# Patient Record
Sex: FEMALE | Race: BLACK OR AFRICAN AMERICAN | NOT HISPANIC OR LATINO | URBAN - METROPOLITAN AREA
[De-identification: names, ages, dates, MRNs, and addresses within clinical notes are randomized per-mention and may not be internally consistent; named-entity substitution may affect disease eponyms.]

---

## 2018-07-31 ENCOUNTER — OUTPATIENT (OUTPATIENT)
Dept: OUTPATIENT SERVICES | Facility: HOSPITAL | Age: 33
LOS: 1 days | Discharge: HOME | End: 2018-07-31

## 2018-07-31 DIAGNOSIS — C92.00 ACUTE MYELOBLASTIC LEUKEMIA, NOT HAVING ACHIEVED REMISSION: ICD-10-CM

## 2018-08-13 ENCOUNTER — APPOINTMENT (OUTPATIENT)
Dept: HEMATOLOGY ONCOLOGY | Facility: CLINIC | Age: 33
End: 2018-08-13
Payer: COMMERCIAL

## 2018-08-13 ENCOUNTER — RESULT REVIEW (OUTPATIENT)
Age: 33
End: 2018-08-13

## 2018-08-13 VITALS
SYSTOLIC BLOOD PRESSURE: 143 MMHG | OXYGEN SATURATION: 98 % | HEART RATE: 87 BPM | DIASTOLIC BLOOD PRESSURE: 94 MMHG | WEIGHT: 187.39 LBS | RESPIRATION RATE: 16 BRPM | HEIGHT: 62.44 IN | TEMPERATURE: 98.4 F | BODY MASS INDEX: 33.62 KG/M2

## 2018-08-13 DIAGNOSIS — Z78.9 OTHER SPECIFIED HEALTH STATUS: ICD-10-CM

## 2018-08-13 LAB
BASOPHILS # BLD AUTO: 0.1 K/UL — SIGNIFICANT CHANGE UP (ref 0–0.2)
BASOPHILS NFR BLD AUTO: 1.9 % — SIGNIFICANT CHANGE UP (ref 0–2)
EOSINOPHIL # BLD AUTO: 0.2 K/UL — SIGNIFICANT CHANGE UP (ref 0–0.5)
EOSINOPHIL NFR BLD AUTO: 3.9 % — SIGNIFICANT CHANGE UP (ref 0–6)
HCT VFR BLD CALC: 39.7 % — SIGNIFICANT CHANGE UP (ref 34.5–45)
HGB BLD-MCNC: 13.7 G/DL — SIGNIFICANT CHANGE UP (ref 11.5–15.5)
LYMPHOCYTES # BLD AUTO: 1.8 K/UL — SIGNIFICANT CHANGE UP (ref 1–3.3)
LYMPHOCYTES # BLD AUTO: 38.2 % — SIGNIFICANT CHANGE UP (ref 13–44)
MCHC RBC-ENTMCNC: 34.5 G/DL — SIGNIFICANT CHANGE UP (ref 32–36)
MCHC RBC-ENTMCNC: 36.2 PG — HIGH (ref 27–34)
MCV RBC AUTO: 105 FL — HIGH (ref 80–100)
MONOCYTES # BLD AUTO: 0.3 K/UL — SIGNIFICANT CHANGE UP (ref 0–0.9)
MONOCYTES NFR BLD AUTO: 6.2 % — SIGNIFICANT CHANGE UP (ref 2–14)
NEUTROPHILS # BLD AUTO: 2.3 K/UL — SIGNIFICANT CHANGE UP (ref 1.8–7.4)
NEUTROPHILS NFR BLD AUTO: 49.7 % — SIGNIFICANT CHANGE UP (ref 43–77)
PLATELET # BLD AUTO: 279 K/UL — SIGNIFICANT CHANGE UP (ref 150–400)
RBC # BLD: 3.77 M/UL — LOW (ref 3.8–5.2)
RBC # FLD: 12.6 % — SIGNIFICANT CHANGE UP (ref 10.3–14.5)
WBC # BLD: 4.6 K/UL — SIGNIFICANT CHANGE UP (ref 3.8–10.5)
WBC # FLD AUTO: 4.6 K/UL — SIGNIFICANT CHANGE UP (ref 3.8–10.5)

## 2018-08-13 PROCEDURE — 99245 OFF/OP CONSLTJ NEW/EST HI 55: CPT

## 2018-09-04 ENCOUNTER — MESSAGE (OUTPATIENT)
Age: 33
End: 2018-09-04

## 2018-09-06 ENCOUNTER — OUTPATIENT (OUTPATIENT)
Dept: OUTPATIENT SERVICES | Facility: HOSPITAL | Age: 33
LOS: 1 days | End: 2018-09-06
Payer: COMMERCIAL

## 2018-09-06 DIAGNOSIS — C91.10 CHRONIC LYMPHOCYTIC LEUKEMIA OF B-CELL TYPE NOT HAVING ACHIEVED REMISSION: ICD-10-CM

## 2018-09-07 ENCOUNTER — APPOINTMENT (OUTPATIENT)
Dept: CV DIAGNOSITCS | Facility: HOSPITAL | Age: 33
End: 2018-09-07

## 2018-09-11 ENCOUNTER — FORM ENCOUNTER (OUTPATIENT)
Age: 33
End: 2018-09-11

## 2018-09-11 ENCOUNTER — MESSAGE (OUTPATIENT)
Age: 33
End: 2018-09-11

## 2018-09-12 ENCOUNTER — LABORATORY RESULT (OUTPATIENT)
Age: 33
End: 2018-09-12

## 2018-09-12 ENCOUNTER — OUTPATIENT (OUTPATIENT)
Dept: OUTPATIENT SERVICES | Facility: HOSPITAL | Age: 33
LOS: 1 days | End: 2018-09-12
Payer: COMMERCIAL

## 2018-09-12 ENCOUNTER — APPOINTMENT (OUTPATIENT)
Dept: HEMATOLOGY ONCOLOGY | Facility: CLINIC | Age: 33
End: 2018-09-12

## 2018-09-12 ENCOUNTER — RESULT REVIEW (OUTPATIENT)
Age: 33
End: 2018-09-12

## 2018-09-12 ENCOUNTER — APPOINTMENT (OUTPATIENT)
Dept: CV DIAGNOSITCS | Facility: HOSPITAL | Age: 33
End: 2018-09-12

## 2018-09-12 ENCOUNTER — APPOINTMENT (OUTPATIENT)
Dept: PULMONOLOGY | Facility: CLINIC | Age: 33
End: 2018-09-12
Payer: COMMERCIAL

## 2018-09-12 ENCOUNTER — OUTPATIENT (OUTPATIENT)
Dept: OUTPATIENT SERVICES | Facility: HOSPITAL | Age: 33
LOS: 1 days | Discharge: HOME | End: 2018-09-12
Payer: COMMERCIAL

## 2018-09-12 ENCOUNTER — APPOINTMENT (OUTPATIENT)
Dept: NUCLEAR MEDICINE | Facility: HOSPITAL | Age: 33
End: 2018-09-12

## 2018-09-12 DIAGNOSIS — C92.01 ACUTE MYELOBLASTIC LEUKEMIA, IN REMISSION: ICD-10-CM

## 2018-09-12 DIAGNOSIS — C92.00 ACUTE MYELOBLASTIC LEUKEMIA, NOT HAVING ACHIEVED REMISSION: ICD-10-CM

## 2018-09-12 LAB
BASOPHILS # BLD AUTO: 0.1 K/UL — SIGNIFICANT CHANGE UP (ref 0–0.2)
BASOPHILS NFR BLD AUTO: 0.8 % — SIGNIFICANT CHANGE UP (ref 0–2)
EOSINOPHIL # BLD AUTO: 0 K/UL — SIGNIFICANT CHANGE UP (ref 0–0.5)
EOSINOPHIL NFR BLD AUTO: 0.4 % — SIGNIFICANT CHANGE UP (ref 0–6)
HCT VFR BLD CALC: 41.7 % — SIGNIFICANT CHANGE UP (ref 34.5–45)
HGB BLD-MCNC: 13.8 G/DL — SIGNIFICANT CHANGE UP (ref 11.5–15.5)
LYMPHOCYTES # BLD AUTO: 1.6 K/UL — SIGNIFICANT CHANGE UP (ref 1–3.3)
LYMPHOCYTES # BLD AUTO: 23 % — SIGNIFICANT CHANGE UP (ref 13–44)
MCHC RBC-ENTMCNC: 33 PG — SIGNIFICANT CHANGE UP (ref 27–34)
MCHC RBC-ENTMCNC: 33.1 G/DL — SIGNIFICANT CHANGE UP (ref 32–36)
MCV RBC AUTO: 99.6 FL — SIGNIFICANT CHANGE UP (ref 80–100)
MONOCYTES # BLD AUTO: 0.4 K/UL — SIGNIFICANT CHANGE UP (ref 0–0.9)
MONOCYTES NFR BLD AUTO: 6 % — SIGNIFICANT CHANGE UP (ref 2–14)
NEUTROPHILS # BLD AUTO: 5 K/UL — SIGNIFICANT CHANGE UP (ref 1.8–7.4)
NEUTROPHILS NFR BLD AUTO: 69.8 % — SIGNIFICANT CHANGE UP (ref 43–77)
PLATELET # BLD AUTO: 275 K/UL — SIGNIFICANT CHANGE UP (ref 150–400)
RBC # BLD: 4.19 M/UL — SIGNIFICANT CHANGE UP (ref 3.8–5.2)
RBC # FLD: 12.2 % — SIGNIFICANT CHANGE UP (ref 10.3–14.5)
WBC # BLD: 7.1 K/UL — SIGNIFICANT CHANGE UP (ref 3.8–10.5)
WBC # FLD AUTO: 7.1 K/UL — SIGNIFICANT CHANGE UP (ref 3.8–10.5)

## 2018-09-12 PROCEDURE — ZZZZZ: CPT

## 2018-09-12 PROCEDURE — 82803 BLOOD GASES ANY COMBINATION: CPT

## 2018-09-12 PROCEDURE — 94726 PLETHYSMOGRAPHY LUNG VOLUMES: CPT

## 2018-09-12 PROCEDURE — 93306 TTE W/DOPPLER COMPLETE: CPT

## 2018-09-12 PROCEDURE — A9538: CPT

## 2018-09-12 PROCEDURE — 78472 GATED HEART PLANAR SINGLE: CPT

## 2018-09-12 PROCEDURE — 93306 TTE W/DOPPLER COMPLETE: CPT | Mod: 26

## 2018-09-12 PROCEDURE — 88321 CONSLTJ&REPRT SLD PREP ELSWR: CPT

## 2018-09-12 PROCEDURE — 94729 DIFFUSING CAPACITY: CPT

## 2018-09-12 PROCEDURE — 70220 X-RAY EXAM OF SINUSES: CPT

## 2018-09-12 PROCEDURE — 36600 WITHDRAWAL OF ARTERIAL BLOOD: CPT | Mod: 59

## 2018-09-12 PROCEDURE — 78472 GATED HEART PLANAR SINGLE: CPT | Mod: 26

## 2018-09-12 PROCEDURE — 94060 EVALUATION OF WHEEZING: CPT

## 2018-09-12 PROCEDURE — A9560: CPT

## 2018-09-12 PROCEDURE — 70220 X-RAY EXAM OF SINUSES: CPT | Mod: 26

## 2018-09-13 ENCOUNTER — MESSAGE (OUTPATIENT)
Age: 33
End: 2018-09-13

## 2018-09-27 ENCOUNTER — RESULT REVIEW (OUTPATIENT)
Age: 33
End: 2018-09-27

## 2018-09-27 ENCOUNTER — APPOINTMENT (OUTPATIENT)
Dept: HEMATOLOGY ONCOLOGY | Facility: CLINIC | Age: 33
End: 2018-09-27
Payer: COMMERCIAL

## 2018-09-27 VITALS
SYSTOLIC BLOOD PRESSURE: 136 MMHG | OXYGEN SATURATION: 100 % | DIASTOLIC BLOOD PRESSURE: 86 MMHG | BODY MASS INDEX: 34.78 KG/M2 | TEMPERATURE: 98.2 F | HEART RATE: 89 BPM | WEIGHT: 189 LBS | RESPIRATION RATE: 16 BRPM | HEIGHT: 62 IN

## 2018-09-27 LAB
ALBUMIN SERPL ELPH-MCNC: 4.3 G/DL
ALP BLD-CCNC: 55 U/L
ALT SERPL-CCNC: 5 U/L
ANION GAP SERPL CALC-SCNC: 16 MMOL/L
AST SERPL-CCNC: 10 U/L
BILIRUB SERPL-MCNC: 0.2 MG/DL
BUN SERPL-MCNC: 12 MG/DL
CALCIUM SERPL-MCNC: 9.4 MG/DL
CHLORIDE SERPL-SCNC: 103 MMOL/L
CO2 SERPL-SCNC: 24 MMOL/L
CREAT SERPL-MCNC: 0.81 MG/DL
GLUCOSE SERPL-MCNC: 101 MG/DL
HCT VFR BLD CALC: 40.1 % — SIGNIFICANT CHANGE UP (ref 34.5–45)
HGB BLD-MCNC: 13.5 G/DL — SIGNIFICANT CHANGE UP (ref 11.5–15.5)
MCHC RBC-ENTMCNC: 32.5 PG — SIGNIFICANT CHANGE UP (ref 27–34)
MCHC RBC-ENTMCNC: 33.7 G/DL — SIGNIFICANT CHANGE UP (ref 32–36)
MCV RBC AUTO: 96.5 FL — SIGNIFICANT CHANGE UP (ref 80–100)
PLATELET # BLD AUTO: 272 K/UL — SIGNIFICANT CHANGE UP (ref 150–400)
POTASSIUM SERPL-SCNC: 4.7 MMOL/L
PROT SERPL-MCNC: 6.6 G/DL
RBC # BLD: 4.16 M/UL — SIGNIFICANT CHANGE UP (ref 3.8–5.2)
RBC # FLD: 12.2 % — SIGNIFICANT CHANGE UP (ref 10.3–14.5)
SODIUM SERPL-SCNC: 143 MMOL/L
WBC # BLD: 7.2 K/UL — SIGNIFICANT CHANGE UP (ref 3.8–10.5)
WBC # FLD AUTO: 7.2 K/UL — SIGNIFICANT CHANGE UP (ref 3.8–10.5)

## 2018-09-27 PROCEDURE — 38222 DX BONE MARROW BX & ASPIR: CPT | Mod: LT

## 2018-09-27 PROCEDURE — 99215 OFFICE O/P EST HI 40 MIN: CPT | Mod: 25

## 2018-09-27 PROCEDURE — 88185 FLOWCYTOMETRY/TC ADD-ON: CPT

## 2018-09-27 PROCEDURE — 88264 CHROMOSOME ANALYSIS 20-25: CPT

## 2018-09-27 PROCEDURE — 88237 TISSUE CULTURE BONE MARROW: CPT

## 2018-09-27 PROCEDURE — 88184 FLOWCYTOMETRY/ TC 1 MARKER: CPT

## 2018-09-27 PROCEDURE — 88291 CYTO/MOLECULAR REPORT: CPT

## 2018-09-27 PROCEDURE — 88280 CHROMOSOME KARYOTYPE STUDY: CPT

## 2018-09-28 ENCOUNTER — RESULT REVIEW (OUTPATIENT)
Age: 33
End: 2018-09-28

## 2018-09-28 ENCOUNTER — LABORATORY RESULT (OUTPATIENT)
Age: 33
End: 2018-09-28

## 2018-09-28 LAB — HCG SERPL-MCNC: <1 MIU/ML

## 2018-10-02 LAB — TM INTERPRETATION: SIGNIFICANT CHANGE UP

## 2018-10-03 ENCOUNTER — APPOINTMENT (OUTPATIENT)
Dept: HEMATOLOGY ONCOLOGY | Facility: CLINIC | Age: 33
End: 2018-10-03

## 2018-10-08 ENCOUNTER — OUTPATIENT (OUTPATIENT)
Dept: OUTPATIENT SERVICES | Facility: HOSPITAL | Age: 33
LOS: 1 days | Discharge: HOME | End: 2018-10-08

## 2018-10-08 DIAGNOSIS — C92.00 ACUTE MYELOBLASTIC LEUKEMIA, NOT HAVING ACHIEVED REMISSION: ICD-10-CM

## 2018-10-12 LAB — CHROM ANALY OVERALL INTERP SPEC-IMP: SIGNIFICANT CHANGE UP

## 2018-10-16 ENCOUNTER — APPOINTMENT (OUTPATIENT)
Dept: INFUSION THERAPY | Facility: HOSPITAL | Age: 33
End: 2018-10-16

## 2018-10-16 ENCOUNTER — APPOINTMENT (OUTPATIENT)
Dept: HEMATOLOGY ONCOLOGY | Facility: CLINIC | Age: 33
End: 2018-10-16

## 2018-10-17 ENCOUNTER — APPOINTMENT (OUTPATIENT)
Dept: INFUSION THERAPY | Facility: HOSPITAL | Age: 33
End: 2018-10-17

## 2018-12-03 ENCOUNTER — INBOUND DOCUMENT (OUTPATIENT)
Age: 33
End: 2018-12-03

## 2018-12-28 ENCOUNTER — INBOUND DOCUMENT (OUTPATIENT)
Age: 33
End: 2018-12-28

## 2019-01-22 ENCOUNTER — OUTPATIENT (OUTPATIENT)
Dept: OUTPATIENT SERVICES | Facility: HOSPITAL | Age: 34
LOS: 1 days | Discharge: HOME | End: 2019-01-22

## 2019-01-22 DIAGNOSIS — C92.00 ACUTE MYELOBLASTIC LEUKEMIA, NOT HAVING ACHIEVED REMISSION: ICD-10-CM

## 2019-01-29 ENCOUNTER — OUTPATIENT (OUTPATIENT)
Dept: OUTPATIENT SERVICES | Facility: HOSPITAL | Age: 34
LOS: 1 days | End: 2019-01-29

## 2019-01-29 ENCOUNTER — LABORATORY RESULT (OUTPATIENT)
Age: 34
End: 2019-01-29

## 2019-01-29 ENCOUNTER — APPOINTMENT (OUTPATIENT)
Dept: HEMATOLOGY ONCOLOGY | Facility: CLINIC | Age: 34
End: 2019-01-29
Payer: COMMERCIAL

## 2019-01-29 ENCOUNTER — RESULT REVIEW (OUTPATIENT)
Age: 34
End: 2019-01-29

## 2019-01-29 VITALS
SYSTOLIC BLOOD PRESSURE: 143 MMHG | OXYGEN SATURATION: 100 % | TEMPERATURE: 98.5 F | RESPIRATION RATE: 16 BRPM | WEIGHT: 198.2 LBS | HEART RATE: 84 BPM | BODY MASS INDEX: 36.25 KG/M2 | DIASTOLIC BLOOD PRESSURE: 90 MMHG

## 2019-01-29 DIAGNOSIS — C92.00 ACUTE MYELOBLASTIC LEUKEMIA, NOT HAVING ACHIEVED REMISSION: ICD-10-CM

## 2019-01-29 LAB
BASOPHILS # BLD AUTO: 0 K/UL — SIGNIFICANT CHANGE UP (ref 0–0.2)
BASOPHILS NFR BLD AUTO: 1.3 % — SIGNIFICANT CHANGE UP (ref 0–2)
EOSINOPHIL # BLD AUTO: 0 K/UL — SIGNIFICANT CHANGE UP (ref 0–0.5)
EOSINOPHIL NFR BLD AUTO: 0.7 % — SIGNIFICANT CHANGE UP (ref 0–6)
HCT VFR BLD CALC: 38.1 % — SIGNIFICANT CHANGE UP (ref 34.5–45)
HGB BLD-MCNC: 13.3 G/DL — SIGNIFICANT CHANGE UP (ref 11.5–15.5)
LYMPHOCYTES # BLD AUTO: 1.7 K/UL — SIGNIFICANT CHANGE UP (ref 1–3.3)
LYMPHOCYTES # BLD AUTO: 48.3 % — HIGH (ref 13–44)
MCHC RBC-ENTMCNC: 32.5 PG — SIGNIFICANT CHANGE UP (ref 27–34)
MCHC RBC-ENTMCNC: 35 G/DL — SIGNIFICANT CHANGE UP (ref 32–36)
MCV RBC AUTO: 92.9 FL — SIGNIFICANT CHANGE UP (ref 80–100)
MONOCYTES # BLD AUTO: 0.1 K/UL — SIGNIFICANT CHANGE UP (ref 0–0.9)
MONOCYTES NFR BLD AUTO: 3.4 % — SIGNIFICANT CHANGE UP (ref 2–14)
NEUTROPHILS # BLD AUTO: 1.7 K/UL — LOW (ref 1.8–7.4)
NEUTROPHILS NFR BLD AUTO: 46.3 % — SIGNIFICANT CHANGE UP (ref 43–77)
PLATELET # BLD AUTO: 470 K/UL — HIGH (ref 150–400)
RBC # BLD: 4.1 M/UL — SIGNIFICANT CHANGE UP (ref 3.8–5.2)
RBC # FLD: 14.3 % — SIGNIFICANT CHANGE UP (ref 10.3–14.5)
WBC # BLD: 3.6 K/UL — LOW (ref 3.8–10.5)
WBC # FLD AUTO: 3.6 K/UL — LOW (ref 3.8–10.5)

## 2019-01-29 PROCEDURE — 99215 OFFICE O/P EST HI 40 MIN: CPT

## 2019-01-31 ENCOUNTER — TRANSCRIPTION ENCOUNTER (OUTPATIENT)
Age: 34
End: 2019-01-31

## 2019-01-31 ENCOUNTER — MESSAGE (OUTPATIENT)
Age: 34
End: 2019-01-31

## 2019-02-04 NOTE — ADDENDUM
[FreeTextEntry1] : Documented by Yolanda López acting as a scribe for Dr. Branden Lou on 1/29/2019.\par \par All medical record entries made by the Scribe were at my, Dr. Branden Lou's, direction and personally dictated by me on 1/29/2019. I have reviewed the chart and agree that  the record accurately reflects my personal performance of the history, physical exam, assessment and plan. I have also personally directed, reviewed, and agree with the discharge instructions.\par

## 2019-02-04 NOTE — ASSESSMENT
[FreeTextEntry1] : Ms. Gary is a 33 years old female with AML, presenting today for a f/u AlloPSCT consultation. Patient is a practicing Yazidi. Accompanied by her  today. \par \par Patient's sister aged 33 () is Typed - HLA identical. \par Patient also has a sister aged 35 (Typed - 0/10) and a brother aged 29 (Type pending) with no significant medical history. \par \par I have again reviewed with the patient the rationale, alternatives, risks and benefits of treatment with chemotherapy alone vs.chemotherapy plus AlloPSCT w/o transfusion support\par \par I have also reviewed the pre-transplant testing for vital organ testing including, but not limited to ECHO, MUGA, PFTs, Urine analysis, BM Bx, LFTs and Dental evaluation. These are complete.\par \par I discussed with the patient, the role of a donor. I have reviewed with the patient the donor screening process including a physical exam, and the peripheral stem cell collection process. I discussed the role of Neupogen for 5 days prior collection. Side effects including but not limited to fever, body aches from Neupogen injections was discussed. \par \par I have also discussed the process of apheresis as a way to collect the peripheral stem cells. Anticoagulation with Citrate during apheresis was discussed, along with side effects including but not limited to hypocalcemia mild dysesthesias (most common) to tetany, seizures and cardiac arrhythmias. Treatment with oral or intravenous calcium supplementation in the setting of hypocalcemia was also discussed. \par \par I have discussed with the patient the pre-administration of Kepivance IV x 3 days, as a GI prophylaxis with the aim to lessen swelling, irritation, sores, and ulcers in the GI tract caused by high dose chemotherapy. Side effects including but not limited to flushing, itching from Kepivance were also discussed. \par \par I have discussed with the patient the four week hospital stay for the transplant. I have reviewed with the patient the initial reduced intensity chemotherapy with Fludarabine/Busulfan in the pre-transplant state. The patient will receive the stem cells by a pic line. I have discussed the role of chemotherapy following the transplant on day 1, 3, 6 and day 11. I have discussed the role of immunosuppressants in the post-transplant state. I have discussed the role of supportive care in the weeks following the transplant with palliative care without blood products. \par \par I discussed with the patient, post-transplant precautions including the use of antiviral, antifungal, antibiotics, and immunosuppressant. I discussed with the patient post-transplant complications including but not limited to bleeding, infection, end-organ damage, mucositis, veno-occlusive disease, graft failure, graft rejection, progression of disease as well as GVHD (both acute and chronic). Possible symptoms of GVHD including, but not limited to, rash, diarrhea, nausea, vomiting, mouth sores, liver inflammation were discussed. I informed the patient of post-discharge expectations as well, including fatigue and “flu-like” symptoms. Post-discharge precautions including diet and crowd control discussed as well. I reviewed with the patient the risks and benefits of this procedure. I reviewed the timing of the transplant with the patient. \par \par I informed the patient that there is a 15% risk of a catastrophic event to occur in patient's who accept transfusion and who receive blood products. Risks  including but not limited to relapse, graft rejection, severe infection and death were discussed in  the first year post-transplant.  In this case the risk is higher. Also, discussed with the patient that the risk of relapse decreases with each additional year post-transplant, and that at the 5 year gonzalez cure may be achieved. \par \par Literature and consents for AlloPSCT were provided to the patient for review. Patient wishes to proceed. All questions were addressed and emotional support provided.\par \par She fully understands her current situation and its gravity as well as the risks of allo transplant without blood product support. Discussed our protocol for bloodless transplants\par \par Bone marrow biopsy 19 shows 4% blasts, previously showed 5% blasts (18).\par Patient is scheduled to see Dr. Christie in 2019. Will discuss further with Dr. 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 \par She remains on vidazza plus venetoclax\par IDM's to be done today. Patient has completed other pre testing. Donor has been cleared.\par Possible admission for transplant on \par Sister is to start GF injections on , and collect on .\par Plan to transplant on .\par pt will need pre admit blood work

## 2019-02-04 NOTE — REASON FOR VISIT
[Follow-Up Visit] : a follow-up visit for [Spouse] : spouse [Parent] : parent [FreeTextEntry2] : AML

## 2019-02-04 NOTE — RESULTS/DATA
[FreeTextEntry1] : Bone marrow biopsy 01/24/19:\par -Persistent minimal involvement by acute myeloid leukemia (4% blasts by aspirate differential count).\par -Hypocellular marrow with erythroid predominate trilineage maturing hematopoiesis. 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

## 2019-02-04 NOTE — HISTORY OF PRESENT ILLNESS
[de-identified] : Ms. 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 Of note patient is a Taoism and cannot receive blood products. \par \par Patient was noted to be newly leukopenic to WBC 1.7 during preoperative evaluation in anticipation of right ankle ligament repair. Bone marrow aspirate and biopsy at [facility] on 12/2/2017 revealed AML with some suspicion for APL. She was transferred to [place] for further management. Peripheral counts from around this time are not available. \par \par Repeat marrow on 12/26/2017 showed 90% blasts with myeloid mutation arrest in a 40-50% cellular marrow. On flow blasts were negative for CD34 and HLA-DR; positive for CD38, CD58, CD33, and MPO, and partially positive for CD15, CD11b, , and CD64. CD45 was reported as dim. Karyotype showed +21 in 3 of 21 metaphases, but no t(15:17) was detected on karyotype of FISH. Molecular studies showed mutations in NPM1 (45%0, IDH1 (R132S; 39%), PTPN1 (23%), and CSF3R (5%) genes. There was lingering diagnostic uncertainty due to a largely aspicular and hypocellular specimen, and thus a marrow was repeated on 1/2/2018, confirming AML.\par \par On 1/4/2018, Ms. Gary began 7+3 with Idarubicin. Her induction course was complicated by DIC, neutropenic fever/ Klebsiella PNA, and bilateral retinal hemorrhage. She did not receive blood products and was instead supported with Nplate, Procrit, IV iron, Amicar, and Lupron. After achieving count stability, the patient was discharged; she was not deemed a candidate for further therapy given inability to receive transfusions and a day 14 marrow was not obtained. She subsequently saw Dr. [place] at [facility]. He obtained a recovery marrow on 2/13/2018 (day +40), which was 30% cellular with 45% blasts. 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 Cytogenetics and FISH were normal. \par \par She then established care with Dr. Christie for consideration of salvage therapy with IDH1 inhibition given the risks of additional chemotherapy without blood products support. [facility], on 3/1/2018, showed 69% blasts. [facility] panel confirmed IDH1 R132S, NPM1 V470Vkz 12, and CSF3R D810G mutations. She was started on Ivosidenib on [facility] protocol  and had completed four cycles to date (6/28/2018 = C5D1). Marrow blasts increased to 85% on 4/30 but declined to 36% by 5/31. A  bone marrow aspirate and biopsy  was obtained  (6/28/2018); preliminary results show further responding disease with 10.3% blasts by flow cytometry. Subsequent marrow in oct showed 5% blasts...she was then started on vidazza plus venetoclax. She completed 2 cycles...f/u marrow with 4% blasts..... [de-identified] : Patient presents today for a follow up AlloPSCT consultation. She is accompanied by her . She is continuing on Vidaza, next cycle starts 02/04, and daily Venetoclax. She does not offer any acute concerns today. Denies fever/chills, night sweats, mouth sores, eye dryness, blurred vision, nausea, vomiting, diarrhea. No CP, SOB or LE edema. She is also taking iron, allergy medications, vitamins B1, B12, and D3, and vitamins. D/W Dr Christie..pt is behaving more like MDS with residual blasts....

## 2019-02-13 ENCOUNTER — INBOUND DOCUMENT (OUTPATIENT)
Age: 34
End: 2019-02-13

## 2019-02-19 ENCOUNTER — APPOINTMENT (OUTPATIENT)
Dept: HEMATOLOGY ONCOLOGY | Facility: CLINIC | Age: 34
End: 2019-02-19

## 2019-02-19 ENCOUNTER — RESULT REVIEW (OUTPATIENT)
Age: 34
End: 2019-02-19

## 2019-02-19 LAB
BASOPHILS # BLD AUTO: 0 K/UL — SIGNIFICANT CHANGE UP (ref 0–0.2)
BASOPHILS NFR BLD AUTO: 0.5 % — SIGNIFICANT CHANGE UP (ref 0–2)
EOSINOPHIL # BLD AUTO: 0 K/UL — SIGNIFICANT CHANGE UP (ref 0–0.5)
EOSINOPHIL NFR BLD AUTO: 0.2 % — SIGNIFICANT CHANGE UP (ref 0–6)
HCT VFR BLD CALC: 37 % — SIGNIFICANT CHANGE UP (ref 34.5–45)
HGB BLD-MCNC: 12.8 G/DL — SIGNIFICANT CHANGE UP (ref 11.5–15.5)
LYMPHOCYTES # BLD AUTO: 2 K/UL — SIGNIFICANT CHANGE UP (ref 1–3.3)
LYMPHOCYTES # BLD AUTO: 45.7 % — HIGH (ref 13–44)
MCHC RBC-ENTMCNC: 32.5 PG — SIGNIFICANT CHANGE UP (ref 27–34)
MCHC RBC-ENTMCNC: 34.5 G/DL — SIGNIFICANT CHANGE UP (ref 32–36)
MCV RBC AUTO: 94.2 FL — SIGNIFICANT CHANGE UP (ref 80–100)
MONOCYTES # BLD AUTO: 0.3 K/UL — SIGNIFICANT CHANGE UP (ref 0–0.9)
MONOCYTES NFR BLD AUTO: 6.8 % — SIGNIFICANT CHANGE UP (ref 2–14)
NEUTROPHILS # BLD AUTO: 2 K/UL — SIGNIFICANT CHANGE UP (ref 1.8–7.4)
NEUTROPHILS NFR BLD AUTO: 46.8 % — SIGNIFICANT CHANGE UP (ref 43–77)
PLATELET # BLD AUTO: 529 K/UL — HIGH (ref 150–400)
RBC # BLD: 3.93 M/UL — SIGNIFICANT CHANGE UP (ref 3.8–5.2)
RBC # FLD: 14.1 % — SIGNIFICANT CHANGE UP (ref 10.3–14.5)
WBC # BLD: 4.3 K/UL — SIGNIFICANT CHANGE UP (ref 3.8–10.5)
WBC # FLD AUTO: 4.3 K/UL — SIGNIFICANT CHANGE UP (ref 3.8–10.5)

## 2019-02-20 ENCOUNTER — INPATIENT (INPATIENT)
Facility: HOSPITAL | Age: 34
LOS: 25 days | Discharge: HOME | DRG: 14 | End: 2019-03-18
Attending: INTERNAL MEDICINE | Admitting: INTERNAL MEDICINE
Payer: COMMERCIAL

## 2019-02-20 VITALS — WEIGHT: 193.79 LBS | HEIGHT: 62.2 IN

## 2019-02-20 DIAGNOSIS — Z76.82 AWAITING ORGAN TRANSPLANT STATUS: ICD-10-CM

## 2019-02-20 DIAGNOSIS — Z51.5 ENCOUNTER FOR PALLIATIVE CARE: ICD-10-CM

## 2019-02-20 DIAGNOSIS — C92.00 ACUTE MYELOBLASTIC LEUKEMIA, NOT HAVING ACHIEVED REMISSION: ICD-10-CM

## 2019-02-20 DIAGNOSIS — Z90.3 ACQUIRED ABSENCE OF STOMACH [PART OF]: Chronic | ICD-10-CM

## 2019-02-20 DIAGNOSIS — D46.9 MYELODYSPLASTIC SYNDROME, UNSPECIFIED: ICD-10-CM

## 2019-02-20 LAB
ALBUMIN SERPL ELPH-MCNC: 4.2 G/DL — SIGNIFICANT CHANGE UP (ref 3.3–5)
ALP SERPL-CCNC: 54 U/L — SIGNIFICANT CHANGE UP (ref 40–120)
ALT FLD-CCNC: 5 U/L — LOW (ref 10–45)
ANION GAP SERPL CALC-SCNC: 11 MMOL/L — SIGNIFICANT CHANGE UP (ref 5–17)
APPEARANCE UR: CLEAR — SIGNIFICANT CHANGE UP
APTT BLD: 27 SEC — LOW (ref 27.5–36.3)
APTT BLD: 32.4 SEC
AST SERPL-CCNC: 15 U/L — SIGNIFICANT CHANGE UP (ref 10–40)
BASOPHILS # BLD AUTO: 0.1 K/UL — SIGNIFICANT CHANGE UP (ref 0–0.2)
BASOPHILS NFR BLD AUTO: 1.3 % — SIGNIFICANT CHANGE UP (ref 0–2)
BILIRUB SERPL-MCNC: 0.3 MG/DL — SIGNIFICANT CHANGE UP (ref 0.2–1.2)
BILIRUB UR-MCNC: NEGATIVE — SIGNIFICANT CHANGE UP
BLD GP AB SCN SERPL QL: NEGATIVE — SIGNIFICANT CHANGE UP
BUN SERPL-MCNC: 8 MG/DL — SIGNIFICANT CHANGE UP (ref 7–23)
CALCIUM SERPL-MCNC: 9.7 MG/DL — SIGNIFICANT CHANGE UP (ref 8.4–10.5)
CHLORIDE SERPL-SCNC: 104 MMOL/L — SIGNIFICANT CHANGE UP (ref 96–108)
CO2 SERPL-SCNC: 24 MMOL/L — SIGNIFICANT CHANGE UP (ref 22–31)
COLOR SPEC: SIGNIFICANT CHANGE UP
CREAT SERPL-MCNC: 1.05 MG/DL — SIGNIFICANT CHANGE UP (ref 0.5–1.3)
DIFF PNL FLD: NEGATIVE — SIGNIFICANT CHANGE UP
EOSINOPHIL # BLD AUTO: 0 K/UL — SIGNIFICANT CHANGE UP (ref 0–0.5)
EOSINOPHIL NFR BLD AUTO: 0.1 % — SIGNIFICANT CHANGE UP (ref 0–6)
FIBRINOGEN PPP-MCNC: 601 MG/DL — HIGH (ref 350–510)
GLUCOSE SERPL-MCNC: 84 MG/DL — SIGNIFICANT CHANGE UP (ref 70–99)
GLUCOSE UR QL: NEGATIVE — SIGNIFICANT CHANGE UP
HCG SERPL-ACNC: <2 MIU/ML — SIGNIFICANT CHANGE UP
HCG SERPL-MCNC: <1 MIU/ML
HCT VFR BLD CALC: 35.1 % — SIGNIFICANT CHANGE UP (ref 34.5–45)
HGB BLD-MCNC: 12.4 G/DL — SIGNIFICANT CHANGE UP (ref 11.5–15.5)
INR BLD: 1.02 RATIO — SIGNIFICANT CHANGE UP (ref 0.88–1.16)
INR PPP: 1.01 RATIO
KETONES UR-MCNC: NEGATIVE — SIGNIFICANT CHANGE UP
LDH SERPL L TO P-CCNC: 175 U/L — SIGNIFICANT CHANGE UP (ref 50–242)
LEUKOCYTE ESTERASE UR-ACNC: NEGATIVE — SIGNIFICANT CHANGE UP
LYMPHOCYTES # BLD AUTO: 2 K/UL — SIGNIFICANT CHANGE UP (ref 1–3.3)
LYMPHOCYTES # BLD AUTO: 40.9 % — SIGNIFICANT CHANGE UP (ref 13–44)
MAGNESIUM SERPL-MCNC: 2.1 MG/DL — SIGNIFICANT CHANGE UP (ref 1.6–2.6)
MCHC RBC-ENTMCNC: 32.8 PG — SIGNIFICANT CHANGE UP (ref 27–34)
MCHC RBC-ENTMCNC: 35.3 GM/DL — SIGNIFICANT CHANGE UP (ref 32–36)
MCV RBC AUTO: 92.8 FL — SIGNIFICANT CHANGE UP (ref 80–100)
MONOCYTES # BLD AUTO: 0.3 K/UL — SIGNIFICANT CHANGE UP (ref 0–0.9)
MONOCYTES NFR BLD AUTO: 6.7 % — SIGNIFICANT CHANGE UP (ref 2–14)
NEUTROPHILS # BLD AUTO: 2.5 K/UL — SIGNIFICANT CHANGE UP (ref 1.8–7.4)
NEUTROPHILS NFR BLD AUTO: 51 % — SIGNIFICANT CHANGE UP (ref 43–77)
NITRITE UR-MCNC: NEGATIVE — SIGNIFICANT CHANGE UP
PH UR: 7 — SIGNIFICANT CHANGE UP (ref 5–8)
PHOSPHATE SERPL-MCNC: 2.9 MG/DL — SIGNIFICANT CHANGE UP (ref 2.5–4.5)
PLATELET # BLD AUTO: 472 K/UL — HIGH (ref 150–400)
POTASSIUM SERPL-MCNC: 4.1 MMOL/L — SIGNIFICANT CHANGE UP (ref 3.5–5.3)
POTASSIUM SERPL-SCNC: 4.1 MMOL/L — SIGNIFICANT CHANGE UP (ref 3.5–5.3)
PROT SERPL-MCNC: 6.8 G/DL — SIGNIFICANT CHANGE UP (ref 6–8.3)
PROT UR-MCNC: SIGNIFICANT CHANGE UP
PROTHROM AB SERPL-ACNC: 11.7 SEC — SIGNIFICANT CHANGE UP (ref 10–12.9)
PT BLD: 11.6 SEC
RBC # BLD: 3.79 M/UL — LOW (ref 3.8–5.2)
RBC # BLD: 3.79 M/UL — LOW (ref 3.8–5.2)
RBC # FLD: 14 % — SIGNIFICANT CHANGE UP (ref 10.3–14.5)
RETICS #: 35.3 K/UL — SIGNIFICANT CHANGE UP (ref 25–125)
RETICS/RBC NFR: 0.9 % — SIGNIFICANT CHANGE UP (ref 0.5–2.5)
RH IG SCN BLD-IMP: POSITIVE — SIGNIFICANT CHANGE UP
SODIUM SERPL-SCNC: 139 MMOL/L — SIGNIFICANT CHANGE UP (ref 135–145)
SP GR SPEC: 1.02 — SIGNIFICANT CHANGE UP (ref 1.01–1.02)
UROBILINOGEN FLD QL: SIGNIFICANT CHANGE UP
WBC # BLD: 5 K/UL — SIGNIFICANT CHANGE UP (ref 3.8–10.5)
WBC # FLD AUTO: 5 K/UL — SIGNIFICANT CHANGE UP (ref 3.8–10.5)

## 2019-02-20 PROCEDURE — 99223 1ST HOSP IP/OBS HIGH 75: CPT

## 2019-02-20 PROCEDURE — 99291 CRITICAL CARE FIRST HOUR: CPT

## 2019-02-20 PROCEDURE — 36573 INSJ PICC RS&I 5 YR+: CPT

## 2019-02-20 PROCEDURE — 93010 ELECTROCARDIOGRAM REPORT: CPT

## 2019-02-20 RX ORDER — MONTELUKAST 4 MG/1
10 TABLET, CHEWABLE ORAL DAILY
Qty: 0 | Refills: 0 | Status: DISCONTINUED | OUTPATIENT
Start: 2019-02-20 | End: 2019-03-18

## 2019-02-20 RX ORDER — PHYTONADIONE (VIT K1) 5 MG
10 TABLET ORAL
Qty: 0 | Refills: 0 | Status: DISCONTINUED | OUTPATIENT
Start: 2019-02-20 | End: 2019-02-26

## 2019-02-20 RX ORDER — SODIUM BICARBONATE 1 MEQ/ML
10 SYRINGE (ML) INTRAVENOUS
Qty: 0 | Refills: 0 | Status: DISCONTINUED | OUTPATIENT
Start: 2019-02-20 | End: 2019-03-18

## 2019-02-20 RX ORDER — APREPITANT 80 MG/1
80 CAPSULE ORAL DAILY
Qty: 0 | Refills: 0 | Status: COMPLETED | OUTPATIENT
Start: 2019-02-21 | End: 2019-02-27

## 2019-02-20 RX ORDER — ATOVAQUONE 750 MG/5ML
750 SUSPENSION ORAL EVERY 12 HOURS
Qty: 0 | Refills: 0 | Status: DISCONTINUED | OUTPATIENT
Start: 2019-02-20 | End: 2019-03-18

## 2019-02-20 RX ORDER — FLUCONAZOLE 150 MG/1
400 TABLET ORAL DAILY
Qty: 0 | Refills: 0 | Status: DISCONTINUED | OUTPATIENT
Start: 2019-02-20 | End: 2019-02-20

## 2019-02-20 RX ORDER — SENNA PLUS 8.6 MG/1
2 TABLET ORAL AT BEDTIME
Qty: 0 | Refills: 0 | Status: DISCONTINUED | OUTPATIENT
Start: 2019-02-20 | End: 2019-03-15

## 2019-02-20 RX ORDER — ACETAMINOPHEN 500 MG
650 TABLET ORAL EVERY 6 HOURS
Qty: 0 | Refills: 0 | Status: DISCONTINUED | OUTPATIENT
Start: 2019-02-20 | End: 2019-03-18

## 2019-02-20 RX ORDER — ASCORBIC ACID 60 MG
250 TABLET,CHEWABLE ORAL DAILY
Qty: 0 | Refills: 0 | Status: DISCONTINUED | OUTPATIENT
Start: 2019-02-20 | End: 2019-03-18

## 2019-02-20 RX ORDER — CHOLECALCIFEROL (VITAMIN D3) 125 MCG
400 CAPSULE ORAL DAILY
Qty: 0 | Refills: 0 | Status: DISCONTINUED | OUTPATIENT
Start: 2019-02-20 | End: 2019-03-18

## 2019-02-20 RX ORDER — HEPARIN SODIUM 5000 [USP'U]/ML
366 INJECTION INTRAVENOUS; SUBCUTANEOUS
Qty: 25000 | Refills: 0 | Status: DISCONTINUED | OUTPATIENT
Start: 2019-02-20 | End: 2019-03-06

## 2019-02-20 RX ORDER — DOCUSATE SODIUM 100 MG
100 CAPSULE ORAL THREE TIMES A DAY
Qty: 0 | Refills: 0 | Status: DISCONTINUED | OUTPATIENT
Start: 2019-02-20 | End: 2019-03-18

## 2019-02-20 RX ORDER — FERROUS SULFATE 325(65) MG
325 TABLET ORAL THREE TIMES A DAY
Qty: 0 | Refills: 0 | Status: DISCONTINUED | OUTPATIENT
Start: 2019-02-20 | End: 2019-03-08

## 2019-02-20 RX ORDER — ONDANSETRON 8 MG/1
8 TABLET, FILM COATED ORAL EVERY 8 HOURS
Qty: 0 | Refills: 0 | Status: COMPLETED | OUTPATIENT
Start: 2019-02-20 | End: 2019-02-28

## 2019-02-20 RX ORDER — CLOTRIMAZOLE 10 MG
1 TROCHE MUCOUS MEMBRANE
Qty: 0 | Refills: 0 | Status: DISCONTINUED | OUTPATIENT
Start: 2019-02-20 | End: 2019-03-18

## 2019-02-20 RX ORDER — CHLORHEXIDINE GLUCONATE 213 G/1000ML
1 SOLUTION TOPICAL
Qty: 0 | Refills: 0 | Status: DISCONTINUED | OUTPATIENT
Start: 2019-02-20 | End: 2019-03-18

## 2019-02-20 RX ORDER — FOLIC ACID 0.8 MG
1 TABLET ORAL DAILY
Qty: 0 | Refills: 0 | Status: DISCONTINUED | OUTPATIENT
Start: 2019-02-20 | End: 2019-03-18

## 2019-02-20 RX ORDER — LEVETIRACETAM 250 MG/1
500 TABLET, FILM COATED ORAL
Qty: 0 | Refills: 0 | Status: COMPLETED | OUTPATIENT
Start: 2019-02-22 | End: 2019-02-25

## 2019-02-20 RX ORDER — ZOLPIDEM TARTRATE 10 MG/1
5 TABLET ORAL AT BEDTIME
Qty: 0 | Refills: 0 | Status: DISCONTINUED | OUTPATIENT
Start: 2019-02-20 | End: 2019-02-20

## 2019-02-20 RX ORDER — DIPHENHYDRAMINE HCL 50 MG
25 CAPSULE ORAL EVERY 4 HOURS
Qty: 0 | Refills: 0 | Status: DISCONTINUED | OUTPATIENT
Start: 2019-02-20 | End: 2019-03-18

## 2019-02-20 RX ORDER — APREPITANT 80 MG/1
125 CAPSULE ORAL ONCE
Qty: 0 | Refills: 0 | Status: COMPLETED | OUTPATIENT
Start: 2019-02-20 | End: 2019-02-20

## 2019-02-20 RX ORDER — METOCLOPRAMIDE HCL 10 MG
10 TABLET ORAL EVERY 6 HOURS
Qty: 0 | Refills: 0 | Status: DISCONTINUED | OUTPATIENT
Start: 2019-02-20 | End: 2019-03-18

## 2019-02-20 RX ORDER — SODIUM CHLORIDE 9 MG/ML
1000 INJECTION INTRAMUSCULAR; INTRAVENOUS; SUBCUTANEOUS
Qty: 0 | Refills: 0 | Status: DISCONTINUED | OUTPATIENT
Start: 2019-02-20 | End: 2019-03-18

## 2019-02-20 RX ORDER — VORICONAZOLE 10 MG/ML
200 INJECTION, POWDER, LYOPHILIZED, FOR SOLUTION INTRAVENOUS EVERY 12 HOURS
Qty: 0 | Refills: 0 | Status: DISCONTINUED | OUTPATIENT
Start: 2019-02-20 | End: 2019-03-07

## 2019-02-20 RX ORDER — ACYCLOVIR SODIUM 500 MG
400 VIAL (EA) INTRAVENOUS THREE TIMES A DAY
Qty: 0 | Refills: 0 | Status: DISCONTINUED | OUTPATIENT
Start: 2019-02-20 | End: 2019-03-07

## 2019-02-20 RX ORDER — MEDROXYPROGESTERONE ACETATE 150 MG/ML
10 INJECTION, SUSPENSION, EXTENDED RELEASE INTRAMUSCULAR DAILY
Qty: 0 | Refills: 0 | Status: DISCONTINUED | OUTPATIENT
Start: 2019-02-20 | End: 2019-03-08

## 2019-02-20 RX ORDER — PANTOPRAZOLE SODIUM 20 MG/1
40 TABLET, DELAYED RELEASE ORAL
Qty: 0 | Refills: 0 | Status: DISCONTINUED | OUTPATIENT
Start: 2019-02-20 | End: 2019-03-07

## 2019-02-20 RX ORDER — SALIVA SUBSTITUTE COMB NO.11 351 MG
5 POWDER IN PACKET (EA) MUCOUS MEMBRANE
Qty: 0 | Refills: 0 | Status: DISCONTINUED | OUTPATIENT
Start: 2019-02-20 | End: 2019-03-18

## 2019-02-20 RX ORDER — FLUDARABINE PHOSPHATE 25 MG/ML
49 INJECTION, SOLUTION INTRAVENOUS EVERY 24 HOURS
Qty: 0 | Refills: 0 | Status: COMPLETED | OUTPATIENT
Start: 2019-02-20 | End: 2019-02-24

## 2019-02-20 RX ORDER — SODIUM CHLORIDE 9 MG/ML
10 INJECTION INTRAMUSCULAR; INTRAVENOUS; SUBCUTANEOUS
Qty: 0 | Refills: 0 | Status: DISCONTINUED | OUTPATIENT
Start: 2019-02-20 | End: 2019-03-18

## 2019-02-20 RX ORDER — MONTELUKAST 4 MG/1
1 TABLET, CHEWABLE ORAL
Qty: 0 | Refills: 0 | COMMUNITY

## 2019-02-20 RX ORDER — ERGOCALCIFEROL 1.25 MG/1
0 CAPSULE ORAL
Qty: 0 | Refills: 0 | COMMUNITY

## 2019-02-20 RX ORDER — URSODIOL 250 MG/1
300 TABLET, FILM COATED ORAL
Qty: 0 | Refills: 0 | Status: DISCONTINUED | OUTPATIENT
Start: 2019-02-20 | End: 2019-03-18

## 2019-02-20 RX ORDER — PREGABALIN 225 MG/1
1000 CAPSULE ORAL DAILY
Qty: 0 | Refills: 0 | Status: DISCONTINUED | OUTPATIENT
Start: 2019-02-20 | End: 2019-03-18

## 2019-02-20 RX ADMIN — Medication 5 MILLILITER(S): at 15:41

## 2019-02-20 RX ADMIN — SENNA PLUS 2 TABLET(S): 8.6 TABLET ORAL at 21:56

## 2019-02-20 RX ADMIN — Medication 250 MILLIGRAM(S): at 17:56

## 2019-02-20 RX ADMIN — Medication 10 MILLILITER(S): at 15:41

## 2019-02-20 RX ADMIN — SODIUM CHLORIDE 20 MILLILITER(S): 9 INJECTION INTRAMUSCULAR; INTRAVENOUS; SUBCUTANEOUS at 15:00

## 2019-02-20 RX ADMIN — MEDROXYPROGESTERONE ACETATE 10 MILLIGRAM(S): 150 INJECTION, SUSPENSION, EXTENDED RELEASE INTRAMUSCULAR at 15:42

## 2019-02-20 RX ADMIN — Medication 1 LOZENGE: at 15:41

## 2019-02-20 RX ADMIN — FLUDARABINE PHOSPHATE 203.92 MILLIGRAM(S): 25 INJECTION, SOLUTION INTRAVENOUS at 16:36

## 2019-02-20 RX ADMIN — CHLORHEXIDINE GLUCONATE 1 APPLICATION(S): 213 SOLUTION TOPICAL at 19:03

## 2019-02-20 RX ADMIN — MONTELUKAST 10 MILLIGRAM(S): 4 TABLET, CHEWABLE ORAL at 18:03

## 2019-02-20 RX ADMIN — PANTOPRAZOLE SODIUM 40 MILLIGRAM(S): 20 TABLET, DELAYED RELEASE ORAL at 15:38

## 2019-02-20 RX ADMIN — Medication 1 LOZENGE: at 20:13

## 2019-02-20 RX ADMIN — Medication 1 MILLIGRAM(S): at 15:37

## 2019-02-20 RX ADMIN — Medication 400 MILLIGRAM(S): at 21:03

## 2019-02-20 RX ADMIN — Medication 5 MILLILITER(S): at 20:13

## 2019-02-20 RX ADMIN — Medication 10 MILLIGRAM(S): at 20:54

## 2019-02-20 RX ADMIN — Medication 325 MILLIGRAM(S): at 21:03

## 2019-02-20 RX ADMIN — VORICONAZOLE 200 MILLIGRAM(S): 10 INJECTION, POWDER, LYOPHILIZED, FOR SOLUTION INTRAVENOUS at 18:20

## 2019-02-20 RX ADMIN — Medication 325 MILLIGRAM(S): at 18:20

## 2019-02-20 RX ADMIN — ATOVAQUONE 750 MILLIGRAM(S): 750 SUSPENSION ORAL at 18:03

## 2019-02-20 RX ADMIN — Medication 1 TABLET(S): at 15:38

## 2019-02-20 RX ADMIN — URSODIOL 300 MILLIGRAM(S): 250 TABLET, FILM COATED ORAL at 17:58

## 2019-02-20 RX ADMIN — PREGABALIN 1000 MICROGRAM(S): 225 CAPSULE ORAL at 18:00

## 2019-02-20 RX ADMIN — Medication 400 MILLIGRAM(S): at 15:36

## 2019-02-20 RX ADMIN — APREPITANT 125 MILLIGRAM(S): 80 CAPSULE ORAL at 15:39

## 2019-02-20 RX ADMIN — Medication 100 MILLIGRAM(S): at 15:39

## 2019-02-20 RX ADMIN — Medication 400 UNIT(S): at 18:00

## 2019-02-20 RX ADMIN — ONDANSETRON 8 MILLIGRAM(S): 8 TABLET, FILM COATED ORAL at 21:03

## 2019-02-20 RX ADMIN — Medication 10 MILLILITER(S): at 20:13

## 2019-02-20 RX ADMIN — Medication 100 MILLIGRAM(S): at 21:09

## 2019-02-20 NOTE — PROGRESS NOTE ADULT - SUBJECTIVE AND OBJECTIVE BOX
34y/o F with PMHX of AML presented to IR for PICC placement for venous access for chemotherapy.   Pt would not accept blood due to Sabianism observations. Blood refusal forms singed and in chart.       Allergies: No Known Allergies      PAST MEDICAL & SURGICAL HISTORY:        Pertinent labs:                      12.8   4.3   )-----------( 529      ( 19 Feb 2019 13:33 )             37.0           Consent: Procedure/risks/ Benefits explained. Informed consent obtained. Pt verbalizes understanding.

## 2019-02-20 NOTE — H&P ADULT - PROBLEM SELECTOR PLAN 2
1. Admit to BMTU   2. TLC placement in IR   3. Day - 7 - day 0 - antiemetics   4. Day – 7 through day -3 – Fludarabine 30mg / m2 IV daily   5. Day - 4 through day - 3 : Busulfan 0.8mg /kg over 2 hours every 6 hours x 8 doses. Hold Acetaminophen on Busulfan days only.   6. Day – 2- Start Tacrolimus 0.03mg /kg po q 12 hours. Tacrolimus levels every Monday & Thursday (target levels 5-10 not to exceed 15)   7. Day -1 – Administer IVIG dosed at  0.4g / kg of ideal body weight; rounded to nearest 5 grams   8. Day – 1 – at 2200, begin transplant hydration : 0.45Ns + 1 amp (50mEq) sodium bicarbonate at 150ml /hr; continue transplant hydration for 24 hours post infusion   9. Day 0 – HPC infusion. Kepivance on days 0, + 1, + 2   10. Administer Methotrexate 5mg/m2/day IVP on Day +1, +3, +6 and +11, hold if serum creatinine is > 3mg/dl  11. Day +12 Start Filgrastim SNDZ 5 micrograms/kg/day 1. Admit to BMTU   2. Double lumen PICC placement in IR   3. Day - 7 - day 0 - antiemetics   4. Day – 7 through day -3 – Fludarabine 30mg / m2 IV daily   5. Day - 4 through day - 3 : Busulfan 0.8mg /kg over 2 hours every 6 hours x 8 doses. Hold Acetaminophen on Busulfan days only. Keppra prophylaxis with Busulfan.  6. Day – 2- Start Tacrolimus 0.03mg /kg po q 12 hours. Tacrolimus levels every Monday & Thursday (target levels 5-10 not to exceed 15)   7. Day -1 – Administer IVIG dosed at  0.4g / kg of ideal body weight; rounded to nearest 5 grams   8. Day – 1 – at 2200, begin transplant hydration : 0.45Ns + 1 amp (50mEq) sodium bicarbonate at 150ml /hr; continue transplant hydration for 24 hours post infusion   9. Day 0 – HPC infusion.   10. Administer Methotrexate 5mg/m2/day IVP on Day +1, +3, +6 and +11, hold if serum creatinine is > 3mg/dl  11. Day +12 Start Filgrastim SNDZ 5 micrograms/kg/day 1. VOD prophylaxis - low dose heparin gtt (dosed at 100 units / kg / day), will D/C when platelet count < 75K as she does not accept transfusion of platelets; glutamine supplementation, Actigall BID   2. PCP prophylaxis - begin Mepron  3. Antiviral prophylaxis - Acyclovir 400mg po 3X/day  4. Antifungal prophylaxis- Vfend 200 mg po 2X/day  5.. GI prophylaxis - Protonix po QD   6. Antibacterial prophylaxis - when ANC < 1000, start Cipro 500mg po BID. If becomes febrile, pan cx, CXR and change Cipro to Cefepime 2g IV q 8 hours. Continue until count recovery  7. Kepivance for prevention of mucositis- days 0, +1, +2  8. Aggressive mouth care and skin care as per protocol

## 2019-02-20 NOTE — CONSULT NOTE ADULT - PROBLEM SELECTOR RECOMMENDATION 3
- will follow post-transplant for symptoms  - declined chaplaincy referral   - has niece, child life offered, patient will ask sister

## 2019-02-20 NOTE — H&P ADULT - NSHPPHYSICALEXAM_GEN_ALL_CORE
GENERAL: well appearing female, AAO x 3  HEAD:  Atraumatic, Normocephalic  EYES: EOMI, PERRLA, conjunctiva and sclera clear  NECK: Supple  CHEST/LUNG: CTA b/l.  no wheezes, rales, rhonchi  HEART: Regular rate and rhythm; Normal S1S2  ABDOMEN: Soft, Nontender, Nondistended; Bowel sounds present  EXTREMITIES:  2+ Peripheral Pulses, No edema + PICC line RUE, clean, dry, no erythema  NEUROLOGY: A & O x 4  SKIN: No rash

## 2019-02-20 NOTE — CONSULT NOTE ADULT - PROBLEM SELECTOR RECOMMENDATION 9
- planning for SCT next week  - plan per BMT team  - initially diagnosed as AML, now felt to possibly be MDS

## 2019-02-20 NOTE — CONSULT NOTE ADULT - SUBJECTIVE AND OBJECTIVE BOX
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 Diagnosed during bloodwork for a preop R ankle ligament repair, and managed with idarubicin c/b DIC, neutropenic fever, and retinal hemorrhage; had salvage therapy; further findings raised the question of MDS over AML. Sister is donor. Planned for SCT 2/27/19.    PERTINENT PM/SXH:   No pertinent past medical history    History of sleeve gastrectomy    FAMILY HISTORY:  Family history of Graves' disease (Father)  Family history of pancreatic cancer (Grandparent): paternal grandfather    ITEMS NOT CHECKED ARE NOT PRESENT    SOCIAL HISTORY:   Significant other/partner:  [X ]    Children:  [ ]  none  Hindu/Spirituality: Confucianism  Substance hx:  [ ]   Tobacco hx:  [ ] none  Alcohol hx: [ ] none Drug use hx: [ ]  Home Opioid hx:  [ ] (I-Stop Reference No: [id number])  Living Situation: [X ]Home  [ ]Long term care  [ ]Rehab [ ]Other  Occupation:    ADVANCE DIRECTIVES:    DNR [ ]  MOLST  [ ]    Living Will  [ ]   DECISION MAKER(s):  [ ] Health Care Proxy(s)  [ ] Surrogate(s)  [ ] Guardian           Name(s) and Contact(s):     BASELINE (I)ADL(s) (prior to admission):  [place]: [ ]Total  [ ] Moderate [ ]Dependent    Allergies    No Known Allergies    Intolerances    MEDICATIONS  (STANDING):  acetaminophen   Tablet .. 650 milliGRAM(s) Oral every 6 hours  acyclovir   Oral Tab/Cap 400 milliGRAM(s) Oral three times a day  ascorbic acid 250 milliGRAM(s) Oral daily  atovaquone Suspension 750 milliGRAM(s) Oral every 12 hours  Biotene Dry Mouth Oral Rinse 5 milliLiter(s) Swish and Spit five times a day  chlorhexidine 4% Liquid 1 Application(s) Topical <User Schedule>  cholecalciferol 400 Unit(s) Oral daily  clotrimazole Lozenge 1 Lozenge Oral five times a day  cyanocobalamin 1000 MICROGram(s) Oral daily  docusate sodium 100 milliGRAM(s) Oral three times a day  ferrous    sulfate 325 milliGRAM(s) Oral three times a day  fludarabine IVPB (eMAR) 49 milliGRAM(s) IV Intermittent every 24 hours  folic acid 1 milliGRAM(s) Oral daily  heparin  Infusion 366 Unit(s)/Hr (3.66 mL/Hr) IV Continuous <Continuous>  LORazepam   Injectable 1 milliGRAM(s) IV Push every 24 hours  medroxyPROGESTERone 10 milliGRAM(s) Oral daily  montelukast 10 milliGRAM(s) Oral daily  multivitamin 1 Tablet(s) Oral daily  ondansetron Injectable 8 milliGRAM(s) IV Push every 8 hours  pantoprazole    Tablet 40 milliGRAM(s) Oral before breakfast  phytonadione   Solution 10 milliGRAM(s) Oral <User Schedule>  senna 2 Tablet(s) Oral at bedtime  sodium bicarbonate Mouth Rinse 10 milliLiter(s) Swish and Spit five times a day  sodium chloride 0.9%. 1000 milliLiter(s) (20 mL/Hr) IV Continuous <Continuous>  ursodiol Capsule 300 milliGRAM(s) Oral two times a day with meals  voriconazole 200 milliGRAM(s) Oral every 12 hours    MEDICATIONS  (PRN):  acetaminophen   Tablet .. 650 milliGRAM(s) Oral every 6 hours PRN Temp greater or equal to 38C (100.4F), Mild Pain (1 - 3)  diphenhydrAMINE   Injectable 25 milliGRAM(s) IV Push every 4 hours PRN Allergy symptoms  LORazepam   Injectable 1 milliGRAM(s) IV Push every 6 hours PRN Anxiety  LORazepam   Injectable 1 milliGRAM(s) IV Push every 6 hours PRN Nausea and/or Vomiting  metoclopramide Injectable 10 milliGRAM(s) IV Push every 6 hours PRN Nausea and/or Vomiting  sodium chloride 0.9% lock flush 10 milliLiter(s) IV Push every 1 hour PRN Pre/post blood products, medications, blood draw, and to maintain line patency  zolpidem 5 milliGRAM(s) Oral at bedtime PRN Insomnia    PRESENT SYMPTOMS:   Source if other than patient:  [ ]Family   [ ]Team     Pain (Impact on QOL):    Location -         Minimal acceptable level (0-10 scale):                    Aggravating factors -  Quality -  Radiation -  Severity (0-10 scale) -    Timing -    PAIN AD Score:     [email or web address] (press ctrl +  left click to view)    Dyspnea:                           [ ]Mild [ ]Moderate [ ]Severe  Anxiety:                             [ ]Mild [ ]Moderate [ ]Severe  Fatigue:                             [ ]Mild [ ]Moderate [ ]Severe  Nausea:                             [ ]Mild [ ]Moderate [ ]Severe  Loss of appetite:              [ ]Mild [ ]Moderate [ ]Severe  Constipation:                    [ ]Mild [ ]Moderate [ ]Severe    Other Symptoms:  [ ]All other review of systems negative   [ ]Unable to obtain due to poor mentation     Karnofsky Performance Score/Palliative Performance Status Version 2:         %    PHYSICAL EXAM:  Vital Signs Last 24 Hrs  T(C): 36.8 (20 Feb 2019 12:30), Max: 36.8 (20 Feb 2019 12:30)  T(F): 98.2 (20 Feb 2019 12:30), Max: 98.2 (20 Feb 2019 12:30)  HR: 95 (20 Feb 2019 13:00) (92 - 95)  BP: 142/97 (20 Feb 2019 13:00) (142/97 - 145/97)  BP(mean): --  RR: 18 (20 Feb 2019 12:30) (18 - 18)  SpO2: 100% (20 Feb 2019 12:30) (100% - 100%) I&O's Summary      GENERAL:  [X ]Alert  [ ]Oriented x   [ ]Lethargic  [ ]Cachexia  [ ]Unarousable  [ ]Verbal  [ ]Non-Verbal    Behavioral:   [ ] Anxiety  [ ] Delirium [ ] Agitation [ ] Other    HEENT:  [X ]Normal   [ ]Dry mouth   [ ]ET Tube/Trach  [ ]Oral lesions    PULMONARY:   [X ]Clear [ ]Tachypnea  [ ]Audible excessive secretions   [ ]Rhonchi        [ ]Right [ ]Left [ ]Bilateral  [ ]Crackles        [ ]Right [ ]Left [ ]Bilateral  [ ]Wheezing     [ ]Right [ ]Left [ ]Bilateral    CARDIOVASCULAR:    [X ]Regular [ ]Irregular [ ]Tachy  [ ]Lance [ ]Murmur [ ]Other    GASTROINTESTINAL:  [X ]Soft  [ ]Distended   [X ]+BS  [X ]Non tender [ ]Tender  [ ]PEG [ ]OGT/ NGT  Last BM:     GENITOURINARY:  [ ]Normal [ ] Incontinent   [ ]Oliguria/Anuria   [ ]Noonan    MUSCULOSKELETAL:   [X ]Normal   [ ]Weakness  [ ]Bed/Wheelchair bound [ ]Edema    NEUROLOGIC:   [X ]No focal deficits  [ ] Cognitive impairment  [ ] Dysphagia [ ]Dysarthria [ ] Paresis [ ]Other     SKIN:   [ ]Normal   [ ]Pressure ulcer(s)  [ ]Rash    CRITICAL CARE:  [ ] Shock Present  [ ]Septic [ ]Cardiogenic [ ]Neurologic [ ]Hypovolemic  [ ]  Vasopressors [ ]  Inotropes   [ ] Respiratory failure present  [ ] Acute  [ ] Chronic [ ] Hypoxic  [ ] Hypercarbic [ ] Other  [ ] Other organ failure     LABS: reviewed                        12.4   5.0   )-----------( 472      ( 20 Feb 2019 13:06 )             35.1   02-20    139  |  104  |  8   ----------------------------<  84  4.1   |  24  |  1.05    Ca    9.7      20 Feb 2019 13:04  Phos  2.9     02-20  Mg     2.1     02-20    TPro  6.8  /  Alb  4.2  /  TBili  0.3  /  DBili  <0.1  /  AST  15  /  ALT  5<L>  /  AlkPhos  54  02-20  PT/INR - ( 20 Feb 2019 15:41 )   PT: 11.7 sec;   INR: 1.02 ratio         PTT - ( 20 Feb 2019 15:41 )  PTT:27.0 sec      RADIOLOGY & ADDITIONAL STUDIES: none on this admission thus far    PROTEIN CALORIE MALNUTRITION:   [ ] PPSV2 < or = to 30% [ ] significant weight loss  [ ] poor nutritional intake [ ] catabolic state [ ] anasarca     Albumin, Serum: 4.2 g/dL (02-20-19 @ 13:04)  Artificial Nutrition [ ]     REFERRALS:   [ ]Chaplaincy  [ ] Hospice  [ ]Child Life  [ ]Social Work  [ ]Case management [ ]Holistic Therapy     Goals of Care Discussion Document:     Saeid Pal MD  Palliative Medicine  office: [phone] | [phone]  pager: [phone]

## 2019-02-20 NOTE — H&P ADULT - HISTORY OF PRESENT ILLNESS
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  Patient is a Che[employer]ism and cannot receive blood products.    Patient was initially noted to be leukopenic during a preoperative evaluation of a right ankle ligament repair.  BM bx at that time (performed at [facility] on in 12/2017) showed AML.  She was transferred to [facility] for further management.  Repeat marrow later in the month showed 90% blasts with myeloid mutation arrest in a 40-50% cellular marrow. On flow blasts were negative for CD34 and HLA-DR; positive for CD38, CD58, CD33, and MPO, and partially positive for CD15, CD11b, , and CD64. CD45 was reported as dim. Karyotype showed +21 in 3 of 21 metaphases, but no t(15:17) was detected on karyotype of FISH. Molecular studies showed mutations in NPM1 (45%0, IDH1 (R132S; 39%), PTPN1 (23%), and CSF3R (5%) genes. There was lingering diagnostic uncertainty due to a largely aspicular and hypocellular specimen, and thus a marrow was repeated on 1/2/2018, confirming AML.    She started 7+3 in 1/2018 with Idarubicin.  Her induction course was complicated by DIC, neutropenic fever with klebsiella pneumonia, and b/l retinal hemorrhage.  She was supported with Nplate, Procrit, IV iron, Amicar and Lupron.  She was discharged, and at the time was deemed not a candidate for further therapy given inability to receive transfusions.  She then saw Dr. North Obrien at [facility] who obtained a BM bx on 2/13/2018 showing 30% cellularity with 45% blasts.  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 Cytogenetics and FISH were normal.     She then established care with Dr. Christie for consideration of salvage therapy with IDH1 inhibition given the risks of additional chemotherapy without blood products support. Initial marrow at [facility], on 3/1/2018, showed 69% blasts. NBGS confirmed IDH1 R132S, NPM1 [id number] 12, and CSF3R D810G mutations. She was started on Ivosidenib on [facility] protocol  and had completed four cycles to date (6/28/2018 = C5D1). Marrow blasts increased to 85% on 4/30 but declined to 36% by 5/31. A bone marrow aspirate and biopsy was obtained (6/28/2018); preliminary results show further responding disease with 10.3% blasts by flow cytometry. Subsequent marrow in October showed 5% blasts.  At that time she was started on Vidaza plus venetoclax.  Her follow up marrow showed 4% blasts. It was decided by the marrows patient was behaving like more of an MDS like picture.    Patient's donor is her sister.  She is a 10/10 match. Both donor and patient are CMV +. She had completed all pre testing needed for transplant.  She will start reduced intensity Fludarabine/Busulfan chemo regimen today.    Currently patient feels well with no acute complaints.  She went for line placement today. 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  Patient is a Christian and cannot receive blood products.    Patient was initially noted to be leukopenic during a preoperative evaluation of a right ankle ligament repair.  BM bx at that time (performed at [facility] on in 12/2017) showed AML.  She was transferred to [facility] for further management.  Repeat marrow later in the month showed 90% blasts with myeloid mutation arrest in a 40-50% cellular marrow. On flow blasts were negative for CD34 and HLA-DR; positive for CD38, CD58, CD33, and MPO, and partially positive for CD15, CD11b, , and CD64. CD45 was reported as dim. Karyotype showed +21 in 3 of 21 metaphases, but no t(15:17) was detected on karyotype of FISH. Molecular studies showed mutations in NPM1 (45%0, IDH1 (R132S; 39%), PTPN1 (23%), and CSF3R (5%) genes. There was lingering diagnostic uncertainty due to a largely aspicular and hypocellular specimen, and thus a marrow was repeated on 1/2/2018, confirming AML.    She started 7+3 in 1/2018 with Idarubicin.  Her induction course was complicated by DIC, neutropenic fever with klebsiella pneumonia, and b/l retinal hemorrhage.  She was supported with Nplate, Procrit, IV iron, Amicar and Lupron.  She was discharged, and at the time was deemed not a candidate for further therapy given inability to receive transfusions.  She then saw Dr. North Obrien at [facility] who obtained a BM bx on 2/13/2018 showing 30% cellularity with 45% blasts.  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 Cytogenetics and FISH were normal.     She then established care with Dr. Christie for consideration of salvage therapy with IDH1 inhibition given the risks of additional chemotherapy without blood products support. Initial marrow at [facility], on 3/1/2018, showed 69% blasts. NBGS confirmed IDH1 R132S, NPM1 [id number] 12, and CSF3R D810G mutations. She was started on Ivosidenib on [facility] protocol  and had completed four cycles to date (6/28/2018 = C5D1). Marrow blasts increased to 85% on 4/30 but declined to 36% by 5/31. A bone marrow aspirate and biopsy was obtained (6/28/2018); preliminary results show further responding disease with 10.3% blasts by flow cytometry. Subsequent marrow in October showed 5% blasts.  At that time she was started on Vidaza plus venetoclax.  Her follow up marrow in 1/2019 showed 4% blasts. Due to these findings, it was decided patient was behaving like more of an MDS like picture.    Patient's donor is her sister.  She is a 10/10 match. Both donor and patient are CMV +. She had completed all pre testing needed for transplant.  She will start reduced intensity Fludarabine/Busulfan chemo regimen today.    Currently patient feels well with no acute complaints.  She went for line placement today.  She is s/p 4 cycles of Vidaza and had continued her venetoclax. 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  Patient is a Yazidism and cannot receive blood products.    Patient was initially noted to be leukopenic during a preoperative evaluation of a right ankle ligament repair.  BM bx at that time (performed at [facility] in 12/2017) showed AML.  She was transferred to [facility] for further management.  Repeat marrow later in the month showed 90% blasts with myeloid mutation arrest in a 40-50% cellular marrow. On flow blasts were negative for CD34 and HLA-DR; positive for CD38, CD58, CD33, and MPO, and partially positive for CD15, CD11b, , and CD64. CD45 was reported as dim. Karyotype showed +21 in 3 of 21 metaphases, but no t(15:17) was detected on karyotype of FISH. Molecular studies showed mutations in NPM1 (45%), IDH1 (R132S; 39%), PTPN1 (23%), and CSF3R (5%) genes. There was lingering diagnostic uncertainty due to a largely aspicular and hypocellular specimen, and thus a marrow was repeated on 1/2/2018, confirming AML.    She started 7+3 in 1/2018 with Idarubicin.  Her induction course was complicated by DIC, neutropenic fever with klebsiella pneumonia, and b/l retinal hemorrhage.  She was supported with Nplate, Procrit, IV iron, Amicar and Lupron.  She was discharged, and at the time was deemed not a candidate for further therapy given inability to receive transfusions.  She then saw Dr. North Obrien at [facility] who obtained a BM bx on 2/13/2018 showing 30% cellularity with 45% blasts.  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 Cytogenetics and FISH were normal.     She then established care with Dr. Christie for consideration of salvage therapy with IDH1 inhibition given the risks of additional chemotherapy without blood products support. Initial marrow at [facility], on 3/1/2018, showed 69% blasts. NBGS confirmed IDH1 R132S, NPM1 S389Ltm 12, and CSF3R D810G mutations. She was started on Ivosidenib on [facility] protocol  and had completed four cycles to date (6/28/2018 = C5D1). Marrow blasts increased to 85% on 4/30 but declined to 36% by 5/31. A bone marrow aspirate and biopsy was obtained (6/28/2018); preliminary results show further responding disease with 10.3% blasts by flow cytometry. Subsequent marrow in October showed 5% blasts.  At that time she was started on Vidaza plus venetoclax.  Her follow up marrow in 1/2019 showed 4% blasts. Due to these findings, it was decided patient was behaving like more of an MDS like picture.    Patient's donor is her sister.  She is a 10/10 match. Both donor and patient are CMV +. She had completed all pre testing needed for transplant.  She will start reduced intensity Fludarabine/Busulfan chemo regimen today.    Currently patient feels well with no acute complaints.  She went for line placement today.  She is s/p 4 cycles of Vidaza and had continued her venetoclax.

## 2019-02-20 NOTE — H&P ADULT - FAMILY HISTORY
Grandparent  Still living? Unknown  Family history of pancreatic cancer, Age at diagnosis: Age Unknown     Father  Still living? Unknown  Family history of Graves' disease, Age at diagnosis: Age Unknown

## 2019-02-20 NOTE — PROCEDURE NOTE - NSPOSTCAREGUIDE_GEN_A_CORE
Keep the cast/splint/dressing clean and dry Banner Transposition Flap Text: The defect edges were debeveled with a #15 scalpel blade.  Given the location of the defect and the proximity to free margins a Banner transposition flap was deemed most appropriate.  Using a sterile surgical marker, an appropriate flap drawn around the defect. The area thus outlined was incised deep to adipose tissue with a #15 scalpel blade.  The skin margins were undermined to an appropriate distance in all directions utilizing iris scissors.

## 2019-02-20 NOTE — H&P ADULT - NSHPREVIEWOFSYSTEMS_GEN_ALL_CORE
CONSTITUTIONAL: No  fevers or chills  EYES/ENT: No visual changes;  No vertigo or throat pain   NECK: No pain or stiffness  RESPIRATORY: no cough, no shortness of breath  CARDIOVASCULAR: No chest pain or palpitations  GASTROINTESTINAL: No abdominal or epigastric pain. No nausea, vomiting, or hematemesis; No diarrhea or constipation. No melena or hematochezia.  GENITOURINARY: No dysuria, frequency or hematuria  NEUROLOGICAL: no numbness or tingling  SKIN: No itching, burning, rash, or lesions   All other review of systems is negative unless indicated above.

## 2019-02-20 NOTE — H&P ADULT - ASSESSMENT
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

## 2019-02-20 NOTE — CONSULT NOTE ADULT - ASSESSMENT
33F with PMH of AML/MDS, Scientology, here for allo PSCT. Diagnosed during bloodwork for a preop R ankle ligament repair, and managed with idarubicin c/b DIC, neutropenic fever, and retinal hemorrhage; had salvage therapy; further findings raised the question of MDS over AML. Sister is donor. Planned for SCT 2/27/19.

## 2019-02-20 NOTE — H&P ADULT - PROBLEM SELECTOR PLAN 3
Plan to supplement vitamins, iron; initiation of SCAR, GI prophylaxis, bowel regimen, use of Provera, avoidance of aspirin and NSAID's, lab testing and additional supportive care as per clinical policy for Care of the Patient Undergoing Bloodless Transplant.     The patient has submitted a copy of her Health Care Proxy.

## 2019-02-20 NOTE — H&P ADULT - PROBLEM SELECTOR PLAN 1
1. VOD prophylaxis - low dose heparin gtt (dosed at 100 units / kg / day), glutamine supplementation, Actigall BID   2. PCP prophylaxis - Bactrim DS through day -2    3. Antiviral prophylaxis - Acyclovir 400mg po TID to start day -1   4. Antifungal prophylaxis- Diflucan 400 mg po daily.  5.. GI prophylaxis - Protonix po QD   6. Antibacterial prophylaxis - when ANC < 1000, start Cipro 500mg po BID. If becomes febrile, pan cx, CXR and change Cipro to Cefepime 2g IV q 8 hours. Continue until count recovery  7. Kepivance for prevention of mucositis- days 0, +1, +2  8. Aggressive mouth care and skin care as per protocol 1. VOD prophylaxis - low dose heparin gtt (dosed at 100 units / kg / day), will D/C when platelet count < 75K as she does not accept transfusion of platelets; glutamine supplementation, Actigall BID   2. PCP prophylaxis - begin Mepron  3. Antiviral prophylaxis - Acyclovir 400mg po 3X/day  4. Antifungal prophylaxis- Vfend 200 mg po 2X/day  5.. GI prophylaxis - Protonix po QD   6. Antibacterial prophylaxis - when ANC < 1000, start Cipro 500mg po BID. If becomes febrile, pan cx, CXR and change Cipro to Cefepime 2g IV q 8 hours. Continue until count recovery  7. Kepivance for prevention of mucositis- days 0, +1, +2  8. Aggressive mouth care and skin care as per protocol 1. Admit to BMTU   2. Double lumen PICC placement in IR   3. Day - 7 - day 0 - antiemetics   4. Day – 7 through day -3 – Fludarabine 30mg / m2 IV daily   5. Day - 4 through day - 3 : Busulfan 0.8mg /kg over 2 hours every 6 hours x 8 doses. Hold Acetaminophen on Busulfan days only. Keppra prophylaxis with Busulfan.  6. Day – 2- Start Tacrolimus 0.03mg /kg po q 12 hours. Tacrolimus levels every Monday & Thursday (target levels 5-10 not to exceed 15)   7. Day -1 – Administer IVIG dosed at  0.4g / kg of ideal body weight; rounded to nearest 5 grams   8. Day – 1 – at 2200, begin transplant hydration : 0.45Ns + 1 amp (50mEq) sodium bicarbonate at 150ml /hr; continue transplant hydration for 24 hours post infusion   9. Day 0 – HPC infusion.   10. Administer Methotrexate 5mg/m2/day IVP on Day +1, +3, +6 and +11, hold if serum creatinine is > 3mg/dl  11. Day +12 Start Filgrastim SNDZ 5 micrograms/kg/day

## 2019-02-21 DIAGNOSIS — Z53.1 PROCEDURE AND TREATMENT NOT CARRIED OUT BECAUSE OF PATIENT'S DECISION FOR REASONS OF BELIEF AND GROUP PRESSURE: ICD-10-CM

## 2019-02-21 DIAGNOSIS — Z29.9 ENCOUNTER FOR PROPHYLACTIC MEASURES, UNSPECIFIED: ICD-10-CM

## 2019-02-21 LAB
ALBUMIN SERPL ELPH-MCNC: 4.3 G/DL
ALP BLD-CCNC: 61 U/L
ALT SERPL-CCNC: 5 U/L
ANION GAP SERPL CALC-SCNC: 17 MMOL/L
AST SERPL-CCNC: 14 U/L
BILIRUB SERPL-MCNC: 0.2 MG/DL
BUN SERPL-MCNC: 8 MG/DL
CALCIUM SERPL-MCNC: 10.6 MG/DL
CHLORIDE SERPL-SCNC: 105 MMOL/L
CO2 SERPL-SCNC: 22 MMOL/L
CREAT SERPL-MCNC: 1.09 MG/DL
GLUCOSE SERPL-MCNC: 118 MG/DL
LDH SERPL-CCNC: 198 U/L
MAGNESIUM SERPL-MCNC: 2.2 MG/DL
POTASSIUM SERPL-SCNC: 4.9 MMOL/L
PROT SERPL-MCNC: 6.9 G/DL
SODIUM SERPL-SCNC: 143 MMOL/L

## 2019-02-21 PROCEDURE — 99291 CRITICAL CARE FIRST HOUR: CPT

## 2019-02-21 RX ORDER — SODIUM CHLORIDE 9 MG/ML
1000 INJECTION, SOLUTION INTRAVENOUS
Qty: 0 | Refills: 0 | Status: DISCONTINUED | OUTPATIENT
Start: 2019-02-26 | End: 2019-03-05

## 2019-02-21 RX ORDER — ACETAMINOPHEN 500 MG
650 TABLET ORAL ONCE
Qty: 0 | Refills: 0 | Status: COMPLETED | OUTPATIENT
Start: 2019-02-27 | End: 2019-02-27

## 2019-02-21 RX ORDER — DIPHENHYDRAMINE HCL 50 MG
25 CAPSULE ORAL ONCE
Qty: 0 | Refills: 0 | Status: COMPLETED | OUTPATIENT
Start: 2019-02-27 | End: 2019-02-27

## 2019-02-21 RX ORDER — HYDROCORTISONE 20 MG
50 TABLET ORAL ONCE
Qty: 0 | Refills: 0 | Status: COMPLETED | OUTPATIENT
Start: 2019-02-27 | End: 2019-02-27

## 2019-02-21 RX ORDER — ACETAMINOPHEN 500 MG
650 TABLET ORAL
Qty: 0 | Refills: 0 | Status: DISCONTINUED | OUTPATIENT
Start: 2019-02-26 | End: 2019-03-18

## 2019-02-21 RX ORDER — DIPHENHYDRAMINE HCL 50 MG
25 CAPSULE ORAL
Qty: 0 | Refills: 0 | Status: DISCONTINUED | OUTPATIENT
Start: 2019-02-26 | End: 2019-03-18

## 2019-02-21 RX ORDER — TACROLIMUS 5 MG/1
2 CAPSULE ORAL EVERY 12 HOURS
Qty: 0 | Refills: 0 | Status: DISCONTINUED | OUTPATIENT
Start: 2019-02-25 | End: 2019-02-28

## 2019-02-21 RX ORDER — IMMUNE GLOBULIN (HUMAN) 10 G/100ML
20 INJECTION INTRAVENOUS; SUBCUTANEOUS
Qty: 0 | Refills: 0 | Status: DISCONTINUED | OUTPATIENT
Start: 2019-02-26 | End: 2019-03-18

## 2019-02-21 RX ADMIN — Medication 5 MILLILITER(S): at 15:33

## 2019-02-21 RX ADMIN — Medication 100 MILLIGRAM(S): at 06:13

## 2019-02-21 RX ADMIN — Medication 325 MILLIGRAM(S): at 13:04

## 2019-02-21 RX ADMIN — Medication 5 MILLILITER(S): at 07:58

## 2019-02-21 RX ADMIN — URSODIOL 300 MILLIGRAM(S): 250 TABLET, FILM COATED ORAL at 17:34

## 2019-02-21 RX ADMIN — Medication 5 MILLILITER(S): at 12:32

## 2019-02-21 RX ADMIN — ONDANSETRON 8 MILLIGRAM(S): 8 TABLET, FILM COATED ORAL at 06:23

## 2019-02-21 RX ADMIN — Medication 100 MILLIGRAM(S): at 21:22

## 2019-02-21 RX ADMIN — VORICONAZOLE 200 MILLIGRAM(S): 10 INJECTION, POWDER, LYOPHILIZED, FOR SOLUTION INTRAVENOUS at 17:34

## 2019-02-21 RX ADMIN — PREGABALIN 1000 MICROGRAM(S): 225 CAPSULE ORAL at 12:34

## 2019-02-21 RX ADMIN — FLUDARABINE PHOSPHATE 203.92 MILLIGRAM(S): 25 INJECTION, SOLUTION INTRAVENOUS at 15:34

## 2019-02-21 RX ADMIN — Medication 1 MILLIGRAM(S): at 12:33

## 2019-02-21 RX ADMIN — Medication 10 MILLILITER(S): at 17:33

## 2019-02-21 RX ADMIN — Medication 1 TABLET(S): at 12:33

## 2019-02-21 RX ADMIN — PANTOPRAZOLE SODIUM 40 MILLIGRAM(S): 20 TABLET, DELAYED RELEASE ORAL at 06:27

## 2019-02-21 RX ADMIN — Medication 1 LOZENGE: at 20:33

## 2019-02-21 RX ADMIN — Medication 5 MILLILITER(S): at 22:55

## 2019-02-21 RX ADMIN — ATOVAQUONE 750 MILLIGRAM(S): 750 SUSPENSION ORAL at 17:34

## 2019-02-21 RX ADMIN — Medication 1 LOZENGE: at 12:32

## 2019-02-21 RX ADMIN — SENNA PLUS 2 TABLET(S): 8.6 TABLET ORAL at 21:23

## 2019-02-21 RX ADMIN — ONDANSETRON 8 MILLIGRAM(S): 8 TABLET, FILM COATED ORAL at 21:23

## 2019-02-21 RX ADMIN — Medication 325 MILLIGRAM(S): at 06:23

## 2019-02-21 RX ADMIN — URSODIOL 300 MILLIGRAM(S): 250 TABLET, FILM COATED ORAL at 07:57

## 2019-02-21 RX ADMIN — Medication 1 LOZENGE: at 15:33

## 2019-02-21 RX ADMIN — Medication 5 MILLILITER(S): at 00:22

## 2019-02-21 RX ADMIN — ONDANSETRON 8 MILLIGRAM(S): 8 TABLET, FILM COATED ORAL at 13:03

## 2019-02-21 RX ADMIN — ATOVAQUONE 750 MILLIGRAM(S): 750 SUSPENSION ORAL at 06:13

## 2019-02-21 RX ADMIN — MONTELUKAST 10 MILLIGRAM(S): 4 TABLET, CHEWABLE ORAL at 12:32

## 2019-02-21 RX ADMIN — APREPITANT 80 MILLIGRAM(S): 80 CAPSULE ORAL at 12:32

## 2019-02-21 RX ADMIN — MEDROXYPROGESTERONE ACETATE 10 MILLIGRAM(S): 150 INJECTION, SUSPENSION, EXTENDED RELEASE INTRAMUSCULAR at 12:35

## 2019-02-21 RX ADMIN — Medication 400 MILLIGRAM(S): at 21:22

## 2019-02-21 RX ADMIN — Medication 10 MILLILITER(S): at 20:33

## 2019-02-21 RX ADMIN — Medication 400 MILLIGRAM(S): at 06:13

## 2019-02-21 RX ADMIN — Medication 10 MILLILITER(S): at 00:22

## 2019-02-21 RX ADMIN — VORICONAZOLE 200 MILLIGRAM(S): 10 INJECTION, POWDER, LYOPHILIZED, FOR SOLUTION INTRAVENOUS at 06:27

## 2019-02-21 RX ADMIN — Medication 1 LOZENGE: at 00:22

## 2019-02-21 RX ADMIN — Medication 10 MILLILITER(S): at 12:28

## 2019-02-21 RX ADMIN — Medication 1 LOZENGE: at 07:57

## 2019-02-21 RX ADMIN — Medication 10 MILLILITER(S): at 22:56

## 2019-02-21 RX ADMIN — CHLORHEXIDINE GLUCONATE 1 APPLICATION(S): 213 SOLUTION TOPICAL at 06:12

## 2019-02-21 RX ADMIN — Medication 1 LOZENGE: at 22:56

## 2019-02-21 RX ADMIN — Medication 400 UNIT(S): at 12:32

## 2019-02-21 RX ADMIN — Medication 250 MILLIGRAM(S): at 12:33

## 2019-02-21 RX ADMIN — Medication 325 MILLIGRAM(S): at 21:22

## 2019-02-21 RX ADMIN — Medication 100 MILLIGRAM(S): at 13:04

## 2019-02-21 RX ADMIN — Medication 400 MILLIGRAM(S): at 13:03

## 2019-02-21 RX ADMIN — Medication 5 MILLILITER(S): at 20:32

## 2019-02-21 RX ADMIN — Medication 10 MILLILITER(S): at 07:58

## 2019-02-21 NOTE — PROGRESS NOTE ADULT - ATTENDING COMMENTS
32 y/o F w/ AML, admitted for allogeneic peripheral blood stem cell transplant from [facility] (sister 10/10). Pt is Gnosticist, and does not accept blood products for Mormonism reasons   Day - 6 of flu /bu prep - fludarabine 2/5  Labs every other day   Continue iron, folic acid, vitamin K, vitamin B   Antiemetics, mouth care, skin care   OOB 34 y/o F with h/o AML(now with 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 Pt is Confucianism, and does not accept blood products for Yarsani reasons   Day - 6 of flu /bu prep - fludarabine 2/5  Continue Acyclovir, Mepron, Vfend prophylaxis  Continue VOD prophylaxis with Actigall, glutamine supplement, low dose heparin drip(will D/C when platelet count <=75K)  Labs every other day   Continue iron, folic acid, vitamin K, vitamin B-12, vitamin C as per standard practice for bloodless transplant  Antiemetics, mouth care, skin care   Bowel regimen  OOB

## 2019-02-21 NOTE — PROGRESS NOTE ADULT - SUBJECTIVE AND OBJECTIVE BOX
HPC Transplant Team                                                      Critical / Counseling Time Provided: 30 minutes                                                                                                                                                        Chief Complaint: Allogeneic peripheral blood stem cell transplant from [facility] (sister 10/10) for treatment of AML     S: Patient seen and examined with HPC Transplant Team:     Denies mouth / tongue / throat pain, dyspnea, cough, nausea, vomiting, diarrhea, abdominal pain     O: Vitals:   Vital Signs Last 24 Hrs  T(C): 36.1 (21 Feb 2019 06:45), Max: 37.2 (20 Feb 2019 17:56)  T(F): 97 (21 Feb 2019 06:45), Max: 99 (20 Feb 2019 17:56)  HR: 93 (21 Feb 2019 06:45) (84 - 96)  BP: 133/92 (21 Feb 2019 06:45) (103/73 - 145/97)  BP(mean): --  RR: 18 (21 Feb 2019 06:45) (18 - 18)  SpO2: 100% (21 Feb 2019 06:45) (99% - 100%)    Admit weight: 87.9kg   Daily Height in cm: 158 (20 Feb 2019 10:19)    Daily     Intake / Output:   02-20 @ 07:01  -  02-21 @ 07:00  --------------------------------------------------------  IN: 1771.6 mL / OUT: 1650 mL / NET: 121.6 mL        PE:   Oropharynx: no erythema or ulcerations  Oral Mucositis:              -                                         Grade: n/a  CVS: S1, S2 RRR   Lungs: CTA throughout bilaterally   Abdomen:+ BS x 4, soft, NT, ND   Extremities: no edema   Gastric Mucositis:       -                                           Grade: n/a  Intestinal Mucositis:     -                                         Grade: n/a  Skin: no rash   TLC: PICC line TLC   Neuro: A&O x 3   Pain: 0     Labs:  - TO BE DRAWN EVERY OTHER DAY   CBC Full  -  ( 20 Feb 2019 13:06 )  WBC Count : 5.0 K/uL  Hemoglobin : 12.4 g/dL  Hematocrit : 35.1 %  Platelet Count - Automated : 472 K/uL  Mean Cell Volume : 92.8 fl  Mean Cell Hemoglobin : 32.8 pg  Mean Cell Hemoglobin Concentration : 35.3 gm/dL  Auto Neutrophil # : 2.5 K/uL  Auto Lymphocyte # : 2.0 K/uL  Auto Monocyte # : 0.3 K/uL  Auto Eosinophil # : 0.0 K/uL  Auto Basophil # : 0.1 K/uL  Auto Neutrophil % : 51.0 %  Auto Lymphocyte % : 40.9 %  Auto Monocyte % : 6.7 %  Auto Eosinophil % : 0.1 %  Auto Basophil % : 1.3 %                          12.4   5.0   )-----------( 472      ( 20 Feb 2019 13:06 )             35.1     02-20    139  |  104  |  8   ----------------------------<  84  4.1   |  24  |  1.05    Ca    9.7      20 Feb 2019 13:04  Phos  2.9     02-20  Mg     2.1     02-20    TPro  6.8  /  Alb  4.2  /  TBili  0.3  /  DBili  <0.1  /  AST  15  /  ALT  5<L>  /  AlkPhos  54  02-20    PT/INR - ( 20 Feb 2019 15:41 )   PT: 11.7 sec;   INR: 1.02 ratio         PTT - ( 20 Feb 2019 15:41 )  PTT:27.0 sec  LIVER FUNCTIONS - ( 20 Feb 2019 13:04 )  Alb: 4.2 g/dL / Pro: 6.8 g/dL / ALK PHOS: 54 U/L / ALT: 5 U/L / AST: 15 U/L / GGT: x           Lactate Dehydrogenase, Serum: 175 U/L (02-20 @ 13:04)      Meds:   Antimicrobials:   acyclovir   Oral Tab/Cap 400 milliGRAM(s) Oral three times a day  atovaquone Suspension 750 milliGRAM(s) Oral every 12 hours  clotrimazole Lozenge 1 Lozenge Oral five times a day  voriconazole 200 milliGRAM(s) Oral every 12 hours      Heme / Onc:   fludarabine IVPB (eMAR) 49 milliGRAM(s) IV Intermittent every 24 hours  heparin  Infusion 366 Unit(s)/Hr IV Continuous <Continuous>      GI:  docusate sodium 100 milliGRAM(s) Oral three times a day  pantoprazole    Tablet 40 milliGRAM(s) Oral before breakfast  senna 2 Tablet(s) Oral at bedtime  sodium bicarbonate Mouth Rinse 10 milliLiter(s) Swish and Spit five times a day  ursodiol Capsule 300 milliGRAM(s) Oral two times a day with meals      Cardiovascular:       Immunologic:       Other medications:   acetaminophen   Tablet .. 650 milliGRAM(s) Oral every 6 hours  aprepitant 80 milliGRAM(s) Oral daily  ascorbic acid 250 milliGRAM(s) Oral daily  Biotene Dry Mouth Oral Rinse 5 milliLiter(s) Swish and Spit five times a day  chlorhexidine 4% Liquid 1 Application(s) Topical <User Schedule>  cholecalciferol 400 Unit(s) Oral daily  cyanocobalamin 1000 MICROGram(s) Oral daily  ferrous    sulfate 325 milliGRAM(s) Oral three times a day  folic acid 1 milliGRAM(s) Oral daily  LORazepam   Injectable 1 milliGRAM(s) IV Push every 24 hours  medroxyPROGESTERone 10 milliGRAM(s) Oral daily  montelukast 10 milliGRAM(s) Oral daily  multivitamin 1 Tablet(s) Oral daily  ondansetron Injectable 8 milliGRAM(s) IV Push every 8 hours  phytonadione   Solution 10 milliGRAM(s) Oral <User Schedule>  sodium chloride 0.9%. 1000 milliLiter(s) IV Continuous <Continuous>      PRN:   acetaminophen   Tablet .. 650 milliGRAM(s) Oral every 6 hours PRN  diphenhydrAMINE   Injectable 25 milliGRAM(s) IV Push every 4 hours PRN  LORazepam   Injectable 1 milliGRAM(s) IV Push every 6 hours PRN  LORazepam   Injectable 1 milliGRAM(s) IV Push every 6 hours PRN  metoclopramide Injectable 10 milliGRAM(s) IV Push every 6 hours PRN  sodium chloride 0.9% lock flush 10 milliLiter(s) IV Push every 1 hour PRN  zolpidem 5 milliGRAM(s) Oral at bedtime PRN      A/P:  33 year old female with a history of AML  Pre  :  Allogeneic PBSCT day - 6   Continue flu / bu prep: fludarabine 2/5   Labs every other day to conserve blood     1. Infectious Disease:   acyclovir   Oral Tab/Cap 400 milliGRAM(s) Oral three times a day  atovaquone Suspension 750 milliGRAM(s) Oral every 12 hours  clotrimazole Lozenge 1 Lozenge Oral five times a day  voriconazole 200 milliGRAM(s) Oral every 12 hours    2. VOD Prophylaxis: Actigall, Glutamine, Heparin (dosed at 100 units / kg / day)     3. GI Prophylaxis:    pantoprazole    Tablet 40 milliGRAM(s) Oral before breakfast    4. Mouthcare - NS / NaHCO3 rinses, Mycelex, Caphosol; Skin care     5. GVHD prophylaxis   Tacrolimus to start on day - 2   MTX days 1,3,6,11     6. Transfuse & replete electrolytes prn     7. IV hydration, daily weights, strict I&O, prn diuresis     8. PO intake as tolerated, nutrition follow up as needed, MVI, folic acid     9. Antiemetics, anti-diarrhea medications:   LORazepam   Injectable 1 milliGRAM(s) IV Push every 6 hours PRN  metoclopramide Injectable 10 milliGRAM(s) IV Push every 6 hours PRN  ondansetron Injectable 8 milliGRAM(s) IV Push every 8 hours  aprepitant 80 milliGRAM(s) Oral daily    10. OOB as tolerated, physical therapy consult if needed     11. Monitor coags / fibrinogen 2x week, vitamin K as needed   phytonadione   Solution 10 milliGRAM(s) Oral <User Schedule>    12. Monitor closely for clinical changes, monitor for fevers     13. Emotional support provided, plan of care discussed and questions addressed     14. Patient education done regarding chemotherapy prep, plan of care, restrictions and discharge planning     15. Continue regular social work input     I have written the above note for Dr. Larsen who performed service with me in the room.   Racquel Leiva NP-C ([phone])    I have seen and examined patient with NP, I agree with above note as scribed. HPC Transplant Team                                                      Critical / Counseling Time Provided: 30 minutes                                                                                                                                                        Chief Complaint: Allogeneic peripheral blood stem cell transplant from [facility] (sister 10/10) for treatment of AML     S: Patient seen and examined with HPC Transplant Team:   No complaints today   Denies mouth / tongue / throat pain, dyspnea, cough, nausea, vomiting, diarrhea, abdominal pain     O: Vitals:   Vital Signs Last 24 Hrs  T(C): 36.1 (21 Feb 2019 06:45), Max: 37.2 (20 Feb 2019 17:56)  T(F): 97 (21 Feb 2019 06:45), Max: 99 (20 Feb 2019 17:56)  HR: 93 (21 Feb 2019 06:45) (84 - 96)  BP: 133/92 (21 Feb 2019 06:45) (103/73 - 145/97)  BP(mean): --  RR: 18 (21 Feb 2019 06:45) (18 - 18)  SpO2: 100% (21 Feb 2019 06:45) (99% - 100%)    Admit weight: 87.9kg   Daily Height in cm: 158 (20 Feb 2019 10:19)    Today's weight: 88.1kg     Intake / Output:   02-20 @ 07:01  -  02-21 @ 07:00  --------------------------------------------------------  IN: 1771.6 mL / OUT: 1650 mL / NET: 121.6 mL        PE:   Oropharynx: no erythema or ulcerations  Oral Mucositis:              -                                         Grade: n/a  CVS: S1, S2 RRR   Lungs: CTA throughout bilaterally   Abdomen:+ BS x 4, soft, NT, ND   Extremities: no edema   Gastric Mucositis:       -                                           Grade: n/a  Intestinal Mucositis:     -                                         Grade: n/a  Skin: no rash   TLC: PICC line TLC   Neuro: A&O x 3   Pain: 0     Labs:  - TO BE DRAWN EVERY OTHER DAY   CBC Full  -  ( 20 Feb 2019 13:06 )  WBC Count : 5.0 K/uL  Hemoglobin : 12.4 g/dL  Hematocrit : 35.1 %  Platelet Count - Automated : 472 K/uL  Mean Cell Volume : 92.8 fl  Mean Cell Hemoglobin : 32.8 pg  Mean Cell Hemoglobin Concentration : 35.3 gm/dL  Auto Neutrophil # : 2.5 K/uL  Auto Lymphocyte # : 2.0 K/uL  Auto Monocyte # : 0.3 K/uL  Auto Eosinophil # : 0.0 K/uL  Auto Basophil # : 0.1 K/uL  Auto Neutrophil % : 51.0 %  Auto Lymphocyte % : 40.9 %  Auto Monocyte % : 6.7 %  Auto Eosinophil % : 0.1 %  Auto Basophil % : 1.3 %                          12.4   5.0   )-----------( 472      ( 20 Feb 2019 13:06 )             35.1     02-20    139  |  104  |  8   ----------------------------<  84  4.1   |  24  |  1.05    Ca    9.7      20 Feb 2019 13:04  Phos  2.9     02-20  Mg     2.1     02-20    TPro  6.8  /  Alb  4.2  /  TBili  0.3  /  DBili  <0.1  /  AST  15  /  ALT  5<L>  /  AlkPhos  54  02-20    PT/INR - ( 20 Feb 2019 15:41 )   PT: 11.7 sec;   INR: 1.02 ratio         PTT - ( 20 Feb 2019 15:41 )  PTT:27.0 sec  LIVER FUNCTIONS - ( 20 Feb 2019 13:04 )  Alb: 4.2 g/dL / Pro: 6.8 g/dL / ALK PHOS: 54 U/L / ALT: 5 U/L / AST: 15 U/L / GGT: x           Lactate Dehydrogenase, Serum: 175 U/L (02-20 @ 13:04)      Meds:   Antimicrobials:   acyclovir   Oral Tab/Cap 400 milliGRAM(s) Oral three times a day  atovaquone Suspension 750 milliGRAM(s) Oral every 12 hours  clotrimazole Lozenge 1 Lozenge Oral five times a day  voriconazole 200 milliGRAM(s) Oral every 12 hours      Heme / Onc:   fludarabine IVPB (eMAR) 49 milliGRAM(s) IV Intermittent every 24 hours  heparin  Infusion 366 Unit(s)/Hr IV Continuous <Continuous>      GI:  docusate sodium 100 milliGRAM(s) Oral three times a day  pantoprazole    Tablet 40 milliGRAM(s) Oral before breakfast  senna 2 Tablet(s) Oral at bedtime  sodium bicarbonate Mouth Rinse 10 milliLiter(s) Swish and Spit five times a day  ursodiol Capsule 300 milliGRAM(s) Oral two times a day with meals      Cardiovascular:       Immunologic:       Other medications:   acetaminophen   Tablet .. 650 milliGRAM(s) Oral every 6 hours  aprepitant 80 milliGRAM(s) Oral daily  ascorbic acid 250 milliGRAM(s) Oral daily  Biotene Dry Mouth Oral Rinse 5 milliLiter(s) Swish and Spit five times a day  chlorhexidine 4% Liquid 1 Application(s) Topical <User Schedule>  cholecalciferol 400 Unit(s) Oral daily  cyanocobalamin 1000 MICROGram(s) Oral daily  ferrous    sulfate 325 milliGRAM(s) Oral three times a day  folic acid 1 milliGRAM(s) Oral daily  LORazepam   Injectable 1 milliGRAM(s) IV Push every 24 hours  medroxyPROGESTERone 10 milliGRAM(s) Oral daily  montelukast 10 milliGRAM(s) Oral daily  multivitamin 1 Tablet(s) Oral daily  ondansetron Injectable 8 milliGRAM(s) IV Push every 8 hours  phytonadione   Solution 10 milliGRAM(s) Oral <User Schedule>  sodium chloride 0.9%. 1000 milliLiter(s) IV Continuous <Continuous>      PRN:   acetaminophen   Tablet .. 650 milliGRAM(s) Oral every 6 hours PRN  diphenhydrAMINE   Injectable 25 milliGRAM(s) IV Push every 4 hours PRN  LORazepam   Injectable 1 milliGRAM(s) IV Push every 6 hours PRN  LORazepam   Injectable 1 milliGRAM(s) IV Push every 6 hours PRN  metoclopramide Injectable 10 milliGRAM(s) IV Push every 6 hours PRN  sodium chloride 0.9% lock flush 10 milliLiter(s) IV Push every 1 hour PRN  zolpidem 5 milliGRAM(s) Oral at bedtime PRN      A/P:  33 year old female with a history of AML  Pre  :  Allogeneic PBSCT day - 6   Continue flu / bu prep: fludarabine 2/5   Labs every other day to conserve blood     1. Infectious Disease:   acyclovir   Oral Tab/Cap 400 milliGRAM(s) Oral three times a day  atovaquone Suspension 750 milliGRAM(s) Oral every 12 hours  clotrimazole Lozenge 1 Lozenge Oral five times a day  voriconazole 200 milliGRAM(s) Oral every 12 hours    2. VOD Prophylaxis: Actigall, Glutamine, Heparin (dosed at 100 units / kg / day)     3. GI Prophylaxis:    pantoprazole    Tablet 40 milliGRAM(s) Oral before breakfast    4. Mouthcare - NS / NaHCO3 rinses, Mycelex, Caphosol; Skin care     5. GVHD prophylaxis   Tacrolimus to start on day - 2   MTX days 1,3,6,11     6. Transfuse & replete electrolytes prn     7. IV hydration, daily weights, strict I&O, prn diuresis     8. PO intake as tolerated, nutrition follow up as needed, MVI, folic acid     9. Antiemetics, anti-diarrhea medications:   LORazepam   Injectable 1 milliGRAM(s) IV Push every 6 hours PRN  metoclopramide Injectable 10 milliGRAM(s) IV Push every 6 hours PRN  ondansetron Injectable 8 milliGRAM(s) IV Push every 8 hours  aprepitant 80 milliGRAM(s) Oral daily    10. OOB as tolerated, physical therapy consult if needed     11. Monitor coags / fibrinogen 2x week, vitamin K as needed   phytonadione   Solution 10 milliGRAM(s) Oral <User Schedule>    12. Monitor closely for clinical changes, monitor for fevers     13. Emotional support provided, plan of care discussed and questions addressed     14. Patient education done regarding chemotherapy prep, plan of care, restrictions and discharge planning     15. Continue regular social work input     I have written the above note for Dr. Larsen who performed service with me in the room.   Racquel Leiva NP-C ([phone])    I have seen and examined patient with NP, I agree with above note as scribed.

## 2019-02-22 LAB
ALBUMIN SERPL ELPH-MCNC: 3.9 G/DL — SIGNIFICANT CHANGE UP (ref 3.3–5)
ALP SERPL-CCNC: 47 U/L — SIGNIFICANT CHANGE UP (ref 40–120)
ALT FLD-CCNC: 6 U/L — LOW (ref 10–45)
ANION GAP SERPL CALC-SCNC: 11 MMOL/L — SIGNIFICANT CHANGE UP (ref 5–17)
APTT BLD: 30.4 SEC — SIGNIFICANT CHANGE UP (ref 27.5–36.3)
AST SERPL-CCNC: 11 U/L — SIGNIFICANT CHANGE UP (ref 10–40)
BASOPHILS # BLD AUTO: 0 K/UL — SIGNIFICANT CHANGE UP (ref 0–0.2)
BASOPHILS NFR BLD AUTO: 0.6 % — SIGNIFICANT CHANGE UP (ref 0–2)
BILIRUB SERPL-MCNC: 0.2 MG/DL — SIGNIFICANT CHANGE UP (ref 0.2–1.2)
BUN SERPL-MCNC: 9 MG/DL — SIGNIFICANT CHANGE UP (ref 7–23)
CALCIUM SERPL-MCNC: 9.4 MG/DL — SIGNIFICANT CHANGE UP (ref 8.4–10.5)
CHLORIDE SERPL-SCNC: 107 MMOL/L — SIGNIFICANT CHANGE UP (ref 96–108)
CO2 SERPL-SCNC: 22 MMOL/L — SIGNIFICANT CHANGE UP (ref 22–31)
CREAT SERPL-MCNC: 0.86 MG/DL — SIGNIFICANT CHANGE UP (ref 0.5–1.3)
EOSINOPHIL # BLD AUTO: 0 K/UL — SIGNIFICANT CHANGE UP (ref 0–0.5)
EOSINOPHIL NFR BLD AUTO: 1 % — SIGNIFICANT CHANGE UP (ref 0–6)
FIBRINOGEN PPP-MCNC: 481 MG/DL — SIGNIFICANT CHANGE UP (ref 350–510)
G6PD RBC-CCNC: 14.9 U/G HGB — SIGNIFICANT CHANGE UP (ref 7–20.5)
GLUCOSE SERPL-MCNC: 86 MG/DL — SIGNIFICANT CHANGE UP (ref 70–99)
HCT VFR BLD CALC: 34.8 % — SIGNIFICANT CHANGE UP (ref 34.5–45)
HGB BLD-MCNC: 12.3 G/DL — SIGNIFICANT CHANGE UP (ref 11.5–15.5)
IGA FLD-MCNC: 83 MG/DL — LOW (ref 84–499)
IGD SER-MCNC: <1 MG/DL — SIGNIFICANT CHANGE UP
IGE SERPL-ACNC: <10 IU/ML — SIGNIFICANT CHANGE UP
IGG FLD-MCNC: 830 MG/DL — SIGNIFICANT CHANGE UP (ref 610–1660)
IGM SERPL-MCNC: 54 MG/DL — SIGNIFICANT CHANGE UP (ref 35–242)
INR BLD: 0.89 RATIO — SIGNIFICANT CHANGE UP (ref 0.88–1.16)
KAPPA LC SER QL IFE: 1.02 MG/DL — SIGNIFICANT CHANGE UP (ref 0.33–1.94)
KAPPA/LAMBDA FREE LIGHT CHAIN RATIO, SERUM: 1.44 RATIO — SIGNIFICANT CHANGE UP (ref 0.26–1.65)
LAMBDA LC SER QL IFE: 0.71 MG/DL — SIGNIFICANT CHANGE UP (ref 0.57–2.63)
LDH SERPL L TO P-CCNC: 210 U/L — SIGNIFICANT CHANGE UP (ref 50–242)
LYMPHOCYTES # BLD AUTO: 1.4 K/UL — SIGNIFICANT CHANGE UP (ref 1–3.3)
LYMPHOCYTES # BLD AUTO: 40.8 % — SIGNIFICANT CHANGE UP (ref 13–44)
MAGNESIUM SERPL-MCNC: 2.1 MG/DL — SIGNIFICANT CHANGE UP (ref 1.6–2.6)
MCHC RBC-ENTMCNC: 32.9 PG — SIGNIFICANT CHANGE UP (ref 27–34)
MCHC RBC-ENTMCNC: 35.4 GM/DL — SIGNIFICANT CHANGE UP (ref 32–36)
MCV RBC AUTO: 93 FL — SIGNIFICANT CHANGE UP (ref 80–100)
MONOCYTES # BLD AUTO: 0.1 K/UL — SIGNIFICANT CHANGE UP (ref 0–0.9)
MONOCYTES NFR BLD AUTO: 2.3 % — SIGNIFICANT CHANGE UP (ref 2–14)
NEUTROPHILS # BLD AUTO: 2 K/UL — SIGNIFICANT CHANGE UP (ref 1.8–7.4)
NEUTROPHILS NFR BLD AUTO: 55.4 % — SIGNIFICANT CHANGE UP (ref 43–77)
PHOSPHATE SERPL-MCNC: 3 MG/DL — SIGNIFICANT CHANGE UP (ref 2.5–4.5)
PLATELET # BLD AUTO: 344 K/UL — SIGNIFICANT CHANGE UP (ref 150–400)
POTASSIUM SERPL-MCNC: 4.1 MMOL/L — SIGNIFICANT CHANGE UP (ref 3.5–5.3)
POTASSIUM SERPL-SCNC: 4.1 MMOL/L — SIGNIFICANT CHANGE UP (ref 3.5–5.3)
PROT SERPL-MCNC: 6.4 G/DL — SIGNIFICANT CHANGE UP (ref 6–8.3)
PROTHROM AB SERPL-ACNC: 10.1 SEC — SIGNIFICANT CHANGE UP (ref 10–12.9)
RBC # BLD: 3.74 M/UL — LOW (ref 3.8–5.2)
RBC # FLD: 14.7 % — HIGH (ref 10.3–14.5)
SODIUM SERPL-SCNC: 140 MMOL/L — SIGNIFICANT CHANGE UP (ref 135–145)
WBC # BLD: 3.5 K/UL — LOW (ref 3.8–10.5)
WBC # FLD AUTO: 3.5 K/UL — LOW (ref 3.8–10.5)

## 2019-02-22 PROCEDURE — 99291 CRITICAL CARE FIRST HOUR: CPT

## 2019-02-22 RX ORDER — LIDOCAINE AND PRILOCAINE CREAM 25; 25 MG/G; MG/G
1 CREAM TOPICAL DAILY
Qty: 0 | Refills: 0 | Status: DISCONTINUED | OUTPATIENT
Start: 2019-03-11 | End: 2019-03-18

## 2019-02-22 RX ORDER — BUSULFAN 6 MG/ML
50 INJECTION INTRAVENOUS EVERY 6 HOURS
Qty: 0 | Refills: 0 | Status: COMPLETED | OUTPATIENT
Start: 2019-02-23 | End: 2019-02-25

## 2019-02-22 RX ORDER — FILGRASTIM 480MCG/1.6
480 VIAL (ML) INJECTION EVERY 24 HOURS
Qty: 0 | Refills: 0 | Status: DISCONTINUED | OUTPATIENT
Start: 2019-03-11 | End: 2019-03-12

## 2019-02-22 RX ADMIN — Medication 400 UNIT(S): at 11:53

## 2019-02-22 RX ADMIN — Medication 400 MILLIGRAM(S): at 21:22

## 2019-02-22 RX ADMIN — VORICONAZOLE 200 MILLIGRAM(S): 10 INJECTION, POWDER, LYOPHILIZED, FOR SOLUTION INTRAVENOUS at 17:17

## 2019-02-22 RX ADMIN — Medication 100 MILLIGRAM(S): at 05:53

## 2019-02-22 RX ADMIN — Medication 10 MILLIGRAM(S): at 11:52

## 2019-02-22 RX ADMIN — Medication 10 MILLILITER(S): at 15:36

## 2019-02-22 RX ADMIN — Medication 1 LOZENGE: at 15:37

## 2019-02-22 RX ADMIN — Medication 5 MILLILITER(S): at 20:45

## 2019-02-22 RX ADMIN — Medication 250 MILLIGRAM(S): at 11:53

## 2019-02-22 RX ADMIN — Medication 1 LOZENGE: at 07:53

## 2019-02-22 RX ADMIN — ONDANSETRON 8 MILLIGRAM(S): 8 TABLET, FILM COATED ORAL at 13:05

## 2019-02-22 RX ADMIN — PREGABALIN 1000 MICROGRAM(S): 225 CAPSULE ORAL at 11:53

## 2019-02-22 RX ADMIN — Medication 10 MILLILITER(S): at 11:57

## 2019-02-22 RX ADMIN — Medication 10 MILLILITER(S): at 08:28

## 2019-02-22 RX ADMIN — MEDROXYPROGESTERONE ACETATE 10 MILLIGRAM(S): 150 INJECTION, SUSPENSION, EXTENDED RELEASE INTRAMUSCULAR at 11:55

## 2019-02-22 RX ADMIN — VORICONAZOLE 200 MILLIGRAM(S): 10 INJECTION, POWDER, LYOPHILIZED, FOR SOLUTION INTRAVENOUS at 05:53

## 2019-02-22 RX ADMIN — Medication 100 MILLIGRAM(S): at 13:06

## 2019-02-22 RX ADMIN — Medication 1 DROP(S): at 22:06

## 2019-02-22 RX ADMIN — Medication 100 MILLIGRAM(S): at 21:22

## 2019-02-22 RX ADMIN — CHLORHEXIDINE GLUCONATE 1 APPLICATION(S): 213 SOLUTION TOPICAL at 05:54

## 2019-02-22 RX ADMIN — Medication 5 MILLILITER(S): at 15:37

## 2019-02-22 RX ADMIN — PANTOPRAZOLE SODIUM 40 MILLIGRAM(S): 20 TABLET, DELAYED RELEASE ORAL at 05:54

## 2019-02-22 RX ADMIN — Medication 325 MILLIGRAM(S): at 05:53

## 2019-02-22 RX ADMIN — LEVETIRACETAM 500 MILLIGRAM(S): 250 TABLET, FILM COATED ORAL at 05:53

## 2019-02-22 RX ADMIN — LEVETIRACETAM 500 MILLIGRAM(S): 250 TABLET, FILM COATED ORAL at 17:18

## 2019-02-22 RX ADMIN — Medication 400 MILLIGRAM(S): at 05:53

## 2019-02-22 RX ADMIN — Medication 400 MILLIGRAM(S): at 13:05

## 2019-02-22 RX ADMIN — Medication 10 MILLILITER(S): at 20:49

## 2019-02-22 RX ADMIN — SENNA PLUS 2 TABLET(S): 8.6 TABLET ORAL at 21:22

## 2019-02-22 RX ADMIN — Medication 325 MILLIGRAM(S): at 13:06

## 2019-02-22 RX ADMIN — APREPITANT 80 MILLIGRAM(S): 80 CAPSULE ORAL at 11:53

## 2019-02-22 RX ADMIN — Medication 1 TABLET(S): at 11:55

## 2019-02-22 RX ADMIN — MONTELUKAST 10 MILLIGRAM(S): 4 TABLET, CHEWABLE ORAL at 11:54

## 2019-02-22 RX ADMIN — ATOVAQUONE 750 MILLIGRAM(S): 750 SUSPENSION ORAL at 05:55

## 2019-02-22 RX ADMIN — FLUDARABINE PHOSPHATE 203.92 MILLIGRAM(S): 25 INJECTION, SOLUTION INTRAVENOUS at 14:58

## 2019-02-22 RX ADMIN — URSODIOL 300 MILLIGRAM(S): 250 TABLET, FILM COATED ORAL at 17:17

## 2019-02-22 RX ADMIN — Medication 1 LOZENGE: at 20:46

## 2019-02-22 RX ADMIN — Medication 5 MILLILITER(S): at 07:54

## 2019-02-22 RX ADMIN — ATOVAQUONE 750 MILLIGRAM(S): 750 SUSPENSION ORAL at 17:17

## 2019-02-22 RX ADMIN — Medication 1 MILLIGRAM(S): at 11:55

## 2019-02-22 RX ADMIN — ONDANSETRON 8 MILLIGRAM(S): 8 TABLET, FILM COATED ORAL at 21:22

## 2019-02-22 RX ADMIN — ONDANSETRON 8 MILLIGRAM(S): 8 TABLET, FILM COATED ORAL at 05:53

## 2019-02-22 RX ADMIN — Medication 1 LOZENGE: at 11:56

## 2019-02-22 RX ADMIN — URSODIOL 300 MILLIGRAM(S): 250 TABLET, FILM COATED ORAL at 07:53

## 2019-02-22 RX ADMIN — Medication 5 MILLILITER(S): at 11:56

## 2019-02-22 RX ADMIN — Medication 325 MILLIGRAM(S): at 21:22

## 2019-02-22 NOTE — DIETITIAN INITIAL EVALUATION ADULT. - OTHER INFO
Pt unsure of UBW, stated she does not weigh herself regularly, pt stated last recent weight of 199 lbs however pt stated she was wearing winter clothes when weight was obtained, stated admission weight 193.7 lbs sound accurate. Pt with no food allergies, pt takes vitamin C, vitamin D, iron, folic acid, vitamin B1, vitamin B12, and a multivitamin at home. No N+V thus far, last BM today. No difficulty chewing/swallowing pt reports slight taste alteration last night with ginger ale.

## 2019-02-22 NOTE — DIETITIAN INITIAL EVALUATION ADULT. - ADHERENCE
Pt previously followed a low microbial diet while counts were low, since counts have recovered pt has been eating regular foods

## 2019-02-22 NOTE — DIETITIAN INITIAL EVALUATION ADULT. - PERTINENT MEDS FT
solu-cortef, emend, vitamin C, biotene, colace, iron, folic acid, reglan, multivitamin, zofran, protonix, senna, sodium bicarbonate

## 2019-02-22 NOTE — PROGRESS NOTE ADULT - ATTENDING COMMENTS
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 Pt is [employer], and does not accept blood products for [employer] reasons   Day - 5 of flu /bu prep - fludarabine 3/5  Continue Acyclovir, Mepron, Vfend prophylaxis  Continue VOD prophylaxis with Actigall, glutamine supplement, low dose heparin drip(will D/C when platelet count <=75K)  Labs every other day   Continue iron, folic acid, vitamin K, vitamin B-12, vitamin C as per standard practice for bloodless transplant  Antiemetics, mouth care, skin care   Bowel regimen  OOB

## 2019-02-22 NOTE — PROGRESS NOTE ADULT - SUBJECTIVE AND OBJECTIVE BOX
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    S: Patient seen and examined with HPC Transplant Team:     Denies mouth / tongue / throat pain, dyspnea, cough, nausea, vomiting, diarrhea, abdominal pain     O: Vitals:   Vital Signs Last 24 Hrs  T(C): 37.2 (2019 06:25), Max: 37.3 (2019 22:11)  T(F): 99 (2019 06:25), Max: 99.2 (2019 22:11)  HR: 84 (2019 06:25) (84 - 104)  BP: 130/94 (2019 06:25) (120/76 - 154/93)  BP(mean): --  RR: 18 (2019 06:25) (18 - 18)  SpO2: 100% (2019 06:25) (98% - 100%)    Admit weight: 87.9kg   Daily Weight in k.1 (2019 09:34)    Intake / Output:    @ 07: @ 07:00  --------------------------------------------------------  IN: 3099.1 mL / OUT: 3500 mL / NET: -400.9 mL     @ 07:01   @ 08:31  --------------------------------------------------------  IN: 29.6 mL / OUT: 0 mL / NET: 29.6 mL      PE:   Oropharynx: no erythema or ulcerations  Oral Mucositis:              -                                         Grade: n/a  CVS: S1, S2 RRR   Lungs: CTA throughout bilaterally   Abdomen:+ BS x 4, soft, NT, ND   Extremities: no edema   Gastric Mucositis:       -                                           Grade: n/a  Intestinal Mucositis:     -                                         Grade: n/a  Skin: no rash   TLC: PICC line TLC   Neuro: A&O x 3   Pain: 0       Labs:   CBC Full  -  ( 2019 07:11 )  WBC Count : 3.5 K/uL  Hemoglobin : 12.3 g/dL  Hematocrit : 34.8 %  Platelet Count - Automated : 344 K/uL  Mean Cell Volume : 93.0 fl  Mean Cell Hemoglobin : 32.9 pg  Mean Cell Hemoglobin Concentration : 35.4 gm/dL  Auto Neutrophil # : 2.0 K/uL  Auto Lymphocyte # : 1.4 K/uL  Auto Monocyte # : 0.1 K/uL  Auto Eosinophil # : 0.0 K/uL  Auto Basophil # : 0.0 K/uL  Auto Neutrophil % : 55.4 %  Auto Lymphocyte % : 40.8 %  Auto Monocyte % : 2.3 %  Auto Eosinophil % : 1.0 %  Auto Basophil % : 0.6 %                          12.3   3.5   )-----------( 344      ( 2019 07:11 )             34.8         140  |  107  |  9   ----------------------------<  86  4.1   |  22  |  0.86    Ca    9.4      2019 07:11  Phos  3.0       Mg     2.1         TPro  6.4  /  Alb  3.9  /  TBili  0.2  /  DBili  <0.1  /  AST  11  /  ALT  6<L>  /  AlkPhos  47      PT/INR - ( 2019 07:11 )   PT: 10.1 sec;   INR: 0.89 ratio         PTT - ( 2019 07:11 )  PTT:30.4 sec  LIVER FUNCTIONS - ( 2019 07:11 )  Alb: 3.9 g/dL / Pro: 6.4 g/dL / ALK PHOS: 47 U/L / ALT: 6 U/L / AST: 11 U/L / GGT: x           Lactate Dehydrogenase, Serum: 210 U/L ( @ 07:11)    Meds:   Antimicrobials:   acyclovir   Oral Tab/Cap 400 milliGRAM(s) Oral three times a day  atovaquone Suspension 750 milliGRAM(s) Oral every 12 hours  clotrimazole Lozenge 1 Lozenge Oral five times a day  voriconazole 200 milliGRAM(s) Oral every 12 hours      Heme / Onc:   fludarabine IVPB (eMAR) 49 milliGRAM(s) IV Intermittent every 24 hours  heparin  Infusion 366 Unit(s)/Hr IV Continuous <Continuous>      GI:  docusate sodium 100 milliGRAM(s) Oral three times a day  pantoprazole    Tablet 40 milliGRAM(s) Oral before breakfast  senna 2 Tablet(s) Oral at bedtime  sodium bicarbonate Mouth Rinse 10 milliLiter(s) Swish and Spit five times a day  ursodiol Capsule 300 milliGRAM(s) Oral two times a day with meals      Cardiovascular:       Immunologic:       Other medications:   acetaminophen   Tablet .. 650 milliGRAM(s) Oral every 6 hours  acetaminophen   Tablet .. 650 milliGRAM(s) Oral <User Schedule>  aprepitant 80 milliGRAM(s) Oral daily  ascorbic acid 250 milliGRAM(s) Oral daily  Biotene Dry Mouth Oral Rinse 5 milliLiter(s) Swish and Spit five times a day  chlorhexidine 4% Liquid 1 Application(s) Topical <User Schedule>  cholecalciferol 400 Unit(s) Oral daily  cyanocobalamin 1000 MICROGram(s) Oral daily  ferrous    sulfate 325 milliGRAM(s) Oral three times a day  folic acid 1 milliGRAM(s) Oral daily  levETIRAcetam 500 milliGRAM(s) Oral two times a day  LORazepam   Injectable 1 milliGRAM(s) IV Push every 24 hours  medroxyPROGESTERone 10 milliGRAM(s) Oral daily  montelukast 10 milliGRAM(s) Oral daily  multivitamin 1 Tablet(s) Oral daily  ondansetron Injectable 8 milliGRAM(s) IV Push every 8 hours  phytonadione   Solution 10 milliGRAM(s) Oral <User Schedule>  sodium chloride 0.9%. 1000 milliLiter(s) IV Continuous <Continuous>      PRN:   acetaminophen   Tablet .. 650 milliGRAM(s) Oral every 6 hours PRN  diphenhydrAMINE   Injectable 25 milliGRAM(s) IV Push every 4 hours PRN  LORazepam   Injectable 1 milliGRAM(s) IV Push every 6 hours PRN  LORazepam   Injectable 1 milliGRAM(s) IV Push every 6 hours PRN  metoclopramide Injectable 10 milliGRAM(s) IV Push every 6 hours PRN  sodium chloride 0.9% lock flush 10 milliLiter(s) IV Push every 1 hour PRN  zolpidem 5 milliGRAM(s) Oral at bedtime PRN    A/P:  33 year old female with a history of AML  Pre  :  Allogeneic PBSCT day - 5  Continue flu / bu prep: fludarabine 3/5   Labs every other day ; continue vitamin C, vitamin B, folic acid, vitamin K & iron supplementation     1. Infectious Disease:   acyclovir   Oral Tab/Cap 400 milliGRAM(s) Oral three times a day  atovaquone Suspension 750 milliGRAM(s) Oral every 12 hours  clotrimazole Lozenge 1 Lozenge Oral five times a day  voriconazole 200 milliGRAM(s) Oral every 12 hours    2. VOD Prophylaxis: Actigall, Glutamine, Heparin (dosed at 100 units / kg / day)     3. GI Prophylaxis:    pantoprazole    Tablet 40 milliGRAM(s) Oral before breakfast    4. Mouthcare - NS / NaHCO3 rinses, Mycelex, Caphosol; Skin care     5. GVHD prophylaxis    to start on day -2   MTX days 1,3,6,11     6. Transfuse & replete electrolytes prn   Bloodless transplant - continue:   medroxyPROGESTERone 10 milliGRAM(s) Oral daily  folic acid 1 milliGRAM(s) Oral daily  cyanocobalamin 1000 MICROGram(s) Oral daily  ferrous    sulfate 325 milliGRAM(s) Oral three times a day  ascorbic acid 250 milliGRAM(s) Oral daily    7. IV hydration, daily weights, strict I&O, prn diuresis     8. PO intake as tolerated, nutrition follow up as needed, MVI, folic acid     9. Antiemetics, anti-diarrhea medications:   LORazepam   Injectable 1 milliGRAM(s) IV Push every 6 hours PRN  metoclopramide Injectable 10 milliGRAM(s) IV Push every 6 hours PRN  ondansetron Injectable 8 milliGRAM(s) IV Push every 8 hours  aprepitant 80 milliGRAM(s) Oral daily    10. OOB as tolerated, physical therapy consult if needed     11. Monitor coags / fibrinogen 2x week, vitamin K as needed   phytonadione   Solution 10 milliGRAM(s) Oral <User Schedule>    12. Monitor closely for clinical changes, monitor for fevers     13. Emotional support provided, plan of care discussed and questions addressed     14. Patient education done regarding chemotherapy prep, plan of care, restrictions and discharge planning     15. Continue regular social work input     I have written the above note for Dr. Larsen who performed service with me in the room.   Racquel Leiva NP-C ([phone])    I have seen and examined patient with NP, I agree with above note as scribed. 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    S: Patient seen and examined with HPC Transplant Team:   no complaints today   Denies mouth / tongue / throat pain, dyspnea, cough, nausea, vomiting, diarrhea, abdominal pain     O: Vitals:   Vital Signs Last 24 Hrs  T(C): 37.2 (2019 06:25), Max: 37.3 (2019 22:11)  T(F): 99 (2019 06:25), Max: 99.2 (2019 22:11)  HR: 84 (2019 06:25) (84 - 104)  BP: 130/94 (2019 06:25) (120/76 - 154/93)  BP(mean): --  RR: 18 (2019 06:25) (18 - 18)  SpO2: 100% (2019 06:25) (98% - 100%)    Admit weight: 87.9kg   Daily Weight in k.1 (2019 09:34)  Today's weight: 88kg     Intake / Output:    @ 07:01  -   @ 07:00  --------------------------------------------------------  IN: 3099.1 mL / OUT: 3500 mL / NET: -400.9 mL     @ 07:01   @ 08:31  --------------------------------------------------------  IN: 29.6 mL / OUT: 0 mL / NET: 29.6 mL      PE:   Oropharynx: no erythema or ulcerations  Oral Mucositis:              -                                         Grade: n/a  CVS: S1, S2 RRR   Lungs: CTA throughout bilaterally   Abdomen:+ BS x 4, soft, NT, ND   Extremities: no edema   Gastric Mucositis:       -                                           Grade: n/a  Intestinal Mucositis:     -                                         Grade: n/a  Skin: no rash   TLC: PICC line TLC   Neuro: A&O x 3   Pain: 0       Labs:   CBC Full  -  ( 2019 07:11 )  WBC Count : 3.5 K/uL  Hemoglobin : 12.3 g/dL  Hematocrit : 34.8 %  Platelet Count - Automated : 344 K/uL  Mean Cell Volume : 93.0 fl  Mean Cell Hemoglobin : 32.9 pg  Mean Cell Hemoglobin Concentration : 35.4 gm/dL  Auto Neutrophil # : 2.0 K/uL  Auto Lymphocyte # : 1.4 K/uL  Auto Monocyte # : 0.1 K/uL  Auto Eosinophil # : 0.0 K/uL  Auto Basophil # : 0.0 K/uL  Auto Neutrophil % : 55.4 %  Auto Lymphocyte % : 40.8 %  Auto Monocyte % : 2.3 %  Auto Eosinophil % : 1.0 %  Auto Basophil % : 0.6 %                          12.3   3.5   )-----------( 344      ( 2019 07:11 )             34.8         140  |  107  |  9   ----------------------------<  86  4.1   |  22  |  0.86    Ca    9.4      2019 07:11  Phos  3.0       Mg     2.1         TPro  6.4  /  Alb  3.9  /  TBili  0.2  /  DBili  <0.1  /  AST  11  /  ALT  6<L>  /  AlkPhos  47      PT/INR - ( 2019 07:11 )   PT: 10.1 sec;   INR: 0.89 ratio         PTT - ( 2019 07:11 )  PTT:30.4 sec  LIVER FUNCTIONS - ( 2019 07:11 )  Alb: 3.9 g/dL / Pro: 6.4 g/dL / ALK PHOS: 47 U/L / ALT: 6 U/L / AST: 11 U/L / GGT: x           Lactate Dehydrogenase, Serum: 210 U/L ( @ 07:11)    Meds:   Antimicrobials:   acyclovir   Oral Tab/Cap 400 milliGRAM(s) Oral three times a day  atovaquone Suspension 750 milliGRAM(s) Oral every 12 hours  clotrimazole Lozenge 1 Lozenge Oral five times a day  voriconazole 200 milliGRAM(s) Oral every 12 hours      Heme / Onc:   fludarabine IVPB (eMAR) 49 milliGRAM(s) IV Intermittent every 24 hours  heparin  Infusion 366 Unit(s)/Hr IV Continuous <Continuous>      GI:  docusate sodium 100 milliGRAM(s) Oral three times a day  pantoprazole    Tablet 40 milliGRAM(s) Oral before breakfast  senna 2 Tablet(s) Oral at bedtime  sodium bicarbonate Mouth Rinse 10 milliLiter(s) Swish and Spit five times a day  ursodiol Capsule 300 milliGRAM(s) Oral two times a day with meals      Cardiovascular:       Immunologic:       Other medications:   acetaminophen   Tablet .. 650 milliGRAM(s) Oral every 6 hours  acetaminophen   Tablet .. 650 milliGRAM(s) Oral <User Schedule>  aprepitant 80 milliGRAM(s) Oral daily  ascorbic acid 250 milliGRAM(s) Oral daily  Biotene Dry Mouth Oral Rinse 5 milliLiter(s) Swish and Spit five times a day  chlorhexidine 4% Liquid 1 Application(s) Topical <User Schedule>  cholecalciferol 400 Unit(s) Oral daily  cyanocobalamin 1000 MICROGram(s) Oral daily  ferrous    sulfate 325 milliGRAM(s) Oral three times a day  folic acid 1 milliGRAM(s) Oral daily  levETIRAcetam 500 milliGRAM(s) Oral two times a day  LORazepam   Injectable 1 milliGRAM(s) IV Push every 24 hours  medroxyPROGESTERone 10 milliGRAM(s) Oral daily  montelukast 10 milliGRAM(s) Oral daily  multivitamin 1 Tablet(s) Oral daily  ondansetron Injectable 8 milliGRAM(s) IV Push every 8 hours  phytonadione   Solution 10 milliGRAM(s) Oral <User Schedule>  sodium chloride 0.9%. 1000 milliLiter(s) IV Continuous <Continuous>      PRN:   acetaminophen   Tablet .. 650 milliGRAM(s) Oral every 6 hours PRN  diphenhydrAMINE   Injectable 25 milliGRAM(s) IV Push every 4 hours PRN  LORazepam   Injectable 1 milliGRAM(s) IV Push every 6 hours PRN  LORazepam   Injectable 1 milliGRAM(s) IV Push every 6 hours PRN  metoclopramide Injectable 10 milliGRAM(s) IV Push every 6 hours PRN  sodium chloride 0.9% lock flush 10 milliLiter(s) IV Push every 1 hour PRN  zolpidem 5 milliGRAM(s) Oral at bedtime PRN    A/P:  33 year old female with a history of AML  Pre  :  Allogeneic PBSCT day - 5  Continue flu / bu prep: fludarabine 3/5   Labs every other day ; continue vitamin C, vitamin B, folic acid, vitamin K & iron supplementation     1. Infectious Disease:   acyclovir   Oral Tab/Cap 400 milliGRAM(s) Oral three times a day  atovaquone Suspension 750 milliGRAM(s) Oral every 12 hours  clotrimazole Lozenge 1 Lozenge Oral five times a day  voriconazole 200 milliGRAM(s) Oral every 12 hours    2. VOD Prophylaxis: Actigall, Glutamine, Heparin (dosed at 100 units / kg / day)     3. GI Prophylaxis:    pantoprazole    Tablet 40 milliGRAM(s) Oral before breakfast    4. Mouthcare - NS / NaHCO3 rinses, Mycelex, Caphosol; Skin care     5. GVHD prophylaxis    to start on day -2   MTX days 1,3,6,11     6. Transfuse & replete electrolytes prn   Bloodless transplant - continue:   medroxyPROGESTERone 10 milliGRAM(s) Oral daily  folic acid 1 milliGRAM(s) Oral daily  cyanocobalamin 1000 MICROGram(s) Oral daily  ferrous    sulfate 325 milliGRAM(s) Oral three times a day  ascorbic acid 250 milliGRAM(s) Oral daily    7. IV hydration, daily weights, strict I&O, prn diuresis     8. PO intake as tolerated, nutrition follow up as needed, MVI, folic acid     9. Antiemetics, anti-diarrhea medications:   LORazepam   Injectable 1 milliGRAM(s) IV Push every 6 hours PRN  metoclopramide Injectable 10 milliGRAM(s) IV Push every 6 hours PRN  ondansetron Injectable 8 milliGRAM(s) IV Push every 8 hours  aprepitant 80 milliGRAM(s) Oral daily    10. OOB as tolerated, physical therapy consult if needed     11. Monitor coags / fibrinogen 2x week, vitamin K as needed   phytonadione   Solution 10 milliGRAM(s) Oral <User Schedule>    12. Monitor closely for clinical changes, monitor for fevers     13. Emotional support provided, plan of care discussed and questions addressed     14. Patient education done regarding chemotherapy prep, plan of care, restrictions and discharge planning     15. Continue regular social work input     I have written the above note for Dr. Larsen who performed service with me in the room.   Racquel Leiva NP-C ([phone])    I have seen and examined patient with NP, I agree with above note as scribed. 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    S: Patient seen and examined with [facility]PC Transplant Team:   no complaints today   Denies mouth / tongue / throat pain, dyspnea, cough, nausea, vomiting, diarrhea, abdominal pain     O: Vitals:   Vital Signs Last 24 Hrs  T(C): 37.2 (2019 06:25), Max: 37.3 (2019 22:11)  T(F): 99 (2019 06:25), Max: 99.2 (2019 22:11)  HR: 84 (2019 06:25) (84 - 104)  BP: 130/94 (2019 06:25) (120/76 - 154/93)  BP(mean): --  RR: 18 (2019 06:25) (18 - 18)  SpO2: 100% (2019 06:25) (98% - 100%)    Admit weight: 87.9kg   Daily Weight in k.1 (2019 09:34)  Today's weight: 88kg     Intake / Output:    @ 07:01  -   @ 07:00  --------------------------------------------------------  IN: 3099.1 mL / OUT: 3500 mL / NET: -400.9 mL     @ 07:01   @ 08:31  --------------------------------------------------------  IN: 29.6 mL / OUT: 0 mL / NET: 29.6 mL      PE:   Oropharynx: no erythema or ulcerations  Oral Mucositis:              -                                         Grade: n/a  CVS: S1, S2 RRR   Lungs: CTA throughout bilaterally   Abdomen:+ BS x 4, soft, NT, ND   Extremities: no edema   Gastric Mucositis:       -                                           Grade: n/a  Intestinal Mucositis:     -                                         Grade: n/a  Skin: no rash   TLC: PICC line TLC   Neuro: A&O x 3   Pain: 0       Labs:   CBC Full  -  ( 2019 07:11 )  WBC Count : 3.5 K/uL  Hemoglobin : 12.3 g/dL  Hematocrit : 34.8 %  Platelet Count - Automated : 344 K/uL  Mean Cell Volume : 93.0 fl  Mean Cell Hemoglobin : 32.9 pg  Mean Cell Hemoglobin Concentration : 35.4 gm/dL  Auto Neutrophil # : 2.0 K/uL  Auto Lymphocyte # : 1.4 K/uL  Auto Monocyte # : 0.1 K/uL  Auto Eosinophil # : 0.0 K/uL  Auto Basophil # : 0.0 K/uL  Auto Neutrophil % : 55.4 %  Auto Lymphocyte % : 40.8 %  Auto Monocyte % : 2.3 %  Auto Eosinophil % : 1.0 %  Auto Basophil % : 0.6 %                          12.3   3.5   )-----------( 344      ( 2019 07:11 )             34.8         140  |  107  |  9   ----------------------------<  86  4.1   |  22  |  0.86    Ca    9.4      2019 07:11  Phos  3.0       Mg     2.1         TPro  6.4  /  Alb  3.9  /  TBili  0.2  /  DBili  <0.1  /  AST  11  /  ALT  6<L>  /  AlkPhos  47      PT/INR - ( 2019 07:11 )   PT: 10.1 sec;   INR: 0.89 ratio         PTT - ( 2019 07:11 )  PTT:30.4 sec  LIVER FUNCTIONS - ( 2019 07:11 )  Alb: 3.9 g/dL / Pro: 6.4 g/dL / ALK PHOS: 47 U/L / ALT: 6 U/L / AST: 11 U/L / GGT: x           Lactate Dehydrogenase, Serum: 210 U/L ( @ 07:11)    Meds:   Antimicrobials:   acyclovir   Oral Tab/Cap 400 milliGRAM(s) Oral three times a day  atovaquone Suspension 750 milliGRAM(s) Oral every 12 hours  clotrimazole Lozenge 1 Lozenge Oral five times a day  voriconazole 200 milliGRAM(s) Oral every 12 hours      Heme / Onc:   fludarabine IVPB (eMAR) 49 milliGRAM(s) IV Intermittent every 24 hours  heparin  Infusion 366 Unit(s)/Hr IV Continuous <Continuous>      GI:  docusate sodium 100 milliGRAM(s) Oral three times a day  pantoprazole    Tablet 40 milliGRAM(s) Oral before breakfast  senna 2 Tablet(s) Oral at bedtime  sodium bicarbonate Mouth Rinse 10 milliLiter(s) Swish and Spit five times a day  ursodiol Capsule 300 milliGRAM(s) Oral two times a day with meals      Cardiovascular:       Immunologic:       Other medications:   acetaminophen   Tablet .. 650 milliGRAM(s) Oral every 6 hours  acetaminophen   Tablet .. 650 milliGRAM(s) Oral <User Schedule>  aprepitant 80 milliGRAM(s) Oral daily  ascorbic acid 250 milliGRAM(s) Oral daily  Biotene Dry Mouth Oral Rinse 5 milliLiter(s) Swish and Spit five times a day  chlorhexidine 4% Liquid 1 Application(s) Topical <User Schedule>  cholecalciferol 400 Unit(s) Oral daily  cyanocobalamin 1000 MICROGram(s) Oral daily  ferrous    sulfate 325 milliGRAM(s) Oral three times a day  folic acid 1 milliGRAM(s) Oral daily  levETIRAcetam 500 milliGRAM(s) Oral two times a day  LORazepam   Injectable 1 milliGRAM(s) IV Push every 24 hours  medroxyPROGESTERone 10 milliGRAM(s) Oral daily  montelukast 10 milliGRAM(s) Oral daily  multivitamin 1 Tablet(s) Oral daily  ondansetron Injectable 8 milliGRAM(s) IV Push every 8 hours  phytonadione   Solution 10 milliGRAM(s) Oral <User Schedule>  sodium chloride 0.9%. 1000 milliLiter(s) IV Continuous <Continuous>      PRN:   acetaminophen   Tablet .. 650 milliGRAM(s) Oral every 6 hours PRN  diphenhydrAMINE   Injectable 25 milliGRAM(s) IV Push every 4 hours PRN  LORazepam   Injectable 1 milliGRAM(s) IV Push every 6 hours PRN  LORazepam   Injectable 1 milliGRAM(s) IV Push every 6 hours PRN  metoclopramide Injectable 10 milliGRAM(s) IV Push every 6 hours PRN  sodium chloride 0.9% lock flush 10 milliLiter(s) IV Push every 1 hour PRN  zolpidem 5 milliGRAM(s) Oral at bedtime PRN    A/P:  33 year old female with a history of AML  Pre  :  Allogeneic PBSCT day - 5  Continue flu / bu prep: fludarabine 3/5   Labs every other day ; continue vitamin C, vitamin B, folic acid, vitamin K & iron supplementation     1. Infectious Disease:   acyclovir   Oral Tab/Cap 400 milliGRAM(s) Oral three times a day  atovaquone Suspension 750 milliGRAM(s) Oral every 12 hours  clotrimazole Lozenge 1 Lozenge Oral five times a day  voriconazole 200 milliGRAM(s) Oral every 12 hours    2. VOD Prophylaxis: Actigall, Glutamine, Heparin (dosed at 100 units / kg / day)     3. GI Prophylaxis:    pantoprazole    Tablet 40 milliGRAM(s) Oral before breakfast    4. Mouthcare - NS / NaHCO3 rinses, Mycelex, Biotene; Skin care     5. GVHD prophylaxis    to start on day -2   MTX days 1,3,6,11     6. Replete electrolytes prn   Bloodless transplant: No pRBC, platelets, FFP  medroxyPROGESTERone 10 milliGRAM(s) Oral daily  folic acid 1 milliGRAM(s) Oral daily  cyanocobalamin 1000 MICROGram(s) Oral daily  ferrous    sulfate 325 milliGRAM(s) Oral three times a day  ascorbic acid 250 milliGRAM(s) Oral daily    7. IV hydration, daily weights, strict I&O, prn diuresis     8. PO intake as tolerated, nutrition follow up as needed, MVI, folic acid     9. Antiemetics, anti-diarrhea medications:   LORazepam   Injectable 1 milliGRAM(s) IV Push every 6 hours PRN  metoclopramide Injectable 10 milliGRAM(s) IV Push every 6 hours PRN  ondansetron Injectable 8 milliGRAM(s) IV Push every 8 hours  aprepitant 80 milliGRAM(s) Oral daily    10. OOB as tolerated, physical therapy consult if needed     11. Monitor coags / fibrinogen 2x week, vitamin K as needed   phytonadione   Solution 10 milliGRAM(s) Oral <User Schedule>    12. Monitor closely for clinical changes, monitor for fevers     13. Emotional support provided, plan of care discussed and questions addressed     14. Patient education done regarding chemotherapy prep, plan of care, restrictions and discharge planning     15. Continue regular social work input     I have written the above note for Dr. Larsen who performed service with me in the room.   Racquel Leiva NP-C ([phone])    I have seen and examined patient with NP, I agree with above note as scribed.

## 2019-02-22 NOTE — DIETITIAN INITIAL EVALUATION ADULT. - PROBLEM SELECTOR PLAN 2
1. VOD prophylaxis - low dose heparin gtt (dosed at 100 units / kg / day), will D/C when platelet count < 75K as she does not accept transfusion of platelets; glutamine supplementation, Actigall BID   2. PCP prophylaxis - begin Mepron  3. Antiviral prophylaxis - Acyclovir 400mg po 3X/day  4. Antifungal prophylaxis- Vfend 200 mg po 2X/day  5.. GI prophylaxis - Protonix po QD   6. Antibacterial prophylaxis - when ANC < 1000, start Cipro 500mg po BID. If becomes febrile, pan cx, CXR and change Cipro to Cefepime 2g IV q 8 hours. Continue until count recovery  7. Kepivance for prevention of mucositis- days 0, +1, +2  8. Aggressive mouth care and skin care as per protocol

## 2019-02-22 NOTE — DIETITIAN INITIAL EVALUATION ADULT. - PROBLEM SELECTOR PLAN 1
1. Admit to BMTU   2. Double lumen PICC placement in IR   3. Day - 7 - day 0 - antiemetics   4. Day – 7 through day -3 – Fludarabine 30mg / m2 IV daily   5. Day - 4 through day - 3 : Busulfan 0.8mg /kg over 2 hours every 6 hours x 8 doses. Hold Acetaminophen on Busulfan days only. Keppra prophylaxis with Busulfan.  6. Day – 2- Start Tacrolimus 0.03mg /kg po q 12 hours. Tacrolimus levels every Monday & Thursday (target levels 5-10 not to exceed 15)   7. Day -1 – Administer IVIG dosed at  0.4g / kg of ideal body weight; rounded to nearest 5 grams   8. Day – 1 – at 2200, begin transplant hydration : 0.45Ns + 1 amp (50mEq) sodium bicarbonate at 150ml /hr; continue transplant hydration for 24 hours post infusion   9. Day 0 – [facility] infusion.   10. Administer Methotrexate 5mg/m2/day IVP on Day +1, +3, +6 and +11, hold if serum creatinine is > 3mg/dl  11. Day +12 Start Filgrastim SNDZ 5 micrograms/kg/day

## 2019-02-22 NOTE — DIETITIAN INITIAL EVALUATION ADULT. - NS AS NUTRI INTERV ED CONTENT
Discussed importance and usage of glutasolve packet, importance of adequate po intake with overall goal for weight maintenance, brief food safety diet guidelines.

## 2019-02-22 NOTE — DIETITIAN INITIAL EVALUATION ADULT. - NS AS NUTRI INTERV MEALS SNACK
1. Continue regular diet with glutasolve 1 packet daily, 2. Continue to encourage po intake and obtain/honor food preferences as able, 3. Monitor PO intake, diet tolerance, weight trends, labs, and skin integrity, 4. RD to remain available as needed

## 2019-02-22 NOTE — DIETITIAN INITIAL EVALUATION ADULT. - ORAL INTAKE PTA
good/Pt reports eating well with good appetite PTA consuming usually 3 meals per day with no recent changes in appetite or intake.

## 2019-02-22 NOTE — DIETITIAN INITIAL EVALUATION ADULT. - ENERGY NEEDS
Pt seen for new admit stem cell transplant. Pt with PMH including AML now day -5 pre-allogeneic PBSCT.    Ht: 5'2" , Wt: 193.7 lbs, BMI: 35.2 kg/m2, IBW: 110 lbs +/- 10%, %IBW: 176%  No edema, Skin intact

## 2019-02-23 PROCEDURE — 99291 CRITICAL CARE FIRST HOUR: CPT

## 2019-02-23 RX ADMIN — Medication 10 MILLILITER(S): at 16:19

## 2019-02-23 RX ADMIN — MONTELUKAST 10 MILLIGRAM(S): 4 TABLET, CHEWABLE ORAL at 12:11

## 2019-02-23 RX ADMIN — URSODIOL 300 MILLIGRAM(S): 250 TABLET, FILM COATED ORAL at 18:05

## 2019-02-23 RX ADMIN — Medication 5 MILLILITER(S): at 12:04

## 2019-02-23 RX ADMIN — Medication 325 MILLIGRAM(S): at 05:48

## 2019-02-23 RX ADMIN — Medication 1 LOZENGE: at 23:44

## 2019-02-23 RX ADMIN — APREPITANT 80 MILLIGRAM(S): 80 CAPSULE ORAL at 12:04

## 2019-02-23 RX ADMIN — Medication 325 MILLIGRAM(S): at 21:06

## 2019-02-23 RX ADMIN — Medication 1 LOZENGE: at 12:04

## 2019-02-23 RX ADMIN — ONDANSETRON 8 MILLIGRAM(S): 8 TABLET, FILM COATED ORAL at 21:06

## 2019-02-23 RX ADMIN — Medication 1 LOZENGE: at 19:52

## 2019-02-23 RX ADMIN — Medication 100 MILLIGRAM(S): at 21:06

## 2019-02-23 RX ADMIN — Medication 400 MILLIGRAM(S): at 13:55

## 2019-02-23 RX ADMIN — LEVETIRACETAM 500 MILLIGRAM(S): 250 TABLET, FILM COATED ORAL at 05:48

## 2019-02-23 RX ADMIN — Medication 1 LOZENGE: at 07:59

## 2019-02-23 RX ADMIN — LEVETIRACETAM 500 MILLIGRAM(S): 250 TABLET, FILM COATED ORAL at 18:05

## 2019-02-23 RX ADMIN — BUSULFAN 45.83 MILLIGRAM(S): 6 INJECTION INTRAVENOUS at 13:57

## 2019-02-23 RX ADMIN — PANTOPRAZOLE SODIUM 40 MILLIGRAM(S): 20 TABLET, DELAYED RELEASE ORAL at 05:50

## 2019-02-23 RX ADMIN — Medication 1 LOZENGE: at 02:01

## 2019-02-23 RX ADMIN — Medication 100 MILLIGRAM(S): at 05:49

## 2019-02-23 RX ADMIN — Medication 10 MILLILITER(S): at 12:04

## 2019-02-23 RX ADMIN — SENNA PLUS 2 TABLET(S): 8.6 TABLET ORAL at 21:06

## 2019-02-23 RX ADMIN — ATOVAQUONE 750 MILLIGRAM(S): 750 SUSPENSION ORAL at 18:05

## 2019-02-23 RX ADMIN — MEDROXYPROGESTERONE ACETATE 10 MILLIGRAM(S): 150 INJECTION, SUSPENSION, EXTENDED RELEASE INTRAMUSCULAR at 12:12

## 2019-02-23 RX ADMIN — Medication 5 MILLILITER(S): at 23:44

## 2019-02-23 RX ADMIN — Medication 5 MILLILITER(S): at 02:01

## 2019-02-23 RX ADMIN — Medication 1 TABLET(S): at 12:06

## 2019-02-23 RX ADMIN — URSODIOL 300 MILLIGRAM(S): 250 TABLET, FILM COATED ORAL at 07:59

## 2019-02-23 RX ADMIN — Medication 250 MILLIGRAM(S): at 12:10

## 2019-02-23 RX ADMIN — Medication 400 MILLIGRAM(S): at 05:48

## 2019-02-23 RX ADMIN — Medication 5 MILLILITER(S): at 19:52

## 2019-02-23 RX ADMIN — VORICONAZOLE 200 MILLIGRAM(S): 10 INJECTION, POWDER, LYOPHILIZED, FOR SOLUTION INTRAVENOUS at 05:50

## 2019-02-23 RX ADMIN — ATOVAQUONE 750 MILLIGRAM(S): 750 SUSPENSION ORAL at 05:48

## 2019-02-23 RX ADMIN — Medication 10 MILLILITER(S): at 19:52

## 2019-02-23 RX ADMIN — Medication 10 MILLILITER(S): at 07:59

## 2019-02-23 RX ADMIN — PREGABALIN 1000 MICROGRAM(S): 225 CAPSULE ORAL at 12:13

## 2019-02-23 RX ADMIN — BUSULFAN 45.83 MILLIGRAM(S): 6 INJECTION INTRAVENOUS at 19:53

## 2019-02-23 RX ADMIN — FLUDARABINE PHOSPHATE 203.92 MILLIGRAM(S): 25 INJECTION, SOLUTION INTRAVENOUS at 16:17

## 2019-02-23 RX ADMIN — CHLORHEXIDINE GLUCONATE 1 APPLICATION(S): 213 SOLUTION TOPICAL at 05:50

## 2019-02-23 RX ADMIN — BUSULFAN 45.83 MILLIGRAM(S): 6 INJECTION INTRAVENOUS at 07:57

## 2019-02-23 RX ADMIN — Medication 10 MILLILITER(S): at 02:01

## 2019-02-23 RX ADMIN — Medication 5 MILLILITER(S): at 16:19

## 2019-02-23 RX ADMIN — Medication 100 MILLIGRAM(S): at 13:55

## 2019-02-23 RX ADMIN — Medication 10 MILLILITER(S): at 23:44

## 2019-02-23 RX ADMIN — Medication 5 MILLILITER(S): at 07:59

## 2019-02-23 RX ADMIN — VORICONAZOLE 200 MILLIGRAM(S): 10 INJECTION, POWDER, LYOPHILIZED, FOR SOLUTION INTRAVENOUS at 18:05

## 2019-02-23 RX ADMIN — Medication 1 LOZENGE: at 16:19

## 2019-02-23 RX ADMIN — Medication 400 MILLIGRAM(S): at 21:06

## 2019-02-23 RX ADMIN — Medication 325 MILLIGRAM(S): at 13:55

## 2019-02-23 RX ADMIN — Medication 400 UNIT(S): at 12:12

## 2019-02-23 RX ADMIN — ONDANSETRON 8 MILLIGRAM(S): 8 TABLET, FILM COATED ORAL at 13:55

## 2019-02-23 RX ADMIN — Medication 1 MILLIGRAM(S): at 12:06

## 2019-02-23 RX ADMIN — ONDANSETRON 8 MILLIGRAM(S): 8 TABLET, FILM COATED ORAL at 05:49

## 2019-02-23 NOTE — PROGRESS NOTE ADULT - ATTENDING COMMENTS
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 Pt is Taoism, and does not accept blood products for [employer] reasons   Day - 4 of flu /bu prep - fludarabine 3/5  Continue Acyclovir, Mepron, Vfend prophylaxis  Continue VOD prophylaxis with Actigall, glutamine supplement, low dose heparin drip(will D/C when platelet count <=75K)  Labs every other day   Continue iron, folic acid, vitamin K, vitamin B-12, vitamin C as per standard practice for bloodless transplant  Antiemetics, mouth care, skin care   Bowel regimen  OOB

## 2019-02-23 NOTE — PROGRESS NOTE ADULT - SUBJECTIVE AND OBJECTIVE BOX
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    S: Patient seen and examined with [facility]PC Transplant Team:   no complaints today   Denies mouth / tongue / throat pain, dyspnea, cough, nausea, vomiting, diarrhea, abdominal pain    O: Vitals:   Vital Signs Last 24 Hrs  T(C): 37 (23 Feb 2019 06:26), Max: 37.2 (22 Feb 2019 13:36)  T(F): 98.6 (23 Feb 2019 06:26), Max: 99 (22 Feb 2019 13:36)  HR: 88 (23 Feb 2019 06:26) (77 - 90)  BP: 135/85 (23 Feb 2019 06:26) (109/75 - 136/85)  BP(mean): --  RR: 18 (23 Feb 2019 06:26) (18 - 18)  SpO2: 100% (23 Feb 2019 06:26) (100% - 100%)    Admit weight: 87.9kg   Today's weight:     Intake / Output:   02-22 @ 07:01  -  02-23 @ 07:00  --------------------------------------------------------  IN: 1879.4 mL / OUT: 2300 mL / NET: -420.6 mL    PE:   Oropharynx: no erythema or ulcerations  Oral Mucositis:              -                                         Grade: n/a  CVS: S1, S2 RRR   Lungs: CTA throughout bilaterally   Abdomen:+ BS x 4, soft, NT, ND   Extremities: no edema   Gastric Mucositis:       -                                           Grade: n/a  Intestinal Mucositis:     -                                         Grade: n/a  Skin: no rash   TLC: PICC line TLC   Neuro: A&O x 3   Pain: 0       Labs:         Radiology:       Meds:   Antimicrobials:   acyclovir   Oral Tab/Cap 400 milliGRAM(s) Oral three times a day  atovaquone Suspension 750 milliGRAM(s) Oral every 12 hours  clotrimazole Lozenge 1 Lozenge Oral five times a day  voriconazole 200 milliGRAM(s) Oral every 12 hours      Heme / Onc:   busulfan IVPB (eMAR) 50 milliGRAM(s) IV Intermittent every 6 hours  fludarabine IVPB (eMAR) 49 milliGRAM(s) IV Intermittent every 24 hours  heparin  Infusion 366 Unit(s)/Hr IV Continuous <Continuous>      GI:  docusate sodium 100 milliGRAM(s) Oral three times a day  pantoprazole    Tablet 40 milliGRAM(s) Oral before breakfast  senna 2 Tablet(s) Oral at bedtime  sodium bicarbonate Mouth Rinse 10 milliLiter(s) Swish and Spit five times a day  ursodiol Capsule 300 milliGRAM(s) Oral two times a day with meals      Cardiovascular:       Immunologic:       Other medications:   acetaminophen   Tablet .. 650 milliGRAM(s) Oral every 6 hours  acetaminophen   Tablet .. 650 milliGRAM(s) Oral <User Schedule>  aprepitant 80 milliGRAM(s) Oral daily  ascorbic acid 250 milliGRAM(s) Oral daily  Biotene Dry Mouth Oral Rinse 5 milliLiter(s) Swish and Spit five times a day  chlorhexidine 4% Liquid 1 Application(s) Topical <User Schedule>  cholecalciferol 400 Unit(s) Oral daily  cyanocobalamin 1000 MICROGram(s) Oral daily  ferrous    sulfate 325 milliGRAM(s) Oral three times a day  folic acid 1 milliGRAM(s) Oral daily  levETIRAcetam 500 milliGRAM(s) Oral two times a day  LORazepam   Injectable 1 milliGRAM(s) IV Push every 24 hours  medroxyPROGESTERone 10 milliGRAM(s) Oral daily  montelukast 10 milliGRAM(s) Oral daily  multivitamin 1 Tablet(s) Oral daily  ondansetron Injectable 8 milliGRAM(s) IV Push every 8 hours  phytonadione   Solution 10 milliGRAM(s) Oral <User Schedule>  sodium chloride 0.9%. 1000 milliLiter(s) IV Continuous <Continuous>      PRN:   acetaminophen   Tablet .. 650 milliGRAM(s) Oral every 6 hours PRN  artificial  tears Solution 1 Drop(s) Both EYES three times a day PRN  diphenhydrAMINE   Injectable 25 milliGRAM(s) IV Push every 4 hours PRN  LORazepam   Injectable 1 milliGRAM(s) IV Push every 6 hours PRN  LORazepam   Injectable 1 milliGRAM(s) IV Push every 6 hours PRN  metoclopramide Injectable 10 milliGRAM(s) IV Push every 6 hours PRN  sodium chloride 0.9% lock flush 10 milliLiter(s) IV Push every 1 hour PRN  zolpidem 5 milliGRAM(s) Oral at bedtime PRN      A/P:  33 year old female with a history of AML  Pre  :  Allogeneic PBSCT day - 4  Continue flu / bu prep: fludarabine 4/5   Labs every other day ; continue vitamin C, vitamin B, folic acid, vitamin K & iron supplementation     1. Infectious Disease:   acyclovir   Oral Tab/Cap 400 milliGRAM(s) Oral three times a day  atovaquone Suspension 750 milliGRAM(s) Oral every 12 hours  clotrimazole Lozenge 1 Lozenge Oral five times a day  voriconazole 200 milliGRAM(s) Oral every 12 hours    2. VOD Prophylaxis: Actigall, Glutamine, Heparin (dosed at 100 units / kg / day)     3. GI Prophylaxis:    pantoprazole    Tablet 40 milliGRAM(s) Oral before breakfast    4. Mouthcare - NS / NaHCO3 rinses, Mycelex, Biotene; Skin care     5. GVHD prophylaxis    to start on day -2   MTX days 1,3,6,11     6. Replete electrolytes prn   Bloodless transplant: No pRBC, platelets, FFP  medroxyPROGESTERone 10 milliGRAM(s) Oral daily  folic acid 1 milliGRAM(s) Oral daily  cyanocobalamin 1000 MICROGram(s) Oral daily  ferrous    sulfate 325 milliGRAM(s) Oral three times a day  ascorbic acid 250 milliGRAM(s) Oral daily    7. IV hydration, daily weights, strict I&O, prn diuresis     8. PO intake as tolerated, nutrition follow up as needed, MVI, folic acid     9. Antiemetics, anti-diarrhea medications:   LORazepam   Injectable 1 milliGRAM(s) IV Push every 6 hours PRN  metoclopramide Injectable 10 milliGRAM(s) IV Push every 6 hours PRN  ondansetron Injectable 8 milliGRAM(s) IV Push every 8 hours  aprepitant 80 milliGRAM(s) Oral daily    10. OOB as tolerated, physical therapy consult if needed     11. Monitor coags / fibrinogen 2x week, vitamin K as needed   phytonadione   Solution 10 milliGRAM(s) Oral <User Schedule>    12. Monitor closely for clinical changes, monitor for fevers     13. Emotional support provided, plan of care discussed and questions addressed     14. Patient education done regarding chemotherapy prep, plan of care, restrictions and discharge planning     15. Continue regular social work input     I have written the above note for Dr. Larsen who performed service with me in the room.   Ladonna Roldan NP-C ([phone])    I have seen and examined patient with NP, I agree with above note as scribed. 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    S: Patient seen and examined with [facility] Transplant Team:   no complaints today   Denies mouth / tongue / throat pain, dyspnea, cough, nausea, vomiting, diarrhea, abdominal pain    O: Vitals:   Vital Signs Last 24 Hrs  T(C): 37 (23 Feb 2019 06:26), Max: 37.2 (22 Feb 2019 13:36)  T(F): 98.6 (23 Feb 2019 06:26), Max: 99 (22 Feb 2019 13:36)  HR: 88 (23 Feb 2019 06:26) (77 - 90)  BP: 135/85 (23 Feb 2019 06:26) (109/75 - 136/85)  BP(mean): --  RR: 18 (23 Feb 2019 06:26) (18 - 18)  SpO2: 100% (23 Feb 2019 06:26) (100% - 100%)    Admit weight: 87.9kg   Today's weight:     Intake / Output:   02-22 @ 07:01  -  02-23 @ 07:00  --------------------------------------------------------  IN: 1879.4 mL / OUT: 2300 mL / NET: -420.6 mL    PE:   Oropharynx: no erythema or ulcerations  Oral Mucositis:              -                                         Grade: n/a  CVS: S1, S2 RRR   Lungs: CTA throughout bilaterally   Abdomen:+ BS x 4, soft, NT, ND   Extremities: no edema   Gastric Mucositis:       -                                           Grade: n/a  Intestinal Mucositis:     -                                         Grade: n/a  Skin: no rash   TLC: PICC line TLC   Neuro: A&O x 3   Pain: 0       Labs:         Radiology:       Meds:   Antimicrobials:   acyclovir   Oral Tab/Cap 400 milliGRAM(s) Oral three times a day  atovaquone Suspension 750 milliGRAM(s) Oral every 12 hours  clotrimazole Lozenge 1 Lozenge Oral five times a day  voriconazole 200 milliGRAM(s) Oral every 12 hours      Heme / Onc:   busulfan IVPB (eMAR) 50 milliGRAM(s) IV Intermittent every 6 hours  fludarabine IVPB (eMAR) 49 milliGRAM(s) IV Intermittent every 24 hours  heparin  Infusion 366 Unit(s)/Hr IV Continuous <Continuous>      GI:  docusate sodium 100 milliGRAM(s) Oral three times a day  pantoprazole    Tablet 40 milliGRAM(s) Oral before breakfast  senna 2 Tablet(s) Oral at bedtime  sodium bicarbonate Mouth Rinse 10 milliLiter(s) Swish and Spit five times a day  ursodiol Capsule 300 milliGRAM(s) Oral two times a day with meals      Cardiovascular:       Immunologic:       Other medications:   acetaminophen   Tablet .. 650 milliGRAM(s) Oral every 6 hours  acetaminophen   Tablet .. 650 milliGRAM(s) Oral <User Schedule>  aprepitant 80 milliGRAM(s) Oral daily  ascorbic acid 250 milliGRAM(s) Oral daily  Biotene Dry Mouth Oral Rinse 5 milliLiter(s) Swish and Spit five times a day  chlorhexidine 4% Liquid 1 Application(s) Topical <User Schedule>  cholecalciferol 400 Unit(s) Oral daily  cyanocobalamin 1000 MICROGram(s) Oral daily  ferrous    sulfate 325 milliGRAM(s) Oral three times a day  folic acid 1 milliGRAM(s) Oral daily  levETIRAcetam 500 milliGRAM(s) Oral two times a day  LORazepam   Injectable 1 milliGRAM(s) IV Push every 24 hours  medroxyPROGESTERone 10 milliGRAM(s) Oral daily  montelukast 10 milliGRAM(s) Oral daily  multivitamin 1 Tablet(s) Oral daily  ondansetron Injectable 8 milliGRAM(s) IV Push every 8 hours  phytonadione   Solution 10 milliGRAM(s) Oral <User Schedule>  sodium chloride 0.9%. 1000 milliLiter(s) IV Continuous <Continuous>      PRN:   acetaminophen   Tablet .. 650 milliGRAM(s) Oral every 6 hours PRN  artificial  tears Solution 1 Drop(s) Both EYES three times a day PRN  diphenhydrAMINE   Injectable 25 milliGRAM(s) IV Push every 4 hours PRN  LORazepam   Injectable 1 milliGRAM(s) IV Push every 6 hours PRN  LORazepam   Injectable 1 milliGRAM(s) IV Push every 6 hours PRN  metoclopramide Injectable 10 milliGRAM(s) IV Push every 6 hours PRN  sodium chloride 0.9% lock flush 10 milliLiter(s) IV Push every 1 hour PRN  zolpidem 5 milliGRAM(s) Oral at bedtime PRN      A/P:  33 year old female with a history of AML  Pre  :  Allogeneic PBSCT day - 4  Continue flu / bu prep: fludarabine 4/5   Labs every other day ; continue vitamin C, vitamin B, folic acid, vitamin K & iron supplementation     1. Infectious Disease:   acyclovir   Oral Tab/Cap 400 milliGRAM(s) Oral three times a day  atovaquone Suspension 750 milliGRAM(s) Oral every 12 hours  clotrimazole Lozenge 1 Lozenge Oral five times a day  voriconazole 200 milliGRAM(s) Oral every 12 hours    2. VOD Prophylaxis: Actigall, Glutamine, Heparin (dosed at 100 units / kg / day)     3. GI Prophylaxis:    pantoprazole    Tablet 40 milliGRAM(s) Oral before breakfast    4. Mouthcare - NS / NaHCO3 rinses, Mycelex, Biotene; Skin care     5. GVHD prophylaxis    to start on day -2   MTX days 1,3,6,11     6. Replete electrolytes prn   Bloodless transplant: No pRBC, platelets, FFP  medroxyPROGESTERone 10 milliGRAM(s) Oral daily  folic acid 1 milliGRAM(s) Oral daily  cyanocobalamin 1000 MICROGram(s) Oral daily  ferrous    sulfate 325 milliGRAM(s) Oral three times a day  ascorbic acid 250 milliGRAM(s) Oral daily    7. IV hydration, daily weights, strict I&O, prn diuresis     8. PO intake as tolerated, nutrition follow up as needed, MVI, folic acid     9. Antiemetics, anti-diarrhea medications:   LORazepam   Injectable 1 milliGRAM(s) IV Push every 6 hours PRN  metoclopramide Injectable 10 milliGRAM(s) IV Push every 6 hours PRN  ondansetron Injectable 8 milliGRAM(s) IV Push every 8 hours  aprepitant 80 milliGRAM(s) Oral daily    10. OOB as tolerated, physical therapy consult if needed     11. Monitor coags / fibrinogen 2x week, vitamin K as needed   phytonadione   Solution 10 milliGRAM(s) Oral <User Schedule>    12. Monitor closely for clinical changes, monitor for fevers     13. Emotional support provided, plan of care discussed and questions addressed     14. Patient education done regarding chemotherapy prep, plan of care, restrictions and discharge planning     15. Continue regular social work input     I have written the above note for Dr. Knott who performed service with me in the room.   Ladonna Roldan NP-C ([phone])    I have seen and examined patient with NP, I agree with above note as scribed.

## 2019-02-24 LAB
ALBUMIN SERPL ELPH-MCNC: 3.6 G/DL — SIGNIFICANT CHANGE UP (ref 3.3–5)
ALP SERPL-CCNC: 43 U/L — SIGNIFICANT CHANGE UP (ref 40–120)
ALT FLD-CCNC: 5 U/L — LOW (ref 10–45)
ANION GAP SERPL CALC-SCNC: 11 MMOL/L — SIGNIFICANT CHANGE UP (ref 5–17)
APTT BLD: 29.9 SEC — SIGNIFICANT CHANGE UP (ref 27.5–36.3)
AST SERPL-CCNC: 14 U/L — SIGNIFICANT CHANGE UP (ref 10–40)
BASOPHILS # BLD AUTO: 0 K/UL — SIGNIFICANT CHANGE UP (ref 0–0.2)
BASOPHILS NFR BLD AUTO: 0 % — SIGNIFICANT CHANGE UP (ref 0–2)
BILIRUB SERPL-MCNC: 0.2 MG/DL — SIGNIFICANT CHANGE UP (ref 0.2–1.2)
BUN SERPL-MCNC: 6 MG/DL — LOW (ref 7–23)
CALCIUM SERPL-MCNC: 9.2 MG/DL — SIGNIFICANT CHANGE UP (ref 8.4–10.5)
CHLORIDE SERPL-SCNC: 107 MMOL/L — SIGNIFICANT CHANGE UP (ref 96–108)
CO2 SERPL-SCNC: 21 MMOL/L — LOW (ref 22–31)
CREAT SERPL-MCNC: 0.8 MG/DL — SIGNIFICANT CHANGE UP (ref 0.5–1.3)
EOSINOPHIL # BLD AUTO: 0 K/UL — SIGNIFICANT CHANGE UP (ref 0–0.5)
EOSINOPHIL NFR BLD AUTO: 0 % — SIGNIFICANT CHANGE UP (ref 0–6)
FIBRINOGEN PPP-MCNC: 455 MG/DL — SIGNIFICANT CHANGE UP (ref 350–510)
GLUCOSE SERPL-MCNC: 87 MG/DL — SIGNIFICANT CHANGE UP (ref 70–99)
HCT VFR BLD CALC: 34.1 % — LOW (ref 34.5–45)
HGB BLD-MCNC: 12 G/DL — SIGNIFICANT CHANGE UP (ref 11.5–15.5)
INR BLD: 0.97 RATIO — SIGNIFICANT CHANGE UP (ref 0.88–1.16)
LDH SERPL L TO P-CCNC: 211 U/L — SIGNIFICANT CHANGE UP (ref 50–242)
LYMPHOCYTES # BLD AUTO: 0.3 K/UL — LOW (ref 1–3.3)
LYMPHOCYTES # BLD AUTO: 13.1 % — SIGNIFICANT CHANGE UP (ref 13–44)
MAGNESIUM SERPL-MCNC: 2 MG/DL — SIGNIFICANT CHANGE UP (ref 1.6–2.6)
MCHC RBC-ENTMCNC: 32.6 PG — SIGNIFICANT CHANGE UP (ref 27–34)
MCHC RBC-ENTMCNC: 35.1 GM/DL — SIGNIFICANT CHANGE UP (ref 32–36)
MCV RBC AUTO: 92.9 FL — SIGNIFICANT CHANGE UP (ref 80–100)
MONOCYTES # BLD AUTO: 0 K/UL — SIGNIFICANT CHANGE UP (ref 0–0.9)
MONOCYTES NFR BLD AUTO: 0.4 % — LOW (ref 2–14)
NEUTROPHILS # BLD AUTO: 1.9 K/UL — SIGNIFICANT CHANGE UP (ref 1.8–7.4)
NEUTROPHILS NFR BLD AUTO: 86.5 % — HIGH (ref 43–77)
PHOSPHATE SERPL-MCNC: 2.6 MG/DL — SIGNIFICANT CHANGE UP (ref 2.5–4.5)
PLATELET # BLD AUTO: 287 K/UL — SIGNIFICANT CHANGE UP (ref 150–400)
POTASSIUM SERPL-MCNC: 4 MMOL/L — SIGNIFICANT CHANGE UP (ref 3.5–5.3)
POTASSIUM SERPL-SCNC: 4 MMOL/L — SIGNIFICANT CHANGE UP (ref 3.5–5.3)
PROT SERPL-MCNC: 6.1 G/DL — SIGNIFICANT CHANGE UP (ref 6–8.3)
PROTHROM AB SERPL-ACNC: 11.1 SEC — SIGNIFICANT CHANGE UP (ref 10–12.9)
RBC # BLD: 3.67 M/UL — LOW (ref 3.8–5.2)
RBC # FLD: 14.7 % — HIGH (ref 10.3–14.5)
SODIUM SERPL-SCNC: 139 MMOL/L — SIGNIFICANT CHANGE UP (ref 135–145)
WBC # BLD: 2.2 K/UL — LOW (ref 3.8–10.5)
WBC # FLD AUTO: 2.2 K/UL — LOW (ref 3.8–10.5)

## 2019-02-24 PROCEDURE — 99291 CRITICAL CARE FIRST HOUR: CPT

## 2019-02-24 RX ORDER — SODIUM CHLORIDE 0.65 %
1 AEROSOL, SPRAY (ML) NASAL
Qty: 0 | Refills: 0 | Status: DISCONTINUED | OUTPATIENT
Start: 2019-02-24 | End: 2019-03-18

## 2019-02-24 RX ADMIN — SENNA PLUS 2 TABLET(S): 8.6 TABLET ORAL at 21:40

## 2019-02-24 RX ADMIN — Medication 1 LOZENGE: at 16:40

## 2019-02-24 RX ADMIN — Medication 10 MILLILITER(S): at 08:00

## 2019-02-24 RX ADMIN — Medication 10 MILLILITER(S): at 12:35

## 2019-02-24 RX ADMIN — Medication 1 MILLIGRAM(S): at 12:36

## 2019-02-24 RX ADMIN — Medication 400 MILLIGRAM(S): at 21:40

## 2019-02-24 RX ADMIN — Medication 5 MILLILITER(S): at 16:39

## 2019-02-24 RX ADMIN — Medication 1 LOZENGE: at 23:22

## 2019-02-24 RX ADMIN — Medication 325 MILLIGRAM(S): at 21:40

## 2019-02-24 RX ADMIN — Medication 5 MILLILITER(S): at 12:35

## 2019-02-24 RX ADMIN — LEVETIRACETAM 500 MILLIGRAM(S): 250 TABLET, FILM COATED ORAL at 18:15

## 2019-02-24 RX ADMIN — APREPITANT 80 MILLIGRAM(S): 80 CAPSULE ORAL at 12:38

## 2019-02-24 RX ADMIN — VORICONAZOLE 200 MILLIGRAM(S): 10 INJECTION, POWDER, LYOPHILIZED, FOR SOLUTION INTRAVENOUS at 18:14

## 2019-02-24 RX ADMIN — Medication 100 MILLIGRAM(S): at 06:25

## 2019-02-24 RX ADMIN — MEDROXYPROGESTERONE ACETATE 10 MILLIGRAM(S): 150 INJECTION, SUSPENSION, EXTENDED RELEASE INTRAMUSCULAR at 12:54

## 2019-02-24 RX ADMIN — Medication 100 MILLIGRAM(S): at 21:40

## 2019-02-24 RX ADMIN — Medication 325 MILLIGRAM(S): at 06:25

## 2019-02-24 RX ADMIN — LEVETIRACETAM 500 MILLIGRAM(S): 250 TABLET, FILM COATED ORAL at 06:25

## 2019-02-24 RX ADMIN — Medication 400 MILLIGRAM(S): at 14:41

## 2019-02-24 RX ADMIN — BUSULFAN 45.83 MILLIGRAM(S): 6 INJECTION INTRAVENOUS at 07:59

## 2019-02-24 RX ADMIN — ATOVAQUONE 750 MILLIGRAM(S): 750 SUSPENSION ORAL at 18:14

## 2019-02-24 RX ADMIN — Medication 10 MILLILITER(S): at 19:39

## 2019-02-24 RX ADMIN — BUSULFAN 45.83 MILLIGRAM(S): 6 INJECTION INTRAVENOUS at 01:51

## 2019-02-24 RX ADMIN — BUSULFAN 45.83 MILLIGRAM(S): 6 INJECTION INTRAVENOUS at 14:02

## 2019-02-24 RX ADMIN — CHLORHEXIDINE GLUCONATE 1 APPLICATION(S): 213 SOLUTION TOPICAL at 11:00

## 2019-02-24 RX ADMIN — PREGABALIN 1000 MICROGRAM(S): 225 CAPSULE ORAL at 12:39

## 2019-02-24 RX ADMIN — MONTELUKAST 10 MILLIGRAM(S): 4 TABLET, CHEWABLE ORAL at 12:38

## 2019-02-24 RX ADMIN — Medication 250 MILLIGRAM(S): at 12:41

## 2019-02-24 RX ADMIN — PANTOPRAZOLE SODIUM 40 MILLIGRAM(S): 20 TABLET, DELAYED RELEASE ORAL at 06:25

## 2019-02-24 RX ADMIN — VORICONAZOLE 200 MILLIGRAM(S): 10 INJECTION, POWDER, LYOPHILIZED, FOR SOLUTION INTRAVENOUS at 06:25

## 2019-02-24 RX ADMIN — ATOVAQUONE 750 MILLIGRAM(S): 750 SUSPENSION ORAL at 06:25

## 2019-02-24 RX ADMIN — ONDANSETRON 8 MILLIGRAM(S): 8 TABLET, FILM COATED ORAL at 06:25

## 2019-02-24 RX ADMIN — Medication 5 MILLILITER(S): at 23:22

## 2019-02-24 RX ADMIN — Medication 400 UNIT(S): at 12:39

## 2019-02-24 RX ADMIN — Medication 10 MILLILITER(S): at 16:40

## 2019-02-24 RX ADMIN — ONDANSETRON 8 MILLIGRAM(S): 8 TABLET, FILM COATED ORAL at 21:40

## 2019-02-24 RX ADMIN — FLUDARABINE PHOSPHATE 203.92 MILLIGRAM(S): 25 INJECTION, SOLUTION INTRAVENOUS at 16:37

## 2019-02-24 RX ADMIN — ONDANSETRON 8 MILLIGRAM(S): 8 TABLET, FILM COATED ORAL at 14:41

## 2019-02-24 RX ADMIN — Medication 325 MILLIGRAM(S): at 14:41

## 2019-02-24 RX ADMIN — Medication 5 MILLILITER(S): at 08:00

## 2019-02-24 RX ADMIN — BUSULFAN 45.83 MILLIGRAM(S): 6 INJECTION INTRAVENOUS at 19:53

## 2019-02-24 RX ADMIN — Medication 100 MILLIGRAM(S): at 14:41

## 2019-02-24 RX ADMIN — URSODIOL 300 MILLIGRAM(S): 250 TABLET, FILM COATED ORAL at 08:00

## 2019-02-24 RX ADMIN — Medication 1 TABLET(S): at 12:38

## 2019-02-24 RX ADMIN — Medication 1 LOZENGE: at 19:39

## 2019-02-24 RX ADMIN — URSODIOL 300 MILLIGRAM(S): 250 TABLET, FILM COATED ORAL at 18:15

## 2019-02-24 RX ADMIN — Medication 1 LOZENGE: at 08:00

## 2019-02-24 RX ADMIN — Medication 10 MILLILITER(S): at 23:22

## 2019-02-24 RX ADMIN — Medication 400 MILLIGRAM(S): at 06:25

## 2019-02-24 RX ADMIN — Medication 1 LOZENGE: at 12:35

## 2019-02-24 RX ADMIN — Medication 5 MILLILITER(S): at 19:39

## 2019-02-24 NOTE — PROGRESS NOTE ADULT - ATTENDING COMMENTS
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 Pt is Hindu, and does not accept blood products for [employer] reasons   Day - 3 of flu /bu prep - fludarabine 4/5  Continue Acyclovir, Mepron, Vfend prophylaxis  Continue VOD prophylaxis with Actigall, glutamine supplement, low dose heparin drip(will D/C when platelet count <=75K)  Labs every other day   Continue iron, folic acid, vitamin K, vitamin B-12, vitamin C as per standard practice for bloodless transplant  Antiemetics, mouth care, skin care   Bowel regimen  OOB

## 2019-02-24 NOTE — PROGRESS NOTE ADULT - SUBJECTIVE AND OBJECTIVE BOX
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    S: Patient seen and examined with HPC Transplant Team:   no complaints today   Denies mouth / tongue / throat pain, dyspnea, cough, nausea, vomiting, diarrhea, abdominal pain    O: Vitals:   Vital Signs Last 24 Hrs  T(C): 37.2 (2019 06:00), Max: 37.4 (2019 09:24)  T(F): 99 (2019 06:00), Max: 99.4 (2019 09:24)  HR: 91 (2019 06:00) (79 - 98)  BP: 112/79 (2019 06:00) (104/68 - 118/72)  BP(mean): --  RR: 18 (2019 06:00) (18 - 18)  SpO2: 100% (2019 06:00) (100% - 100%)    Daily Weight in k.9 (2019 09:24)  Admit weight: 87.9kg       Intake / Output:    @ 07:01  -   @ 07:00  --------------------------------------------------------  IN: 2163 mL / OUT: 3250 mL / NET: -1087 mL    PE:   Oropharynx: no erythema or ulcerations  Oral Mucositis:              -                                         Grade: n/a  CVS: S1, S2 RRR   Lungs: CTA throughout bilaterally   Abdomen:+ BS x 4, soft, NT, ND   Extremities: no edema   Gastric Mucositis:       -                                           Grade: n/a  Intestinal Mucositis:     -                                         Grade: n/a  Skin: no rash   TLC: PICC line TLC   Neuro: A&O x 3   Pain: 0    Labs    Meds:   Antimicrobials:   acyclovir   Oral Tab/Cap 400 milliGRAM(s) Oral three times a day  atovaquone Suspension 750 milliGRAM(s) Oral every 12 hours  clotrimazole Lozenge 1 Lozenge Oral five times a day  voriconazole 200 milliGRAM(s) Oral every 12 hours      Heme / Onc:   busulfan IVPB (eMAR) 50 milliGRAM(s) IV Intermittent every 6 hours  fludarabine IVPB (eMAR) 49 milliGRAM(s) IV Intermittent every 24 hours  heparin  Infusion 366 Unit(s)/Hr IV Continuous <Continuous>      GI:  docusate sodium 100 milliGRAM(s) Oral three times a day  pantoprazole    Tablet 40 milliGRAM(s) Oral before breakfast  senna 2 Tablet(s) Oral at bedtime  sodium bicarbonate Mouth Rinse 10 milliLiter(s) Swish and Spit five times a day  ursodiol Capsule 300 milliGRAM(s) Oral two times a day with meals      Cardiovascular:       Immunologic:       Other medications:   acetaminophen   Tablet .. 650 milliGRAM(s) Oral every 6 hours  acetaminophen   Tablet .. 650 milliGRAM(s) Oral <User Schedule>  aprepitant 80 milliGRAM(s) Oral daily  ascorbic acid 250 milliGRAM(s) Oral daily  Biotene Dry Mouth Oral Rinse 5 milliLiter(s) Swish and Spit five times a day  chlorhexidine 4% Liquid 1 Application(s) Topical <User Schedule>  cholecalciferol 400 Unit(s) Oral daily  cyanocobalamin 1000 MICROGram(s) Oral daily  ferrous    sulfate 325 milliGRAM(s) Oral three times a day  folic acid 1 milliGRAM(s) Oral daily  levETIRAcetam 500 milliGRAM(s) Oral two times a day  LORazepam   Injectable 1 milliGRAM(s) IV Push every 24 hours  medroxyPROGESTERone 10 milliGRAM(s) Oral daily  montelukast 10 milliGRAM(s) Oral daily  multivitamin 1 Tablet(s) Oral daily  ondansetron Injectable 8 milliGRAM(s) IV Push every 8 hours  phytonadione   Solution 10 milliGRAM(s) Oral <User Schedule>  sodium chloride 0.9%. 1000 milliLiter(s) IV Continuous <Continuous>      PRN:   acetaminophen   Tablet .. 650 milliGRAM(s) Oral every 6 hours PRN  artificial  tears Solution 1 Drop(s) Both EYES three times a day PRN  diphenhydrAMINE   Injectable 25 milliGRAM(s) IV Push every 4 hours PRN  LORazepam   Injectable 1 milliGRAM(s) IV Push every 6 hours PRN  LORazepam   Injectable 1 milliGRAM(s) IV Push every 6 hours PRN  metoclopramide Injectable 10 milliGRAM(s) IV Push every 6 hours PRN  sodium chloride 0.9% lock flush 10 milliLiter(s) IV Push every 1 hour PRN  zolpidem 5 milliGRAM(s) Oral at bedtime PRN      A/P:  33 year old female with a history of AML  Pre  :  Allogeneic PBSCT day - 3  Continue flu / bu prep: fludarabine 5/5   Labs every other day ; continue vitamin C, vitamin B, folic acid, vitamin K & iron supplementation     1. Infectious Disease:   acyclovir   Oral Tab/Cap 400 milliGRAM(s) Oral three times a day  atovaquone Suspension 750 milliGRAM(s) Oral every 12 hours  clotrimazole Lozenge 1 Lozenge Oral five times a day  voriconazole 200 milliGRAM(s) Oral every 12 hours    2. VOD Prophylaxis: Actigall, Glutamine, Heparin (dosed at 100 units / kg / day)     3. GI Prophylaxis:    pantoprazole    Tablet 40 milliGRAM(s) Oral before breakfast    4. Mouthcare - NS / NaHCO3 rinses, Mycelex, Biotene; Skin care     5. GVHD prophylaxis    to start on day -2   MTX days 1,3,6,11     6. Replete electrolytes prn   Bloodless transplant: No pRBC, platelets, FFP  medroxyPROGESTERone 10 milliGRAM(s) Oral daily  folic acid 1 milliGRAM(s) Oral daily  cyanocobalamin 1000 MICROGram(s) Oral daily  ferrous    sulfate 325 milliGRAM(s) Oral three times a day  ascorbic acid 250 milliGRAM(s) Oral daily    7. IV hydration, daily weights, strict I&O, prn diuresis     8. PO intake as tolerated, nutrition follow up as needed, MVI, folic acid     9. Antiemetics, anti-diarrhea medications:   LORazepam   Injectable 1 milliGRAM(s) IV Push every 6 hours PRN  metoclopramide Injectable 10 milliGRAM(s) IV Push every 6 hours PRN  ondansetron Injectable 8 milliGRAM(s) IV Push every 8 hours  aprepitant 80 milliGRAM(s) Oral daily    10. OOB as tolerated, physical therapy consult if needed     11. Monitor coags / fibrinogen 2x week, vitamin K as needed   phytonadione   Solution 10 milliGRAM(s) Oral <User Schedule>    12. Monitor closely for clinical changes, monitor for fevers     13. Emotional support provided, plan of care discussed and questions addressed     14. Patient education done regarding chemotherapy prep, plan of care, restrictions and discharge planning     15. Continue regular social work input     I have written the above note for Dr. Knott who performed service with me in the room.   Ladonna Roldan NP-C ([phone])    I have seen and examined patient with NP, I agree with above note as scribed. 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    S: Patient seen and examined with HPC Transplant Team:   no complaints today   Denies mouth / tongue / throat pain, dyspnea, cough, nausea, vomiting, diarrhea, abdominal pain    O: Vitals:   Vital Signs Last 24 Hrs  T(C): 37.2 (2019 06:00), Max: 37.4 (2019 09:24)  T(F): 99 (2019 06:00), Max: 99.4 (2019 09:24)  HR: 91 (2019 06:00) (79 - 98)  BP: 112/79 (2019 06:00) (104/68 - 118/72)  BP(mean): --  RR: 18 (2019 06:00) (18 - 18)  SpO2: 100% (2019 06:00) (100% - 100%)    Daily Weight in k.6kg  Admit weight: 87.9kg       Intake / Output:    @ 07:01  -   @ 07:00  --------------------------------------------------------  IN: 2163 mL / OUT: 3250 mL / NET: -1087 mL    PE:   Oropharynx: no erythema or ulcerations  Oral Mucositis:              -                                         Grade: n/a  CVS: S1, S2 RRR   Lungs: CTA throughout bilaterally   Abdomen:+ BS x 4, soft, NT, ND   Extremities: no edema   Gastric Mucositis:       -                                           Grade: n/a  Intestinal Mucositis:     -                                         Grade: n/a  Skin: no rash   TLC: PICC line TLC   Neuro: A&O x 3   Pain: 0    Labs    Meds:   Antimicrobials:   acyclovir   Oral Tab/Cap 400 milliGRAM(s) Oral three times a day  atovaquone Suspension 750 milliGRAM(s) Oral every 12 hours  clotrimazole Lozenge 1 Lozenge Oral five times a day  voriconazole 200 milliGRAM(s) Oral every 12 hours      Heme / Onc:   busulfan IVPB (eMAR) 50 milliGRAM(s) IV Intermittent every 6 hours  fludarabine IVPB (eMAR) 49 milliGRAM(s) IV Intermittent every 24 hours  heparin  Infusion 366 Unit(s)/Hr IV Continuous <Continuous>      GI:  docusate sodium 100 milliGRAM(s) Oral three times a day  pantoprazole    Tablet 40 milliGRAM(s) Oral before breakfast  senna 2 Tablet(s) Oral at bedtime  sodium bicarbonate Mouth Rinse 10 milliLiter(s) Swish and Spit five times a day  ursodiol Capsule 300 milliGRAM(s) Oral two times a day with meals      Cardiovascular:       Immunologic:       Other medications:   acetaminophen   Tablet .. 650 milliGRAM(s) Oral every 6 hours  acetaminophen   Tablet .. 650 milliGRAM(s) Oral <User Schedule>  aprepitant 80 milliGRAM(s) Oral daily  ascorbic acid 250 milliGRAM(s) Oral daily  Biotene Dry Mouth Oral Rinse 5 milliLiter(s) Swish and Spit five times a day  chlorhexidine 4% Liquid 1 Application(s) Topical <User Schedule>  cholecalciferol 400 Unit(s) Oral daily  cyanocobalamin 1000 MICROGram(s) Oral daily  ferrous    sulfate 325 milliGRAM(s) Oral three times a day  folic acid 1 milliGRAM(s) Oral daily  levETIRAcetam 500 milliGRAM(s) Oral two times a day  LORazepam   Injectable 1 milliGRAM(s) IV Push every 24 hours  medroxyPROGESTERone 10 milliGRAM(s) Oral daily  montelukast 10 milliGRAM(s) Oral daily  multivitamin 1 Tablet(s) Oral daily  ondansetron Injectable 8 milliGRAM(s) IV Push every 8 hours  phytonadione   Solution 10 milliGRAM(s) Oral <User Schedule>  sodium chloride 0.9%. 1000 milliLiter(s) IV Continuous <Continuous>      PRN:   acetaminophen   Tablet .. 650 milliGRAM(s) Oral every 6 hours PRN  artificial  tears Solution 1 Drop(s) Both EYES three times a day PRN  diphenhydrAMINE   Injectable 25 milliGRAM(s) IV Push every 4 hours PRN  LORazepam   Injectable 1 milliGRAM(s) IV Push every 6 hours PRN  LORazepam   Injectable 1 milliGRAM(s) IV Push every 6 hours PRN  metoclopramide Injectable 10 milliGRAM(s) IV Push every 6 hours PRN  sodium chloride 0.9% lock flush 10 milliLiter(s) IV Push every 1 hour PRN  zolpidem 5 milliGRAM(s) Oral at bedtime PRN      A/P:  33 year old female with a history of AML  Pre  :  Allogeneic PBSCT day - 3  Continue flu / bu prep: fludarabine 5/5   Labs every other day ; continue vitamin C, vitamin B, folic acid, vitamin K & iron supplementation     1. Infectious Disease:   acyclovir   Oral Tab/Cap 400 milliGRAM(s) Oral three times a day  atovaquone Suspension 750 milliGRAM(s) Oral every 12 hours  clotrimazole Lozenge 1 Lozenge Oral five times a day  voriconazole 200 milliGRAM(s) Oral every 12 hours    2. VOD Prophylaxis: Actigall, Glutamine, Heparin (dosed at 100 units / kg / day)     3. GI Prophylaxis:    pantoprazole    Tablet 40 milliGRAM(s) Oral before breakfast    4. Mouthcare - NS / NaHCO3 rinses, Mycelex, Biotene; Skin care     5. GVHD prophylaxis    to start on day -2   MTX days 1,3,6,11     6. Replete electrolytes prn   Bloodless transplant: No pRBC, platelets, FFP  medroxyPROGESTERone 10 milliGRAM(s) Oral daily  folic acid 1 milliGRAM(s) Oral daily  cyanocobalamin 1000 MICROGram(s) Oral daily  ferrous    sulfate 325 milliGRAM(s) Oral three times a day  ascorbic acid 250 milliGRAM(s) Oral daily    7. IV hydration, daily weights, strict I&O, prn diuresis     8. PO intake as tolerated, nutrition follow up as needed, MVI, folic acid     9. Antiemetics, anti-diarrhea medications:   LORazepam   Injectable 1 milliGRAM(s) IV Push every 6 hours PRN  metoclopramide Injectable 10 milliGRAM(s) IV Push every 6 hours PRN  ondansetron Injectable 8 milliGRAM(s) IV Push every 8 hours  aprepitant 80 milliGRAM(s) Oral daily    10. OOB as tolerated, physical therapy consult if needed     11. Monitor coags / fibrinogen 2x week, vitamin K as needed   phytonadione   Solution 10 milliGRAM(s) Oral <User Schedule>    12. Monitor closely for clinical changes, monitor for fevers     13. Emotional support provided, plan of care discussed and questions addressed     14. Patient education done regarding chemotherapy prep, plan of care, restrictions and discharge planning     15. Continue regular social work input     I have written the above note for Dr. Knott who performed service with me in the room.   Ladonna Roldan NP-C ([phone])    I have seen and examined patient with NP, I agree with above note as scribed.

## 2019-02-25 PROCEDURE — 99291 CRITICAL CARE FIRST HOUR: CPT

## 2019-02-25 RX ADMIN — ONDANSETRON 8 MILLIGRAM(S): 8 TABLET, FILM COATED ORAL at 06:08

## 2019-02-25 RX ADMIN — Medication 325 MILLIGRAM(S): at 13:28

## 2019-02-25 RX ADMIN — ATOVAQUONE 750 MILLIGRAM(S): 750 SUSPENSION ORAL at 17:31

## 2019-02-25 RX ADMIN — Medication 250 MILLIGRAM(S): at 12:29

## 2019-02-25 RX ADMIN — Medication 100 MILLIGRAM(S): at 06:08

## 2019-02-25 RX ADMIN — PREGABALIN 1000 MICROGRAM(S): 225 CAPSULE ORAL at 12:34

## 2019-02-25 RX ADMIN — CHLORHEXIDINE GLUCONATE 1 APPLICATION(S): 213 SOLUTION TOPICAL at 08:47

## 2019-02-25 RX ADMIN — Medication 1 LOZENGE: at 23:24

## 2019-02-25 RX ADMIN — Medication 10 MILLIGRAM(S): at 01:35

## 2019-02-25 RX ADMIN — Medication 100 MILLIGRAM(S): at 22:06

## 2019-02-25 RX ADMIN — TACROLIMUS 2 MILLIGRAM(S): 5 CAPSULE ORAL at 17:32

## 2019-02-25 RX ADMIN — Medication 10 MILLIGRAM(S): at 10:40

## 2019-02-25 RX ADMIN — SENNA PLUS 2 TABLET(S): 8.6 TABLET ORAL at 22:06

## 2019-02-25 RX ADMIN — PANTOPRAZOLE SODIUM 40 MILLIGRAM(S): 20 TABLET, DELAYED RELEASE ORAL at 06:08

## 2019-02-25 RX ADMIN — APREPITANT 80 MILLIGRAM(S): 80 CAPSULE ORAL at 12:33

## 2019-02-25 RX ADMIN — Medication 1 LOZENGE: at 15:18

## 2019-02-25 RX ADMIN — VORICONAZOLE 200 MILLIGRAM(S): 10 INJECTION, POWDER, LYOPHILIZED, FOR SOLUTION INTRAVENOUS at 06:08

## 2019-02-25 RX ADMIN — SODIUM CHLORIDE 20 MILLILITER(S): 9 INJECTION INTRAMUSCULAR; INTRAVENOUS; SUBCUTANEOUS at 00:12

## 2019-02-25 RX ADMIN — Medication 5 MILLILITER(S): at 15:18

## 2019-02-25 RX ADMIN — Medication 400 MILLIGRAM(S): at 06:08

## 2019-02-25 RX ADMIN — Medication 325 MILLIGRAM(S): at 22:06

## 2019-02-25 RX ADMIN — Medication 5 MILLILITER(S): at 21:41

## 2019-02-25 RX ADMIN — URSODIOL 300 MILLIGRAM(S): 250 TABLET, FILM COATED ORAL at 17:31

## 2019-02-25 RX ADMIN — ATOVAQUONE 750 MILLIGRAM(S): 750 SUSPENSION ORAL at 06:08

## 2019-02-25 RX ADMIN — MONTELUKAST 10 MILLIGRAM(S): 4 TABLET, CHEWABLE ORAL at 12:33

## 2019-02-25 RX ADMIN — BUSULFAN 45.83 MILLIGRAM(S): 6 INJECTION INTRAVENOUS at 02:00

## 2019-02-25 RX ADMIN — Medication 1 TABLET(S): at 12:29

## 2019-02-25 RX ADMIN — Medication 1 LOZENGE: at 08:48

## 2019-02-25 RX ADMIN — Medication 325 MILLIGRAM(S): at 06:08

## 2019-02-25 RX ADMIN — Medication 5 MILLILITER(S): at 23:24

## 2019-02-25 RX ADMIN — MEDROXYPROGESTERONE ACETATE 10 MILLIGRAM(S): 150 INJECTION, SUSPENSION, EXTENDED RELEASE INTRAMUSCULAR at 12:28

## 2019-02-25 RX ADMIN — Medication 10 MILLILITER(S): at 12:27

## 2019-02-25 RX ADMIN — Medication 400 UNIT(S): at 12:33

## 2019-02-25 RX ADMIN — HEPARIN SODIUM 3.66 UNIT(S)/HR: 5000 INJECTION INTRAVENOUS; SUBCUTANEOUS at 00:12

## 2019-02-25 RX ADMIN — Medication 5 MILLILITER(S): at 12:28

## 2019-02-25 RX ADMIN — Medication 10 MILLILITER(S): at 21:42

## 2019-02-25 RX ADMIN — ONDANSETRON 8 MILLIGRAM(S): 8 TABLET, FILM COATED ORAL at 13:27

## 2019-02-25 RX ADMIN — Medication 10 MILLILITER(S): at 15:19

## 2019-02-25 RX ADMIN — Medication 5 MILLILITER(S): at 08:48

## 2019-02-25 RX ADMIN — URSODIOL 300 MILLIGRAM(S): 250 TABLET, FILM COATED ORAL at 08:49

## 2019-02-25 RX ADMIN — Medication 1 LOZENGE: at 12:28

## 2019-02-25 RX ADMIN — LEVETIRACETAM 500 MILLIGRAM(S): 250 TABLET, FILM COATED ORAL at 17:32

## 2019-02-25 RX ADMIN — Medication 10 MILLILITER(S): at 08:46

## 2019-02-25 RX ADMIN — ONDANSETRON 8 MILLIGRAM(S): 8 TABLET, FILM COATED ORAL at 22:07

## 2019-02-25 RX ADMIN — Medication 10 MILLILITER(S): at 23:24

## 2019-02-25 RX ADMIN — Medication 1 MILLIGRAM(S): at 12:33

## 2019-02-25 RX ADMIN — VORICONAZOLE 200 MILLIGRAM(S): 10 INJECTION, POWDER, LYOPHILIZED, FOR SOLUTION INTRAVENOUS at 17:31

## 2019-02-25 RX ADMIN — TACROLIMUS 2 MILLIGRAM(S): 5 CAPSULE ORAL at 06:08

## 2019-02-25 RX ADMIN — Medication 100 MILLIGRAM(S): at 13:27

## 2019-02-25 RX ADMIN — LEVETIRACETAM 500 MILLIGRAM(S): 250 TABLET, FILM COATED ORAL at 06:08

## 2019-02-25 RX ADMIN — Medication 400 MILLIGRAM(S): at 13:28

## 2019-02-25 RX ADMIN — Medication 1 LOZENGE: at 21:42

## 2019-02-25 RX ADMIN — Medication 400 MILLIGRAM(S): at 22:06

## 2019-02-25 NOTE — PROGRESS NOTE ADULT - ATTENDING COMMENTS
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 Pt is [employer], and does not accept blood products for Shinto reasons   Day - 2 of flu /bu prep - start tacrolimus today   Continue Acyclovir, Mepron, Vfend prophylaxis  Continue VOD prophylaxis with Actigall, glutamine supplement, low dose heparin drip(will D/C when platelet count <=75K)  Labs every other day   Continue iron, folic acid, vitamin K, vitamin B-12, vitamin C as per standard practice for bloodless transplant  Antiemetics, mouth care, skin care   Bowel regimen  OOB/ambulate

## 2019-02-25 NOTE — PROGRESS NOTE ADULT - SUBJECTIVE AND OBJECTIVE BOX
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    S: Patient seen and examined with HPC Transplant Team:     Denies mouth / tongue / throat pain, dyspnea, cough, nausea, vomiting, diarrhea, abdominal pain     O: Vitals:   Vital Signs Last 24 Hrs  T(C): 37.4 (2019 06:15), Max: 37.6 (2019 10:05)  T(F): 99.3 (2019 06:15), Max: 99.7 (2019 10:05)  HR: 88 (2019 06:15) (85 - 98)  BP: 122/80 (2019 06:15) (115/76 - 126/80)  BP(mean): --  RR: 18 (2019 06:15) (18 - 18)  SpO2: 100% (2019 06:15) (98% - 100%)    Admit weight: 88.6kg  Daily Weight in k.6 (2019 10:00)  Today's weight:     Intake / Output:    @ 07:01  -   @ 07:00  --------------------------------------------------------  IN: 3198 mL / OUT: 2900 mL / NET: 298 mL      PE:   Oropharynx: no erythema or ulcerations  Oral Mucositis:              -                                         Grade: n/a  CVS: S1, S2 RRR   Lungs: CTA throughout bilaterally   Abdomen:+ BS x 4, soft, NT, ND   Extremities: no edema   Gastric Mucositis:       -                                           Grade: n/a  Intestinal Mucositis:     -                                         Grade: n/a  Skin: no rash   TLC: PICC line TLC   Neuro: A&O x 3   Pain: 0    Labs:   CBC Full  -  ( 2019 07:13 )  WBC Count : 2.2 K/uL  Hemoglobin : 12.0 g/dL  Hematocrit : 34.1 %  Platelet Count - Automated : 287 K/uL  Mean Cell Volume : 92.9 fl  Mean Cell Hemoglobin : 32.6 pg  Mean Cell Hemoglobin Concentration : 35.1 gm/dL  Auto Neutrophil # : 1.9 K/uL  Auto Lymphocyte # : 0.3 K/uL  Auto Monocyte # : 0.0 K/uL  Auto Eosinophil # : 0.0 K/uL  Auto Basophil # : 0.0 K/uL  Auto Neutrophil % : 86.5 %  Auto Lymphocyte % : 13.1 %  Auto Monocyte % : 0.4 %  Auto Eosinophil % : 0.0 %  Auto Basophil % : 0.0 %                          12.0   2.2   )-----------( 287      ( 2019 07:13 )             34.1         139  |  107  |  6<L>  ----------------------------<  87  4.0   |  21<L>  |  0.80    Ca    9.2      2019 07:13  Phos  2.6       Mg     2.0         TPro  6.1  /  Alb  3.6  /  TBili  0.2  /  DBili  x   /  AST  14  /  ALT  5<L>  /  AlkPhos  43      PT/INR - ( 2019 07:13 )   PT: 11.1 sec;   INR: 0.97 ratio         PTT - ( 2019 07:13 )  PTT:29.9 sec  LIVER FUNCTIONS - ( 2019 07:13 )  Alb: 3.6 g/dL / Pro: 6.1 g/dL / ALK PHOS: 43 U/L / ALT: 5 U/L / AST: 14 U/L / GGT: x             Meds:   Antimicrobials:   acyclovir   Oral Tab/Cap 400 milliGRAM(s) Oral three times a day  atovaquone Suspension 750 milliGRAM(s) Oral every 12 hours  clotrimazole Lozenge 1 Lozenge Oral five times a day  voriconazole 200 milliGRAM(s) Oral every 12 hours      Heme / Onc:   heparin  Infusion 366 Unit(s)/Hr IV Continuous <Continuous>      GI:  docusate sodium 100 milliGRAM(s) Oral three times a day  pantoprazole    Tablet 40 milliGRAM(s) Oral before breakfast  senna 2 Tablet(s) Oral at bedtime  sodium bicarbonate Mouth Rinse 10 milliLiter(s) Swish and Spit five times a day  ursodiol Capsule 300 milliGRAM(s) Oral two times a day with meals      Cardiovascular:       Immunologic:   tacrolimus 2 milliGRAM(s) Oral every 12 hours      Other medications:   acetaminophen   Tablet .. 650 milliGRAM(s) Oral every 6 hours  acetaminophen   Tablet .. 650 milliGRAM(s) Oral <User Schedule>  aprepitant 80 milliGRAM(s) Oral daily  ascorbic acid 250 milliGRAM(s) Oral daily  Biotene Dry Mouth Oral Rinse 5 milliLiter(s) Swish and Spit five times a day  chlorhexidine 4% Liquid 1 Application(s) Topical <User Schedule>  cholecalciferol 400 Unit(s) Oral daily  cyanocobalamin 1000 MICROGram(s) Oral daily  ferrous    sulfate 325 milliGRAM(s) Oral three times a day  folic acid 1 milliGRAM(s) Oral daily  levETIRAcetam 500 milliGRAM(s) Oral two times a day  LORazepam   Injectable 1 milliGRAM(s) IV Push every 24 hours  medroxyPROGESTERone 10 milliGRAM(s) Oral daily  montelukast 10 milliGRAM(s) Oral daily  multivitamin 1 Tablet(s) Oral daily  ondansetron Injectable 8 milliGRAM(s) IV Push every 8 hours  phytonadione   Solution 10 milliGRAM(s) Oral <User Schedule>  sodium chloride 0.9%. 1000 milliLiter(s) IV Continuous <Continuous>      PRN:   acetaminophen   Tablet .. 650 milliGRAM(s) Oral every 6 hours PRN  artificial  tears Solution 1 Drop(s) Both EYES three times a day PRN  diphenhydrAMINE   Injectable 25 milliGRAM(s) IV Push every 4 hours PRN  LORazepam   Injectable 1 milliGRAM(s) IV Push every 6 hours PRN  LORazepam   Injectable 1 milliGRAM(s) IV Push every 6 hours PRN  metoclopramide Injectable 10 milliGRAM(s) IV Push every 6 hours PRN  sodium chloride 0.65% Nasal 1 Spray(s) Both Nostrils five times a day PRN  sodium chloride 0.9% lock flush 10 milliLiter(s) IV Push every 1 hour PRN  zolpidem 5 milliGRAM(s) Oral at bedtime PRN    A/P:  33 year old female with a history of AML  Pre  :  Allogeneic PBSCT day - 2   Start tacrolimus today   Labs every other day ; continue vitamin C, vitamin B, folic acid, vitamin K & iron supplementation     1. Infectious Disease:   acyclovir   Oral Tab/Cap 400 milliGRAM(s) Oral three times a day  atovaquone Suspension 750 milliGRAM(s) Oral every 12 hours  clotrimazole Lozenge 1 Lozenge Oral five times a day  voriconazole 200 milliGRAM(s) Oral every 12 hour    2. VOD Prophylaxis: Actigall, Glutamine, Heparin (dosed at 100 units / kg / day)   Will d/c heparin when platelets < 75K    3. GI Prophylaxis:   pantoprazole    Tablet 40 milliGRAM(s) Oral before breakfast    4. Mouthcare - NS / NaHCO3 rinses, Mycelex, Caphosol; Skin care     5. GVHD prophylaxis   tacrolimus 2 milliGRAM(s) Oral every 12 hours    6. Transfuse & replete electrolytes prn     7. IV hydration, daily weights, strict I&O, prn diuresis     8. PO intake as tolerated, nutrition follow up as needed, MVI, folic acid     9. Antiemetics, anti-diarrhea medications:   LORazepam   Injectable 1 milliGRAM(s) IV Push every 6 hours PRN  metoclopramide Injectable 10 milliGRAM(s) IV Push every 6 hours PRN  ondansetron Injectable 8 milliGRAM(s) IV Push every 8 hours    10. OOB as tolerated, physical therapy consult if needed     11. Monitor coags / fibrinogen 2x week, vitamin K as needed   phytonadione   Solution 10 milliGRAM(s) Oral <User Schedule>    12. Monitor closely for clinical changes, monitor for fevers     13. Emotional support provided, plan of care discussed and questions addressed     14. Patient education done regarding chemotherapy prep, plan of care, restrictions and discharge planning     15. Continue regular social work input     I have written the above note for Dr. Larsen who performed service with me in the room.   Racquel Leiva NP-C ([phone])    I have seen and examined patient with NP, I agree with above note as scribed. 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    S: Patient seen and examined with HPC Transplant Team:   + fatigue   + mild, intermittent nausea   Denies mouth / tongue / throat pain, dyspnea, cough, vomiting, diarrhea, abdominal pain     O: Vitals:   Vital Signs Last 24 Hrs  T(C): 37.4 (2019 06:15), Max: 37.6 (2019 10:05)  T(F): 99.3 (2019 06:15), Max: 99.7 (2019 10:05)  HR: 88 (2019 06:15) (85 - 98)  BP: 122/80 (2019 06:15) (115/76 - 126/80)  BP(mean): --  RR: 18 (2019 06:15) (18 - 18)  SpO2: 100% (2019 06:15) (98% - 100%)    Admit weight: 88.6kg  Daily Weight in k.6 (2019 10:00)  Today's weight: 89.3kg     Intake / Output:    @ 07:01  -   @ 07:00  --------------------------------------------------------  IN: 3198 mL / OUT: 2900 mL / NET: 298 mL      PE:   Oropharynx: no erythema or ulcerations  Oral Mucositis:              -                                         Grade: n/a  CVS: S1, S2 RRR   Lungs: CTA throughout bilaterally   Abdomen:+ BS x 4, soft, NT, ND   Extremities: no edema   Gastric Mucositis:       -                                           Grade: n/a  Intestinal Mucositis:     -                                         Grade: n/a  Skin: no rash   TLC: PICC line TLC   Neuro: A&O x 3   Pain: 0    Labs:   CBC Full  -  ( 2019 07:13 )  WBC Count : 2.2 K/uL  Hemoglobin : 12.0 g/dL  Hematocrit : 34.1 %  Platelet Count - Automated : 287 K/uL  Mean Cell Volume : 92.9 fl  Mean Cell Hemoglobin : 32.6 pg  Mean Cell Hemoglobin Concentration : 35.1 gm/dL  Auto Neutrophil # : 1.9 K/uL  Auto Lymphocyte # : 0.3 K/uL  Auto Monocyte # : 0.0 K/uL  Auto Eosinophil # : 0.0 K/uL  Auto Basophil # : 0.0 K/uL  Auto Neutrophil % : 86.5 %  Auto Lymphocyte % : 13.1 %  Auto Monocyte % : 0.4 %  Auto Eosinophil % : 0.0 %  Auto Basophil % : 0.0 %                          12.0   2.2   )-----------( 287      ( 2019 07:13 )             34.1     02-    139  |  107  |  6<L>  ----------------------------<  87  4.0   |  21<L>  |  0.80    Ca    9.2      2019 07:13  Phos  2.6       Mg     2.0         TPro  6.1  /  Alb  3.6  /  TBili  0.2  /  DBili  x   /  AST  14  /  ALT  5<L>  /  AlkPhos  43      PT/INR - ( 2019 07:13 )   PT: 11.1 sec;   INR: 0.97 ratio         PTT - ( 2019 07:13 )  PTT:29.9 sec  LIVER FUNCTIONS - ( 2019 07:13 )  Alb: 3.6 g/dL / Pro: 6.1 g/dL / ALK PHOS: 43 U/L / ALT: 5 U/L / AST: 14 U/L / GGT: x             Meds:   Antimicrobials:   acyclovir   Oral Tab/Cap 400 milliGRAM(s) Oral three times a day  atovaquone Suspension 750 milliGRAM(s) Oral every 12 hours  clotrimazole Lozenge 1 Lozenge Oral five times a day  voriconazole 200 milliGRAM(s) Oral every 12 hours      Heme / Onc:   heparin  Infusion 366 Unit(s)/Hr IV Continuous <Continuous>      GI:  docusate sodium 100 milliGRAM(s) Oral three times a day  pantoprazole    Tablet 40 milliGRAM(s) Oral before breakfast  senna 2 Tablet(s) Oral at bedtime  sodium bicarbonate Mouth Rinse 10 milliLiter(s) Swish and Spit five times a day  ursodiol Capsule 300 milliGRAM(s) Oral two times a day with meals      Cardiovascular:       Immunologic:   tacrolimus 2 milliGRAM(s) Oral every 12 hours      Other medications:   acetaminophen   Tablet .. 650 milliGRAM(s) Oral every 6 hours  acetaminophen   Tablet .. 650 milliGRAM(s) Oral <User Schedule>  aprepitant 80 milliGRAM(s) Oral daily  ascorbic acid 250 milliGRAM(s) Oral daily  Biotene Dry Mouth Oral Rinse 5 milliLiter(s) Swish and Spit five times a day  chlorhexidine 4% Liquid 1 Application(s) Topical <User Schedule>  cholecalciferol 400 Unit(s) Oral daily  cyanocobalamin 1000 MICROGram(s) Oral daily  ferrous    sulfate 325 milliGRAM(s) Oral three times a day  folic acid 1 milliGRAM(s) Oral daily  levETIRAcetam 500 milliGRAM(s) Oral two times a day  LORazepam   Injectable 1 milliGRAM(s) IV Push every 24 hours  medroxyPROGESTERone 10 milliGRAM(s) Oral daily  montelukast 10 milliGRAM(s) Oral daily  multivitamin 1 Tablet(s) Oral daily  ondansetron Injectable 8 milliGRAM(s) IV Push every 8 hours  phytonadione   Solution 10 milliGRAM(s) Oral <User Schedule>  sodium chloride 0.9%. 1000 milliLiter(s) IV Continuous <Continuous>      PRN:   acetaminophen   Tablet .. 650 milliGRAM(s) Oral every 6 hours PRN  artificial  tears Solution 1 Drop(s) Both EYES three times a day PRN  diphenhydrAMINE   Injectable 25 milliGRAM(s) IV Push every 4 hours PRN  LORazepam   Injectable 1 milliGRAM(s) IV Push every 6 hours PRN  LORazepam   Injectable 1 milliGRAM(s) IV Push every 6 hours PRN  metoclopramide Injectable 10 milliGRAM(s) IV Push every 6 hours PRN  sodium chloride 0.65% Nasal 1 Spray(s) Both Nostrils five times a day PRN  sodium chloride 0.9% lock flush 10 milliLiter(s) IV Push every 1 hour PRN  zolpidem 5 milliGRAM(s) Oral at bedtime PRN    A/P:  33 year old female with a history of AML  Pre  :  Allogeneic PBSCT day - 2   Start tacrolimus today   Labs every other day ; continue vitamin C, vitamin B, folic acid, vitamin K & iron supplementation     1. Infectious Disease:   acyclovir   Oral Tab/Cap 400 milliGRAM(s) Oral three times a day  atovaquone Suspension 750 milliGRAM(s) Oral every 12 hours  clotrimazole Lozenge 1 Lozenge Oral five times a day  voriconazole 200 milliGRAM(s) Oral every 12 hour    2. VOD Prophylaxis: Actigall, Glutamine, Heparin (dosed at 100 units / kg / day)   Will d/c heparin when platelets < 75K    3. GI Prophylaxis:   pantoprazole    Tablet 40 milliGRAM(s) Oral before breakfast    4. Mouthcare - NS / NaHCO3 rinses, Mycelex, Caphosol; Skin care     5. GVHD prophylaxis   tacrolimus 2 milliGRAM(s) Oral every 12 hours    6. Transfuse & replete electrolytes prn     7. IV hydration, daily weights, strict I&O, prn diuresis     8. PO intake as tolerated, nutrition follow up as needed, MVI, folic acid     9. Antiemetics, anti-diarrhea medications:   LORazepam   Injectable 1 milliGRAM(s) IV Push every 6 hours PRN  metoclopramide Injectable 10 milliGRAM(s) IV Push every 6 hours PRN  ondansetron Injectable 8 milliGRAM(s) IV Push every 8 hours    10. OOB as tolerated, physical therapy consult if needed     11. Monitor coags / fibrinogen 2x week, vitamin K as needed   phytonadione   Solution 10 milliGRAM(s) Oral <User Schedule>    12. Monitor closely for clinical changes, monitor for fevers     13. Emotional support provided, plan of care discussed and questions addressed     14. Patient education done regarding chemotherapy prep, plan of care, restrictions and discharge planning     15. Continue regular social work input     I have written the above note for Dr. Larsen who performed service with me in the room.   Racquel Leiva NP-C ([phone])    I have seen and examined patient with NP, I agree with above note as scribed.

## 2019-02-26 LAB
ALBUMIN SERPL ELPH-MCNC: 3.5 G/DL — SIGNIFICANT CHANGE UP (ref 3.3–5)
ALP SERPL-CCNC: 44 U/L — SIGNIFICANT CHANGE UP (ref 40–120)
ALT FLD-CCNC: 8 U/L — LOW (ref 10–45)
ANION GAP SERPL CALC-SCNC: 12 MMOL/L — SIGNIFICANT CHANGE UP (ref 5–17)
APTT BLD: 32.2 SEC — SIGNIFICANT CHANGE UP (ref 27.5–36.3)
AST SERPL-CCNC: 15 U/L — SIGNIFICANT CHANGE UP (ref 10–40)
BASOPHILS # BLD AUTO: 0 K/UL — SIGNIFICANT CHANGE UP (ref 0–0.2)
BASOPHILS NFR BLD AUTO: 0.6 % — SIGNIFICANT CHANGE UP (ref 0–2)
BILIRUB SERPL-MCNC: 0.2 MG/DL — SIGNIFICANT CHANGE UP (ref 0.2–1.2)
BUN SERPL-MCNC: 7 MG/DL — SIGNIFICANT CHANGE UP (ref 7–23)
CALCIUM SERPL-MCNC: 9.1 MG/DL — SIGNIFICANT CHANGE UP (ref 8.4–10.5)
CHLORIDE SERPL-SCNC: 105 MMOL/L — SIGNIFICANT CHANGE UP (ref 96–108)
CO2 SERPL-SCNC: 21 MMOL/L — LOW (ref 22–31)
CREAT SERPL-MCNC: 0.71 MG/DL — SIGNIFICANT CHANGE UP (ref 0.5–1.3)
EOSINOPHIL # BLD AUTO: 0 K/UL — SIGNIFICANT CHANGE UP (ref 0–0.5)
EOSINOPHIL NFR BLD AUTO: 2.2 % — SIGNIFICANT CHANGE UP (ref 0–6)
FIBRINOGEN PPP-MCNC: 449 MG/DL — SIGNIFICANT CHANGE UP (ref 350–510)
GLUCOSE SERPL-MCNC: 100 MG/DL — HIGH (ref 70–99)
HCT VFR BLD CALC: 36.3 % — SIGNIFICANT CHANGE UP (ref 34.5–45)
HGB BLD-MCNC: 13 G/DL — SIGNIFICANT CHANGE UP (ref 11.5–15.5)
INR BLD: 1.06 RATIO — SIGNIFICANT CHANGE UP (ref 0.88–1.16)
LDH SERPL L TO P-CCNC: 205 U/L — SIGNIFICANT CHANGE UP (ref 50–242)
LYMPHOCYTES # BLD AUTO: 0.3 K/UL — LOW (ref 1–3.3)
LYMPHOCYTES # BLD AUTO: 36.7 % — SIGNIFICANT CHANGE UP (ref 13–44)
MAGNESIUM SERPL-MCNC: 1.8 MG/DL — SIGNIFICANT CHANGE UP (ref 1.6–2.6)
MCHC RBC-ENTMCNC: 33.3 PG — SIGNIFICANT CHANGE UP (ref 27–34)
MCHC RBC-ENTMCNC: 35.8 GM/DL — SIGNIFICANT CHANGE UP (ref 32–36)
MCV RBC AUTO: 93 FL — SIGNIFICANT CHANGE UP (ref 80–100)
MONOCYTES # BLD AUTO: 0 K/UL — SIGNIFICANT CHANGE UP (ref 0–0.9)
MONOCYTES NFR BLD AUTO: 0.6 % — LOW (ref 2–14)
NEUTROPHILS # BLD AUTO: 0.5 K/UL — LOW (ref 1.8–7.4)
NEUTROPHILS NFR BLD AUTO: 59.8 % — SIGNIFICANT CHANGE UP (ref 43–77)
PHOSPHATE SERPL-MCNC: 2.6 MG/DL — SIGNIFICANT CHANGE UP (ref 2.5–4.5)
PLATELET # BLD AUTO: 232 K/UL — SIGNIFICANT CHANGE UP (ref 150–400)
POTASSIUM SERPL-MCNC: 3.5 MMOL/L — SIGNIFICANT CHANGE UP (ref 3.5–5.3)
POTASSIUM SERPL-SCNC: 3.5 MMOL/L — SIGNIFICANT CHANGE UP (ref 3.5–5.3)
PROT SERPL-MCNC: 5.9 G/DL — LOW (ref 6–8.3)
PROTHROM AB SERPL-ACNC: 12.2 SEC — SIGNIFICANT CHANGE UP (ref 10–12.9)
RBC # BLD: 3.9 M/UL — SIGNIFICANT CHANGE UP (ref 3.8–5.2)
RBC # FLD: 14.5 % — SIGNIFICANT CHANGE UP (ref 10.3–14.5)
SODIUM SERPL-SCNC: 138 MMOL/L — SIGNIFICANT CHANGE UP (ref 135–145)
WBC # BLD: 0.8 K/UL — CRITICAL LOW (ref 3.8–10.5)
WBC # FLD AUTO: 0.8 K/UL — CRITICAL LOW (ref 3.8–10.5)

## 2019-02-26 PROCEDURE — 99291 CRITICAL CARE FIRST HOUR: CPT

## 2019-02-26 PROCEDURE — 38240 TRANSPLT ALLO HCT/DONOR: CPT

## 2019-02-26 RX ORDER — POTASSIUM CHLORIDE 20 MEQ
40 PACKET (EA) ORAL ONCE
Qty: 0 | Refills: 0 | Status: COMPLETED | OUTPATIENT
Start: 2019-02-26 | End: 2019-02-26

## 2019-02-26 RX ORDER — CIPROFLOXACIN LACTATE 400MG/40ML
500 VIAL (ML) INTRAVENOUS EVERY 12 HOURS
Qty: 0 | Refills: 0 | Status: DISCONTINUED | OUTPATIENT
Start: 2019-02-26 | End: 2019-03-08

## 2019-02-26 RX ORDER — PHYTONADIONE (VIT K1) 5 MG
10 TABLET ORAL EVERY OTHER DAY
Qty: 0 | Refills: 0 | Status: DISCONTINUED | OUTPATIENT
Start: 2019-02-26 | End: 2019-02-27

## 2019-02-26 RX ORDER — FUROSEMIDE 40 MG
20 TABLET ORAL ONCE
Qty: 0 | Refills: 0 | Status: COMPLETED | OUTPATIENT
Start: 2019-02-26 | End: 2019-02-26

## 2019-02-26 RX ADMIN — Medication 25 MILLIGRAM(S): at 12:23

## 2019-02-26 RX ADMIN — Medication 250 MILLIGRAM(S): at 12:21

## 2019-02-26 RX ADMIN — Medication 325 MILLIGRAM(S): at 21:03

## 2019-02-26 RX ADMIN — Medication 5 MILLILITER(S): at 08:07

## 2019-02-26 RX ADMIN — Medication 1 LOZENGE: at 12:20

## 2019-02-26 RX ADMIN — PREGABALIN 1000 MICROGRAM(S): 225 CAPSULE ORAL at 12:22

## 2019-02-26 RX ADMIN — Medication 650 MILLIGRAM(S): at 12:22

## 2019-02-26 RX ADMIN — TACROLIMUS 2 MILLIGRAM(S): 5 CAPSULE ORAL at 17:52

## 2019-02-26 RX ADMIN — Medication 10 MILLILITER(S): at 15:23

## 2019-02-26 RX ADMIN — Medication 20 MILLIGRAM(S): at 14:33

## 2019-02-26 RX ADMIN — Medication 1 LOZENGE: at 15:24

## 2019-02-26 RX ADMIN — Medication 400 MILLIGRAM(S): at 21:03

## 2019-02-26 RX ADMIN — ATOVAQUONE 750 MILLIGRAM(S): 750 SUSPENSION ORAL at 17:52

## 2019-02-26 RX ADMIN — TACROLIMUS 2 MILLIGRAM(S): 5 CAPSULE ORAL at 05:12

## 2019-02-26 RX ADMIN — Medication 1 TABLET(S): at 12:22

## 2019-02-26 RX ADMIN — Medication 10 MILLILITER(S): at 12:20

## 2019-02-26 RX ADMIN — ATOVAQUONE 750 MILLIGRAM(S): 750 SUSPENSION ORAL at 05:10

## 2019-02-26 RX ADMIN — Medication 325 MILLIGRAM(S): at 05:11

## 2019-02-26 RX ADMIN — VORICONAZOLE 200 MILLIGRAM(S): 10 INJECTION, POWDER, LYOPHILIZED, FOR SOLUTION INTRAVENOUS at 05:12

## 2019-02-26 RX ADMIN — URSODIOL 300 MILLIGRAM(S): 250 TABLET, FILM COATED ORAL at 08:07

## 2019-02-26 RX ADMIN — MEDROXYPROGESTERONE ACETATE 10 MILLIGRAM(S): 150 INJECTION, SUSPENSION, EXTENDED RELEASE INTRAMUSCULAR at 12:22

## 2019-02-26 RX ADMIN — Medication 1 LOZENGE: at 08:07

## 2019-02-26 RX ADMIN — MONTELUKAST 10 MILLIGRAM(S): 4 TABLET, CHEWABLE ORAL at 12:21

## 2019-02-26 RX ADMIN — Medication 325 MILLIGRAM(S): at 13:13

## 2019-02-26 RX ADMIN — APREPITANT 80 MILLIGRAM(S): 80 CAPSULE ORAL at 12:20

## 2019-02-26 RX ADMIN — Medication 500 MILLIGRAM(S): at 17:52

## 2019-02-26 RX ADMIN — Medication 10 MILLILITER(S): at 20:00

## 2019-02-26 RX ADMIN — URSODIOL 300 MILLIGRAM(S): 250 TABLET, FILM COATED ORAL at 17:52

## 2019-02-26 RX ADMIN — Medication 5 MILLILITER(S): at 20:00

## 2019-02-26 RX ADMIN — Medication 100 MILLIGRAM(S): at 21:03

## 2019-02-26 RX ADMIN — Medication 400 MILLIGRAM(S): at 13:12

## 2019-02-26 RX ADMIN — IMMUNE GLOBULIN (HUMAN) 33.33 GRAM(S): 10 INJECTION INTRAVENOUS; SUBCUTANEOUS at 12:22

## 2019-02-26 RX ADMIN — VORICONAZOLE 200 MILLIGRAM(S): 10 INJECTION, POWDER, LYOPHILIZED, FOR SOLUTION INTRAVENOUS at 17:52

## 2019-02-26 RX ADMIN — Medication 100 MILLIGRAM(S): at 13:13

## 2019-02-26 RX ADMIN — ONDANSETRON 8 MILLIGRAM(S): 8 TABLET, FILM COATED ORAL at 13:13

## 2019-02-26 RX ADMIN — Medication 1 MILLIGRAM(S): at 12:21

## 2019-02-26 RX ADMIN — Medication 10 MILLILITER(S): at 08:04

## 2019-02-26 RX ADMIN — Medication 5 MILLILITER(S): at 12:23

## 2019-02-26 RX ADMIN — Medication 100 MILLIGRAM(S): at 05:11

## 2019-02-26 RX ADMIN — Medication 40 MILLIEQUIVALENT(S): at 13:13

## 2019-02-26 RX ADMIN — Medication 650 MILLIGRAM(S): at 13:00

## 2019-02-26 RX ADMIN — SENNA PLUS 2 TABLET(S): 8.6 TABLET ORAL at 21:03

## 2019-02-26 RX ADMIN — ONDANSETRON 8 MILLIGRAM(S): 8 TABLET, FILM COATED ORAL at 21:03

## 2019-02-26 RX ADMIN — Medication 400 MILLIGRAM(S): at 05:10

## 2019-02-26 RX ADMIN — Medication 1 LOZENGE: at 20:00

## 2019-02-26 RX ADMIN — PANTOPRAZOLE SODIUM 40 MILLIGRAM(S): 20 TABLET, DELAYED RELEASE ORAL at 05:13

## 2019-02-26 RX ADMIN — CHLORHEXIDINE GLUCONATE 1 APPLICATION(S): 213 SOLUTION TOPICAL at 05:13

## 2019-02-26 RX ADMIN — Medication 5 MILLILITER(S): at 15:24

## 2019-02-26 RX ADMIN — ONDANSETRON 8 MILLIGRAM(S): 8 TABLET, FILM COATED ORAL at 05:11

## 2019-02-26 RX ADMIN — Medication 400 UNIT(S): at 12:22

## 2019-02-26 NOTE — PROGRESS NOTE ADULT - SUBJECTIVE AND OBJECTIVE BOX
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    S: Patient seen and examined with HPC Transplant Team:     Denies mouth / tongue / throat pain, dyspnea, cough, nausea, vomiting, diarrhea, abdominal pain     O: Vitals:   Vital Signs Last 24 Hrs  T(C): 36.7 (2019 06:11), Max: 37.3 (2019 13:55)  T(F): 98.1 (2019 06:11), Max: 99.1 (2019 13:55)  HR: 86 (2019 06:11) (86 - 100)  BP: 134/92 (2019 06:11) (123/86 - 150/92)  BP(mean): --  RR: 18 (2019 06:11) (18 - 18)  SpO2: 97% (2019 06:11) (97% - 100%)    Admit weight: 88.6kg  Daily Weight in k.3 (2019 10:53)  Today's weight:     Intake / Output:    @ 07:01  -   @ 07:00  --------------------------------------------------------  IN: 2380.3 mL / OUT: 2450 mL / NET: -69.7 mL    PE:   Oropharynx: no erythema or ulcerations  Oral Mucositis:              -                                         Grade: n/a  CVS: S1, S2 RRR   Lungs: CTA throughout bilaterally   Abdomen:+ BS x 4, soft, NT, ND   Extremities: no edema   Gastric Mucositis:       -                                           Grade: n/a  Intestinal Mucositis:     -                                         Grade: n/a  Skin: no rash   TLC: PICC line TLC   Neuro: A&O x 3   Pain: 0    Labs:   CBC Full  -  ( 2019 06:51 )  WBC Count : 0.8 K/uL  Hemoglobin : 13.0 g/dL  Hematocrit : 36.3 %  Platelet Count - Automated : 232 K/uL  Mean Cell Volume : 93.0 fl  Mean Cell Hemoglobin : 33.3 pg  Mean Cell Hemoglobin Concentration : 35.8 gm/dL  Auto Neutrophil # : 0.5 K/uL  Auto Lymphocyte # : 0.3 K/uL  Auto Monocyte # : 0.0 K/uL  Auto Eosinophil # : 0.0 K/uL  Auto Basophil # : 0.0 K/uL  Auto Neutrophil % : 59.8 %  Auto Lymphocyte % : 36.7 %  Auto Monocyte % : 0.6 %  Auto Eosinophil % : 2.2 %  Auto Basophil % : 0.6 %                          13.0   0.8   )-----------( 232      ( 2019 06:51 )             36.3         138  |  105  |  7   ----------------------------<  100<H>  3.5   |  21<L>  |  0.71    Ca    9.1      2019 06:51  Phos  2.6       Mg     1.8         TPro  5.9<L>  /  Alb  3.5  /  TBili  0.2  /  DBili  <0.1  /  AST  15  /  ALT  8<L>  /  AlkPhos  44      PT/INR - ( 2019 06:53 )   PT: 12.2 sec;   INR: 1.06 ratio         PTT - ( 2019 06:53 )  PTT:32.2 sec  LIVER FUNCTIONS - ( 2019 06:51 )  Alb: 3.5 g/dL / Pro: 5.9 g/dL / ALK PHOS: 44 U/L / ALT: 8 U/L / AST: 15 U/L / GGT: x           Lactate Dehydrogenase, Serum: 205 U/L ( @ 06:51)    Meds:   Antimicrobials:   acyclovir   Oral Tab/Cap 400 milliGRAM(s) Oral three times a day  atovaquone Suspension 750 milliGRAM(s) Oral every 12 hours  ciprofloxacin     Tablet 500 milliGRAM(s) Oral every 12 hours  clotrimazole Lozenge 1 Lozenge Oral five times a day  voriconazole 200 milliGRAM(s) Oral every 12 hours      Heme / Onc:   heparin  Infusion 366 Unit(s)/Hr IV Continuous <Continuous>      GI:  docusate sodium 100 milliGRAM(s) Oral three times a day  pantoprazole    Tablet 40 milliGRAM(s) Oral before breakfast  senna 2 Tablet(s) Oral at bedtime  sodium bicarbonate Mouth Rinse 10 milliLiter(s) Swish and Spit five times a day  ursodiol Capsule 300 milliGRAM(s) Oral two times a day with meals      Cardiovascular:       Immunologic:   immune   globulin 10% (GAMMAGARD) IVPB 20 Gram(s) IV Intermittent <User Schedule>  tacrolimus 2 milliGRAM(s) Oral every 12 hours      Other medications:   acetaminophen   Tablet .. 650 milliGRAM(s) Oral every 6 hours  acetaminophen   Tablet .. 650 milliGRAM(s) Oral <User Schedule>  aprepitant 80 milliGRAM(s) Oral daily  ascorbic acid 250 milliGRAM(s) Oral daily  Biotene Dry Mouth Oral Rinse 5 milliLiter(s) Swish and Spit five times a day  chlorhexidine 4% Liquid 1 Application(s) Topical <User Schedule>  cholecalciferol 400 Unit(s) Oral daily  cyanocobalamin 1000 MICROGram(s) Oral daily  diphenhydrAMINE   Injectable 25 milliGRAM(s) IV Push every 3 weeks  ferrous    sulfate 325 milliGRAM(s) Oral three times a day  folic acid 1 milliGRAM(s) Oral daily  LORazepam   Injectable 1 milliGRAM(s) IV Push every 24 hours  medroxyPROGESTERone 10 milliGRAM(s) Oral daily  montelukast 10 milliGRAM(s) Oral daily  multivitamin 1 Tablet(s) Oral daily  ondansetron Injectable 8 milliGRAM(s) IV Push every 8 hours  phytonadione   Solution 10 milliGRAM(s) Oral every other day  sodium chloride 0.45% 1000 milliLiter(s) IV Continuous <Continuous>  sodium chloride 0.9%. 1000 milliLiter(s) IV Continuous <Continuous>      PRN:   acetaminophen   Tablet .. 650 milliGRAM(s) Oral every 6 hours PRN  artificial  tears Solution 1 Drop(s) Both EYES three times a day PRN  diphenhydrAMINE   Injectable 25 milliGRAM(s) IV Push every 4 hours PRN  LORazepam   Injectable 1 milliGRAM(s) IV Push every 6 hours PRN  LORazepam   Injectable 1 milliGRAM(s) IV Push every 6 hours PRN  metoclopramide Injectable 10 milliGRAM(s) IV Push every 6 hours PRN  sodium chloride 0.65% Nasal 1 Spray(s) Both Nostrils five times a day PRN  sodium chloride 0.9% lock flush 10 milliLiter(s) IV Push every 1 hour PRN  zolpidem 5 milliGRAM(s) Oral at bedtime PRN    A/P:  33 year old female with a history of AML  Pre  :  Allogeneic PBSCT day - 1  IVIG today   Neutropenic - started ciprofloxin today; if T >/= 38C pan cx, CXR and change cipro to cefepime.   Labs every other day ; continue vitamin C, vitamin B, folic acid, vitamin K & iron supplementation. Increased vitamin K to every other day given neutropenia     1. Infectious Disease:   acyclovir   Oral Tab/Cap 400 milliGRAM(s) Oral three times a day  atovaquone Suspension 750 milliGRAM(s) Oral every 12 hours  ciprofloxacin     Tablet 500 milliGRAM(s) Oral every 12 hours  clotrimazole Lozenge 1 Lozenge Oral five times a day  voriconazole 200 milliGRAM(s) Oral every 12 hours    2. VOD Prophylaxis: Actigall, Glutamine, Heparin (dosed at 100 units / kg / day)     3. GI Prophylaxis:    pantoprazole    Tablet 40 milliGRAM(s) Oral before breakfast    4. Mouthcare - NS / NaHCO3 rinses, Mycelex, Caphosol; Skin care     5. GVHD prophylaxis   tacrolimus 2 milliGRAM(s) Oral every 12 hours    6. Transfuse & replete electrolytes prn     7. IV hydration, daily weights, strict I&O, prn diuresis     8. PO intake as tolerated, nutrition follow up as needed, MVI, folic acid     9. Antiemetics, anti-diarrhea medications:   LORazepam   Injectable 1 milliGRAM(s) IV Push every 6 hours PRN  metoclopramide Injectable 10 milliGRAM(s) IV Push every 6 hours PRN  ondansetron Injectable 8 milliGRAM(s) IV Push every 8 hours  aprepitant 80 milliGRAM(s) Oral daily    10. OOB as tolerated, physical therapy consult if needed     11. Monitor coags / fibrinogen 2x week, vitamin K as needed     12. Monitor closely for clinical changes, monitor for fevers     13. Emotional support provided, plan of care discussed and questions addressed     14. Patient education done regarding plan of care, restrictions and discharge planning     15. Continue regular social work input     I have written the above note for Dr. Larsen who performed service with me in the room.   Racquel Leiva NP-C ([phone])    I have seen and examined patient with NP, I agree with above note as scribed. 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    S: Patient seen and examined with HPC Transplant Team:     Denies mouth / tongue / throat pain, dyspnea, cough, nausea, vomiting, diarrhea, abdominal pain     O: Vitals:   Vital Signs Last 24 Hrs  T(C): 36.7 (2019 06:11), Max: 37.3 (2019 13:55)  T(F): 98.1 (2019 06:11), Max: 99.1 (2019 13:55)  HR: 86 (2019 06:11) (86 - 100)  BP: 134/92 (2019 06:11) (123/86 - 150/92)  BP(mean): --  RR: 18 (2019 06:11) (18 - 18)  SpO2: 97% (2019 06:11) (97% - 100%)    Admit weight: 88.6kg  Daily Weight in k.3 (2019 10:53)  Today's weight: 89.4kg     Intake / Output:    @ 07:01  -  - @ 07:00  --------------------------------------------------------  IN: 2380.3 mL / OUT: 2450 mL / NET: -69.7 mL    PE:   Oropharynx: no erythema or ulcerations  Oral Mucositis:              -                                         Grade: n/a  CVS: S1, S2 RRR   Lungs: CTA throughout bilaterally   Abdomen:+ BS x 4, soft, NT, ND   Extremities: no edema   Gastric Mucositis:       -                                           Grade: n/a  Intestinal Mucositis:     -                                         Grade: n/a  Skin: no rash   TLC: PICC line TLC   Neuro: A&O x 3   Pain: 0    Labs:   CBC Full  -  ( 2019 06:51 )  WBC Count : 0.8 K/uL  Hemoglobin : 13.0 g/dL  Hematocrit : 36.3 %  Platelet Count - Automated : 232 K/uL  Mean Cell Volume : 93.0 fl  Mean Cell Hemoglobin : 33.3 pg  Mean Cell Hemoglobin Concentration : 35.8 gm/dL  Auto Neutrophil # : 0.5 K/uL  Auto Lymphocyte # : 0.3 K/uL  Auto Monocyte # : 0.0 K/uL  Auto Eosinophil # : 0.0 K/uL  Auto Basophil # : 0.0 K/uL  Auto Neutrophil % : 59.8 %  Auto Lymphocyte % : 36.7 %  Auto Monocyte % : 0.6 %  Auto Eosinophil % : 2.2 %  Auto Basophil % : 0.6 %                          13.0   0.8   )-----------( 232      ( 2019 06:51 )             36.3         138  |  105  |  7   ----------------------------<  100<H>  3.5   |  21<L>  |  0.71    Ca    9.1      2019 06:51  Phos  2.6       Mg     1.8         TPro  5.9<L>  /  Alb  3.5  /  TBili  0.2  /  DBili  <0.1  /  AST  15  /  ALT  8<L>  /  AlkPhos  44      PT/INR - ( 2019 06:53 )   PT: 12.2 sec;   INR: 1.06 ratio         PTT - ( 2019 06:53 )  PTT:32.2 sec  LIVER FUNCTIONS - ( 2019 06:51 )  Alb: 3.5 g/dL / Pro: 5.9 g/dL / ALK PHOS: 44 U/L / ALT: 8 U/L / AST: 15 U/L / GGT: x           Lactate Dehydrogenase, Serum: 205 U/L ( @ 06:51)    Meds:   Antimicrobials:   acyclovir   Oral Tab/Cap 400 milliGRAM(s) Oral three times a day  atovaquone Suspension 750 milliGRAM(s) Oral every 12 hours  ciprofloxacin     Tablet 500 milliGRAM(s) Oral every 12 hours  clotrimazole Lozenge 1 Lozenge Oral five times a day  voriconazole 200 milliGRAM(s) Oral every 12 hours      Heme / Onc:   heparin  Infusion 366 Unit(s)/Hr IV Continuous <Continuous>      GI:  docusate sodium 100 milliGRAM(s) Oral three times a day  pantoprazole    Tablet 40 milliGRAM(s) Oral before breakfast  senna 2 Tablet(s) Oral at bedtime  sodium bicarbonate Mouth Rinse 10 milliLiter(s) Swish and Spit five times a day  ursodiol Capsule 300 milliGRAM(s) Oral two times a day with meals      Cardiovascular:       Immunologic:   immune   globulin 10% (GAMMAGARD) IVPB 20 Gram(s) IV Intermittent <User Schedule>  tacrolimus 2 milliGRAM(s) Oral every 12 hours      Other medications:   acetaminophen   Tablet .. 650 milliGRAM(s) Oral every 6 hours  acetaminophen   Tablet .. 650 milliGRAM(s) Oral <User Schedule>  aprepitant 80 milliGRAM(s) Oral daily  ascorbic acid 250 milliGRAM(s) Oral daily  Biotene Dry Mouth Oral Rinse 5 milliLiter(s) Swish and Spit five times a day  chlorhexidine 4% Liquid 1 Application(s) Topical <User Schedule>  cholecalciferol 400 Unit(s) Oral daily  cyanocobalamin 1000 MICROGram(s) Oral daily  diphenhydrAMINE   Injectable 25 milliGRAM(s) IV Push every 3 weeks  ferrous    sulfate 325 milliGRAM(s) Oral three times a day  folic acid 1 milliGRAM(s) Oral daily  LORazepam   Injectable 1 milliGRAM(s) IV Push every 24 hours  medroxyPROGESTERone 10 milliGRAM(s) Oral daily  montelukast 10 milliGRAM(s) Oral daily  multivitamin 1 Tablet(s) Oral daily  ondansetron Injectable 8 milliGRAM(s) IV Push every 8 hours  phytonadione   Solution 10 milliGRAM(s) Oral every other day  sodium chloride 0.45% 1000 milliLiter(s) IV Continuous <Continuous>  sodium chloride 0.9%. 1000 milliLiter(s) IV Continuous <Continuous>      PRN:   acetaminophen   Tablet .. 650 milliGRAM(s) Oral every 6 hours PRN  artificial  tears Solution 1 Drop(s) Both EYES three times a day PRN  diphenhydrAMINE   Injectable 25 milliGRAM(s) IV Push every 4 hours PRN  LORazepam   Injectable 1 milliGRAM(s) IV Push every 6 hours PRN  LORazepam   Injectable 1 milliGRAM(s) IV Push every 6 hours PRN  metoclopramide Injectable 10 milliGRAM(s) IV Push every 6 hours PRN  sodium chloride 0.65% Nasal 1 Spray(s) Both Nostrils five times a day PRN  sodium chloride 0.9% lock flush 10 milliLiter(s) IV Push every 1 hour PRN  zolpidem 5 milliGRAM(s) Oral at bedtime PRN    A/P:  33 year old female with a history of AML  Pre  :  Allogeneic PBSCT day - 1  IVIG today   Neutropenic - started ciprofloxin today; if T >/= 38C pan cx, CXR and change cipro to cefepime.   Labs every other day ; continue vitamin C, vitamin B, folic acid, vitamin K & iron supplementation. Increased vitamin K to every other day given neutropenia     1. Infectious Disease:   acyclovir   Oral Tab/Cap 400 milliGRAM(s) Oral three times a day  atovaquone Suspension 750 milliGRAM(s) Oral every 12 hours  ciprofloxacin     Tablet 500 milliGRAM(s) Oral every 12 hours  clotrimazole Lozenge 1 Lozenge Oral five times a day  voriconazole 200 milliGRAM(s) Oral every 12 hours    2. VOD Prophylaxis: Actigall, Glutamine, Heparin (dosed at 100 units / kg / day)     3. GI Prophylaxis:    pantoprazole    Tablet 40 milliGRAM(s) Oral before breakfast    4. Mouthcare - NS / NaHCO3 rinses, Mycelex, Caphosol; Skin care     5. GVHD prophylaxis   tacrolimus 2 milliGRAM(s) Oral every 12 hours    6. Transfuse & replete electrolytes prn     7. IV hydration, daily weights, strict I&O, prn diuresis     8. PO intake as tolerated, nutrition follow up as needed, MVI, folic acid     9. Antiemetics, anti-diarrhea medications:   LORazepam   Injectable 1 milliGRAM(s) IV Push every 6 hours PRN  metoclopramide Injectable 10 milliGRAM(s) IV Push every 6 hours PRN  ondansetron Injectable 8 milliGRAM(s) IV Push every 8 hours  aprepitant 80 milliGRAM(s) Oral daily    10. OOB as tolerated, physical therapy consult if needed     11. Monitor coags / fibrinogen 2x week, vitamin K as needed     12. Monitor closely for clinical changes, monitor for fevers     13. Emotional support provided, plan of care discussed and questions addressed     14. Patient education done regarding plan of care, restrictions and discharge planning     15. Continue regular social work input     I have written the above note for Dr. Larsen who performed service with me in the room.   Racquel CHAPARRO ([phone])    I have seen and examined patient with NP, I agree with above note as scribed. 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    S: Patient seen and examined with HPC Transplant Team:   No complaints today   Denies mouth / tongue / throat pain, dyspnea, cough, nausea, vomiting, diarrhea, abdominal pain     O: Vitals:   Vital Signs Last 24 Hrs  T(C): 36.7 (2019 06:11), Max: 37.3 (2019 13:55)  T(F): 98.1 (2019 06:11), Max: 99.1 (2019 13:55)  HR: 86 (2019 06:11) (86 - 100)  BP: 134/92 (2019 06:11) (123/86 - 150/92)  BP(mean): --  RR: 18 (2019 06:11) (18 - 18)  SpO2: 97% (2019 06:11) (97% - 100%)    Admit weight: 88.6kg  Daily Weight in k.3 (2019 10:53)  Today's weight: 89.4kg     Intake / Output:    @ 07:01  -  - @ 07:00  --------------------------------------------------------  IN: 2380.3 mL / OUT: 2450 mL / NET: -69.7 mL    PE:   Oropharynx: no erythema or ulcerations  Oral Mucositis:              -                                         Grade: n/a  CVS: S1, S2 RRR   Lungs: CTA throughout bilaterally   Abdomen:+ BS x 4, soft, NT, ND   Extremities: no edema   Gastric Mucositis:       -                                           Grade: n/a  Intestinal Mucositis:     -                                         Grade: n/a  Skin: no rash   TLC: PICC line TLC   Neuro: A&O x 3   Pain: 0    Labs:   CBC Full  -  ( 2019 06:51 )  WBC Count : 0.8 K/uL  Hemoglobin : 13.0 g/dL  Hematocrit : 36.3 %  Platelet Count - Automated : 232 K/uL  Mean Cell Volume : 93.0 fl  Mean Cell Hemoglobin : 33.3 pg  Mean Cell Hemoglobin Concentration : 35.8 gm/dL  Auto Neutrophil # : 0.5 K/uL  Auto Lymphocyte # : 0.3 K/uL  Auto Monocyte # : 0.0 K/uL  Auto Eosinophil # : 0.0 K/uL  Auto Basophil # : 0.0 K/uL  Auto Neutrophil % : 59.8 %  Auto Lymphocyte % : 36.7 %  Auto Monocyte % : 0.6 %  Auto Eosinophil % : 2.2 %  Auto Basophil % : 0.6 %                          13.0   0.8   )-----------( 232      ( 2019 06:51 )             36.3         138  |  105  |  7   ----------------------------<  100<H>  3.5   |  21<L>  |  0.71    Ca    9.1      2019 06:51  Phos  2.6       Mg     1.8         TPro  5.9<L>  /  Alb  3.5  /  TBili  0.2  /  DBili  <0.1  /  AST  15  /  ALT  8<L>  /  AlkPhos  44      PT/INR - ( 2019 06:53 )   PT: 12.2 sec;   INR: 1.06 ratio         PTT - ( 2019 06:53 )  PTT:32.2 sec  LIVER FUNCTIONS - ( 2019 06:51 )  Alb: 3.5 g/dL / Pro: 5.9 g/dL / ALK PHOS: 44 U/L / ALT: 8 U/L / AST: 15 U/L / GGT: x           Lactate Dehydrogenase, Serum: 205 U/L ( @ 06:51)    Meds:   Antimicrobials:   acyclovir   Oral Tab/Cap 400 milliGRAM(s) Oral three times a day  atovaquone Suspension 750 milliGRAM(s) Oral every 12 hours  ciprofloxacin     Tablet 500 milliGRAM(s) Oral every 12 hours  clotrimazole Lozenge 1 Lozenge Oral five times a day  voriconazole 200 milliGRAM(s) Oral every 12 hours      Heme / Onc:   heparin  Infusion 366 Unit(s)/Hr IV Continuous <Continuous>      GI:  docusate sodium 100 milliGRAM(s) Oral three times a day  pantoprazole    Tablet 40 milliGRAM(s) Oral before breakfast  senna 2 Tablet(s) Oral at bedtime  sodium bicarbonate Mouth Rinse 10 milliLiter(s) Swish and Spit five times a day  ursodiol Capsule 300 milliGRAM(s) Oral two times a day with meals      Cardiovascular:       Immunologic:   immune   globulin 10% (GAMMAGARD) IVPB 20 Gram(s) IV Intermittent <User Schedule>  tacrolimus 2 milliGRAM(s) Oral every 12 hours      Other medications:   acetaminophen   Tablet .. 650 milliGRAM(s) Oral every 6 hours  acetaminophen   Tablet .. 650 milliGRAM(s) Oral <User Schedule>  aprepitant 80 milliGRAM(s) Oral daily  ascorbic acid 250 milliGRAM(s) Oral daily  Biotene Dry Mouth Oral Rinse 5 milliLiter(s) Swish and Spit five times a day  chlorhexidine 4% Liquid 1 Application(s) Topical <User Schedule>  cholecalciferol 400 Unit(s) Oral daily  cyanocobalamin 1000 MICROGram(s) Oral daily  diphenhydrAMINE   Injectable 25 milliGRAM(s) IV Push every 3 weeks  ferrous    sulfate 325 milliGRAM(s) Oral three times a day  folic acid 1 milliGRAM(s) Oral daily  LORazepam   Injectable 1 milliGRAM(s) IV Push every 24 hours  medroxyPROGESTERone 10 milliGRAM(s) Oral daily  montelukast 10 milliGRAM(s) Oral daily  multivitamin 1 Tablet(s) Oral daily  ondansetron Injectable 8 milliGRAM(s) IV Push every 8 hours  phytonadione   Solution 10 milliGRAM(s) Oral every other day  sodium chloride 0.45% 1000 milliLiter(s) IV Continuous <Continuous>  sodium chloride 0.9%. 1000 milliLiter(s) IV Continuous <Continuous>      PRN:   acetaminophen   Tablet .. 650 milliGRAM(s) Oral every 6 hours PRN  artificial  tears Solution 1 Drop(s) Both EYES three times a day PRN  diphenhydrAMINE   Injectable 25 milliGRAM(s) IV Push every 4 hours PRN  LORazepam   Injectable 1 milliGRAM(s) IV Push every 6 hours PRN  LORazepam   Injectable 1 milliGRAM(s) IV Push every 6 hours PRN  metoclopramide Injectable 10 milliGRAM(s) IV Push every 6 hours PRN  sodium chloride 0.65% Nasal 1 Spray(s) Both Nostrils five times a day PRN  sodium chloride 0.9% lock flush 10 milliLiter(s) IV Push every 1 hour PRN  zolpidem 5 milliGRAM(s) Oral at bedtime PRN    A/P:  33 year old female with a history of AML  Pre  :  Allogeneic PBSCT day - 1  IVIG today   Neutropenic - started ciprofloxin today; if T >/= 38C pan cx, CXR and change cipro to cefepime.   Labs every other day ; continue vitamin C, vitamin B, folic acid, vitamin K & iron supplementation. Increased vitamin K to every other day given neutropenia     1. Infectious Disease:   acyclovir   Oral Tab/Cap 400 milliGRAM(s) Oral three times a day  atovaquone Suspension 750 milliGRAM(s) Oral every 12 hours  ciprofloxacin     Tablet 500 milliGRAM(s) Oral every 12 hours  clotrimazole Lozenge 1 Lozenge Oral five times a day  voriconazole 200 milliGRAM(s) Oral every 12 hours    2. VOD Prophylaxis: Actigall, Glutamine, Heparin (dosed at 100 units / kg / day)     3. GI Prophylaxis:    pantoprazole    Tablet 40 milliGRAM(s) Oral before breakfast    4. Mouthcare - NS / NaHCO3 rinses, Mycelex, Caphosol; Skin care     5. GVHD prophylaxis   tacrolimus 2 milliGRAM(s) Oral every 12 hours    6. Transfuse & replete electrolytes prn     7. IV hydration, daily weights, strict I&O, prn diuresis     8. PO intake as tolerated, nutrition follow up as needed, MVI, folic acid     9. Antiemetics, anti-diarrhea medications:   LORazepam   Injectable 1 milliGRAM(s) IV Push every 6 hours PRN  metoclopramide Injectable 10 milliGRAM(s) IV Push every 6 hours PRN  ondansetron Injectable 8 milliGRAM(s) IV Push every 8 hours  aprepitant 80 milliGRAM(s) Oral daily    10. OOB as tolerated, physical therapy consult if needed     11. Monitor coags / fibrinogen 2x week, vitamin K as needed     12. Monitor closely for clinical changes, monitor for fevers     13. Emotional support provided, plan of care discussed and questions addressed     14. Patient education done regarding plan of care, restrictions and discharge planning     15. Continue regular social work input     I have written the above note for Dr. Larsen who performed service with me in the room.   Racquel CHAPARRO ([phone])    I have seen and examined patient with NP, I agree with above note as scribed. 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    S: Patient seen and examined with HPC Transplant Team:   No complaints today   Denies mouth / tongue / throat pain, dyspnea, cough, nausea, vomiting, diarrhea, abdominal pain     O: Vitals:   Vital Signs Last 24 Hrs  T(C): 36.7 (2019 06:11), Max: 37.3 (2019 13:55)  T(F): 98.1 (2019 06:11), Max: 99.1 (2019 13:55)  HR: 86 (2019 06:11) (86 - 100)  BP: 134/92 (2019 06:11) (123/86 - 150/92)  BP(mean): --  RR: 18 (2019 06:11) (18 - 18)  SpO2: 97% (2019 06:11) (97% - 100%)    Admit weight: 88.6kg  Daily Weight in k.3 (2019 10:53)  Today's weight: 89.4kg     Intake / Output:    @ 07:01  -  - @ 07:00  --------------------------------------------------------  IN: 2380.3 mL / OUT: 2450 mL / NET: -69.7 mL    PE:   Oropharynx: no erythema or ulcerations  Oral Mucositis:              -                                         Grade: n/a  CVS: S1, S2 RRR   Lungs: CTA throughout bilaterally   Abdomen:+ BS x 4, soft, NT, ND   Extremities: no edema   Gastric Mucositis:       -                                           Grade: n/a  Intestinal Mucositis:     -                                         Grade: n/a  Skin: no rash   TLC: PICC line TLC   Neuro: A&O x 3   Pain: 0    Labs:   CBC Full  -  ( 2019 06:51 )  WBC Count : 0.8 K/uL  Hemoglobin : 13.0 g/dL  Hematocrit : 36.3 %  Platelet Count - Automated : 232 K/uL  Mean Cell Volume : 93.0 fl  Mean Cell Hemoglobin : 33.3 pg  Mean Cell Hemoglobin Concentration : 35.8 gm/dL  Auto Neutrophil # : 0.5 K/uL  Auto Lymphocyte # : 0.3 K/uL  Auto Monocyte # : 0.0 K/uL  Auto Eosinophil # : 0.0 K/uL  Auto Basophil # : 0.0 K/uL  Auto Neutrophil % : 59.8 %  Auto Lymphocyte % : 36.7 %  Auto Monocyte % : 0.6 %  Auto Eosinophil % : 2.2 %  Auto Basophil % : 0.6 %                          13.0   0.8   )-----------( 232      ( 2019 06:51 )             36.3         138  |  105  |  7   ----------------------------<  100<H>  3.5   |  21<L>  |  0.71    Ca    9.1      2019 06:51  Phos  2.6       Mg     1.8         TPro  5.9<L>  /  Alb  3.5  /  TBili  0.2  /  DBili  <0.1  /  AST  15  /  ALT  8<L>  /  AlkPhos  44      PT/INR - ( 2019 06:53 )   PT: 12.2 sec;   INR: 1.06 ratio         PTT - ( 2019 06:53 )  PTT:32.2 sec  LIVER FUNCTIONS - ( 2019 06:51 )  Alb: 3.5 g/dL / Pro: 5.9 g/dL / ALK PHOS: 44 U/L / ALT: 8 U/L / AST: 15 U/L / GGT: x           Lactate Dehydrogenase, Serum: 205 U/L ( @ 06:51)    Meds:   Antimicrobials:   acyclovir   Oral Tab/Cap 400 milliGRAM(s) Oral three times a day  atovaquone Suspension 750 milliGRAM(s) Oral every 12 hours  ciprofloxacin     Tablet 500 milliGRAM(s) Oral every 12 hours  clotrimazole Lozenge 1 Lozenge Oral five times a day  voriconazole 200 milliGRAM(s) Oral every 12 hours      Heme / Onc:   heparin  Infusion 366 Unit(s)/Hr IV Continuous <Continuous>      GI:  docusate sodium 100 milliGRAM(s) Oral three times a day  pantoprazole    Tablet 40 milliGRAM(s) Oral before breakfast  senna 2 Tablet(s) Oral at bedtime  sodium bicarbonate Mouth Rinse 10 milliLiter(s) Swish and Spit five times a day  ursodiol Capsule 300 milliGRAM(s) Oral two times a day with meals      Cardiovascular:       Immunologic:   immune   globulin 10% (GAMMAGARD) IVPB 20 Gram(s) IV Intermittent <User Schedule>  tacrolimus 2 milliGRAM(s) Oral every 12 hours      Other medications:   acetaminophen   Tablet .. 650 milliGRAM(s) Oral every 6 hours  acetaminophen   Tablet .. 650 milliGRAM(s) Oral <User Schedule>  aprepitant 80 milliGRAM(s) Oral daily  ascorbic acid 250 milliGRAM(s) Oral daily  Biotene Dry Mouth Oral Rinse 5 milliLiter(s) Swish and Spit five times a day  chlorhexidine 4% Liquid 1 Application(s) Topical <User Schedule>  cholecalciferol 400 Unit(s) Oral daily  cyanocobalamin 1000 MICROGram(s) Oral daily  diphenhydrAMINE   Injectable 25 milliGRAM(s) IV Push every 3 weeks  ferrous    sulfate 325 milliGRAM(s) Oral three times a day  folic acid 1 milliGRAM(s) Oral daily  LORazepam   Injectable 1 milliGRAM(s) IV Push every 24 hours  medroxyPROGESTERone 10 milliGRAM(s) Oral daily  montelukast 10 milliGRAM(s) Oral daily  multivitamin 1 Tablet(s) Oral daily  ondansetron Injectable 8 milliGRAM(s) IV Push every 8 hours  phytonadione   Solution 10 milliGRAM(s) Oral every other day  sodium chloride 0.45% 1000 milliLiter(s) IV Continuous <Continuous>  sodium chloride 0.9%. 1000 milliLiter(s) IV Continuous <Continuous>      PRN:   acetaminophen   Tablet .. 650 milliGRAM(s) Oral every 6 hours PRN  artificial  tears Solution 1 Drop(s) Both EYES three times a day PRN  diphenhydrAMINE   Injectable 25 milliGRAM(s) IV Push every 4 hours PRN  LORazepam   Injectable 1 milliGRAM(s) IV Push every 6 hours PRN  LORazepam   Injectable 1 milliGRAM(s) IV Push every 6 hours PRN  metoclopramide Injectable 10 milliGRAM(s) IV Push every 6 hours PRN  sodium chloride 0.65% Nasal 1 Spray(s) Both Nostrils five times a day PRN  sodium chloride 0.9% lock flush 10 milliLiter(s) IV Push every 1 hour PRN  zolpidem 5 milliGRAM(s) Oral at bedtime PRN    A/P:  33 year old female with a history of AML  Pre  :  Allogeneic PBSCT day - 1  IVIG today   Neutropenic - started ciprofloxin today; if T >/= 38C pan cx, CXR and change cipro to cefepime.   Labs every other day ; continue vitamin C, vitamin B, folic acid, vitamin K & iron supplementation. Increased vitamin K to every other day given neutropenia     1. Infectious Disease:   acyclovir   Oral Tab/Cap 400 milliGRAM(s) Oral three times a day  atovaquone Suspension 750 milliGRAM(s) Oral every 12 hours  ciprofloxacin     Tablet 500 milliGRAM(s) Oral every 12 hours  clotrimazole Lozenge 1 Lozenge Oral five times a day  voriconazole 200 milliGRAM(s) Oral every 12 hours    2. VOD Prophylaxis: Actigall, Glutamine, Heparin (dosed at 100 units / kg / day)     3. GI Prophylaxis:    pantoprazole    Tablet 40 milliGRAM(s) Oral before breakfast    4. Mouthcare - NS / NaHCO3 rinses, Mycelex, Biotene; Skin care     5. GVHD prophylaxis   tacrolimus 2 milliGRAM(s) Oral every 12 hours    6. Transfuse & replete electrolytes prn     7. IV hydration, daily weights, strict I&O, prn diuresis     8. PO intake as tolerated, nutrition follow up as needed, MVI, folic acid     9. Antiemetics, anti-diarrhea medications:   LORazepam   Injectable 1 milliGRAM(s) IV Push every 6 hours PRN  metoclopramide Injectable 10 milliGRAM(s) IV Push every 6 hours PRN  ondansetron Injectable 8 milliGRAM(s) IV Push every 8 hours  aprepitant 80 milliGRAM(s) Oral daily    10. OOB as tolerated, physical therapy consult if needed     11. Monitor coags / fibrinogen 2x week, vitamin K as needed     12. Monitor closely for clinical changes, monitor for fevers     13. Emotional support provided, plan of care discussed and questions addressed     14. Patient education done regarding plan of care, restrictions and discharge planning     15. Continue regular social work input     I have written the above note for Dr. Larsen who performed service with me in the room.   Racquel CHAPARRO ([phone])    I have seen and examined patient with NP, I agree with above note as scribed.

## 2019-02-26 NOTE — PROGRESS NOTE ADULT - ATTENDING COMMENTS
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 Pt is [employer], and does not accept blood products for [employer] reasons   Day - 1 - IVIG today   Continue Acyclovir, Mepron, Vfend prophylaxis; begin Cipro for neutropenia  Continue VOD prophylaxis with Actigall, glutamine supplement, low dose heparin drip(will D/C when platelet count <=75K)  Labs every other day   Continue iron, folic acid, vitamin K, vitamin B-12, vitamin C as per standard practice for bloodless transplant  Antiemetics, mouth care, skin care   Bowel regimen  OOB/ambulate

## 2019-02-27 PROCEDURE — 99291 CRITICAL CARE FIRST HOUR: CPT

## 2019-02-27 RX ORDER — PHYTONADIONE (VIT K1) 5 MG
10 TABLET ORAL EVERY OTHER DAY
Qty: 0 | Refills: 0 | Status: DISCONTINUED | OUTPATIENT
Start: 2019-02-27 | End: 2019-03-14

## 2019-02-27 RX ORDER — PALIFERMIN 6.25 MG
5280 VIAL (EA) INTRAVENOUS EVERY 24 HOURS
Qty: 0 | Refills: 0 | Status: COMPLETED | OUTPATIENT
Start: 2019-02-27 | End: 2019-03-01

## 2019-02-27 RX ORDER — POTASSIUM CHLORIDE 20 MEQ
20 PACKET (EA) ORAL
Qty: 0 | Refills: 0 | Status: COMPLETED | OUTPATIENT
Start: 2019-02-27 | End: 2019-02-27

## 2019-02-27 RX ADMIN — Medication 325 MILLIGRAM(S): at 05:42

## 2019-02-27 RX ADMIN — Medication 10 MILLILITER(S): at 19:30

## 2019-02-27 RX ADMIN — Medication 10 MILLIGRAM(S): at 13:16

## 2019-02-27 RX ADMIN — Medication 325 MILLIGRAM(S): at 13:15

## 2019-02-27 RX ADMIN — TACROLIMUS 2 MILLIGRAM(S): 5 CAPSULE ORAL at 17:18

## 2019-02-27 RX ADMIN — VORICONAZOLE 200 MILLIGRAM(S): 10 INJECTION, POWDER, LYOPHILIZED, FOR SOLUTION INTRAVENOUS at 17:18

## 2019-02-27 RX ADMIN — Medication 400 UNIT(S): at 13:14

## 2019-02-27 RX ADMIN — Medication 500 MILLIGRAM(S): at 05:42

## 2019-02-27 RX ADMIN — MEDROXYPROGESTERONE ACETATE 10 MILLIGRAM(S): 150 INJECTION, SUSPENSION, EXTENDED RELEASE INTRAMUSCULAR at 13:14

## 2019-02-27 RX ADMIN — CHLORHEXIDINE GLUCONATE 1 APPLICATION(S): 213 SOLUTION TOPICAL at 06:00

## 2019-02-27 RX ADMIN — TACROLIMUS 2 MILLIGRAM(S): 5 CAPSULE ORAL at 05:44

## 2019-02-27 RX ADMIN — Medication 100 MILLIEQUIVALENT(S): at 15:20

## 2019-02-27 RX ADMIN — ATOVAQUONE 750 MILLIGRAM(S): 750 SUSPENSION ORAL at 17:18

## 2019-02-27 RX ADMIN — ONDANSETRON 8 MILLIGRAM(S): 8 TABLET, FILM COATED ORAL at 13:16

## 2019-02-27 RX ADMIN — Medication 25 MILLIGRAM(S): at 11:10

## 2019-02-27 RX ADMIN — Medication 100 MILLIEQUIVALENT(S): at 13:15

## 2019-02-27 RX ADMIN — VORICONAZOLE 200 MILLIGRAM(S): 10 INJECTION, POWDER, LYOPHILIZED, FOR SOLUTION INTRAVENOUS at 05:42

## 2019-02-27 RX ADMIN — Medication 5 MILLILITER(S): at 00:47

## 2019-02-27 RX ADMIN — Medication 5 MILLILITER(S): at 19:30

## 2019-02-27 RX ADMIN — Medication 5 MILLILITER(S): at 15:21

## 2019-02-27 RX ADMIN — PANTOPRAZOLE SODIUM 40 MILLIGRAM(S): 20 TABLET, DELAYED RELEASE ORAL at 05:45

## 2019-02-27 RX ADMIN — Medication 400 MILLIGRAM(S): at 13:15

## 2019-02-27 RX ADMIN — Medication 10 MILLILITER(S): at 13:15

## 2019-02-27 RX ADMIN — Medication 1 LOZENGE: at 19:30

## 2019-02-27 RX ADMIN — SENNA PLUS 2 TABLET(S): 8.6 TABLET ORAL at 21:11

## 2019-02-27 RX ADMIN — Medication 10 MILLILITER(S): at 23:39

## 2019-02-27 RX ADMIN — Medication 10 MILLILITER(S): at 15:21

## 2019-02-27 RX ADMIN — Medication 400 MILLIGRAM(S): at 05:42

## 2019-02-27 RX ADMIN — Medication 100 MILLIGRAM(S): at 05:42

## 2019-02-27 RX ADMIN — ATOVAQUONE 750 MILLIGRAM(S): 750 SUSPENSION ORAL at 05:41

## 2019-02-27 RX ADMIN — Medication 100 MILLIGRAM(S): at 21:11

## 2019-02-27 RX ADMIN — Medication 5280 MICROGRAM(S): at 17:18

## 2019-02-27 RX ADMIN — Medication 1 LOZENGE: at 15:21

## 2019-02-27 RX ADMIN — Medication 650 MILLIGRAM(S): at 11:34

## 2019-02-27 RX ADMIN — Medication 5 MILLILITER(S): at 09:01

## 2019-02-27 RX ADMIN — Medication 500 MILLIGRAM(S): at 17:18

## 2019-02-27 RX ADMIN — MONTELUKAST 10 MILLIGRAM(S): 4 TABLET, CHEWABLE ORAL at 13:15

## 2019-02-27 RX ADMIN — APREPITANT 80 MILLIGRAM(S): 80 CAPSULE ORAL at 13:12

## 2019-02-27 RX ADMIN — Medication 1 MILLIGRAM(S): at 13:14

## 2019-02-27 RX ADMIN — URSODIOL 300 MILLIGRAM(S): 250 TABLET, FILM COATED ORAL at 17:18

## 2019-02-27 RX ADMIN — Medication 1 LOZENGE: at 13:15

## 2019-02-27 RX ADMIN — Medication 10 MILLILITER(S): at 00:48

## 2019-02-27 RX ADMIN — PREGABALIN 1000 MICROGRAM(S): 225 CAPSULE ORAL at 13:13

## 2019-02-27 RX ADMIN — Medication 250 MILLIGRAM(S): at 13:12

## 2019-02-27 RX ADMIN — Medication 1 TABLET(S): at 13:14

## 2019-02-27 RX ADMIN — Medication 50 MILLIGRAM(S): at 11:10

## 2019-02-27 RX ADMIN — Medication 325 MILLIGRAM(S): at 21:11

## 2019-02-27 RX ADMIN — Medication 1 LOZENGE: at 09:06

## 2019-02-27 RX ADMIN — ONDANSETRON 8 MILLIGRAM(S): 8 TABLET, FILM COATED ORAL at 21:10

## 2019-02-27 RX ADMIN — Medication 1 LOZENGE: at 23:39

## 2019-02-27 RX ADMIN — URSODIOL 300 MILLIGRAM(S): 250 TABLET, FILM COATED ORAL at 09:00

## 2019-02-27 RX ADMIN — Medication 10 MILLILITER(S): at 09:06

## 2019-02-27 RX ADMIN — Medication 400 MILLIGRAM(S): at 21:11

## 2019-02-27 RX ADMIN — Medication 5 MILLILITER(S): at 23:39

## 2019-02-27 RX ADMIN — Medication 1 LOZENGE: at 00:48

## 2019-02-27 RX ADMIN — ONDANSETRON 8 MILLIGRAM(S): 8 TABLET, FILM COATED ORAL at 05:41

## 2019-02-27 RX ADMIN — Medication 5 MILLILITER(S): at 13:15

## 2019-02-27 NOTE — PROGRESS NOTE ADULT - SUBJECTIVE AND OBJECTIVE BOX
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    S: Patient seen and examined with HPC Transplant Team:     Denies mouth / tongue / throat pain, dyspnea, cough, nausea, vomiting, diarrhea, abdominal pain     O: Vitals:   Vital Signs Last 24 Hrs  T(C): 37.3 (2019 06:01), Max: 37.4 (2019 11:30)  T(F): 99.1 (2019 06:01), Max: 99.3 (2019 11:30)  HR: 85 (2019 06:01) (85 - 96)  BP: 133/94 (2019 06:01) (122/86 - 142/87)  BP(mean): --  RR: 18 (2019 06:01) (17 - 18)  SpO2: 99% (2019 06:01) (98% - 100%)    Admit weight: 88.6kg  Daily Weight in k.4 (2019 09:33)  Today's weight:     Intake / Output:    @ 07:01  -   @ 07:00  --------------------------------------------------------  IN: 2389.1 mL / OUT: 5175 mL / NET: -2785.9 mL      PE:   Oropharynx: no erythema or ulcerations  Oral Mucositis:              -                                         Grade: n/a  CVS: S1, S2 RRR   Lungs: CTA throughout bilaterally   Abdomen:+ BS x 4, soft, NT, ND   Extremities: no edema   Gastric Mucositis:       -                                           Grade: n/a  Intestinal Mucositis:     -                                         Grade: n/a  Skin: no rash   TLC: PICC line TLC   Neuro: A&O x 3   Pain: 0    Karnofsky / Lansky Scale: 60%  GVHD: will assess for acute GVHD post engraftment   Skin:   Liver:   Gut:   Overall Grade:     Labs:   CBC Full  -  ( 2019 06:51 )  WBC Count : 0.8 K/uL  Hemoglobin : 13.0 g/dL  Hematocrit : 36.3 %  Platelet Count - Automated : 232 K/uL  Mean Cell Volume : 93.0 fl  Mean Cell Hemoglobin : 33.3 pg  Mean Cell Hemoglobin Concentration : 35.8 gm/dL  Auto Neutrophil # : 0.5 K/uL  Auto Lymphocyte # : 0.3 K/uL  Auto Monocyte # : 0.0 K/uL  Auto Eosinophil # : 0.0 K/uL  Auto Basophil # : 0.0 K/uL  Auto Neutrophil % : 59.8 %  Auto Lymphocyte % : 36.7 %  Auto Monocyte % : 0.6 %  Auto Eosinophil % : 2.2 %  Auto Basophil % : 0.6 %                          13.0   0.8   )-----------( 232      ( 2019 06:51 )             36.3         138  |  105  |  7   ----------------------------<  100<H>  3.5   |  21<L>  |  0.71    Ca    9.1      2019 06:51  Phos  2.6       Mg     1.8         TPro  5.9<L>  /  Alb  3.5  /  TBili  0.2  /  DBili  <0.1  /  AST  15  /  ALT  8<L>  /  AlkPhos  44      PT/INR - ( 2019 06:53 )   PT: 12.2 sec;   INR: 1.06 ratio         PTT - ( 2019 06:53 )  PTT:32.2 sec  LIVER FUNCTIONS - ( 2019 06:51 )  Alb: 3.5 g/dL / Pro: 5.9 g/dL / ALK PHOS: 44 U/L / ALT: 8 U/L / AST: 15 U/L / GGT: x             Meds:   Antimicrobials:   acyclovir   Oral Tab/Cap 400 milliGRAM(s) Oral three times a day  atovaquone Suspension 750 milliGRAM(s) Oral every 12 hours  ciprofloxacin     Tablet 500 milliGRAM(s) Oral every 12 hours  clotrimazole Lozenge 1 Lozenge Oral five times a day  voriconazole 200 milliGRAM(s) Oral every 12 hours      Heme / Onc:   heparin  Infusion 366 Unit(s)/Hr IV Continuous <Continuous>      GI:  docusate sodium 100 milliGRAM(s) Oral three times a day  pantoprazole    Tablet 40 milliGRAM(s) Oral before breakfast  senna 2 Tablet(s) Oral at bedtime  sodium bicarbonate Mouth Rinse 10 milliLiter(s) Swish and Spit five times a day  ursodiol Capsule 300 milliGRAM(s) Oral two times a day with meals      Cardiovascular:       Immunologic:   immune   globulin 10% (GAMMAGARD) IVPB 20 Gram(s) IV Intermittent <User Schedule>  tacrolimus 2 milliGRAM(s) Oral every 12 hours      Other medications:   acetaminophen   Tablet .. 650 milliGRAM(s) Oral every 6 hours  acetaminophen   Tablet .. 650 milliGRAM(s) Oral <User Schedule>  acetaminophen   Tablet .. 650 milliGRAM(s) Oral once  aprepitant 80 milliGRAM(s) Oral daily  ascorbic acid 250 milliGRAM(s) Oral daily  Biotene Dry Mouth Oral Rinse 5 milliLiter(s) Swish and Spit five times a day  chlorhexidine 4% Liquid 1 Application(s) Topical <User Schedule>  cholecalciferol 400 Unit(s) Oral daily  cyanocobalamin 1000 MICROGram(s) Oral daily  diphenhydrAMINE   Injectable 25 milliGRAM(s) IV Push once  diphenhydrAMINE   Injectable 25 milliGRAM(s) IV Push every 3 weeks  ferrous    sulfate 325 milliGRAM(s) Oral three times a day  folic acid 1 milliGRAM(s) Oral daily  hydrocortisone sodium succinate Injectable 50 milliGRAM(s) IV Push once  LORazepam   Injectable 1 milliGRAM(s) IV Push every 24 hours  medroxyPROGESTERone 10 milliGRAM(s) Oral daily  montelukast 10 milliGRAM(s) Oral daily  multivitamin 1 Tablet(s) Oral daily  ondansetron Injectable 8 milliGRAM(s) IV Push every 8 hours  phytonadione   Solution 10 milliGRAM(s) Oral every other day  sodium chloride 0.45% 1000 milliLiter(s) IV Continuous <Continuous>  sodium chloride 0.9%. 1000 milliLiter(s) IV Continuous <Continuous>      PRN:   acetaminophen   Tablet .. 650 milliGRAM(s) Oral every 6 hours PRN  artificial  tears Solution 1 Drop(s) Both EYES three times a day PRN  diphenhydrAMINE   Injectable 25 milliGRAM(s) IV Push every 4 hours PRN  LORazepam   Injectable 1 milliGRAM(s) IV Push every 6 hours PRN  LORazepam   Injectable 1 milliGRAM(s) IV Push every 6 hours PRN  metoclopramide Injectable 10 milliGRAM(s) IV Push every 6 hours PRN  sodium chloride 0.65% Nasal 1 Spray(s) Both Nostrils five times a day PRN  sodium chloride 0.9% lock flush 10 milliLiter(s) IV Push every 1 hour PRN  zolpidem 5 milliGRAM(s) Oral at bedtime PRN    A/P:  33 year old female with a history of AML  Pre  :  Allogeneic PBSCT day 0  HPC transplant today - continue transplant hydration for 24 hours post infusion of cells   Neutropenic - started ciprofloxin today; if T >/= 38C pan cx, CXR and change cipro to cefepime.   Labs every other day ; continue vitamin C, vitamin B, folic acid, vitamin K & iron supplementation. Increased vitamin K to every other day given neutropenia     1. Infectious Disease:   acyclovir   Oral Tab/Cap 400 milliGRAM(s) Oral three times a day  atovaquone Suspension 750 milliGRAM(s) Oral every 12 hours  ciprofloxacin     Tablet 500 milliGRAM(s) Oral every 12 hours  clotrimazole Lozenge 1 Lozenge Oral five times a day  voriconazole 200 milliGRAM(s) Oral every 12 hours    2. VOD Prophylaxis: Actigall, Glutamine, Heparin (dosed at 100 units / kg / day)     3. GI Prophylaxis:    pantoprazole    Tablet 40 milliGRAM(s) Oral before breakfast    4. Mouthcare - NS / NaHCO3 rinses, Mycelex, Caphosol; Skin care     5. GVHD prophylaxis -  tacrolimus 2 milliGRAM(s) Oral every 12 hours    6. Transfuse & replete electrolytes prn - bloodless transplant   phytonadione   Solution 10 milliGRAM(s) Oral every other day  ferrous    sulfate 325 milliGRAM(s) Oral three times a day  folic acid 1 milliGRAM(s) Oral daily    7. IV hydration, daily weights, strict I&O, prn diuresis     8. PO intake as tolerated, nutrition follow up as needed, MVI, folic acid     9. Antiemetics, anti-diarrhea medications:   LORazepam   Injectable 1 milliGRAM(s) IV Push every 6 hours PRN  metoclopramide Injectable 10 milliGRAM(s) IV Push every 6 hours PRN  ondansetron Injectable 8 milliGRAM(s) IV Push every 8 hours  aprepitant 80 milliGRAM(s) Oral daily    10. OOB as tolerated, physical therapy consult if needed     11. Monitor coags / fibrinogen 2x week, vitamin K as needed     12. Monitor closely for clinical changes, monitor for fevers     13. Emotional support provided, plan of care discussed and questions addressed     14. Patient education done regarding plan of care, restrictions and discharge planning     15. Continue regular social work input     I have written the above note for Dr. Larsen who performed service with me in the room.   Racquel Leiva NP-C ([phone])    I have seen and examined patient with NP, I agree with above note as scribed. 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    S: Patient seen and examined with HPC Transplant Team:   + loose stool   Denies mouth / tongue / throat pain, dyspnea, cough, nausea, vomiting, diarrhea, abdominal pain     O: Vitals:   Vital Signs Last 24 Hrs  T(C): 37.3 (2019 06:01), Max: 37.4 (2019 11:30)  T(F): 99.1 (2019 06:01), Max: 99.3 (2019 11:30)  HR: 85 (2019 06:01) (85 - 96)  BP: 133/94 (2019 06:01) (122/86 - 142/87)  BP(mean): --  RR: 18 (2019 06:01) (17 - 18)  SpO2: 99% (2019 06:01) (98% - 100%)    Admit weight: 88.6kg  Daily Weight in k.4 (2019 09:33)  Today's weight:     Intake / Output:    @ 07:01  -   @ 07:00  --------------------------------------------------------  IN: 2389.1 mL / OUT: 5175 mL / NET: -2785.9 mL      PE:   Oropharynx: no erythema or ulcerations  Oral Mucositis:              -                                         Grade: n/a  CVS: S1, S2 RRR   Lungs: CTA throughout bilaterally   Abdomen:+ BS x 4, soft, NT, ND   Extremities: no edema   Gastric Mucositis:       -                                           Grade: n/a  Intestinal Mucositis:     -                                         Grade: n/a  Skin: no rash   TLC: PICC line TLC   Neuro: A&O x 3   Pain: 0    Karnofsky / Lansky Scale: 60%  GVHD: will assess for acute GVHD post engraftment   Skin:   Liver:   Gut:   Overall Grade:     Labs:   CBC Full  -  ( 2019 06:51 )  WBC Count : 0.8 K/uL  Hemoglobin : 13.0 g/dL  Hematocrit : 36.3 %  Platelet Count - Automated : 232 K/uL  Mean Cell Volume : 93.0 fl  Mean Cell Hemoglobin : 33.3 pg  Mean Cell Hemoglobin Concentration : 35.8 gm/dL  Auto Neutrophil # : 0.5 K/uL  Auto Lymphocyte # : 0.3 K/uL  Auto Monocyte # : 0.0 K/uL  Auto Eosinophil # : 0.0 K/uL  Auto Basophil # : 0.0 K/uL  Auto Neutrophil % : 59.8 %  Auto Lymphocyte % : 36.7 %  Auto Monocyte % : 0.6 %  Auto Eosinophil % : 2.2 %  Auto Basophil % : 0.6 %                          13.0   0.8   )-----------( 232      ( 2019 06:51 )             36.3         138  |  105  |  7   ----------------------------<  100<H>  3.5   |  21<L>  |  0.71    Ca    9.1      2019 06:51  Phos  2.6       Mg     1.8         TPro  5.9<L>  /  Alb  3.5  /  TBili  0.2  /  DBili  <0.1  /  AST  15  /  ALT  8<L>  /  AlkPhos  44      PT/INR - ( 2019 06:53 )   PT: 12.2 sec;   INR: 1.06 ratio         PTT - ( 2019 06:53 )  PTT:32.2 sec  LIVER FUNCTIONS - ( 2019 06:51 )  Alb: 3.5 g/dL / Pro: 5.9 g/dL / ALK PHOS: 44 U/L / ALT: 8 U/L / AST: 15 U/L / GGT: x             Meds:   Antimicrobials:   acyclovir   Oral Tab/Cap 400 milliGRAM(s) Oral three times a day  atovaquone Suspension 750 milliGRAM(s) Oral every 12 hours  ciprofloxacin     Tablet 500 milliGRAM(s) Oral every 12 hours  clotrimazole Lozenge 1 Lozenge Oral five times a day  voriconazole 200 milliGRAM(s) Oral every 12 hours      Heme / Onc:   heparin  Infusion 366 Unit(s)/Hr IV Continuous <Continuous>      GI:  docusate sodium 100 milliGRAM(s) Oral three times a day  pantoprazole    Tablet 40 milliGRAM(s) Oral before breakfast  senna 2 Tablet(s) Oral at bedtime  sodium bicarbonate Mouth Rinse 10 milliLiter(s) Swish and Spit five times a day  ursodiol Capsule 300 milliGRAM(s) Oral two times a day with meals      Cardiovascular:       Immunologic:   immune   globulin 10% (GAMMAGARD) IVPB 20 Gram(s) IV Intermittent <User Schedule>  tacrolimus 2 milliGRAM(s) Oral every 12 hours      Other medications:   acetaminophen   Tablet .. 650 milliGRAM(s) Oral every 6 hours  acetaminophen   Tablet .. 650 milliGRAM(s) Oral <User Schedule>  acetaminophen   Tablet .. 650 milliGRAM(s) Oral once  aprepitant 80 milliGRAM(s) Oral daily  ascorbic acid 250 milliGRAM(s) Oral daily  Biotene Dry Mouth Oral Rinse 5 milliLiter(s) Swish and Spit five times a day  chlorhexidine 4% Liquid 1 Application(s) Topical <User Schedule>  cholecalciferol 400 Unit(s) Oral daily  cyanocobalamin 1000 MICROGram(s) Oral daily  diphenhydrAMINE   Injectable 25 milliGRAM(s) IV Push once  diphenhydrAMINE   Injectable 25 milliGRAM(s) IV Push every 3 weeks  ferrous    sulfate 325 milliGRAM(s) Oral three times a day  folic acid 1 milliGRAM(s) Oral daily  hydrocortisone sodium succinate Injectable 50 milliGRAM(s) IV Push once  LORazepam   Injectable 1 milliGRAM(s) IV Push every 24 hours  medroxyPROGESTERone 10 milliGRAM(s) Oral daily  montelukast 10 milliGRAM(s) Oral daily  multivitamin 1 Tablet(s) Oral daily  ondansetron Injectable 8 milliGRAM(s) IV Push every 8 hours  phytonadione   Solution 10 milliGRAM(s) Oral every other day  sodium chloride 0.45% 1000 milliLiter(s) IV Continuous <Continuous>  sodium chloride 0.9%. 1000 milliLiter(s) IV Continuous <Continuous>      PRN:   acetaminophen   Tablet .. 650 milliGRAM(s) Oral every 6 hours PRN  artificial  tears Solution 1 Drop(s) Both EYES three times a day PRN  diphenhydrAMINE   Injectable 25 milliGRAM(s) IV Push every 4 hours PRN  LORazepam   Injectable 1 milliGRAM(s) IV Push every 6 hours PRN  LORazepam   Injectable 1 milliGRAM(s) IV Push every 6 hours PRN  metoclopramide Injectable 10 milliGRAM(s) IV Push every 6 hours PRN  sodium chloride 0.65% Nasal 1 Spray(s) Both Nostrils five times a day PRN  sodium chloride 0.9% lock flush 10 milliLiter(s) IV Push every 1 hour PRN  zolpidem 5 milliGRAM(s) Oral at bedtime PRN    A/P:  33 year old female with a history of AML  Pre  :  Allogeneic PBSCT day 0  HPC transplant today - continue transplant hydration for 24 hours post infusion of cells   Neutropenic - started ciprofloxin today; if T >/= 38C pan cx, CXR and change cipro to cefepime.   Labs every other day ; continue vitamin C, vitamin B, folic acid, vitamin K & iron supplementation. Increased vitamin K to every other day given neutropenia     1. Infectious Disease:   acyclovir   Oral Tab/Cap 400 milliGRAM(s) Oral three times a day  atovaquone Suspension 750 milliGRAM(s) Oral every 12 hours  ciprofloxacin     Tablet 500 milliGRAM(s) Oral every 12 hours  clotrimazole Lozenge 1 Lozenge Oral five times a day  voriconazole 200 milliGRAM(s) Oral every 12 hours    2. VOD Prophylaxis: Actigall, Glutamine, Heparin (dosed at 100 units / kg / day)     3. GI Prophylaxis:    pantoprazole    Tablet 40 milliGRAM(s) Oral before breakfast    4. Mouthcare - NS / NaHCO3 rinses, Mycelex, Caphosol; Skin care     5. GVHD prophylaxis -  tacrolimus 2 milliGRAM(s) Oral every 12 hours    6. Transfuse & replete electrolytes prn - bloodless transplant   phytonadione   Solution 10 milliGRAM(s) Oral every other day  ferrous    sulfate 325 milliGRAM(s) Oral three times a day  folic acid 1 milliGRAM(s) Oral daily    7. IV hydration, daily weights, strict I&O, prn diuresis     8. PO intake as tolerated, nutrition follow up as needed, MVI, folic acid     9. Antiemetics, anti-diarrhea medications:   LORazepam   Injectable 1 milliGRAM(s) IV Push every 6 hours PRN  metoclopramide Injectable 10 milliGRAM(s) IV Push every 6 hours PRN  ondansetron Injectable 8 milliGRAM(s) IV Push every 8 hours  aprepitant 80 milliGRAM(s) Oral daily    10. OOB as tolerated, physical therapy consult if needed     11. Monitor coags / fibrinogen 2x week, vitamin K as needed     12. Monitor closely for clinical changes, monitor for fevers     13. Emotional support provided, plan of care discussed and questions addressed     14. Patient education done regarding plan of care, restrictions and discharge planning     15. Continue regular social work input     I have written the above note for Dr. Larsen who performed service with me in the room.   Racquel Leiva NP-C ([phone])    I have seen and examined patient with NP, I agree with above note as scribed. 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    S: Patient seen and examined with HPC Transplant Team:   + loose stool   Denies mouth / tongue / throat pain, dyspnea, cough, nausea, vomiting, diarrhea, abdominal pain     O: Vitals:   Vital Signs Last 24 Hrs  T(C): 37.3 (2019 06:01), Max: 37.4 (2019 11:30)  T(F): 99.1 (2019 06:01), Max: 99.3 (2019 11:30)  HR: 85 (2019 06:01) (85 - 96)  BP: 133/94 (2019 06:01) (122/86 - 142/87)  BP(mean): --  RR: 18 (2019 06:01) (17 - 18)  SpO2: 99% (2019 06:01) (98% - 100%)    Admit weight: 88.6kg  Daily Weight in k.4 (2019 09:33)  Today's weight: 89.2kg    Intake / Output:    @ 07:01  -   @ 07:00  --------------------------------------------------------  IN: 2389.1 mL / OUT: 5175 mL / NET: -2785.9 mL      PE:   Oropharynx: no erythema or ulcerations  Oral Mucositis:              -                                         Grade: n/a  CVS: S1, S2 RRR   Lungs: CTA throughout bilaterally   Abdomen:+ BS x 4, soft, NT, ND   Extremities: no edema   Gastric Mucositis:       -                                           Grade: n/a  Intestinal Mucositis:     -                                         Grade: n/a  Skin: no rash   TLC: PICC line TLC   Neuro: A&O x 3   Pain: 0    Karnofsky / Lansky Scale: 60%  GVHD: will assess for acute GVHD post engraftment   Skin:   Liver:   Gut:   Overall Grade:     Labs:   CBC Full  -  ( 2019 06:51 )  WBC Count : 0.8 K/uL  Hemoglobin : 13.0 g/dL  Hematocrit : 36.3 %  Platelet Count - Automated : 232 K/uL  Mean Cell Volume : 93.0 fl  Mean Cell Hemoglobin : 33.3 pg  Mean Cell Hemoglobin Concentration : 35.8 gm/dL  Auto Neutrophil # : 0.5 K/uL  Auto Lymphocyte # : 0.3 K/uL  Auto Monocyte # : 0.0 K/uL  Auto Eosinophil # : 0.0 K/uL  Auto Basophil # : 0.0 K/uL  Auto Neutrophil % : 59.8 %  Auto Lymphocyte % : 36.7 %  Auto Monocyte % : 0.6 %  Auto Eosinophil % : 2.2 %  Auto Basophil % : 0.6 %                          13.0   0.8   )-----------( 232      ( 2019 06:51 )             36.3         138  |  105  |  7   ----------------------------<  100<H>  3.5   |  21<L>  |  0.71    Ca    9.1      2019 06:51  Phos  2.6       Mg     1.8         TPro  5.9<L>  /  Alb  3.5  /  TBili  0.2  /  DBili  <0.1  /  AST  15  /  ALT  8<L>  /  AlkPhos  44      PT/INR - ( 2019 06:53 )   PT: 12.2 sec;   INR: 1.06 ratio         PTT - ( 2019 06:53 )  PTT:32.2 sec  LIVER FUNCTIONS - ( 2019 06:51 )  Alb: 3.5 g/dL / Pro: 5.9 g/dL / ALK PHOS: 44 U/L / ALT: 8 U/L / AST: 15 U/L / GGT: x             Meds:   Antimicrobials:   acyclovir   Oral Tab/Cap 400 milliGRAM(s) Oral three times a day  atovaquone Suspension 750 milliGRAM(s) Oral every 12 hours  ciprofloxacin     Tablet 500 milliGRAM(s) Oral every 12 hours  clotrimazole Lozenge 1 Lozenge Oral five times a day  voriconazole 200 milliGRAM(s) Oral every 12 hours      Heme / Onc:   heparin  Infusion 366 Unit(s)/Hr IV Continuous <Continuous>      GI:  docusate sodium 100 milliGRAM(s) Oral three times a day  pantoprazole    Tablet 40 milliGRAM(s) Oral before breakfast  senna 2 Tablet(s) Oral at bedtime  sodium bicarbonate Mouth Rinse 10 milliLiter(s) Swish and Spit five times a day  ursodiol Capsule 300 milliGRAM(s) Oral two times a day with meals      Cardiovascular:       Immunologic:   immune   globulin 10% (GAMMAGARD) IVPB 20 Gram(s) IV Intermittent <User Schedule>  tacrolimus 2 milliGRAM(s) Oral every 12 hours      Other medications:   acetaminophen   Tablet .. 650 milliGRAM(s) Oral every 6 hours  acetaminophen   Tablet .. 650 milliGRAM(s) Oral <User Schedule>  acetaminophen   Tablet .. 650 milliGRAM(s) Oral once  aprepitant 80 milliGRAM(s) Oral daily  ascorbic acid 250 milliGRAM(s) Oral daily  Biotene Dry Mouth Oral Rinse 5 milliLiter(s) Swish and Spit five times a day  chlorhexidine 4% Liquid 1 Application(s) Topical <User Schedule>  cholecalciferol 400 Unit(s) Oral daily  cyanocobalamin 1000 MICROGram(s) Oral daily  diphenhydrAMINE   Injectable 25 milliGRAM(s) IV Push once  diphenhydrAMINE   Injectable 25 milliGRAM(s) IV Push every 3 weeks  ferrous    sulfate 325 milliGRAM(s) Oral three times a day  folic acid 1 milliGRAM(s) Oral daily  hydrocortisone sodium succinate Injectable 50 milliGRAM(s) IV Push once  LORazepam   Injectable 1 milliGRAM(s) IV Push every 24 hours  medroxyPROGESTERone 10 milliGRAM(s) Oral daily  montelukast 10 milliGRAM(s) Oral daily  multivitamin 1 Tablet(s) Oral daily  ondansetron Injectable 8 milliGRAM(s) IV Push every 8 hours  phytonadione   Solution 10 milliGRAM(s) Oral every other day  sodium chloride 0.45% 1000 milliLiter(s) IV Continuous <Continuous>  sodium chloride 0.9%. 1000 milliLiter(s) IV Continuous <Continuous>      PRN:   acetaminophen   Tablet .. 650 milliGRAM(s) Oral every 6 hours PRN  artificial  tears Solution 1 Drop(s) Both EYES three times a day PRN  diphenhydrAMINE   Injectable 25 milliGRAM(s) IV Push every 4 hours PRN  LORazepam   Injectable 1 milliGRAM(s) IV Push every 6 hours PRN  LORazepam   Injectable 1 milliGRAM(s) IV Push every 6 hours PRN  metoclopramide Injectable 10 milliGRAM(s) IV Push every 6 hours PRN  sodium chloride 0.65% Nasal 1 Spray(s) Both Nostrils five times a day PRN  sodium chloride 0.9% lock flush 10 milliLiter(s) IV Push every 1 hour PRN  zolpidem 5 milliGRAM(s) Oral at bedtime PRN    A/P:  33 year old female with a history of AML  Pre  :  Allogeneic PBSCT day 0  HPC transplant today - continue transplant hydration for 24 hours post infusion of cells   Neutropenic - started ciprofloxin today; if T >/= 38C pan cx, CXR and change cipro to cefepime.   Labs every other day ; continue vitamin C, vitamin B, folic acid, vitamin K & iron supplementation. Increased vitamin K to every other day given neutropenia     1. Infectious Disease:   acyclovir   Oral Tab/Cap 400 milliGRAM(s) Oral three times a day  atovaquone Suspension 750 milliGRAM(s) Oral every 12 hours  ciprofloxacin     Tablet 500 milliGRAM(s) Oral every 12 hours  clotrimazole Lozenge 1 Lozenge Oral five times a day  voriconazole 200 milliGRAM(s) Oral every 12 hours    2. VOD Prophylaxis: Actigall, Glutamine, Heparin (dosed at 100 units / kg / day)     3. GI Prophylaxis:    pantoprazole    Tablet 40 milliGRAM(s) Oral before breakfast    4. Mouthcare - NS / NaHCO3 rinses, Mycelex, Caphosol; Skin care     5. GVHD prophylaxis -  tacrolimus 2 milliGRAM(s) Oral every 12 hours    6. Transfuse & replete electrolytes prn - bloodless transplant   phytonadione   Solution 10 milliGRAM(s) Oral every other day  ferrous    sulfate 325 milliGRAM(s) Oral three times a day  folic acid 1 milliGRAM(s) Oral daily    7. IV hydration, daily weights, strict I&O, prn diuresis     8. PO intake as tolerated, nutrition follow up as needed, MVI, folic acid     9. Antiemetics, anti-diarrhea medications:   LORazepam   Injectable 1 milliGRAM(s) IV Push every 6 hours PRN  metoclopramide Injectable 10 milliGRAM(s) IV Push every 6 hours PRN  ondansetron Injectable 8 milliGRAM(s) IV Push every 8 hours  aprepitant 80 milliGRAM(s) Oral daily    10. OOB as tolerated, physical therapy consult if needed     11. Monitor coags / fibrinogen 2x week, vitamin K as needed     12. Monitor closely for clinical changes, monitor for fevers     13. Emotional support provided, plan of care discussed and questions addressed     14. Patient education done regarding plan of care, restrictions and discharge planning     15. Continue regular social work input     I have written the above note for Dr. Larsen who performed service with me in the room.   Racquel Leiva NP-C ([phone])    I have seen and examined patient with NP, I agree with above note as scribed. 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    S: Patient seen and examined with HPC Transplant Team:   + loose stool this morning  Denies mouth / tongue / throat pain, dyspnea, cough, nausea, vomiting, diarrhea, abdominal pain     O: Vitals:   Vital Signs Last 24 Hrs  T(C): 37.3 (2019 06:01), Max: 37.4 (2019 11:30)  T(F): 99.1 (2019 06:01), Max: 99.3 (2019 11:30)  HR: 85 (2019 06:01) (85 - 96)  BP: 133/94 (2019 06:01) (122/86 - 142/87)  BP(mean): --  RR: 18 (2019 06:01) (17 - 18)  SpO2: 99% (2019 06:01) (98% - 100%)    Admit weight: 88.6kg  Daily Weight in k.4 (2019 09:33)  Today's weight: 89.2kg    Intake / Output:    @ 07:01  -   @ 07:00  --------------------------------------------------------  IN: 2389.1 mL / OUT: 5175 mL / NET: -2785.9 mL      PE:   Oropharynx: no erythema or ulcerations  Oral Mucositis:              -                                         Grade: n/a  CVS: S1, S2 RRR   Lungs: CTA throughout bilaterally   Abdomen:+ BS x 4, soft, NT, ND   Extremities: no edema   Gastric Mucositis:       -                                           Grade: n/a  Intestinal Mucositis:     -                                         Grade: n/a  Skin: no rash   TLC: PICC line TLC   Neuro: A&O x 3   Pain: 0    Karnofsky / Lansky Scale: 60%  GVHD: will assess for acute GVHD post engraftment   Skin:   Liver:   Gut:   Overall Grade:     Labs:   CBC Full  -  ( 2019 06:51 )  WBC Count : 0.8 K/uL  Hemoglobin : 13.0 g/dL  Hematocrit : 36.3 %  Platelet Count - Automated : 232 K/uL  Mean Cell Volume : 93.0 fl  Mean Cell Hemoglobin : 33.3 pg  Mean Cell Hemoglobin Concentration : 35.8 gm/dL  Auto Neutrophil # : 0.5 K/uL  Auto Lymphocyte # : 0.3 K/uL  Auto Monocyte # : 0.0 K/uL  Auto Eosinophil # : 0.0 K/uL  Auto Basophil # : 0.0 K/uL  Auto Neutrophil % : 59.8 %  Auto Lymphocyte % : 36.7 %  Auto Monocyte % : 0.6 %  Auto Eosinophil % : 2.2 %  Auto Basophil % : 0.6 %                          13.0   0.8   )-----------( 232      ( 2019 06:51 )             36.3         138  |  105  |  7   ----------------------------<  100<H>  3.5   |  21<L>  |  0.71    Ca    9.1      2019 06:51  Phos  2.6       Mg     1.8         TPro  5.9<L>  /  Alb  3.5  /  TBili  0.2  /  DBili  <0.1  /  AST  15  /  ALT  8<L>  /  AlkPhos  44      PT/INR - ( 2019 06:53 )   PT: 12.2 sec;   INR: 1.06 ratio         PTT - ( 2019 06:53 )  PTT:32.2 sec  LIVER FUNCTIONS - ( 2019 06:51 )  Alb: 3.5 g/dL / Pro: 5.9 g/dL / ALK PHOS: 44 U/L / ALT: 8 U/L / AST: 15 U/L / GGT: x             Meds:   Antimicrobials:   acyclovir   Oral Tab/Cap 400 milliGRAM(s) Oral three times a day  atovaquone Suspension 750 milliGRAM(s) Oral every 12 hours  ciprofloxacin     Tablet 500 milliGRAM(s) Oral every 12 hours  clotrimazole Lozenge 1 Lozenge Oral five times a day  voriconazole 200 milliGRAM(s) Oral every 12 hours      Heme / Onc:   heparin  Infusion 366 Unit(s)/Hr IV Continuous <Continuous>      GI:  docusate sodium 100 milliGRAM(s) Oral three times a day  pantoprazole    Tablet 40 milliGRAM(s) Oral before breakfast  senna 2 Tablet(s) Oral at bedtime  sodium bicarbonate Mouth Rinse 10 milliLiter(s) Swish and Spit five times a day  ursodiol Capsule 300 milliGRAM(s) Oral two times a day with meals      Cardiovascular:       Immunologic:   immune   globulin 10% (GAMMAGARD) IVPB 20 Gram(s) IV Intermittent <User Schedule>  tacrolimus 2 milliGRAM(s) Oral every 12 hours      Other medications:   acetaminophen   Tablet .. 650 milliGRAM(s) Oral every 6 hours  acetaminophen   Tablet .. 650 milliGRAM(s) Oral <User Schedule>  acetaminophen   Tablet .. 650 milliGRAM(s) Oral once  aprepitant 80 milliGRAM(s) Oral daily  ascorbic acid 250 milliGRAM(s) Oral daily  Biotene Dry Mouth Oral Rinse 5 milliLiter(s) Swish and Spit five times a day  chlorhexidine 4% Liquid 1 Application(s) Topical <User Schedule>  cholecalciferol 400 Unit(s) Oral daily  cyanocobalamin 1000 MICROGram(s) Oral daily  diphenhydrAMINE   Injectable 25 milliGRAM(s) IV Push once  diphenhydrAMINE   Injectable 25 milliGRAM(s) IV Push every 3 weeks  ferrous    sulfate 325 milliGRAM(s) Oral three times a day  folic acid 1 milliGRAM(s) Oral daily  hydrocortisone sodium succinate Injectable 50 milliGRAM(s) IV Push once  LORazepam   Injectable 1 milliGRAM(s) IV Push every 24 hours  medroxyPROGESTERone 10 milliGRAM(s) Oral daily  montelukast 10 milliGRAM(s) Oral daily  multivitamin 1 Tablet(s) Oral daily  ondansetron Injectable 8 milliGRAM(s) IV Push every 8 hours  phytonadione   Solution 10 milliGRAM(s) Oral every other day  sodium chloride 0.45% 1000 milliLiter(s) IV Continuous <Continuous>  sodium chloride 0.9%. 1000 milliLiter(s) IV Continuous <Continuous>      PRN:   acetaminophen   Tablet .. 650 milliGRAM(s) Oral every 6 hours PRN  artificial  tears Solution 1 Drop(s) Both EYES three times a day PRN  diphenhydrAMINE   Injectable 25 milliGRAM(s) IV Push every 4 hours PRN  LORazepam   Injectable 1 milliGRAM(s) IV Push every 6 hours PRN  LORazepam   Injectable 1 milliGRAM(s) IV Push every 6 hours PRN  metoclopramide Injectable 10 milliGRAM(s) IV Push every 6 hours PRN  sodium chloride 0.65% Nasal 1 Spray(s) Both Nostrils five times a day PRN  sodium chloride 0.9% lock flush 10 milliLiter(s) IV Push every 1 hour PRN  zolpidem 5 milliGRAM(s) Oral at bedtime PRN    A/P:  33 year old female with a history of AML  Pre  :  Allogeneic PBSCT day 0  HPC transplant today - continue transplant hydration for 24 hours post infusion of cells   Neutropenic - started Cipro on ; if T >/= 38C pan cx, CXR and change Cipro to Cefepime.   Labs every other day ; continue vitamin C, vitamin B, folic acid, vitamin K & iron supplementation. Increased vitamin K to every other day as per standard guidelines for bloodless transplant.     1. Infectious Disease:   acyclovir   Oral Tab/Cap 400 milliGRAM(s) Oral three times a day  atovaquone Suspension 750 milliGRAM(s) Oral every 12 hours  ciprofloxacin     Tablet 500 milliGRAM(s) Oral every 12 hours  clotrimazole Lozenge 1 Lozenge Oral five times a day  voriconazole 200 milliGRAM(s) Oral every 12 hours    2. VOD Prophylaxis: Actigall, Glutamine, Heparin (dosed at 100 units / kg / day)     3. GI Prophylaxis:    pantoprazole    Tablet 40 milliGRAM(s) Oral before breakfast    4. Mouthcare - NS / NaHCO3 rinses, Mycelex, Caphosol; Skin care     5. GVHD prophylaxis -  tacrolimus 2 milliGRAM(s) Oral every 12 hours    6. Transfuse & replete electrolytes prn - bloodless transplant   phytonadione   Solution 10 milliGRAM(s) Oral every other day  ferrous    sulfate 325 milliGRAM(s) Oral three times a day  folic acid 1 milliGRAM(s) Oral daily    7. IV hydration, daily weights, strict I&O, prn diuresis     8. PO intake as tolerated, nutrition follow up as needed, MVI, folic acid     9. Antiemetics, anti-diarrhea medications:   LORazepam   Injectable 1 milliGRAM(s) IV Push every 6 hours PRN  metoclopramide Injectable 10 milliGRAM(s) IV Push every 6 hours PRN  ondansetron Injectable 8 milliGRAM(s) IV Push every 8 hours  aprepitant 80 milliGRAM(s) Oral daily    10. OOB as tolerated, physical therapy consult if needed     11. Monitor coags / fibrinogen 2x week, vitamin K as needed     12. Monitor closely for clinical changes, monitor for fevers     13. Emotional support provided, plan of care discussed and questions addressed     14. Patient education done regarding plan of care, restrictions and discharge planning     15. Continue regular social work input     I have written the above note for Dr. Larsen who performed service with me in the room.   Racquel Leiva NP-C ([phone])    I have seen and examined patient with NP, I agree with above note as scribed.

## 2019-02-27 NOTE — PROGRESS NOTE ADULT - ATTENDING COMMENTS
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 Pt is Druze, and does not accept blood products for Mandaen reasons   Day 0 - pbsct today; continue transplant hydration for 24 hours post infusion of cells   Begin Zarxio on day +12  Continue Acyclovir, Mepron, Vfend prophylaxis; on Cipro for neutropenia  Continue VOD prophylaxis with Actigall, glutamine supplement, low dose heparin drip(will D/C when platelet count <=75K)  Labs every other day   Continue iron, folic acid, vitamin K, vitamin B-12, vitamin C as per standard practice for bloodless transplant  Antiemetics, mouth care, skin care   Hold bowel regimen  OOB/ambulate

## 2019-02-28 DIAGNOSIS — R63.0 ANOREXIA: ICD-10-CM

## 2019-02-28 DIAGNOSIS — R53.83 OTHER FATIGUE: ICD-10-CM

## 2019-02-28 LAB
ALBUMIN SERPL ELPH-MCNC: 3.4 G/DL — SIGNIFICANT CHANGE UP (ref 3.3–5)
ALP SERPL-CCNC: 45 U/L — SIGNIFICANT CHANGE UP (ref 40–120)
ALT FLD-CCNC: 11 U/L — SIGNIFICANT CHANGE UP (ref 10–45)
ANION GAP SERPL CALC-SCNC: 12 MMOL/L — SIGNIFICANT CHANGE UP (ref 5–17)
AST SERPL-CCNC: 20 U/L — SIGNIFICANT CHANGE UP (ref 10–40)
BASOPHILS # BLD AUTO: 0 K/UL — SIGNIFICANT CHANGE UP (ref 0–0.2)
BILIRUB SERPL-MCNC: 0.3 MG/DL — SIGNIFICANT CHANGE UP (ref 0.2–1.2)
BUN SERPL-MCNC: 5 MG/DL — LOW (ref 7–23)
CALCIUM SERPL-MCNC: 9 MG/DL — SIGNIFICANT CHANGE UP (ref 8.4–10.5)
CHLORIDE SERPL-SCNC: 107 MMOL/L — SIGNIFICANT CHANGE UP (ref 96–108)
CO2 SERPL-SCNC: 21 MMOL/L — LOW (ref 22–31)
CREAT SERPL-MCNC: 0.71 MG/DL — SIGNIFICANT CHANGE UP (ref 0.5–1.3)
EOSINOPHIL # BLD AUTO: 0.1 K/UL — SIGNIFICANT CHANGE UP (ref 0–0.5)
GLUCOSE SERPL-MCNC: 99 MG/DL — SIGNIFICANT CHANGE UP (ref 70–99)
HCT VFR BLD CALC: 32.5 % — LOW (ref 34.5–45)
HGB BLD-MCNC: 11.3 G/DL — LOW (ref 11.5–15.5)
LDH SERPL L TO P-CCNC: 236 U/L — SIGNIFICANT CHANGE UP (ref 50–242)
LYMPHOCYTES # BLD AUTO: 40 % — SIGNIFICANT CHANGE UP (ref 13–44)
LYMPHOCYTES # BLD AUTO: SIGNIFICANT CHANGE UP (ref 1–3.3)
MAGNESIUM SERPL-MCNC: 1.4 MG/DL — LOW (ref 1.6–2.6)
MCHC RBC-ENTMCNC: 32.1 PG — SIGNIFICANT CHANGE UP (ref 27–34)
MCHC RBC-ENTMCNC: 34.8 GM/DL — SIGNIFICANT CHANGE UP (ref 32–36)
MCV RBC AUTO: 92.3 FL — SIGNIFICANT CHANGE UP (ref 80–100)
MONOCYTES # BLD AUTO: SIGNIFICANT CHANGE UP (ref 0–0.9)
MONOCYTES NFR BLD AUTO: 8 % — SIGNIFICANT CHANGE UP (ref 2–14)
NEUTROPHILS # BLD AUTO: SIGNIFICANT CHANGE UP (ref 1.8–7.4)
NEUTROPHILS NFR BLD AUTO: 52 % — SIGNIFICANT CHANGE UP (ref 43–77)
PHOSPHATE SERPL-MCNC: 2.7 MG/DL — SIGNIFICANT CHANGE UP (ref 2.5–4.5)
PLATELET # BLD AUTO: 285 K/UL — SIGNIFICANT CHANGE UP (ref 150–400)
POTASSIUM SERPL-MCNC: 4 MMOL/L — SIGNIFICANT CHANGE UP (ref 3.5–5.3)
POTASSIUM SERPL-SCNC: 4 MMOL/L — SIGNIFICANT CHANGE UP (ref 3.5–5.3)
PROT SERPL-MCNC: 6.1 G/DL — SIGNIFICANT CHANGE UP (ref 6–8.3)
RBC # BLD: 3.52 M/UL — LOW (ref 3.8–5.2)
RBC # FLD: 14.6 % — HIGH (ref 10.3–14.5)
SODIUM SERPL-SCNC: 140 MMOL/L — SIGNIFICANT CHANGE UP (ref 135–145)
TACROLIMUS SERPL-MCNC: 12 NG/ML — SIGNIFICANT CHANGE UP
WBC # BLD: 0.9 K/UL — CRITICAL LOW (ref 3.8–10.5)
WBC # FLD AUTO: 0.9 K/UL — CRITICAL LOW (ref 3.8–10.5)

## 2019-02-28 PROCEDURE — 99233 SBSQ HOSP IP/OBS HIGH 50: CPT

## 2019-02-28 PROCEDURE — 99291 CRITICAL CARE FIRST HOUR: CPT

## 2019-02-28 RX ORDER — FUROSEMIDE 40 MG
20 TABLET ORAL ONCE
Qty: 0 | Refills: 0 | Status: COMPLETED | OUTPATIENT
Start: 2019-02-28 | End: 2019-02-28

## 2019-02-28 RX ORDER — FUROSEMIDE 40 MG
40 TABLET ORAL ONCE
Qty: 0 | Refills: 0 | Status: COMPLETED | OUTPATIENT
Start: 2019-02-28 | End: 2019-02-28

## 2019-02-28 RX ORDER — METHOTREXATE 2.5 MG/1
9 TABLET ORAL ONCE
Qty: 0 | Refills: 0 | Status: COMPLETED | OUTPATIENT
Start: 2019-02-28 | End: 2019-02-28

## 2019-02-28 RX ORDER — MAGNESIUM SULFATE 500 MG/ML
2 VIAL (ML) INJECTION ONCE
Qty: 0 | Refills: 0 | Status: COMPLETED | OUTPATIENT
Start: 2019-02-28 | End: 2019-02-28

## 2019-02-28 RX ORDER — MAGNESIUM OXIDE 400 MG ORAL TABLET 241.3 MG
400 TABLET ORAL
Qty: 0 | Refills: 0 | Status: DISCONTINUED | OUTPATIENT
Start: 2019-02-28 | End: 2019-03-18

## 2019-02-28 RX ORDER — TACROLIMUS 5 MG/1
1.5 CAPSULE ORAL EVERY 12 HOURS
Qty: 0 | Refills: 0 | Status: DISCONTINUED | OUTPATIENT
Start: 2019-02-28 | End: 2019-03-03

## 2019-02-28 RX ADMIN — Medication 325 MILLIGRAM(S): at 17:05

## 2019-02-28 RX ADMIN — PANTOPRAZOLE SODIUM 40 MILLIGRAM(S): 20 TABLET, DELAYED RELEASE ORAL at 06:41

## 2019-02-28 RX ADMIN — Medication 100 MILLIGRAM(S): at 21:30

## 2019-02-28 RX ADMIN — Medication 10 MILLILITER(S): at 21:29

## 2019-02-28 RX ADMIN — MAGNESIUM OXIDE 400 MG ORAL TABLET 400 MILLIGRAM(S): 241.3 TABLET ORAL at 12:25

## 2019-02-28 RX ADMIN — MEDROXYPROGESTERONE ACETATE 10 MILLIGRAM(S): 150 INJECTION, SUSPENSION, EXTENDED RELEASE INTRAMUSCULAR at 12:24

## 2019-02-28 RX ADMIN — Medication 10 MILLILITER(S): at 23:11

## 2019-02-28 RX ADMIN — TACROLIMUS 1.5 MILLIGRAM(S): 5 CAPSULE ORAL at 17:03

## 2019-02-28 RX ADMIN — Medication 325 MILLIGRAM(S): at 05:47

## 2019-02-28 RX ADMIN — Medication 1 LOZENGE: at 08:12

## 2019-02-28 RX ADMIN — Medication 400 MILLIGRAM(S): at 05:47

## 2019-02-28 RX ADMIN — METHOTREXATE 4.32 MILLIGRAM(S): 2.5 TABLET ORAL at 16:29

## 2019-02-28 RX ADMIN — Medication 650 MILLIGRAM(S): at 16:43

## 2019-02-28 RX ADMIN — URSODIOL 300 MILLIGRAM(S): 250 TABLET, FILM COATED ORAL at 17:03

## 2019-02-28 RX ADMIN — Medication 650 MILLIGRAM(S): at 17:05

## 2019-02-28 RX ADMIN — Medication 10 MILLILITER(S): at 17:05

## 2019-02-28 RX ADMIN — Medication 5280 MICROGRAM(S): at 17:05

## 2019-02-28 RX ADMIN — SODIUM CHLORIDE 150 MILLILITER(S): 9 INJECTION, SOLUTION INTRAVENOUS at 05:48

## 2019-02-28 RX ADMIN — Medication 1 TABLET(S): at 12:25

## 2019-02-28 RX ADMIN — Medication 1 LOZENGE: at 23:11

## 2019-02-28 RX ADMIN — MAGNESIUM OXIDE 400 MG ORAL TABLET 400 MILLIGRAM(S): 241.3 TABLET ORAL at 10:20

## 2019-02-28 RX ADMIN — Medication 5 MILLILITER(S): at 17:04

## 2019-02-28 RX ADMIN — ONDANSETRON 8 MILLIGRAM(S): 8 TABLET, FILM COATED ORAL at 05:47

## 2019-02-28 RX ADMIN — Medication 100 MILLIGRAM(S): at 05:47

## 2019-02-28 RX ADMIN — URSODIOL 300 MILLIGRAM(S): 250 TABLET, FILM COATED ORAL at 08:12

## 2019-02-28 RX ADMIN — VORICONAZOLE 200 MILLIGRAM(S): 10 INJECTION, POWDER, LYOPHILIZED, FOR SOLUTION INTRAVENOUS at 05:47

## 2019-02-28 RX ADMIN — Medication 5 MILLILITER(S): at 23:11

## 2019-02-28 RX ADMIN — Medication 5 MILLILITER(S): at 08:13

## 2019-02-28 RX ADMIN — ONDANSETRON 8 MILLIGRAM(S): 8 TABLET, FILM COATED ORAL at 17:05

## 2019-02-28 RX ADMIN — MAGNESIUM OXIDE 400 MG ORAL TABLET 400 MILLIGRAM(S): 241.3 TABLET ORAL at 17:03

## 2019-02-28 RX ADMIN — Medication 10 MILLILITER(S): at 12:25

## 2019-02-28 RX ADMIN — CHLORHEXIDINE GLUCONATE 1 APPLICATION(S): 213 SOLUTION TOPICAL at 10:21

## 2019-02-28 RX ADMIN — HEPARIN SODIUM 3.66 UNIT(S)/HR: 5000 INJECTION INTRAVENOUS; SUBCUTANEOUS at 05:48

## 2019-02-28 RX ADMIN — Medication 500 MILLIGRAM(S): at 17:03

## 2019-02-28 RX ADMIN — MONTELUKAST 10 MILLIGRAM(S): 4 TABLET, CHEWABLE ORAL at 12:24

## 2019-02-28 RX ADMIN — ATOVAQUONE 750 MILLIGRAM(S): 750 SUSPENSION ORAL at 05:47

## 2019-02-28 RX ADMIN — Medication 1 MILLIGRAM(S): at 12:25

## 2019-02-28 RX ADMIN — Medication 325 MILLIGRAM(S): at 21:30

## 2019-02-28 RX ADMIN — Medication 400 MILLIGRAM(S): at 17:05

## 2019-02-28 RX ADMIN — Medication 250 MILLIGRAM(S): at 12:24

## 2019-02-28 RX ADMIN — Medication 10 MILLILITER(S): at 08:12

## 2019-02-28 RX ADMIN — Medication 1 LOZENGE: at 12:25

## 2019-02-28 RX ADMIN — Medication 5 MILLILITER(S): at 12:25

## 2019-02-28 RX ADMIN — VORICONAZOLE 200 MILLIGRAM(S): 10 INJECTION, POWDER, LYOPHILIZED, FOR SOLUTION INTRAVENOUS at 17:03

## 2019-02-28 RX ADMIN — TACROLIMUS 2 MILLIGRAM(S): 5 CAPSULE ORAL at 05:48

## 2019-02-28 RX ADMIN — PREGABALIN 1000 MICROGRAM(S): 225 CAPSULE ORAL at 12:25

## 2019-02-28 RX ADMIN — Medication 1 LOZENGE: at 17:05

## 2019-02-28 RX ADMIN — SENNA PLUS 2 TABLET(S): 8.6 TABLET ORAL at 21:30

## 2019-02-28 RX ADMIN — Medication 400 MILLIGRAM(S): at 21:30

## 2019-02-28 RX ADMIN — ATOVAQUONE 750 MILLIGRAM(S): 750 SUSPENSION ORAL at 17:03

## 2019-02-28 RX ADMIN — Medication 500 MILLIGRAM(S): at 05:47

## 2019-02-28 RX ADMIN — Medication 5 MILLILITER(S): at 21:28

## 2019-02-28 RX ADMIN — Medication 400 UNIT(S): at 12:24

## 2019-02-28 RX ADMIN — Medication 20 MILLIGRAM(S): at 06:42

## 2019-02-28 RX ADMIN — Medication 1 LOZENGE: at 21:29

## 2019-02-28 RX ADMIN — Medication 50 GRAM(S): at 10:21

## 2019-02-28 RX ADMIN — Medication 40 MILLIGRAM(S): at 10:19

## 2019-02-28 NOTE — PROGRESS NOTE ADULT - SUBJECTIVE AND OBJECTIVE BOX
HPI: 33F with PMH of AML/MDS, Hinduism, here for allo PSCT. Diagnosed during bloodwork for a preop R ankle ligament repair, and managed with idarubicin c/b DIC, neutropenic fever, and retinal hemorrhage; had salvage therapy; further findings raised the question of MDS over AML. Sister is donor. Planned for SCT 2/27/19.    INTERVAL EVENTS: states feeling well D#1 post-transplant, has some fatigue and appetite loss    ADVANCE DIRECTIVES:    DNR [ ]  MOLST  [ ]    Living Will  [ ]   DECISION MAKER(s):  [ ] Health Care Proxy(s)  [ ] Surrogate(s)  [ ] Guardian           Name(s) and Contact(s):     BASELINE (I)ADL(s) (prior to admission):  Angelina: [ ]Total  [ ] Moderate [ ]Dependent    Allergies    No Known Allergies    Intolerances    MEDICATIONS  (STANDING):  acetaminophen   Tablet .. 650 milliGRAM(s) Oral every 6 hours  acetaminophen   Tablet .. 650 milliGRAM(s) Oral <User Schedule>  acyclovir   Oral Tab/Cap 400 milliGRAM(s) Oral three times a day  ascorbic acid 250 milliGRAM(s) Oral daily  atovaquone Suspension 750 milliGRAM(s) Oral every 12 hours  Biotene Dry Mouth Oral Rinse 5 milliLiter(s) Swish and Spit five times a day  chlorhexidine 4% Liquid 1 Application(s) Topical <User Schedule>  cholecalciferol 400 Unit(s) Oral daily  ciprofloxacin     Tablet 500 milliGRAM(s) Oral every 12 hours  clotrimazole Lozenge 1 Lozenge Oral five times a day  cyanocobalamin 1000 MICROGram(s) Oral daily  diphenhydrAMINE   Injectable 25 milliGRAM(s) IV Push every 3 weeks  docusate sodium 100 milliGRAM(s) Oral three times a day  ferrous    sulfate 325 milliGRAM(s) Oral three times a day  folic acid 1 milliGRAM(s) Oral daily  heparin  Infusion 366 Unit(s)/Hr (3.66 mL/Hr) IV Continuous <Continuous>  immune   globulin 10% (GAMMAGARD) IVPB 20 Gram(s) IV Intermittent <User Schedule>  magnesium oxide 400 milliGRAM(s) Oral three times a day with meals  medroxyPROGESTERone 10 milliGRAM(s) Oral daily  methotrexate Injection (eMAR) 9 milliGRAM(s) IV Push Chemo once  montelukast 10 milliGRAM(s) Oral daily  multivitamin 1 Tablet(s) Oral daily  ondansetron Injectable 8 milliGRAM(s) IV Push every 8 hours  palifermin Injectable  (eMAR) 5280 MICROGram(s) IV Push every 24 hours  pantoprazole    Tablet 40 milliGRAM(s) Oral before breakfast  phytonadione   Solution 10 milliGRAM(s) Oral every other day  senna 2 Tablet(s) Oral at bedtime  sodium bicarbonate Mouth Rinse 10 milliLiter(s) Swish and Spit five times a day  sodium chloride 0.45% 1000 milliLiter(s) (150 mL/Hr) IV Continuous <Continuous>  sodium chloride 0.9%. 1000 milliLiter(s) (20 mL/Hr) IV Continuous <Continuous>  tacrolimus 1.5 milliGRAM(s) Oral every 12 hours  ursodiol Capsule 300 milliGRAM(s) Oral two times a day with meals  voriconazole 200 milliGRAM(s) Oral every 12 hours    MEDICATIONS  (PRN):  acetaminophen   Tablet .. 650 milliGRAM(s) Oral every 6 hours PRN Temp greater or equal to 38C (100.4F), Mild Pain (1 - 3)  artificial  tears Solution 1 Drop(s) Both EYES three times a day PRN Dry Eyes  diphenhydrAMINE   Injectable 25 milliGRAM(s) IV Push every 4 hours PRN Allergy symptoms  metoclopramide Injectable 10 milliGRAM(s) IV Push every 6 hours PRN Nausea and/or Vomiting  sodium chloride 0.65% Nasal 1 Spray(s) Both Nostrils five times a day PRN Nasal Congestion  sodium chloride 0.9% lock flush 10 milliLiter(s) IV Push every 1 hour PRN Pre/post blood products, medications, blood draw, and to maintain line patency    PRESENT SYMPTOMS:   Source if other than patient:  [ ]Family   [ ]Team     Pain (Impact on QOL):    Location -         Minimal acceptable level (0-10 scale):                    Aggravating factors -  Quality -  Radiation -  Severity (0-10 scale) -    Timing -    PAIN AD Score:     [email or web address] (press ctrl +  left click to view)    Dyspnea:                           [ ]Mild [ ]Moderate [ ]Severe  Anxiety:                             [ ]Mild [ ]Moderate [ ]Severe  Fatigue:                             [X ]Mild [ ]Moderate [ ]Severe  Nausea:                             [ ]Mild [ ]Moderate [ ]Severe  Loss of appetite:              [X ]Mild [ ]Moderate [ ]Severe  Constipation:                    [ ]Mild [ ]Moderate [ ]Severe    Other Symptoms:  [X ]All other review of systems negative   [ ]Unable to obtain due to poor mentation     Karnofsky Performance Score/Palliative Performance Status Version 2:         %    PHYSICAL EXAM:  Vital Signs Last 24 Hrs  T(C): 36.9 (28 Feb 2019 14:15), Max: 37.6 (27 Feb 2019 17:27)  T(F): 98.5 (28 Feb 2019 14:15), Max: 99.6 (27 Feb 2019 17:27)  HR: 94 (28 Feb 2019 14:15) (90 - 119)  BP: 128/87 (28 Feb 2019 14:15) (117/80 - 145/-)  BP(mean): 85 (28 Feb 2019 05:50) (85 - 85)  RR: 18 (28 Feb 2019 14:15) (18 - 18)  SpO2: 100% (28 Feb 2019 14:15) (99% - 100%)    GENERAL:  [X ]Alert  [ ]Oriented x   [ ]Lethargic  [ ]Cachexia  [ ]Unarousable  [ ]Verbal  [ ]Non-Verbal    Behavioral:   [ ] Anxiety  [ ] Delirium [ ] Agitation [ ] Other    HEENT:  [X ]Normal   [ ]Dry mouth   [ ]ET Tube/Trach  [ ]Oral lesions    PULMONARY:   [ ]Clear [ ]Tachypnea  [ ]Audible excessive secretions   [ ]Rhonchi        [ ]Right [ ]Left [ ]Bilateral  [ ]Crackles        [ ]Right [ ]Left [ ]Bilateral  [ ]Wheezing     [ ]Right [ ]Left [ ]Bilateral    CARDIOVASCULAR:    [ ]Regular [ ]Irregular [ ]Tachy  [ ]Lnace [ ]Murmur [ ]Other    GASTROINTESTINAL:  [ ]Soft  [ ]Distended   [ ]+BS  [ ]Non tender [ ]Tender  [ ]PEG [ ]OGT/ NGT  Last BM:     GENITOURINARY:  [ ]Normal [ ] Incontinent   [ ]Oliguria/Anuria   [ ]Noonan    MUSCULOSKELETAL:   [X ]Normal   [ ]Weakness  [ ]Bed/Wheelchair bound [ ]Edema    NEUROLOGIC:   [X ]No focal deficits  [ ] Cognitive impairment  [ ] Dysphagia [ ]Dysarthria [ ] Paresis [ ]Other     SKIN:   [ ]Normal   [ ]Pressure ulcer(s)  [ ]Rash    CRITICAL CARE:  [ ] Shock Present  [ ]Septic [ ]Cardiogenic [ ]Neurologic [ ]Hypovolemic  [ ]  Vasopressors [ ]  Inotropes   [ ] Respiratory failure present  [ ] Acute  [ ] Chronic [ ] Hypoxic  [ ] Hypercarbic [ ] Other  [ ] Other organ failure     LABS: reviewed                                   11.3   0.9   )-----------( 285      ( 28 Feb 2019 06:57 )             32.5     02-28    140  |  107  |  5<L>  ----------------------------<  99  4.0   |  21<L>  |  0.71    Ca    9.0      28 Feb 2019 06:57  Phos  2.7     02-28  Mg     1.4     02-28        RADIOLOGY & ADDITIONAL STUDIES: none on this admission thus far    PROTEIN CALORIE MALNUTRITION:   [ ] PPSV2 < or = to 30% [ ] significant weight loss  [ ] poor nutritional intake [ ] catabolic state [ ] anasarca     Albumin, Serum: 4.2 g/dL (02-20-19 @ 13:04)  Artificial Nutrition [ ]     REFERRALS:   [ ]Chaplaincy  [ ] Hospice  [ ]Child Life  [ ]Social Work  [ ]Case management [ ]Holistic Therapy     Goals of Care Discussion Document:     Saeid Pal MD  Palliative Medicine  office: [phone] | [phone]  pager: [phone]

## 2019-02-28 NOTE — CHART NOTE - NSCHARTNOTEFT_GEN_A_CORE
Pt seen for nutrition follow up. Pt with AML day +1 S/P allogeneic PBSCT.     Source: Patient [ x]    Family [ ]     other [ ]    Diet : Regular diet with glutasolve 1 packet daily       Patient denies N+V, pt reports loose stool usually 2-3 episodes daily      PO intake:  < /=50% [ ] 50-75% [ x]   % [ ]  other : Pt reports appetite has decreased, pt estimates consuming ~50% of meals of late, per flowsheets % of meals. Pt enjoys milkshakes though Shake It Up program, had most of shake yesterday. Pt requesting ensure supplements, communicated with team.       Current Weight: 193.7 lbs (2/20), 195.9 lbs (2/28), weight slightly elevated from admission   % Weight Change    Pertinent Medications: MEDICATIONS  (STANDING):  acetaminophen   Tablet .. 650 milliGRAM(s) Oral every 6 hours  acetaminophen   Tablet .. 650 milliGRAM(s) Oral <User Schedule>  acyclovir   Oral Tab/Cap 400 milliGRAM(s) Oral three times a day  ascorbic acid 250 milliGRAM(s) Oral daily  atovaquone Suspension 750 milliGRAM(s) Oral every 12 hours  Biotene Dry Mouth Oral Rinse 5 milliLiter(s) Swish and Spit five times a day  chlorhexidine 4% Liquid 1 Application(s) Topical <User Schedule>  cholecalciferol 400 Unit(s) Oral daily  ciprofloxacin     Tablet 500 milliGRAM(s) Oral every 12 hours  clotrimazole Lozenge 1 Lozenge Oral five times a day  cyanocobalamin 1000 MICROGram(s) Oral daily  diphenhydrAMINE   Injectable 25 milliGRAM(s) IV Push every 3 weeks  docusate sodium 100 milliGRAM(s) Oral three times a day  ferrous    sulfate 325 milliGRAM(s) Oral three times a day  folic acid 1 milliGRAM(s) Oral daily  furosemide   Injectable 40 milliGRAM(s) IV Push once  heparin  Infusion 366 Unit(s)/Hr (3.66 mL/Hr) IV Continuous <Continuous>  immune   globulin 10% (GAMMAGARD) IVPB 20 Gram(s) IV Intermittent <User Schedule>  magnesium oxide 400 milliGRAM(s) Oral three times a day with meals  magnesium sulfate  IVPB 2 Gram(s) IV Intermittent once  medroxyPROGESTERone 10 milliGRAM(s) Oral daily  methotrexate Injection (eMAR) 9 milliGRAM(s) IV Push Chemo once  montelukast 10 milliGRAM(s) Oral daily  multivitamin 1 Tablet(s) Oral daily  ondansetron Injectable 8 milliGRAM(s) IV Push every 8 hours  palifermin Injectable  (eMAR) 5280 MICROGram(s) IV Push every 24 hours  pantoprazole    Tablet 40 milliGRAM(s) Oral before breakfast  phytonadione   Solution 10 milliGRAM(s) Oral every other day  senna 2 Tablet(s) Oral at bedtime  sodium bicarbonate Mouth Rinse 10 milliLiter(s) Swish and Spit five times a day  sodium chloride 0.45% 1000 milliLiter(s) (150 mL/Hr) IV Continuous <Continuous>  sodium chloride 0.9%. 1000 milliLiter(s) (20 mL/Hr) IV Continuous <Continuous>  tacrolimus 2 milliGRAM(s) Oral every 12 hours  ursodiol Capsule 300 milliGRAM(s) Oral two times a day with meals  voriconazole 200 milliGRAM(s) Oral every 12 hours    MEDICATIONS  (PRN):  acetaminophen   Tablet .. 650 milliGRAM(s) Oral every 6 hours PRN Temp greater or equal to 38C (100.4F), Mild Pain (1 - 3)  artificial  tears Solution 1 Drop(s) Both EYES three times a day PRN Dry Eyes  diphenhydrAMINE   Injectable 25 milliGRAM(s) IV Push every 4 hours PRN Allergy symptoms  metoclopramide Injectable 10 milliGRAM(s) IV Push every 6 hours PRN Nausea and/or Vomiting  sodium chloride 0.65% Nasal 1 Spray(s) Both Nostrils five times a day PRN Nasal Congestion  sodium chloride 0.9% lock flush 10 milliLiter(s) IV Push every 1 hour PRN Pre/post blood products, medications, blood draw, and to maintain line patency    Pertinent Labs:  02-28 Na140 mmol/L Glu 99 mg/dL K+ 4.0 mmol/L Cr  0.71 mg/dL BUN 5 mg/dL<L> 02-28 Phos 2.7 mg/dL 02-28 Alb 3.4 g/dL      Skin: No edema, skin intact    Estimated Needs:   [x ] no change since previous assessment  [ ] recalculated:       Previous Nutrition Diagnosis:     [x ] Predicted suboptimal energy intake        Nutrition Diagnosis is [ x] ongoing  [ ] resolved [ ] not applicable          New Nutrition Diagnosis: [x ] not applicable          Interventions:     Recommend    1. Continue regular diet with glutasolve 1 packet daily, consider adding ensure enlive 3 daily per pt request (350 Kcal, 20g protein per 8 oz serving)  2. Continue to encourage po intake and obtain/honor food preferences as able, reviewed importance of adequate po intake with overall goal for weight maintenance, discussed ordering nutrient dense snacks with meal trays to save for in between meals to promote smaller, more frequent meals.        Monitoring and Evaluation:     [ x] PO intake [ x] Tolerance to diet prescription [ x] weights [ x] follow up per protocol    [ ] other:

## 2019-02-28 NOTE — PROGRESS NOTE ADULT - ATTENDING COMMENTS
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 Pt is Alevism, and does not accept blood products for Bahai reasons   Day 0 - pbsct today; continue transplant hydration for 24 hours post infusion of cells   Begin Zarxio on day +12  Continue Acyclovir, Mepron, Vfend prophylaxis; on Cipro for neutropenia  Continue VOD prophylaxis with Actigall, glutamine supplement, low dose heparin drip(will D/C when platelet count <=75K)  Labs every other day   Continue iron, folic acid, vitamin K, vitamin B-12, vitamin C as per standard practice for bloodless transplant  Antiemetics, mouth care, skin care   Hold bowel regimen  OOB/ambulate 34 y/o F with h/o AML(now with 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 Pt is Yarsani, and does not accept blood products for Judaism reasons   Day + 1 -  continue transplant hydration for 24 hours post infusion of cells   Begin Zarxio on day +12  Continue Acyclovir, Mepron, Vfend prophylaxis; on Cipro for neutropenia  Continue VOD prophylaxis with Actigall, glutamine supplement, low dose heparin drip(will D/C when platelet count <=75K)  Labs every other day   Continue iron, folic acid, vitamin K, vitamin B-12, vitamin C as per standard practice for bloodless transplant  Antiemetics, mouth care, skin care   Hold bowel regimen  OOB/ambulate 34 y/o F with h/o AML(now with 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 Pt is Amish, and does not accept blood products for Scientology reasons   Day + 1 -  continue transplant hydration for 24 hours post infusion of cells   Begin Zarxio on day +12  Continue Acyclovir, Mepron, Vfend prophylaxis; on Cipro for neutropenia  Continue VOD prophylaxis with Actigall, glutamine supplement, low dose heparin drip(will D/C when platelet count <=75K)  Labs every other day   Continue iron, folic acid, vitamin K, vitamin B-12, vitamin C as per standard practice for bloodless transplant  Antiemetics, mouth care, skin care   Bowel regimen as needed  OOB/ambulate

## 2019-02-28 NOTE — PROGRESS NOTE ADULT - SUBJECTIVE AND OBJECTIVE BOX
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      S: Patient seen and examined with HPC Transplant Team:   + loose stool this morning    Denies mouth / tongue / throat pain, dyspnea, cough, nausea, vomiting, diarrhea, abdominal pain       O: Vitals:   Vital Signs Last 24 Hrs  T(C): 37 (2019 05:50), Max: 37.6 (2019 17:27)  T(F): 98.6 (2019 05:50), Max: 99.6 (2019 17:27)  HR: 90 (2019 05:50) (85 - 110)  BP: 145/- (2019 05:50) (117/80 - 155/97)  BP(mean): 85 (2019 05:50) (85 - 85)  RR: 18 (2019 05:50) (17 - 19)  SpO2: 100% (2019 05:50) (99% - 100%)    Admit weight: 88.6 kg  Daily     Daily Weight in k.2 (2019 10:00)    Intake / Output:    @ 07:01  -   @ 07:00  --------------------------------------------------------  IN: 5192 mL / OUT: 3725 mL / NET: 1467 mL    PE:   Oropharynx: no erythema or ulcerations  Oral Mucositis:              -                                         Grade: n/a  CVS: S1, S2 RRR   Lungs: CTA throughout bilaterally   Abdomen:+ BS x 4, soft, NT, ND   Extremities: no edema   Gastric Mucositis:       -                                           Grade: n/a  Intestinal Mucositis:     -                                         Grade: n/a  Skin: no rash   TLC: PICC line TLC   Neuro: A&O x 3   Pain: 0    Labs:               Cultures: no recent      Meds:   Antimicrobials:   acyclovir   Oral Tab/Cap 400 milliGRAM(s) Oral three times a day  atovaquone Suspension 750 milliGRAM(s) Oral every 12 hours  ciprofloxacin     Tablet 500 milliGRAM(s) Oral every 12 hours  clotrimazole Lozenge 1 Lozenge Oral five times a day  voriconazole 200 milliGRAM(s) Oral every 12 hours      Heme / Onc:   heparin  Infusion 366 Unit(s)/Hr IV Continuous <Continuous>      GI:  docusate sodium 100 milliGRAM(s) Oral three times a day  pantoprazole    Tablet 40 milliGRAM(s) Oral before breakfast  senna 2 Tablet(s) Oral at bedtime  sodium bicarbonate Mouth Rinse 10 milliLiter(s) Swish and Spit five times a day  ursodiol Capsule 300 milliGRAM(s) Oral two times a day with meals        Immunologic:   immune   globulin 10% (GAMMAGARD) IVPB 20 Gram(s) IV Intermittent <User Schedule>  tacrolimus 2 milliGRAM(s) Oral every 12 hours      Other medications:   acetaminophen   Tablet .. 650 milliGRAM(s) Oral every 6 hours  acetaminophen   Tablet .. 650 milliGRAM(s) Oral <User Schedule>  ascorbic acid 250 milliGRAM(s) Oral daily  Biotene Dry Mouth Oral Rinse 5 milliLiter(s) Swish and Spit five times a day  chlorhexidine 4% Liquid 1 Application(s) Topical <User Schedule>  cholecalciferol 400 Unit(s) Oral daily  cyanocobalamin 1000 MICROGram(s) Oral daily  diphenhydrAMINE   Injectable 25 milliGRAM(s) IV Push every 3 weeks  ferrous    sulfate 325 milliGRAM(s) Oral three times a day  folic acid 1 milliGRAM(s) Oral daily  medroxyPROGESTERone 10 milliGRAM(s) Oral daily  montelukast 10 milliGRAM(s) Oral daily  multivitamin 1 Tablet(s) Oral daily  ondansetron Injectable 8 milliGRAM(s) IV Push every 8 hours  palifermin Injectable  (eMAR) 5280 MICROGram(s) IV Push every 24 hours  phytonadione   Solution 10 milliGRAM(s) Oral every other day  sodium chloride 0.45% 1000 milliLiter(s) IV Continuous <Continuous>  sodium chloride 0.9%. 1000 milliLiter(s) IV Continuous <Continuous>      PRN:   acetaminophen   Tablet .. 650 milliGRAM(s) Oral every 6 hours PRN  artificial  tears Solution 1 Drop(s) Both EYES three times a day PRN  diphenhydrAMINE   Injectable 25 milliGRAM(s) IV Push every 4 hours PRN  metoclopramide Injectable 10 milliGRAM(s) IV Push every 6 hours PRN  sodium chloride 0.65% Nasal 1 Spray(s) Both Nostrils five times a day PRN  sodium chloride 0.9% lock flush 10 milliLiter(s) IV Push every 1 hour PRN      A/P:  33 year old female with a history of AML  Pre  :  Allogeneic PBSCT day +1  HPC transplant today - continue transplant hydration for 24 hours post infusion of cells   Neutropenic - started Cipro on ; if T >/= 38C pan cx, CXR and change Cipro to Cefepime.   Labs every other day ; continue vitamin C, vitamin B, folic acid, vitamin K & iron supplementation. Increased vitamin K to every other day as per standard guidelines for bloodless transplant.     1. Infectious Disease:   acyclovir   Oral Tab/Cap 400 milliGRAM(s) Oral three times a day  atovaquone Suspension 750 milliGRAM(s) Oral every 12 hours  ciprofloxacin     Tablet 500 milliGRAM(s) Oral every 12 hours  clotrimazole Lozenge 1 Lozenge Oral five times a day  voriconazole 200 milliGRAM(s) Oral every 12 hours    2. VOD Prophylaxis: Actigall, Glutamine, Heparin (dosed at 100 units / kg / day)     3. GI Prophylaxis:    pantoprazole    Tablet 40 milliGRAM(s) Oral before breakfast    4. Mouthcare - NS / NaHCO3 rinses, Mycelex, Caphosol; Skin care     5. GVHD prophylaxis -  tacrolimus 2 milliGRAM(s) Oral every 12 hours    6. Transfuse & replete electrolytes prn - bloodless transplant   phytonadione   Solution 10 milliGRAM(s) Oral every other day  ferrous    sulfate 325 milliGRAM(s) Oral three times a day  folic acid 1 milliGRAM(s) Oral daily    7. IV hydration, daily weights, strict I&O, prn diuresis     8. PO intake as tolerated, nutrition follow up as needed, MVI, folic acid     9. Antiemetics, anti-diarrhea medications:   LORazepam   Injectable 1 milliGRAM(s) IV Push every 6 hours PRN  metoclopramide Injectable 10 milliGRAM(s) IV Push every 6 hours PRN  ondansetron Injectable 8 milliGRAM(s) IV Push every 8 hours  aprepitant 80 milliGRAM(s) Oral daily    10. OOB as tolerated, physical therapy consult if needed     11. Monitor coags / fibrinogen 2x week, vitamin K as needed     12. Monitor closely for clinical changes, monitor for fevers     13. Emotional support provided, plan of care discussed and questions addressed     14. Patient education done regarding plan of care, restrictions and discharge planning     15. Continue regular social work input     I have written the above note for Dr. Larsen who performed service with me in the room.   Racquel Leiva NP-C ([phone])    I have seen and examined patient with NP, I agree with above note as scribed. 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    S: Patient seen and examined with HPC Transplant Team:   + loose stool this morning    Denies mouth / tongue / throat pain, dyspnea, cough, nausea, vomiting, diarrhea, abdominal pain       O: Vitals:   Vital Signs Last 24 Hrs  T(C): 37 (2019 05:50), Max: 37.6 (2019 17:27)  T(F): 98.6 (2019 05:50), Max: 99.6 (2019 17:27)  HR: 90 (2019 05:50) (85 - 110)  BP: 145/- (2019 05:50) (117/80 - 155/97)  BP(mean): 85 (2019 05:50) (85 - 85)  RR: 18 (2019 05:50) (17 - 19)  SpO2: 100% (2019 05:50) (99% - 100%)    Admit weight: 88.6 kg  Daily Weight in k.2 (2019 10:00)  Today's weight:     Intake / Output:    @ 07:01  -   @ 07:00  --------------------------------------------------------  IN: 5192 mL / OUT: 3725 mL / NET: 1467 mL    PE:   Oropharynx: no erythema or ulcerations  Oral Mucositis:              -                                         Grade: n/a  CVS: S1, S2 RRR   Lungs: CTA throughout bilaterally   Abdomen:+ BS x 4, soft, NT, ND   Extremities: no edema   Gastric Mucositis:       -                                           Grade: n/a  Intestinal Mucositis:     -                                         Grade: n/a  Skin: no rash   TLC: PICC line TLC   Neuro: A&O x 3   Pain: 0    Labs:       Meds:   Antimicrobials:   acyclovir   Oral Tab/Cap 400 milliGRAM(s) Oral three times a day  atovaquone Suspension 750 milliGRAM(s) Oral every 12 hours  ciprofloxacin     Tablet 500 milliGRAM(s) Oral every 12 hours  clotrimazole Lozenge 1 Lozenge Oral five times a day  voriconazole 200 milliGRAM(s) Oral every 12 hours      Heme / Onc:   heparin  Infusion 366 Unit(s)/Hr IV Continuous <Continuous>      GI:  docusate sodium 100 milliGRAM(s) Oral three times a day  pantoprazole    Tablet 40 milliGRAM(s) Oral before breakfast  senna 2 Tablet(s) Oral at bedtime  sodium bicarbonate Mouth Rinse 10 milliLiter(s) Swish and Spit five times a day  ursodiol Capsule 300 milliGRAM(s) Oral two times a day with meals        Immunologic:   immune   globulin 10% (GAMMAGARD) IVPB 20 Gram(s) IV Intermittent <User Schedule>  tacrolimus 2 milliGRAM(s) Oral every 12 hours      Other medications:   acetaminophen   Tablet .. 650 milliGRAM(s) Oral every 6 hours  acetaminophen   Tablet .. 650 milliGRAM(s) Oral <User Schedule>  ascorbic acid 250 milliGRAM(s) Oral daily  Biotene Dry Mouth Oral Rinse 5 milliLiter(s) Swish and Spit five times a day  chlorhexidine 4% Liquid 1 Application(s) Topical <User Schedule>  cholecalciferol 400 Unit(s) Oral daily  cyanocobalamin 1000 MICROGram(s) Oral daily  diphenhydrAMINE   Injectable 25 milliGRAM(s) IV Push every 3 weeks  ferrous    sulfate 325 milliGRAM(s) Oral three times a day  folic acid 1 milliGRAM(s) Oral daily  medroxyPROGESTERone 10 milliGRAM(s) Oral daily  montelukast 10 milliGRAM(s) Oral daily  multivitamin 1 Tablet(s) Oral daily  ondansetron Injectable 8 milliGRAM(s) IV Push every 8 hours  palifermin Injectable  (eMAR) 5280 MICROGram(s) IV Push every 24 hours  phytonadione   Solution 10 milliGRAM(s) Oral every other day  sodium chloride 0.45% 1000 milliLiter(s) IV Continuous <Continuous>  sodium chloride 0.9%. 1000 milliLiter(s) IV Continuous <Continuous>      PRN:   acetaminophen   Tablet .. 650 milliGRAM(s) Oral every 6 hours PRN  artificial  tears Solution 1 Drop(s) Both EYES three times a day PRN  diphenhydrAMINE   Injectable 25 milliGRAM(s) IV Push every 4 hours PRN  metoclopramide Injectable 10 milliGRAM(s) IV Push every 6 hours PRN  sodium chloride 0.65% Nasal 1 Spray(s) Both Nostrils five times a day PRN  sodium chloride 0.9% lock flush 10 milliLiter(s) IV Push every 1 hour PRN      A/P:  33 year old female with a history of AML  Pre  :  Allogeneic PBSCT day +1  HPC transplant today - continue transplant hydration for 24 hours post infusion of cells   Neutropenic - started Cipro on ; if T >/= 38C pan cx, CXR and change Cipro to Cefepime.   Labs every other day ; continue vitamin C, vitamin B, folic acid, vitamin K & iron supplementation. Increased vitamin K to every other day as per standard guidelines for bloodless transplant.     1. Infectious Disease:   acyclovir   Oral Tab/Cap 400 milliGRAM(s) Oral three times a day  atovaquone Suspension 750 milliGRAM(s) Oral every 12 hours  ciprofloxacin     Tablet 500 milliGRAM(s) Oral every 12 hours  clotrimazole Lozenge 1 Lozenge Oral five times a day  voriconazole 200 milliGRAM(s) Oral every 12 hours    2. VOD Prophylaxis: Actigall, Glutamine, Heparin (dosed at 100 units / kg / day)     3. GI Prophylaxis:    pantoprazole    Tablet 40 milliGRAM(s) Oral before breakfast    4. Mouthcare - NS / NaHCO3 rinses, Mycelex, Caphosol; Skin care     5. GVHD prophylaxis -  tacrolimus 2 milliGRAM(s) Oral every 12 hours    6. Transfuse & replete electrolytes prn - bloodless transplant   phytonadione   Solution 10 milliGRAM(s) Oral every other day  ferrous    sulfate 325 milliGRAM(s) Oral three times a day  folic acid 1 milliGRAM(s) Oral daily    7. IV hydration, daily weights, strict I&O, prn diuresis   Lasix 40mg IV x 1    8. PO intake as tolerated, nutrition follow up as needed, MVI, folic acid     9. Antiemetics, anti-diarrhea medications:   LORazepam   Injectable 1 milliGRAM(s) IV Push every 6 hours PRN  metoclopramide Injectable 10 milliGRAM(s) IV Push every 6 hours PRN  ondansetron Injectable 8 milliGRAM(s) IV Push every 8 hours  aprepitant 80 milliGRAM(s) Oral daily    10. OOB as tolerated, physical therapy consult if needed     11. Monitor coags / fibrinogen 2x week, vitamin K as needed     12. Monitor closely for clinical changes, monitor for fevers     13. Emotional support provided, plan of care discussed and questions addressed     14. Patient education done regarding plan of care, restrictions and discharge planning     15. Continue regular social work input     I have written the above note for Dr. Larsen who performed service with me in the room.   Racquel Leiva NP-C ([phone])    I have seen and examined patient with NP, I agree with above note as scribed. 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    S: Patient seen and examined with HPC Transplant Team:   + general fatigue    Denies mouth / tongue / throat pain, dyspnea, cough, nausea, vomiting, diarrhea, abdominal pain       O: Vitals:   Vital Signs Last 24 Hrs  T(C): 37 (2019 05:50), Max: 37.6 (2019 17:27)  T(F): 98.6 (2019 05:50), Max: 99.6 (2019 17:27)  HR: 90 (2019 05:50) (85 - 110)  BP: 145/- (2019 05:50) (117/80 - 155/97)  BP(mean): 85 (2019 05:50) (85 - 85)  RR: 18 (2019 05:50) (17 - 19)  SpO2: 100% (2019 05:50) (99% - 100%)    Admit weight: 88.6 kg  Daily Weight in k.2 (2019 10:00)  Today's weight:     Intake / Output:    @ 07:01  -   @ 07:00  --------------------------------------------------------  IN: 5192 mL / OUT: 3725 mL / NET: 1467 mL    PE:   Oropharynx: no erythema or ulcerations  Oral Mucositis:              -                                         Grade: n/a  CVS: S1, S2 RRR   Lungs: CTA throughout bilaterally   Abdomen:+ BS x 4, soft, NT, ND   Extremities: no edema   Gastric Mucositis:       -                                           Grade: n/a  Intestinal Mucositis:     -                                         Grade: n/a  Skin: no rash   TLC: PICC line c/d/i  Neuro: A&O x 3   Pain: 0      Labs:                         11.3   0.9   )-----------( 285      ( 2019 06:57 )             32.5     2019 06:57    140    |  107    |  5      ----------------------------<  99     4.0     |  21     |  0.71     Ca    9.0        2019 06:57  Phos  2.7       2019 06:57  Mg     1.4       2019 06:57    TPro  6.1    /  Alb  3.4    /  TBili  0.3    /  DBili  x      /  AST  20     /  ALT  11     /  AlkPhos  45     2019 06:57      LIVER FUNCTIONS - ( 2019 06:57 )  Alb: 3.4 g/dL / Pro: 6.1 g/dL / ALK PHOS: 45 U/L / ALT: 11 U/L / AST: 20 U/L / GGT: x             Meds:   Antimicrobials:   acyclovir   Oral Tab/Cap 400 milliGRAM(s) Oral three times a day  atovaquone Suspension 750 milliGRAM(s) Oral every 12 hours  ciprofloxacin     Tablet 500 milliGRAM(s) Oral every 12 hours  clotrimazole Lozenge 1 Lozenge Oral five times a day  voriconazole 200 milliGRAM(s) Oral every 12 hours      Heme / Onc:   heparin  Infusion 366 Unit(s)/Hr IV Continuous <Continuous>      GI:  docusate sodium 100 milliGRAM(s) Oral three times a day  pantoprazole    Tablet 40 milliGRAM(s) Oral before breakfast  senna 2 Tablet(s) Oral at bedtime  sodium bicarbonate Mouth Rinse 10 milliLiter(s) Swish and Spit five times a day  ursodiol Capsule 300 milliGRAM(s) Oral two times a day with meals        Immunologic:   immune   globulin 10% (GAMMAGARD) IVPB 20 Gram(s) IV Intermittent <User Schedule>  tacrolimus 2 milliGRAM(s) Oral every 12 hours      Other medications:   acetaminophen   Tablet .. 650 milliGRAM(s) Oral every 6 hours  acetaminophen   Tablet .. 650 milliGRAM(s) Oral <User Schedule>  ascorbic acid 250 milliGRAM(s) Oral daily  Biotene Dry Mouth Oral Rinse 5 milliLiter(s) Swish and Spit five times a day  chlorhexidine 4% Liquid 1 Application(s) Topical <User Schedule>  cholecalciferol 400 Unit(s) Oral daily  cyanocobalamin 1000 MICROGram(s) Oral daily  diphenhydrAMINE   Injectable 25 milliGRAM(s) IV Push every 3 weeks  ferrous    sulfate 325 milliGRAM(s) Oral three times a day  folic acid 1 milliGRAM(s) Oral daily  medroxyPROGESTERone 10 milliGRAM(s) Oral daily  montelukast 10 milliGRAM(s) Oral daily  multivitamin 1 Tablet(s) Oral daily  ondansetron Injectable 8 milliGRAM(s) IV Push every 8 hours  palifermin Injectable  (eMAR) 5280 MICROGram(s) IV Push every 24 hours  phytonadione   Solution 10 milliGRAM(s) Oral every other day  sodium chloride 0.45% 1000 milliLiter(s) IV Continuous <Continuous>  sodium chloride 0.9%. 1000 milliLiter(s) IV Continuous <Continuous>      PRN:   acetaminophen   Tablet .. 650 milliGRAM(s) Oral every 6 hours PRN  artificial  tears Solution 1 Drop(s) Both EYES three times a day PRN  diphenhydrAMINE   Injectable 25 milliGRAM(s) IV Push every 4 hours PRN  metoclopramide Injectable 10 milliGRAM(s) IV Push every 6 hours PRN  sodium chloride 0.65% Nasal 1 Spray(s) Both Nostrils five times a day PRN  sodium chloride 0.9% lock flush 10 milliLiter(s) IV Push every 1 hour PRN      A/P:  33 year old female with a history of AML  Pre  :  Allogeneic PBSCT day +1  s/p HPC transplant   - continue transplant hydration for 24 hours post infusion of cells   Neutropenic - started Cipro on ; if T >/= 38C pan cx, CXR and change Cipro to Cefepime.   Labs every other day ; continue vitamin C, vitamin B, folic acid, vitamin K & iron supplementation. Increased vitamin K to every other day as per standard guidelines for bloodless transplant.     1. Infectious Disease:   acyclovir   Oral Tab/Cap 400 milliGRAM(s) Oral three times a day  atovaquone Suspension 750 milliGRAM(s) Oral every 12 hours  ciprofloxacin     Tablet 500 milliGRAM(s) Oral every 12 hours  clotrimazole Lozenge 1 Lozenge Oral five times a day  voriconazole 200 milliGRAM(s) Oral every 12 hours    2. VOD Prophylaxis: Actigall, Glutamine, Heparin (dosed at 100 units / kg / day)     3. GI Prophylaxis:    pantoprazole    Tablet 40 milliGRAM(s) Oral before breakfast    4. Mouthcare - NS / NaHCO3 rinses, Mycelex, Caphosol; Skin care     5. GVHD prophylaxis -  tacrolimus 2 milliGRAM(s) Oral every 12 hours    6. Transfuse & replete electrolytes prn - bloodless transplant   phytonadione   Solution 10 milliGRAM(s) Oral every other day  ferrous    sulfate 325 milliGRAM(s) Oral three times a day  folic acid 1 milliGRAM(s) Oral daily    7. IV hydration, daily weights, strict I&O, prn diuresis   Lasix 40mg IV x 1    8. PO intake as tolerated, nutrition follow up as needed, MVI, folic acid     9. Antiemetics, anti-diarrhea medications:   LORazepam   Injectable 1 milliGRAM(s) IV Push every 6 hours PRN  metoclopramide Injectable 10 milliGRAM(s) IV Push every 6 hours PRN  ondansetron Injectable 8 milliGRAM(s) IV Push every 8 hours  aprepitant 80 milliGRAM(s) Oral daily    10. OOB as tolerated, physical therapy consult if needed     11. Monitor coags / fibrinogen 2x week, vitamin K as needed     12. Monitor closely for clinical changes, monitor for fevers     13. Emotional support provided, plan of care discussed and questions addressed     14. Patient education done regarding plan of care, restrictions and discharge planning     15. Continue regular social work input     I have written the above note for Dr. Larsen who performed service with me in the room.   Racquel Leiva NP-C ([phone])    I have seen and examined patient with NP, I agree with above note as scribed. 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    S: Patient seen and examined with HPC Transplant Team:   + general fatigue    Denies mouth / tongue / throat pain, dyspnea, cough, nausea, vomiting, diarrhea, abdominal pain       O: Vitals:   Vital Signs Last 24 Hrs  T(C): 37 (2019 05:50), Max: 37.6 (2019 17:27)  T(F): 98.6 (2019 05:50), Max: 99.6 (2019 17:27)  HR: 90 (2019 05:50) (85 - 110)  BP: 145/- (2019 05:50) (117/80 - 155/97)  BP(mean): 85 (2019 05:50) (85 - 85)  RR: 18 (2019 05:50) (17 - 19)  SpO2: 100% (2019 05:50) (99% - 100%)    Admit weight: 88.6 kg  Daily Weight in k.2 (2019 10:00)  Today's weight: 88.9 kg    Intake / Output:    @ 07:01  -   @ 07:00  --------------------------------------------------------  IN: 5192 mL / OUT: 3725 mL / NET: 1467 mL    PE:   Oropharynx: no erythema or ulcerations  Oral Mucositis:              -                                         Grade: n/a  CVS: S1, S2 RRR   Lungs: CTA throughout bilaterally   Abdomen:+ BS x 4, soft, NT, ND   Extremities: no edema   Gastric Mucositis:       -                                           Grade: n/a  Intestinal Mucositis:     -                                         Grade: n/a  Skin: no rash   TLC: PICC line c/d/i  Neuro: A&O x 3   Pain: 0      Labs:                         11.3   0.9   )-----------( 285      ( 2019 06:57 )             32.5     2019 06:57    140    |  107    |  5      ----------------------------<  99     4.0     |  21     |  0.71     Ca    9.0        2019 06:57  Phos  2.7       2019 06:57  Mg     1.4       2019 06:57    TPro  6.1    /  Alb  3.4    /  TBili  0.3    /  DBili  x      /  AST  20     /  ALT  11     /  AlkPhos  45     2019 06:57      LIVER FUNCTIONS - ( 2019 06:57 )  Alb: 3.4 g/dL / Pro: 6.1 g/dL / ALK PHOS: 45 U/L / ALT: 11 U/L / AST: 20 U/L / GGT: x             Meds:   Antimicrobials:   acyclovir   Oral Tab/Cap 400 milliGRAM(s) Oral three times a day  atovaquone Suspension 750 milliGRAM(s) Oral every 12 hours  ciprofloxacin     Tablet 500 milliGRAM(s) Oral every 12 hours  clotrimazole Lozenge 1 Lozenge Oral five times a day  voriconazole 200 milliGRAM(s) Oral every 12 hours      Heme / Onc:   heparin  Infusion 366 Unit(s)/Hr IV Continuous <Continuous>      GI:  docusate sodium 100 milliGRAM(s) Oral three times a day  pantoprazole    Tablet 40 milliGRAM(s) Oral before breakfast  senna 2 Tablet(s) Oral at bedtime  sodium bicarbonate Mouth Rinse 10 milliLiter(s) Swish and Spit five times a day  ursodiol Capsule 300 milliGRAM(s) Oral two times a day with meals        Immunologic:   immune   globulin 10% (GAMMAGARD) IVPB 20 Gram(s) IV Intermittent <User Schedule>  tacrolimus 2 milliGRAM(s) Oral every 12 hours      Other medications:   acetaminophen   Tablet .. 650 milliGRAM(s) Oral every 6 hours  acetaminophen   Tablet .. 650 milliGRAM(s) Oral <User Schedule>  ascorbic acid 250 milliGRAM(s) Oral daily  Biotene Dry Mouth Oral Rinse 5 milliLiter(s) Swish and Spit five times a day  chlorhexidine 4% Liquid 1 Application(s) Topical <User Schedule>  cholecalciferol 400 Unit(s) Oral daily  cyanocobalamin 1000 MICROGram(s) Oral daily  diphenhydrAMINE   Injectable 25 milliGRAM(s) IV Push every 3 weeks  ferrous    sulfate 325 milliGRAM(s) Oral three times a day  folic acid 1 milliGRAM(s) Oral daily  medroxyPROGESTERone 10 milliGRAM(s) Oral daily  montelukast 10 milliGRAM(s) Oral daily  multivitamin 1 Tablet(s) Oral daily  ondansetron Injectable 8 milliGRAM(s) IV Push every 8 hours  palifermin Injectable  (eMAR) 5280 MICROGram(s) IV Push every 24 hours  phytonadione   Solution 10 milliGRAM(s) Oral every other day  sodium chloride 0.45% 1000 milliLiter(s) IV Continuous <Continuous>  sodium chloride 0.9%. 1000 milliLiter(s) IV Continuous <Continuous>      PRN:   acetaminophen   Tablet .. 650 milliGRAM(s) Oral every 6 hours PRN  artificial  tears Solution 1 Drop(s) Both EYES three times a day PRN  diphenhydrAMINE   Injectable 25 milliGRAM(s) IV Push every 4 hours PRN  metoclopramide Injectable 10 milliGRAM(s) IV Push every 6 hours PRN  sodium chloride 0.65% Nasal 1 Spray(s) Both Nostrils five times a day PRN  sodium chloride 0.9% lock flush 10 milliLiter(s) IV Push every 1 hour PRN      A/P:  33 year old female with a history of AML  Pre  :  Allogeneic PBSCT day +1  s/p HPC transplant   - continue transplant hydration for 24 hours post infusion of cells   Neutropenic - started Cipro on ; if T >/= 38C pan cx, CXR and change Cipro to Cefepime.   Labs every other day ; continue vitamin C, vitamin B, folic acid, vitamin K & iron supplementation. Increased vitamin K to every other day as per standard guidelines for bloodless transplant.     1. Infectious Disease:   acyclovir   Oral Tab/Cap 400 milliGRAM(s) Oral three times a day  atovaquone Suspension 750 milliGRAM(s) Oral every 12 hours  ciprofloxacin     Tablet 500 milliGRAM(s) Oral every 12 hours  clotrimazole Lozenge 1 Lozenge Oral five times a day  voriconazole 200 milliGRAM(s) Oral every 12 hours    2. VOD Prophylaxis: Actigall, Glutamine, Heparin (dosed at 100 units / kg / day)     3. GI Prophylaxis:    pantoprazole    Tablet 40 milliGRAM(s) Oral before breakfast    4. Mouthcare - NS / NaHCO3 rinses, Mycelex, Caphosol; Skin care     5. GVHD prophylaxis -  tacrolimus 2 milliGRAM(s) Oral every 12 hours    6. Transfuse & replete electrolytes prn - bloodless transplant   phytonadione   Solution 10 milliGRAM(s) Oral every other day  ferrous    sulfate 325 milliGRAM(s) Oral three times a day  folic acid 1 milliGRAM(s) Oral daily    7. IV hydration, daily weights, strict I&O, prn diuresis   Lasix 40mg IV x 1    8. PO intake as tolerated, nutrition follow up as needed, MVI, folic acid     9. Antiemetics, anti-diarrhea medications:   LORazepam   Injectable 1 milliGRAM(s) IV Push every 6 hours PRN  metoclopramide Injectable 10 milliGRAM(s) IV Push every 6 hours PRN  ondansetron Injectable 8 milliGRAM(s) IV Push every 8 hours  aprepitant 80 milliGRAM(s) Oral daily    10. OOB as tolerated, physical therapy consult if needed     11. Monitor coags / fibrinogen 2x week, vitamin K as needed     12. Monitor closely for clinical changes, monitor for fevers     13. Emotional support provided, plan of care discussed and questions addressed     14. Patient education done regarding plan of care, restrictions and discharge planning     15. Continue regular social work input     I have written the above note for Dr. Larsen who performed service with me in the room.   Racquel Leiva NP-C ([phone])    I have seen and examined patient with NP, I agree with above note as scribed. 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    S: Patient seen and examined with HPC Transplant Team:   + general fatigue    Denies mouth / tongue / throat pain, dyspnea, cough, nausea, vomiting, diarrhea, abdominal pain       O: Vitals:   Vital Signs Last 24 Hrs  T(C): 37 (2019 05:50), Max: 37.6 (2019 17:27)  T(F): 98.6 (2019 05:50), Max: 99.6 (2019 17:27)  HR: 90 (2019 05:50) (85 - 110)  BP: 145/- (2019 05:50) (117/80 - 155/97)  BP(mean): 85 (2019 05:50) (85 - 85)  RR: 18 (2019 05:50) (17 - 19)  SpO2: 100% (2019 05:50) (99% - 100%)    Admit weight: 88.6 kg  Daily Weight in k.2 (2019 10:00)  Today's weight: 88.9 kg    Intake / Output:    @ 07:01  -   @ 07:00  --------------------------------------------------------  IN: 5192 mL / OUT: 3725 mL / NET: 1467 mL    PE:   Oropharynx: no erythema or ulcerations  Oral Mucositis:              -                                         Grade: n/a  CVS: S1, S2 RRR   Lungs: CTA throughout bilaterally   Abdomen:+ BS x 4, soft, NT, ND   Extremities: no edema   Gastric Mucositis:       -                                           Grade: n/a  Intestinal Mucositis:     -                                         Grade: n/a  Skin: no rash   TLC: PICC line c/d/i  Neuro: A&O x 3   Pain: 0      Labs:                         11.3   0.9   )-----------( 285      ( 2019 06:57 )             32.5     2019 06:57    140    |  107    |  5      ----------------------------<  99     4.0     |  21     |  0.71     Ca    9.0        2019 06:57  Phos  2.7       2019 06:57  Mg     1.4       2019 06:57    TPro  6.1    /  Alb  3.4    /  TBili  0.3    /  DBili  x      /  AST  20     /  ALT  11     /  AlkPhos  45     2019 06:57      LIVER FUNCTIONS - ( 2019 06:57 )  Alb: 3.4 g/dL / Pro: 6.1 g/dL / ALK PHOS: 45 U/L / ALT: 11 U/L / AST: 20 U/L / GGT: x             Meds:   Antimicrobials:   acyclovir   Oral Tab/Cap 400 milliGRAM(s) Oral three times a day  atovaquone Suspension 750 milliGRAM(s) Oral every 12 hours  ciprofloxacin     Tablet 500 milliGRAM(s) Oral every 12 hours  clotrimazole Lozenge 1 Lozenge Oral five times a day  voriconazole 200 milliGRAM(s) Oral every 12 hours      Heme / Onc:   heparin  Infusion 366 Unit(s)/Hr IV Continuous <Continuous>      GI:  docusate sodium 100 milliGRAM(s) Oral three times a day  pantoprazole    Tablet 40 milliGRAM(s) Oral before breakfast  senna 2 Tablet(s) Oral at bedtime  sodium bicarbonate Mouth Rinse 10 milliLiter(s) Swish and Spit five times a day  ursodiol Capsule 300 milliGRAM(s) Oral two times a day with meals        Immunologic:   immune   globulin 10% (GAMMAGARD) IVPB 20 Gram(s) IV Intermittent <User Schedule>  tacrolimus 2 milliGRAM(s) Oral every 12 hours      Other medications:   acetaminophen   Tablet .. 650 milliGRAM(s) Oral every 6 hours  acetaminophen   Tablet .. 650 milliGRAM(s) Oral <User Schedule>  ascorbic acid 250 milliGRAM(s) Oral daily  Biotene Dry Mouth Oral Rinse 5 milliLiter(s) Swish and Spit five times a day  chlorhexidine 4% Liquid 1 Application(s) Topical <User Schedule>  cholecalciferol 400 Unit(s) Oral daily  cyanocobalamin 1000 MICROGram(s) Oral daily  diphenhydrAMINE   Injectable 25 milliGRAM(s) IV Push every 3 weeks  ferrous    sulfate 325 milliGRAM(s) Oral three times a day  folic acid 1 milliGRAM(s) Oral daily  medroxyPROGESTERone 10 milliGRAM(s) Oral daily  montelukast 10 milliGRAM(s) Oral daily  multivitamin 1 Tablet(s) Oral daily  ondansetron Injectable 8 milliGRAM(s) IV Push every 8 hours  palifermin Injectable  (eMAR) 5280 MICROGram(s) IV Push every 24 hours  phytonadione   Solution 10 milliGRAM(s) Oral every other day  sodium chloride 0.45% 1000 milliLiter(s) IV Continuous <Continuous>  sodium chloride 0.9%. 1000 milliLiter(s) IV Continuous <Continuous>      PRN:   acetaminophen   Tablet .. 650 milliGRAM(s) Oral every 6 hours PRN  artificial  tears Solution 1 Drop(s) Both EYES three times a day PRN  diphenhydrAMINE   Injectable 25 milliGRAM(s) IV Push every 4 hours PRN  metoclopramide Injectable 10 milliGRAM(s) IV Push every 6 hours PRN  sodium chloride 0.65% Nasal 1 Spray(s) Both Nostrils five times a day PRN  sodium chloride 0.9% lock flush 10 milliLiter(s) IV Push every 1 hour PRN      A/P:  33 year old female with a history of AML  Post  :  Allogeneic PBSCT day +1  s/p HPC transplant   - continue transplant hydration for 24 hours post infusion of cells   Neutropenic - started Cipro on ; if T >/= 38C pan cx, CXR and change Cipro to Cefepime.   Labs every other day ; continue vitamin C, vitamin B, folic acid, vitamin K & iron supplementation. Increased vitamin K to every other day as per standard guidelines for bloodless transplant.     1. Infectious Disease:   acyclovir   Oral Tab/Cap 400 milliGRAM(s) Oral three times a day  atovaquone Suspension 750 milliGRAM(s) Oral every 12 hours  ciprofloxacin     Tablet 500 milliGRAM(s) Oral every 12 hours  clotrimazole Lozenge 1 Lozenge Oral five times a day  voriconazole 200 milliGRAM(s) Oral every 12 hours    2. VOD Prophylaxis: Actigall, Glutamine, Heparin (dosed at 100 units / kg / day)     3. GI Prophylaxis:    pantoprazole    Tablet 40 milliGRAM(s) Oral before breakfast    4. Mouth care - NS / NaHCO3 rinses, Mycelex, Biotene; Skin care     5. GVHD prophylaxis -  tacrolimus 2 milliGRAM(s) Oral every 12 hours    6. Transfuse & replete electrolytes prn - bloodless transplant   phytonadione   Solution 10 milliGRAM(s) Oral every other day  ferrous    sulfate 325 milliGRAM(s) Oral three times a day  folic acid 1 milliGRAM(s) Oral daily    7. IV hydration, daily weights, strict I&O, prn diuresis   Lasix 40mg IV x 1    8. PO intake as tolerated, nutrition follow up as needed, MVI, folic acid     9. Antiemetics, anti-diarrhea medications:   LORazepam   Injectable 1 milliGRAM(s) IV Push every 6 hours PRN  metoclopramide Injectable 10 milliGRAM(s) IV Push every 6 hours PRN  ondansetron Injectable 8 milliGRAM(s) IV Push every 8 hours  aprepitant 80 milliGRAM(s) Oral daily    10. OOB as tolerated, physical therapy consult if needed     11. Monitor coags / fibrinogen 2x week, vitamin K as needed     12. Monitor closely for clinical changes, monitor for fevers     13. Emotional support provided, plan of care discussed and questions addressed     14. Patient education done regarding plan of care, restrictions and discharge planning     15. Continue regular social work input     I have written the above note for Dr. Larsen who performed service with me in the room.   Racquel Leiva NP-C ([phone])    I have seen and examined patient with NP, I agree with above note as scribed.

## 2019-03-01 ENCOUNTER — TRANSCRIPTION ENCOUNTER (OUTPATIENT)
Age: 34
End: 2019-03-01

## 2019-03-01 LAB
ALBUMIN SERPL ELPH-MCNC: 3.7 G/DL — SIGNIFICANT CHANGE UP (ref 3.3–5)
ALP SERPL-CCNC: 48 U/L — SIGNIFICANT CHANGE UP (ref 40–120)
ALT FLD-CCNC: 10 U/L — SIGNIFICANT CHANGE UP (ref 10–45)
ANION GAP SERPL CALC-SCNC: 12 MMOL/L — SIGNIFICANT CHANGE UP (ref 5–17)
AST SERPL-CCNC: 18 U/L — SIGNIFICANT CHANGE UP (ref 10–40)
BILIRUB SERPL-MCNC: 0.2 MG/DL — SIGNIFICANT CHANGE UP (ref 0.2–1.2)
BUN SERPL-MCNC: 14 MG/DL — SIGNIFICANT CHANGE UP (ref 7–23)
CALCIUM SERPL-MCNC: 9.1 MG/DL — SIGNIFICANT CHANGE UP (ref 8.4–10.5)
CHLORIDE SERPL-SCNC: 103 MMOL/L — SIGNIFICANT CHANGE UP (ref 96–108)
CO2 SERPL-SCNC: 23 MMOL/L — SIGNIFICANT CHANGE UP (ref 22–31)
CREAT SERPL-MCNC: 0.71 MG/DL — SIGNIFICANT CHANGE UP (ref 0.5–1.3)
GLUCOSE SERPL-MCNC: 127 MG/DL — HIGH (ref 70–99)
POTASSIUM SERPL-MCNC: 4.1 MMOL/L — SIGNIFICANT CHANGE UP (ref 3.5–5.3)
POTASSIUM SERPL-SCNC: 4.1 MMOL/L — SIGNIFICANT CHANGE UP (ref 3.5–5.3)
PROT SERPL-MCNC: 6.7 G/DL — SIGNIFICANT CHANGE UP (ref 6–8.3)
SODIUM SERPL-SCNC: 138 MMOL/L — SIGNIFICANT CHANGE UP (ref 135–145)

## 2019-03-01 PROCEDURE — 99291 CRITICAL CARE FIRST HOUR: CPT

## 2019-03-01 RX ADMIN — Medication 500 MILLIGRAM(S): at 17:26

## 2019-03-01 RX ADMIN — URSODIOL 300 MILLIGRAM(S): 250 TABLET, FILM COATED ORAL at 17:27

## 2019-03-01 RX ADMIN — MAGNESIUM OXIDE 400 MG ORAL TABLET 400 MILLIGRAM(S): 241.3 TABLET ORAL at 17:27

## 2019-03-01 RX ADMIN — SODIUM CHLORIDE 20 MILLILITER(S): 9 INJECTION INTRAMUSCULAR; INTRAVENOUS; SUBCUTANEOUS at 23:15

## 2019-03-01 RX ADMIN — SENNA PLUS 2 TABLET(S): 8.6 TABLET ORAL at 21:44

## 2019-03-01 RX ADMIN — Medication 5 MILLILITER(S): at 07:56

## 2019-03-01 RX ADMIN — Medication 250 MILLIGRAM(S): at 12:37

## 2019-03-01 RX ADMIN — Medication 500 MILLIGRAM(S): at 05:50

## 2019-03-01 RX ADMIN — Medication 400 MILLIGRAM(S): at 21:43

## 2019-03-01 RX ADMIN — Medication 5 MILLILITER(S): at 23:13

## 2019-03-01 RX ADMIN — Medication 1 LOZENGE: at 07:56

## 2019-03-01 RX ADMIN — Medication 100 MILLIGRAM(S): at 21:43

## 2019-03-01 RX ADMIN — Medication 10 MILLILITER(S): at 15:34

## 2019-03-01 RX ADMIN — ATOVAQUONE 750 MILLIGRAM(S): 750 SUSPENSION ORAL at 17:26

## 2019-03-01 RX ADMIN — PREGABALIN 1000 MICROGRAM(S): 225 CAPSULE ORAL at 12:43

## 2019-03-01 RX ADMIN — MEDROXYPROGESTERONE ACETATE 10 MILLIGRAM(S): 150 INJECTION, SUSPENSION, EXTENDED RELEASE INTRAMUSCULAR at 12:37

## 2019-03-01 RX ADMIN — Medication 5 MILLILITER(S): at 15:34

## 2019-03-01 RX ADMIN — CHLORHEXIDINE GLUCONATE 1 APPLICATION(S): 213 SOLUTION TOPICAL at 07:00

## 2019-03-01 RX ADMIN — Medication 1 LOZENGE: at 12:42

## 2019-03-01 RX ADMIN — TACROLIMUS 1.5 MILLIGRAM(S): 5 CAPSULE ORAL at 11:33

## 2019-03-01 RX ADMIN — Medication 400 MILLIGRAM(S): at 15:19

## 2019-03-01 RX ADMIN — Medication 400 UNIT(S): at 12:41

## 2019-03-01 RX ADMIN — Medication 10 MILLIGRAM(S): at 12:42

## 2019-03-01 RX ADMIN — Medication 1 LOZENGE: at 15:34

## 2019-03-01 RX ADMIN — Medication 100 MILLIGRAM(S): at 15:19

## 2019-03-01 RX ADMIN — Medication 1 MILLIGRAM(S): at 12:38

## 2019-03-01 RX ADMIN — SODIUM CHLORIDE 20 MILLILITER(S): 9 INJECTION INTRAMUSCULAR; INTRAVENOUS; SUBCUTANEOUS at 00:39

## 2019-03-01 RX ADMIN — Medication 10 MILLILITER(S): at 23:13

## 2019-03-01 RX ADMIN — Medication 5 MILLILITER(S): at 12:42

## 2019-03-01 RX ADMIN — Medication 10 MILLILITER(S): at 20:01

## 2019-03-01 RX ADMIN — Medication 1 LOZENGE: at 23:13

## 2019-03-01 RX ADMIN — Medication 325 MILLIGRAM(S): at 21:44

## 2019-03-01 RX ADMIN — Medication 1 LOZENGE: at 20:01

## 2019-03-01 RX ADMIN — VORICONAZOLE 200 MILLIGRAM(S): 10 INJECTION, POWDER, LYOPHILIZED, FOR SOLUTION INTRAVENOUS at 17:27

## 2019-03-01 RX ADMIN — PANTOPRAZOLE SODIUM 40 MILLIGRAM(S): 20 TABLET, DELAYED RELEASE ORAL at 07:03

## 2019-03-01 RX ADMIN — ATOVAQUONE 750 MILLIGRAM(S): 750 SUSPENSION ORAL at 05:49

## 2019-03-01 RX ADMIN — VORICONAZOLE 200 MILLIGRAM(S): 10 INJECTION, POWDER, LYOPHILIZED, FOR SOLUTION INTRAVENOUS at 05:50

## 2019-03-01 RX ADMIN — Medication 400 MILLIGRAM(S): at 05:49

## 2019-03-01 RX ADMIN — Medication 325 MILLIGRAM(S): at 05:50

## 2019-03-01 RX ADMIN — Medication 325 MILLIGRAM(S): at 15:19

## 2019-03-01 RX ADMIN — MAGNESIUM OXIDE 400 MG ORAL TABLET 400 MILLIGRAM(S): 241.3 TABLET ORAL at 12:37

## 2019-03-01 RX ADMIN — Medication 10 MILLILITER(S): at 07:56

## 2019-03-01 RX ADMIN — Medication 5 MILLILITER(S): at 20:01

## 2019-03-01 RX ADMIN — Medication 100 MILLIGRAM(S): at 05:50

## 2019-03-01 RX ADMIN — Medication 5280 MICROGRAM(S): at 15:22

## 2019-03-01 RX ADMIN — URSODIOL 300 MILLIGRAM(S): 250 TABLET, FILM COATED ORAL at 07:55

## 2019-03-01 RX ADMIN — TACROLIMUS 1.5 MILLIGRAM(S): 5 CAPSULE ORAL at 17:27

## 2019-03-01 RX ADMIN — Medication 1 TABLET(S): at 12:37

## 2019-03-01 RX ADMIN — Medication 10 MILLILITER(S): at 12:42

## 2019-03-01 RX ADMIN — HEPARIN SODIUM 3.66 UNIT(S)/HR: 5000 INJECTION INTRAVENOUS; SUBCUTANEOUS at 23:25

## 2019-03-01 RX ADMIN — HEPARIN SODIUM 3.66 UNIT(S)/HR: 5000 INJECTION INTRAVENOUS; SUBCUTANEOUS at 00:40

## 2019-03-01 RX ADMIN — MONTELUKAST 10 MILLIGRAM(S): 4 TABLET, CHEWABLE ORAL at 12:44

## 2019-03-01 RX ADMIN — MAGNESIUM OXIDE 400 MG ORAL TABLET 400 MILLIGRAM(S): 241.3 TABLET ORAL at 07:56

## 2019-03-01 NOTE — PROGRESS NOTE ADULT - SUBJECTIVE AND OBJECTIVE BOX
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    S: Patient seen and examined with HPC Transplant Team:   + general fatigue    Denies mouth / tongue / throat pain, dyspnea, cough, nausea, vomiting, diarrhea, abdominal pain     O: Vitals:     Vital Signs Last 24 Hrs  T(C): 37 (01 Mar 2019 06:02), Max: 37.5 (2019 10:16)  T(F): 98.6 (01 Mar 2019 06:02), Max: 99.5 (2019 10:16)  HR: 97 (01 Mar 2019 06:02) (94 - 119)  BP: 124/70 (01 Mar 2019 06:02) (118/81 - 129/84)  RR: 18 (01 Mar 2019 06:02) (18 - 18)  SpO2: 98% (01 Mar 2019 06:02) (98% - 100%)    Admit weight: 88.6 kg  Daily     Daily Weight in k.9 (2019 08:15)    Intake / Output:    @ 07:01  -  03-01 @ 07:00  --------------------------------------------------------  IN: 2802 mL / OUT: 4350 mL / NET: -1548 mL      PE:   Oropharynx: no erythema or ulcerations  Oral Mucositis:              -                                         Grade: n/a  CVS: S1, S2 RRR   Lungs: CTA throughout bilaterally   Abdomen:+ BS x 4, soft, NT, ND   Extremities: no edema   Gastric Mucositis:       -                                           Grade: n/a  Intestinal Mucositis:     -                                         Grade: n/a  Skin: no rash   TLC: PICC line c/d/i  Neuro: A&O x 3   Pain: 0      Labs:         @ 09:29  Tacrolimus 12.0                Cyclosporine --    Cultures: no recent      Radiology:       Meds:   Antimicrobials:   acyclovir   Oral Tab/Cap 400 milliGRAM(s) Oral three times a day  atovaquone Suspension 750 milliGRAM(s) Oral every 12 hours  ciprofloxacin     Tablet 500 milliGRAM(s) Oral every 12 hours  clotrimazole Lozenge 1 Lozenge Oral five times a day  voriconazole 200 milliGRAM(s) Oral every 12 hours      Heme / Onc:   heparin  Infusion 366 Unit(s)/Hr IV Continuous <Continuous>      GI:  docusate sodium 100 milliGRAM(s) Oral three times a day  pantoprazole    Tablet 40 milliGRAM(s) Oral before breakfast  senna 2 Tablet(s) Oral at bedtime  sodium bicarbonate Mouth Rinse 10 milliLiter(s) Swish and Spit five times a day  ursodiol Capsule 300 milliGRAM(s) Oral two times a day with meals      Immunologic:   immune   globulin 10% (GAMMAGARD) IVPB 20 Gram(s) IV Intermittent <User Schedule>  tacrolimus 1.5 milliGRAM(s) Oral every 12 hours      Other medications:   acetaminophen   Tablet .. 650 milliGRAM(s) Oral every 6 hours  acetaminophen   Tablet .. 650 milliGRAM(s) Oral <User Schedule>  ascorbic acid 250 milliGRAM(s) Oral daily  Biotene Dry Mouth Oral Rinse 5 milliLiter(s) Swish and Spit five times a day  chlorhexidine 4% Liquid 1 Application(s) Topical <User Schedule>  cholecalciferol 400 Unit(s) Oral daily  cyanocobalamin 1000 MICROGram(s) Oral daily  diphenhydrAMINE   Injectable 25 milliGRAM(s) IV Push every 3 weeks  ferrous    sulfate 325 milliGRAM(s) Oral three times a day  folic acid 1 milliGRAM(s) Oral daily  magnesium oxide 400 milliGRAM(s) Oral three times a day with meals  medroxyPROGESTERone 10 milliGRAM(s) Oral daily  montelukast 10 milliGRAM(s) Oral daily  multivitamin 1 Tablet(s) Oral daily  palifermin Injectable  (eMAR) 5280 MICROGram(s) IV Push every 24 hours  phytonadione   Solution 10 milliGRAM(s) Oral every other day  sodium chloride 0.45% 1000 milliLiter(s) IV Continuous <Continuous>  sodium chloride 0.9%. 1000 milliLiter(s) IV Continuous <Continuous>      PRN:   acetaminophen   Tablet .. 650 milliGRAM(s) Oral every 6 hours PRN  artificial  tears Solution 1 Drop(s) Both EYES three times a day PRN  diphenhydrAMINE   Injectable 25 milliGRAM(s) IV Push every 4 hours PRN  metoclopramide Injectable 10 milliGRAM(s) IV Push every 6 hours PRN  sodium chloride 0.65% Nasal 1 Spray(s) Both Nostrils five times a day PRN  sodium chloride 0.9% lock flush 10 milliLiter(s) IV Push every 1 hour PRN        A/P:  33 year old female with a history of AML  Post  :  Allogeneic PBSCT day +2  s/p HPC transplant   - continue transplant hydration for 24 hours post infusion of cells   Neutropenic - started Cipro on ; if T >/= 38C pan cx, CXR and change Cipro to Cefepime.   Labs every other day ; continue vitamin C, vitamin B, folic acid, vitamin K & iron supplementation. Increased vitamin K to every other day as per standard guidelines for bloodless transplant.     1. Infectious Disease:   acyclovir   Oral Tab/Cap 400 milliGRAM(s) Oral three times a day  atovaquone Suspension 750 milliGRAM(s) Oral every 12 hours  ciprofloxacin     Tablet 500 milliGRAM(s) Oral every 12 hours  clotrimazole Lozenge 1 Lozenge Oral five times a day  voriconazole 200 milliGRAM(s) Oral every 12 hours    2. VOD Prophylaxis: Actigall, Glutamine, Heparin (dosed at 100 units / kg / day)     3. GI Prophylaxis:    pantoprazole    Tablet 40 milliGRAM(s) Oral before breakfast    4. Mouth care - NS / NaHCO3 rinses, Mycelex, Biotene; Skin care     5. GVHD prophylaxis -  tacrolimus 2 milliGRAM(s) Oral every 12 hours    6. Transfuse & replete electrolytes prn - bloodless transplant   phytonadione   Solution 10 milliGRAM(s) Oral every other day  ferrous    sulfate 325 milliGRAM(s) Oral three times a day  folic acid 1 milliGRAM(s) Oral daily    7. IV hydration, daily weights, strict I&O, prn diuresis   Lasix 40mg IV x 1    8. PO intake as tolerated, nutrition follow up as needed, MVI, folic acid     9. Antiemetics, anti-diarrhea medications:   LORazepam   Injectable 1 milliGRAM(s) IV Push every 6 hours PRN  metoclopramide Injectable 10 milliGRAM(s) IV Push every 6 hours PRN  ondansetron Injectable 8 milliGRAM(s) IV Push every 8 hours  aprepitant 80 milliGRAM(s) Oral daily    10. OOB as tolerated, physical therapy consult if needed     11. Monitor coags / fibrinogen 2x week, vitamin K as needed     12. Monitor closely for clinical changes, monitor for fevers     13. Emotional support provided, plan of care discussed and questions addressed     14. Patient education done regarding plan of care, restrictions and discharge planning     15. Continue regular social work input     I have written the above note for Dr. Larsen who performed service with me in the room.   Racquel Leiva NP-C ([phone])    I have seen and examined patient with NP, I agree with above note as scribed. 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    S: Patient seen and examined with HPC Transplant Team:   + general fatigue  + decreased appetite  + intermittent abdominal discomfort    Denies mouth / tongue / throat pain, dyspnea, cough, nausea, vomiting, diarrhea, abdominal pain     O: Vitals:     Vital Signs Last 24 Hrs  T(C): 37 (01 Mar 2019 06:02), Max: 37.5 (2019 10:16)  T(F): 98.6 (01 Mar 2019 06:02), Max: 99.5 (2019 10:16)  HR: 97 (01 Mar 2019 06:02) (94 - 119)  BP: 124/70 (01 Mar 2019 06:02) (118/81 - 129/84)  RR: 18 (01 Mar 2019 06:02) (18 - 18)  SpO2: 98% (01 Mar 2019 06:02) (98% - 100%)    Admit weight: 88.6 kg  Daily     Daily Weight in k.9 (2019 08:15)    Intake / Output:    @ 07:01  -  03-01 @ 07:00  --------------------------------------------------------  IN: 2802 mL / OUT: 4350 mL / NET: -1548 mL      PE:   Oropharynx: no erythema or ulcerations  Oral Mucositis:              -                                         Grade: n/a  CVS: S1, S2 RRR   Lungs: CTA throughout bilaterally   Abdomen:+ BS x 4, soft, NT, ND   Extremities: no edema   Gastric Mucositis:       -                                           Grade: n/a  Intestinal Mucositis:     -                                         Grade: n/a  Skin: no rash   TLC: PICC line c/d/i  Neuro: A&O x 3   Pain: 0      Labs:         @ 09:29  Tacrolimus 12.0                Cyclosporine --    Cultures: no recent        Meds:   Antimicrobials:   acyclovir   Oral Tab/Cap 400 milliGRAM(s) Oral three times a day  atovaquone Suspension 750 milliGRAM(s) Oral every 12 hours  ciprofloxacin     Tablet 500 milliGRAM(s) Oral every 12 hours  clotrimazole Lozenge 1 Lozenge Oral five times a day  voriconazole 200 milliGRAM(s) Oral every 12 hours      Heme / Onc:   heparin  Infusion 366 Unit(s)/Hr IV Continuous <Continuous>      GI:  docusate sodium 100 milliGRAM(s) Oral three times a day  pantoprazole    Tablet 40 milliGRAM(s) Oral before breakfast  senna 2 Tablet(s) Oral at bedtime  sodium bicarbonate Mouth Rinse 10 milliLiter(s) Swish and Spit five times a day  ursodiol Capsule 300 milliGRAM(s) Oral two times a day with meals      Immunologic:   immune   globulin 10% (GAMMAGARD) IVPB 20 Gram(s) IV Intermittent <User Schedule>  tacrolimus 1.5 milliGRAM(s) Oral every 12 hours      Other medications:   acetaminophen   Tablet .. 650 milliGRAM(s) Oral every 6 hours  acetaminophen   Tablet .. 650 milliGRAM(s) Oral <User Schedule>  ascorbic acid 250 milliGRAM(s) Oral daily  Biotene Dry Mouth Oral Rinse 5 milliLiter(s) Swish and Spit five times a day  chlorhexidine 4% Liquid 1 Application(s) Topical <User Schedule>  cholecalciferol 400 Unit(s) Oral daily  cyanocobalamin 1000 MICROGram(s) Oral daily  diphenhydrAMINE   Injectable 25 milliGRAM(s) IV Push every 3 weeks  ferrous    sulfate 325 milliGRAM(s) Oral three times a day  folic acid 1 milliGRAM(s) Oral daily  magnesium oxide 400 milliGRAM(s) Oral three times a day with meals  medroxyPROGESTERone 10 milliGRAM(s) Oral daily  montelukast 10 milliGRAM(s) Oral daily  multivitamin 1 Tablet(s) Oral daily  palifermin Injectable  (eMAR) 5280 MICROGram(s) IV Push every 24 hours  phytonadione   Solution 10 milliGRAM(s) Oral every other day  sodium chloride 0.45% 1000 milliLiter(s) IV Continuous <Continuous>  sodium chloride 0.9%. 1000 milliLiter(s) IV Continuous <Continuous>      PRN:   acetaminophen   Tablet .. 650 milliGRAM(s) Oral every 6 hours PRN  artificial  tears Solution 1 Drop(s) Both EYES three times a day PRN  diphenhydrAMINE   Injectable 25 milliGRAM(s) IV Push every 4 hours PRN  metoclopramide Injectable 10 milliGRAM(s) IV Push every 6 hours PRN  sodium chloride 0.65% Nasal 1 Spray(s) Both Nostrils five times a day PRN  sodium chloride 0.9% lock flush 10 milliLiter(s) IV Push every 1 hour PRN        A/P:  33 year old female with a history of AML  Post  :  Allogeneic PBSCT day +2  s/p HPC transplant   - continue transplant hydration for 24 hours post infusion of cells   Neutropenic - started Cipro on ; if T >/= 38C pan cx, CXR and change Cipro to Cefepime.   Labs every other day ; continue vitamin C, vitamin B, folic acid, vitamin K & iron supplementation. Increased vitamin K to every other day as per standard guidelines for bloodless transplant.     1. Infectious Disease:   acyclovir   Oral Tab/Cap 400 milliGRAM(s) Oral three times a day  atovaquone Suspension 750 milliGRAM(s) Oral every 12 hours  ciprofloxacin     Tablet 500 milliGRAM(s) Oral every 12 hours  clotrimazole Lozenge 1 Lozenge Oral five times a day  voriconazole 200 milliGRAM(s) Oral every 12 hours    2. VOD Prophylaxis: Actigall, Glutamine, Heparin (dosed at 100 units / kg / day)     3. GI Prophylaxis:    pantoprazole    Tablet 40 milliGRAM(s) Oral before breakfast    4. Mouth care - NS / NaHCO3 rinses, Mycelex, Biotene; Skin care     5. GVHD prophylaxis -  tacrolimus 2 milliGRAM(s) Oral every 12 hours    6. Transfuse & replete electrolytes prn - bloodless transplant   phytonadione   Solution 10 milliGRAM(s) Oral every other day  ferrous    sulfate 325 milliGRAM(s) Oral three times a day  folic acid 1 milliGRAM(s) Oral daily    7. IV hydration, daily weights, strict I&O, prn diuresis   Lasix 40mg IV x 1    8. PO intake as tolerated, nutrition follow up as needed, MVI, folic acid     9. Antiemetics, anti-diarrhea medications:   LORazepam   Injectable 1 milliGRAM(s) IV Push every 6 hours PRN  metoclopramide Injectable 10 milliGRAM(s) IV Push every 6 hours PRN  ondansetron Injectable 8 milliGRAM(s) IV Push every 8 hours  aprepitant 80 milliGRAM(s) Oral daily    10. OOB as tolerated, physical therapy consult if needed     11. Monitor coags / fibrinogen 2x week, vitamin K as needed     12. Monitor closely for clinical changes, monitor for fevers     13. Emotional support provided, plan of care discussed and questions addressed     14. Patient education done regarding plan of care, restrictions and discharge planning     15. Continue regular social work input     I have written the above note for Dr. Larsen who performed service with me in the room.   Racquel Leiva NP-C ([phone])    I have seen and examined patient with NP, I agree with above note as scribed. 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    S: Patient seen and examined with HPC Transplant Team:   + general fatigue  + decreased appetite  + intermittent abdominal discomfort    Denies mouth / tongue / throat pain, dyspnea, cough, nausea, vomiting, diarrhea, abdominal pain     O: Vitals:     Vital Signs Last 24 Hrs  T(C): 37 (01 Mar 2019 06:02), Max: 37.5 (2019 10:16)  T(F): 98.6 (01 Mar 2019 06:02), Max: 99.5 (2019 10:16)  HR: 97 (01 Mar 2019 06:02) (94 - 119)  BP: 124/70 (01 Mar 2019 06:02) (118/81 - 129/84)  RR: 18 (01 Mar 2019 06:02) (18 - 18)  SpO2: 98% (01 Mar 2019 06:02) (98% - 100%)    Admit weight: 88.6 kg  Daily     Daily Weight in k.9 (2019 08:15)    Intake / Output:    @ 07:01  -  03-01 @ 07:00  --------------------------------------------------------  IN: 2802 mL / OUT: 4350 mL / NET: -1548 mL      PE:   Oropharynx: no erythema or ulcerations  Oral Mucositis:              -                                         Grade: n/a  CVS: S1, S2 RRR   Lungs: CTA throughout bilaterally   Abdomen:+ BS x 4, soft, NT, ND   Extremities: no edema   Gastric Mucositis:       -                                           Grade: n/a  Intestinal Mucositis:     -                                         Grade: n/a  Skin: no rash   TLC: PICC line c/d/i  Neuro: A&O x 3   Pain: 0      Labs:         @ 09:29  Tacrolimus 12.0                Cyclosporine --    Cultures: no recent        Meds:   Antimicrobials:   acyclovir   Oral Tab/Cap 400 milliGRAM(s) Oral three times a day  atovaquone Suspension 750 milliGRAM(s) Oral every 12 hours  ciprofloxacin     Tablet 500 milliGRAM(s) Oral every 12 hours  clotrimazole Lozenge 1 Lozenge Oral five times a day  voriconazole 200 milliGRAM(s) Oral every 12 hours      Heme / Onc:   heparin  Infusion 366 Unit(s)/Hr IV Continuous <Continuous>      GI:  docusate sodium 100 milliGRAM(s) Oral three times a day  pantoprazole    Tablet 40 milliGRAM(s) Oral before breakfast  senna 2 Tablet(s) Oral at bedtime  sodium bicarbonate Mouth Rinse 10 milliLiter(s) Swish and Spit five times a day  ursodiol Capsule 300 milliGRAM(s) Oral two times a day with meals      Immunologic:   immune   globulin 10% (GAMMAGARD) IVPB 20 Gram(s) IV Intermittent <User Schedule>  tacrolimus 1.5 milliGRAM(s) Oral every 12 hours      Other medications:   acetaminophen   Tablet .. 650 milliGRAM(s) Oral every 6 hours  acetaminophen   Tablet .. 650 milliGRAM(s) Oral <User Schedule>  ascorbic acid 250 milliGRAM(s) Oral daily  Biotene Dry Mouth Oral Rinse 5 milliLiter(s) Swish and Spit five times a day  chlorhexidine 4% Liquid 1 Application(s) Topical <User Schedule>  cholecalciferol 400 Unit(s) Oral daily  cyanocobalamin 1000 MICROGram(s) Oral daily  diphenhydrAMINE   Injectable 25 milliGRAM(s) IV Push every 3 weeks  ferrous    sulfate 325 milliGRAM(s) Oral three times a day  folic acid 1 milliGRAM(s) Oral daily  magnesium oxide 400 milliGRAM(s) Oral three times a day with meals  medroxyPROGESTERone 10 milliGRAM(s) Oral daily  montelukast 10 milliGRAM(s) Oral daily  multivitamin 1 Tablet(s) Oral daily  palifermin Injectable  (eMAR) 5280 MICROGram(s) IV Push every 24 hours  phytonadione   Solution 10 milliGRAM(s) Oral every other day  sodium chloride 0.45% 1000 milliLiter(s) IV Continuous <Continuous>  sodium chloride 0.9%. 1000 milliLiter(s) IV Continuous <Continuous>      PRN:   acetaminophen   Tablet .. 650 milliGRAM(s) Oral every 6 hours PRN  artificial  tears Solution 1 Drop(s) Both EYES three times a day PRN  diphenhydrAMINE   Injectable 25 milliGRAM(s) IV Push every 4 hours PRN  metoclopramide Injectable 10 milliGRAM(s) IV Push every 6 hours PRN  sodium chloride 0.65% Nasal 1 Spray(s) Both Nostrils five times a day PRN  sodium chloride 0.9% lock flush 10 milliLiter(s) IV Push every 1 hour PRN        A/P:  33 year old female with a history of AML  Post  :  Allogeneic PBSCT day +2  s/p HPC transplant   - continue transplant hydration for 24 hours post infusion of cells   Neutropenic - started Cipro on ; if T >/= 38C pan cx, CXR and change Cipro to Cefepime.   Labs every other day ; continue vitamin C, vitamin B, folic acid, vitamin K & iron supplementation. Increased vitamin K to every other day as per standard guidelines for bloodless transplant.   MTX yesterday - will check CMP today to make sure Cr < 1.5, if > 1.5 will need leucovorin     1. Infectious Disease:   acyclovir   Oral Tab/Cap 400 milliGRAM(s) Oral three times a day  atovaquone Suspension 750 milliGRAM(s) Oral every 12 hours  ciprofloxacin     Tablet 500 milliGRAM(s) Oral every 12 hours  clotrimazole Lozenge 1 Lozenge Oral five times a day  voriconazole 200 milliGRAM(s) Oral every 12 hours    2. VOD Prophylaxis: Actigall, Glutamine, Heparin (dosed at 100 units / kg / day)     3. GI Prophylaxis:    pantoprazole    Tablet 40 milliGRAM(s) Oral before breakfast    4. Mouth care - NS / NaHCO3 rinses, Mycelex, Biotene; Skin care     5. GVHD prophylaxis -  tacrolimus 2 milliGRAM(s) Oral every 12 hours    6. Transfuse & replete electrolytes prn - bloodless transplant   phytonadione   Solution 10 milliGRAM(s) Oral every other day  ferrous    sulfate 325 milliGRAM(s) Oral three times a day  folic acid 1 milliGRAM(s) Oral daily    7. IV hydration, daily weights, strict I&O, prn diuresis   Lasix 40mg IV x 1    8. PO intake as tolerated, nutrition follow up as needed, MVI, folic acid     9. Antiemetics, anti-diarrhea medications:   LORazepam   Injectable 1 milliGRAM(s) IV Push every 6 hours PRN  metoclopramide Injectable 10 milliGRAM(s) IV Push every 6 hours PRN  ondansetron Injectable 8 milliGRAM(s) IV Push every 8 hours  aprepitant 80 milliGRAM(s) Oral daily    10. OOB as tolerated, physical therapy consult if needed     11. Monitor coags / fibrinogen 2x week, vitamin K as needed     12. Monitor closely for clinical changes, monitor for fevers     13. Emotional support provided, plan of care discussed and questions addressed     14. Patient education done regarding plan of care, restrictions and discharge planning     15. Continue regular social work input     I have written the above note for Dr. Larsen who performed service with me in the room.   Racquel Leiva NP-C ([phone])    I have seen and examined patient with NP, I agree with above note as scribed. 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    S: Patient seen and examined with HPC Transplant Team:   + general fatigue  + decreased appetite  + mouth coating (from [employer])    Denies mouth / tongue / throat pain, dyspnea, cough, nausea, vomiting, diarrhea, abdominal pain     O: Vitals:     Vital Signs Last 24 Hrs  T(C): 37 (01 Mar 2019 06:02), Max: 37.5 (2019 10:16)  T(F): 98.6 (01 Mar 2019 06:02), Max: 99.5 (2019 10:16)  HR: 97 (01 Mar 2019 06:02) (94 - 119)  BP: 124/70 (01 Mar 2019 06:02) (118/81 - 129/84)  RR: 18 (01 Mar 2019 06:02) (18 - 18)  SpO2: 98% (01 Mar 2019 06:02) (98% - 100%)    Admit weight: 88.6 kg  Daily Weight in k.9 (2019 08:15)  Today's weight: 89.3 kg    Intake / Output:    @ 07:01  -  03-01 @ 07:00  --------------------------------------------------------  IN: 2802 mL / OUT: 4350 mL / NET: -1548 mL      PE:   Oropharynx: no erythema or ulcerations  Oral Mucositis:              -                                         Grade: n/a  CVS: S1, S2 RRR   Lungs: CTA throughout bilaterally   Abdomen:+ BS x 4, soft, NT, ND   Extremities: no edema   Gastric Mucositis:       -                                           Grade: n/a  Intestinal Mucositis:     -                                         Grade: n/a  Skin: no rash   TLC: PICC line c/d/i  Neuro: A&O x 3   Pain: 0      Labs:                         11.3   0.9   )-----------( 285      ( 2019 06:57 )             32.5     2019 06:57    140    |  107    |  5      ----------------------------<  99     4.0     |  21     |  0.71     Ca    9.0        2019 06:57  Phos  2.7       2019 06:57  Mg     1.4       2019 06:57    TPro  6.1    /  Alb  3.4    /  TBili  0.3    /  DBili  x      /  AST  20     /  ALT  11     /  AlkPhos  45     2019 06:57      LIVER FUNCTIONS - ( 2019 06:57 )  Alb: 3.4 g/dL / Pro: 6.1 g/dL / ALK PHOS: 45 U/L / ALT: 11 U/L / AST: 20 U/L / GGT: x               @ 09:29  Tacrolimus 12.0                Cyclosporine --    Cultures: no recent      Meds:   Antimicrobials:   acyclovir   Oral Tab/Cap 400 milliGRAM(s) Oral three times a day  atovaquone Suspension 750 milliGRAM(s) Oral every 12 hours  ciprofloxacin     Tablet 500 milliGRAM(s) Oral every 12 hours  clotrimazole Lozenge 1 Lozenge Oral five times a day  voriconazole 200 milliGRAM(s) Oral every 12 hours      Heme / Onc:   heparin  Infusion 366 Unit(s)/Hr IV Continuous <Continuous>      GI:  docusate sodium 100 milliGRAM(s) Oral three times a day  pantoprazole    Tablet 40 milliGRAM(s) Oral before breakfast  senna 2 Tablet(s) Oral at bedtime  sodium bicarbonate Mouth Rinse 10 milliLiter(s) Swish and Spit five times a day  ursodiol Capsule 300 milliGRAM(s) Oral two times a day with meals      Immunologic:   immune   globulin 10% (GAMMAGARD) IVPB 20 Gram(s) IV Intermittent <User Schedule>  tacrolimus 1.5 milliGRAM(s) Oral every 12 hours      Other medications:   acetaminophen   Tablet .. 650 milliGRAM(s) Oral every 6 hours  acetaminophen   Tablet .. 650 milliGRAM(s) Oral <User Schedule>  ascorbic acid 250 milliGRAM(s) Oral daily  Biotene Dry Mouth Oral Rinse 5 milliLiter(s) Swish and Spit five times a day  chlorhexidine 4% Liquid 1 Application(s) Topical <User Schedule>  cholecalciferol 400 Unit(s) Oral daily  cyanocobalamin 1000 MICROGram(s) Oral daily  diphenhydrAMINE   Injectable 25 milliGRAM(s) IV Push every 3 weeks  ferrous    sulfate 325 milliGRAM(s) Oral three times a day  folic acid 1 milliGRAM(s) Oral daily  magnesium oxide 400 milliGRAM(s) Oral three times a day with meals  medroxyPROGESTERone 10 milliGRAM(s) Oral daily  montelukast 10 milliGRAM(s) Oral daily  multivitamin 1 Tablet(s) Oral daily  palifermin Injectable  (eMAR) 5280 MICROGram(s) IV Push every 24 hours  phytonadione   Solution 10 milliGRAM(s) Oral every other day  sodium chloride 0.45% 1000 milliLiter(s) IV Continuous <Continuous>  sodium chloride 0.9%. 1000 milliLiter(s) IV Continuous <Continuous>      PRN:   acetaminophen   Tablet .. 650 milliGRAM(s) Oral every 6 hours PRN  artificial  tears Solution 1 Drop(s) Both EYES three times a day PRN  diphenhydrAMINE   Injectable 25 milliGRAM(s) IV Push every 4 hours PRN  metoclopramide Injectable 10 milliGRAM(s) IV Push every 6 hours PRN  sodium chloride 0.65% Nasal 1 Spray(s) Both Nostrils five times a day PRN  sodium chloride 0.9% lock flush 10 milliLiter(s) IV Push every 1 hour PRN        A/P:  33 year old female with a history of AML  Post  :  Allogeneic PBSCT day +2  s/p HPC transplant   - continue transplant hydration for 24 hours post infusion of cells   Neutropenic - started Cipro on ; if T >/= 38C pan cx, CXR and change Cipro to Cefepime.   Labs every other day ; continue vitamin C, vitamin B, folic acid, vitamin K & iron supplementation. Increased vitamin K to every other day as per standard guidelines for bloodless transplant.   MTX yesterday - will check CMP today to make sure Cr < 1.5, if > 1.5 will need leucovorin     1. Infectious Disease:   acyclovir   Oral Tab/Cap 400 milliGRAM(s) Oral three times a day  atovaquone Suspension 750 milliGRAM(s) Oral every 12 hours  ciprofloxacin     Tablet 500 milliGRAM(s) Oral every 12 hours  clotrimazole Lozenge 1 Lozenge Oral five times a day  voriconazole 200 milliGRAM(s) Oral every 12 hours    2. VOD Prophylaxis: Actigall, Glutamine, Heparin (dosed at 100 units / kg / day)     3. GI Prophylaxis:    pantoprazole    Tablet 40 milliGRAM(s) Oral before breakfast    4. Mouth care - NS / NaHCO3 rinses, Mycelex, Biotene; Skin care     5. GVHD prophylaxis -  tacrolimus 2 milliGRAM(s) Oral every 12 hours    6. Transfuse & replete electrolytes prn - bloodless transplant   phytonadione   Solution 10 milliGRAM(s) Oral every other day  ferrous    sulfate 325 milliGRAM(s) Oral three times a day  folic acid 1 milliGRAM(s) Oral daily    7. IV hydration, daily weights, strict I&O, prn diuresis   Lasix 40mg IV x 1    8. PO intake as tolerated, nutrition follow up as needed, MVI, folic acid     9. Antiemetics, anti-diarrhea medications:   LORazepam   Injectable 1 milliGRAM(s) IV Push every 6 hours PRN  metoclopramide Injectable 10 milliGRAM(s) IV Push every 6 hours PRN  ondansetron Injectable 8 milliGRAM(s) IV Push every 8 hours  aprepitant 80 milliGRAM(s) Oral daily    10. OOB as tolerated, physical therapy consult if needed     11. Monitor coags / fibrinogen 2x week, vitamin K as needed     12. Monitor closely for clinical changes, monitor for fevers     13. Emotional support provided, plan of care discussed and questions addressed     14. Patient education done regarding plan of care, restrictions and discharge planning     15. Continue regular social work input     I have written the above note for Dr. Larsen who performed service with me in the room.   Racquel Leiva NP-C ([phone])    I have seen and examined patient with NP, I agree with above note as scribed. 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    S: Patient seen and examined with HPC Transplant Team:   + general fatigue  + decreased appetite  + mouth coating (from Kepivance)    Denies mouth / tongue / throat pain, dyspnea, cough, nausea, vomiting, diarrhea, abdominal pain     O: Vitals:     Vital Signs Last 24 Hrs  T(C): 37 (01 Mar 2019 06:02), Max: 37.5 (2019 10:16)  T(F): 98.6 (01 Mar 2019 06:02), Max: 99.5 (2019 10:16)  HR: 97 (01 Mar 2019 06:02) (94 - 119)  BP: 124/70 (01 Mar 2019 06:02) (118/81 - 129/84)  RR: 18 (01 Mar 2019 06:02) (18 - 18)  SpO2: 98% (01 Mar 2019 06:02) (98% - 100%)    Admit weight: 88.6 kg  Daily Weight in k.9 (2019 08:15)  Today's weight: 89.3 kg    Intake / Output:    @ 07:01  -  03-01 @ 07:00  --------------------------------------------------------  IN: 2802 mL / OUT: 4350 mL / NET: -1548 mL      PE:   Oropharynx: no erythema or ulcerations  Oral Mucositis:              -                                         Grade: n/a  CVS: S1, S2 RRR   Lungs: CTA throughout bilaterally   Abdomen:+ BS x 4, soft, NT, ND   Extremities: no edema   Gastric Mucositis:       -                                           Grade: n/a  Intestinal Mucositis:     -                                         Grade: n/a  Skin: no rash   TLC: PICC line c/d/i  Neuro: A&O x 3   Pain: 0      Labs:                         11.3   0.9   )-----------( 285      ( 2019 06:57 )             32.5     2019 06:57    140    |  107    |  5      ----------------------------<  99     4.0     |  21     |  0.71     Ca    9.0        2019 06:57  Phos  2.7       2019 06:57  Mg     1.4       2019 06:57    TPro  6.1    /  Alb  3.4    /  TBili  0.3    /  DBili  x      /  AST  20     /  ALT  11     /  AlkPhos  45     2019 06:57      LIVER FUNCTIONS - ( 2019 06:57 )  Alb: 3.4 g/dL / Pro: 6.1 g/dL / ALK PHOS: 45 U/L / ALT: 11 U/L / AST: 20 U/L / GGT: x               @ 09:29  Tacrolimus 12.0                Cyclosporine --    Cultures: no recent      Meds:   Antimicrobials:   acyclovir   Oral Tab/Cap 400 milliGRAM(s) Oral three times a day  atovaquone Suspension 750 milliGRAM(s) Oral every 12 hours  ciprofloxacin     Tablet 500 milliGRAM(s) Oral every 12 hours  clotrimazole Lozenge 1 Lozenge Oral five times a day  voriconazole 200 milliGRAM(s) Oral every 12 hours      Heme / Onc:   heparin  Infusion 366 Unit(s)/Hr IV Continuous <Continuous>      GI:  docusate sodium 100 milliGRAM(s) Oral three times a day  pantoprazole    Tablet 40 milliGRAM(s) Oral before breakfast  senna 2 Tablet(s) Oral at bedtime  sodium bicarbonate Mouth Rinse 10 milliLiter(s) Swish and Spit five times a day  ursodiol Capsule 300 milliGRAM(s) Oral two times a day with meals      Immunologic:   immune   globulin 10% (GAMMAGARD) IVPB 20 Gram(s) IV Intermittent <User Schedule>  tacrolimus 1.5 milliGRAM(s) Oral every 12 hours      Other medications:   acetaminophen   Tablet .. 650 milliGRAM(s) Oral every 6 hours  acetaminophen   Tablet .. 650 milliGRAM(s) Oral <User Schedule>  ascorbic acid 250 milliGRAM(s) Oral daily  Biotene Dry Mouth Oral Rinse 5 milliLiter(s) Swish and Spit five times a day  chlorhexidine 4% Liquid 1 Application(s) Topical <User Schedule>  cholecalciferol 400 Unit(s) Oral daily  cyanocobalamin 1000 MICROGram(s) Oral daily  diphenhydrAMINE   Injectable 25 milliGRAM(s) IV Push every 3 weeks  ferrous    sulfate 325 milliGRAM(s) Oral three times a day  folic acid 1 milliGRAM(s) Oral daily  magnesium oxide 400 milliGRAM(s) Oral three times a day with meals  medroxyPROGESTERone 10 milliGRAM(s) Oral daily  montelukast 10 milliGRAM(s) Oral daily  multivitamin 1 Tablet(s) Oral daily  palifermin Injectable  (eMAR) 5280 MICROGram(s) IV Push every 24 hours  phytonadione   Solution 10 milliGRAM(s) Oral every other day  sodium chloride 0.45% 1000 milliLiter(s) IV Continuous <Continuous>  sodium chloride 0.9%. 1000 milliLiter(s) IV Continuous <Continuous>      PRN:   acetaminophen   Tablet .. 650 milliGRAM(s) Oral every 6 hours PRN  artificial  tears Solution 1 Drop(s) Both EYES three times a day PRN  diphenhydrAMINE   Injectable 25 milliGRAM(s) IV Push every 4 hours PRN  metoclopramide Injectable 10 milliGRAM(s) IV Push every 6 hours PRN  sodium chloride 0.65% Nasal 1 Spray(s) Both Nostrils five times a day PRN  sodium chloride 0.9% lock flush 10 milliLiter(s) IV Push every 1 hour PRN        A/P:  33 year old female with a history of AML  Post  :  Allogeneic PBSCT day +2  s/p HPC transplant   - continue transplant hydration for 24 hours post infusion of cells   Neutropenic - started Cipro on ; if T >/= 38C pan cx, CXR and change Cipro to Cefepime.   Labs every other day ; continue vitamin C, vitamin B, folic acid, vitamin K & iron supplementation. Increased vitamin K to every other day as per standard guidelines for bloodless transplant.   MTX yesterday - will check CMP to make sure Cr < 1.5, if > 1.5 will need leucovorin   Tacro level 12, reduced dose to 1.5 mg BID, check level Saturday am    1. Infectious Disease:   acyclovir   Oral Tab/Cap 400 milliGRAM(s) Oral three times a day  atovaquone Suspension 750 milliGRAM(s) Oral every 12 hours  ciprofloxacin     Tablet 500 milliGRAM(s) Oral every 12 hours  clotrimazole Lozenge 1 Lozenge Oral five times a day  voriconazole 200 milliGRAM(s) Oral every 12 hours    2. VOD Prophylaxis: Actigall, Glutamine, Heparin (dosed at 100 units / kg / day)     3. GI Prophylaxis:    pantoprazole    Tablet 40 milliGRAM(s) Oral before breakfast    4. Mouth care - NS / NaHCO3 rinses, Mycelex, Biotene; Skin care     5. GVHD prophylaxis -  tacrolimus 1.5 milliGRAM(s) Oral every 12 hours    6. Transfuse & replete electrolytes prn - bloodless transplant   phytonadione   Solution 10 milliGRAM(s) Oral every other day  ferrous    sulfate 325 milliGRAM(s) Oral three times a day  folic acid 1 milliGRAM(s) Oral daily    7. IV hydration, daily weights, strict I&O, prn diuresis   Lasix 40mg IV x 1    8. PO intake as tolerated, nutrition follow up as needed, MVI, folic acid     9. Antiemetics, anti-diarrhea medications:   LORazepam   Injectable 1 milliGRAM(s) IV Push every 6 hours PRN  metoclopramide Injectable 10 milliGRAM(s) IV Push every 6 hours PRN  ondansetron Injectable 8 milliGRAM(s) IV Push every 8 hours  aprepitant 80 milliGRAM(s) Oral daily    10. OOB as tolerated, physical therapy consult if needed     11. Monitor coags / fibrinogen 2x week, vitamin K as needed     12. Monitor closely for clinical changes, monitor for fevers     13. Emotional support provided, plan of care discussed and questions addressed     14. Patient education done regarding plan of care, restrictions and discharge planning     15. Continue regular social work input     I have written the above note for Dr. Larsen who performed service with me in the room.   Racquel Leiva NP-C ([phone])    I have seen and examined patient with NP, I agree with above note as scribed. 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    S: Patient seen and examined with HPC Transplant Team:   + general fatigue  + decreased appetite  + mouth coating (from Kepivance)    Denies mouth / tongue / throat pain, dyspnea, cough, nausea, vomiting, diarrhea, abdominal pain     O: Vitals:     Vital Signs Last 24 Hrs  T(C): 37 (01 Mar 2019 06:02), Max: 37.5 (2019 10:16)  T(F): 98.6 (01 Mar 2019 06:02), Max: 99.5 (2019 10:16)  HR: 97 (01 Mar 2019 06:02) (94 - 119)  BP: 124/70 (01 Mar 2019 06:02) (118/81 - 129/84)  RR: 18 (01 Mar 2019 06:02) (18 - 18)  SpO2: 98% (01 Mar 2019 06:02) (98% - 100%)    Admit weight: 88.6 kg  Daily Weight in k.9 (2019 08:15)  Today's weight: 89.3 kg    Intake / Output:    @ 07:01  -  03-01 @ 07:00  --------------------------------------------------------  IN: 2802 mL / OUT: 4350 mL / NET: -1548 mL      PE:   Oropharynx: no erythema or ulcerations  Oral Mucositis:              -                                         Grade: n/a  CVS: S1, S2 RRR   Lungs: CTA throughout bilaterally   Abdomen:+ BS x 4, soft, NT, ND   Extremities: no edema   Gastric Mucositis:       -                                           Grade: n/a  Intestinal Mucositis:     -                                         Grade: n/a  Skin: no rash   TLC: PICC line c/d/i  Neuro: A&O x 3   Pain: 0      Labs:                         11.3   0.9   )-----------( 285      ( 2019 06:57 )             32.5     2019 06:57    140    |  107    |  5      ----------------------------<  99     4.0     |  21     |  0.71     Ca    9.0        2019 06:57  Phos  2.7       2019 06:57  Mg     1.4       2019 06:57    TPro  6.1    /  Alb  3.4    /  TBili  0.3    /  DBili  x      /  AST  20     /  ALT  11     /  AlkPhos  45     2019 06:57      LIVER FUNCTIONS - ( 2019 06:57 )  Alb: 3.4 g/dL / Pro: 6.1 g/dL / ALK PHOS: 45 U/L / ALT: 11 U/L / AST: 20 U/L / GGT: x               @ 09:29  Tacrolimus 12.0                Cyclosporine --    Cultures: no recent      Meds:   Antimicrobials:   acyclovir   Oral Tab/Cap 400 milliGRAM(s) Oral three times a day  atovaquone Suspension 750 milliGRAM(s) Oral every 12 hours  ciprofloxacin     Tablet 500 milliGRAM(s) Oral every 12 hours  clotrimazole Lozenge 1 Lozenge Oral five times a day  voriconazole 200 milliGRAM(s) Oral every 12 hours      Heme / Onc:   heparin  Infusion 366 Unit(s)/Hr IV Continuous <Continuous>      GI:  docusate sodium 100 milliGRAM(s) Oral three times a day  pantoprazole    Tablet 40 milliGRAM(s) Oral before breakfast  senna 2 Tablet(s) Oral at bedtime  sodium bicarbonate Mouth Rinse 10 milliLiter(s) Swish and Spit five times a day  ursodiol Capsule 300 milliGRAM(s) Oral two times a day with meals      Immunologic:   immune   globulin 10% (GAMMAGARD) IVPB 20 Gram(s) IV Intermittent <User Schedule>  tacrolimus 1.5 milliGRAM(s) Oral every 12 hours      Other medications:   acetaminophen   Tablet .. 650 milliGRAM(s) Oral every 6 hours  acetaminophen   Tablet .. 650 milliGRAM(s) Oral <User Schedule>  ascorbic acid 250 milliGRAM(s) Oral daily  Biotene Dry Mouth Oral Rinse 5 milliLiter(s) Swish and Spit five times a day  chlorhexidine 4% Liquid 1 Application(s) Topical <User Schedule>  cholecalciferol 400 Unit(s) Oral daily  cyanocobalamin 1000 MICROGram(s) Oral daily  diphenhydrAMINE   Injectable 25 milliGRAM(s) IV Push every 3 weeks  ferrous    sulfate 325 milliGRAM(s) Oral three times a day  folic acid 1 milliGRAM(s) Oral daily  magnesium oxide 400 milliGRAM(s) Oral three times a day with meals  medroxyPROGESTERone 10 milliGRAM(s) Oral daily  montelukast 10 milliGRAM(s) Oral daily  multivitamin 1 Tablet(s) Oral daily  palifermin Injectable  (eMAR) 5280 MICROGram(s) IV Push every 24 hours  phytonadione   Solution 10 milliGRAM(s) Oral every other day  sodium chloride 0.45% 1000 milliLiter(s) IV Continuous <Continuous>  sodium chloride 0.9%. 1000 milliLiter(s) IV Continuous <Continuous>      PRN:   acetaminophen   Tablet .. 650 milliGRAM(s) Oral every 6 hours PRN  artificial  tears Solution 1 Drop(s) Both EYES three times a day PRN  diphenhydrAMINE   Injectable 25 milliGRAM(s) IV Push every 4 hours PRN  metoclopramide Injectable 10 milliGRAM(s) IV Push every 6 hours PRN  sodium chloride 0.65% Nasal 1 Spray(s) Both Nostrils five times a day PRN  sodium chloride 0.9% lock flush 10 milliLiter(s) IV Push every 1 hour PRN        A/P:  33 year old female with a history of AML  Post  :  Allogeneic PBSCT day +2  s/p HPC transplant   - continue transplant hydration for 24 hours post infusion of cells   Neutropenic - started Cipro on ; if T >/= 38C pan cx, CXR and change Cipro to Cefepime.   Labs every other day ; continue vitamin C, vitamin B, folic acid, vitamin K & iron supplementation. Increased vitamin K to every other day as per standard guidelines for bloodless transplant.   MTX  - Cr < 1.5, no need for leucovorin   Tacro level 12, reduced dose to 1.5 mg BID, check level Saturday am    1. Infectious Disease:   acyclovir   Oral Tab/Cap 400 milliGRAM(s) Oral three times a day  atovaquone Suspension 750 milliGRAM(s) Oral every 12 hours  ciprofloxacin     Tablet 500 milliGRAM(s) Oral every 12 hours  clotrimazole Lozenge 1 Lozenge Oral five times a day  voriconazole 200 milliGRAM(s) Oral every 12 hours    2. VOD Prophylaxis: Actigall, Glutamine, Heparin (dosed at 100 units / kg / day)     3. GI Prophylaxis:    pantoprazole    Tablet 40 milliGRAM(s) Oral before breakfast    4. Mouth care - NS / NaHCO3 rinses, Mycelex, Biotene; Skin care     5. GVHD prophylaxis -  tacrolimus 1.5 milliGRAM(s) Oral every 12 hours    6. Transfuse & replete electrolytes prn - bloodless transplant   phytonadione   Solution 10 milliGRAM(s) Oral every other day  ferrous    sulfate 325 milliGRAM(s) Oral three times a day  folic acid 1 milliGRAM(s) Oral daily    7. IV hydration, daily weights, strict I&O, prn diuresis   Lasix 40mg IV x 1    8. PO intake as tolerated, nutrition follow up as needed, MVI, folic acid     9. Antiemetics, anti-diarrhea medications:   LORazepam   Injectable 1 milliGRAM(s) IV Push every 6 hours PRN  metoclopramide Injectable 10 milliGRAM(s) IV Push every 6 hours PRN  ondansetron Injectable 8 milliGRAM(s) IV Push every 8 hours  aprepitant 80 milliGRAM(s) Oral daily    10. OOB as tolerated, physical therapy consult if needed     11. Monitor coags / fibrinogen 2x week, vitamin K as needed     12. Monitor closely for clinical changes, monitor for fevers     13. Emotional support provided, plan of care discussed and questions addressed     14. Patient education done regarding plan of care, restrictions and discharge planning     15. Continue regular social work input     I have written the above note for Dr. Larsen who performed service with me in the room.   Racquel Leiva NP-C ([phone])    I have seen and examined patient with NP, I agree with above note as scribed.

## 2019-03-01 NOTE — PROGRESS NOTE ADULT - ATTENDING COMMENTS
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 Pt is [employer], and does not accept blood products for Mandaen reasons   Day + 1 -  continue transplant hydration for 24 hours post infusion of cells   Begin Zarxio on day +12  Continue Acyclovir, Mepron, Vfend prophylaxis; on Cipro for neutropenia  Continue VOD prophylaxis with Actigall, glutamine supplement, low dose heparin drip(will D/C when platelet count <=75K)  Labs every other day   Continue iron, folic acid, vitamin K, vitamin B-12, vitamin C as per standard practice for bloodless transplant  Antiemetics, mouth care, skin care   Bowel regimen as needed  OOB/ambulate 32 y/o F with h/o AML(now with 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 Pt is [employer], and does not accept blood products for Zoroastrian reasons   Day + 2 -  s/p MTX on day +1, check serum creatinine today  Begin Zarxio on day +12  Continue Acyclovir, Mepron, Vfend prophylaxis; on Cipro for neutropenia  Continue VOD prophylaxis with Actigall, glutamine supplement, low dose heparin drip(will D/C when platelet count <=75K)  Labs every other day or at discretion of attending physician  Continue iron, folic acid, vitamin K, vitamin B-12, vitamin C as per standard practice for bloodless transplant  Antiemetics, mouth care, skin care   Bowel regimen as needed  OOB/ambulate

## 2019-03-02 LAB
ALBUMIN SERPL ELPH-MCNC: 3.6 G/DL — SIGNIFICANT CHANGE UP (ref 3.3–5)
ALP SERPL-CCNC: 46 U/L — SIGNIFICANT CHANGE UP (ref 40–120)
ALT FLD-CCNC: 15 U/L — SIGNIFICANT CHANGE UP (ref 10–45)
ANION GAP SERPL CALC-SCNC: 12 MMOL/L — SIGNIFICANT CHANGE UP (ref 5–17)
APTT BLD: 29.7 SEC — SIGNIFICANT CHANGE UP (ref 27.5–36.3)
AST SERPL-CCNC: 24 U/L — SIGNIFICANT CHANGE UP (ref 10–40)
BASOPHILS # BLD AUTO: 0 K/UL — SIGNIFICANT CHANGE UP (ref 0–0.2)
BASOPHILS NFR BLD AUTO: 2.5 % — HIGH (ref 0–2)
BILIRUB SERPL-MCNC: 0.4 MG/DL — SIGNIFICANT CHANGE UP (ref 0.2–1.2)
BUN SERPL-MCNC: 8 MG/DL — SIGNIFICANT CHANGE UP (ref 7–23)
CALCIUM SERPL-MCNC: 9.6 MG/DL — SIGNIFICANT CHANGE UP (ref 8.4–10.5)
CHLORIDE SERPL-SCNC: 105 MMOL/L — SIGNIFICANT CHANGE UP (ref 96–108)
CO2 SERPL-SCNC: 22 MMOL/L — SIGNIFICANT CHANGE UP (ref 22–31)
CREAT SERPL-MCNC: 0.66 MG/DL — SIGNIFICANT CHANGE UP (ref 0.5–1.3)
EOSINOPHIL # BLD AUTO: 0 K/UL — SIGNIFICANT CHANGE UP (ref 0–0.5)
EOSINOPHIL NFR BLD AUTO: 4.2 % — SIGNIFICANT CHANGE UP (ref 0–6)
GLUCOSE SERPL-MCNC: 105 MG/DL — HIGH (ref 70–99)
HCT VFR BLD CALC: 33 % — LOW (ref 34.5–45)
HGB BLD-MCNC: 11.8 G/DL — SIGNIFICANT CHANGE UP (ref 11.5–15.5)
INR BLD: 0.96 RATIO — SIGNIFICANT CHANGE UP (ref 0.88–1.16)
LDH SERPL L TO P-CCNC: 225 U/L — SIGNIFICANT CHANGE UP (ref 50–242)
LYMPHOCYTES # BLD AUTO: 0.3 K/UL — LOW (ref 1–3.3)
LYMPHOCYTES # BLD AUTO: 54.1 % — HIGH (ref 13–44)
MAGNESIUM SERPL-MCNC: 1.6 MG/DL — SIGNIFICANT CHANGE UP (ref 1.6–2.6)
MCHC RBC-ENTMCNC: 32.8 PG — SIGNIFICANT CHANGE UP (ref 27–34)
MCHC RBC-ENTMCNC: 35.6 GM/DL — SIGNIFICANT CHANGE UP (ref 32–36)
MCV RBC AUTO: 92 FL — SIGNIFICANT CHANGE UP (ref 80–100)
MONOCYTES # BLD AUTO: 0.1 K/UL — SIGNIFICANT CHANGE UP (ref 0–0.9)
MONOCYTES NFR BLD AUTO: 11.2 % — SIGNIFICANT CHANGE UP (ref 2–14)
NEUTROPHILS # BLD AUTO: 0.2 K/UL — LOW (ref 1.8–7.4)
NEUTROPHILS NFR BLD AUTO: 28 % — LOW (ref 43–77)
PHOSPHATE SERPL-MCNC: 3.2 MG/DL — SIGNIFICANT CHANGE UP (ref 2.5–4.5)
PLATELET # BLD AUTO: 242 K/UL — SIGNIFICANT CHANGE UP (ref 150–400)
POTASSIUM SERPL-MCNC: 4.4 MMOL/L — SIGNIFICANT CHANGE UP (ref 3.5–5.3)
POTASSIUM SERPL-SCNC: 4.4 MMOL/L — SIGNIFICANT CHANGE UP (ref 3.5–5.3)
PROT SERPL-MCNC: 6.8 G/DL — SIGNIFICANT CHANGE UP (ref 6–8.3)
PROTHROM AB SERPL-ACNC: 10.9 SEC — SIGNIFICANT CHANGE UP (ref 10–12.9)
RBC # BLD: 3.59 M/UL — LOW (ref 3.8–5.2)
RBC # FLD: 14.5 % — SIGNIFICANT CHANGE UP (ref 10.3–14.5)
SODIUM SERPL-SCNC: 139 MMOL/L — SIGNIFICANT CHANGE UP (ref 135–145)
TACROLIMUS SERPL-MCNC: 17.7 NG/ML — SIGNIFICANT CHANGE UP
WBC # BLD: 0.6 K/UL — CRITICAL LOW (ref 3.8–10.5)
WBC # FLD AUTO: 0.6 K/UL — CRITICAL LOW (ref 3.8–10.5)

## 2019-03-02 PROCEDURE — 99291 CRITICAL CARE FIRST HOUR: CPT

## 2019-03-02 RX ORDER — METHOTREXATE 2.5 MG/1
9 TABLET ORAL ONCE
Qty: 0 | Refills: 0 | Status: COMPLETED | OUTPATIENT
Start: 2019-03-02 | End: 2019-03-02

## 2019-03-02 RX ADMIN — MAGNESIUM OXIDE 400 MG ORAL TABLET 400 MILLIGRAM(S): 241.3 TABLET ORAL at 12:14

## 2019-03-02 RX ADMIN — Medication 10 MILLILITER(S): at 16:09

## 2019-03-02 RX ADMIN — Medication 325 MILLIGRAM(S): at 13:46

## 2019-03-02 RX ADMIN — Medication 5 MILLILITER(S): at 08:07

## 2019-03-02 RX ADMIN — ATOVAQUONE 750 MILLIGRAM(S): 750 SUSPENSION ORAL at 18:04

## 2019-03-02 RX ADMIN — METHOTREXATE 4.32 MILLIGRAM(S): 2.5 TABLET ORAL at 16:08

## 2019-03-02 RX ADMIN — VORICONAZOLE 200 MILLIGRAM(S): 10 INJECTION, POWDER, LYOPHILIZED, FOR SOLUTION INTRAVENOUS at 18:04

## 2019-03-02 RX ADMIN — Medication 400 MILLIGRAM(S): at 06:20

## 2019-03-02 RX ADMIN — CHLORHEXIDINE GLUCONATE 1 APPLICATION(S): 213 SOLUTION TOPICAL at 06:21

## 2019-03-02 RX ADMIN — MAGNESIUM OXIDE 400 MG ORAL TABLET 400 MILLIGRAM(S): 241.3 TABLET ORAL at 08:07

## 2019-03-02 RX ADMIN — URSODIOL 300 MILLIGRAM(S): 250 TABLET, FILM COATED ORAL at 18:04

## 2019-03-02 RX ADMIN — ATOVAQUONE 750 MILLIGRAM(S): 750 SUSPENSION ORAL at 06:20

## 2019-03-02 RX ADMIN — URSODIOL 300 MILLIGRAM(S): 250 TABLET, FILM COATED ORAL at 08:07

## 2019-03-02 RX ADMIN — Medication 5 MILLILITER(S): at 12:11

## 2019-03-02 RX ADMIN — MONTELUKAST 10 MILLIGRAM(S): 4 TABLET, CHEWABLE ORAL at 12:15

## 2019-03-02 RX ADMIN — TACROLIMUS 1.5 MILLIGRAM(S): 5 CAPSULE ORAL at 06:22

## 2019-03-02 RX ADMIN — Medication 1 LOZENGE: at 12:11

## 2019-03-02 RX ADMIN — Medication 5 MILLILITER(S): at 16:05

## 2019-03-02 RX ADMIN — Medication 5 MILLILITER(S): at 20:38

## 2019-03-02 RX ADMIN — VORICONAZOLE 200 MILLIGRAM(S): 10 INJECTION, POWDER, LYOPHILIZED, FOR SOLUTION INTRAVENOUS at 06:23

## 2019-03-02 RX ADMIN — Medication 500 MILLIGRAM(S): at 18:04

## 2019-03-02 RX ADMIN — Medication 400 UNIT(S): at 12:15

## 2019-03-02 RX ADMIN — Medication 400 MILLIGRAM(S): at 21:03

## 2019-03-02 RX ADMIN — Medication 325 MILLIGRAM(S): at 06:22

## 2019-03-02 RX ADMIN — Medication 100 MILLIGRAM(S): at 06:22

## 2019-03-02 RX ADMIN — TACROLIMUS 1.5 MILLIGRAM(S): 5 CAPSULE ORAL at 18:04

## 2019-03-02 RX ADMIN — Medication 10 MILLILITER(S): at 08:05

## 2019-03-02 RX ADMIN — Medication 1 LOZENGE: at 20:38

## 2019-03-02 RX ADMIN — Medication 500 MILLIGRAM(S): at 06:21

## 2019-03-02 RX ADMIN — Medication 325 MILLIGRAM(S): at 21:04

## 2019-03-02 RX ADMIN — SENNA PLUS 2 TABLET(S): 8.6 TABLET ORAL at 21:07

## 2019-03-02 RX ADMIN — Medication 400 MILLIGRAM(S): at 13:46

## 2019-03-02 RX ADMIN — PANTOPRAZOLE SODIUM 40 MILLIGRAM(S): 20 TABLET, DELAYED RELEASE ORAL at 06:23

## 2019-03-02 RX ADMIN — Medication 250 MILLIGRAM(S): at 12:12

## 2019-03-02 RX ADMIN — PREGABALIN 1000 MICROGRAM(S): 225 CAPSULE ORAL at 12:14

## 2019-03-02 RX ADMIN — Medication 10 MILLILITER(S): at 20:38

## 2019-03-02 RX ADMIN — MAGNESIUM OXIDE 400 MG ORAL TABLET 400 MILLIGRAM(S): 241.3 TABLET ORAL at 18:04

## 2019-03-02 RX ADMIN — Medication 10 MILLILITER(S): at 12:16

## 2019-03-02 RX ADMIN — Medication 1 TABLET(S): at 12:15

## 2019-03-02 RX ADMIN — Medication 1 LOZENGE: at 16:05

## 2019-03-02 RX ADMIN — Medication 1 MILLIGRAM(S): at 12:14

## 2019-03-02 RX ADMIN — MEDROXYPROGESTERONE ACETATE 10 MILLIGRAM(S): 150 INJECTION, SUSPENSION, EXTENDED RELEASE INTRAMUSCULAR at 12:12

## 2019-03-02 RX ADMIN — Medication 1 LOZENGE: at 08:07

## 2019-03-02 NOTE — PROGRESS NOTE ADULT - ATTENDING COMMENTS
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 Pt is S[employer], and does not accept blood products for Shinto reasons   Day + 2 -  s/p MTX on day +1, check serum creatinine today  Begin Zarxio on day +12  Continue Acyclovir, Mepron, Vfend prophylaxis; on Cipro for neutropenia  Continue VOD prophylaxis with Actigall, glutamine supplement, low dose heparin drip(will D/C when platelet count <=75K)  Labs every other day or at discretion of attending physician  Continue iron, folic acid, vitamin K, vitamin B-12, vitamin C as per standard practice for bloodless transplant  Antiemetics, mouth care, skin care   Bowel regimen as needed  OOB/ambulate 34 y/o F with h/o AML(now with 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 Pt is Sabianist, and does not accept blood products for Samaritan reasons   Day + 3 -  for MTX today, serum creatinine in normal range. No mucositis.  Continue Tacrolimus, check level today  Begin Zarxio on day +12  Continue Acyclovir, Mepron, Vfend prophylaxis; on Cipro for neutropenia  Continue VOD prophylaxis with Actigall, glutamine supplement, low dose heparin drip(will D/C when platelet count <=75K)  Labs every other day or at discretion of attending physician  Continue iron, folic acid, vitamin K, vitamin B-12, vitamin C as per standard practice for bloodless transplant  Antiemetics, mouth care, skin care   Bowel regimen as needed  OOB/ambulate

## 2019-03-02 NOTE — PROGRESS NOTE ADULT - SUBJECTIVE AND OBJECTIVE BOX
HPC Transplant Team                                                      Critical / Counseling Time Provided: 30 minutes                                                                                                                                                        Chief Complaint:     S: Patient seen and examined with HPC Transplant Team:   Denies mouth / tongue / throat pain, dyspnea, cough, nausea, vomiting, diarrhea, abdominal pain     O: Vitals:   Vital Signs Last 24 Hrs  T(C): 36.2 (02 Mar 2019 06:39), Max: 37.2 (01 Mar 2019 14:45)  T(F): 97.2 (02 Mar 2019 06:39), Max: 99 (01 Mar 2019 14:45)  HR: 89 (02 Mar 2019 06:39) (85 - 115)  BP: 133/83 (02 Mar 2019 06:39) (112/76 - 133/93)  BP(mean): --  RR: 18 (02 Mar 2019 06:39) (18 - 18)  SpO2: 100% (02 Mar 2019 06:39) (99% - 100%)    Admit weight:   Daily     Daily Weight in k.3 (01 Mar 2019 09:25)    Intake / Output:    @ 07:01  -  03-02 @ 07:00  --------------------------------------------------------  IN: 1860.9 mL / OUT: 1500 mL / NET: 360.9 mL          PE:   Oropharynx:   Oral Mucositis:                                                        Grade:   CVS:   Lungs:   Abdomen:  Extremities:   Gastric Mucositis:                                                  Grade:   Intestinal Mucositis:                                              Grade:   Skin:   TLC:   Neuro:   Pain:     Labs:   CBC Full  -  ( 02 Mar 2019 06:42 )  WBC Count : 0.6 K/uL  Hemoglobin : 11.8 g/dL  Hematocrit : 33.0 %  Platelet Count - Automated : 242 K/uL  Mean Cell Volume : 92.0 fl  Mean Cell Hemoglobin : 32.8 pg  Mean Cell Hemoglobin Concentration : 35.6 gm/dL  Auto Neutrophil # : x  Auto Lymphocyte # : x  Auto Monocyte # : x  Auto Eosinophil # : x  Auto Basophil # : x  Auto Neutrophil % : x  Auto Lymphocyte % : x  Auto Monocyte % : x  Auto Eosinophil % : x  Auto Basophil % : x                          11.8   0.6   )-----------( 242      ( 02 Mar 2019 06:42 )             33.0     03-    138  |  103  |  14  ----------------------------<  127<H>  4.1   |  23  |  0.71    Ca    9.1      01 Mar 2019 11:52    TPro  6.7  /  Alb  3.7  /  TBili  0.2  /  DBili  x   /  AST  18  /  ALT  10  /  AlkPhos  48  -    PT/INR - ( 02 Mar 2019 06:42 )   PT: 10.9 sec;   INR: 0.96 ratio         PTT - ( 02 Mar 2019 06:42 )  PTT:29.7 sec  LIVER FUNCTIONS - ( 01 Mar 2019 11:52 )  Alb: 3.7 g/dL / Pro: 6.7 g/dL / ALK PHOS: 48 U/L / ALT: 10 U/L / AST: 18 U/L / GGT: x                   Karnofsky / Lansky Scale:   GVHD:   Skin:   Liver:   Gut:   Overall Grade:       Cultures:         Radiology:       Meds:   Antimicrobials:   acyclovir   Oral Tab/Cap 400 milliGRAM(s) Oral three times a day  atovaquone Suspension 750 milliGRAM(s) Oral every 12 hours  ciprofloxacin     Tablet 500 milliGRAM(s) Oral every 12 hours  clotrimazole Lozenge 1 Lozenge Oral five times a day  voriconazole 200 milliGRAM(s) Oral every 12 hours      Heme / Onc:   heparin  Infusion 366 Unit(s)/Hr IV Continuous <Continuous>      GI:  docusate sodium 100 milliGRAM(s) Oral three times a day  pantoprazole    Tablet 40 milliGRAM(s) Oral before breakfast  senna 2 Tablet(s) Oral at bedtime  sodium bicarbonate Mouth Rinse 10 milliLiter(s) Swish and Spit five times a day  ursodiol Capsule 300 milliGRAM(s) Oral two times a day with meals      Cardiovascular:       Immunologic:   immune   globulin 10% (GAMMAGARD) IVPB 20 Gram(s) IV Intermittent <User Schedule>  tacrolimus 1.5 milliGRAM(s) Oral every 12 hours      Other medications:   acetaminophen   Tablet .. 650 milliGRAM(s) Oral every 6 hours  acetaminophen   Tablet .. 650 milliGRAM(s) Oral <User Schedule>  ascorbic acid 250 milliGRAM(s) Oral daily  Biotene Dry Mouth Oral Rinse 5 milliLiter(s) Swish and Spit five times a day  chlorhexidine 4% Liquid 1 Application(s) Topical <User Schedule>  cholecalciferol 400 Unit(s) Oral daily  cyanocobalamin 1000 MICROGram(s) Oral daily  diphenhydrAMINE   Injectable 25 milliGRAM(s) IV Push every 3 weeks  ferrous    sulfate 325 milliGRAM(s) Oral three times a day  folic acid 1 milliGRAM(s) Oral daily  magnesium oxide 400 milliGRAM(s) Oral three times a day with meals  medroxyPROGESTERone 10 milliGRAM(s) Oral daily  montelukast 10 milliGRAM(s) Oral daily  multivitamin 1 Tablet(s) Oral daily  phytonadione   Solution 10 milliGRAM(s) Oral every other day  sodium chloride 0.45% 1000 milliLiter(s) IV Continuous <Continuous>  sodium chloride 0.9%. 1000 milliLiter(s) IV Continuous <Continuous>      PRN:   acetaminophen   Tablet .. 650 milliGRAM(s) Oral every 6 hours PRN  artificial  tears Solution 1 Drop(s) Both EYES three times a day PRN  diphenhydrAMINE   Injectable 25 milliGRAM(s) IV Push every 4 hours PRN  metoclopramide Injectable 10 milliGRAM(s) IV Push every 6 hours PRN  sodium chloride 0.65% Nasal 1 Spray(s) Both Nostrils five times a day PRN  sodium chloride 0.9% lock flush 10 milliLiter(s) IV Push every 1 hour PRN      A/P:   ___ year old ___  with a history of ______________________  Pre / Status Post :  Autologous / Allogeneic PBSCT / BMT day ____________    1. Infectious Disease:   Fluconazole, Acyclovir     2. VOD Prophylaxis: Actigall, Glutamine, Heparin (dosed at 100 units / kg / day)     3. GI Prophylaxis:  Protonix    4. Mouthcare - NS / NaHCO3 rinses, Mycelex, Caphosol, skin care     5. GVHD prophylaxis     6. Transfuse & replete electrolytes prn     7. IV hydration, daily weights, strict I&O, prn diuresis     8. PO intake as tolerated, nutrition follow up as needed, MVI, folic acid     9. Antiemetics, anti-diarrhea medications:   Reglan, Ativan    10. OOB as tolerated, physical therapy consult if needed     11. Monitor coags / fibrinogen 2x week, vitamin K as needed     12. Monitor closely for clinical changes, monitor for fevers     13. Emotional support provided, plan of care discussed with patient and family, questions addressed     14. Patient education done regarding chemotherapy prep, plan of care, restrictions and discharge planning     15. Continue regular social work input     I have written the above note for Dr. Larsen who performed service with me in the room.   Maru Del Rio  NP-C ([phone])    I have seen and examined patient with NP, I agree with above note as scribed. 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      S: Patient seen and examined with HPC Transplant Team:   + general fatigue  + decreased appetite  + diarrhea x2 since this am    Denies mouth , tongue , throat pain, dyspnea, cough, nausea, vomiting,  abdominal pain     O: Vitals:   Vital Signs Last 24 Hrs  T(C): 36.2 (02 Mar 2019 06:39), Max: 37.2 (01 Mar 2019 14:45)  T(F): 97.2 (02 Mar 2019 06:39), Max: 99 (01 Mar 2019 14:45)  HR: 89 (02 Mar 2019 06:39) (85 - 115)  BP: 133/83 (02 Mar 2019 06:39) (112/76 - 133/93)  BP(mean): --  RR: 18 (02 Mar 2019 06:39) (18 - 18)  SpO2: 100% (02 Mar 2019 06:39) (99% - 100%)    Admit weight:   Daily     Daily Weight in k.3 (01 Mar 2019 09:25)    Intake / Output:   03- @ 07:01  -  03-02 @ 07:00  --------------------------------------------------------  IN: 1860.9 mL / OUT: 1500 mL / NET: 360.9 mL    PE:   Oropharynx: no erythema or ulcerations  Oral Mucositis:              -                                         Grade: n/a  CVS: S1, S2 RRR   Lungs: CTA throughout bilaterally   Abdomen:+ BS x 4, soft, NT, ND   Extremities: no edema   Gastric Mucositis:       -                                           Grade: n/a  Intestinal Mucositis:     -                                         Grade: n/a  Skin: no rash   TLC: PICC line in right arm, c/d/i  Neuro: A&O x 3   Pain: 0      Labs:   CBC Full  -  ( 02 Mar 2019 06:42 )  WBC Count : 0.6 K/uL  Hemoglobin : 11.8 g/dL  Hematocrit : 33.0 %  Platelet Count - Automated : 242 K/uL  Mean Cell Volume : 92.0 fl  Mean Cell Hemoglobin : 32.8 pg  Mean Cell Hemoglobin Concentration : 35.6 gm/dL  Auto Neutrophil # : x  Auto Lymphocyte # : x  Auto Monocyte # : x  Auto Eosinophil # : x  Auto Basophil # : x  Auto Neutrophil % : x  Auto Lymphocyte % : x  Auto Monocyte % : x  Auto Eosinophil % : x  Auto Basophil % : x                          11.8   0.6   )-----------( 242      ( 02 Mar 2019 06:42 )             33.0     03-    138  |  103  |  14  ----------------------------<  127<H>  4.1   |  23  |  0.71    Ca    9.1      01 Mar 2019 11:52    TPro  6.7  /  Alb  3.7  /  TBili  0.2  /  DBili  x   /  AST  18  /  ALT  10  /  AlkPhos  48      PT/INR - ( 02 Mar 2019 06:42 )   PT: 10.9 sec;   INR: 0.96 ratio         PTT - ( 02 Mar 2019 06:42 )  PTT:29.7 sec  LIVER FUNCTIONS - ( 01 Mar 2019 11:52 )  Alb: 3.7 g/dL / Pro: 6.7 g/dL / ALK PHOS: 48 U/L / ALT: 10 U/L / AST: 18 U/L / GGT: x                   Karnofsky / Lansky Scale:   GVHD:   Skin:   Liver:   Gut:   Overall Grade:       Cultures:         Radiology:       Meds:   Antimicrobials:   acyclovir   Oral Tab/Cap 400 milliGRAM(s) Oral three times a day  atovaquone Suspension 750 milliGRAM(s) Oral every 12 hours  ciprofloxacin     Tablet 500 milliGRAM(s) Oral every 12 hours  clotrimazole Lozenge 1 Lozenge Oral five times a day  voriconazole 200 milliGRAM(s) Oral every 12 hours      Heme / Onc:   heparin  Infusion 366 Unit(s)/Hr IV Continuous <Continuous>      GI:  docusate sodium 100 milliGRAM(s) Oral three times a day  pantoprazole    Tablet 40 milliGRAM(s) Oral before breakfast  senna 2 Tablet(s) Oral at bedtime  sodium bicarbonate Mouth Rinse 10 milliLiter(s) Swish and Spit five times a day  ursodiol Capsule 300 milliGRAM(s) Oral two times a day with meals      Cardiovascular:       Immunologic:   immune   globulin 10% (GAMMAGARD) IVPB 20 Gram(s) IV Intermittent <User Schedule>  tacrolimus 1.5 milliGRAM(s) Oral every 12 hours      Other medications:   acetaminophen   Tablet .. 650 milliGRAM(s) Oral every 6 hours  acetaminophen   Tablet .. 650 milliGRAM(s) Oral <User Schedule>  ascorbic acid 250 milliGRAM(s) Oral daily  Biotene Dry Mouth Oral Rinse 5 milliLiter(s) Swish and Spit five times a day  chlorhexidine 4% Liquid 1 Application(s) Topical <User Schedule>  cholecalciferol 400 Unit(s) Oral daily  cyanocobalamin 1000 MICROGram(s) Oral daily  diphenhydrAMINE   Injectable 25 milliGRAM(s) IV Push every 3 weeks  ferrous    sulfate 325 milliGRAM(s) Oral three times a day  folic acid 1 milliGRAM(s) Oral daily  magnesium oxide 400 milliGRAM(s) Oral three times a day with meals  medroxyPROGESTERone 10 milliGRAM(s) Oral daily  montelukast 10 milliGRAM(s) Oral daily  multivitamin 1 Tablet(s) Oral daily  phytonadione   Solution 10 milliGRAM(s) Oral every other day  sodium chloride 0.45% 1000 milliLiter(s) IV Continuous <Continuous>  sodium chloride 0.9%. 1000 milliLiter(s) IV Continuous <Continuous>      PRN:   acetaminophen   Tablet .. 650 milliGRAM(s) Oral every 6 hours PRN  artificial  tears Solution 1 Drop(s) Both EYES three times a day PRN  diphenhydrAMINE   Injectable 25 milliGRAM(s) IV Push every 4 hours PRN  metoclopramide Injectable 10 milliGRAM(s) IV Push every 6 hours PRN  sodium chloride 0.65% Nasal 1 Spray(s) Both Nostrils five times a day PRN  sodium chloride 0.9% lock flush 10 milliLiter(s) IV Push every 1 hour PRN      A/P:   ___ year old ___  with a history of ______________________  Pre / Status Post :  Autologous / Allogeneic PBSCT / BMT day ____________    1. Infectious Disease:   Fluconazole, Acyclovir     2. VOD Prophylaxis: Actigall, Glutamine, Heparin (dosed at 100 units / kg / day)     3. GI Prophylaxis:  Protonix    4. Mouthcare - NS / NaHCO3 rinses, Mycelex, Caphosol, skin care     5. GVHD prophylaxis     6. Transfuse & replete electrolytes prn     7. IV hydration, daily weights, strict I&O, prn diuresis     8. PO intake as tolerated, nutrition follow up as needed, MVI, folic acid     9. Antiemetics, anti-diarrhea medications:   Reglan, Ativan    10. OOB as tolerated, physical therapy consult if needed     11. Monitor coags / fibrinogen 2x week, vitamin K as needed     12. Monitor closely for clinical changes, monitor for fevers     13. Emotional support provided, plan of care discussed with patient and family, questions addressed     14. Patient education done regarding chemotherapy prep, plan of care, restrictions and discharge planning     15. Continue regular social work input     I have written the above note for Dr. Larsen who performed service with me in the room.   Maru Del Rio  NP-C ([phone])    I have seen and examined patient with NP, I agree with above note as scribed. 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      S: Patient seen and examined with HPC Transplant Team:   + general fatigue  + decreased appetite  + diarrhea x2 since this am    Denies mouth , tongue , throat pain, dyspnea, cough, nausea, vomiting,  abdominal pain     O: Vitals:   Vital Signs Last 24 Hrs  T(C): 36.2 (02 Mar 2019 06:39), Max: 37.2 (01 Mar 2019 14:45)  T(F): 97.2 (02 Mar 2019 06:39), Max: 99 (01 Mar 2019 14:45)  HR: 89 (02 Mar 2019 06:39) (85 - 115)  BP: 133/83 (02 Mar 2019 06:39) (112/76 - 133/93)  BP(mean): --  RR: 18 (02 Mar 2019 06:39) (18 - 18)  SpO2: 100% (02 Mar 2019 06:39) (99% - 100%)    Admit weight:   Daily     Daily Weight in k.3 (01 Mar 2019 09:25)    Intake / Output:   03- @ 07:01  -  03-02 @ 07:00  --------------------------------------------------------  IN: 1860.9 mL / OUT: 1500 mL / NET: 360.9 mL    PE:   Oropharynx: no erythema or ulcerations  Oral Mucositis:              -                                         Grade: n/a  CVS: S1, S2 RRR   Lungs: CTA throughout bilaterally   Abdomen:+ BS x 4, soft, NT, ND   Extremities: no edema   Gastric Mucositis:       -                                           Grade: n/a  Intestinal Mucositis:     -                                         Grade: n/a  Skin: no rash   TLC: PICC line in right arm, c/d/i  Neuro: A&O x 3   Pain: 0      Labs:   CBC Full  -  ( 02 Mar 2019 06:42 )  WBC Count : 0.6 K/uL  Hemoglobin : 11.8 g/dL  Hematocrit : 33.0 %  Platelet Count - Automated : 242 K/uL  Mean Cell Volume : 92.0 fl  Mean Cell Hemoglobin : 32.8 pg  Mean Cell Hemoglobin Concentration : 35.6 gm/dL  Auto Neutrophil # : x  Auto Lymphocyte # : x  Auto Monocyte # : x  Auto Eosinophil # : x  Auto Basophil # : x  Auto Neutrophil % : x  Auto Lymphocyte % : x  Auto Monocyte % : x  Auto Eosinophil % : x  Auto Basophil % : x                          11.8   0.6   )-----------( 242      ( 02 Mar 2019 06:42 )             33.0     03-    138  |  103  |  14  ----------------------------<  127<H>  4.1   |  23  |  0.71    Ca    9.1      01 Mar 2019 11:52    TPro  6.7  /  Alb  3.7  /  TBili  0.2  /  DBili  x   /  AST  18  /  ALT  10  /  AlkPhos  48      PT/INR - ( 02 Mar 2019 06:42 )   PT: 10.9 sec;   INR: 0.96 ratio         PTT - ( 02 Mar 2019 06:42 )  PTT:29.7 sec  LIVER FUNCTIONS - ( 01 Mar 2019 11:52 )  Alb: 3.7 g/dL / Pro: 6.7 g/dL / ALK PHOS: 48 U/L / ALT: 10 U/L / AST: 18 U/L / GGT: x           Meds:   Antimicrobials:   acyclovir   Oral Tab/Cap 400 milliGRAM(s) Oral three times a day  atovaquone Suspension 750 milliGRAM(s) Oral every 12 hours  ciprofloxacin     Tablet 500 milliGRAM(s) Oral every 12 hours  clotrimazole Lozenge 1 Lozenge Oral five times a day  voriconazole 200 milliGRAM(s) Oral every 12 hours      Heme / Onc:   heparin  Infusion 366 Unit(s)/Hr IV Continuous <Continuous>      GI:  docusate sodium 100 milliGRAM(s) Oral three times a day  pantoprazole    Tablet 40 milliGRAM(s) Oral before breakfast  senna 2 Tablet(s) Oral at bedtime  sodium bicarbonate Mouth Rinse 10 milliLiter(s) Swish and Spit five times a day  ursodiol Capsule 300 milliGRAM(s) Oral two times a day with meals    Immunologic:   immune   globulin 10% (GAMMAGARD) IVPB 20 Gram(s) IV Intermittent <User Schedule>  tacrolimus 1.5 milliGRAM(s) Oral every 12 hours      Other medications:   acetaminophen   Tablet .. 650 milliGRAM(s) Oral every 6 hours  acetaminophen   Tablet .. 650 milliGRAM(s) Oral <User Schedule>  ascorbic acid 250 milliGRAM(s) Oral daily  Biotene Dry Mouth Oral Rinse 5 milliLiter(s) Swish and Spit five times a day  chlorhexidine 4% Liquid 1 Application(s) Topical <User Schedule>  cholecalciferol 400 Unit(s) Oral daily  cyanocobalamin 1000 MICROGram(s) Oral daily  diphenhydrAMINE   Injectable 25 milliGRAM(s) IV Push every 3 weeks  ferrous    sulfate 325 milliGRAM(s) Oral three times a day  folic acid 1 milliGRAM(s) Oral daily  magnesium oxide 400 milliGRAM(s) Oral three times a day with meals  medroxyPROGESTERone 10 milliGRAM(s) Oral daily  montelukast 10 milliGRAM(s) Oral daily  multivitamin 1 Tablet(s) Oral daily  phytonadione   Solution 10 milliGRAM(s) Oral every other day  sodium chloride 0.45% 1000 milliLiter(s) IV Continuous <Continuous>  sodium chloride 0.9%. 1000 milliLiter(s) IV Continuous <Continuous>      PRN:   acetaminophen   Tablet .. 650 milliGRAM(s) Oral every 6 hours PRN  artificial  tears Solution 1 Drop(s) Both EYES three times a day PRN  diphenhydrAMINE   Injectable 25 milliGRAM(s) IV Push every 4 hours PRN  metoclopramide Injectable 10 milliGRAM(s) IV Push every 6 hours PRN  sodium chloride 0.65% Nasal 1 Spray(s) Both Nostrils five times a day PRN  sodium chloride 0.9% lock flush 10 milliLiter(s) IV Push every 1 hour PRN    A/P:   33 year old female with a history of AML  Post  :  Allogeneic PBSCT day +3  s/p HPC transplant   - continue transplant hydration for 24 hours post infusion of cells   Neutropenic - started Cipro on ; if T >/= 38C pan cx, CXR and change Cipro to Cefepime.   Labs every other day ; continue vitamin C, vitamin B, folic acid, vitamin K & iron supplementation. Increased vitamin K to every other day as per standard guidelines for bloodless transplant.   Tacro level ______________., reduced dose to 1.5 mg BID, check level   Patient is neutropenic, if febrile     1. Infectious Disease:   acyclovir   Oral Tab/Cap 400 milliGRAM(s) Oral three times a day  atovaquone Suspension 750 milliGRAM(s) Oral every 12 hours  ciprofloxacin     Tablet 500 milliGRAM(s) Oral every 12 hours  clotrimazole Lozenge 1 Lozenge Oral five times a day  voriconazole 200 milliGRAM(s) Oral every 12 hours    2. VOD Prophylaxis: Actigall, Glutamine, Heparin (dosed at 100 units / kg / day)     3. GI Prophylaxis:    pantoprazole    Tablet 40 milliGRAM(s) Oral before breakfast    4. Mouth care - NS / NaHCO3 rinses, Mycelex, Biotene; Skin care     5. GVHD prophylaxis -  tacrolimus 1.5 milliGRAM(s) Oral every 12 hours    6. Transfuse & replete electrolytes prn - bloodless transplant   phytonadione   Solution 10 milliGRAM(s) Oral every other day  ferrous    sulfate 325 milliGRAM(s) Oral three times a day  folic acid 1 milliGRAM(s) Oral daily    7. IV hydration, daily weights, strict I&O, prn diuresis   Lasix 40mg IV x 1    8. PO intake as tolerated, nutrition follow up as needed, MVI, folic acid     9. Antiemetics, anti-diarrhea medications:   LORazepam   Injectable 1 milliGRAM(s) IV Push every 6 hours PRN  metoclopramide Injectable 10 milliGRAM(s) IV Push every 6 hours PRN  ondansetron Injectable 8 milliGRAM(s) IV Push every 8 hours  aprepitant 80 milliGRAM(s) Oral daily    10. OOB as tolerated, physical therapy consult if needed     11. Monitor coags / fibrinogen 2x week, vitamin K as needed     12. Monitor closely for clinical changes, monitor for fevers     13. Emotional support provided, plan of care discussed and questions addressed     14. Patient education done regarding plan of care, restrictions and discharge planning     15. Continue regular social work input     I have written the above note for Dr. Larsen who performed service with me in the room.   Maru Del Rio NP-C ([phone])    I have seen and examined patient with NP, I agree with above note as scribed. 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      S: Patient seen and examined with HPC Transplant Team:   + general fatigue  + decreased appetite  + diarrhea x2 since this am    Denies mouth , tongue , throat pain, dyspnea, cough, nausea, vomiting,  abdominal pain     O: Vitals:   Vital Signs Last 24 Hrs  T(C): 36.2 (02 Mar 2019 06:39), Max: 37.2 (01 Mar 2019 14:45)  T(F): 97.2 (02 Mar 2019 06:39), Max: 99 (01 Mar 2019 14:45)  HR: 89 (02 Mar 2019 06:39) (85 - 115)  BP: 133/83 (02 Mar 2019 06:39) (112/76 - 133/93)  BP(mean): --  RR: 18 (02 Mar 2019 06:39) (18 - 18)  SpO2: 100% (02 Mar 2019 06:39) (99% - 100%)    Admit weight: 87.9kg  Daily Weight in k.9 (02 Mar 2019 09:25)    Intake / Output:   03- @ 07:01  -  -02 @ 07:00  --------------------------------------------------------  IN: 1860.9 mL / OUT: 1500 mL / NET: 360.9 mL    PE:   Oropharynx: no erythema or ulcerations  Oral Mucositis:              -                                         Grade: n/a  CVS: S1, S2 RRR   Lungs: CTA throughout bilaterally   Abdomen:+ BS x 4, soft, NT, ND   Extremities: no edema   Gastric Mucositis:       -                                           Grade: n/a  Intestinal Mucositis:     -                                         Grade: n/a  Skin: no rash   TLC: PICC line in right arm, c/d/i  Neuro: A&O x 3   Pain: 0      Labs:   CBC Full  -  ( 02 Mar 2019 06:42 )  WBC Count : 0.6 K/uL  Hemoglobin : 11.8 g/dL  Hematocrit : 33.0 %  Platelet Count - Automated : 242 K/uL  Mean Cell Volume : 92.0 fl  Mean Cell Hemoglobin : 32.8 pg  Mean Cell Hemoglobin Concentration : 35.6 gm/dL  Auto Neutrophil # : x  Auto Lymphocyte # : x  Auto Monocyte # : x  Auto Eosinophil # : x  Auto Basophil # : x  Auto Neutrophil % : x  Auto Lymphocyte % : x  Auto Monocyte % : x  Auto Eosinophil % : x  Auto Basophil % : x                          11.8   0.6   )-----------( 242      ( 02 Mar 2019 06:42 )             33.0     03-    138  |  103  |  14  ----------------------------<  127<H>  4.1   |  23  |  0.71    Ca    9.1      01 Mar 2019 11:52    TPro  6.7  /  Alb  3.7  /  TBili  0.2  /  DBili  x   /  AST  18  /  ALT  10  /  AlkPhos  48  03-    PT/INR - ( 02 Mar 2019 06:42 )   PT: 10.9 sec;   INR: 0.96 ratio         PTT - ( 02 Mar 2019 06:42 )  PTT:29.7 sec  LIVER FUNCTIONS - ( 01 Mar 2019 11:52 )  Alb: 3.7 g/dL / Pro: 6.7 g/dL / ALK PHOS: 48 U/L / ALT: 10 U/L / AST: 18 U/L / GGT: x           Meds:   Antimicrobials:   acyclovir   Oral Tab/Cap 400 milliGRAM(s) Oral three times a day  atovaquone Suspension 750 milliGRAM(s) Oral every 12 hours  ciprofloxacin     Tablet 500 milliGRAM(s) Oral every 12 hours  clotrimazole Lozenge 1 Lozenge Oral five times a day  voriconazole 200 milliGRAM(s) Oral every 12 hours      Heme / Onc:   heparin  Infusion 366 Unit(s)/Hr IV Continuous <Continuous>      GI:  docusate sodium 100 milliGRAM(s) Oral three times a day  pantoprazole    Tablet 40 milliGRAM(s) Oral before breakfast  senna 2 Tablet(s) Oral at bedtime  sodium bicarbonate Mouth Rinse 10 milliLiter(s) Swish and Spit five times a day  ursodiol Capsule 300 milliGRAM(s) Oral two times a day with meals    Immunologic:   immune   globulin 10% (GAMMAGARD) IVPB 20 Gram(s) IV Intermittent <User Schedule>  tacrolimus 1.5 milliGRAM(s) Oral every 12 hours      Other medications:   acetaminophen   Tablet .. 650 milliGRAM(s) Oral every 6 hours  acetaminophen   Tablet .. 650 milliGRAM(s) Oral <User Schedule>  ascorbic acid 250 milliGRAM(s) Oral daily  Biotene Dry Mouth Oral Rinse 5 milliLiter(s) Swish and Spit five times a day  chlorhexidine 4% Liquid 1 Application(s) Topical <User Schedule>  cholecalciferol 400 Unit(s) Oral daily  cyanocobalamin 1000 MICROGram(s) Oral daily  diphenhydrAMINE   Injectable 25 milliGRAM(s) IV Push every 3 weeks  ferrous    sulfate 325 milliGRAM(s) Oral three times a day  folic acid 1 milliGRAM(s) Oral daily  magnesium oxide 400 milliGRAM(s) Oral three times a day with meals  medroxyPROGESTERone 10 milliGRAM(s) Oral daily  montelukast 10 milliGRAM(s) Oral daily  multivitamin 1 Tablet(s) Oral daily  phytonadione   Solution 10 milliGRAM(s) Oral every other day  sodium chloride 0.45% 1000 milliLiter(s) IV Continuous <Continuous>  sodium chloride 0.9%. 1000 milliLiter(s) IV Continuous <Continuous>      PRN:   acetaminophen   Tablet .. 650 milliGRAM(s) Oral every 6 hours PRN  artificial  tears Solution 1 Drop(s) Both EYES three times a day PRN  diphenhydrAMINE   Injectable 25 milliGRAM(s) IV Push every 4 hours PRN  metoclopramide Injectable 10 milliGRAM(s) IV Push every 6 hours PRN  sodium chloride 0.65% Nasal 1 Spray(s) Both Nostrils five times a day PRN  sodium chloride 0.9% lock flush 10 milliLiter(s) IV Push every 1 hour PRN    A/P:   33 year old female with a history of AML  Post  :  Allogeneic PBSCT day +3  s/p [facility] transplant   - continue transplant hydration for 24 hours post infusion of cells   Neutropenic - started Cipro on ; if T >/= 38C pan cx, CXR and change Cipro to Cefepime.   Labs every other day ; continue vitamin C, vitamin B, folic acid, vitamin K & iron supplementation. Increased vitamin K to every other day as per standard guidelines for bloodless transplant.   Tacro level ______________.,check level   Patient is neutropenic, if febrile pan culture, change Cipro to Cefepime.    1. Infectious Disease:   acyclovir   Oral Tab/Cap 400 milliGRAM(s) Oral three times a day  atovaquone Suspension 750 milliGRAM(s) Oral every 12 hours  ciprofloxacin     Tablet 500 milliGRAM(s) Oral every 12 hours  clotrimazole Lozenge 1 Lozenge Oral five times a day  voriconazole 200 milliGRAM(s) Oral every 12 hours    2. VOD Prophylaxis: Actigall, Glutamine, Heparin (dosed at 100 units / kg / day)     3. GI Prophylaxis:    pantoprazole    Tablet 40 milliGRAM(s) Oral before breakfast    4. Mouth care - NS / NaHCO3 rinses, Mycelex, Biotene; Skin care     5. GVHD prophylaxis -  tacrolimus 1.5 milliGRAM(s) Oral every 12 hours    6. Transfuse & replete electrolytes prn - bloodless transplant   phytonadione   Solution 10 milliGRAM(s) Oral every other day  ferrous    sulfate 325 milliGRAM(s) Oral three times a day  folic acid 1 milliGRAM(s) Oral daily    7. IV hydration, daily weights, strict I&O, prn diuresis   Lasix 40mg IV x 1    8. PO intake as tolerated, nutrition follow up as needed, MVI, folic acid     9. Antiemetics, anti-diarrhea medications:   LORazepam   Injectable 1 milliGRAM(s) IV Push every 6 hours PRN  metoclopramide Injectable 10 milliGRAM(s) IV Push every 6 hours PRN  ondansetron Injectable 8 milliGRAM(s) IV Push every 8 hours  aprepitant 80 milliGRAM(s) Oral daily    10. OOB as tolerated, physical therapy consult if needed     11. Monitor coags / fibrinogen 2x week, vitamin K as needed     12. Monitor closely for clinical changes, monitor for fevers     13. Emotional support provided, plan of care discussed and questions addressed     14. Patient education done regarding plan of care, restrictions and discharge planning     15. Continue regular social work input     I have written the above note for Dr. Larsen who performed service with me in the room.   Maru Del Rio NP-C ([phone])    I have seen and examined patient with NP, I agree with above note as scribed.

## 2019-03-03 PROCEDURE — 99291 CRITICAL CARE FIRST HOUR: CPT

## 2019-03-03 RX ORDER — AMLODIPINE BESYLATE 2.5 MG/1
5 TABLET ORAL DAILY
Qty: 0 | Refills: 0 | Status: DISCONTINUED | OUTPATIENT
Start: 2019-03-03 | End: 2019-03-18

## 2019-03-03 RX ADMIN — MAGNESIUM OXIDE 400 MG ORAL TABLET 400 MILLIGRAM(S): 241.3 TABLET ORAL at 12:07

## 2019-03-03 RX ADMIN — Medication 400 MILLIGRAM(S): at 06:11

## 2019-03-03 RX ADMIN — ATOVAQUONE 750 MILLIGRAM(S): 750 SUSPENSION ORAL at 06:09

## 2019-03-03 RX ADMIN — Medication 5 MILLILITER(S): at 08:30

## 2019-03-03 RX ADMIN — SENNA PLUS 2 TABLET(S): 8.6 TABLET ORAL at 21:41

## 2019-03-03 RX ADMIN — MEDROXYPROGESTERONE ACETATE 10 MILLIGRAM(S): 150 INJECTION, SUSPENSION, EXTENDED RELEASE INTRAMUSCULAR at 12:05

## 2019-03-03 RX ADMIN — Medication 100 MILLIGRAM(S): at 21:39

## 2019-03-03 RX ADMIN — Medication 250 MILLIGRAM(S): at 12:04

## 2019-03-03 RX ADMIN — MONTELUKAST 10 MILLIGRAM(S): 4 TABLET, CHEWABLE ORAL at 12:05

## 2019-03-03 RX ADMIN — Medication 1 LOZENGE: at 19:31

## 2019-03-03 RX ADMIN — AMLODIPINE BESYLATE 5 MILLIGRAM(S): 2.5 TABLET ORAL at 08:29

## 2019-03-03 RX ADMIN — Medication 5 MILLILITER(S): at 02:26

## 2019-03-03 RX ADMIN — Medication 1 LOZENGE: at 12:04

## 2019-03-03 RX ADMIN — VORICONAZOLE 200 MILLIGRAM(S): 10 INJECTION, POWDER, LYOPHILIZED, FOR SOLUTION INTRAVENOUS at 06:10

## 2019-03-03 RX ADMIN — Medication 10 MILLILITER(S): at 19:32

## 2019-03-03 RX ADMIN — PREGABALIN 1000 MICROGRAM(S): 225 CAPSULE ORAL at 12:06

## 2019-03-03 RX ADMIN — TACROLIMUS 1.5 MILLIGRAM(S): 5 CAPSULE ORAL at 06:09

## 2019-03-03 RX ADMIN — CHLORHEXIDINE GLUCONATE 1 APPLICATION(S): 213 SOLUTION TOPICAL at 06:11

## 2019-03-03 RX ADMIN — MAGNESIUM OXIDE 400 MG ORAL TABLET 400 MILLIGRAM(S): 241.3 TABLET ORAL at 16:52

## 2019-03-03 RX ADMIN — Medication 1 LOZENGE: at 02:26

## 2019-03-03 RX ADMIN — Medication 400 UNIT(S): at 12:05

## 2019-03-03 RX ADMIN — Medication 1 LOZENGE: at 16:51

## 2019-03-03 RX ADMIN — Medication 5 MILLILITER(S): at 19:31

## 2019-03-03 RX ADMIN — Medication 10 MILLILITER(S): at 12:03

## 2019-03-03 RX ADMIN — Medication 100 MILLIGRAM(S): at 13:26

## 2019-03-03 RX ADMIN — Medication 10 MILLILITER(S): at 02:26

## 2019-03-03 RX ADMIN — Medication 500 MILLIGRAM(S): at 06:09

## 2019-03-03 RX ADMIN — ATOVAQUONE 750 MILLIGRAM(S): 750 SUSPENSION ORAL at 17:09

## 2019-03-03 RX ADMIN — PANTOPRAZOLE SODIUM 40 MILLIGRAM(S): 20 TABLET, DELAYED RELEASE ORAL at 06:10

## 2019-03-03 RX ADMIN — Medication 400 MILLIGRAM(S): at 21:39

## 2019-03-03 RX ADMIN — Medication 5 MILLILITER(S): at 16:51

## 2019-03-03 RX ADMIN — Medication 10 MILLILITER(S): at 08:29

## 2019-03-03 RX ADMIN — URSODIOL 300 MILLIGRAM(S): 250 TABLET, FILM COATED ORAL at 16:52

## 2019-03-03 RX ADMIN — Medication 1 TABLET(S): at 12:05

## 2019-03-03 RX ADMIN — Medication 325 MILLIGRAM(S): at 06:09

## 2019-03-03 RX ADMIN — Medication 325 MILLIGRAM(S): at 13:26

## 2019-03-03 RX ADMIN — Medication 400 MILLIGRAM(S): at 13:26

## 2019-03-03 RX ADMIN — Medication 5 MILLILITER(S): at 12:04

## 2019-03-03 RX ADMIN — Medication 325 MILLIGRAM(S): at 21:39

## 2019-03-03 RX ADMIN — MAGNESIUM OXIDE 400 MG ORAL TABLET 400 MILLIGRAM(S): 241.3 TABLET ORAL at 08:29

## 2019-03-03 RX ADMIN — Medication 1 MILLIGRAM(S): at 12:05

## 2019-03-03 RX ADMIN — VORICONAZOLE 200 MILLIGRAM(S): 10 INJECTION, POWDER, LYOPHILIZED, FOR SOLUTION INTRAVENOUS at 17:08

## 2019-03-03 RX ADMIN — Medication 10 MILLIGRAM(S): at 12:06

## 2019-03-03 RX ADMIN — Medication 1 LOZENGE: at 08:30

## 2019-03-03 RX ADMIN — Medication 10 MILLILITER(S): at 16:49

## 2019-03-03 RX ADMIN — URSODIOL 300 MILLIGRAM(S): 250 TABLET, FILM COATED ORAL at 08:30

## 2019-03-03 RX ADMIN — Medication 500 MILLIGRAM(S): at 17:08

## 2019-03-03 NOTE — PROGRESS NOTE ADULT - ATTENDING COMMENTS
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 No mucositis.  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 Pt is [employer], and does not accept blood products for Worship reasons   Day + 4 -  s/p MTX on 3/2, serum creatinine in normal range. No mucositis.  On Tacrolimus, with level from 3/2 elevated. Therefore, hold Tacrolimus until review of Stat level from 3/4/19 am.  Begin Zarxio on day +12  Continue Acyclovir, Mepron, Vfend prophylaxis; on Cipro for neutropenia  Continue VOD prophylaxis with Actigall, glutamine supplement, low dose heparin drip(will D/C when platelet count <=75K)  Hypertension- possibly related to Tacro, other meds, fluids- will begin Norvasc.  Labs every other day or at discretion of attending physician  Continue iron, folic acid, vitamin K, vitamin B-12, vitamin C as per standard practice for bloodless transplant  Antiemetics, mouth care, skin care   OOB/ambulate

## 2019-03-03 NOTE — PROGRESS NOTE ADULT - SUBJECTIVE AND OBJECTIVE BOX
HPC Transplant Team                                                      Critical / Counseling Time Provided: 30 minutes                                                                                                                                                        Chief Complaint:     S: Patient seen and examined with HPC Transplant Team:   Denies mouth / tongue / throat pain, dyspnea, cough, nausea, vomiting, diarrhea, abdominal pain     O: Vitals:   Vital Signs Last 24 Hrs  T(C): 37.4 (03 Mar 2019 07:11), Max: 37.4 (02 Mar 2019 09:02)  T(F): 99.3 (03 Mar 2019 07:11), Max: 99.3 (02 Mar 2019 09:02)  HR: 81 (03 Mar 2019 07:11) (78 - 105)  BP: 147/93 (03 Mar 2019 02:22) (133/85 - 147/93)  BP(mean): --  RR: 18 (03 Mar 2019 07:11) (18 - 18)  SpO2: 100% (03 Mar 2019 07:11) (97% - 100%)    Admit weight:   Daily     Daily Weight in k.9 (02 Mar 2019 09:02)    Intake / Output:    @ 07:01  -  03-03 @ 07:00  --------------------------------------------------------  IN: 1172.7 mL / OUT: 2075 mL / NET: -902.3 mL          PE:   Oropharynx:   Oral Mucositis:                                                        Grade:   CVS:   Lungs:   Abdomen:  Extremities:   Gastric Mucositis:                                                  Grade:   Intestinal Mucositis:                                              Grade:   Skin:   TLC:   Neuro:   Pain:     Labs:   CBC Full  -  ( 02 Mar 2019 06:42 )  WBC Count : 0.6 K/uL  Hemoglobin : 11.8 g/dL  Hematocrit : 33.0 %  Platelet Count - Automated : 242 K/uL  Mean Cell Volume : 92.0 fl  Mean Cell Hemoglobin : 32.8 pg  Mean Cell Hemoglobin Concentration : 35.6 gm/dL  Auto Neutrophil # : 0.2 K/uL  Auto Lymphocyte # : 0.3 K/uL  Auto Monocyte # : 0.1 K/uL  Auto Eosinophil # : 0.0 K/uL  Auto Basophil # : 0.0 K/uL  Auto Neutrophil % : 28.0 %  Auto Lymphocyte % : 54.1 %  Auto Monocyte % : 11.2 %  Auto Eosinophil % : 4.2 %  Auto Basophil % : 2.5 %                          11.8   0.6   )-----------( 242      ( 02 Mar 2019 06:42 )             33.0     03-02    139  |  105  |  8   ----------------------------<  105<H>  4.4   |  22  |  0.66    Ca    9.6      02 Mar 2019 06:42  Phos  3.2     03-02  Mg     1.6     -    TPro  6.8  /  Alb  3.6  /  TBili  0.4  /  DBili  0.1  /  AST  24  /  ALT  15  /  AlkPhos  46  03-    PT/INR - ( 02 Mar 2019 06:42 )   PT: 10.9 sec;   INR: 0.96 ratio         PTT - ( 02 Mar 2019 06:42 )  PTT:29.7 sec  LIVER FUNCTIONS - ( 02 Mar 2019 06:42 )  Alb: 3.6 g/dL / Pro: 6.8 g/dL / ALK PHOS: 46 U/L / ALT: 15 U/L / AST: 24 U/L / GGT: x                 @ 15:34  Tacrolimus 17.7                Cyclosporine --      Karnofsky / Lansky Scale:   GVHD:   Skin:   Liver:   Gut:   Overall Grade:       Cultures:         Radiology:       Meds:   Antimicrobials:   acyclovir   Oral Tab/Cap 400 milliGRAM(s) Oral three times a day  atovaquone Suspension 750 milliGRAM(s) Oral every 12 hours  ciprofloxacin     Tablet 500 milliGRAM(s) Oral every 12 hours  clotrimazole Lozenge 1 Lozenge Oral five times a day  voriconazole 200 milliGRAM(s) Oral every 12 hours      Heme / Onc:   heparin  Infusion 366 Unit(s)/Hr IV Continuous <Continuous>      GI:  docusate sodium 100 milliGRAM(s) Oral three times a day  pantoprazole    Tablet 40 milliGRAM(s) Oral before breakfast  senna 2 Tablet(s) Oral at bedtime  sodium bicarbonate Mouth Rinse 10 milliLiter(s) Swish and Spit five times a day  ursodiol Capsule 300 milliGRAM(s) Oral two times a day with meals      Cardiovascular:       Immunologic:   immune   globulin 10% (GAMMAGARD) IVPB 20 Gram(s) IV Intermittent <User Schedule>  tacrolimus 1.5 milliGRAM(s) Oral every 12 hours      Other medications:   acetaminophen   Tablet .. 650 milliGRAM(s) Oral every 6 hours  acetaminophen   Tablet .. 650 milliGRAM(s) Oral <User Schedule>  ascorbic acid 250 milliGRAM(s) Oral daily  Biotene Dry Mouth Oral Rinse 5 milliLiter(s) Swish and Spit five times a day  chlorhexidine 4% Liquid 1 Application(s) Topical <User Schedule>  cholecalciferol 400 Unit(s) Oral daily  cyanocobalamin 1000 MICROGram(s) Oral daily  diphenhydrAMINE   Injectable 25 milliGRAM(s) IV Push every 3 weeks  ferrous    sulfate 325 milliGRAM(s) Oral three times a day  folic acid 1 milliGRAM(s) Oral daily  magnesium oxide 400 milliGRAM(s) Oral three times a day with meals  medroxyPROGESTERone 10 milliGRAM(s) Oral daily  montelukast 10 milliGRAM(s) Oral daily  multivitamin 1 Tablet(s) Oral daily  phytonadione   Solution 10 milliGRAM(s) Oral every other day  sodium chloride 0.45% 1000 milliLiter(s) IV Continuous <Continuous>  sodium chloride 0.9%. 1000 milliLiter(s) IV Continuous <Continuous>      PRN:   acetaminophen   Tablet .. 650 milliGRAM(s) Oral every 6 hours PRN  artificial  tears Solution 1 Drop(s) Both EYES three times a day PRN  diphenhydrAMINE   Injectable 25 milliGRAM(s) IV Push every 4 hours PRN  metoclopramide Injectable 10 milliGRAM(s) IV Push every 6 hours PRN  sodium chloride 0.65% Nasal 1 Spray(s) Both Nostrils five times a day PRN  sodium chloride 0.9% lock flush 10 milliLiter(s) IV Push every 1 hour PRN      A/P:   ___ year old ___  with a history of ______________________  Pre / Status Post :  Autologous / Allogeneic PBSCT / BMT day ____________    1. Infectious Disease:   Fluconazole, Acyclovir     2. VOD Prophylaxis: Actigall, Glutamine, Heparin (dosed at 100 units / kg / day)     3. GI Prophylaxis:  Protonix    4. Mouthcare - NS / NaHCO3 rinses, Mycelex, Caphosol, skin care     5. GVHD prophylaxis     6. Transfuse & replete electrolytes prn     7. IV hydration, daily weights, strict I&O, prn diuresis     8. PO intake as tolerated, nutrition follow up as needed, MVI, folic acid     9. Antiemetics, anti-diarrhea medications:   Reglan, Ativan    10. OOB as tolerated, physical therapy consult if needed     11. Monitor coags / fibrinogen 2x week, vitamin K as needed     12. Monitor closely for clinical changes, monitor for fevers     13. Emotional support provided, plan of care discussed with patient and family, questions addressed     14. Patient education done regarding chemotherapy prep, plan of care, restrictions and discharge planning     15. Continue regular social work input     I have written the above note for Dr. Larsen who performed service with me in the room.   Maru Del Rio  NP-C ([phone])    I have seen and examined patient with NP, I agree with above note as scribed. [facility] Transplant Team                                                      Critical / Counseling Time Provided: 30 minutes                                                                                                                                                        Chief Complaint:     S: Patient seen and examined with HPC Transplant Team:     + decreased appetite  + Loose BM x 2since this am    Denies mouth , tongue , throat pain, headache, dizziness, change in vision,  dyspnea, cough, nausea, vomiting,  abdominal pain     O: Vitals:   Vital Signs Last 24 Hrs  T(C): 37.4 (03 Mar 2019 07:11), Max: 37.4 (02 Mar 2019 09:02)  T(F): 99.3 (03 Mar 2019 07:11), Max: 99.3 (02 Mar 2019 09:02)  HR: 81 (03 Mar 2019 07:11) (78 - 105)  BP: 147/93 (03 Mar 2019 02:22) (133/85 - 147/93)  BP(mean): --  RR: 18 (03 Mar 2019 07:11) (18 - 18)  SpO2: 100% (03 Mar 2019 07:11) (97% - 100%)    Admit weight:   Daily     Daily Weight in k.9 (02 Mar 2019 09:02)    Intake / Output:   03-02 @ 07:01  -  03-03 @ 07:00  --------------------------------------------------------  IN: 1172.7 mL / OUT: 2075 mL / NET: -902.3 mL    PE:   Oropharynx: no erythema or ulcerations  Oral Mucositis:              -                                         Grade: n/a  CVS: S1, S2 RRR   Lungs: CTA throughout bilaterally   Abdomen:+ BS x 4, soft, NT, ND   Extremities: no edema   Gastric Mucositis:       -                                           Grade: n/a  Intestinal Mucositis:     -                                         Grade: n/a  Skin: no rash   TLC: PICC line in right arm, c/d/i  Neuro: A&O x 3   Pain: 0    Labs:   CBC Full  -  ( 02 Mar 2019 06:42 )  WBC Count : 0.6 K/uL  Hemoglobin : 11.8 g/dL  Hematocrit : 33.0 %  Platelet Count - Automated : 242 K/uL  Mean Cell Volume : 92.0 fl  Mean Cell Hemoglobin : 32.8 pg  Mean Cell Hemoglobin Concentration : 35.6 gm/dL  Auto Neutrophil # : 0.2 K/uL  Auto Lymphocyte # : 0.3 K/uL  Auto Monocyte # : 0.1 K/uL  Auto Eosinophil # : 0.0 K/uL  Auto Basophil # : 0.0 K/uL  Auto Neutrophil % : 28.0 %  Auto Lymphocyte % : 54.1 %  Auto Monocyte % : 11.2 %  Auto Eosinophil % : 4.2 %  Auto Basophil % : 2.5 %                          11.8   0.6   )-----------( 242      ( 02 Mar 2019 06:42 )             33.0     03-02    139  |  105  |  8   ----------------------------<  105<H>  4.4   |  22  |  0.66    Ca    9.6      02 Mar 2019 06:42  Phos  3.2     -  Mg     1.6         TPro  6.8  /  Alb  3.6  /  TBili  0.4  /  DBili  0.1  /  AST  24  /  ALT  15  /  AlkPhos  46  03    PT/INR - ( 02 Mar 2019 06:42 )   PT: 10.9 sec;   INR: 0.96 ratio         PTT - ( 02 Mar 2019 06:42 )  PTT:29.7 sec  LIVER FUNCTIONS - ( 02 Mar 2019 06:42 )  Alb: 3.6 g/dL / Pro: 6.8 g/dL / ALK PHOS: 46 U/L / ALT: 15 U/L / AST: 24 U/L / GGT: x                 @ 15:34  Tacrolimus 17.7                Cyclosporine --      Karnofsky / Lansky Scale:   GVHD:   Skin:   Liver:   Gut:   Overall Grade:       Cultures:         Radiology:       Meds:   Antimicrobials:   acyclovir   Oral Tab/Cap 400 milliGRAM(s) Oral three times a day  atovaquone Suspension 750 milliGRAM(s) Oral every 12 hours  ciprofloxacin     Tablet 500 milliGRAM(s) Oral every 12 hours  clotrimazole Lozenge 1 Lozenge Oral five times a day  voriconazole 200 milliGRAM(s) Oral every 12 hours      Heme / Onc:   heparin  Infusion 366 Unit(s)/Hr IV Continuous <Continuous>      GI:  docusate sodium 100 milliGRAM(s) Oral three times a day  pantoprazole    Tablet 40 milliGRAM(s) Oral before breakfast  senna 2 Tablet(s) Oral at bedtime  sodium bicarbonate Mouth Rinse 10 milliLiter(s) Swish and Spit five times a day  ursodiol Capsule 300 milliGRAM(s) Oral two times a day with meals      Cardiovascular:       Immunologic:   immune   globulin 10% (GAMMAGARD) IVPB 20 Gram(s) IV Intermittent <User Schedule>  tacrolimus 1.5 milliGRAM(s) Oral every 12 hours      Other medications:   acetaminophen   Tablet .. 650 milliGRAM(s) Oral every 6 hours  acetaminophen   Tablet .. 650 milliGRAM(s) Oral <User Schedule>  ascorbic acid 250 milliGRAM(s) Oral daily  Biotene Dry Mouth Oral Rinse 5 milliLiter(s) Swish and Spit five times a day  chlorhexidine 4% Liquid 1 Application(s) Topical <User Schedule>  cholecalciferol 400 Unit(s) Oral daily  cyanocobalamin 1000 MICROGram(s) Oral daily  diphenhydrAMINE   Injectable 25 milliGRAM(s) IV Push every 3 weeks  ferrous    sulfate 325 milliGRAM(s) Oral three times a day  folic acid 1 milliGRAM(s) Oral daily  magnesium oxide 400 milliGRAM(s) Oral three times a day with meals  medroxyPROGESTERone 10 milliGRAM(s) Oral daily  montelukast 10 milliGRAM(s) Oral daily  multivitamin 1 Tablet(s) Oral daily  phytonadione   Solution 10 milliGRAM(s) Oral every other day  sodium chloride 0.45% 1000 milliLiter(s) IV Continuous <Continuous>  sodium chloride 0.9%. 1000 milliLiter(s) IV Continuous <Continuous>      PRN:   acetaminophen   Tablet .. 650 milliGRAM(s) Oral every 6 hours PRN  artificial  tears Solution 1 Drop(s) Both EYES three times a day PRN  diphenhydrAMINE   Injectable 25 milliGRAM(s) IV Push every 4 hours PRN  metoclopramide Injectable 10 milliGRAM(s) IV Push every 6 hours PRN  sodium chloride 0.65% Nasal 1 Spray(s) Both Nostrils five times a day PRN  sodium chloride 0.9% lock flush 10 milliLiter(s) IV Push every 1 hour PRN      A/P:   ___ year old ___  with a history of ______________________  Pre / Status Post :  Autologous / Allogeneic PBSCT / BMT day ____________    1. Infectious Disease:   Fluconazole, Acyclovir     2. VOD Prophylaxis: Actigall, Glutamine, Heparin (dosed at 100 units / kg / day)     3. GI Prophylaxis:  Protonix    4. Mouthcare - NS / NaHCO3 rinses, Mycelex, Caphosol, skin care     5. GVHD prophylaxis     6. Transfuse & replete electrolytes prn     7. IV hydration, daily weights, strict I&O, prn diuresis     8. PO intake as tolerated, nutrition follow up as needed, MVI, folic acid     9. Antiemetics, anti-diarrhea medications:   Reglan, Ativan    10. OOB as tolerated, physical therapy consult if needed     11. Monitor coags / fibrinogen 2x week, vitamin K as needed     12. Monitor closely for clinical changes, monitor for fevers     13. Emotional support provided, plan of care discussed with patient and family, questions addressed     14. Patient education done regarding chemotherapy prep, plan of care, restrictions and discharge planning     15. Continue regular social work input     I have written the above note for Dr. Larsen who performed service with me in the room.   aMru Del Rio  NP-C ([phone])    I have seen and examined patient with NP, I agree with above note as scribed. 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    S: Patient seen and examined with HPC Transplant Team:     + decreased appetite  + Loose BM x 2 since this am    Denies mouth , tongue , throat pain, headache, dizziness, change in vision,  dyspnea, cough, nausea, vomiting,  abdominal pain     O: Vitals:   Vital Signs Last 24 Hrs  T(C): 37.4 (03 Mar 2019 07:11), Max: 37.4 (02 Mar 2019 09:02)  T(F): 99.3 (03 Mar 2019 07:11), Max: 99.3 (02 Mar 2019 09:02)  HR: 81 (03 Mar 2019 07:11) (78 - 105)  BP: 147/93 (03 Mar 2019 02:22) (133/85 - 147/93)  BP(mean): --  RR: 18 (03 Mar 2019 07:11) (18 - 18)  SpO2: 100% (03 Mar 2019 07:11) (97% - 100%)    Admit weight:   Daily     Daily Weight in k.9 (02 Mar 2019 09:02)    Intake / Output:   - @ 07:01  -  03-03 @ 07:00  --------------------------------------------------------  IN: 1172.7 mL / OUT: 2075 mL / NET: -902.3 mL    PE:   Oropharynx: no erythema or ulcerations  Oral Mucositis:              -                                         Grade: n/a  CVS: S1, S2 RRR   Lungs: CTA throughout bilaterally   Abdomen:+ BS x 4, soft, NT, ND   Extremities: no edema   Gastric Mucositis:       -                                           Grade: n/a  Intestinal Mucositis:     -                                         Grade: n/a  Skin: no rash   TLC: PICC line in right arm, c/d/i  Neuro: A&O x 3   Pain: 0    Labs:   CBC Full  -  ( 02 Mar 2019 06:42 )  WBC Count : 0.6 K/uL  Hemoglobin : 11.8 g/dL  Hematocrit : 33.0 %  Platelet Count - Automated : 242 K/uL  Mean Cell Volume : 92.0 fl  Mean Cell Hemoglobin : 32.8 pg  Mean Cell Hemoglobin Concentration : 35.6 gm/dL  Auto Neutrophil # : 0.2 K/uL  Auto Lymphocyte # : 0.3 K/uL  Auto Monocyte # : 0.1 K/uL  Auto Eosinophil # : 0.0 K/uL  Auto Basophil # : 0.0 K/uL  Auto Neutrophil % : 28.0 %  Auto Lymphocyte % : 54.1 %  Auto Monocyte % : 11.2 %  Auto Eosinophil % : 4.2 %  Auto Basophil % : 2.5 %                          11.8   0.6   )-----------( 242      ( 02 Mar 2019 06:42 )             33.0     03-02    139  |  105  |  8   ----------------------------<  105<H>  4.4   |  22  |  0.66    Ca    9.6      02 Mar 2019 06:42  Phos  3.2     03-02  Mg     1.6     -    TPro  6.8  /  Alb  3.6  /  TBili  0.4  /  DBili  0.1  /  AST  24  /  ALT  15  /  AlkPhos  46  03-02    PT/INR - ( 02 Mar 2019 06:42 )   PT: 10.9 sec;   INR: 0.96 ratio         PTT - ( 02 Mar 2019 06:42 )  PTT:29.7 sec  LIVER FUNCTIONS - ( 02 Mar 2019 06:42 )  Alb: 3.6 g/dL / Pro: 6.8 g/dL / ALK PHOS: 46 U/L / ALT: 15 U/L / AST: 24 U/L / GGT: x                 @ 15:34  Tacrolimus 17.7                Cyclosporine --    Cultures:         Radiology:       Meds:   Antimicrobials:   acyclovir   Oral Tab/Cap 400 milliGRAM(s) Oral three times a day  atovaquone Suspension 750 milliGRAM(s) Oral every 12 hours  ciprofloxacin     Tablet 500 milliGRAM(s) Oral every 12 hours  clotrimazole Lozenge 1 Lozenge Oral five times a day  voriconazole 200 milliGRAM(s) Oral every 12 hours      Heme / Onc:   heparin  Infusion 366 Unit(s)/Hr IV Continuous <Continuous>      GI:  docusate sodium 100 milliGRAM(s) Oral three times a day  pantoprazole    Tablet 40 milliGRAM(s) Oral before breakfast  senna 2 Tablet(s) Oral at bedtime  sodium bicarbonate Mouth Rinse 10 milliLiter(s) Swish and Spit five times a day  ursodiol Capsule 300 milliGRAM(s) Oral two times a day with meals      Cardiovascular:       Immunologic:   immune   globulin 10% (GAMMAGARD) IVPB 20 Gram(s) IV Intermittent <User Schedule>  tacrolimus 1.5 milliGRAM(s) Oral every 12 hours      Other medications:   acetaminophen   Tablet .. 650 milliGRAM(s) Oral every 6 hours  acetaminophen   Tablet .. 650 milliGRAM(s) Oral <User Schedule>  ascorbic acid 250 milliGRAM(s) Oral daily  Biotene Dry Mouth Oral Rinse 5 milliLiter(s) Swish and Spit five times a day  chlorhexidine 4% Liquid 1 Application(s) Topical <User Schedule>  cholecalciferol 400 Unit(s) Oral daily  cyanocobalamin 1000 MICROGram(s) Oral daily  diphenhydrAMINE   Injectable 25 milliGRAM(s) IV Push every 3 weeks  ferrous    sulfate 325 milliGRAM(s) Oral three times a day  folic acid 1 milliGRAM(s) Oral daily  magnesium oxide 400 milliGRAM(s) Oral three times a day with meals  medroxyPROGESTERone 10 milliGRAM(s) Oral daily  montelukast 10 milliGRAM(s) Oral daily  multivitamin 1 Tablet(s) Oral daily  phytonadione   Solution 10 milliGRAM(s) Oral every other day  sodium chloride 0.45% 1000 milliLiter(s) IV Continuous <Continuous>  sodium chloride 0.9%. 1000 milliLiter(s) IV Continuous <Continuous>      PRN:   acetaminophen   Tablet .. 650 milliGRAM(s) Oral every 6 hours PRN  artificial  tears Solution 1 Drop(s) Both EYES three times a day PRN  diphenhydrAMINE   Injectable 25 milliGRAM(s) IV Push every 4 hours PRN  metoclopramide Injectable 10 milliGRAM(s) IV Push every 6 hours PRN  sodium chloride 0.65% Nasal 1 Spray(s) Both Nostrils five times a day PRN  sodium chloride 0.9% lock flush 10 milliLiter(s) IV Push every 1 hour PRN      A/P:   ___ year old ___  with a history of ______________________  Pre / Status Post :  Autologous / Allogeneic PBSCT / BMT day ____________    1. Infectious Disease:   Fluconazole, Acyclovir     2. VOD Prophylaxis: Actigall, Glutamine, Heparin (dosed at 100 units / kg / day)     3. GI Prophylaxis:  Protonix    4. Mouthcare - NS / NaHCO3 rinses, Mycelex, Caphosol, skin care     5. GVHD prophylaxis     6. Transfuse & replete electrolytes prn     7. IV hydration, daily weights, strict I&O, prn diuresis     8. PO intake as tolerated, nutrition follow up as needed, MVI, folic acid     9. Antiemetics, anti-diarrhea medications:   Reglan, Ativan    10. OOB as tolerated, physical therapy consult if needed     11. Monitor coags / fibrinogen 2x week, vitamin K as needed     12. Monitor closely for clinical changes, monitor for fevers     13. Emotional support provided, plan of care discussed with patient and family, questions addressed     14. Patient education done regarding chemotherapy prep, plan of care, restrictions and discharge planning     15. Continue regular social work input     I have written the above note for Dr. Larsen who performed service with me in the room.   Maru Del Rio  NP-C ([phone])    I have seen and examined patient with NP, I agree with above note as scribed. 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    S: Patient seen and examined with HPC Transplant Team:     + decreased appetite  + Loose BM x 2 since this am    Denies mouth , tongue , throat pain, headache, dizziness, change in vision,  dyspnea, cough, nausea, vomiting,  abdominal pain     O: Vitals:   Vital Signs Last 24 Hrs  T(C): 37.4 (03 Mar 2019 07:11), Max: 37.4 (02 Mar 2019 09:02)  T(F): 99.3 (03 Mar 2019 07:11), Max: 99.3 (02 Mar 2019 09:02)  HR: 81 (03 Mar 2019 07:11) (78 - 105)  BP: 147/93 (03 Mar 2019 02:22) (133/85 - 147/93)  BP(mean): --  RR: 18 (03 Mar 2019 07:11) (18 - 18)  SpO2: 100% (03 Mar 2019 07:11) (97% - 100%)    Admit weight: 88.2kg  Daily Weight in k.2 (03 Mar 2019 09:02)    Intake / Output:   - @ 07:01  -  -03 @ 07:00  --------------------------------------------------------  IN: 1172.7 mL / OUT: 2075 mL / NET: -902.3 mL    PE:   Oropharynx: no erythema or ulcerations  Oral Mucositis:              -                                         Grade: n/a  CVS: S1, S2 RRR   Lungs: CTA throughout bilaterally   Abdomen:+ BS x 4, soft, NT, ND   Extremities: no edema   Gastric Mucositis:       -                                           Grade: n/a  Intestinal Mucositis:     -                                         Grade: n/a  Skin: no rash   TLC: PICC line in right arm, c/d/i  Neuro: A&O x 3   Pain: 0    LABS:                        11.8   0.6   )-----------( 242      ( 02 Mar 2019 06:42 )             33.0         Mean Cell Volume : 92.0 fl  Mean Cell Hemoglobin : 32.8 pg  Mean Cell Hemoglobin Concentration : 35.6 gm/dL  Auto Neutrophil # : 0.2 K/uL  Auto Lymphocyte # : 0.3 K/uL  Auto Monocyte # : 0.1 K/uL  Auto Eosinophil # : 0.0 K/uL  Auto Basophil # : 0.0 K/uL  Auto Neutrophil % : 28.0 %  Auto Lymphocyte % : 54.1 %  Auto Monocyte % : 11.2 %  Auto Eosinophil % : 4.2 %  Auto Basophil % : 2.5 %          139  |  105  |  8   ----------------------------<  105<H>  4.4   |  22  |  0.66    Ca    9.6      02 Mar 2019 06:42  Phos  3.2     03-  Mg     1.6         TPro  6.8  /  Alb  3.6  /  TBili  0.4  /  DBili  0.1  /  AST  24  /  ALT  15  /  AlkPhos  46    PT/INR - ( 02 Mar 2019 06:42 )   PT: 10.9 sec;   INR: 0.96 ratio    PTT - ( 02 Mar 2019 06:42 )  PTT:29.7 sec     @ 15:34  Tacrolimus 17.7                Cyclosporine --    Meds:   Antimicrobials:   acyclovir   Oral Tab/Cap 400 milliGRAM(s) Oral three times a day  atovaquone Suspension 750 milliGRAM(s) Oral every 12 hours  ciprofloxacin     Tablet 500 milliGRAM(s) Oral every 12 hours  clotrimazole Lozenge 1 Lozenge Oral five times a day  voriconazole 200 milliGRAM(s) Oral every 12 hours      Heme / Onc:   heparin  Infusion 366 Unit(s)/Hr IV Continuous <Continuous>      GI:  docusate sodium 100 milliGRAM(s) Oral three times a day  pantoprazole    Tablet 40 milliGRAM(s) Oral before breakfast  senna 2 Tablet(s) Oral at bedtime  sodium bicarbonate Mouth Rinse 10 milliLiter(s) Swish and Spit five times a day  ursodiol Capsule 300 milliGRAM(s) Oral two times a day with meals    Immunologic:   immune   globulin 10% (GAMMAGARD) IVPB 20 Gram(s) IV Intermittent <User Schedule>  tacrolimus 1.5 milliGRAM(s) Oral every 12 hours      Other medications:   acetaminophen   Tablet .. 650 milliGRAM(s) Oral every 6 hours  acetaminophen   Tablet .. 650 milliGRAM(s) Oral <User Schedule>  ascorbic acid 250 milliGRAM(s) Oral daily  Biotene Dry Mouth Oral Rinse 5 milliLiter(s) Swish and Spit five times a day  chlorhexidine 4% Liquid 1 Application(s) Topical <User Schedule>  cholecalciferol 400 Unit(s) Oral daily  cyanocobalamin 1000 MICROGram(s) Oral daily  diphenhydrAMINE   Injectable 25 milliGRAM(s) IV Push every 3 weeks  ferrous    sulfate 325 milliGRAM(s) Oral three times a day  folic acid 1 milliGRAM(s) Oral daily  magnesium oxide 400 milliGRAM(s) Oral three times a day with meals  medroxyPROGESTERone 10 milliGRAM(s) Oral daily  montelukast 10 milliGRAM(s) Oral daily  multivitamin 1 Tablet(s) Oral daily  phytonadione   Solution 10 milliGRAM(s) Oral every other day  sodium chloride 0.45% 1000 milliLiter(s) IV Continuous <Continuous>  sodium chloride 0.9%. 1000 milliLiter(s) IV Continuous <Continuous>      PRN:   acetaminophen   Tablet .. 650 milliGRAM(s) Oral every 6 hours PRN  artificial  tears Solution 1 Drop(s) Both EYES three times a day PRN  diphenhydrAMINE   Injectable 25 milliGRAM(s) IV Push every 4 hours PRN  metoclopramide Injectable 10 milliGRAM(s) IV Push every 6 hours PRN  sodium chloride 0.65% Nasal 1 Spray(s) Both Nostrils five times a day PRN  sodium chloride 0.9% lock flush 10 milliLiter(s) IV Push every 1 hour PRN      A/P: 33 year old female with a history of AML  Post  :  Allogeneic PBSCT day +4  s/p [facility] transplant   - continue transplant hydration for 24 hours post infusion of cells   Neutropenic - started Cipro on ; if T >/= 38C pan cx, CXR and change Cipro to Cefepime.   Labs every other day ; continue vitamin C, vitamin B, folic acid, vitamin K & iron supplementation. Increased vitamin K to every other day as per standard guidelines for bloodless transplant.   Tacro level on 3/2 17.7, will hold the dose for today, recheck the level on 3/3. check level on Monday am.  Patient is neutropenic.  BP- 140s, this am started on Norvasc.    1. Infectious Disease:   acyclovir   Oral Tab/Cap 400 milliGRAM(s) Oral three times a day  atovaquone Suspension 750 milliGRAM(s) Oral every 12 hours  ciprofloxacin     Tablet 500 milliGRAM(s) Oral every 12 hours  clotrimazole Lozenge 1 Lozenge Oral five times a day  voriconazole 200 milliGRAM(s) Oral every 12 hours    2. VOD Prophylaxis: Actigall, Glutamine, Heparin (dosed at 100 units / kg / day)     3. GI Prophylaxis:    pantoprazole    Tablet 40 milliGRAM(s) Oral before breakfast    4. Mouth care - NS / NaHCO3 rinses, Mycelex, Biotene; Skin care     5. GVHD prophylaxis -  tacrolimus  (On Hold)    6. Transfuse & replete electrolytes prn - bloodless transplant   phytonadione   Solution 10 milliGRAM(s) Oral every other day  ferrous    sulfate 325 milliGRAM(s) Oral three times a day  folic acid 1 milliGRAM(s) Oral daily    7. IV hydration, daily weights, strict I&O, prn diuresis     8. PO intake as tolerated, nutrition follow up as needed, MVI, folic acid     9. Antiemetics, anti-diarrhea medications:   LORazepam   Injectable 1 milliGRAM(s) IV Push every 6 hours PRN  metoclopramide Injectable 10 milliGRAM(s) IV Push every 6 hours PRN  ondansetron Injectable 8 milliGRAM(s) IV Push every 8 hours  aprepitant 80 milliGRAM(s) Oral daily    10. OOB as tolerated, physical therapy consult if needed     11. Monitor coags / fibrinogen 2x week, vitamin K as needed     12. Monitor closely for clinical changes, monitor for fevers     13. Emotional support provided, plan of care discussed and questions addressed     14. Patient education done regarding plan of care, restrictions and discharge planning     15. Continue regular social work input     I have written the above note for Dr. Larsen who performed service with me in the room.   Maru Del Rio  NP-C ([phone])    I have seen and examined patient with NP, I agree with above note as scribed.

## 2019-03-04 LAB
ALBUMIN SERPL ELPH-MCNC: 3.9 G/DL — SIGNIFICANT CHANGE UP (ref 3.3–5)
ALP SERPL-CCNC: 47 U/L — SIGNIFICANT CHANGE UP (ref 40–120)
ALT FLD-CCNC: 24 U/L — SIGNIFICANT CHANGE UP (ref 10–45)
ANION GAP SERPL CALC-SCNC: 15 MMOL/L — SIGNIFICANT CHANGE UP (ref 5–17)
APTT BLD: 65.4 SEC — HIGH (ref 27.5–36.3)
AST SERPL-CCNC: 27 U/L — SIGNIFICANT CHANGE UP (ref 10–40)
BILIRUB SERPL-MCNC: 0.4 MG/DL — SIGNIFICANT CHANGE UP (ref 0.2–1.2)
BUN SERPL-MCNC: 7 MG/DL — SIGNIFICANT CHANGE UP (ref 7–23)
CALCIUM SERPL-MCNC: 10 MG/DL — SIGNIFICANT CHANGE UP (ref 8.4–10.5)
CHLORIDE SERPL-SCNC: 105 MMOL/L — SIGNIFICANT CHANGE UP (ref 96–108)
CO2 SERPL-SCNC: 20 MMOL/L — LOW (ref 22–31)
CREAT SERPL-MCNC: 0.58 MG/DL — SIGNIFICANT CHANGE UP (ref 0.5–1.3)
FIBRINOGEN PPP-MCNC: 577 MG/DL — HIGH (ref 350–510)
GLUCOSE SERPL-MCNC: 89 MG/DL — SIGNIFICANT CHANGE UP (ref 70–99)
HCT VFR BLD CALC: 38 % — SIGNIFICANT CHANGE UP (ref 34.5–45)
HGB BLD-MCNC: 13 G/DL — SIGNIFICANT CHANGE UP (ref 11.5–15.5)
INR BLD: 0.99 RATIO — SIGNIFICANT CHANGE UP (ref 0.88–1.16)
LDH SERPL L TO P-CCNC: 240 U/L — SIGNIFICANT CHANGE UP (ref 50–242)
MAGNESIUM SERPL-MCNC: 1.8 MG/DL — SIGNIFICANT CHANGE UP (ref 1.6–2.6)
MCHC RBC-ENTMCNC: 31.3 PG — SIGNIFICANT CHANGE UP (ref 27–34)
MCHC RBC-ENTMCNC: 34.2 GM/DL — SIGNIFICANT CHANGE UP (ref 32–36)
MCV RBC AUTO: 91.7 FL — SIGNIFICANT CHANGE UP (ref 80–100)
PHOSPHATE SERPL-MCNC: 3.4 MG/DL — SIGNIFICANT CHANGE UP (ref 2.5–4.5)
PLATELET # BLD AUTO: 186 K/UL — SIGNIFICANT CHANGE UP (ref 150–400)
POTASSIUM SERPL-MCNC: 4.4 MMOL/L — SIGNIFICANT CHANGE UP (ref 3.5–5.3)
POTASSIUM SERPL-SCNC: 4.4 MMOL/L — SIGNIFICANT CHANGE UP (ref 3.5–5.3)
PROT SERPL-MCNC: 6.8 G/DL — SIGNIFICANT CHANGE UP (ref 6–8.3)
PROTHROM AB SERPL-ACNC: 11.3 SEC — SIGNIFICANT CHANGE UP (ref 10–12.9)
RBC # BLD: 4.14 M/UL — SIGNIFICANT CHANGE UP (ref 3.8–5.2)
RBC # FLD: 14.9 % — HIGH (ref 10.3–14.5)
SODIUM SERPL-SCNC: 140 MMOL/L — SIGNIFICANT CHANGE UP (ref 135–145)
TACROLIMUS SERPL-MCNC: 8.2 NG/ML — SIGNIFICANT CHANGE UP
WBC # BLD: 0.4 K/UL — CRITICAL LOW (ref 3.8–10.5)
WBC # FLD AUTO: 0.4 K/UL — CRITICAL LOW (ref 3.8–10.5)

## 2019-03-04 PROCEDURE — 99291 CRITICAL CARE FIRST HOUR: CPT

## 2019-03-04 RX ORDER — TACROLIMUS 5 MG/1
1 CAPSULE ORAL ONCE
Qty: 0 | Refills: 0 | Status: COMPLETED | OUTPATIENT
Start: 2019-03-04 | End: 2019-03-04

## 2019-03-04 RX ORDER — BENZOCAINE AND MENTHOL 5; 1 G/100ML; G/100ML
1 LIQUID ORAL
Qty: 0 | Refills: 0 | Status: DISCONTINUED | OUTPATIENT
Start: 2019-03-04 | End: 2019-03-18

## 2019-03-04 RX ORDER — TACROLIMUS 5 MG/1
1 CAPSULE ORAL EVERY 12 HOURS
Qty: 0 | Refills: 0 | Status: DISCONTINUED | OUTPATIENT
Start: 2019-03-05 | End: 2019-03-10

## 2019-03-04 RX ORDER — DIPHENHYDRAMINE HYDROCHLORIDE AND LIDOCAINE HYDROCHLORIDE AND ALUMINUM HYDROXIDE AND MAGNESIUM HYDRO
5 KIT EVERY 8 HOURS
Qty: 0 | Refills: 0 | Status: DISCONTINUED | OUTPATIENT
Start: 2019-03-04 | End: 2019-03-18

## 2019-03-04 RX ADMIN — Medication 1 LOZENGE: at 07:34

## 2019-03-04 RX ADMIN — Medication 1 LOZENGE: at 11:18

## 2019-03-04 RX ADMIN — Medication 1 TABLET(S): at 12:13

## 2019-03-04 RX ADMIN — Medication 400 UNIT(S): at 12:14

## 2019-03-04 RX ADMIN — Medication 400 MILLIGRAM(S): at 22:03

## 2019-03-04 RX ADMIN — URSODIOL 300 MILLIGRAM(S): 250 TABLET, FILM COATED ORAL at 17:33

## 2019-03-04 RX ADMIN — Medication 100 MILLIGRAM(S): at 13:18

## 2019-03-04 RX ADMIN — Medication 10 MILLILITER(S): at 07:35

## 2019-03-04 RX ADMIN — Medication 400 MILLIGRAM(S): at 13:18

## 2019-03-04 RX ADMIN — URSODIOL 300 MILLIGRAM(S): 250 TABLET, FILM COATED ORAL at 07:34

## 2019-03-04 RX ADMIN — ATOVAQUONE 750 MILLIGRAM(S): 750 SUSPENSION ORAL at 05:03

## 2019-03-04 RX ADMIN — ATOVAQUONE 750 MILLIGRAM(S): 750 SUSPENSION ORAL at 17:33

## 2019-03-04 RX ADMIN — MAGNESIUM OXIDE 400 MG ORAL TABLET 400 MILLIGRAM(S): 241.3 TABLET ORAL at 12:14

## 2019-03-04 RX ADMIN — MAGNESIUM OXIDE 400 MG ORAL TABLET 400 MILLIGRAM(S): 241.3 TABLET ORAL at 17:33

## 2019-03-04 RX ADMIN — Medication 5 MILLILITER(S): at 15:56

## 2019-03-04 RX ADMIN — Medication 10 MILLILITER(S): at 15:56

## 2019-03-04 RX ADMIN — VORICONAZOLE 200 MILLIGRAM(S): 10 INJECTION, POWDER, LYOPHILIZED, FOR SOLUTION INTRAVENOUS at 05:03

## 2019-03-04 RX ADMIN — Medication 250 MILLIGRAM(S): at 12:13

## 2019-03-04 RX ADMIN — HEPARIN SODIUM 3.66 UNIT(S)/HR: 5000 INJECTION INTRAVENOUS; SUBCUTANEOUS at 11:19

## 2019-03-04 RX ADMIN — Medication 1 LOZENGE: at 20:41

## 2019-03-04 RX ADMIN — Medication 1 LOZENGE: at 15:56

## 2019-03-04 RX ADMIN — Medication 5 MILLILITER(S): at 00:48

## 2019-03-04 RX ADMIN — TACROLIMUS 1 MILLIGRAM(S): 5 CAPSULE ORAL at 22:09

## 2019-03-04 RX ADMIN — Medication 325 MILLIGRAM(S): at 13:18

## 2019-03-04 RX ADMIN — TACROLIMUS 1 MILLIGRAM(S): 5 CAPSULE ORAL at 13:18

## 2019-03-04 RX ADMIN — Medication 1 LOZENGE: at 00:48

## 2019-03-04 RX ADMIN — Medication 1 MILLIGRAM(S): at 12:14

## 2019-03-04 RX ADMIN — Medication 10 MILLILITER(S): at 00:48

## 2019-03-04 RX ADMIN — PREGABALIN 1000 MICROGRAM(S): 225 CAPSULE ORAL at 12:14

## 2019-03-04 RX ADMIN — AMLODIPINE BESYLATE 5 MILLIGRAM(S): 2.5 TABLET ORAL at 05:06

## 2019-03-04 RX ADMIN — Medication 500 MILLIGRAM(S): at 05:03

## 2019-03-04 RX ADMIN — CHLORHEXIDINE GLUCONATE 1 APPLICATION(S): 213 SOLUTION TOPICAL at 05:04

## 2019-03-04 RX ADMIN — SENNA PLUS 2 TABLET(S): 8.6 TABLET ORAL at 22:09

## 2019-03-04 RX ADMIN — Medication 325 MILLIGRAM(S): at 22:04

## 2019-03-04 RX ADMIN — MEDROXYPROGESTERONE ACETATE 10 MILLIGRAM(S): 150 INJECTION, SUSPENSION, EXTENDED RELEASE INTRAMUSCULAR at 12:14

## 2019-03-04 RX ADMIN — Medication 100 MILLIGRAM(S): at 05:03

## 2019-03-04 RX ADMIN — SODIUM CHLORIDE 20 MILLILITER(S): 9 INJECTION INTRAMUSCULAR; INTRAVENOUS; SUBCUTANEOUS at 11:19

## 2019-03-04 RX ADMIN — Medication 100 MILLIGRAM(S): at 22:04

## 2019-03-04 RX ADMIN — MONTELUKAST 10 MILLIGRAM(S): 4 TABLET, CHEWABLE ORAL at 12:14

## 2019-03-04 RX ADMIN — Medication 5 MILLILITER(S): at 07:34

## 2019-03-04 RX ADMIN — Medication 325 MILLIGRAM(S): at 05:03

## 2019-03-04 RX ADMIN — Medication 500 MILLIGRAM(S): at 17:33

## 2019-03-04 RX ADMIN — Medication 5 MILLILITER(S): at 11:18

## 2019-03-04 RX ADMIN — Medication 5 MILLILITER(S): at 20:41

## 2019-03-04 RX ADMIN — VORICONAZOLE 200 MILLIGRAM(S): 10 INJECTION, POWDER, LYOPHILIZED, FOR SOLUTION INTRAVENOUS at 17:33

## 2019-03-04 RX ADMIN — Medication 10 MILLILITER(S): at 20:41

## 2019-03-04 RX ADMIN — PANTOPRAZOLE SODIUM 40 MILLIGRAM(S): 20 TABLET, DELAYED RELEASE ORAL at 05:04

## 2019-03-04 RX ADMIN — Medication 400 MILLIGRAM(S): at 05:03

## 2019-03-04 RX ADMIN — Medication 10 MILLILITER(S): at 11:18

## 2019-03-04 RX ADMIN — DIPHENHYDRAMINE HYDROCHLORIDE AND LIDOCAINE HYDROCHLORIDE AND ALUMINUM HYDROXIDE AND MAGNESIUM HYDRO 5 MILLILITER(S): KIT at 01:13

## 2019-03-04 RX ADMIN — MAGNESIUM OXIDE 400 MG ORAL TABLET 400 MILLIGRAM(S): 241.3 TABLET ORAL at 07:34

## 2019-03-04 NOTE — PROGRESS NOTE ADULT - SUBJECTIVE AND OBJECTIVE BOX
[facility]PC Transplant Team                                                      Critical / Counseling Time Provided: 30 minutes                                                                                                                                                        Chief Complaint: Allogeneic peripheral blood stem cell transplant from [facility] (sister 10/10)     S: Patient seen and examined with HPC Transplant Team:     Denies mouth / tongue / throat pain, dyspnea, cough, nausea, vomiting, diarrhea, abdominal pain     O: Vitals:   Vital Signs Last 24 Hrs  T(C): 37.1 (04 Mar 2019 06:00), Max: 37.4 (03 Mar 2019 13:49)  T(F): 98.8 (04 Mar 2019 06:00), Max: 99.3 (03 Mar 2019 13:49)  HR: 94 (04 Mar 2019 06:00) (92 - 100)  BP: 141/87 (04 Mar 2019 06:00) (116/84 - 141/87)  BP(mean): --  RR: 18 (04 Mar 2019 06:00) (18 - 18)  SpO2: 100% (04 Mar 2019 06:00) (100% - 100%)    Admit weight: 88.2kg  Today's weight:     Intake / Output:   03-03 @ 07:01 - 03-04 @ 07:00  --------------------------------------------------------  IN: 1267.4 mL / OUT: 1925 mL / NET: -657.6 mL    03-04 @ 07:01  - 03-04 @ 08:59  --------------------------------------------------------  IN: 44.8 mL / OUT: 0 mL / NET: 44.8 mL    PE:   Oropharynx: no erythema or ulcerations  Oral Mucositis:              -                                         Grade: n/a  CVS: S1, S2 RRR   Lungs: CTA throughout bilaterally   Abdomen:+ BS x 4, soft, NT, ND   Extremities: no edema   Gastric Mucositis:       -                                           Grade: n/a  Intestinal Mucositis:     -                                         Grade: n/a  Skin: no rash   TLC: PICC line in right arm, c/d/i  Neuro: A&O x 3   Pain: 0    Labs:   CBC Full  -  ( 04 Mar 2019 06:52 )  WBC Count : 0.4 K/uL  Hemoglobin : 13.0 g/dL  Hematocrit : 38.0 %  Platelet Count - Automated : 186 K/uL  Mean Cell Volume : 91.7 fl  Mean Cell Hemoglobin : 31.3 pg  Mean Cell Hemoglobin Concentration : 34.2 gm/dL  Auto Neutrophil # : x  Auto Lymphocyte # : x  Auto Monocyte # : x  Auto Eosinophil # : x  Auto Basophil # : x  Auto Neutrophil % : x  Auto Lymphocyte % : x  Auto Monocyte % : x  Auto Eosinophil % : x  Auto Basophil % : x                          13.0   0.4   )-----------( 186      ( 04 Mar 2019 06:52 )             38.0     03-04    140  |  105  |  7   ----------------------------<  89  4.4   |  20<L>  |  0.58    Ca    10.0      04 Mar 2019 06:52  Phos  3.4     03-04  Mg     1.8     03-04    TPro  6.8  /  Alb  3.9  /  TBili  0.4  /  DBili  <0.1  /  AST  27  /  ALT  24  /  AlkPhos  47  03-04    PT/INR - ( 04 Mar 2019 06:52 )   PT: 11.3 sec;   INR: 0.99 ratio         PTT - ( 04 Mar 2019 06:52 )  PTT:65.4 sec  LIVER FUNCTIONS - ( 04 Mar 2019 06:52 )  Alb: 3.9 g/dL / Pro: 6.8 g/dL / ALK PHOS: 47 U/L / ALT: 24 U/L / AST: 27 U/L / GGT: x           Lactate Dehydrogenase, Serum: 240 U/L (03-04 @ 06:52)      Meds:   Antimicrobials:   acyclovir   Oral Tab/Cap 400 milliGRAM(s) Oral three times a day  atovaquone Suspension 750 milliGRAM(s) Oral every 12 hours  ciprofloxacin     Tablet 500 milliGRAM(s) Oral every 12 hours  clotrimazole Lozenge 1 Lozenge Oral five times a day  voriconazole 200 milliGRAM(s) Oral every 12 hours      Heme / Onc:   heparin  Infusion 366 Unit(s)/Hr IV Continuous <Continuous>      GI:  docusate sodium 100 milliGRAM(s) Oral three times a day  pantoprazole    Tablet 40 milliGRAM(s) Oral before breakfast  senna 2 Tablet(s) Oral at bedtime  sodium bicarbonate Mouth Rinse 10 milliLiter(s) Swish and Spit five times a day  ursodiol Capsule 300 milliGRAM(s) Oral two times a day with meals      Cardiovascular:   amLODIPine   Tablet 5 milliGRAM(s) Oral daily      Immunologic:   immune   globulin 10% (GAMMAGARD) IVPB 20 Gram(s) IV Intermittent <User Schedule>      Other medications:   acetaminophen   Tablet .. 650 milliGRAM(s) Oral every 6 hours  acetaminophen   Tablet .. 650 milliGRAM(s) Oral <User Schedule>  ascorbic acid 250 milliGRAM(s) Oral daily  Biotene Dry Mouth Oral Rinse 5 milliLiter(s) Swish and Spit five times a day  chlorhexidine 4% Liquid 1 Application(s) Topical <User Schedule>  cholecalciferol 400 Unit(s) Oral daily  cyanocobalamin 1000 MICROGram(s) Oral daily  diphenhydrAMINE   Injectable 25 milliGRAM(s) IV Push every 3 weeks  ferrous    sulfate 325 milliGRAM(s) Oral three times a day  folic acid 1 milliGRAM(s) Oral daily  magnesium oxide 400 milliGRAM(s) Oral three times a day with meals  medroxyPROGESTERone 10 milliGRAM(s) Oral daily  montelukast 10 milliGRAM(s) Oral daily  multivitamin 1 Tablet(s) Oral daily  phytonadione   Solution 10 milliGRAM(s) Oral every other day  sodium chloride 0.45% 1000 milliLiter(s) IV Continuous <Continuous>  sodium chloride 0.9%. 1000 milliLiter(s) IV Continuous <Continuous>      PRN:   acetaminophen   Tablet .. 650 milliGRAM(s) Oral every 6 hours PRN  artificial  tears Solution 1 Drop(s) Both EYES three times a day PRN  diphenhydrAMINE   Injectable 25 milliGRAM(s) IV Push every 4 hours PRN  FIRST- Mouthwash  BLM 5 milliLiter(s) Swish and Spit every 8 hours PRN  metoclopramide Injectable 10 milliGRAM(s) IV Push every 6 hours PRN  sodium chloride 0.65% Nasal 1 Spray(s) Both Nostrils five times a day PRN  sodium chloride 0.9% lock flush 10 milliLiter(s) IV Push every 1 hour PRN    A/P: 33 year old female with a history of AML  Post  :  Allogeneic PBSCT day + 5   Neutropenic - started Cipro on 2/26; if T >/= 38C pan cx, CXR and change Cipro to Cefepime.   Labs every other day ; continue vitamin C, vitamin B, folic acid, vitamin K & iron supplementation. Increased vitamin K to every other day as per standard guidelines for bloodless transplant.   Tacro level on 3/2 17.7, check level on Monday am.  Patient is neutropenic.  BP- 140s, started on Norvasc.    1. Infectious Disease:   acyclovir   Oral Tab/Cap 400 milliGRAM(s) Oral three times a day  atovaquone Suspension 750 milliGRAM(s) Oral every 12 hours  ciprofloxacin     Tablet 500 milliGRAM(s) Oral every 12 hours  clotrimazole Lozenge 1 Lozenge Oral five times a day  voriconazole 200 milliGRAM(s) Oral every 12 hours    2. VOD Prophylaxis: Actigall, Glutamine, Heparin (dosed at 100 units / kg / day)     3. GI Prophylaxis:   pantoprazole    Tablet 40 milliGRAM(s) Oral before breakfast    4. Mouthcare - NS / NaHCO3 rinses, Mycelex, Caphosol; Skin care     5. GVHD prophylaxis -   pending level     6. Transfuse & replete electrolytes prn   magnesium oxide 400 milliGRAM(s) Oral three times a day with meals    7. IV hydration, daily weights, strict I&O, prn diuresis     8. PO intake as tolerated, nutrition follow up as needed, MVI, folic acid     9. Antiemetics, anti-diarrhea medications:   metoclopramide Injectable 10 milliGRAM(s) IV Push every 6 hours PRN    10. OOB as tolerated, physical therapy consult if needed     11. Monitor coags / fibrinogen 2x week, vitamin K as needed   phytonadione   Solution 10 milliGRAM(s) Oral every other day  cyanocobalamin 1000 MICROGram(s) Oral daily  ferrous    sulfate 325 milliGRAM(s) Oral three times a day    12. Monitor closely for clinical changes, monitor for fevers     13. Emotional support provided, plan of care discussed and questions addressed     14. Patient education done regarding plan of care, restrictions and discharge planning     15. Continue regular social work input     I have written the above note for Dr. Larsen who performed service with me in the room.   Racquel CHAPARRO ([phone])    I have seen and examined patient with NP, I agree with above note as scribed. [facility] Transplant Team                                                      Critical / Counseling Time Provided: 30 minutes                                                                                                                                                        Chief Complaint: Allogeneic peripheral blood stem cell transplant from [facility] (sister 10/10)     S: Patient seen and examined with HPC Transplant Team:     + scratchy throat, irritation with swallowing  + loose stools    Denies mouth / tongue, dyspnea, dizziness, headaches, changes in vision, cough, nausea, vomiting, diarrhea, abdominal pain     O: Vitals:   Vital Signs Last 24 Hrs  T(C): 37.1 (04 Mar 2019 06:00), Max: 37.4 (03 Mar 2019 13:49)  T(F): 98.8 (04 Mar 2019 06:00), Max: 99.3 (03 Mar 2019 13:49)  HR: 94 (04 Mar 2019 06:00) (92 - 100)  BP: 141/87 (04 Mar 2019 06:00) (116/84 - 141/87)  BP(mean): --  RR: 18 (04 Mar 2019 06:00) (18 - 18)  SpO2: 100% (04 Mar 2019 06:00) (100% - 100%)    Admit weight: 88.2kg  Today's weight: 87.8 kg    Intake / Output:   03-03 @ 07:01  -  03-04 @ 07:00  --------------------------------------------------------  IN: 1267.4 mL / OUT: 1925 mL / NET: -657.6 mL    03-04 @ 07:01 - 03-04 @ 08:59  --------------------------------------------------------  IN: 44.8 mL / OUT: 0 mL / NET: 44.8 mL    PE:   Oropharynx: no erythema or ulcerations  Oral Mucositis:              -                                         Grade: n/a  CVS: S1, S2 RRR   Lungs: CTA throughout bilaterally   Abdomen:+ BS x 4, soft, NT, ND   Extremities: no edema   Gastric Mucositis:       -                                           Grade: n/a  Intestinal Mucositis:     -                                         Grade: n/a  Skin: no rash   TLC: PICC line in right arm, c/d/i  Neuro: A&O x 3   Pain: 0    Labs:   CBC Full  -  ( 04 Mar 2019 06:52 )  WBC Count : 0.4 K/uL  Hemoglobin : 13.0 g/dL  Hematocrit : 38.0 %  Platelet Count - Automated : 186 K/uL  Mean Cell Volume : 91.7 fl  Mean Cell Hemoglobin : 31.3 pg  Mean Cell Hemoglobin Concentration : 34.2 gm/dL  Auto Neutrophil # : x  Auto Lymphocyte # : x  Auto Monocyte # : x  Auto Eosinophil # : x  Auto Basophil # : x  Auto Neutrophil % : x  Auto Lymphocyte % : x  Auto Monocyte % : x  Auto Eosinophil % : x  Auto Basophil % : x                          13.0   0.4   )-----------( 186      ( 04 Mar 2019 06:52 )             38.0     03-04    140  |  105  |  7   ----------------------------<  89  4.4   |  20<L>  |  0.58    Ca    10.0      04 Mar 2019 06:52  Phos  3.4     03-04  Mg     1.8     03-04    TPro  6.8  /  Alb  3.9  /  TBili  0.4  /  DBili  <0.1  /  AST  27  /  ALT  24  /  AlkPhos  47  03-04    PT/INR - ( 04 Mar 2019 06:52 )   PT: 11.3 sec;   INR: 0.99 ratio         PTT - ( 04 Mar 2019 06:52 )  PTT:65.4 sec  LIVER FUNCTIONS - ( 04 Mar 2019 06:52 )  Alb: 3.9 g/dL / Pro: 6.8 g/dL / ALK PHOS: 47 U/L / ALT: 24 U/L / AST: 27 U/L / GGT: x           Lactate Dehydrogenase, Serum: 240 U/L (03-04 @ 06:52)      Meds:   Antimicrobials:   acyclovir   Oral Tab/Cap 400 milliGRAM(s) Oral three times a day  atovaquone Suspension 750 milliGRAM(s) Oral every 12 hours  ciprofloxacin     Tablet 500 milliGRAM(s) Oral every 12 hours  clotrimazole Lozenge 1 Lozenge Oral five times a day  voriconazole 200 milliGRAM(s) Oral every 12 hours      Heme / Onc:   heparin  Infusion 366 Unit(s)/Hr IV Continuous <Continuous>      GI:  docusate sodium 100 milliGRAM(s) Oral three times a day  pantoprazole    Tablet 40 milliGRAM(s) Oral before breakfast  senna 2 Tablet(s) Oral at bedtime  sodium bicarbonate Mouth Rinse 10 milliLiter(s) Swish and Spit five times a day  ursodiol Capsule 300 milliGRAM(s) Oral two times a day with meals      Cardiovascular:   amLODIPine   Tablet 5 milliGRAM(s) Oral daily      Immunologic:   immune   globulin 10% (GAMMAGARD) IVPB 20 Gram(s) IV Intermittent <User Schedule>      Other medications:   acetaminophen   Tablet .. 650 milliGRAM(s) Oral every 6 hours  acetaminophen   Tablet .. 650 milliGRAM(s) Oral <User Schedule>  ascorbic acid 250 milliGRAM(s) Oral daily  Biotene Dry Mouth Oral Rinse 5 milliLiter(s) Swish and Spit five times a day  chlorhexidine 4% Liquid 1 Application(s) Topical <User Schedule>  cholecalciferol 400 Unit(s) Oral daily  cyanocobalamin 1000 MICROGram(s) Oral daily  diphenhydrAMINE   Injectable 25 milliGRAM(s) IV Push every 3 weeks  ferrous    sulfate 325 milliGRAM(s) Oral three times a day  folic acid 1 milliGRAM(s) Oral daily  magnesium oxide 400 milliGRAM(s) Oral three times a day with meals  medroxyPROGESTERone 10 milliGRAM(s) Oral daily  montelukast 10 milliGRAM(s) Oral daily  multivitamin 1 Tablet(s) Oral daily  phytonadione   Solution 10 milliGRAM(s) Oral every other day  sodium chloride 0.45% 1000 milliLiter(s) IV Continuous <Continuous>  sodium chloride 0.9%. 1000 milliLiter(s) IV Continuous <Continuous>      PRN:   acetaminophen   Tablet .. 650 milliGRAM(s) Oral every 6 hours PRN  artificial  tears Solution 1 Drop(s) Both EYES three times a day PRN  diphenhydrAMINE   Injectable 25 milliGRAM(s) IV Push every 4 hours PRN  FIRST- Mouthwash  BLM 5 milliLiter(s) Swish and Spit every 8 hours PRN  metoclopramide Injectable 10 milliGRAM(s) IV Push every 6 hours PRN  sodium chloride 0.65% Nasal 1 Spray(s) Both Nostrils five times a day PRN  sodium chloride 0.9% lock flush 10 milliLiter(s) IV Push every 1 hour PRN    A/P: 33 year old female with a history of AML  Post  :  Allogeneic PBSCT day + 5   Neutropenic - started Cipro on 2/26; if T >/= 38C pan cx, CXR and change Cipro to Cefepime.   Labs every other day ; continue vitamin C, vitamin B, folic acid, vitamin K & iron supplementation. Increased vitamin K to every other day as per standard guidelines for bloodless transplant.   Tacro level on 3/2 17.7, on hold currently until F/U level today  Patient is neutropenic.  If spikes switch Cipro to Cefepime.  BP- 140s, started on Norvasc.  Hld for SBP <120    1. Infectious Disease:   acyclovir   Oral Tab/Cap 400 milliGRAM(s) Oral three times a day  atovaquone Suspension 750 milliGRAM(s) Oral every 12 hours  ciprofloxacin     Tablet 500 milliGRAM(s) Oral every 12 hours  clotrimazole Lozenge 1 Lozenge Oral five times a day  voriconazole 200 milliGRAM(s) Oral every 12 hours    2. VOD Prophylaxis: Actigall, Glutamine, Heparin (dosed at 100 units / kg / day)     3. GI Prophylaxis:   pantoprazole    Tablet 40 milliGRAM(s) Oral before breakfast    4. Mouthcare - NS / NaHCO3 rinses, Mycelex, Caphosol; Skin care     5. GVHD prophylaxis -   pending level     6. Transfuse & replete electrolytes prn   magnesium oxide 400 milliGRAM(s) Oral three times a day with meals    7. IV hydration, daily weights, strict I&O, prn diuresis     8. PO intake as tolerated, nutrition follow up as needed, MVI, folic acid     9. Antiemetics, anti-diarrhea medications:   metoclopramide Injectable 10 milliGRAM(s) IV Push every 6 hours PRN    10. OOB as tolerated, physical therapy consult if needed     11. Monitor coags / fibrinogen 2x week, vitamin K as needed   phytonadione   Solution 10 milliGRAM(s) Oral every other day  cyanocobalamin 1000 MICROGram(s) Oral daily  ferrous    sulfate 325 milliGRAM(s) Oral three times a day    12. Monitor closely for clinical changes, monitor for fevers     13. Emotional support provided, plan of care discussed and questions addressed     14. Patient education done regarding plan of care, restrictions and discharge planning     15. Continue regular social work input     I have written the above note for Dr. Larsen who performed service with me in the room.   Racquel Leiva NP-C ([phone])    I have seen and examined patient with NP, I agree with above note as scribed. [facility] Transplant Team                                                      Critical / Counseling Time Provided: 30 minutes                                                                                                                                                        Chief Complaint: Allogeneic peripheral blood stem cell transplant from [facility] (sister 10/10)     S: Patient seen and examined with [facility]PC Transplant Team:     + scratchy throat, irritation with swallowing  + loose stools    Denies mouth / tongue, dyspnea, dizziness, headaches, changes in vision, cough, nausea, vomiting, diarrhea, abdominal pain     O: Vitals:   Vital Signs Last 24 Hrs  T(C): 37.1 (04 Mar 2019 06:00), Max: 37.4 (03 Mar 2019 13:49)  T(F): 98.8 (04 Mar 2019 06:00), Max: 99.3 (03 Mar 2019 13:49)  HR: 94 (04 Mar 2019 06:00) (92 - 100)  BP: 141/87 (04 Mar 2019 06:00) (116/84 - 141/87)  BP(mean): --  RR: 18 (04 Mar 2019 06:00) (18 - 18)  SpO2: 100% (04 Mar 2019 06:00) (100% - 100%)    Admit weight: 88.2kg  Today's weight: 87.8 kg    Intake / Output:   03-03 @ 07:01  -  03-04 @ 07:00  --------------------------------------------------------  IN: 1267.4 mL / OUT: 1925 mL / NET: -657.6 mL    03-04 @ 07:01 - 03-04 @ 08:59  --------------------------------------------------------  IN: 44.8 mL / OUT: 0 mL / NET: 44.8 mL    PE:   Oropharynx: no erythema or ulcerations  Oral Mucositis:              -                                         Grade: n/a  CVS: S1, S2 RRR   Lungs: CTA throughout bilaterally   Abdomen:+ BS x 4, soft, NT, ND   Extremities: no edema   Gastric Mucositis:       -                                           Grade: n/a  Intestinal Mucositis:     -                                         Grade: n/a  Skin: no rash   TLC: PICC line in right arm, c/d/i  Neuro: A&O x 3   Pain: 0    Labs:   CBC Full  -  ( 04 Mar 2019 06:52 )  WBC Count : 0.4 K/uL  Hemoglobin : 13.0 g/dL  Hematocrit : 38.0 %  Platelet Count - Automated : 186 K/uL  Mean Cell Volume : 91.7 fl  Mean Cell Hemoglobin : 31.3 pg  Mean Cell Hemoglobin Concentration : 34.2 gm/dL  Auto Neutrophil # : x  Auto Lymphocyte # : x  Auto Monocyte # : x  Auto Eosinophil # : x  Auto Basophil # : x  Auto Neutrophil % : x  Auto Lymphocyte % : x  Auto Monocyte % : x  Auto Eosinophil % : x  Auto Basophil % : x                          13.0   0.4   )-----------( 186      ( 04 Mar 2019 06:52 )             38.0     03-04    140  |  105  |  7   ----------------------------<  89  4.4   |  20<L>  |  0.58    Ca    10.0      04 Mar 2019 06:52  Phos  3.4     03-04  Mg     1.8     03-04    TPro  6.8  /  Alb  3.9  /  TBili  0.4  /  DBili  <0.1  /  AST  27  /  ALT  24  /  AlkPhos  47  03-04    PT/INR - ( 04 Mar 2019 06:52 )   PT: 11.3 sec;   INR: 0.99 ratio         PTT - ( 04 Mar 2019 06:52 )  PTT:65.4 sec  LIVER FUNCTIONS - ( 04 Mar 2019 06:52 )  Alb: 3.9 g/dL / Pro: 6.8 g/dL / ALK PHOS: 47 U/L / ALT: 24 U/L / AST: 27 U/L / GGT: x           Lactate Dehydrogenase, Serum: 240 U/L (03-04 @ 06:52)      Meds:   Antimicrobials:   acyclovir   Oral Tab/Cap 400 milliGRAM(s) Oral three times a day  atovaquone Suspension 750 milliGRAM(s) Oral every 12 hours  ciprofloxacin     Tablet 500 milliGRAM(s) Oral every 12 hours  clotrimazole Lozenge 1 Lozenge Oral five times a day  voriconazole 200 milliGRAM(s) Oral every 12 hours      Heme / Onc:   heparin  Infusion 366 Unit(s)/Hr IV Continuous <Continuous>      GI:  docusate sodium 100 milliGRAM(s) Oral three times a day  pantoprazole    Tablet 40 milliGRAM(s) Oral before breakfast  senna 2 Tablet(s) Oral at bedtime  sodium bicarbonate Mouth Rinse 10 milliLiter(s) Swish and Spit five times a day  ursodiol Capsule 300 milliGRAM(s) Oral two times a day with meals      Cardiovascular:   amLODIPine   Tablet 5 milliGRAM(s) Oral daily      Immunologic:   immune   globulin 10% (GAMMAGARD) IVPB 20 Gram(s) IV Intermittent <User Schedule>      Other medications:   acetaminophen   Tablet .. 650 milliGRAM(s) Oral every 6 hours  acetaminophen   Tablet .. 650 milliGRAM(s) Oral <User Schedule>  ascorbic acid 250 milliGRAM(s) Oral daily  Biotene Dry Mouth Oral Rinse 5 milliLiter(s) Swish and Spit five times a day  chlorhexidine 4% Liquid 1 Application(s) Topical <User Schedule>  cholecalciferol 400 Unit(s) Oral daily  cyanocobalamin 1000 MICROGram(s) Oral daily  diphenhydrAMINE   Injectable 25 milliGRAM(s) IV Push every 3 weeks  ferrous    sulfate 325 milliGRAM(s) Oral three times a day  folic acid 1 milliGRAM(s) Oral daily  magnesium oxide 400 milliGRAM(s) Oral three times a day with meals  medroxyPROGESTERone 10 milliGRAM(s) Oral daily  montelukast 10 milliGRAM(s) Oral daily  multivitamin 1 Tablet(s) Oral daily  phytonadione   Solution 10 milliGRAM(s) Oral every other day  sodium chloride 0.45% 1000 milliLiter(s) IV Continuous <Continuous>  sodium chloride 0.9%. 1000 milliLiter(s) IV Continuous <Continuous>      PRN:   acetaminophen   Tablet .. 650 milliGRAM(s) Oral every 6 hours PRN  artificial  tears Solution 1 Drop(s) Both EYES three times a day PRN  diphenhydrAMINE   Injectable 25 milliGRAM(s) IV Push every 4 hours PRN  FIRST- Mouthwash  BLM 5 milliLiter(s) Swish and Spit every 8 hours PRN  metoclopramide Injectable 10 milliGRAM(s) IV Push every 6 hours PRN  sodium chloride 0.65% Nasal 1 Spray(s) Both Nostrils five times a day PRN  sodium chloride 0.9% lock flush 10 milliLiter(s) IV Push every 1 hour PRN    A/P: 33 year old female with a history of AML  Post  :  Allogeneic PBSCT day + 5   Neutropenic - started Cipro on 2/26; if T >/= 38C pan cx, CXR and change Cipro to Cefepime.   Labs every other day ; continue vitamin C, vitamin B, folic acid, vitamin K & iron supplementation. Increased vitamin K to every other day as per standard guidelines for bloodless transplant.   Tacro level on 3/2 17.7, held since yesterday AM.  Level 8.6 today, decrease dose to 1 mg PO BID starting today, recheck level on next draw (3/6).  Patient is neutropenic.  If spikes switch Cipro to Cefepime.  BP- 140s, started on Norvasc.  Hld for SBP <120    1. Infectious Disease:   acyclovir   Oral Tab/Cap 400 milliGRAM(s) Oral three times a day  atovaquone Suspension 750 milliGRAM(s) Oral every 12 hours  ciprofloxacin     Tablet 500 milliGRAM(s) Oral every 12 hours  clotrimazole Lozenge 1 Lozenge Oral five times a day  voriconazole 200 milliGRAM(s) Oral every 12 hours    2. VOD Prophylaxis: Actigall, Glutamine, Heparin (dosed at 100 units / kg / day)     3. GI Prophylaxis:   pantoprazole    Tablet 40 milliGRAM(s) Oral before breakfast    4. Mouthcare - NS / NaHCO3 rinses, Mycelex, Caphosol; Skin care     5. GVHD prophylaxis -   pending level     6. Transfuse & replete electrolytes prn   magnesium oxide 400 milliGRAM(s) Oral three times a day with meals    7. IV hydration, daily weights, strict I&O, prn diuresis     8. PO intake as tolerated, nutrition follow up as needed, MVI, folic acid     9. Antiemetics, anti-diarrhea medications:   metoclopramide Injectable 10 milliGRAM(s) IV Push every 6 hours PRN    10. OOB as tolerated, physical therapy consult if needed     11. Monitor coags / fibrinogen 2x week, vitamin K as needed   phytonadione   Solution 10 milliGRAM(s) Oral every other day  cyanocobalamin 1000 MICROGram(s) Oral daily  ferrous    sulfate 325 milliGRAM(s) Oral three times a day    12. Monitor closely for clinical changes, monitor for fevers     13. Emotional support provided, plan of care discussed and questions addressed     14. Patient education done regarding plan of care, restrictions and discharge planning     15. Continue regular social work input     I have written the above note for Dr. Larsen who performed service with me in the room.   Racquel Leiva NP-C ([phone])    I have seen and examined patient with NP, I agree with above note as scribed. [facility] Transplant Team                                                      Critical / Counseling Time Provided: 30 minutes                                                                                                                                                        Chief Complaint: Allogeneic peripheral blood stem cell transplant from [facility] (sister 10/10)     S: Patient seen and examined with HPC Transplant Team:     + scratchy throat, irritation with swallowing  + loose stools    Denies mouth / tongue pain, dizziness, headaches, changes in vision, cough, dyspnea, nausea, vomiting, diarrhea, abdominal pain     O: Vitals:   Vital Signs Last 24 Hrs  T(C): 37.1 (04 Mar 2019 06:00), Max: 37.4 (03 Mar 2019 13:49)  T(F): 98.8 (04 Mar 2019 06:00), Max: 99.3 (03 Mar 2019 13:49)  HR: 94 (04 Mar 2019 06:00) (92 - 100)  BP: 141/87 (04 Mar 2019 06:00) (116/84 - 141/87)  BP(mean): --  RR: 18 (04 Mar 2019 06:00) (18 - 18)  SpO2: 100% (04 Mar 2019 06:00) (100% - 100%)    Admit weight: 88.2kg  Today's weight: 87.8 kg    Intake / Output:   03-03 @ 07:01  -  03-04 @ 07:00  --------------------------------------------------------  IN: 1267.4 mL / OUT: 1925 mL / NET: -657.6 mL    03-04 @ 07:01 - 03-04 @ 08:59  --------------------------------------------------------  IN: 44.8 mL / OUT: 0 mL / NET: 44.8 mL    PE:   Oropharynx: no erythema or ulcerations  Oral Mucositis:              -                                         Grade: n/a  CVS: S1, S2 RRR   Lungs: CTA throughout bilaterally   Abdomen:+ BS x 4, soft, NT, ND   Extremities: no edema   Gastric Mucositis:       -                                           Grade: n/a  Intestinal Mucositis:     -                                         Grade: n/a  Skin: no rash   TLC: PICC line in right arm, c/d/i  Neuro: A&O x 3   Pain: 0    Labs:   CBC Full  -  ( 04 Mar 2019 06:52 )  WBC Count : 0.4 K/uL  Hemoglobin : 13.0 g/dL  Hematocrit : 38.0 %  Platelet Count - Automated : 186 K/uL  Mean Cell Volume : 91.7 fl  Mean Cell Hemoglobin : 31.3 pg  Mean Cell Hemoglobin Concentration : 34.2 gm/dL  Auto Neutrophil # : x  Auto Lymphocyte # : x  Auto Monocyte # : x  Auto Eosinophil # : x  Auto Basophil # : x  Auto Neutrophil % : x  Auto Lymphocyte % : x  Auto Monocyte % : x  Auto Eosinophil % : x  Auto Basophil % : x                          13.0   0.4   )-----------( 186      ( 04 Mar 2019 06:52 )             38.0     03-04    140  |  105  |  7   ----------------------------<  89  4.4   |  20<L>  |  0.58    Ca    10.0      04 Mar 2019 06:52  Phos  3.4     03-04  Mg     1.8     03-04    TPro  6.8  /  Alb  3.9  /  TBili  0.4  /  DBili  <0.1  /  AST  27  /  ALT  24  /  AlkPhos  47  03-04    PT/INR - ( 04 Mar 2019 06:52 )   PT: 11.3 sec;   INR: 0.99 ratio         PTT - ( 04 Mar 2019 06:52 )  PTT:65.4 sec  LIVER FUNCTIONS - ( 04 Mar 2019 06:52 )  Alb: 3.9 g/dL / Pro: 6.8 g/dL / ALK PHOS: 47 U/L / ALT: 24 U/L / AST: 27 U/L / GGT: x           Lactate Dehydrogenase, Serum: 240 U/L (03-04 @ 06:52)      Meds:   Antimicrobials:   acyclovir   Oral Tab/Cap 400 milliGRAM(s) Oral three times a day  atovaquone Suspension 750 milliGRAM(s) Oral every 12 hours  ciprofloxacin     Tablet 500 milliGRAM(s) Oral every 12 hours  clotrimazole Lozenge 1 Lozenge Oral five times a day  voriconazole 200 milliGRAM(s) Oral every 12 hours      Heme / Onc:   heparin  Infusion 366 Unit(s)/Hr IV Continuous <Continuous>      GI:  docusate sodium 100 milliGRAM(s) Oral three times a day  pantoprazole    Tablet 40 milliGRAM(s) Oral before breakfast  senna 2 Tablet(s) Oral at bedtime  sodium bicarbonate Mouth Rinse 10 milliLiter(s) Swish and Spit five times a day  ursodiol Capsule 300 milliGRAM(s) Oral two times a day with meals      Cardiovascular:   amLODIPine   Tablet 5 milliGRAM(s) Oral daily      Immunologic:   immune   globulin 10% (GAMMAGARD) IVPB 20 Gram(s) IV Intermittent <User Schedule>      Other medications:   acetaminophen   Tablet .. 650 milliGRAM(s) Oral every 6 hours  acetaminophen   Tablet .. 650 milliGRAM(s) Oral <User Schedule>  ascorbic acid 250 milliGRAM(s) Oral daily  Biotene Dry Mouth Oral Rinse 5 milliLiter(s) Swish and Spit five times a day  chlorhexidine 4% Liquid 1 Application(s) Topical <User Schedule>  cholecalciferol 400 Unit(s) Oral daily  cyanocobalamin 1000 MICROGram(s) Oral daily  diphenhydrAMINE   Injectable 25 milliGRAM(s) IV Push every 3 weeks  ferrous    sulfate 325 milliGRAM(s) Oral three times a day  folic acid 1 milliGRAM(s) Oral daily  magnesium oxide 400 milliGRAM(s) Oral three times a day with meals  medroxyPROGESTERone 10 milliGRAM(s) Oral daily  montelukast 10 milliGRAM(s) Oral daily  multivitamin 1 Tablet(s) Oral daily  phytonadione   Solution 10 milliGRAM(s) Oral every other day  sodium chloride 0.45% 1000 milliLiter(s) IV Continuous <Continuous>  sodium chloride 0.9%. 1000 milliLiter(s) IV Continuous <Continuous>      PRN:   acetaminophen   Tablet .. 650 milliGRAM(s) Oral every 6 hours PRN  artificial  tears Solution 1 Drop(s) Both EYES three times a day PRN  diphenhydrAMINE   Injectable 25 milliGRAM(s) IV Push every 4 hours PRN  FIRST- Mouthwash  BLM 5 milliLiter(s) Swish and Spit every 8 hours PRN  metoclopramide Injectable 10 milliGRAM(s) IV Push every 6 hours PRN  sodium chloride 0.65% Nasal 1 Spray(s) Both Nostrils five times a day PRN  sodium chloride 0.9% lock flush 10 milliLiter(s) IV Push every 1 hour PRN    A/P: 33 year old female with a history of AML  Post  :  Allogeneic PBSCT day + 5   Neutropenic - started Cipro on 2/26; if T >/= 38C pan cx, CXR and change Cipro to Cefepime.   Labs every other day ; continue vitamin C, vitamin B, folic acid, vitamin K & iron supplementation. Increased vitamin K to every other day as per standard guidelines for bloodless transplant.   Tacro level on 3/2 17.7, held since yesterday AM.  Level 8.6 today, decrease dose to 1 mg PO BID starting today, recheck level on next draw (3/6).  Patient is neutropenic.  If spikes switch Cipro to Cefepime.  BP- 140s, started on Norvasc.  Hold for SBP <120    1. Infectious Disease:   acyclovir   Oral Tab/Cap 400 milliGRAM(s) Oral three times a day  atovaquone Suspension 750 milliGRAM(s) Oral every 12 hours  ciprofloxacin     Tablet 500 milliGRAM(s) Oral every 12 hours  clotrimazole Lozenge 1 Lozenge Oral five times a day  voriconazole 200 milliGRAM(s) Oral every 12 hours    2. VOD Prophylaxis: Actigall, Glutamine, Heparin (dosed at 100 units / kg / day)     3. GI Prophylaxis:   pantoprazole    Tablet 40 milliGRAM(s) Oral before breakfast    4. Mouth care - NS / NaHCO3 rinses, Mycelex, Biotene; Skin care     5. GVHD prophylaxis -   pending level     6. Transfuse & replete electrolytes prn   magnesium oxide 400 milliGRAM(s) Oral three times a day with meals    7. IV hydration, daily weights, strict I&O, prn diuresis     8. PO intake as tolerated, nutrition follow up as needed, MVI, folic acid     9. Antiemetics, anti-diarrhea medications:   metoclopramide Injectable 10 milliGRAM(s) IV Push every 6 hours PRN    10. OOB as tolerated, physical therapy consult if needed     11. Monitor coags / fibrinogen 2x week, vitamin K as needed   phytonadione   Solution 10 milliGRAM(s) Oral every other day  cyanocobalamin 1000 MICROGram(s) Oral daily  ferrous    sulfate 325 milliGRAM(s) Oral three times a day    12. Monitor closely for clinical changes, monitor for fevers     13. Emotional support provided, plan of care discussed and questions addressed     14. Patient education done regarding plan of care, restrictions and discharge planning     15. Continue regular social work input     I have written the above note for Dr. Larsen who performed service with me in the room.   Racquel Leiva NP-C ([phone])    I have seen and examined patient with NP, I agree with above note as scribed.

## 2019-03-04 NOTE — PROGRESS NOTE ADULT - ATTENDING COMMENTS
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 Pt is Conf[employer]ianism, and does not accept blood products for [employer] reasons   Day + 5 -  s/p MTX on 3/2, serum creatinine in normal range. No mucositis.  On Tacrolimus, with level from 3/2 elevated. Therefore, hold Tacrolimus until review of Stat level from 3/4/19 am.  Begin Zarxio on day +12  Continue Acyclovir, Mepron, Vfend prophylaxis; on Cipro for neutropenia  Continue VOD prophylaxis with Actigall, glutamine supplement, low dose heparin drip(will D/C when platelet count <=75K)  Hypertension- possibly related to Tacro, other meds, fluids- will begin Norvasc.  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 Pt is Ep[employer], and does not accept blood products for L[employer]-day reasons   Day + 5 -  s/p MTX on 3/2, serum creatinine in normal range. No mucositis.  Adjust Tacrolimus to 1 mg po q 12 hrs, monitor level  Begin Zarxio on day +12  Continue Acyclovir, Mepron, Vfend prophylaxis; on Cipro for neutropenia  Continue VOD prophylaxis with Actigall, glutamine supplement, low dose heparin drip(will D/C when platelet count <=75K)  Hypertension- possibly related to Tacro, other meds, fluids- continue Norvasc.  Labs every other day or at discretion of attending physician  Continue iron, folic acid, vitamin K, vitamin B-12, vitamin C as per standard practice for bloodless transplant  Antiemetics, mouth care, skin care   OOB/ambulate

## 2019-03-05 PROCEDURE — 99291 CRITICAL CARE FIRST HOUR: CPT

## 2019-03-05 PROCEDURE — 99233 SBSQ HOSP IP/OBS HIGH 50: CPT

## 2019-03-05 RX ORDER — METHOTREXATE 2.5 MG/1
9 TABLET ORAL ONCE
Qty: 0 | Refills: 0 | Status: COMPLETED | OUTPATIENT
Start: 2019-03-05 | End: 2019-03-05

## 2019-03-05 RX ORDER — OXYCODONE HYDROCHLORIDE 5 MG/1
2.5 TABLET ORAL ONCE
Qty: 0 | Refills: 0 | Status: DISCONTINUED | OUTPATIENT
Start: 2019-03-05 | End: 2019-03-05

## 2019-03-05 RX ADMIN — SODIUM CHLORIDE 20 MILLILITER(S): 9 INJECTION INTRAMUSCULAR; INTRAVENOUS; SUBCUTANEOUS at 17:42

## 2019-03-05 RX ADMIN — Medication 500 MILLIGRAM(S): at 17:34

## 2019-03-05 RX ADMIN — Medication 400 MILLIGRAM(S): at 06:01

## 2019-03-05 RX ADMIN — METHOTREXATE 4.32 MILLIGRAM(S): 2.5 TABLET ORAL at 16:24

## 2019-03-05 RX ADMIN — Medication 1 LOZENGE: at 15:17

## 2019-03-05 RX ADMIN — Medication 1 TABLET(S): at 11:55

## 2019-03-05 RX ADMIN — Medication 400 UNIT(S): at 11:55

## 2019-03-05 RX ADMIN — Medication 100 MILLIGRAM(S): at 21:27

## 2019-03-05 RX ADMIN — HEPARIN SODIUM 3.66 UNIT(S)/HR: 5000 INJECTION INTRAVENOUS; SUBCUTANEOUS at 00:47

## 2019-03-05 RX ADMIN — HEPARIN SODIUM 3.66 UNIT(S)/HR: 5000 INJECTION INTRAVENOUS; SUBCUTANEOUS at 17:42

## 2019-03-05 RX ADMIN — URSODIOL 300 MILLIGRAM(S): 250 TABLET, FILM COATED ORAL at 17:34

## 2019-03-05 RX ADMIN — ATOVAQUONE 750 MILLIGRAM(S): 750 SUSPENSION ORAL at 06:00

## 2019-03-05 RX ADMIN — Medication 10 MILLILITER(S): at 15:17

## 2019-03-05 RX ADMIN — Medication 10 MILLILITER(S): at 00:03

## 2019-03-05 RX ADMIN — Medication 250 MILLIGRAM(S): at 11:55

## 2019-03-05 RX ADMIN — Medication 1 LOZENGE: at 11:56

## 2019-03-05 RX ADMIN — TACROLIMUS 1 MILLIGRAM(S): 5 CAPSULE ORAL at 06:03

## 2019-03-05 RX ADMIN — Medication 1 LOZENGE: at 19:41

## 2019-03-05 RX ADMIN — Medication 100 MILLIGRAM(S): at 13:26

## 2019-03-05 RX ADMIN — MEDROXYPROGESTERONE ACETATE 10 MILLIGRAM(S): 150 INJECTION, SUSPENSION, EXTENDED RELEASE INTRAMUSCULAR at 11:55

## 2019-03-05 RX ADMIN — Medication 325 MILLIGRAM(S): at 06:02

## 2019-03-05 RX ADMIN — PREGABALIN 1000 MICROGRAM(S): 225 CAPSULE ORAL at 11:55

## 2019-03-05 RX ADMIN — SODIUM CHLORIDE 20 MILLILITER(S): 9 INJECTION INTRAMUSCULAR; INTRAVENOUS; SUBCUTANEOUS at 00:48

## 2019-03-05 RX ADMIN — Medication 5 MILLILITER(S): at 19:41

## 2019-03-05 RX ADMIN — Medication 500 MILLIGRAM(S): at 06:01

## 2019-03-05 RX ADMIN — ATOVAQUONE 750 MILLIGRAM(S): 750 SUSPENSION ORAL at 17:34

## 2019-03-05 RX ADMIN — Medication 400 MILLIGRAM(S): at 21:27

## 2019-03-05 RX ADMIN — Medication 5 MILLILITER(S): at 15:17

## 2019-03-05 RX ADMIN — MONTELUKAST 10 MILLIGRAM(S): 4 TABLET, CHEWABLE ORAL at 11:55

## 2019-03-05 RX ADMIN — Medication 10 MILLIGRAM(S): at 11:55

## 2019-03-05 RX ADMIN — Medication 5 MILLILITER(S): at 07:48

## 2019-03-05 RX ADMIN — PANTOPRAZOLE SODIUM 40 MILLIGRAM(S): 20 TABLET, DELAYED RELEASE ORAL at 06:05

## 2019-03-05 RX ADMIN — Medication 10 MILLILITER(S): at 11:56

## 2019-03-05 RX ADMIN — OXYCODONE HYDROCHLORIDE 2.5 MILLIGRAM(S): 5 TABLET ORAL at 21:58

## 2019-03-05 RX ADMIN — MAGNESIUM OXIDE 400 MG ORAL TABLET 400 MILLIGRAM(S): 241.3 TABLET ORAL at 07:48

## 2019-03-05 RX ADMIN — Medication 5 MILLILITER(S): at 00:02

## 2019-03-05 RX ADMIN — Medication 1 LOZENGE: at 00:03

## 2019-03-05 RX ADMIN — Medication 5 MILLILITER(S): at 11:56

## 2019-03-05 RX ADMIN — TACROLIMUS 1 MILLIGRAM(S): 5 CAPSULE ORAL at 17:34

## 2019-03-05 RX ADMIN — OXYCODONE HYDROCHLORIDE 2.5 MILLIGRAM(S): 5 TABLET ORAL at 21:28

## 2019-03-05 RX ADMIN — VORICONAZOLE 200 MILLIGRAM(S): 10 INJECTION, POWDER, LYOPHILIZED, FOR SOLUTION INTRAVENOUS at 17:34

## 2019-03-05 RX ADMIN — Medication 325 MILLIGRAM(S): at 21:27

## 2019-03-05 RX ADMIN — Medication 1 MILLIGRAM(S): at 11:56

## 2019-03-05 RX ADMIN — AMLODIPINE BESYLATE 5 MILLIGRAM(S): 2.5 TABLET ORAL at 06:01

## 2019-03-05 RX ADMIN — Medication 1 LOZENGE: at 07:48

## 2019-03-05 RX ADMIN — URSODIOL 300 MILLIGRAM(S): 250 TABLET, FILM COATED ORAL at 07:48

## 2019-03-05 RX ADMIN — Medication 100 MILLIGRAM(S): at 06:02

## 2019-03-05 RX ADMIN — Medication 10 MILLILITER(S): at 19:41

## 2019-03-05 RX ADMIN — SENNA PLUS 2 TABLET(S): 8.6 TABLET ORAL at 21:27

## 2019-03-05 RX ADMIN — Medication 325 MILLIGRAM(S): at 13:26

## 2019-03-05 RX ADMIN — MAGNESIUM OXIDE 400 MG ORAL TABLET 400 MILLIGRAM(S): 241.3 TABLET ORAL at 11:55

## 2019-03-05 RX ADMIN — Medication 10 MILLILITER(S): at 07:48

## 2019-03-05 RX ADMIN — MAGNESIUM OXIDE 400 MG ORAL TABLET 400 MILLIGRAM(S): 241.3 TABLET ORAL at 17:34

## 2019-03-05 RX ADMIN — VORICONAZOLE 200 MILLIGRAM(S): 10 INJECTION, POWDER, LYOPHILIZED, FOR SOLUTION INTRAVENOUS at 06:04

## 2019-03-05 RX ADMIN — CHLORHEXIDINE GLUCONATE 1 APPLICATION(S): 213 SOLUTION TOPICAL at 06:00

## 2019-03-05 RX ADMIN — Medication 400 MILLIGRAM(S): at 13:26

## 2019-03-05 NOTE — PROGRESS NOTE ADULT - PROBLEM SELECTOR PLAN 2
- continue to encourage PO intake  - some mouth discomfort (4-5/10) but does not want pain meds at this time

## 2019-03-05 NOTE — PROGRESS NOTE ADULT - ATTENDING COMMENTS
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 Pt is Sabianist, and does not accept blood products for Mu-ism reasons   Day + 6 -  s/p MTX on 3/2, serum creatinine in normal range. No mucositis.  Adjust Tacrolimus to 1 mg po q 12 hrs, monitor level  Begin Zarxio on day +12  Continue Acyclovir, Mepron, Vfend prophylaxis; on Cipro for neutropenia  Continue VOD prophylaxis with Actigall, glutamine supplement, low dose heparin drip(will D/C when platelet count <=75K)  Hypertension- possibly related to Tacro, other meds, fluids- continue Norvasc.  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 Pt is Religious, and does not accept blood products for Druze reasons   Day + 6 -  s/p MTX on 3/2, serum creatinine in normal range. No oral mucositis visible. For day + 6 MTX today.  Mucositis- throat soreness/pain with swallowing likely secondary to mucositis s/p MTX; monitor symptoms.  Continue Tacrolimus 1 mg po q 12 hrs, monitor level  Begin Zarxio on day +12  Continue Acyclovir, Mepron, Vfend prophylaxis; on Cipro for neutropenia  Continue VOD prophylaxis with Actigall, glutamine supplement, low dose heparin drip(will D/C when platelet count <=75K)  Hypertension- possibly related to Tacro, other meds, fluids- continue Norvasc.  Labs every other day or at discretion of attending physician  Continue iron, folic acid, vitamin K, vitamin B-12, vitamin C as per standard practice for bloodless transplant  Antiemetics, mouth care, skin care   OOB/ambulate

## 2019-03-05 NOTE — PROGRESS NOTE ADULT - SUBJECTIVE AND OBJECTIVE BOX
HPI: 33F with PMH of AML/MDS, Jew, here for allo PSCT. Diagnosed during bloodwork for a preop R ankle ligament repair, and managed with idarubicin c/b DIC, neutropenic fever, and retinal hemorrhage; had salvage therapy; further findings raised the question of MDS over AML. Sister is donor. Planned for SCT 2/27/19.    INTERVAL EVENTS: states feeling well D#6 post-transplant, has some fatigue and appetite loss    ADVANCE DIRECTIVES:    DNR [ ]  MOLST  [ ]    Living Will  [ ]   DECISION MAKER(s):  [ ] Health Care Proxy(s)  [ ] Surrogate(s)  [ ] Guardian           Name(s) and Contact(s):     BASELINE (I)ADL(s) (prior to admission):  [place]: [ ]Total  [ ] Moderate [ ]Dependent    Allergies    No Known Allergies    Intolerances    MEDICATIONS  (STANDING):  acetaminophen   Tablet .. 650 milliGRAM(s) Oral every 6 hours  acetaminophen   Tablet .. 650 milliGRAM(s) Oral <User Schedule>  acyclovir   Oral Tab/Cap 400 milliGRAM(s) Oral three times a day  amLODIPine   Tablet 5 milliGRAM(s) Oral daily  ascorbic acid 250 milliGRAM(s) Oral daily  atovaquone Suspension 750 milliGRAM(s) Oral every 12 hours  Biotene Dry Mouth Oral Rinse 5 milliLiter(s) Swish and Spit five times a day  chlorhexidine 4% Liquid 1 Application(s) Topical <User Schedule>  cholecalciferol 400 Unit(s) Oral daily  ciprofloxacin     Tablet 500 milliGRAM(s) Oral every 12 hours  clotrimazole Lozenge 1 Lozenge Oral five times a day  cyanocobalamin 1000 MICROGram(s) Oral daily  diphenhydrAMINE   Injectable 25 milliGRAM(s) IV Push every 3 weeks  docusate sodium 100 milliGRAM(s) Oral three times a day  ferrous    sulfate 325 milliGRAM(s) Oral three times a day  folic acid 1 milliGRAM(s) Oral daily  heparin  Infusion 366 Unit(s)/Hr (3.66 mL/Hr) IV Continuous <Continuous>  immune   globulin 10% (GAMMAGARD) IVPB 20 Gram(s) IV Intermittent <User Schedule>  magnesium oxide 400 milliGRAM(s) Oral three times a day with meals  medroxyPROGESTERone 10 milliGRAM(s) Oral daily  methotrexate Injection (eMAR) 9 milliGRAM(s) IV Push Chemo once  montelukast 10 milliGRAM(s) Oral daily  multivitamin 1 Tablet(s) Oral daily  pantoprazole    Tablet 40 milliGRAM(s) Oral before breakfast  phytonadione   Solution 10 milliGRAM(s) Oral every other day  senna 2 Tablet(s) Oral at bedtime  sodium bicarbonate Mouth Rinse 10 milliLiter(s) Swish and Spit five times a day  sodium chloride 0.9%. 1000 milliLiter(s) (20 mL/Hr) IV Continuous <Continuous>  tacrolimus 1 milliGRAM(s) Oral every 12 hours  ursodiol Capsule 300 milliGRAM(s) Oral two times a day with meals  voriconazole 200 milliGRAM(s) Oral every 12 hours    MEDICATIONS  (PRN):  acetaminophen   Tablet .. 650 milliGRAM(s) Oral every 6 hours PRN Temp greater or equal to 38C (100.4F), Mild Pain (1 - 3)  artificial  tears Solution 1 Drop(s) Both EYES three times a day PRN Dry Eyes  benzocaine 15 mG/menthol 3.6 mG Lozenge 1 Lozenge Oral every 3 hours PRN Sore Throat  diphenhydrAMINE   Injectable 25 milliGRAM(s) IV Push every 4 hours PRN Allergy symptoms  FIRST- Mouthwash  BLM 5 milliLiter(s) Swish and Spit every 8 hours PRN Mouth Care  metoclopramide Injectable 10 milliGRAM(s) IV Push every 6 hours PRN Nausea and/or Vomiting  sodium chloride 0.65% Nasal 1 Spray(s) Both Nostrils five times a day PRN Nasal Congestion  sodium chloride 0.9% lock flush 10 milliLiter(s) IV Push every 1 hour PRN Pre/post blood products, medications, blood draw, and to maintain line patency    PRESENT SYMPTOMS:   Source if other than patient:  [ ]Family   [ ]Team     Pain (Impact on QOL):    Location -         Minimal acceptable level (0-10 scale):                    Aggravating factors -  Quality -  Radiation -  Severity (0-10 scale) -    Timing -    PAIN AD Score:     [email or web address] (press ctrl +  left click to view)    Dyspnea:                           [ ]Mild [ ]Moderate [ ]Severe  Anxiety:                             [ ]Mild [ ]Moderate [ ]Severe  Fatigue:                             [X ]Mild [ ]Moderate [ ]Severe  Nausea:                             [ ]Mild [ ]Moderate [ ]Severe  Loss of appetite:              [X ]Mild [ ]Moderate [ ]Severe  Constipation:                    [ ]Mild [ ]Moderate [ ]Severe    Other Symptoms:  [X ]All other review of systems negative   [ ]Unable to obtain due to poor mentation     Karnofsky Performance Score/Palliative Performance Status Version 2:         %    PHYSICAL EXAM:  Vital Signs Last 24 Hrs  T(C): 37.4 (05 Mar 2019 13:49), Max: 37.4 (05 Mar 2019 13:49)  T(F): 99.3 (05 Mar 2019 13:49), Max: 99.3 (05 Mar 2019 13:49)  HR: 85 (05 Mar 2019 13:49) (85 - 104)  BP: 119/86 (05 Mar 2019 13:49) (112/70 - 127/88)  BP(mean): --  RR: 18 (05 Mar 2019 13:49) (18 - 18)  SpO2: 98% (05 Mar 2019 13:49) (98% - 100%)    GENERAL:  [X ]Alert  [ ]Oriented x   [ ]Lethargic  [ ]Cachexia  [ ]Unarousable  [ ]Verbal  [ ]Non-Verbal    Behavioral:   [ ] Anxiety  [ ] Delirium [ ] Agitation [ ] Other    HEENT:  [X ]Normal   [ ]Dry mouth   [ ]ET Tube/Trach  [ ]Oral lesions    PULMONARY:   [X ]Clear [ ]Tachypnea  [ ]Audible excessive secretions   [ ]Rhonchi        [ ]Right [ ]Left [ ]Bilateral  [ ]Crackles        [ ]Right [ ]Left [ ]Bilateral  [ ]Wheezing     [ ]Right [ ]Left [ ]Bilateral    CARDIOVASCULAR:    [ X]Regular [ ]Irregular [ ]Tachy  [ ]Lance [ ]Murmur [ ]Other    GASTROINTESTINAL:  [X ]Soft  [ ]Distended   [ X]+BS  [X ]Non tender [ ]Tender  [ ]PEG [ ]OGT/ NGT  Last BM:     GENITOURINARY:  [ ]Normal [ ] Incontinent   [ ]Oliguria/Anuria   [ ]Noonan    MUSCULOSKELETAL:   [X ]Normal   [ ]Weakness  [ ]Bed/Wheelchair bound [ ]Edema    NEUROLOGIC:   [X ]No focal deficits  [ ] Cognitive impairment  [ ] Dysphagia [ ]Dysarthria [ ] Paresis [ ]Other     SKIN:   [ ]Normal   [ ]Pressure ulcer(s)  [ ]Rash    CRITICAL CARE:  [ ] Shock Present  [ ]Septic [ ]Cardiogenic [ ]Neurologic [ ]Hypovolemic  [ ]  Vasopressors [ ]  Inotropes   [ ] Respiratory failure present  [ ] Acute  [ ] Chronic [ ] Hypoxic  [ ] Hypercarbic [ ] Other  [ ] Other organ failure     LABS: reviewed                                   13.0   0.4   )-----------( 186      ( 04 Mar 2019 06:52 )             38.0     03-04    140  |  105  |  7   ----------------------------<  89  4.4   |  20<L>  |  0.58    Ca    10.0      04 Mar 2019 06:52  Phos  3.4     03-04  Mg     1.8     03-04          RADIOLOGY & ADDITIONAL STUDIES: none on this admission thus far    PROTEIN CALORIE MALNUTRITION:   [ ] PPSV2 < or = to 30% [ ] significant weight loss  [ ] poor nutritional intake [ ] catabolic state [ ] anasarca     Albumin, Serum: 4.2 g/dL (02-20-19 @ 13:04)  Artificial Nutrition [ ]     REFERRALS:   [ ]Chaplaincy  [ ] Hospice  [ ]Child Life  [ ]Social Work  [ ]Case management [ ]Holistic Therapy     Goals of Care Discussion Document:     Saeid Pal MD  Palliative Medicine  office: [phone] | [phone]  pager: [phone]

## 2019-03-05 NOTE — PROGRESS NOTE ADULT - SUBJECTIVE AND OBJECTIVE BOX
HPC Transplant Team                                                      Critical / Counseling Time Provided: 30 minutes                                                                                                                                                        Chief Complaint: Allogeneic peripheral blood stem cell transplant from [facility] (sister 10/10)     S: Patient seen and examined with HPC Transplant Team:     Denies mouth / tongue / throat pain, dyspnea, cough, nausea, vomiting, diarrhea, abdominal pain     O: Vitals:   Vital Signs Last 24 Hrs  T(C): 36.2 (05 Mar 2019 06:18), Max: 37.2 (04 Mar 2019 17:01)  T(F): 97.2 (05 Mar 2019 06:18), Max: 99 (04 Mar 2019 17:01)  HR: 90 (05 Mar 2019 06:18) (89 - 108)  BP: 125/80 (05 Mar 2019 06:18) (112/70 - 127/88)  BP(mean): --  RR: 18 (05 Mar 2019 06:18) (18 - 18)  SpO2: 100% (05 Mar 2019 06:18) (99% - 100%)    Admit weight: 88.2kg  Daily Weight in k.8 (04 Mar 2019 10:09)    Intake / Output:    @ 07:  -  05 @ 07:00  --------------------------------------------------------  IN: 1799.6 mL / OUT: 2150 mL / NET: -350.4 mL     @ 07:01  -  05 @ 08:52  --------------------------------------------------------  IN: 37.7 mL / OUT: 300 mL / NET: -262.3 mL      PE:   Oropharynx: no erythema or ulcerations  Oral Mucositis:              -                                         Grade: n/a  CVS: S1, S2 RRR   Lungs: CTA throughout bilaterally   Abdomen:+ BS x 4, soft, NT, ND   Extremities: no edema   Gastric Mucositis:       -                                           Grade: n/a  Intestinal Mucositis:     -                                         Grade: n/a  Skin: no rash   TLC: PICC line in right arm, c/d/i  Neuro: A&O x 3   Pain: 0      Labs:   CBC Full  -  ( 04 Mar 2019 06:52 )  WBC Count : 0.4 K/uL  Hemoglobin : 13.0 g/dL  Hematocrit : 38.0 %  Platelet Count - Automated : 186 K/uL  Mean Cell Volume : 91.7 fl  Mean Cell Hemoglobin : 31.3 pg  Mean Cell Hemoglobin Concentration : 34.2 gm/dL  Auto Neutrophil # : x  Auto Lymphocyte # : x  Auto Monocyte # : x  Auto Eosinophil # : x  Auto Basophil # : x  Auto Neutrophil % : x  Auto Lymphocyte % : x  Auto Monocyte % : x  Auto Eosinophil % : x  Auto Basophil % : x                          13.0   0.4   )-----------( 186      ( 04 Mar 2019 06:52 )             38.0     03-04    140  |  105  |  7   ----------------------------<  89  4.4   |  20<L>  |  0.58    Ca    10.0      04 Mar 2019 06:52  Phos  3.4     03-04  Mg     1.8     03-04    TPro  6.8  /  Alb  3.9  /  TBili  0.4  /  DBili  <0.1  /  AST  27  /  ALT  24  /  AlkPhos  47  03-04    PT/INR - ( 04 Mar 2019 06:52 )   PT: 11.3 sec;   INR: 0.99 ratio         PTT - ( 04 Mar 2019 06:52 )  PTT:65.4 sec  LIVER FUNCTIONS - ( 04 Mar 2019 06:52 )  Alb: 3.9 g/dL / Pro: 6.8 g/dL / ALK PHOS: 47 U/L / ALT: 24 U/L / AST: 27 U/L / GGT: x                03-04 @ 09:31  Tacrolimus 8.2                Cyclosporine --      Meds:   Antimicrobials:   acyclovir   Oral Tab/Cap 400 milliGRAM(s) Oral three times a day  atovaquone Suspension 750 milliGRAM(s) Oral every 12 hours  ciprofloxacin     Tablet 500 milliGRAM(s) Oral every 12 hours  clotrimazole Lozenge 1 Lozenge Oral five times a day  voriconazole 200 milliGRAM(s) Oral every 12 hours      Heme / Onc:   heparin  Infusion 366 Unit(s)/Hr IV Continuous <Continuous>  methotrexate Injection (eMAR) 9 milliGRAM(s) IV Push Chemo once      GI:  docusate sodium 100 milliGRAM(s) Oral three times a day  pantoprazole    Tablet 40 milliGRAM(s) Oral before breakfast  senna 2 Tablet(s) Oral at bedtime  sodium bicarbonate Mouth Rinse 10 milliLiter(s) Swish and Spit five times a day  ursodiol Capsule 300 milliGRAM(s) Oral two times a day with meals      Cardiovascular:   amLODIPine   Tablet 5 milliGRAM(s) Oral daily      Immunologic:   immune   globulin 10% (GAMMAGARD) IVPB 20 Gram(s) IV Intermittent <User Schedule>  tacrolimus 1 milliGRAM(s) Oral every 12 hours      Other medications:   acetaminophen   Tablet .. 650 milliGRAM(s) Oral every 6 hours  acetaminophen   Tablet .. 650 milliGRAM(s) Oral <User Schedule>  ascorbic acid 250 milliGRAM(s) Oral daily  Biotene Dry Mouth Oral Rinse 5 milliLiter(s) Swish and Spit five times a day  chlorhexidine 4% Liquid 1 Application(s) Topical <User Schedule>  cholecalciferol 400 Unit(s) Oral daily  cyanocobalamin 1000 MICROGram(s) Oral daily  diphenhydrAMINE   Injectable 25 milliGRAM(s) IV Push every 3 weeks  ferrous    sulfate 325 milliGRAM(s) Oral three times a day  folic acid 1 milliGRAM(s) Oral daily  magnesium oxide 400 milliGRAM(s) Oral three times a day with meals  medroxyPROGESTERone 10 milliGRAM(s) Oral daily  montelukast 10 milliGRAM(s) Oral daily  multivitamin 1 Tablet(s) Oral daily  phytonadione   Solution 10 milliGRAM(s) Oral every other day  sodium chloride 0.9%. 1000 milliLiter(s) IV Continuous <Continuous>      PRN:   acetaminophen   Tablet .. 650 milliGRAM(s) Oral every 6 hours PRN  artificial  tears Solution 1 Drop(s) Both EYES three times a day PRN  benzocaine 15 mG/menthol 3.6 mG Lozenge 1 Lozenge Oral every 3 hours PRN  diphenhydrAMINE   Injectable 25 milliGRAM(s) IV Push every 4 hours PRN  FIRST- Mouthwash  BLM 5 milliLiter(s) Swish and Spit every 8 hours PRN  metoclopramide Injectable 10 milliGRAM(s) IV Push every 6 hours PRN  sodium chloride 0.65% Nasal 1 Spray(s) Both Nostrils five times a day PRN  sodium chloride 0.9% lock flush 10 milliLiter(s) IV Push every 1 hour PRN    A/P: 33 year old female with a history of AML  Post  :  Allogeneic PBSCT day + 6  Neutropenic - started Cipro on ; if T >/= 38C pan cx, CXR and change Cipro to Cefepime.   Labs every other day ; continue vitamin C, vitamin B, folic acid, vitamin K & iron supplementation. Increased vitamin K to every other day as per standard guidelines for bloodless transplant.   Patient is neutropenic.  If spikes switch Cipro to Cefepime.  BP- 140s, started on Norvasc.  Hold for SBP <120    1. Infectious Disease:   acyclovir   Oral Tab/Cap 400 milliGRAM(s) Oral three times a day  atovaquone Suspension 750 milliGRAM(s) Oral every 12 hours  ciprofloxacin     Tablet 500 milliGRAM(s) Oral every 12 hours  clotrimazole Lozenge 1 Lozenge Oral five times a day  voriconazole 200 milliGRAM(s) Oral every 12 hours    2. VOD Prophylaxis: Actigall, Glutamine, Heparin (dosed at 100 units / kg / day)     3. GI Prophylaxis:    pantoprazole    Tablet 40 milliGRAM(s) Oral before breakfast    4. Mouthcare - NS / NaHCO3 rinses, Mycelex, Caphosol; Skin care     5. GVHD prophylaxis -   tacrolimus 1 milliGRAM(s) Oral every 12 hours    6. Transfuse & replete electrolytes prn   magnesium oxide 400 milliGRAM(s) Oral three times a day with meals  phytonadione   Solution 10 milliGRAM(s) Oral every other day  ferrous    sulfate 325 milliGRAM(s) Oral three times a day  ascorbic acid 250 milliGRAM(s) Oral daily    7. IV hydration, daily weights, strict I&O, prn diuresis     8. PO intake as tolerated, nutrition follow up as needed, MVI, folic acid     9. Antiemetics, anti-diarrhea medications:   metoclopramide Injectable 10 milliGRAM(s) IV Push every 6 hours PRN    10. OOB as tolerated, physical therapy consult if needed     11. Monitor coags / fibrinogen 2x week, vitamin K as needed     12. Monitor closely for clinical changes, monitor for fevers     13. Emotional support provided, plan of care discussed and questions addressed     14. Patient education done regarding  plan of care, restrictions and discharge planning     15. Continue regular social work input     I have written the above note for Dr. Larsen who performed service with me in the room.   Racquel Leiva NP-C ([phone])    I have seen and examined patient with NP, I agree with above note as scribed. HPC Transplant Team                                                      Critical / Counseling Time Provided: 30 minutes                                                                                                                                                        Chief Complaint: Allogeneic peripheral blood stem cell transplant from [facility] (sister 10/10)     S: Patient seen and examined with HPC Transplant Team:   + throat pain with swallowing (-5/10)  + loose stool   Denies mouth / tongue pain, dyspnea, cough, nausea, vomiting, diarrhea, abdominal pain     O: Vitals:   Vital Signs Last 24 Hrs  T(C): 36.2 (05 Mar 2019 06:18), Max: 37.2 (04 Mar 2019 17:01)  T(F): 97.2 (05 Mar 2019 06:18), Max: 99 (04 Mar 2019 17:01)  HR: 90 (05 Mar 2019 06:18) (89 - 108)  BP: 125/80 (05 Mar 2019 06:18) (112/70 - 127/88)  BP(mean): --  RR: 18 (05 Mar 2019 06:18) (18 - 18)  SpO2: 100% (05 Mar 2019 06:18) (99% - 100%)    Admit weight: 88.2kg  Daily Weight in k.8 (04 Mar 2019 10:09)  Today's weight: 87.5kg     Intake / Output:    @ 07:01  -  05 @ 07:00  --------------------------------------------------------  IN: 1799.6 mL / OUT: 2150 mL / NET: -350.4 mL     @ 07:  -   @ 08:52  --------------------------------------------------------  IN: 37.7 mL / OUT: 300 mL / NET: -262.3 mL      PE:   Oropharynx: no erythema or ulcerations  Oral Mucositis:              +                                         ndGndrndanddndend:nd nd2nd CVS: S1, S2 RRR   Lungs: CTA throughout bilaterally   Abdomen:+ BS x 4, soft, NT, ND   Extremities: no edema   Gastric Mucositis:       -                                           Grade: n/a  Intestinal Mucositis:     -                                         Grade: n/a  Skin: no rash   TLC: PICC line in right arm, c/d/i  Neuro: A&O x 3   Pain: (4-5/10 throat)       Labs:   CBC Full  -  ( 04 Mar 2019 06:52 )  WBC Count : 0.4 K/uL  Hemoglobin : 13.0 g/dL  Hematocrit : 38.0 %  Platelet Count - Automated : 186 K/uL  Mean Cell Volume : 91.7 fl  Mean Cell Hemoglobin : 31.3 pg  Mean Cell Hemoglobin Concentration : 34.2 gm/dL  Auto Neutrophil # : x  Auto Lymphocyte # : x  Auto Monocyte # : x  Auto Eosinophil # : x  Auto Basophil # : x  Auto Neutrophil % : x  Auto Lymphocyte % : x  Auto Monocyte % : x  Auto Eosinophil % : x  Auto Basophil % : x                          13.0   0.4   )-----------( 186      ( 04 Mar 2019 06:52 )             38.0     03-04    140  |  105  |  7   ----------------------------<  89  4.4   |  20<L>  |  0.58    Ca    10.0      04 Mar 2019 06:52  Phos  3.4     03-04  Mg     1.8     03-04    TPro  6.8  /  Alb  3.9  /  TBili  0.4  /  DBili  <0.1  /  AST  27  /  ALT  24  /  AlkPhos  47  03-04    PT/INR - ( 04 Mar 2019 06:52 )   PT: 11.3 sec;   INR: 0.99 ratio         PTT - ( 04 Mar 2019 06:52 )  PTT:65.4 sec  LIVER FUNCTIONS - ( 04 Mar 2019 06:52 )  Alb: 3.9 g/dL / Pro: 6.8 g/dL / ALK PHOS: 47 U/L / ALT: 24 U/L / AST: 27 U/L / GGT: x                03- @ 09:31  Tacrolimus 8.2                Cyclosporine --      Meds:   Antimicrobials:   acyclovir   Oral Tab/Cap 400 milliGRAM(s) Oral three times a day  atovaquone Suspension 750 milliGRAM(s) Oral every 12 hours  ciprofloxacin     Tablet 500 milliGRAM(s) Oral every 12 hours  clotrimazole Lozenge 1 Lozenge Oral five times a day  voriconazole 200 milliGRAM(s) Oral every 12 hours      Heme / Onc:   heparin  Infusion 366 Unit(s)/Hr IV Continuous <Continuous>  methotrexate Injection (eMAR) 9 milliGRAM(s) IV Push Chemo once      GI:  docusate sodium 100 milliGRAM(s) Oral three times a day  pantoprazole    Tablet 40 milliGRAM(s) Oral before breakfast  senna 2 Tablet(s) Oral at bedtime  sodium bicarbonate Mouth Rinse 10 milliLiter(s) Swish and Spit five times a day  ursodiol Capsule 300 milliGRAM(s) Oral two times a day with meals      Cardiovascular:   amLODIPine   Tablet 5 milliGRAM(s) Oral daily      Immunologic:   immune   globulin 10% (GAMMAGARD) IVPB 20 Gram(s) IV Intermittent <User Schedule>  tacrolimus 1 milliGRAM(s) Oral every 12 hours      Other medications:   acetaminophen   Tablet .. 650 milliGRAM(s) Oral every 6 hours  acetaminophen   Tablet .. 650 milliGRAM(s) Oral <User Schedule>  ascorbic acid 250 milliGRAM(s) Oral daily  Biotene Dry Mouth Oral Rinse 5 milliLiter(s) Swish and Spit five times a day  chlorhexidine 4% Liquid 1 Application(s) Topical <User Schedule>  cholecalciferol 400 Unit(s) Oral daily  cyanocobalamin 1000 MICROGram(s) Oral daily  diphenhydrAMINE   Injectable 25 milliGRAM(s) IV Push every 3 weeks  ferrous    sulfate 325 milliGRAM(s) Oral three times a day  folic acid 1 milliGRAM(s) Oral daily  magnesium oxide 400 milliGRAM(s) Oral three times a day with meals  medroxyPROGESTERone 10 milliGRAM(s) Oral daily  montelukast 10 milliGRAM(s) Oral daily  multivitamin 1 Tablet(s) Oral daily  phytonadione   Solution 10 milliGRAM(s) Oral every other day  sodium chloride 0.9%. 1000 milliLiter(s) IV Continuous <Continuous>      PRN:   acetaminophen   Tablet .. 650 milliGRAM(s) Oral every 6 hours PRN  artificial  tears Solution 1 Drop(s) Both EYES three times a day PRN  benzocaine 15 mG/menthol 3.6 mG Lozenge 1 Lozenge Oral every 3 hours PRN  diphenhydrAMINE   Injectable 25 milliGRAM(s) IV Push every 4 hours PRN  FIRST- Mouthwash  BLM 5 milliLiter(s) Swish and Spit every 8 hours PRN  metoclopramide Injectable 10 milliGRAM(s) IV Push every 6 hours PRN  sodium chloride 0.65% Nasal 1 Spray(s) Both Nostrils five times a day PRN  sodium chloride 0.9% lock flush 10 milliLiter(s) IV Push every 1 hour PRN    A/P: 33 year old female with a history of AML  Post  :  Allogeneic PBSCT day + 6  Neutropenic - started Cipro on ; if T >/= 38C pan cx, CXR and change Cipro to Cefepime.   Labs every other day ; continue vitamin C, vitamin B, folic acid, vitamin K & iron supplementation. Increased vitamin K to every other day as per standard guidelines for bloodless transplant.   Patient is neutropenic.  If spikes switch Cipro to Cefepime.  BP- 140s, started on Norvasc.  Hold for SBP <120  Mucositis (4-5/10 throat pain) likely related to MTX, continue supportive care     1. Infectious Disease:   acyclovir   Oral Tab/Cap 400 milliGRAM(s) Oral three times a day  atovaquone Suspension 750 milliGRAM(s) Oral every 12 hours  ciprofloxacin     Tablet 500 milliGRAM(s) Oral every 12 hours  clotrimazole Lozenge 1 Lozenge Oral five times a day  voriconazole 200 milliGRAM(s) Oral every 12 hours    2. VOD Prophylaxis: Actigall, Glutamine, Heparin (dosed at 100 units / kg / day)     3. GI Prophylaxis:    pantoprazole    Tablet 40 milliGRAM(s) Oral before breakfast    4. Mouthcare - NS / NaHCO3 rinses, Mycelex, Caphosol; Skin care     5. GVHD prophylaxis -   tacrolimus 1 milliGRAM(s) Oral every 12 hours    6. Transfuse & replete electrolytes prn   magnesium oxide 400 milliGRAM(s) Oral three times a day with meals  phytonadione   Solution 10 milliGRAM(s) Oral every other day  ferrous    sulfate 325 milliGRAM(s) Oral three times a day  ascorbic acid 250 milliGRAM(s) Oral daily    7. IV hydration, daily weights, strict I&O, prn diuresis     8. PO intake as tolerated, nutrition follow up as needed, MVI, folic acid     9. Antiemetics, anti-diarrhea medications:   metoclopramide Injectable 10 milliGRAM(s) IV Push every 6 hours PRN    10. OOB as tolerated, physical therapy consult if needed     11. Monitor coags / fibrinogen 2x week, vitamin K as needed     12. Monitor closely for clinical changes, monitor for fevers     13. Emotional support provided, plan of care discussed and questions addressed     14. Patient education done regarding  plan of care, restrictions and discharge planning     15. Continue regular social work input     I have written the above note for Dr. Larsen who performed service with me in the room.   Racquel Leiva NP-C ([phone])    I have seen and examined patient with NP, I agree with above note as scribed. HPC Transplant Team                                                      Critical / Counseling Time Provided: 30 minutes                                                                                                                                                        Chief Complaint: Allogeneic peripheral blood stem cell transplant from [facility] (sister 10/10)     S: Patient seen and examined with HPC Transplant Team:   + throat pain with swallowing (-5/10)  + loose stool   Denies mouth / tongue pain, dyspnea, cough, nausea, vomiting, diarrhea, abdominal pain     O: Vitals:   Vital Signs Last 24 Hrs  T(C): 36.2 (05 Mar 2019 06:18), Max: 37.2 (04 Mar 2019 17:01)  T(F): 97.2 (05 Mar 2019 06:18), Max: 99 (04 Mar 2019 17:01)  HR: 90 (05 Mar 2019 06:18) (89 - 108)  BP: 125/80 (05 Mar 2019 06:18) (112/70 - 127/88)  BP(mean): --  RR: 18 (05 Mar 2019 06:18) (18 - 18)  SpO2: 100% (05 Mar 2019 06:18) (99% - 100%)    Admit weight: 88.2kg  Daily Weight in k.8 (04 Mar 2019 10:09)  Today's weight: 87.5kg     Intake / Output:    @ 07:01  -  05 @ 07:00  --------------------------------------------------------  IN: 1799.6 mL / OUT: 2150 mL / NET: -350.4 mL     @ 07:  -   @ 08:52  --------------------------------------------------------  IN: 37.7 mL / OUT: 300 mL / NET: -262.3 mL      PE:   Oropharynx: no erythema or ulcerations  Oral Mucositis:              +                                         Grade: 1(throat)  CVS: S1, S2 RRR   Lungs: CTA throughout bilaterally   Abdomen:+ BS x 4, soft, NT, ND   Extremities: no edema   Gastric Mucositis:       -                                           Grade: n/a  Intestinal Mucositis:     -                                         Grade: n/a  Skin: no rash   TLC: PICC line in right arm, c/d/i  Neuro: A&O x 3   Pain: (4-5/10 throat)       Labs:   CBC Full  -  ( 04 Mar 2019 06:52 )  WBC Count : 0.4 K/uL  Hemoglobin : 13.0 g/dL  Hematocrit : 38.0 %  Platelet Count - Automated : 186 K/uL  Mean Cell Volume : 91.7 fl  Mean Cell Hemoglobin : 31.3 pg  Mean Cell Hemoglobin Concentration : 34.2 gm/dL  Auto Neutrophil # : x  Auto Lymphocyte # : x  Auto Monocyte # : x  Auto Eosinophil # : x  Auto Basophil # : x  Auto Neutrophil % : x  Auto Lymphocyte % : x  Auto Monocyte % : x  Auto Eosinophil % : x  Auto Basophil % : x                          13.0   0.4   )-----------( 186      ( 04 Mar 2019 06:52 )             38.0     03-04    140  |  105  |  7   ----------------------------<  89  4.4   |  20<L>  |  0.58    Ca    10.0      04 Mar 2019 06:52  Phos  3.4     03-04  Mg     1.8     03-04    TPro  6.8  /  Alb  3.9  /  TBili  0.4  /  DBili  <0.1  /  AST  27  /  ALT  24  /  AlkPhos  47  03-04    PT/INR - ( 04 Mar 2019 06:52 )   PT: 11.3 sec;   INR: 0.99 ratio         PTT - ( 04 Mar 2019 06:52 )  PTT:65.4 sec  LIVER FUNCTIONS - ( 04 Mar 2019 06:52 )  Alb: 3.9 g/dL / Pro: 6.8 g/dL / ALK PHOS: 47 U/L / ALT: 24 U/L / AST: 27 U/L / GGT: x                - @ 09:31  Tacrolimus 8.2                Cyclosporine --      Meds:   Antimicrobials:   acyclovir   Oral Tab/Cap 400 milliGRAM(s) Oral three times a day  atovaquone Suspension 750 milliGRAM(s) Oral every 12 hours  ciprofloxacin     Tablet 500 milliGRAM(s) Oral every 12 hours  clotrimazole Lozenge 1 Lozenge Oral five times a day  voriconazole 200 milliGRAM(s) Oral every 12 hours      Heme / Onc:   heparin  Infusion 366 Unit(s)/Hr IV Continuous <Continuous>  methotrexate Injection (eMAR) 9 milliGRAM(s) IV Push Chemo once      GI:  docusate sodium 100 milliGRAM(s) Oral three times a day  pantoprazole    Tablet 40 milliGRAM(s) Oral before breakfast  senna 2 Tablet(s) Oral at bedtime  sodium bicarbonate Mouth Rinse 10 milliLiter(s) Swish and Spit five times a day  ursodiol Capsule 300 milliGRAM(s) Oral two times a day with meals      Cardiovascular:   amLODIPine   Tablet 5 milliGRAM(s) Oral daily      Immunologic:   immune   globulin 10% (GAMMAGARD) IVPB 20 Gram(s) IV Intermittent <User Schedule>  tacrolimus 1 milliGRAM(s) Oral every 12 hours      Other medications:   acetaminophen   Tablet .. 650 milliGRAM(s) Oral every 6 hours  acetaminophen   Tablet .. 650 milliGRAM(s) Oral <User Schedule>  ascorbic acid 250 milliGRAM(s) Oral daily  Biotene Dry Mouth Oral Rinse 5 milliLiter(s) Swish and Spit five times a day  chlorhexidine 4% Liquid 1 Application(s) Topical <User Schedule>  cholecalciferol 400 Unit(s) Oral daily  cyanocobalamin 1000 MICROGram(s) Oral daily  diphenhydrAMINE   Injectable 25 milliGRAM(s) IV Push every 3 weeks  ferrous    sulfate 325 milliGRAM(s) Oral three times a day  folic acid 1 milliGRAM(s) Oral daily  magnesium oxide 400 milliGRAM(s) Oral three times a day with meals  medroxyPROGESTERone 10 milliGRAM(s) Oral daily  montelukast 10 milliGRAM(s) Oral daily  multivitamin 1 Tablet(s) Oral daily  phytonadione   Solution 10 milliGRAM(s) Oral every other day  sodium chloride 0.9%. 1000 milliLiter(s) IV Continuous <Continuous>      PRN:   acetaminophen   Tablet .. 650 milliGRAM(s) Oral every 6 hours PRN  artificial  tears Solution 1 Drop(s) Both EYES three times a day PRN  benzocaine 15 mG/menthol 3.6 mG Lozenge 1 Lozenge Oral every 3 hours PRN  diphenhydrAMINE   Injectable 25 milliGRAM(s) IV Push every 4 hours PRN  FIRST- Mouthwash  BLM 5 milliLiter(s) Swish and Spit every 8 hours PRN  metoclopramide Injectable 10 milliGRAM(s) IV Push every 6 hours PRN  sodium chloride 0.65% Nasal 1 Spray(s) Both Nostrils five times a day PRN  sodium chloride 0.9% lock flush 10 milliLiter(s) IV Push every 1 hour PRN    A/P: 33 year old female with a history of AML  Post  :  Allogeneic PBSCT day + 6  Neutropenic - started Cipro on ; if T >/= 38C pan cx, CXR and change Cipro to Cefepime.   Labs every other day ; continue vitamin C, vitamin B, folic acid, vitamin K & iron supplementation. Increased vitamin K to every other day as per standard guidelines for bloodless transplant.   Patient is neutropenic.  If spikes switch Cipro to Cefepime.  BP- 140s, started on Norvasc.  Hold for SBP <120  Mucositis (4-5/10 throat pain) likely related to MTX, continue supportive care     1. Infectious Disease:   acyclovir   Oral Tab/Cap 400 milliGRAM(s) Oral three times a day  atovaquone Suspension 750 milliGRAM(s) Oral every 12 hours  ciprofloxacin     Tablet 500 milliGRAM(s) Oral every 12 hours  clotrimazole Lozenge 1 Lozenge Oral five times a day  voriconazole 200 milliGRAM(s) Oral every 12 hours    2. VOD Prophylaxis: Actigall, Glutamine, Heparin (dosed at 100 units / kg / day)     3. GI Prophylaxis:    pantoprazole    Tablet 40 milliGRAM(s) Oral before breakfast    4. Mouthcare - NS / NaHCO3 rinses, Mycelex, Biotene; Skin care     5. GVHD prophylaxis -   tacrolimus 1 milliGRAM(s) Oral every 12 hours    6. Transfuse & replete electrolytes prn   magnesium oxide 400 milliGRAM(s) Oral three times a day with meals  phytonadione   Solution 10 milliGRAM(s) Oral every other day  ferrous    sulfate 325 milliGRAM(s) Oral three times a day  ascorbic acid 250 milliGRAM(s) Oral daily    7. IV hydration, daily weights, strict I&O, prn diuresis     8. PO intake as tolerated, nutrition follow up as needed, MVI, folic acid     9. Antiemetics, anti-diarrhea medications:   metoclopramide Injectable 10 milliGRAM(s) IV Push every 6 hours PRN    10. OOB as tolerated, physical therapy consult if needed     11. Monitor coags / fibrinogen 2x week, vitamin K as needed     12. Monitor closely for clinical changes, monitor for fevers     13. Emotional support provided, plan of care discussed and questions addressed     14. Patient education done regarding  plan of care, restrictions and discharge planning     15. Continue regular social work input     I have written the above note for Dr. Larsen who performed service with me in the room.   Racquel Leiva NP-C ([phone])    I have seen and examined patient with NP, I agree with above note as scribed.

## 2019-03-06 LAB
ALBUMIN SERPL ELPH-MCNC: 3.9 G/DL — SIGNIFICANT CHANGE UP (ref 3.3–5)
ALP SERPL-CCNC: 47 U/L — SIGNIFICANT CHANGE UP (ref 40–120)
ALT FLD-CCNC: 13 U/L — SIGNIFICANT CHANGE UP (ref 10–45)
ANION GAP SERPL CALC-SCNC: 16 MMOL/L — SIGNIFICANT CHANGE UP (ref 5–17)
APTT BLD: 30.6 SEC — SIGNIFICANT CHANGE UP (ref 27.5–36.3)
AST SERPL-CCNC: 17 U/L — SIGNIFICANT CHANGE UP (ref 10–40)
BILIRUB SERPL-MCNC: 0.3 MG/DL — SIGNIFICANT CHANGE UP (ref 0.2–1.2)
BUN SERPL-MCNC: 8 MG/DL — SIGNIFICANT CHANGE UP (ref 7–23)
CALCIUM SERPL-MCNC: 9.7 MG/DL — SIGNIFICANT CHANGE UP (ref 8.4–10.5)
CHLORIDE SERPL-SCNC: 104 MMOL/L — SIGNIFICANT CHANGE UP (ref 96–108)
CO2 SERPL-SCNC: 20 MMOL/L — LOW (ref 22–31)
CREAT SERPL-MCNC: 0.66 MG/DL — SIGNIFICANT CHANGE UP (ref 0.5–1.3)
FIBRINOGEN PPP-MCNC: 523 MG/DL — HIGH (ref 350–510)
GLUCOSE SERPL-MCNC: 87 MG/DL — SIGNIFICANT CHANGE UP (ref 70–99)
HCT VFR BLD CALC: 29.5 % — LOW (ref 34.5–45)
HGB BLD-MCNC: 10.7 G/DL — LOW (ref 11.5–15.5)
INR BLD: 1.02 RATIO — SIGNIFICANT CHANGE UP (ref 0.88–1.16)
LDH SERPL L TO P-CCNC: 197 U/L — SIGNIFICANT CHANGE UP (ref 50–242)
MAGNESIUM SERPL-MCNC: 1.7 MG/DL — SIGNIFICANT CHANGE UP (ref 1.6–2.6)
MCHC RBC-ENTMCNC: 32.9 PG — SIGNIFICANT CHANGE UP (ref 27–34)
MCHC RBC-ENTMCNC: 36.3 GM/DL — HIGH (ref 32–36)
MCV RBC AUTO: 90.7 FL — SIGNIFICANT CHANGE UP (ref 80–100)
PHOSPHATE SERPL-MCNC: 4.4 MG/DL — SIGNIFICANT CHANGE UP (ref 2.5–4.5)
PLATELET # BLD AUTO: 156 K/UL — SIGNIFICANT CHANGE UP (ref 150–400)
POTASSIUM SERPL-MCNC: 4.2 MMOL/L — SIGNIFICANT CHANGE UP (ref 3.5–5.3)
POTASSIUM SERPL-SCNC: 4.2 MMOL/L — SIGNIFICANT CHANGE UP (ref 3.5–5.3)
PROT SERPL-MCNC: 6.5 G/DL — SIGNIFICANT CHANGE UP (ref 6–8.3)
PROTHROM AB SERPL-ACNC: 11.7 SEC — SIGNIFICANT CHANGE UP (ref 10–12.9)
RBC # BLD: 3.25 M/UL — LOW (ref 3.8–5.2)
RBC # FLD: 14.2 % — SIGNIFICANT CHANGE UP (ref 10.3–14.5)
SODIUM SERPL-SCNC: 140 MMOL/L — SIGNIFICANT CHANGE UP (ref 135–145)
TACROLIMUS SERPL-MCNC: 5 NG/ML — SIGNIFICANT CHANGE UP
WBC # BLD: 0.4 K/UL — CRITICAL LOW (ref 3.8–10.5)
WBC # FLD AUTO: 0.4 K/UL — CRITICAL LOW (ref 3.8–10.5)

## 2019-03-06 PROCEDURE — 99291 CRITICAL CARE FIRST HOUR: CPT

## 2019-03-06 RX ORDER — FENTANYL CITRATE 50 UG/ML
1 INJECTION INTRAVENOUS
Qty: 0 | Refills: 0 | Status: DISCONTINUED | OUTPATIENT
Start: 2019-03-06 | End: 2019-03-07

## 2019-03-06 RX ORDER — HYDROMORPHONE HYDROCHLORIDE 2 MG/ML
0.5 INJECTION INTRAMUSCULAR; INTRAVENOUS; SUBCUTANEOUS EVERY 4 HOURS
Qty: 0 | Refills: 0 | Status: DISCONTINUED | OUTPATIENT
Start: 2019-03-06 | End: 2019-03-07

## 2019-03-06 RX ORDER — HYDROMORPHONE HYDROCHLORIDE 2 MG/ML
1 INJECTION INTRAMUSCULAR; INTRAVENOUS; SUBCUTANEOUS
Qty: 0 | Refills: 0 | Status: DISCONTINUED | OUTPATIENT
Start: 2019-03-06 | End: 2019-03-07

## 2019-03-06 RX ORDER — MORPHINE SULFATE 50 MG/1
2 CAPSULE, EXTENDED RELEASE ORAL ONCE
Qty: 0 | Refills: 0 | Status: DISCONTINUED | OUTPATIENT
Start: 2019-03-06 | End: 2019-03-06

## 2019-03-06 RX ORDER — ERYTHROPOIETIN 10000 [IU]/ML
10000 INJECTION, SOLUTION INTRAVENOUS; SUBCUTANEOUS
Qty: 0 | Refills: 0 | Status: DISCONTINUED | OUTPATIENT
Start: 2019-03-06 | End: 2019-03-08

## 2019-03-06 RX ADMIN — MAGNESIUM OXIDE 400 MG ORAL TABLET 400 MILLIGRAM(S): 241.3 TABLET ORAL at 08:42

## 2019-03-06 RX ADMIN — Medication 10 MILLILITER(S): at 23:34

## 2019-03-06 RX ADMIN — MORPHINE SULFATE 2 MILLIGRAM(S): 50 CAPSULE, EXTENDED RELEASE ORAL at 03:54

## 2019-03-06 RX ADMIN — Medication 500 MILLIGRAM(S): at 17:13

## 2019-03-06 RX ADMIN — SODIUM CHLORIDE 20 MILLILITER(S): 9 INJECTION INTRAMUSCULAR; INTRAVENOUS; SUBCUTANEOUS at 23:35

## 2019-03-06 RX ADMIN — Medication 5 MILLILITER(S): at 20:14

## 2019-03-06 RX ADMIN — Medication 1 LOZENGE: at 16:27

## 2019-03-06 RX ADMIN — URSODIOL 300 MILLIGRAM(S): 250 TABLET, FILM COATED ORAL at 17:13

## 2019-03-06 RX ADMIN — Medication 10 MILLIGRAM(S): at 13:06

## 2019-03-06 RX ADMIN — TACROLIMUS 1 MILLIGRAM(S): 5 CAPSULE ORAL at 17:13

## 2019-03-06 RX ADMIN — MONTELUKAST 10 MILLIGRAM(S): 4 TABLET, CHEWABLE ORAL at 12:31

## 2019-03-06 RX ADMIN — Medication 325 MILLIGRAM(S): at 14:50

## 2019-03-06 RX ADMIN — Medication 250 MILLIGRAM(S): at 12:30

## 2019-03-06 RX ADMIN — Medication 400 MILLIGRAM(S): at 21:23

## 2019-03-06 RX ADMIN — MAGNESIUM OXIDE 400 MG ORAL TABLET 400 MILLIGRAM(S): 241.3 TABLET ORAL at 16:28

## 2019-03-06 RX ADMIN — PREGABALIN 1000 MICROGRAM(S): 225 CAPSULE ORAL at 12:30

## 2019-03-06 RX ADMIN — Medication 1 LOZENGE: at 23:34

## 2019-03-06 RX ADMIN — Medication 500 MILLIGRAM(S): at 06:03

## 2019-03-06 RX ADMIN — VORICONAZOLE 200 MILLIGRAM(S): 10 INJECTION, POWDER, LYOPHILIZED, FOR SOLUTION INTRAVENOUS at 17:14

## 2019-03-06 RX ADMIN — VORICONAZOLE 200 MILLIGRAM(S): 10 INJECTION, POWDER, LYOPHILIZED, FOR SOLUTION INTRAVENOUS at 06:04

## 2019-03-06 RX ADMIN — Medication 5 MILLILITER(S): at 12:31

## 2019-03-06 RX ADMIN — ATOVAQUONE 750 MILLIGRAM(S): 750 SUSPENSION ORAL at 17:13

## 2019-03-06 RX ADMIN — Medication 10 MILLILITER(S): at 12:31

## 2019-03-06 RX ADMIN — HYDROMORPHONE HYDROCHLORIDE 0.5 MILLIGRAM(S): 2 INJECTION INTRAMUSCULAR; INTRAVENOUS; SUBCUTANEOUS at 10:00

## 2019-03-06 RX ADMIN — HEPARIN SODIUM 3.66 UNIT(S)/HR: 5000 INJECTION INTRAVENOUS; SUBCUTANEOUS at 00:08

## 2019-03-06 RX ADMIN — Medication 400 MILLIGRAM(S): at 06:02

## 2019-03-06 RX ADMIN — Medication 1 TABLET(S): at 12:30

## 2019-03-06 RX ADMIN — MEDROXYPROGESTERONE ACETATE 10 MILLIGRAM(S): 150 INJECTION, SUSPENSION, EXTENDED RELEASE INTRAMUSCULAR at 12:30

## 2019-03-06 RX ADMIN — Medication 400 UNIT(S): at 12:30

## 2019-03-06 RX ADMIN — Medication 325 MILLIGRAM(S): at 06:04

## 2019-03-06 RX ADMIN — Medication 325 MILLIGRAM(S): at 21:23

## 2019-03-06 RX ADMIN — Medication 100 MILLIGRAM(S): at 21:23

## 2019-03-06 RX ADMIN — URSODIOL 300 MILLIGRAM(S): 250 TABLET, FILM COATED ORAL at 08:41

## 2019-03-06 RX ADMIN — HYDROMORPHONE HYDROCHLORIDE 1 MILLIGRAM(S): 2 INJECTION INTRAMUSCULAR; INTRAVENOUS; SUBCUTANEOUS at 12:54

## 2019-03-06 RX ADMIN — FENTANYL CITRATE 1 PATCH: 50 INJECTION INTRAVENOUS at 12:24

## 2019-03-06 RX ADMIN — Medication 10 MILLILITER(S): at 00:07

## 2019-03-06 RX ADMIN — Medication 5 MILLILITER(S): at 00:07

## 2019-03-06 RX ADMIN — HYDROMORPHONE HYDROCHLORIDE 0.5 MILLIGRAM(S): 2 INJECTION INTRAMUSCULAR; INTRAVENOUS; SUBCUTANEOUS at 10:30

## 2019-03-06 RX ADMIN — ATOVAQUONE 750 MILLIGRAM(S): 750 SUSPENSION ORAL at 06:03

## 2019-03-06 RX ADMIN — Medication 400 MILLIGRAM(S): at 14:49

## 2019-03-06 RX ADMIN — MAGNESIUM OXIDE 400 MG ORAL TABLET 400 MILLIGRAM(S): 241.3 TABLET ORAL at 12:30

## 2019-03-06 RX ADMIN — TACROLIMUS 1 MILLIGRAM(S): 5 CAPSULE ORAL at 06:04

## 2019-03-06 RX ADMIN — SENNA PLUS 2 TABLET(S): 8.6 TABLET ORAL at 21:23

## 2019-03-06 RX ADMIN — CHLORHEXIDINE GLUCONATE 1 APPLICATION(S): 213 SOLUTION TOPICAL at 06:03

## 2019-03-06 RX ADMIN — Medication 100 MILLIGRAM(S): at 06:03

## 2019-03-06 RX ADMIN — Medication 10 MILLILITER(S): at 08:41

## 2019-03-06 RX ADMIN — MORPHINE SULFATE 2 MILLIGRAM(S): 50 CAPSULE, EXTENDED RELEASE ORAL at 03:24

## 2019-03-06 RX ADMIN — Medication 5 MILLILITER(S): at 08:41

## 2019-03-06 RX ADMIN — SODIUM CHLORIDE 20 MILLILITER(S): 9 INJECTION INTRAMUSCULAR; INTRAVENOUS; SUBCUTANEOUS at 21:23

## 2019-03-06 RX ADMIN — SODIUM CHLORIDE 20 MILLILITER(S): 9 INJECTION INTRAMUSCULAR; INTRAVENOUS; SUBCUTANEOUS at 00:08

## 2019-03-06 RX ADMIN — Medication 1 LOZENGE: at 00:08

## 2019-03-06 RX ADMIN — DIPHENHYDRAMINE HYDROCHLORIDE AND LIDOCAINE HYDROCHLORIDE AND ALUMINUM HYDROXIDE AND MAGNESIUM HYDRO 5 MILLILITER(S): KIT at 06:04

## 2019-03-06 RX ADMIN — AMLODIPINE BESYLATE 5 MILLIGRAM(S): 2.5 TABLET ORAL at 06:03

## 2019-03-06 RX ADMIN — Medication 1 LOZENGE: at 20:15

## 2019-03-06 RX ADMIN — HYDROMORPHONE HYDROCHLORIDE 1 MILLIGRAM(S): 2 INJECTION INTRAMUSCULAR; INTRAVENOUS; SUBCUTANEOUS at 21:46

## 2019-03-06 RX ADMIN — Medication 10 MILLILITER(S): at 16:27

## 2019-03-06 RX ADMIN — Medication 5 MILLILITER(S): at 16:27

## 2019-03-06 RX ADMIN — Medication 5 MILLILITER(S): at 23:34

## 2019-03-06 RX ADMIN — Medication 1 LOZENGE: at 12:32

## 2019-03-06 RX ADMIN — PANTOPRAZOLE SODIUM 40 MILLIGRAM(S): 20 TABLET, DELAYED RELEASE ORAL at 06:04

## 2019-03-06 RX ADMIN — HYDROMORPHONE HYDROCHLORIDE 1 MILLIGRAM(S): 2 INJECTION INTRAMUSCULAR; INTRAVENOUS; SUBCUTANEOUS at 12:24

## 2019-03-06 RX ADMIN — Medication 10 MILLILITER(S): at 20:15

## 2019-03-06 RX ADMIN — Medication 1 LOZENGE: at 08:41

## 2019-03-06 RX ADMIN — HYDROMORPHONE HYDROCHLORIDE 1 MILLIGRAM(S): 2 INJECTION INTRAMUSCULAR; INTRAVENOUS; SUBCUTANEOUS at 22:20

## 2019-03-06 RX ADMIN — Medication 1 MILLIGRAM(S): at 12:30

## 2019-03-06 RX ADMIN — FENTANYL CITRATE 1 PATCH: 50 INJECTION INTRAVENOUS at 19:30

## 2019-03-06 RX ADMIN — ERYTHROPOIETIN 10000 UNIT(S): 10000 INJECTION, SOLUTION INTRAVENOUS; SUBCUTANEOUS at 16:26

## 2019-03-06 NOTE — PROVIDER CONTACT NOTE (OTHER) - BACKGROUND
7 days post allo transplant. patient reported scant to small amount of vaginal bleeding this AM, MD instructed patient to  inform staff of increase in bleeding

## 2019-03-06 NOTE — PROGRESS NOTE ADULT - ATTENDING COMMENTS
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 Pt is [employer], and does not accept blood products for [employer] reasons   Day + 6 -  s/p MTX on 3/2, serum creatinine in normal range. No oral mucositis visible. For day + 6 MTX today.  Mucositis- throat soreness/pain with swallowing likely secondary to mucositis s/p MTX; monitor symptoms.  Continue Tacrolimus 1 mg po q 12 hrs, monitor level  Begin Zarxio on day +12  Continue Acyclovir, Mepron, Vfend prophylaxis; on Cipro for neutropenia  Continue VOD prophylaxis with Actigall, glutamine supplement, low dose heparin drip(will D/C when platelet count <=75K)  Hypertension- possibly related to [place], other meds, fluids- continue Norvasc.  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 Pt is L[employer] day, and does not accept blood products for [employer] reasons   Day + 7 -  s/p MTX on 3/2, serum creatinine in normal range. No oral mucositis visible.   Mucositis- throat soreness/pain with swallowing likely secondary to mucositis s/p MTX; monitor symptoms.  Continue Tacrolimus 1 mg po q 12 hrs, monitor level  Begin Zarxio on day +12  Continue Acyclovir, Mepron, Vfend prophylaxis; on Cipro for neutropenia  Continue VOD prophylaxis with Actigall, glutamine supplement, low dose heparin drip(will D/C when platelet count <=75K)  Hypertension- possibly related to [place], other meds, fluids- continue Norvasc.  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 No oral mucositis visible.   Mucositis- throat soreness/pain with swallowing likely secondary to mucositis s/p MTX; monitor symptoms.  Continue Tacrolimus 1 mg po q 12 hrs, monitor level  Begin Zarxio on day +12  Continue Acyclovir, Mepron, Vfend prophylaxis; on Cipro for neutropenia  Continue VOD prophylaxis with Actigall, glutamine supplement, low dose heparin drip(will D/C when platelet count <=75K). Will D/C heparin if increased menstrual bleeding  Hypertension- possibly related to [place], other meds, fluids- continue Norvasc.  Labs every other day or at discretion of attending physician  Continue iron, folic acid, vitamin K, vitamin B-12, vitamin C as per standard practice for bloodless transplant  Anemia secondary to antineoplastic chemotherapy- begin Procrit for Hgb < 11g/dL, as she does not accept transfusion of pRBC  Antiemetics, mouth care, skin care   OOB/ambulate

## 2019-03-06 NOTE — CHART NOTE - NSCHARTNOTEFT_GEN_A_CORE
Nutrition Follow Up Note    Patient seen for: nutrition/BMAR follow up    Source: patient, NP, medial record    Chart reviewed, events noted. Pt is a  33 year old female with a history of AML; S/P Allogeneic PBSCT day + 7.    Pt reports mucositis (throat), nausea, and decreased appetite secondary to dysgeusia, inability to taste most foods and aversion to food smells. Pt expressed desire for more variety on oral supplements, rather than Ensure Enlive vanilla tid. RD will provide flavor variety, including strawberry, and add Ensure Clear apple per pt request. Pt is amenable to trying high protein gelatin and Magic Cup supplement (290 calories, 9 Gm protein) to help meet protein-calorie needs. Pt observed drinking milkshake from [employer] program and requested a second one.    Diet : Regular + Glutasolve      PO intake : Per nursing flow sheet, po intake at meals has decreased to 25-50% over the past week. Pt reports she is snacking all day, often on sweets because she can tolerate the taste.     Source for PO intake: patient, nursing flowsheet    Last BM: 3/2, 3/5    Daily Weight in k (-06), Weight in k.5 (-05), Weight in k.8 (-), Weight in k.2 (-), Weight in k.9 (-), Weight in k.3 (-), Weight in k.9 (-); weight loss noted, possible related to decreased po intake and/or fluid shifts    Drug Dosing Weight  Weight (kg): 87.9 (2019 10:19)  BMI (kg/m2): 35.2 (2019 10:19)    Pertinent Medications: MEDICATIONS  (STANDING):  acetaminophen   Tablet .. 650 milliGRAM(s) Oral every 6 hours  acetaminophen   Tablet .. 650 milliGRAM(s) Oral <User Schedule>  acyclovir   Oral Tab/Cap 400 milliGRAM(s) Oral three times a day  amLODIPine   Tablet 5 milliGRAM(s) Oral daily  ascorbic acid 250 milliGRAM(s) Oral daily  atovaquone Suspension 750 milliGRAM(s) Oral every 12 hours  Biotene Dry Mouth Oral Rinse 5 milliLiter(s) Swish and Spit five times a day  chlorhexidine 4% Liquid 1 Application(s) Topical <User Schedule>  cholecalciferol 400 Unit(s) Oral daily  ciprofloxacin     Tablet 500 milliGRAM(s) Oral every 12 hours  clotrimazole Lozenge 1 Lozenge Oral five times a day  cyanocobalamin 1000 MICROGram(s) Oral daily  diphenhydrAMINE   Injectable 25 milliGRAM(s) IV Push every 3 weeks  docusate sodium 100 milliGRAM(s) Oral three times a day  epoetin bhupendra Injectable 81282 Unit(s) SubCutaneous <User Schedule>  fentaNYL   Patch  12 MICROgram(s)/Hr 1 Patch Transdermal every 72 hours  ferrous    sulfate 325 milliGRAM(s) Oral three times a day  folic acid 1 milliGRAM(s) Oral daily  heparin  Infusion 366 Unit(s)/Hr (3.66 mL/Hr) IV Continuous <Continuous>  immune   globulin 10% (GAMMAGARD) IVPB 20 Gram(s) IV Intermittent <User Schedule>  magnesium oxide 400 milliGRAM(s) Oral three times a day with meals  medroxyPROGESTERone 10 milliGRAM(s) Oral daily  montelukast 10 milliGRAM(s) Oral daily  multivitamin 1 Tablet(s) Oral daily  pantoprazole    Tablet 40 milliGRAM(s) Oral before breakfast  phytonadione   Solution 10 milliGRAM(s) Oral every other day  senna 2 Tablet(s) Oral at bedtime  sodium bicarbonate Mouth Rinse 10 milliLiter(s) Swish and Spit five times a day  sodium chloride 0.9%. 1000 milliLiter(s) (20 mL/Hr) IV Continuous <Continuous>  tacrolimus 1 milliGRAM(s) Oral every 12 hours  ursodiol Capsule 300 milliGRAM(s) Oral two times a day with meals  voriconazole 200 milliGRAM(s) Oral every 12 hours    MEDICATIONS  (PRN):  acetaminophen   Tablet .. 650 milliGRAM(s) Oral every 6 hours PRN Temp greater or equal to 38C (100.4F), Mild Pain (1 - 3)  artificial  tears Solution 1 Drop(s) Both EYES three times a day PRN Dry Eyes  benzocaine 15 mG/menthol 3.6 mG Lozenge 1 Lozenge Oral every 3 hours PRN Sore Throat  diphenhydrAMINE   Injectable 25 milliGRAM(s) IV Push every 4 hours PRN Allergy symptoms  FIRST- Mouthwash  BLM 5 milliLiter(s) Swish and Spit every 8 hours PRN Mouth Care  HYDROmorphone  Injectable 0.5 milliGRAM(s) IV Push every 4 hours PRN Moderate Pain (4 - 6)  HYDROmorphone  Injectable 1 milliGRAM(s) IV Push every 3 hours PRN Severe Pain (7 - 10)  metoclopramide Injectable 10 milliGRAM(s) IV Push every 6 hours PRN Nausea and/or Vomiting  sodium chloride 0.65% Nasal 1 Spray(s) Both Nostrils five times a day PRN Nasal Congestion  sodium chloride 0.9% lock flush 10 milliLiter(s) IV Push every 1 hour PRN Pre/post blood products, medications, blood draw, and to maintain line patency      LABS:    @ 06:25: Sodium 140, Potassium 4.2, Chloride 104, Calcium 9.7, Magnesium 1.7, Phosphorus 4.4, BUN 8, Creatinine 0.66, BG 87, Alk Phos 47, ALT/SGPT 13, AST/SGOT 17, Total Protein 6.5, Albumin 3.9, Total Bilirubin 0.3, Direct Bilirubin <0.1, Hemoglobin 10.7<L>, Hematocrit 29.5<L>    CBC Full  -  ( 06 Mar 2019 06:25 )  WBC Count : 0.4 K/uL  Hemoglobin : 10.7 g/dL  Hematocrit : 29.5 %  Platelet Count - Automated : 156 K/uL  Mean Cell Volume : 90.7 fl  Mean Cell Hemoglobin : 32.9 pg  Mean Cell Hemoglobin Concentration : 36.3 gm/dL    Skin per nursing documentation: no pressure injuries noted  Edema: none noted    Estimated Needs:   [X ] no change since previous assessment  [ ] recalculated:     Previous Nutrition Diagnosis: Predicted suboptimal energy intake   Nutrition Diagnosis is: replaced, below    New Nutrition Diagnosis: Inadequate Protein-Energy Intake  Related to: loss of appetite, with dysgeusia, and food aversions in setting of allogeneic PBSCT   As evidenced by: po intake reduced to 25-50% in past week, poor tolerance of high-protein, nutrient-dense foods     Interventions:     Recommend  1) Continue Regular diet + Glutamine  2) Continue 3 servings Ensure Enlive (provides 350cal, 20Gm protein per 8oz serving) dialy; RD will order flavor variety  3) Add 2 servings apple Ensure Clear per pt request for variety  4) High Protein Gelatin and vanilla Magic Cup supplement (290 calories, 9 Gm protein) added to meal trays to provide variety  5) Encourage po intake at meals; honor food preferences as appropriate/available    Monitoring and Evaluation:     Continue to monitor nutritional intake, tolerance to diet prescription, weights, labs, skin integrity    RD remains available upon request and will follow up per protocol    Veronica Welch MS RD CDN [facility], Pager # [phone] Nutrition Follow Up Note    Patient seen for: nutrition/BMAR follow up    Source: patient, NP, medial record    Chart reviewed, events noted. Pt is a 33 year old female with a history of AML; S/P Allogeneic PBSCT day + 7.    Pt reports mucositis (throat), nausea, and decreased appetite secondary to dysgeusia, inability to taste most foods and aversion to food smells. Pt expressed desire for more variety of oral supplements, rather than Ensure Enlive vanilla tid. RD will provide flavor variety, including strawberry Ensure, and will add 2 servings apple Ensure Clear Therapeutic Nutrition (240 calories, 8 grams protein per serving) per pt request. Pt is amenable to trying high protein gelatin and Magic Cup supplement (290 calories, 9 Gm protein) to help meet protein-calorie needs. Pt observed drinking milkshake from [employer] program, and requested a second one.    Diet : Regular + GlutaSolve (15 grams L-Glutamine)       PO intake : Per nursing flow sheet, po intake at meals has decreased to 25-50% over the past week. Pt reports she is snacking all day, relying on sweets because she can tolerate the taste.     Source for PO intake: patient, nursing flowsheet    Last BM: 3/2, 3/5    Daily Weight in k (-), Weight in k.5 (-), Weight in k.8 (-), Weight in k.2 (-), Weight in k.9 (-), Weight in k.3 (-), Weight in k.9 (-); weight loss noted, possible related to decreased po intake and/or fluid shifts    Drug Dosing Weight  Weight (kg): 87.9 (2019 10:19)  BMI (kg/m2): 35.2 (2019 10:19)    Pertinent Medications: MEDICATIONS  (STANDING):  acetaminophen   Tablet .. 650 milliGRAM(s) Oral every 6 hours  acetaminophen   Tablet .. 650 milliGRAM(s) Oral <User Schedule>  acyclovir   Oral Tab/Cap 400 milliGRAM(s) Oral three times a day  amLODIPine   Tablet 5 milliGRAM(s) Oral daily  ascorbic acid 250 milliGRAM(s) Oral daily  atovaquone Suspension 750 milliGRAM(s) Oral every 12 hours  Biotene Dry Mouth Oral Rinse 5 milliLiter(s) Swish and Spit five times a day  chlorhexidine 4% Liquid 1 Application(s) Topical <User Schedule>  cholecalciferol 400 Unit(s) Oral daily  ciprofloxacin     Tablet 500 milliGRAM(s) Oral every 12 hours  clotrimazole Lozenge 1 Lozenge Oral five times a day  cyanocobalamin 1000 MICROGram(s) Oral daily  diphenhydrAMINE   Injectable 25 milliGRAM(s) IV Push every 3 weeks  docusate sodium 100 milliGRAM(s) Oral three times a day  epoetin bhupendra Injectable 64290 Unit(s) SubCutaneous <User Schedule>  fentaNYL   Patch  12 MICROgram(s)/Hr 1 Patch Transdermal every 72 hours  ferrous    sulfate 325 milliGRAM(s) Oral three times a day  folic acid 1 milliGRAM(s) Oral daily  heparin  Infusion 366 Unit(s)/Hr (3.66 mL/Hr) IV Continuous <Continuous>  immune   globulin 10% (GAMMAGARD) IVPB 20 Gram(s) IV Intermittent <User Schedule>  magnesium oxide 400 milliGRAM(s) Oral three times a day with meals  medroxyPROGESTERone 10 milliGRAM(s) Oral daily  montelukast 10 milliGRAM(s) Oral daily  multivitamin 1 Tablet(s) Oral daily  pantoprazole    Tablet 40 milliGRAM(s) Oral before breakfast  phytonadione   Solution 10 milliGRAM(s) Oral every other day  senna 2 Tablet(s) Oral at bedtime  sodium bicarbonate Mouth Rinse 10 milliLiter(s) Swish and Spit five times a day  sodium chloride 0.9%. 1000 milliLiter(s) (20 mL/Hr) IV Continuous <Continuous>  tacrolimus 1 milliGRAM(s) Oral every 12 hours  ursodiol Capsule 300 milliGRAM(s) Oral two times a day with meals  voriconazole 200 milliGRAM(s) Oral every 12 hours    MEDICATIONS  (PRN):  acetaminophen   Tablet .. 650 milliGRAM(s) Oral every 6 hours PRN Temp greater or equal to 38C (100.4F), Mild Pain (1 - 3)  artificial  tears Solution 1 Drop(s) Both EYES three times a day PRN Dry Eyes  benzocaine 15 mG/menthol 3.6 mG Lozenge 1 Lozenge Oral every 3 hours PRN Sore Throat  diphenhydrAMINE   Injectable 25 milliGRAM(s) IV Push every 4 hours PRN Allergy symptoms  FIRST- Mouthwash  BLM 5 milliLiter(s) Swish and Spit every 8 hours PRN Mouth Care  HYDROmorphone  Injectable 0.5 milliGRAM(s) IV Push every 4 hours PRN Moderate Pain (4 - 6)  HYDROmorphone  Injectable 1 milliGRAM(s) IV Push every 3 hours PRN Severe Pain (7 - 10)  metoclopramide Injectable 10 milliGRAM(s) IV Push every 6 hours PRN Nausea and/or Vomiting  sodium chloride 0.65% Nasal 1 Spray(s) Both Nostrils five times a day PRN Nasal Congestion  sodium chloride 0.9% lock flush 10 milliLiter(s) IV Push every 1 hour PRN Pre/post blood products, medications, blood draw, and to maintain line patency      LABS:    @ 06:25: Sodium 140, Potassium 4.2, Chloride 104, Calcium 9.7, Magnesium 1.7, Phosphorus 4.4, BUN 8, Creatinine 0.66, BG 87, Alk Phos 47, ALT/SGPT 13, AST/SGOT 17, Total Protein 6.5, Albumin 3.9, Total Bilirubin 0.3, Direct Bilirubin <0.1, Hemoglobin 10.7<L>, Hematocrit 29.5<L>    CBC Full  -  ( 06 Mar 2019 06:25 )  WBC Count : 0.4 K/uL  Hemoglobin : 10.7 g/dL  Hematocrit : 29.5 %  Platelet Count - Automated : 156 K/uL  Mean Cell Volume : 90.7 fl  Mean Cell Hemoglobin : 32.9 pg  Mean Cell Hemoglobin Concentration : 36.3 gm/dL    Skin per nursing documentation: no pressure injuries noted  Edema: none noted    Estimated Needs:   [X ] no change since previous assessment  [ ] recalculated:     Previous Nutrition Diagnosis: Predicted suboptimal energy intake   Nutrition Diagnosis is: replaced, below    New Nutrition Diagnosis: Inadequate Protein-Energy Intake  Related to: loss of appetite, with dysgeusia, and food aversions in setting of allogeneic PBSCT   As evidenced by: po intake reduced to 25-50% in past week, poor tolerance of high-protein, nutrient-dense foods     Interventions:     Recommend  1) Continue Regular diet + GlutaSolve (15 grams L-Glutamine)   2) Continue 3 servings Ensure Enlive (provides 350cal, 20Gm protein per 8oz serving) dialy; RD will order flavor variety  3) Add 2 servings apple Ensure Clear Therapeutic Nutrition (240 calories, 8 grams protein per serving)  per pt request for variety  4) High Protein Gelatin and vanilla Magic Cup supplement (290 calories, 9 Gm protein) added to meal trays to provide variety  5) Encourage po intake at meals; honor food preferences as appropriate/available  6) Continue micronutrient supplementation as medically appropriate    Monitoring and Evaluation:     Continue to monitor nutritional intake, tolerance to diet prescription, weights, labs, skin integrity    RD remains available upon request and will follow up per protocol    Veronica Welch MS RD CDN [facility], Pager # [phone]

## 2019-03-06 NOTE — PROGRESS NOTE ADULT - SUBJECTIVE AND OBJECTIVE BOX
HPC Transplant Team                                                      Critical / Counseling Time Provided: 30 minutes                                                                                                                                                        Chief Complaint:     S: Patient seen and examined with HPC Transplant Team:   Denies mouth / tongue / throat pain, dyspnea, cough, nausea, vomiting, diarrhea, abdominal pain     O: Vitals:   Vital Signs Last 24 Hrs  T(C): 36.9 (06 Mar 2019 05:47), Max: 37.4 (05 Mar 2019 13:49)  T(F): 98.4 (06 Mar 2019 05:47), Max: 99.3 (05 Mar 2019 13:49)  HR: 88 (06 Mar 2019 05:47) (85 - 104)  BP: 130/84 (06 Mar 2019 05:47) (114/79 - 130/84)  BP(mean): --  RR: 19 (06 Mar 2019 05:47) (18 - 19)  SpO2: 100% (06 Mar 2019 05:47) (98% - 100%)    Admit weight:   Daily     Daily Weight in k.5 (05 Mar 2019 09:41)    Intake / Output:   03-05 @ 07:01  -  03-06 @ 07:00  --------------------------------------------------------  IN: 857.9 mL / OUT: 1300 mL / NET: -442.1 mL          PE:   Oropharynx:   Oral Mucositis:                                                        Grade:   CVS:   Lungs:   Abdomen:  Extremities:   Gastric Mucositis:                                                  Grade:   Intestinal Mucositis:                                              Grade:   Skin:   TLC:   Neuro:   Pain:     Labs:   CBC Full  -  ( 06 Mar 2019 06:25 )  WBC Count : 0.4 K/uL  Hemoglobin : 10.7 g/dL  Hematocrit : 29.5 %  Platelet Count - Automated : 156 K/uL  Mean Cell Volume : 90.7 fl  Mean Cell Hemoglobin : 32.9 pg  Mean Cell Hemoglobin Concentration : 36.3 gm/dL  Auto Neutrophil # : x  Auto Lymphocyte # : x  Auto Monocyte # : x  Auto Eosinophil # : x  Auto Basophil # : x  Auto Neutrophil % : x  Auto Lymphocyte % : x  Auto Monocyte % : x  Auto Eosinophil % : x  Auto Basophil % : x                          10.7   0.4   )-----------( 156      ( 06 Mar 2019 06:25 )             29.5           PT/INR - ( 06 Mar 2019 06:26 )   PT: 11.7 sec;   INR: 1.02 ratio         PTT - ( 06 Mar 2019 06:26 )  PTT:30.6 sec            Karnofsky / Lansky Scale:   GVHD:   Skin:   Liver:   Gut:   Overall Grade:       Cultures:         Radiology:       Meds:   Antimicrobials:   acyclovir   Oral Tab/Cap 400 milliGRAM(s) Oral three times a day  atovaquone Suspension 750 milliGRAM(s) Oral every 12 hours  ciprofloxacin     Tablet 500 milliGRAM(s) Oral every 12 hours  clotrimazole Lozenge 1 Lozenge Oral five times a day  voriconazole 200 milliGRAM(s) Oral every 12 hours      Heme / Onc:   heparin  Infusion 366 Unit(s)/Hr IV Continuous <Continuous>      GI:  docusate sodium 100 milliGRAM(s) Oral three times a day  pantoprazole    Tablet 40 milliGRAM(s) Oral before breakfast  senna 2 Tablet(s) Oral at bedtime  sodium bicarbonate Mouth Rinse 10 milliLiter(s) Swish and Spit five times a day  ursodiol Capsule 300 milliGRAM(s) Oral two times a day with meals      Cardiovascular:   amLODIPine   Tablet 5 milliGRAM(s) Oral daily      Immunologic:   immune   globulin 10% (GAMMAGARD) IVPB 20 Gram(s) IV Intermittent <User Schedule>  tacrolimus 1 milliGRAM(s) Oral every 12 hours      Other medications:   acetaminophen   Tablet .. 650 milliGRAM(s) Oral every 6 hours  acetaminophen   Tablet .. 650 milliGRAM(s) Oral <User Schedule>  ascorbic acid 250 milliGRAM(s) Oral daily  Biotene Dry Mouth Oral Rinse 5 milliLiter(s) Swish and Spit five times a day  chlorhexidine 4% Liquid 1 Application(s) Topical <User Schedule>  cholecalciferol 400 Unit(s) Oral daily  cyanocobalamin 1000 MICROGram(s) Oral daily  diphenhydrAMINE   Injectable 25 milliGRAM(s) IV Push every 3 weeks  ferrous    sulfate 325 milliGRAM(s) Oral three times a day  folic acid 1 milliGRAM(s) Oral daily  magnesium oxide 400 milliGRAM(s) Oral three times a day with meals  medroxyPROGESTERone 10 milliGRAM(s) Oral daily  montelukast 10 milliGRAM(s) Oral daily  multivitamin 1 Tablet(s) Oral daily  phytonadione   Solution 10 milliGRAM(s) Oral every other day  sodium chloride 0.9%. 1000 milliLiter(s) IV Continuous <Continuous>      PRN:   acetaminophen   Tablet .. 650 milliGRAM(s) Oral every 6 hours PRN  artificial  tears Solution 1 Drop(s) Both EYES three times a day PRN  benzocaine 15 mG/menthol 3.6 mG Lozenge 1 Lozenge Oral every 3 hours PRN  diphenhydrAMINE   Injectable 25 milliGRAM(s) IV Push every 4 hours PRN  FIRST- Mouthwash  BLM 5 milliLiter(s) Swish and Spit every 8 hours PRN  metoclopramide Injectable 10 milliGRAM(s) IV Push every 6 hours PRN  sodium chloride 0.65% Nasal 1 Spray(s) Both Nostrils five times a day PRN  sodium chloride 0.9% lock flush 10 milliLiter(s) IV Push every 1 hour PRN      A/P:   ___ year old ___  with a history of ______________________  Pre / Status Post :  Autologous / Allogeneic PBSCT / BMT day ____________    1. Infectious Disease:   Fluconazole, Acyclovir     2. VOD Prophylaxis: Actigall, Glutamine, Heparin (dosed at 100 units / kg / day)     3. GI Prophylaxis:  Protonix    4. Mouthcare - NS / NaHCO3 rinses, Mycelex, Caphosol, skin care     5. GVHD prophylaxis     6. Transfuse & replete electrolytes prn     7. IV hydration, daily weights, strict I&O, prn diuresis     8. PO intake as tolerated, nutrition follow up as needed, MVI, folic acid     9. Antiemetics, anti-diarrhea medications:   Reglan, Ativan    10. OOB as tolerated, physical therapy consult if needed     11. Monitor coags / fibrinogen 2x week, vitamin K as needed     12. Monitor closely for clinical changes, monitor for fevers     13. Emotional support provided, plan of care discussed with patient and family, questions addressed     14. Patient education done regarding chemotherapy prep, plan of care, restrictions and discharge planning     15. Continue regular social work input     I have written the above note for Dr. Larsen who performed service with me in the room.   Maru Del Rio  NP-C ([phone])    I have seen and examined patient with NP, I agree with above note as scribed. HPC Transplant Team                                                      Critical / Counseling Time Provided: 30 minutes                                                                                                                                                        Chief Complaint: Allogeneic peripheral blood stem cell transplant from [facility] (sister 10/10)       S: Patient seen and examined with HPC Transplant Team:     + throat pain with swallowing (4-5/10)  + loose stool   Denies mouth / tongue / throat pain, dyspnea, cough, nausea, vomiting, diarrhea, abdominal pain     O: Vitals:   Vital Signs Last 24 Hrs  T(C): 36.9 (06 Mar 2019 05:47), Max: 37.4 (05 Mar 2019 13:49)  T(F): 98.4 (06 Mar 2019 05:47), Max: 99.3 (05 Mar 2019 13:49)  HR: 88 (06 Mar 2019 05:47) (85 - 104)  BP: 130/84 (06 Mar 2019 05:47) (114/79 - 130/84)  BP(mean): --  RR: 19 (06 Mar 2019 05:47) (18 - 19)  SpO2: 100% (06 Mar 2019 05:47) (98% - 100%)    Admit weight:   Daily     Daily Weight in k.5 (05 Mar 2019 09:41)    Intake / Output:   03-05 @ 07:01  -  03-06 @ 07:00  --------------------------------------------------------  IN: 857.9 mL / OUT: 1300 mL / NET: -442.1 mL    PE:   Oropharynx: no erythema or ulcerations  Oral Mucositis:              +                                         Grade: 1(throat)  CVS: S1, S2 RRR   Lungs: CTA throughout bilaterally   Abdomen:+ BS x 4, soft, NT, ND   Extremities: no edema   Gastric Mucositis:       -                                           Grade: n/a  Intestinal Mucositis:     -                                         Grade: n/a  Skin: no rash   TLC: PICC line in right arm, c/d/i  Neuro: A&O x 3   Pain: (4-5/10 throat)       Labs:   CBC Full  -  ( 06 Mar 2019 06:25 )  WBC Count : 0.4 K/uL  Hemoglobin : 10.7 g/dL  Hematocrit : 29.5 %  Platelet Count - Automated : 156 K/uL  Mean Cell Volume : 90.7 fl  Mean Cell Hemoglobin : 32.9 pg  Mean Cell Hemoglobin Concentration : 36.3 gm/dL  Auto Neutrophil # : x  Auto Lymphocyte # : x  Auto Monocyte # : x  Auto Eosinophil # : x  Auto Basophil # : x  Auto Neutrophil % : x  Auto Lymphocyte % : x  Auto Monocyte % : x  Auto Eosinophil % : x  Auto Basophil % : x                          10.7   0.4   )-----------( 156      ( 06 Mar 2019 06:25 )             29.5           PT/INR - ( 06 Mar 2019 06:26 )   PT: 11.7 sec;   INR: 1.02 ratio         PTT - ( 06 Mar 2019 06:26 )  PTT:30.6 sec            Karnofsky / Lansky Scale:   GVHD:   Skin:   Liver:   Gut:   Overall Grade:       Cultures:         Radiology:       Meds:   Antimicrobials:   acyclovir   Oral Tab/Cap 400 milliGRAM(s) Oral three times a day  atovaquone Suspension 750 milliGRAM(s) Oral every 12 hours  ciprofloxacin     Tablet 500 milliGRAM(s) Oral every 12 hours  clotrimazole Lozenge 1 Lozenge Oral five times a day  voriconazole 200 milliGRAM(s) Oral every 12 hours      Heme / Onc:   heparin  Infusion 366 Unit(s)/Hr IV Continuous <Continuous>      GI:  docusate sodium 100 milliGRAM(s) Oral three times a day  pantoprazole    Tablet 40 milliGRAM(s) Oral before breakfast  senna 2 Tablet(s) Oral at bedtime  sodium bicarbonate Mouth Rinse 10 milliLiter(s) Swish and Spit five times a day  ursodiol Capsule 300 milliGRAM(s) Oral two times a day with meals      Cardiovascular:   amLODIPine   Tablet 5 milliGRAM(s) Oral daily      Immunologic:   immune   globulin 10% (GAMMAGARD) IVPB 20 Gram(s) IV Intermittent <User Schedule>  tacrolimus 1 milliGRAM(s) Oral every 12 hours      Other medications:   acetaminophen   Tablet .. 650 milliGRAM(s) Oral every 6 hours  acetaminophen   Tablet .. 650 milliGRAM(s) Oral <User Schedule>  ascorbic acid 250 milliGRAM(s) Oral daily  Biotene Dry Mouth Oral Rinse 5 milliLiter(s) Swish and Spit five times a day  chlorhexidine 4% Liquid 1 Application(s) Topical <User Schedule>  cholecalciferol 400 Unit(s) Oral daily  cyanocobalamin 1000 MICROGram(s) Oral daily  diphenhydrAMINE   Injectable 25 milliGRAM(s) IV Push every 3 weeks  ferrous    sulfate 325 milliGRAM(s) Oral three times a day  folic acid 1 milliGRAM(s) Oral daily  magnesium oxide 400 milliGRAM(s) Oral three times a day with meals  medroxyPROGESTERone 10 milliGRAM(s) Oral daily  montelukast 10 milliGRAM(s) Oral daily  multivitamin 1 Tablet(s) Oral daily  phytonadione   Solution 10 milliGRAM(s) Oral every other day  sodium chloride 0.9%. 1000 milliLiter(s) IV Continuous <Continuous>      PRN:   acetaminophen   Tablet .. 650 milliGRAM(s) Oral every 6 hours PRN  artificial  tears Solution 1 Drop(s) Both EYES three times a day PRN  benzocaine 15 mG/menthol 3.6 mG Lozenge 1 Lozenge Oral every 3 hours PRN  diphenhydrAMINE   Injectable 25 milliGRAM(s) IV Push every 4 hours PRN  FIRST- Mouthwash  BLM 5 milliLiter(s) Swish and Spit every 8 hours PRN  metoclopramide Injectable 10 milliGRAM(s) IV Push every 6 hours PRN  sodium chloride 0.65% Nasal 1 Spray(s) Both Nostrils five times a day PRN  sodium chloride 0.9% lock flush 10 milliLiter(s) IV Push every 1 hour PRN      A/P:    33 year old female with a history of AML  Post  :  Allogeneic PBSCT day + 7  Neutropenic - started Cipro on ; if T >/= 38C pan cx, CXR and change Cipro to Cefepime.   Labs every other day ; continue vitamin C, vitamin B, folic acid, vitamin K & iron supplementation. Increased vitamin K to every other day as per standard guidelines for bloodless transplant.   Patient is neutropenic.  If spikes switch Cipro to Cefepime.  BP- 140s, started on Norvasc.  Hold for SBP <120  Mucositis (4-5/10 throat pain) likely related to MTX, continue supportive care     1. Infectious Disease:   acyclovir   Oral Tab/Cap 400 milliGRAM(s) Oral three times a day  atovaquone Suspension 750 milliGRAM(s) Oral every 12 hours  ciprofloxacin     Tablet 500 milliGRAM(s) Oral every 12 hours  clotrimazole Lozenge 1 Lozenge Oral five times a day  voriconazole 200 milliGRAM(s) Oral every 12 hours    2. VOD Prophylaxis: Actigall, Glutamine, Heparin (dosed at 100 units / kg / day)     3. GI Prophylaxis:    pantoprazole    Tablet 40 milliGRAM(s) Oral before breakfast    4. Mouthcare - NS / NaHCO3 rinses, Mycelex, Biotene; Skin care     5. GVHD prophylaxis -   tacrolimus 1 milliGRAM(s) Oral every 12 hours    6. Transfuse & replete electrolytes prn   magnesium oxide 400 milliGRAM(s) Oral three times a day with meals  phytonadione   Solution 10 milliGRAM(s) Oral every other day  ferrous    sulfate 325 milliGRAM(s) Oral three times a day  ascorbic acid 250 milliGRAM(s) Oral daily    7. IV hydration, daily weights, strict I&O, prn diuresis     8. PO intake as tolerated, nutrition follow up as needed, MVI, folic acid     9. Antiemetics, anti-diarrhea medications:   metoclopramide Injectable 10 milliGRAM(s) IV Push every 6 hours PRN    10. OOB as tolerated, physical therapy consult if needed     11. Monitor coags / fibrinogen 2x week, vitamin K as needed     12. Monitor closely for clinical changes, monitor for fevers     13. Emotional support provided, plan of care discussed and questions addressed     14. Patient education done regarding  plan of care, restrictions and discharge planning     I have written the above note for Dr. Larsen who performed service with me in the room.   Maru Del Rio NP-C ([phone])    I have seen and examined patient with NP, I agree with above note as scribed. HPC Transplant Team                                                      Critical / Counseling Time Provided: 30 minutes                                                                                                                                                        Chief Complaint: Allogeneic peripheral blood stem cell transplant from [facility] (sister 10/10)       S: Patient seen and examined with HPC Transplant Team:     + throat pain with swallowing (4-5/10)  + loose stool   Denies mouth / tongue / throat pain, dyspnea, cough, nausea, vomiting, diarrhea, abdominal pain     O: Vitals:   Vital Signs Last 24 Hrs  T(C): 36.9 (06 Mar 2019 05:47), Max: 37.4 (05 Mar 2019 13:49)  T(F): 98.4 (06 Mar 2019 05:47), Max: 99.3 (05 Mar 2019 13:49)  HR: 88 (06 Mar 2019 05:47) (85 - 104)  BP: 130/84 (06 Mar 2019 05:47) (114/79 - 130/84)  BP(mean): --  RR: 19 (06 Mar 2019 05:47) (18 - 19)  SpO2: 100% (06 Mar 2019 05:47) (98% - 100%)    Admit weight: 88.2kg  Daily Weight in k.5 (05 Mar 2019 09:41)  Today's weight:     Intake / Output:   03-05 @ 07:01  -  03-06 @ 07:00  --------------------------------------------------------  IN: 857.9 mL / OUT: 1300 mL / NET: -442.1 mL    PE:   Oropharynx: no erythema or ulcerations  Oral Mucositis:              +                                         Grade: 1(throat)  CVS: S1, S2 RRR   Lungs: CTA throughout bilaterally   Abdomen:+ BS x 4, soft, NT, ND   Extremities: no edema   Gastric Mucositis:       -                                           Grade: n/a  Intestinal Mucositis:     -                                         Grade: n/a  Skin: no rash   TLC: PICC line in right arm, c/d/i  Neuro: A&O x 3   Pain: (-5/10 throat)     Karnofsky / Lansky Scale: 50%  GVHD: will assess for acute GVHD post engraftment   Skin:   Liver:   Gut:   Overall Grade:     Labs:   CBC Full  -  ( 06 Mar 2019 06:25 )  WBC Count : 0.4 K/uL  Hemoglobin : 10.7 g/dL  Hematocrit : 29.5 %  Platelet Count - Automated : 156 K/uL  Mean Cell Volume : 90.7 fl  Mean Cell Hemoglobin : 32.9 pg  Mean Cell Hemoglobin Concentration : 36.3 gm/dL  Auto Neutrophil # : x  Auto Lymphocyte # : x  Auto Monocyte # : x  Auto Eosinophil # : x  Auto Basophil # : x  Auto Neutrophil % : x  Auto Lymphocyte % : x  Auto Monocyte % : x  Auto Eosinophil % : x  Auto Basophil % : x                          10.7   0.4   )-----------( 156      ( 06 Mar 2019 06:25 )             29.5           PT/INR - ( 06 Mar 2019 06:26 )   PT: 11.7 sec;   INR: 1.02 ratio         PTT - ( 06 Mar 2019 06:26 )  PTT:30.6 sec        140  |  104  |  8   ----------------------------<  87  4.2   |  20<L>  |  0.66    Ca    9.7      06 Mar 2019 06:25  Phos  4.4     03-06  Mg     1.7     -    TPro  6.5  /  Alb  3.9  /  TBili  0.3  /  DBili  <0.1  /  AST  17  /  ALT  13  /  AlkPhos  47  03-06      Meds:   Antimicrobials:   acyclovir   Oral Tab/Cap 400 milliGRAM(s) Oral three times a day  atovaquone Suspension 750 milliGRAM(s) Oral every 12 hours  ciprofloxacin     Tablet 500 milliGRAM(s) Oral every 12 hours  clotrimazole Lozenge 1 Lozenge Oral five times a day  voriconazole 200 milliGRAM(s) Oral every 12 hours      Heme / Onc:   heparin  Infusion 366 Unit(s)/Hr IV Continuous <Continuous>      GI:  docusate sodium 100 milliGRAM(s) Oral three times a day  pantoprazole    Tablet 40 milliGRAM(s) Oral before breakfast  senna 2 Tablet(s) Oral at bedtime  sodium bicarbonate Mouth Rinse 10 milliLiter(s) Swish and Spit five times a day  ursodiol Capsule 300 milliGRAM(s) Oral two times a day with meals      Cardiovascular:   amLODIPine   Tablet 5 milliGRAM(s) Oral daily      Immunologic:   immune   globulin 10% (GAMMAGARD) IVPB 20 Gram(s) IV Intermittent <User Schedule>  tacrolimus 1 milliGRAM(s) Oral every 12 hours      Other medications:   acetaminophen   Tablet .. 650 milliGRAM(s) Oral every 6 hours  acetaminophen   Tablet .. 650 milliGRAM(s) Oral <User Schedule>  ascorbic acid 250 milliGRAM(s) Oral daily  Biotene Dry Mouth Oral Rinse 5 milliLiter(s) Swish and Spit five times a day  chlorhexidine 4% Liquid 1 Application(s) Topical <User Schedule>  cholecalciferol 400 Unit(s) Oral daily  cyanocobalamin 1000 MICROGram(s) Oral daily  diphenhydrAMINE   Injectable 25 milliGRAM(s) IV Push every 3 weeks  ferrous    sulfate 325 milliGRAM(s) Oral three times a day  folic acid 1 milliGRAM(s) Oral daily  magnesium oxide 400 milliGRAM(s) Oral three times a day with meals  medroxyPROGESTERone 10 milliGRAM(s) Oral daily  montelukast 10 milliGRAM(s) Oral daily  multivitamin 1 Tablet(s) Oral daily  phytonadione   Solution 10 milliGRAM(s) Oral every other day  sodium chloride 0.9%. 1000 milliLiter(s) IV Continuous <Continuous>      PRN:   acetaminophen   Tablet .. 650 milliGRAM(s) Oral every 6 hours PRN  artificial  tears Solution 1 Drop(s) Both EYES three times a day PRN  benzocaine 15 mG/menthol 3.6 mG Lozenge 1 Lozenge Oral every 3 hours PRN  diphenhydrAMINE   Injectable 25 milliGRAM(s) IV Push every 4 hours PRN  FIRST- Mouthwash  BLM 5 milliLiter(s) Swish and Spit every 8 hours PRN  metoclopramide Injectable 10 milliGRAM(s) IV Push every 6 hours PRN  sodium chloride 0.65% Nasal 1 Spray(s) Both Nostrils five times a day PRN  sodium chloride 0.9% lock flush 10 milliLiter(s) IV Push every 1 hour PRN      A/P:    33 year old female with a history of AML  Post  :  Allogeneic PBSCT day + 7  Neutropenic - started Cipro on ; if T >/= 38C pan cx, CXR and change Cipro to Cefepime.   Labs every other day ; continue vitamin C, vitamin B, folic acid, vitamin K & iron supplementation. Increased vitamin K to every other day as per standard guidelines for bloodless transplant.   Patient is neutropenic.  If spikes switch Cipro to Cefepime.  BP- 140s, started on Norvasc.  Hold for SBP <120  Mucositis (4-5/10 throat pain) likely related to MTX, continue supportive care     1. Infectious Disease:   acyclovir   Oral Tab/Cap 400 milliGRAM(s) Oral three times a day  atovaquone Suspension 750 milliGRAM(s) Oral every 12 hours  ciprofloxacin     Tablet 500 milliGRAM(s) Oral every 12 hours  clotrimazole Lozenge 1 Lozenge Oral five times a day  voriconazole 200 milliGRAM(s) Oral every 12 hours    2. VOD Prophylaxis: Actigall, Glutamine, Heparin (dosed at 100 units / kg / day)     3. GI Prophylaxis:    pantoprazole    Tablet 40 milliGRAM(s) Oral before breakfast    4. Mouthcare - NS / NaHCO3 rinses, Mycelex, Biotene; Skin care     5. GVHD prophylaxis -   tacrolimus 1 milliGRAM(s) Oral every 12 hours  MTX days 1,3,6,11    6. Transfuse & replete electrolytes prn   magnesium oxide 400 milliGRAM(s) Oral three times a day with meals  phytonadione   Solution 10 milliGRAM(s) Oral every other day  ferrous    sulfate 325 milliGRAM(s) Oral three times a day  ascorbic acid 250 milliGRAM(s) Oral daily    7. IV hydration, daily weights, strict I&O, prn diuresis     8. PO intake as tolerated, nutrition follow up as needed, MVI, folic acid     9. Antiemetics, anti-diarrhea medications:   metoclopramide Injectable 10 milliGRAM(s) IV Push every 6 hours PRN    10. OOB as tolerated, physical therapy consult if needed     11. Monitor coags / fibrinogen 2x week, vitamin K as needed     12. Monitor closely for clinical changes, monitor for fevers     13. Emotional support provided, plan of care discussed and questions addressed     14. Patient education done regarding  plan of care, restrictions and discharge planning     I have written the above note for Dr. Larsen who performed service with me in the room.   Maru Del Rio  NP-C ([phone])    I have seen and examined patient with NP, I agree with above note as scribed. [facility] Transplant Team                                                      Critical / Counseling Time Provided: 30 minutes                                                                                                                                                        Chief Complaint: Allogeneic peripheral blood stem cell transplant from [facility] (sister 10/10)       S: Patient seen and examined with HPC Transplant Team:     + throat pain with swallowing (4-5/10)  + loose stool   Denies mouth / tongue / throat pain, dyspnea, cough, nausea, vomiting, diarrhea, abdominal pain     O: Vitals:   Vital Signs Last 24 Hrs  T(C): 36.9 (06 Mar 2019 05:47), Max: 37.4 (05 Mar 2019 13:49)  T(F): 98.4 (06 Mar 2019 05:47), Max: 99.3 (05 Mar 2019 13:49)  HR: 88 (06 Mar 2019 05:47) (85 - 104)  BP: 130/84 (06 Mar 2019 05:47) (114/79 - 130/84)  BP(mean): --  RR: 19 (06 Mar 2019 05:47) (18 - 19)  SpO2: 100% (06 Mar 2019 05:47) (98% - 100%)    Admit weight: 88.2kg  Daily Weight in k.5 (05 Mar 2019 09:41)  Today's weight: 87kg     Intake / Output:   03-05 @ 07:01  -  03-06 @ 07:00  --------------------------------------------------------  IN: 857.9 mL / OUT: 1300 mL / NET: -442.1 mL    PE:   Oropharynx: no erythema or ulcerations  Oral Mucositis:              +                                         Grade: 1(throat)  CVS: S1, S2 RRR   Lungs: CTA throughout bilaterally   Abdomen:+ BS x 4, soft, NT, ND   Extremities: no edema   Gastric Mucositis:       -                                           Grade: n/a  Intestinal Mucositis:     -                                         Grade: n/a  Skin: no rash   TLC: PICC line in right arm, c/d/i  Neuro: A&O x 3   Pain: (-5/10 throat)     Karnofsky / Lansky Scale: 50%  GVHD: will assess for acute GVHD post engraftment   Skin:   Liver:   Gut:   Overall Grade:     Labs:   CBC Full  -  ( 06 Mar 2019 06:25 )  WBC Count : 0.4 K/uL  Hemoglobin : 10.7 g/dL  Hematocrit : 29.5 %  Platelet Count - Automated : 156 K/uL  Mean Cell Volume : 90.7 fl  Mean Cell Hemoglobin : 32.9 pg  Mean Cell Hemoglobin Concentration : 36.3 gm/dL  Auto Neutrophil # : x  Auto Lymphocyte # : x  Auto Monocyte # : x  Auto Eosinophil # : x  Auto Basophil # : x  Auto Neutrophil % : x  Auto Lymphocyte % : x  Auto Monocyte % : x  Auto Eosinophil % : x  Auto Basophil % : x                          10.7   0.4   )-----------( 156      ( 06 Mar 2019 06:25 )             29.5           PT/INR - ( 06 Mar 2019 06:26 )   PT: 11.7 sec;   INR: 1.02 ratio         PTT - ( 06 Mar 2019 06:26 )  PTT:30.6 sec        140  |  104  |  8   ----------------------------<  87  4.2   |  20<L>  |  0.66    Ca    9.7      06 Mar 2019 06:25  Phos  4.4     03-  Mg     1.7     -    TPro  6.5  /  Alb  3.9  /  TBili  0.3  /  DBili  <0.1  /  AST  17  /  ALT  13  /  AlkPhos  47  03-06      Meds:   Antimicrobials:   acyclovir   Oral Tab/Cap 400 milliGRAM(s) Oral three times a day  atovaquone Suspension 750 milliGRAM(s) Oral every 12 hours  ciprofloxacin     Tablet 500 milliGRAM(s) Oral every 12 hours  clotrimazole Lozenge 1 Lozenge Oral five times a day  voriconazole 200 milliGRAM(s) Oral every 12 hours      Heme / Onc:   heparin  Infusion 366 Unit(s)/Hr IV Continuous <Continuous>      GI:  docusate sodium 100 milliGRAM(s) Oral three times a day  pantoprazole    Tablet 40 milliGRAM(s) Oral before breakfast  senna 2 Tablet(s) Oral at bedtime  sodium bicarbonate Mouth Rinse 10 milliLiter(s) Swish and Spit five times a day  ursodiol Capsule 300 milliGRAM(s) Oral two times a day with meals      Cardiovascular:   amLODIPine   Tablet 5 milliGRAM(s) Oral daily      Immunologic:   immune   globulin 10% (GAMMAGARD) IVPB 20 Gram(s) IV Intermittent <User Schedule>  tacrolimus 1 milliGRAM(s) Oral every 12 hours      Other medications:   acetaminophen   Tablet .. 650 milliGRAM(s) Oral every 6 hours  acetaminophen   Tablet .. 650 milliGRAM(s) Oral <User Schedule>  ascorbic acid 250 milliGRAM(s) Oral daily  Biotene Dry Mouth Oral Rinse 5 milliLiter(s) Swish and Spit five times a day  chlorhexidine 4% Liquid 1 Application(s) Topical <User Schedule>  cholecalciferol 400 Unit(s) Oral daily  cyanocobalamin 1000 MICROGram(s) Oral daily  diphenhydrAMINE   Injectable 25 milliGRAM(s) IV Push every 3 weeks  ferrous    sulfate 325 milliGRAM(s) Oral three times a day  folic acid 1 milliGRAM(s) Oral daily  magnesium oxide 400 milliGRAM(s) Oral three times a day with meals  medroxyPROGESTERone 10 milliGRAM(s) Oral daily  montelukast 10 milliGRAM(s) Oral daily  multivitamin 1 Tablet(s) Oral daily  phytonadione   Solution 10 milliGRAM(s) Oral every other day  sodium chloride 0.9%. 1000 milliLiter(s) IV Continuous <Continuous>      PRN:   acetaminophen   Tablet .. 650 milliGRAM(s) Oral every 6 hours PRN  artificial  tears Solution 1 Drop(s) Both EYES three times a day PRN  benzocaine 15 mG/menthol 3.6 mG Lozenge 1 Lozenge Oral every 3 hours PRN  diphenhydrAMINE   Injectable 25 milliGRAM(s) IV Push every 4 hours PRN  FIRST- Mouthwash  BLM 5 milliLiter(s) Swish and Spit every 8 hours PRN  metoclopramide Injectable 10 milliGRAM(s) IV Push every 6 hours PRN  sodium chloride 0.65% Nasal 1 Spray(s) Both Nostrils five times a day PRN  sodium chloride 0.9% lock flush 10 milliLiter(s) IV Push every 1 hour PRN      A/P:    33 year old female with a history of AML  Post  :  Allogeneic PBSCT day + 7  Neutropenic - started Cipro on ; if T >/= 38C pan cx, CXR and change Cipro to Cefepime.   Labs every other day ; continue vitamin C, vitamin B, folic acid, vitamin K & iron supplementation. Increased vitamin K to every other day as per standard guidelines for bloodless transplant.   Patient is neutropenic.  If spikes switch Cipro to Cefepime.  BP- 140s, started on Norvasc.  Hold for SBP <120  Mucositis (4-5/10 throat pain) likely related to MTX, continue supportive care     1. Infectious Disease:   acyclovir   Oral Tab/Cap 400 milliGRAM(s) Oral three times a day  atovaquone Suspension 750 milliGRAM(s) Oral every 12 hours  ciprofloxacin     Tablet 500 milliGRAM(s) Oral every 12 hours  clotrimazole Lozenge 1 Lozenge Oral five times a day  voriconazole 200 milliGRAM(s) Oral every 12 hours    2. VOD Prophylaxis: Actigall, Glutamine, Heparin (dosed at 100 units / kg / day)     3. GI Prophylaxis:    pantoprazole    Tablet 40 milliGRAM(s) Oral before breakfast    4. Mouthcare - NS / NaHCO3 rinses, Mycelex, Biotene; Skin care     5. GVHD prophylaxis -   tacrolimus 1 milliGRAM(s) Oral every 12 hours  MTX days 1,3,6,11    6. Transfuse & replete electrolytes prn   magnesium oxide 400 milliGRAM(s) Oral three times a day with meals  phytonadione   Solution 10 milliGRAM(s) Oral every other day  ferrous    sulfate 325 milliGRAM(s) Oral three times a day  ascorbic acid 250 milliGRAM(s) Oral daily    7. IV hydration, daily weights, strict I&O, prn diuresis     8. PO intake as tolerated, nutrition follow up as needed, MVI, folic acid     9. Antiemetics, anti-diarrhea medications:   metoclopramide Injectable 10 milliGRAM(s) IV Push every 6 hours PRN    10. OOB as tolerated, physical therapy consult if needed     11. Monitor coags / fibrinogen 2x week, vitamin K as needed     12. Monitor closely for clinical changes, monitor for fevers     13. Emotional support provided, plan of care discussed and questions addressed     14. Patient education done regarding  plan of care, restrictions and discharge planning     I have written the above note for Dr. Larsen who performed service with me in the room.   Maru Del Rio  NP-C ([phone])    I have seen and examined patient with NP, I agree with above note as scribed. [facility] Transplant Team                                                      Critical / Counseling Time Provided: 30 minutes                                                                                                                                                        Chief Complaint: Allogeneic peripheral blood stem cell transplant from [facility] (sister 10/10)     S: Patient seen and examined with [facility]PC Transplant Team:   + menstruation started today.   + throat pain with swallowing (5/10) O/N  loose bowel movements yesterday    Denies dyspnea, cough, nausea, vomiting, diarrhea, abdominal pain     O: Vitals:   Vital Signs Last 24 Hrs  T(C): 36.9 (06 Mar 2019 05:47), Max: 37.4 (05 Mar 2019 13:49)  T(F): 98.4 (06 Mar 2019 05:47), Max: 99.3 (05 Mar 2019 13:49)  HR: 88 (06 Mar 2019 05:47) (85 - 104)  BP: 130/84 (06 Mar 2019 05:47) (114/79 - 130/84)  BP(mean): --  RR: 19 (06 Mar 2019 05:47) (18 - 19)  SpO2: 100% (06 Mar 2019 05:47) (98% - 100%)    Admit weight: 88.2kg  Daily Weight in k.5 (05 Mar 2019 09:41)  Today's weight: 87kg     Intake / Output:   03-05 @ 07:01  -  03-06 @ 07:00  --------------------------------------------------------  IN: 857.9 mL / OUT: 1300 mL / NET: -442.1 mL    PE:   Oropharynx: no erythema or ulcerations  Oral Mucositis:              +                                         Grade: 1(throat)  CVS: S1, S2 RRR   Lungs: CTA throughout bilaterally   Abdomen:+ BS x 4, soft, NT, ND   Extremities: no edema  in BLE  Gastric Mucositis:       -                                           Grade: n/a  Intestinal Mucositis:     -                                         Grade: n/a  Skin: no rash   TLC: PICC line in right arm, c/d/i  Neuro: A&O x 3   Pain: (4-5/10 throat)     Karnofsky / Lansky Scale: 50%  GVHD: will assess for acute GVHD post engraftment   Skin:   Liver:   Gut:   Overall Grade:     Labs:   CBC Full  -  ( 06 Mar 2019 06:25 )  WBC Count : 0.4 K/uL  Hemoglobin : 10.7 g/dL  Hematocrit : 29.5 %  Platelet Count - Automated : 156 K/uL  Mean Cell Volume : 90.7 fl  Mean Cell Hemoglobin : 32.9 pg  Mean Cell Hemoglobin Concentration : 36.3 gm/dL  Auto Neutrophil # : x  Auto Lymphocyte # : x  Auto Monocyte # : x  Auto Eosinophil # : x  Auto Basophil # : x  Auto Neutrophil % : x  Auto Lymphocyte % : x  Auto Monocyte % : x  Auto Eosinophil % : x  Auto Basophil % : x                          10.7   0.4   )-----------( 156      ( 06 Mar 2019 06:25 )             29.5           PT/INR - ( 06 Mar 2019 06:26 )   PT: 11.7 sec;   INR: 1.02 ratio         PTT - ( 06 Mar 2019 06:26 )  PTT:30.6 sec        140  |  104  |  8   ----------------------------<  87  4.2   |  20<L>  |  0.66    Ca    9.7      06 Mar 2019 06:25  Phos  4.4     03-  Mg     1.7     -    TPro  6.5  /  Alb  3.9  /  TBili  0.3  /  DBili  <0.1  /  AST  17  /  ALT  13  /  AlkPhos  47  03-06      Meds:   Antimicrobials:   acyclovir   Oral Tab/Cap 400 milliGRAM(s) Oral three times a day  atovaquone Suspension 750 milliGRAM(s) Oral every 12 hours  ciprofloxacin     Tablet 500 milliGRAM(s) Oral every 12 hours  clotrimazole Lozenge 1 Lozenge Oral five times a day  voriconazole 200 milliGRAM(s) Oral every 12 hours      Heme / Onc:   heparin  Infusion 366 Unit(s)/Hr IV Continuous <Continuous>      GI:  docusate sodium 100 milliGRAM(s) Oral three times a day  pantoprazole    Tablet 40 milliGRAM(s) Oral before breakfast  senna 2 Tablet(s) Oral at bedtime  sodium bicarbonate Mouth Rinse 10 milliLiter(s) Swish and Spit five times a day  ursodiol Capsule 300 milliGRAM(s) Oral two times a day with meals      Cardiovascular:   amLODIPine   Tablet 5 milliGRAM(s) Oral daily      Immunologic:   immune   globulin 10% (GAMMAGARD) IVPB 20 Gram(s) IV Intermittent <User Schedule>  tacrolimus 1 milliGRAM(s) Oral every 12 hours      Other medications:   acetaminophen   Tablet .. 650 milliGRAM(s) Oral every 6 hours  acetaminophen   Tablet .. 650 milliGRAM(s) Oral <User Schedule>  ascorbic acid 250 milliGRAM(s) Oral daily  Biotene Dry Mouth Oral Rinse 5 milliLiter(s) Swish and Spit five times a day  chlorhexidine 4% Liquid 1 Application(s) Topical <User Schedule>  cholecalciferol 400 Unit(s) Oral daily  cyanocobalamin 1000 MICROGram(s) Oral daily  diphenhydrAMINE   Injectable 25 milliGRAM(s) IV Push every 3 weeks  ferrous    sulfate 325 milliGRAM(s) Oral three times a day  folic acid 1 milliGRAM(s) Oral daily  magnesium oxide 400 milliGRAM(s) Oral three times a day with meals  medroxyPROGESTERone 10 milliGRAM(s) Oral daily  montelukast 10 milliGRAM(s) Oral daily  multivitamin 1 Tablet(s) Oral daily  phytonadione   Solution 10 milliGRAM(s) Oral every other day  sodium chloride 0.9%. 1000 milliLiter(s) IV Continuous <Continuous>      PRN:   acetaminophen   Tablet .. 650 milliGRAM(s) Oral every 6 hours PRN  artificial  tears Solution 1 Drop(s) Both EYES three times a day PRN  benzocaine 15 mG/menthol 3.6 mG Lozenge 1 Lozenge Oral every 3 hours PRN  diphenhydrAMINE   Injectable 25 milliGRAM(s) IV Push every 4 hours PRN  FIRST- Mouthwash  BLM 5 milliLiter(s) Swish and Spit every 8 hours PRN  metoclopramide Injectable 10 milliGRAM(s) IV Push every 6 hours PRN  sodium chloride 0.65% Nasal 1 Spray(s) Both Nostrils five times a day PRN  sodium chloride 0.9% lock flush 10 milliLiter(s) IV Push every 1 hour PRN      A/P:    33 year old female with a history of AML  Post  :  Allogeneic PBSCT day + 7  Neutropenic - started Cipro on ; if T >/= 38C pan cx, CXR and change Cipro to Cefepime.   Labs every other day ; continue vitamin C, vitamin B, folic acid, vitamin K & iron supplementation. Increased vitamin K to every other day as per standard guidelines for bloodless transplant.   Patient is neutropenic.  If spikes switch Cipro to Cefepime.  BP- 140s, started on Norvasc.  Hold for SBP <120  Mucositis (4-5/10 throat pain) likely related to MTX, continue supportive care     1. Infectious Disease:   acyclovir   Oral Tab/Cap 400 milliGRAM(s) Oral three times a day  atovaquone Suspension 750 milliGRAM(s) Oral every 12 hours  ciprofloxacin     Tablet 500 milliGRAM(s) Oral every 12 hours  clotrimazole Lozenge 1 Lozenge Oral five times a day  voriconazole 200 milliGRAM(s) Oral every 12 hours    2. VOD Prophylaxis: Actigall, Glutamine, Heparin (dosed at 100 units / kg / day)     3. GI Prophylaxis:    pantoprazole    Tablet 40 milliGRAM(s) Oral before breakfast    4. Mouthcare - NS / NaHCO3 rinses, Mycelex, Biotene; Skin care     5. GVHD prophylaxis -   tacrolimus 1 milliGRAM(s) Oral every 12 hours  MTX days 1,3,6,11    6. Transfuse & replete electrolytes prn   magnesium oxide 400 milliGRAM(s) Oral three times a day with meals  phytonadione   Solution 10 milliGRAM(s) Oral every other day  ferrous    sulfate 325 milliGRAM(s) Oral three times a day  ascorbic acid 250 milliGRAM(s) Oral daily    7. IV hydration, daily weights, strict I&O, prn diuresis     8. PO intake as tolerated, nutrition follow up as needed, MVI, folic acid     9. Antiemetics, anti-diarrhea medications:   metoclopramide Injectable 10 milliGRAM(s) IV Push every 6 hours PRN    10. OOB as tolerated, physical therapy consult if needed     11. Monitor coags / fibrinogen 2x week, vitamin K as needed     12. Monitor closely for clinical changes, monitor for fevers     13. Emotional support provided, plan of care discussed and questions addressed     14. Patient education done regarding  plan of care, restrictions and discharge planning     I have written the above note for Dr. Larsen who performed service with me in the room.   Maru Del Rio  NP-C ([phone])    I have seen and examined patient with NP, I agree with above note as scribed.

## 2019-03-07 DIAGNOSIS — K12.30 ORAL MUCOSITIS (ULCERATIVE), UNSPECIFIED: ICD-10-CM

## 2019-03-07 PROCEDURE — 99291 CRITICAL CARE FIRST HOUR: CPT

## 2019-03-07 PROCEDURE — 99233 SBSQ HOSP IP/OBS HIGH 50: CPT

## 2019-03-07 RX ORDER — ACYCLOVIR SODIUM 500 MG
400 VIAL (EA) INTRAVENOUS EVERY 8 HOURS
Qty: 0 | Refills: 0 | Status: DISCONTINUED | OUTPATIENT
Start: 2019-03-07 | End: 2019-03-13

## 2019-03-07 RX ORDER — METOCLOPRAMIDE HCL 10 MG
1 TABLET ORAL
Qty: 60 | Refills: 0 | OUTPATIENT
Start: 2019-03-07 | End: 2019-03-21

## 2019-03-07 RX ORDER — AMLODIPINE BESYLATE 2.5 MG/1
1 TABLET ORAL
Qty: 30 | Refills: 3 | OUTPATIENT
Start: 2019-03-07 | End: 2019-07-04

## 2019-03-07 RX ORDER — HYDROMORPHONE HYDROCHLORIDE 2 MG/ML
1 INJECTION INTRAMUSCULAR; INTRAVENOUS; SUBCUTANEOUS
Qty: 0 | Refills: 0 | Status: DISCONTINUED | OUTPATIENT
Start: 2019-03-07 | End: 2019-03-12

## 2019-03-07 RX ORDER — ONDANSETRON 8 MG/1
1 TABLET, FILM COATED ORAL
Qty: 45 | Refills: 0 | OUTPATIENT
Start: 2019-03-07 | End: 2019-03-21

## 2019-03-07 RX ORDER — VORICONAZOLE 10 MG/ML
1 INJECTION, POWDER, LYOPHILIZED, FOR SOLUTION INTRAVENOUS
Qty: 0 | Refills: 0 | COMMUNITY

## 2019-03-07 RX ORDER — TACROLIMUS 5 MG/1
1 CAPSULE ORAL
Qty: 60 | Refills: 3 | OUTPATIENT
Start: 2019-03-07 | End: 2019-07-04

## 2019-03-07 RX ORDER — FENTANYL CITRATE 50 UG/ML
1 INJECTION INTRAVENOUS
Qty: 0 | Refills: 0 | Status: DISCONTINUED | OUTPATIENT
Start: 2019-03-07 | End: 2019-03-07

## 2019-03-07 RX ORDER — ONDANSETRON 8 MG/1
0 TABLET, FILM COATED ORAL
Qty: 0 | Refills: 0 | COMMUNITY

## 2019-03-07 RX ORDER — FOLIC ACID 0.8 MG
1 TABLET ORAL
Qty: 30 | Refills: 3 | OUTPATIENT
Start: 2019-03-07 | End: 2019-07-04

## 2019-03-07 RX ORDER — PANTOPRAZOLE SODIUM 20 MG/1
40 TABLET, DELAYED RELEASE ORAL DAILY
Qty: 0 | Refills: 0 | Status: DISCONTINUED | OUTPATIENT
Start: 2019-03-07 | End: 2019-03-14

## 2019-03-07 RX ORDER — SUCRALFATE 1 G
1 TABLET ORAL
Qty: 0 | Refills: 0 | Status: DISCONTINUED | OUTPATIENT
Start: 2019-03-07 | End: 2019-03-14

## 2019-03-07 RX ORDER — VENETOCLAX 100 MG/1
0 TABLET, FILM COATED ORAL
Qty: 0 | Refills: 0 | COMMUNITY

## 2019-03-07 RX ORDER — HYDROMORPHONE HYDROCHLORIDE 2 MG/ML
0.5 INJECTION INTRAMUSCULAR; INTRAVENOUS; SUBCUTANEOUS
Qty: 0 | Refills: 0 | Status: DISCONTINUED | OUTPATIENT
Start: 2019-03-07 | End: 2019-03-11

## 2019-03-07 RX ORDER — VORICONAZOLE 10 MG/ML
200 INJECTION, POWDER, LYOPHILIZED, FOR SOLUTION INTRAVENOUS EVERY 12 HOURS
Qty: 0 | Refills: 0 | Status: DISCONTINUED | OUTPATIENT
Start: 2019-03-07 | End: 2019-03-08

## 2019-03-07 RX ORDER — FOLIC ACID 0.8 MG
1 TABLET ORAL
Qty: 0 | Refills: 0 | COMMUNITY

## 2019-03-07 RX ORDER — MAGNESIUM OXIDE 400 MG ORAL TABLET 241.3 MG
1 TABLET ORAL
Qty: 90 | Refills: 3 | OUTPATIENT
Start: 2019-03-07 | End: 2019-07-04

## 2019-03-07 RX ORDER — ACYCLOVIR SODIUM 500 MG
1 VIAL (EA) INTRAVENOUS
Qty: 90 | Refills: 3 | OUTPATIENT
Start: 2019-03-07 | End: 2019-07-04

## 2019-03-07 RX ORDER — ATOVAQUONE 750 MG/5ML
5 SUSPENSION ORAL
Qty: 300 | Refills: 3 | OUTPATIENT
Start: 2019-03-07 | End: 2019-07-04

## 2019-03-07 RX ORDER — ACYCLOVIR SODIUM 500 MG
0 VIAL (EA) INTRAVENOUS
Qty: 0 | Refills: 0 | COMMUNITY

## 2019-03-07 RX ORDER — FENTANYL CITRATE 50 UG/ML
1 INJECTION INTRAVENOUS
Qty: 0 | Refills: 0 | Status: DISCONTINUED | OUTPATIENT
Start: 2019-03-07 | End: 2019-03-11

## 2019-03-07 RX ORDER — ALLOPURINOL 300 MG
1 TABLET ORAL
Qty: 0 | Refills: 0 | COMMUNITY

## 2019-03-07 RX ORDER — VORICONAZOLE 10 MG/ML
1 INJECTION, POWDER, LYOPHILIZED, FOR SOLUTION INTRAVENOUS
Qty: 60 | Refills: 3 | OUTPATIENT
Start: 2019-03-07 | End: 2019-07-04

## 2019-03-07 RX ORDER — URSODIOL 250 MG/1
1 TABLET, FILM COATED ORAL
Qty: 60 | Refills: 0 | OUTPATIENT
Start: 2019-03-07 | End: 2019-04-05

## 2019-03-07 RX ORDER — CIPROFLOXACIN LACTATE 400MG/40ML
1 VIAL (ML) INTRAVENOUS
Qty: 0 | Refills: 0 | COMMUNITY

## 2019-03-07 RX ADMIN — FENTANYL CITRATE 1 PATCH: 50 INJECTION INTRAVENOUS at 12:52

## 2019-03-07 RX ADMIN — MAGNESIUM OXIDE 400 MG ORAL TABLET 400 MILLIGRAM(S): 241.3 TABLET ORAL at 12:45

## 2019-03-07 RX ADMIN — CHLORHEXIDINE GLUCONATE 1 APPLICATION(S): 213 SOLUTION TOPICAL at 10:00

## 2019-03-07 RX ADMIN — FENTANYL CITRATE 1 PATCH: 50 INJECTION INTRAVENOUS at 19:20

## 2019-03-07 RX ADMIN — Medication 400 MILLIGRAM(S): at 14:00

## 2019-03-07 RX ADMIN — URSODIOL 300 MILLIGRAM(S): 250 TABLET, FILM COATED ORAL at 08:54

## 2019-03-07 RX ADMIN — Medication 500 MILLIGRAM(S): at 17:57

## 2019-03-07 RX ADMIN — FENTANYL CITRATE 1 PATCH: 50 INJECTION INTRAVENOUS at 18:53

## 2019-03-07 RX ADMIN — Medication 1 LOZENGE: at 12:46

## 2019-03-07 RX ADMIN — MAGNESIUM OXIDE 400 MG ORAL TABLET 400 MILLIGRAM(S): 241.3 TABLET ORAL at 08:55

## 2019-03-07 RX ADMIN — Medication 5 MILLILITER(S): at 19:59

## 2019-03-07 RX ADMIN — Medication 10 MILLILITER(S): at 08:54

## 2019-03-07 RX ADMIN — URSODIOL 300 MILLIGRAM(S): 250 TABLET, FILM COATED ORAL at 17:10

## 2019-03-07 RX ADMIN — Medication 100 MILLIGRAM(S): at 06:30

## 2019-03-07 RX ADMIN — Medication 325 MILLIGRAM(S): at 06:07

## 2019-03-07 RX ADMIN — Medication 1 GRAM(S): at 17:54

## 2019-03-07 RX ADMIN — Medication 10 MILLIGRAM(S): at 12:48

## 2019-03-07 RX ADMIN — Medication 400 UNIT(S): at 12:44

## 2019-03-07 RX ADMIN — Medication 325 MILLIGRAM(S): at 17:10

## 2019-03-07 RX ADMIN — HYDROMORPHONE HYDROCHLORIDE 1 MILLIGRAM(S): 2 INJECTION INTRAMUSCULAR; INTRAVENOUS; SUBCUTANEOUS at 22:33

## 2019-03-07 RX ADMIN — Medication 5 MILLILITER(S): at 08:54

## 2019-03-07 RX ADMIN — VORICONAZOLE 200 MILLIGRAM(S): 10 INJECTION, POWDER, LYOPHILIZED, FOR SOLUTION INTRAVENOUS at 17:54

## 2019-03-07 RX ADMIN — AMLODIPINE BESYLATE 5 MILLIGRAM(S): 2.5 TABLET ORAL at 06:07

## 2019-03-07 RX ADMIN — Medication 400 MILLIGRAM(S): at 06:07

## 2019-03-07 RX ADMIN — PREGABALIN 1000 MICROGRAM(S): 225 CAPSULE ORAL at 12:43

## 2019-03-07 RX ADMIN — TACROLIMUS 1 MILLIGRAM(S): 5 CAPSULE ORAL at 17:54

## 2019-03-07 RX ADMIN — Medication 500 MILLIGRAM(S): at 06:07

## 2019-03-07 RX ADMIN — FENTANYL CITRATE 1 PATCH: 50 INJECTION INTRAVENOUS at 12:34

## 2019-03-07 RX ADMIN — Medication 10 MILLILITER(S): at 12:46

## 2019-03-07 RX ADMIN — Medication 1 TABLET(S): at 12:41

## 2019-03-07 RX ADMIN — TACROLIMUS 1 MILLIGRAM(S): 5 CAPSULE ORAL at 06:07

## 2019-03-07 RX ADMIN — Medication 5 MILLILITER(S): at 16:00

## 2019-03-07 RX ADMIN — Medication 5 MILLILITER(S): at 12:46

## 2019-03-07 RX ADMIN — Medication 250 MILLIGRAM(S): at 12:44

## 2019-03-07 RX ADMIN — Medication 1 LOZENGE: at 08:54

## 2019-03-07 RX ADMIN — Medication 1 LOZENGE: at 19:59

## 2019-03-07 RX ADMIN — HYDROMORPHONE HYDROCHLORIDE 1 MILLIGRAM(S): 2 INJECTION INTRAMUSCULAR; INTRAVENOUS; SUBCUTANEOUS at 17:45

## 2019-03-07 RX ADMIN — Medication 1 LOZENGE: at 16:00

## 2019-03-07 RX ADMIN — HYDROMORPHONE HYDROCHLORIDE 1 MILLIGRAM(S): 2 INJECTION INTRAMUSCULAR; INTRAVENOUS; SUBCUTANEOUS at 08:15

## 2019-03-07 RX ADMIN — Medication 100 MILLIGRAM(S): at 14:37

## 2019-03-07 RX ADMIN — Medication 1 MILLIGRAM(S): at 12:43

## 2019-03-07 RX ADMIN — FENTANYL CITRATE 1 PATCH: 50 INJECTION INTRAVENOUS at 06:29

## 2019-03-07 RX ADMIN — Medication 10 MILLILITER(S): at 17:07

## 2019-03-07 RX ADMIN — FENTANYL CITRATE 1 PATCH: 50 INJECTION INTRAVENOUS at 18:54

## 2019-03-07 RX ADMIN — HYDROMORPHONE HYDROCHLORIDE 1 MILLIGRAM(S): 2 INJECTION INTRAMUSCULAR; INTRAVENOUS; SUBCUTANEOUS at 02:35

## 2019-03-07 RX ADMIN — Medication 325 MILLIGRAM(S): at 21:40

## 2019-03-07 RX ADMIN — VORICONAZOLE 200 MILLIGRAM(S): 10 INJECTION, POWDER, LYOPHILIZED, FOR SOLUTION INTRAVENOUS at 06:07

## 2019-03-07 RX ADMIN — MAGNESIUM OXIDE 400 MG ORAL TABLET 400 MILLIGRAM(S): 241.3 TABLET ORAL at 17:10

## 2019-03-07 RX ADMIN — MONTELUKAST 10 MILLIGRAM(S): 4 TABLET, CHEWABLE ORAL at 12:48

## 2019-03-07 RX ADMIN — Medication 1 GRAM(S): at 12:46

## 2019-03-07 RX ADMIN — HYDROMORPHONE HYDROCHLORIDE 1 MILLIGRAM(S): 2 INJECTION INTRAMUSCULAR; INTRAVENOUS; SUBCUTANEOUS at 22:03

## 2019-03-07 RX ADMIN — ATOVAQUONE 750 MILLIGRAM(S): 750 SUSPENSION ORAL at 06:07

## 2019-03-07 RX ADMIN — Medication 400 MILLIGRAM(S): at 21:40

## 2019-03-07 RX ADMIN — HYDROMORPHONE HYDROCHLORIDE 1 MILLIGRAM(S): 2 INJECTION INTRAMUSCULAR; INTRAVENOUS; SUBCUTANEOUS at 17:15

## 2019-03-07 RX ADMIN — PANTOPRAZOLE SODIUM 40 MILLIGRAM(S): 20 TABLET, DELAYED RELEASE ORAL at 06:07

## 2019-03-07 RX ADMIN — Medication 100 MILLIGRAM(S): at 21:40

## 2019-03-07 RX ADMIN — PANTOPRAZOLE SODIUM 40 MILLIGRAM(S): 20 TABLET, DELAYED RELEASE ORAL at 12:48

## 2019-03-07 RX ADMIN — MEDROXYPROGESTERONE ACETATE 10 MILLIGRAM(S): 150 INJECTION, SUSPENSION, EXTENDED RELEASE INTRAMUSCULAR at 12:41

## 2019-03-07 RX ADMIN — HYDROMORPHONE HYDROCHLORIDE 1 MILLIGRAM(S): 2 INJECTION INTRAMUSCULAR; INTRAVENOUS; SUBCUTANEOUS at 03:05

## 2019-03-07 RX ADMIN — ATOVAQUONE 750 MILLIGRAM(S): 750 SUSPENSION ORAL at 17:53

## 2019-03-07 RX ADMIN — SENNA PLUS 2 TABLET(S): 8.6 TABLET ORAL at 21:40

## 2019-03-07 RX ADMIN — HYDROMORPHONE HYDROCHLORIDE 1 MILLIGRAM(S): 2 INJECTION INTRAMUSCULAR; INTRAVENOUS; SUBCUTANEOUS at 07:45

## 2019-03-07 RX ADMIN — Medication 10 MILLILITER(S): at 19:59

## 2019-03-07 NOTE — PROGRESS NOTE ADULT - ATTENDING COMMENTS
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 Pt is Denominational, and does not accept blood products for Roman Catholic reasons   Day + 7 -  s/p MTX on days +1, +3, +6, serum creatinine in normal range. No oral mucositis visible.   Mucositis- throat soreness/pain with swallowing likely secondary to mucositis s/p MTX; monitor symptoms.  Continue Tacrolimus 1 mg po q 12 hrs, monitor level  Begin Zarxio on day +12  Continue Acyclovir, Mepron, Vfend prophylaxis; on Cipro for neutropenia  Continue VOD prophylaxis with Actigall, glutamine supplement, low dose heparin drip(will D/C when platelet count <=75K). Will D/C heparin if increased menstrual bleeding  Hypertension- possibly related to Tacro, other meds, fluids- continue Norvasc.  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 Pt is Adventist, and does not accept blood products for Orthodox reasons   Day + 8 -  s/p MTX on days +1, +3, +6, serum creatinine in normal range. No oral mucositis visible.   Mucositis- throat soreness/pain with swallowing likely secondary to mucositis s/p MTX; monitor symptoms.  Continue Tacrolimus 1 mg po q 12 hrs, monitor level  Begin Zarxio on day +12  Continue Acyclovir, Mepron, Vfend prophylaxis; on Cipro for neutropenia  Continue VOD prophylaxis with Actigall, glutamine supplement, low dose heparin drip(will D/C when platelet count <=75K). Will D/C heparin if increased menstrual bleeding  Hypertension- possibly related to Tacro, other meds, fluids- continue Norvasc.  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 Pt is Cheondoism, and does not accept blood products for Confucianist reasons   Day + 8 -  s/p MTX on days +1, +3, +6, serum creatinine in normal range. No oral mucositis visible.   Mucositis- throat soreness/pain with swallowing likely secondary to mucositis s/p MTX; monitor symptoms. Palliative care consult for pain management.  Continue Tacrolimus 1 mg po q 12 hrs, monitor level  Begin Zarxio on day +12  Continue Acyclovir, Mepron, Vfend prophylaxis; on Cipro for neutropenia  Continue VOD prophylaxis with Actigall, glutamine supplement. Off heparin since 3/6 due to increased menstrual bleeding  Hypertension- possibly related to Tacro, other meds, fluids- continue Norvasc.  Labs every other day or at discretion of attending physician  Continue iron, folic acid, vitamin K, vitamin B-12, vitamin C as per standard practice for bloodless transplant  Anemia secondary to antineoplastic chemotherapy- begin Procrit for Hgb < 11g/dL, as she does not accept transfusion of pRBC. 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 Pt is Episcopal, and does not accept blood products for Lutheran reasons   Day + 8 -  s/p MTX on days +1, +3, +6, serum creatinine in normal range. No oral mucositis visible.   Mucositis- throat soreness/pain with swallowing likely secondary to mucositis s/p MTX; monitor symptoms. Palliative care consult for pain management. Change meds to IV route if possible, add Carafate slurry.  Continue Tacrolimus 1 mg po q 12 hrs, monitor level  Begin Zarxio on day +12  Continue Acyclovir, Mepron, Vfend prophylaxis; on Cipro for neutropenia  Continue VOD prophylaxis with Actigall, glutamine supplement. Off heparin since 3/6 due to increased menstrual bleeding  Hypertension- possibly related to Tacro, other meds, fluids- continue Norvasc.  Labs every other day or at discretion of attending physician  Continue iron, folic acid, vitamin K, vitamin B-12, vitamin C as per standard practice for bloodless transplant  Anemia secondary to antineoplastic chemotherapy- begin Procrit for Hgb < 11g/dL, as she does not accept transfusion of pRBC. Cont iron supplement  Antiemetics, mouth care, skin care   OOB/ambulate

## 2019-03-07 NOTE — PROGRESS NOTE ADULT - SUBJECTIVE AND OBJECTIVE BOX
HPI: 33F with PMH of AML/MDS, Voodoo, here for allo PSCT. Diagnosed during bloodwork for a preop R ankle ligament repair, and managed with idarubicin c/b DIC, neutropenic fever, and retinal hemorrhage; had salvage therapy; further findings raised the question of MDS over AML. Sister is donor. Planned for SCT 2/27/19.    INTERVAL EVENTS: D#8 post-transplant, has some mouth pain on opioids, causing decreased appetite. Has remained ambulatory.     ADVANCE DIRECTIVES:    DNR [ ]  MOLST  [ ]    Living Will  [ ]   DECISION MAKER(s):  [ ] Health Care Proxy(s)  [ ] Surrogate(s)  [ ] Guardian           Name(s) and Contact(s):     BASELINE (I)ADL(s) (prior to admission):  [place]: [ ]Total  [ ] Moderate [ ]Dependent    Allergies    No Known Allergies    Intolerances    MEDICATIONS  (STANDING):  acetaminophen   Tablet .. 650 milliGRAM(s) Oral every 6 hours  acetaminophen   Tablet .. 650 milliGRAM(s) Oral <User Schedule>  acyclovir    Suspension 400 milliGRAM(s) Oral every 8 hours  amLODIPine   Tablet 5 milliGRAM(s) Oral daily  ascorbic acid 250 milliGRAM(s) Oral daily  atovaquone Suspension 750 milliGRAM(s) Oral every 12 hours  Biotene Dry Mouth Oral Rinse 5 milliLiter(s) Swish and Spit five times a day  chlorhexidine 4% Liquid 1 Application(s) Topical <User Schedule>  cholecalciferol 400 Unit(s) Oral daily  ciprofloxacin     Tablet 500 milliGRAM(s) Oral every 12 hours  clotrimazole Lozenge 1 Lozenge Oral five times a day  cyanocobalamin 1000 MICROGram(s) Oral daily  diphenhydrAMINE   Injectable 25 milliGRAM(s) IV Push every 3 weeks  docusate sodium 100 milliGRAM(s) Oral three times a day  epoetin bhupendra Injectable 13695 Unit(s) SubCutaneous <User Schedule>  fentaNYL   Patch  25 MICROgram(s)/Hr 1 Patch Transdermal every 72 hours  ferrous    sulfate 325 milliGRAM(s) Oral three times a day  folic acid 1 milliGRAM(s) Oral daily  immune   globulin 10% (GAMMAGARD) IVPB 20 Gram(s) IV Intermittent <User Schedule>  magnesium oxide 400 milliGRAM(s) Oral three times a day with meals  medroxyPROGESTERone 10 milliGRAM(s) Oral daily  montelukast 10 milliGRAM(s) Oral daily  multivitamin 1 Tablet(s) Oral daily  pantoprazole  Injectable 40 milliGRAM(s) IV Push daily  phytonadione   Solution 10 milliGRAM(s) Oral every other day  senna 2 Tablet(s) Oral at bedtime  sodium bicarbonate Mouth Rinse 10 milliLiter(s) Swish and Spit five times a day  sodium chloride 0.9%. 1000 milliLiter(s) (20 mL/Hr) IV Continuous <Continuous>  sucralfate suspension 1 Gram(s) Oral four times a day  tacrolimus 1 milliGRAM(s) Oral every 12 hours  ursodiol Capsule 300 milliGRAM(s) Oral two times a day with meals  voriconazole 200 milliGRAM(s) Oral every 12 hours    MEDICATIONS  (PRN):  acetaminophen   Tablet .. 650 milliGRAM(s) Oral every 6 hours PRN Temp greater or equal to 38C (100.4F), Mild Pain (1 - 3)  artificial  tears Solution 1 Drop(s) Both EYES three times a day PRN Dry Eyes  benzocaine 15 mG/menthol 3.6 mG Lozenge 1 Lozenge Oral every 3 hours PRN Sore Throat  diphenhydrAMINE   Injectable 25 milliGRAM(s) IV Push every 4 hours PRN Allergy symptoms  FIRST- Mouthwash  BLM 5 milliLiter(s) Swish and Spit every 8 hours PRN Mouth Care  HYDROmorphone  Injectable 1 milliGRAM(s) IV Push every 3 hours PRN Severe Pain (7 - 10)  HYDROmorphone  Injectable 0.5 milliGRAM(s) IV Push every 4 hours PRN Moderate Pain (4 - 6)  metoclopramide Injectable 10 milliGRAM(s) IV Push every 6 hours PRN Nausea and/or Vomiting  sodium chloride 0.65% Nasal 1 Spray(s) Both Nostrils five times a day PRN Nasal Congestion  sodium chloride 0.9% lock flush 10 milliLiter(s) IV Push every 1 hour PRN Pre/post blood products, medications, blood draw, and to maintain line patency    PRESENT SYMPTOMS:   Source if other than patient:  [ ]Family   [ ]Team     Pain (Impact on QOL):    Location -         Minimal acceptable level (0-10 scale):                    Aggravating factors -  Quality -  Radiation -  Severity (0-10 scale) -    Timing -    PAIN AD Score:     [email or web address] (press ctrl +  left click to view)    Dyspnea:                           [ ]Mild [ ]Moderate [ ]Severe  Anxiety:                             [ ]Mild [ ]Moderate [ ]Severe  Fatigue:                             [X ]Mild [ ]Moderate [ ]Severe  Nausea:                             [ ]Mild [ ]Moderate [ ]Severe  Loss of appetite:              [X ]Mild [ ]Moderate [ ]Severe  Constipation:                    [ ]Mild [ ]Moderate [ ]Severe    Other Symptoms:  [X ]All other review of systems negative   [ ]Unable to obtain due to poor mentation     Karnofsky Performance Score/Palliative Performance Status Version 2:         %    PHYSICAL EXAM:  Vital Signs Last 24 Hrs  T(C): 37.1 (07 Mar 2019 17:27), Max: 37.3 (07 Mar 2019 02:26)  T(F): 98.8 (07 Mar 2019 17:27), Max: 99.1 (07 Mar 2019 02:26)  HR: 95 (07 Mar 2019 17:27) (82 - 98)  BP: 111/71 (07 Mar 2019 17:27) (110/72 - 125/85)  BP(mean): --  RR: 18 (07 Mar 2019 17:27) (18 - 18)  SpO2: 100% (07 Mar 2019 17:27) (100% - 100%)    GENERAL:  [X ]Alert  [ ]Oriented x   [ ]Lethargic  [ ]Cachexia  [ ]Unarousable  [ ]Verbal  [ ]Non-Verbal    Behavioral:   [ ] Anxiety  [ ] Delirium [ ] Agitation [ ] Other    HEENT:  [X ]Normal   [ ]Dry mouth   [ ]ET Tube/Trach  [ ]Oral lesions    PULMONARY:   [X ]Clear [ ]Tachypnea  [ ]Audible excessive secretions   [ ]Rhonchi        [ ]Right [ ]Left [ ]Bilateral  [ ]Crackles        [ ]Right [ ]Left [ ]Bilateral  [ ]Wheezing     [ ]Right [ ]Left [ ]Bilateral    CARDIOVASCULAR:    [ X]Regular [ ]Irregular [ ]Tachy  [ ]Lance [ ]Murmur [ ]Other    GASTROINTESTINAL:  [X ]Soft  [ ]Distended   [ X]+BS  [X ]Non tender [ ]Tender  [ ]PEG [ ]OGT/ NGT  Last BM:     GENITOURINARY:  [ ]Normal [ ] Incontinent   [ ]Oliguria/Anuria   [ ]Noonan    MUSCULOSKELETAL:   [X ]Normal   [ ]Weakness  [ ]Bed/Wheelchair bound [ ]Edema    NEUROLOGIC:   [X ]No focal deficits  [ ] Cognitive impairment  [ ] Dysphagia [ ]Dysarthria [ ] Paresis [ ]Other     SKIN:   [ ]Normal   [ ]Pressure ulcer(s)  [ ]Rash    CRITICAL CARE:  [ ] Shock Present  [ ]Septic [ ]Cardiogenic [ ]Neurologic [ ]Hypovolemic  [ ]  Vasopressors [ ]  Inotropes   [ ] Respiratory failure present  [ ] Acute  [ ] Chronic [ ] Hypoxic  [ ] Hypercarbic [ ] Other  [ ] Other organ failure     LABS: reviewed                                   10.7   0.4   )-----------( 156      ( 06 Mar 2019 06:25 )             29.5     03-06    140  |  104  |  8   ----------------------------<  87  4.2   |  20<L>  |  0.66    Ca    9.7      06 Mar 2019 06:25  Phos  4.4     03-06  Mg     1.7     03-06    RADIOLOGY & ADDITIONAL STUDIES: none on this admission thus far    PROTEIN CALORIE MALNUTRITION:   [ ] PPSV2 < or = to 30% [ ] significant weight loss  [ ] poor nutritional intake [ ] catabolic state [ ] anasarca     Albumin, Serum: 4.2 g/dL (02-20-19 @ 13:04)  Artificial Nutrition [ ]     REFERRALS:   [ ]Chaplaincy  [ ] Hospice  [ ]Child Life  [ ]Social Work  [ ]Case management [ ]Holistic Therapy     Goals of Care Discussion Document:     Saeid Pal MD  Palliative Medicine  office: [phone] | [phone]  pager: [phone]

## 2019-03-07 NOTE — PROGRESS NOTE ADULT - SUBJECTIVE AND OBJECTIVE BOX
HPC Transplant Team                                                      Critical / Counseling Time Provided: 30 minutes                                                                                                                                                        Chief Complaint:     S: Patient seen and examined with HPC Transplant Team:   Denies mouth / tongue / throat pain, dyspnea, cough, nausea, vomiting, diarrhea, abdominal pain     O: Vitals:   Vital Signs Last 24 Hrs  T(C): 37 (07 Mar 2019 06:10), Max: 37.3 (07 Mar 2019 02:26)  T(F): 98.6 (07 Mar 2019 06:10), Max: 99.1 (07 Mar 2019 02:26)  HR: 98 (07 Mar 2019 06:10) (82 - 98)  BP: 120/78 (07 Mar 2019 06:10) (110/72 - 125/85)  BP(mean): --  RR: 18 (07 Mar 2019 06:10) (18 - 18)  SpO2: 100% (07 Mar 2019 06:10) (100% - 100%)    Admit weight:   Daily     Daily Weight in k (06 Mar 2019 10:17)    Intake / Output:   -06 @ 07:01  -  -07 @ 07:00  --------------------------------------------------------  IN: 1786 mL / OUT: 1625 mL / NET: 161 mL          PE:   Oropharynx:   Oral Mucositis:                                                        Grade:   CVS:   Lungs:   Abdomen:  Extremities:   Gastric Mucositis:                                                  Grade:   Intestinal Mucositis:                                              Grade:   Skin:   TLC:   Neuro:   Pain:     Labs:   CBC Full  -  ( 06 Mar 2019 06:25 )  WBC Count : 0.4 K/uL  Hemoglobin : 10.7 g/dL  Hematocrit : 29.5 %  Platelet Count - Automated : 156 K/uL  Mean Cell Volume : 90.7 fl  Mean Cell Hemoglobin : 32.9 pg  Mean Cell Hemoglobin Concentration : 36.3 gm/dL  Auto Neutrophil # : x  Auto Lymphocyte # : x  Auto Monocyte # : x  Auto Eosinophil # : x  Auto Basophil # : x  Auto Neutrophil % : x  Auto Lymphocyte % : x  Auto Monocyte % : x  Auto Eosinophil % : x  Auto Basophil % : x                          10.7   0.4   )-----------( 156      ( 06 Mar 2019 06:25 )             29.5     03-06    140  |  104  |  8   ----------------------------<  87  4.2   |  20<L>  |  0.66    Ca    9.7      06 Mar 2019 06:25  Phos  4.4     03-06  Mg     1.7     -    TPro  6.5  /  Alb  3.9  /  TBili  0.3  /  DBili  <0.1  /  AST  17  /  ALT  13  /  AlkPhos  47  03-06    PT/INR - ( 06 Mar 2019 06:26 )   PT: 11.7 sec;   INR: 1.02 ratio         PTT - ( 06 Mar 2019 06:26 )  PTT:30.6 sec  LIVER FUNCTIONS - ( 06 Mar 2019 06:25 )  Alb: 3.9 g/dL / Pro: 6.5 g/dL / ALK PHOS: 47 U/L / ALT: 13 U/L / AST: 17 U/L / GGT: x                03-06 @ 09:34  Tacrolimus 5.0                Cyclosporine --      Karnofsky / Lansky Scale:   GVHD:   Skin:   Liver:   Gut:   Overall Grade:       Cultures:         Radiology:       Meds:   Antimicrobials:   acyclovir   Oral Tab/Cap 400 milliGRAM(s) Oral three times a day  atovaquone Suspension 750 milliGRAM(s) Oral every 12 hours  ciprofloxacin     Tablet 500 milliGRAM(s) Oral every 12 hours  clotrimazole Lozenge 1 Lozenge Oral five times a day  voriconazole 200 milliGRAM(s) Oral every 12 hours      Heme / Onc:       GI:  docusate sodium 100 milliGRAM(s) Oral three times a day  pantoprazole    Tablet 40 milliGRAM(s) Oral before breakfast  senna 2 Tablet(s) Oral at bedtime  sodium bicarbonate Mouth Rinse 10 milliLiter(s) Swish and Spit five times a day  ursodiol Capsule 300 milliGRAM(s) Oral two times a day with meals      Cardiovascular:   amLODIPine   Tablet 5 milliGRAM(s) Oral daily      Immunologic:   epoetin bhupendra Injectable 57880 Unit(s) SubCutaneous <User Schedule>  immune   globulin 10% (GAMMAGARD) IVPB 20 Gram(s) IV Intermittent <User Schedule>  tacrolimus 1 milliGRAM(s) Oral every 12 hours      Other medications:   acetaminophen   Tablet .. 650 milliGRAM(s) Oral every 6 hours  acetaminophen   Tablet .. 650 milliGRAM(s) Oral <User Schedule>  ascorbic acid 250 milliGRAM(s) Oral daily  Biotene Dry Mouth Oral Rinse 5 milliLiter(s) Swish and Spit five times a day  chlorhexidine 4% Liquid 1 Application(s) Topical <User Schedule>  cholecalciferol 400 Unit(s) Oral daily  cyanocobalamin 1000 MICROGram(s) Oral daily  diphenhydrAMINE   Injectable 25 milliGRAM(s) IV Push every 3 weeks  fentaNYL   Patch  12 MICROgram(s)/Hr 1 Patch Transdermal every 72 hours  ferrous    sulfate 325 milliGRAM(s) Oral three times a day  folic acid 1 milliGRAM(s) Oral daily  magnesium oxide 400 milliGRAM(s) Oral three times a day with meals  medroxyPROGESTERone 10 milliGRAM(s) Oral daily  montelukast 10 milliGRAM(s) Oral daily  multivitamin 1 Tablet(s) Oral daily  phytonadione   Solution 10 milliGRAM(s) Oral every other day  sodium chloride 0.9%. 1000 milliLiter(s) IV Continuous <Continuous>      PRN:   acetaminophen   Tablet .. 650 milliGRAM(s) Oral every 6 hours PRN  artificial  tears Solution 1 Drop(s) Both EYES three times a day PRN  benzocaine 15 mG/menthol 3.6 mG Lozenge 1 Lozenge Oral every 3 hours PRN  diphenhydrAMINE   Injectable 25 milliGRAM(s) IV Push every 4 hours PRN  FIRST- Mouthwash  BLM 5 milliLiter(s) Swish and Spit every 8 hours PRN  HYDROmorphone  Injectable 0.5 milliGRAM(s) IV Push every 4 hours PRN  HYDROmorphone  Injectable 1 milliGRAM(s) IV Push every 3 hours PRN  metoclopramide Injectable 10 milliGRAM(s) IV Push every 6 hours PRN  sodium chloride 0.65% Nasal 1 Spray(s) Both Nostrils five times a day PRN  sodium chloride 0.9% lock flush 10 milliLiter(s) IV Push every 1 hour PRN      A/P:   ___ year old ___  with a history of ______________________  Pre / Status Post :  Autologous / Allogeneic PBSCT / BMT day ____________    1. Infectious Disease:   Fluconazole, Acyclovir     2. VOD Prophylaxis: Actigall, Glutamine, Heparin (dosed at 100 units / kg / day)     3. GI Prophylaxis:  Protonix    4. Mouthcare - NS / NaHCO3 rinses, Mycelex, Caphosol, skin care     5. GVHD prophylaxis     6. Transfuse & replete electrolytes prn     7. IV hydration, daily weights, strict I&O, prn diuresis     8. PO intake as tolerated, nutrition follow up as needed, MVI, folic acid     9. Antiemetics, anti-diarrhea medications:   Reglan, Ativan    10. OOB as tolerated, physical therapy consult if needed     11. Monitor coags / fibrinogen 2x week, vitamin K as needed     12. Monitor closely for clinical changes, monitor for fevers     13. Emotional support provided, plan of care discussed with patient and family, questions addressed     14. Patient education done regarding chemotherapy prep, plan of care, restrictions and discharge planning     15. Continue regular social work input     I have written the above note for Dr. Larsen who performed service with me in the room.   Maru Del Rio  NP-C ([phone])    I have seen and examined patient with NP, I agree with above note as scribed. HPC Transplant Team                                                      Critical / Counseling Time Provided: 30 minutes                                                                                                                                                        Chief Complaint: Allogeneic peripheral blood stem cell transplant from [facility] (sister 10/10)     S: Patient seen and examined with HPC Transplant Team:   + menstruation started today.   + throat pain with swallowing (5/10) O/N  loose bowel movements yesterday    Denies dyspnea, cough, nausea, vomiting, diarrhea, abdominal pain.    O: Vitals:   Vital Signs Last 24 Hrs  T(C): 37 (07 Mar 2019 06:10), Max: 37.3 (07 Mar 2019 02:26)  T(F): 98.6 (07 Mar 2019 06:10), Max: 99.1 (07 Mar 2019 02:26)  HR: 98 (07 Mar 2019 06:10) (82 - 98)  BP: 120/78 (07 Mar 2019 06:10) (110/72 - 125/85)  BP(mean): --  RR: 18 (07 Mar 2019 06:10) (18 - 18)  SpO2: 100% (07 Mar 2019 06:10) (100% - 100%)    Admit weight:   Daily     Daily Weight in k (06 Mar 2019 10:17)    Intake / Output:   -06 @ 07:01  -  -07 @ 07:00  --------------------------------------------------------  IN: 1786 mL / OUT: 1625 mL / NET: 161 mL    PE:   Oropharynx: no erythema or ulcerations  Oral Mucositis:              +                                         Grade: 1(throat)  CVS: S1, S2 RRR   Lungs: CTA throughout bilaterally   Abdomen:+ BS x 4, soft, NT, ND   Extremities: no edema  in BLE  Gastric Mucositis:       -                                           Grade: n/a  Intestinal Mucositis:     -                                         Grade: n/a  Skin: no rash   TLC: PICC line in right arm, c/d/i  Neuro: A&O x 3   Pain: (4-5/10 throat)       Labs:   CBC Full  -  ( 06 Mar 2019 06:25 )  WBC Count : 0.4 K/uL  Hemoglobin : 10.7 g/dL  Hematocrit : 29.5 %  Platelet Count - Automated : 156 K/uL  Mean Cell Volume : 90.7 fl  Mean Cell Hemoglobin : 32.9 pg  Mean Cell Hemoglobin Concentration : 36.3 gm/dL  Auto Neutrophil # : x  Auto Lymphocyte # : x  Auto Monocyte # : x  Auto Eosinophil # : x  Auto Basophil # : x  Auto Neutrophil % : x  Auto Lymphocyte % : x  Auto Monocyte % : x  Auto Eosinophil % : x  Auto Basophil % : x                          10.7   0.4   )-----------( 156      ( 06 Mar 2019 06:25 )             29.5     03-06    140  |  104  |  8   ----------------------------<  87  4.2   |  20<L>  |  0.66    Ca    9.7      06 Mar 2019 06:25  Phos  4.4     03-06  Mg     1.7     -    TPro  6.5  /  Alb  3.9  /  TBili  0.3  /  DBili  <0.1  /  AST  17  /  ALT  13  /  AlkPhos  47  03-06    PT/INR - ( 06 Mar 2019 06:26 )   PT: 11.7 sec;   INR: 1.02 ratio         PTT - ( 06 Mar 2019 06:26 )  PTT:30.6 sec  LIVER FUNCTIONS - ( 06 Mar 2019 06:25 )  Alb: 3.9 g/dL / Pro: 6.5 g/dL / ALK PHOS: 47 U/L / ALT: 13 U/L / AST: 17 U/L / GGT: x                03-06 @ 09:34  Tacrolimus 5.0                Cyclosporine --      Karnofsky / Lansky Scale:   GVHD:   Skin:   Liver:   Gut:   Overall Grade:       Cultures:         Radiology:       Meds:   Antimicrobials:   acyclovir   Oral Tab/Cap 400 milliGRAM(s) Oral three times a day  atovaquone Suspension 750 milliGRAM(s) Oral every 12 hours  ciprofloxacin     Tablet 500 milliGRAM(s) Oral every 12 hours  clotrimazole Lozenge 1 Lozenge Oral five times a day  voriconazole 200 milliGRAM(s) Oral every 12 hours      Heme / Onc:       GI:  docusate sodium 100 milliGRAM(s) Oral three times a day  pantoprazole    Tablet 40 milliGRAM(s) Oral before breakfast  senna 2 Tablet(s) Oral at bedtime  sodium bicarbonate Mouth Rinse 10 milliLiter(s) Swish and Spit five times a day  ursodiol Capsule 300 milliGRAM(s) Oral two times a day with meals      Cardiovascular:   amLODIPine   Tablet 5 milliGRAM(s) Oral daily      Immunologic:   epoetin bhupendra Injectable 10059 Unit(s) SubCutaneous <User Schedule>  immune   globulin 10% (GAMMAGARD) IVPB 20 Gram(s) IV Intermittent <User Schedule>  tacrolimus 1 milliGRAM(s) Oral every 12 hours      Other medications:   acetaminophen   Tablet .. 650 milliGRAM(s) Oral every 6 hours  acetaminophen   Tablet .. 650 milliGRAM(s) Oral <User Schedule>  ascorbic acid 250 milliGRAM(s) Oral daily  Biotene Dry Mouth Oral Rinse 5 milliLiter(s) Swish and Spit five times a day  chlorhexidine 4% Liquid 1 Application(s) Topical <User Schedule>  cholecalciferol 400 Unit(s) Oral daily  cyanocobalamin 1000 MICROGram(s) Oral daily  diphenhydrAMINE   Injectable 25 milliGRAM(s) IV Push every 3 weeks  fentaNYL   Patch  12 MICROgram(s)/Hr 1 Patch Transdermal every 72 hours  ferrous    sulfate 325 milliGRAM(s) Oral three times a day  folic acid 1 milliGRAM(s) Oral daily  magnesium oxide 400 milliGRAM(s) Oral three times a day with meals  medroxyPROGESTERone 10 milliGRAM(s) Oral daily  montelukast 10 milliGRAM(s) Oral daily  multivitamin 1 Tablet(s) Oral daily  phytonadione   Solution 10 milliGRAM(s) Oral every other day  sodium chloride 0.9%. 1000 milliLiter(s) IV Continuous <Continuous>      PRN:   acetaminophen   Tablet .. 650 milliGRAM(s) Oral every 6 hours PRN  artificial  tears Solution 1 Drop(s) Both EYES three times a day PRN  benzocaine 15 mG/menthol 3.6 mG Lozenge 1 Lozenge Oral every 3 hours PRN  diphenhydrAMINE   Injectable 25 milliGRAM(s) IV Push every 4 hours PRN  FIRST- Mouthwash  BLM 5 milliLiter(s) Swish and Spit every 8 hours PRN  HYDROmorphone  Injectable 0.5 milliGRAM(s) IV Push every 4 hours PRN  HYDROmorphone  Injectable 1 milliGRAM(s) IV Push every 3 hours PRN  metoclopramide Injectable 10 milliGRAM(s) IV Push every 6 hours PRN  sodium chloride 0.65% Nasal 1 Spray(s) Both Nostrils five times a day PRN  sodium chloride 0.9% lock flush 10 milliLiter(s) IV Push every 1 hour PRN      A/P:     33 year old female with a history of AML  Post  :  Allogeneic PBSCT day + 8  Neutropenic - started Cipro on ; if T >/= 38C pan cx, CXR and change Cipro to Cefepime.   Labs every other day ; continue vitamin C, vitamin B, folic acid, vitamin K & iron supplementation. Increased vitamin K to every other day as per standard guidelines for bloodless transplant.   Patient is neutropenic.  If spikes switch Cipro to Cefepime.  BP- 140s, started on Norvasc.  Hold for SBP <120  Mucositis (4-5/10 throat pain) likely related to MTX, continue supportive care     1. Infectious Disease:   acyclovir   Oral Tab/Cap 400 milliGRAM(s) Oral three times a day  atovaquone Suspension 750 milliGRAM(s) Oral every 12 hours  ciprofloxacin     Tablet 500 milliGRAM(s) Oral every 12 hours  clotrimazole Lozenge 1 Lozenge Oral five times a day  voriconazole 200 milliGRAM(s) Oral every 12 hours    2. VOD Prophylaxis: Actigall, Glutamine, Heparin (dosed at 100 units / kg / day)     3. GI Prophylaxis:    pantoprazole    Tablet 40 milliGRAM(s) Oral before breakfast    4. Mouthcare - NS / NaHCO3 rinses, Mycelex, Biotene; Skin care     5. GVHD prophylaxis -   tacrolimus 1 milliGRAM(s) Oral every 12 hours  MTX days 1,3,6,11    6. Transfuse & replete electrolytes prn   magnesium oxide 400 milliGRAM(s) Oral three times a day with meals  phytonadione   Solution 10 milliGRAM(s) Oral every other day  ferrous    sulfate 325 milliGRAM(s) Oral three times a day  ascorbic acid 250 milliGRAM(s) Oral daily    7. IV hydration, daily weights, strict I&O, prn diuresis     8. PO intake as tolerated, nutrition follow up as needed, MVI, folic acid     9. Antiemetics, anti-diarrhea medications:   metoclopramide Injectable 10 milliGRAM(s) IV Push every 6 hours PRN    10. OOB as tolerated, physical therapy consult if needed     11. Monitor coags / fibrinogen 2x week, vitamin K as needed     12. Monitor closely for clinical changes, monitor for fevers     13. Emotional support provided, plan of care discussed and questions addressed     14. Patient education done regarding  plan of care, restrictions and discharge planning     I have written the above note for Dr. Larsen who performed service with me in the room.   Maru Del Rio  NP-C ([phone])    I have seen and examined patient with NP, I agree with above note as scribed. [facility] Transplant Team                                                      Critical / Counseling Time Provided: 30 minutes                                                                                                                                                        Chief Complaint: Allogeneic peripheral blood stem cell transplant from [facility] (sister 10/10)     S: Patient seen and examined with HPC Transplant Team:   + menstruation started today.   + throat pain with swallowing (5/10) O/N  loose bowel movements yesterday    Denies dyspnea, cough, nausea, vomiting, diarrhea, abdominal pain.    O: Vitals:   Vital Signs Last 24 Hrs  T(C): 37 (07 Mar 2019 06:10), Max: 37.3 (07 Mar 2019 02:26)  T(F): 98.6 (07 Mar 2019 06:10), Max: 99.1 (07 Mar 2019 02:26)  HR: 98 (07 Mar 2019 06:10) (82 - 98)  BP: 120/78 (07 Mar 2019 06:10) (110/72 - 125/85)  BP(mean): --  RR: 18 (07 Mar 2019 06:10) (18 - 18)  SpO2: 100% (07 Mar 2019 06:10) (100% - 100%)    Admit weight: 88.2kg  Daily Weight in k (06 Mar 2019 10:17)  Today's weight:     Intake / Output:   03-06 @ 07:01  -  03-07 @ 07:00  --------------------------------------------------------  IN: 1786 mL / OUT: 1625 mL / NET: 161 mL    PE:   Oropharynx: no erythema or ulcerations  Oral Mucositis:              +                                         Grade: 1(throat)  CVS: S1, S2 RRR   Lungs: CTA throughout bilaterally   Abdomen:+ BS x 4, soft, NT, ND   Extremities: no edema  in BLE  Gastric Mucositis:       -                                           Grade: n/a  Intestinal Mucositis:     -                                         Grade: n/a  Skin: no rash   TLC: PICC line in right arm, c/d/i  Neuro: A&O x 3   Pain: (4-5/10 throat)       Labs:   CBC Full  -  ( 06 Mar 2019 06:25 )  WBC Count : 0.4 K/uL  Hemoglobin : 10.7 g/dL  Hematocrit : 29.5 %  Platelet Count - Automated : 156 K/uL  Mean Cell Volume : 90.7 fl  Mean Cell Hemoglobin : 32.9 pg  Mean Cell Hemoglobin Concentration : 36.3 gm/dL  Auto Neutrophil # : x  Auto Lymphocyte # : x  Auto Monocyte # : x  Auto Eosinophil # : x  Auto Basophil # : x  Auto Neutrophil % : x  Auto Lymphocyte % : x  Auto Monocyte % : x  Auto Eosinophil % : x  Auto Basophil % : x                          10.7   0.4   )-----------( 156      ( 06 Mar 2019 06:25 )             29.5     03-06    140  |  104  |  8   ----------------------------<  87  4.2   |  20<L>  |  0.66    Ca    9.7      06 Mar 2019 06:25  Phos  4.4     03-06  Mg     1.7     -    TPro  6.5  /  Alb  3.9  /  TBili  0.3  /  DBili  <0.1  /  AST  17  /  ALT  13  /  AlkPhos  47  03-06    PT/INR - ( 06 Mar 2019 06:26 )   PT: 11.7 sec;   INR: 1.02 ratio         PTT - ( 06 Mar 2019 06:26 )  PTT:30.6 sec  LIVER FUNCTIONS - ( 06 Mar 2019 06:25 )  Alb: 3.9 g/dL / Pro: 6.5 g/dL / ALK PHOS: 47 U/L / ALT: 13 U/L / AST: 17 U/L / GGT: x                03-06 @ 09:34  Tacrolimus 5.0                Cyclosporine --      Meds:   Antimicrobials:   acyclovir   Oral Tab/Cap 400 milliGRAM(s) Oral three times a day  atovaquone Suspension 750 milliGRAM(s) Oral every 12 hours  ciprofloxacin     Tablet 500 milliGRAM(s) Oral every 12 hours  clotrimazole Lozenge 1 Lozenge Oral five times a day  voriconazole 200 milliGRAM(s) Oral every 12 hours      Heme / Onc:       GI:  docusate sodium 100 milliGRAM(s) Oral three times a day  pantoprazole    Tablet 40 milliGRAM(s) Oral before breakfast  senna 2 Tablet(s) Oral at bedtime  sodium bicarbonate Mouth Rinse 10 milliLiter(s) Swish and Spit five times a day  ursodiol Capsule 300 milliGRAM(s) Oral two times a day with meals      Cardiovascular:   amLODIPine   Tablet 5 milliGRAM(s) Oral daily      Immunologic:   epoetin bhupendra Injectable 41959 Unit(s) SubCutaneous <User Schedule>  immune   globulin 10% (GAMMAGARD) IVPB 20 Gram(s) IV Intermittent <User Schedule>  tacrolimus 1 milliGRAM(s) Oral every 12 hours      Other medications:   acetaminophen   Tablet .. 650 milliGRAM(s) Oral every 6 hours  acetaminophen   Tablet .. 650 milliGRAM(s) Oral <User Schedule>  ascorbic acid 250 milliGRAM(s) Oral daily  Biotene Dry Mouth Oral Rinse 5 milliLiter(s) Swish and Spit five times a day  chlorhexidine 4% Liquid 1 Application(s) Topical <User Schedule>  cholecalciferol 400 Unit(s) Oral daily  cyanocobalamin 1000 MICROGram(s) Oral daily  diphenhydrAMINE   Injectable 25 milliGRAM(s) IV Push every 3 weeks  fentaNYL   Patch  12 MICROgram(s)/Hr 1 Patch Transdermal every 72 hours  ferrous    sulfate 325 milliGRAM(s) Oral three times a day  folic acid 1 milliGRAM(s) Oral daily  magnesium oxide 400 milliGRAM(s) Oral three times a day with meals  medroxyPROGESTERone 10 milliGRAM(s) Oral daily  montelukast 10 milliGRAM(s) Oral daily  multivitamin 1 Tablet(s) Oral daily  phytonadione   Solution 10 milliGRAM(s) Oral every other day  sodium chloride 0.9%. 1000 milliLiter(s) IV Continuous <Continuous>      PRN:   acetaminophen   Tablet .. 650 milliGRAM(s) Oral every 6 hours PRN  artificial  tears Solution 1 Drop(s) Both EYES three times a day PRN  benzocaine 15 mG/menthol 3.6 mG Lozenge 1 Lozenge Oral every 3 hours PRN  diphenhydrAMINE   Injectable 25 milliGRAM(s) IV Push every 4 hours PRN  FIRST- Mouthwash  BLM 5 milliLiter(s) Swish and Spit every 8 hours PRN  HYDROmorphone  Injectable 0.5 milliGRAM(s) IV Push every 4 hours PRN  HYDROmorphone  Injectable 1 milliGRAM(s) IV Push every 3 hours PRN  metoclopramide Injectable 10 milliGRAM(s) IV Push every 6 hours PRN  sodium chloride 0.65% Nasal 1 Spray(s) Both Nostrils five times a day PRN  sodium chloride 0.9% lock flush 10 milliLiter(s) IV Push every 1 hour PRN      A/P:     33 year old female with a history of AML  Post  :  Allogeneic PBSCT day + 8  Neutropenic - started Cipro on ; if T >/= 38C pan cx, CXR and change Cipro to Cefepime.   Labs every other day ; continue vitamin C, vitamin B, folic acid, vitamin K & iron supplementation. Increased vitamin K to every other day as per standard guidelines for bloodless transplant.   Patient is neutropenic.  If spikes switch Cipro to Cefepime.  BP- 140s, started on Norvasc.  Hold for SBP <120  Mucositis (4-5/10 throat pain) likely related to MTX, continue supportive care     1. Infectious Disease:   acyclovir   Oral Tab/Cap 400 milliGRAM(s) Oral three times a day  atovaquone Suspension 750 milliGRAM(s) Oral every 12 hours  ciprofloxacin     Tablet 500 milliGRAM(s) Oral every 12 hours  clotrimazole Lozenge 1 Lozenge Oral five times a day  voriconazole 200 milliGRAM(s) Oral every 12 hours    2. VOD Prophylaxis: Actigall, Glutamine, Heparin (dosed at 100 units / kg / day)     3. GI Prophylaxis:    pantoprazole    Tablet 40 milliGRAM(s) Oral before breakfast    4. Mouthcare - NS / NaHCO3 rinses, Mycelex, Biotene; Skin care     5. GVHD prophylaxis -   tacrolimus 1 milliGRAM(s) Oral every 12 hours  MTX days 1,3,6,11    6. Transfuse & replete electrolytes prn - does not accept transfusion   magnesium oxide 400 milliGRAM(s) Oral three times a day with meals  phytonadione   Solution 10 milliGRAM(s) Oral every other day  ferrous    sulfate 325 milliGRAM(s) Oral three times a day  ascorbic acid 250 milliGRAM(s) Oral daily    7. IV hydration, daily weights, strict I&O, prn diuresis     8. PO intake as tolerated, nutrition follow up as needed, MVI, folic acid     9. Antiemetics, anti-diarrhea medications:   metoclopramide Injectable 10 milliGRAM(s) IV Push every 6 hours PRN    10. OOB as tolerated, physical therapy consult if needed     11. Monitor coags / fibrinogen 2x week, vitamin K as needed     12. Monitor closely for clinical changes, monitor for fevers     13. Emotional support provided, plan of care discussed and questions addressed     14. Patient education done regarding  plan of care, restrictions and discharge planning     I have written the above note for Dr. Larsen who performed service with me in the room.   Maru Del Rio  NP-C ([phone])    I have seen and examined patient with NP, I agree with above note as scribed. [facility] Transplant Team                                                      Critical / Counseling Time Provided: 30 minutes                                                                                                                                                        Chief Complaint: Allogeneic peripheral blood stem cell transplant from [facility] (sister 10/10)     S: Patient seen and examined with HPC Transplant Team:   + menstruation started today.   + throat pain with swallowing (5/10) O/N  loose bowel movements yesterday    Denies dyspnea, cough, nausea, vomiting, diarrhea, abdominal pain.    O: Vitals:   Vital Signs Last 24 Hrs  T(C): 37 (07 Mar 2019 06:10), Max: 37.3 (07 Mar 2019 02:26)  T(F): 98.6 (07 Mar 2019 06:10), Max: 99.1 (07 Mar 2019 02:26)  HR: 98 (07 Mar 2019 06:10) (82 - 98)  BP: 120/78 (07 Mar 2019 06:10) (110/72 - 125/85)  BP(mean): --  RR: 18 (07 Mar 2019 06:10) (18 - 18)  SpO2: 100% (07 Mar 2019 06:10) (100% - 100%)    Admit weight: 88.2kg  Daily Weight in k (06 Mar 2019 10:17)  Today's weight: 87.2kg     Intake / Output:   03-06 @ 07:01  -  03-07 @ 07:00  --------------------------------------------------------  IN: 1786 mL / OUT: 1625 mL / NET: 161 mL    PE:   Oropharynx: no erythema or ulcerations  Oral Mucositis:              +                                         Grade: 1(throat)  CVS: S1, S2 RRR   Lungs: CTA throughout bilaterally   Abdomen:+ BS x 4, soft, NT, ND   Extremities: no edema  in BLE  Gastric Mucositis:       -                                           Grade: n/a  Intestinal Mucositis:     -                                         Grade: n/a  Skin: no rash   TLC: PICC line in right arm, c/d/i  Neuro: A&O x 3   Pain: (4-5/10 throat)       Labs:   CBC Full  -  ( 06 Mar 2019 06:25 )  WBC Count : 0.4 K/uL  Hemoglobin : 10.7 g/dL  Hematocrit : 29.5 %  Platelet Count - Automated : 156 K/uL  Mean Cell Volume : 90.7 fl  Mean Cell Hemoglobin : 32.9 pg  Mean Cell Hemoglobin Concentration : 36.3 gm/dL  Auto Neutrophil # : x  Auto Lymphocyte # : x  Auto Monocyte # : x  Auto Eosinophil # : x  Auto Basophil # : x  Auto Neutrophil % : x  Auto Lymphocyte % : x  Auto Monocyte % : x  Auto Eosinophil % : x  Auto Basophil % : x                          10.7   0.4   )-----------( 156      ( 06 Mar 2019 06:25 )             29.5     03-06    140  |  104  |  8   ----------------------------<  87  4.2   |  20<L>  |  0.66    Ca    9.7      06 Mar 2019 06:25  Phos  4.4     03-06  Mg     1.7     -    TPro  6.5  /  Alb  3.9  /  TBili  0.3  /  DBili  <0.1  /  AST  17  /  ALT  13  /  AlkPhos  47  03-06    PT/INR - ( 06 Mar 2019 06:26 )   PT: 11.7 sec;   INR: 1.02 ratio         PTT - ( 06 Mar 2019 06:26 )  PTT:30.6 sec  LIVER FUNCTIONS - ( 06 Mar 2019 06:25 )  Alb: 3.9 g/dL / Pro: 6.5 g/dL / ALK PHOS: 47 U/L / ALT: 13 U/L / AST: 17 U/L / GGT: x                03-06 @ 09:34  Tacrolimus 5.0                Cyclosporine --      Meds:   Antimicrobials:   acyclovir   Oral Tab/Cap 400 milliGRAM(s) Oral three times a day  atovaquone Suspension 750 milliGRAM(s) Oral every 12 hours  ciprofloxacin     Tablet 500 milliGRAM(s) Oral every 12 hours  clotrimazole Lozenge 1 Lozenge Oral five times a day  voriconazole 200 milliGRAM(s) Oral every 12 hours      Heme / Onc:       GI:  docusate sodium 100 milliGRAM(s) Oral three times a day  pantoprazole    Tablet 40 milliGRAM(s) Oral before breakfast  senna 2 Tablet(s) Oral at bedtime  sodium bicarbonate Mouth Rinse 10 milliLiter(s) Swish and Spit five times a day  ursodiol Capsule 300 milliGRAM(s) Oral two times a day with meals      Cardiovascular:   amLODIPine   Tablet 5 milliGRAM(s) Oral daily      Immunologic:   epoetin bhupendra Injectable 47976 Unit(s) SubCutaneous <User Schedule>  immune   globulin 10% (GAMMAGARD) IVPB 20 Gram(s) IV Intermittent <User Schedule>  tacrolimus 1 milliGRAM(s) Oral every 12 hours      Other medications:   acetaminophen   Tablet .. 650 milliGRAM(s) Oral every 6 hours  acetaminophen   Tablet .. 650 milliGRAM(s) Oral <User Schedule>  ascorbic acid 250 milliGRAM(s) Oral daily  Biotene Dry Mouth Oral Rinse 5 milliLiter(s) Swish and Spit five times a day  chlorhexidine 4% Liquid 1 Application(s) Topical <User Schedule>  cholecalciferol 400 Unit(s) Oral daily  cyanocobalamin 1000 MICROGram(s) Oral daily  diphenhydrAMINE   Injectable 25 milliGRAM(s) IV Push every 3 weeks  fentaNYL   Patch  12 MICROgram(s)/Hr 1 Patch Transdermal every 72 hours  ferrous    sulfate 325 milliGRAM(s) Oral three times a day  folic acid 1 milliGRAM(s) Oral daily  magnesium oxide 400 milliGRAM(s) Oral three times a day with meals  medroxyPROGESTERone 10 milliGRAM(s) Oral daily  montelukast 10 milliGRAM(s) Oral daily  multivitamin 1 Tablet(s) Oral daily  phytonadione   Solution 10 milliGRAM(s) Oral every other day  sodium chloride 0.9%. 1000 milliLiter(s) IV Continuous <Continuous>      PRN:   acetaminophen   Tablet .. 650 milliGRAM(s) Oral every 6 hours PRN  artificial  tears Solution 1 Drop(s) Both EYES three times a day PRN  benzocaine 15 mG/menthol 3.6 mG Lozenge 1 Lozenge Oral every 3 hours PRN  diphenhydrAMINE   Injectable 25 milliGRAM(s) IV Push every 4 hours PRN  FIRST- Mouthwash  BLM 5 milliLiter(s) Swish and Spit every 8 hours PRN  HYDROmorphone  Injectable 0.5 milliGRAM(s) IV Push every 4 hours PRN  HYDROmorphone  Injectable 1 milliGRAM(s) IV Push every 3 hours PRN  metoclopramide Injectable 10 milliGRAM(s) IV Push every 6 hours PRN  sodium chloride 0.65% Nasal 1 Spray(s) Both Nostrils five times a day PRN  sodium chloride 0.9% lock flush 10 milliLiter(s) IV Push every 1 hour PRN      A/P:     33 year old female with a history of AML  Post  :  Allogeneic PBSCT day + 8  Neutropenic - started Cipro on ; if T >/= 38C pan cx, CXR and change Cipro to Cefepime.   Labs every other day ; continue vitamin C, vitamin B, folic acid, vitamin K & iron supplementation. Increased vitamin K to every other day as per standard guidelines for bloodless transplant.   Patient is neutropenic.  If spikes switch Cipro to Cefepime.  BP- 140s, started on Norvasc.  Hold for SBP <120  Mucositis (4-5/10 throat pain) likely related to MTX, continue supportive care     1. Infectious Disease:   acyclovir   Oral Tab/Cap 400 milliGRAM(s) Oral three times a day  atovaquone Suspension 750 milliGRAM(s) Oral every 12 hours  ciprofloxacin     Tablet 500 milliGRAM(s) Oral every 12 hours  clotrimazole Lozenge 1 Lozenge Oral five times a day  voriconazole 200 milliGRAM(s) Oral every 12 hours    2. VOD Prophylaxis: Actigall, Glutamine, Heparin (dosed at 100 units / kg / day)     3. GI Prophylaxis:    pantoprazole    Tablet 40 milliGRAM(s) Oral before breakfast    4. Mouthcare - NS / NaHCO3 rinses, Mycelex, Biotene; Skin care     5. GVHD prophylaxis -   tacrolimus 1 milliGRAM(s) Oral every 12 hours  MTX days 1,3,6,11    6. Transfuse & replete electrolytes prn - does not accept transfusion   magnesium oxide 400 milliGRAM(s) Oral three times a day with meals  phytonadione   Solution 10 milliGRAM(s) Oral every other day  ferrous    sulfate 325 milliGRAM(s) Oral three times a day  ascorbic acid 250 milliGRAM(s) Oral daily    7. IV hydration, daily weights, strict I&O, prn diuresis     8. PO intake as tolerated, nutrition follow up as needed, MVI, folic acid     9. Antiemetics, anti-diarrhea medications:   metoclopramide Injectable 10 milliGRAM(s) IV Push every 6 hours PRN    10. OOB as tolerated, physical therapy consult if needed     11. Monitor coags / fibrinogen 2x week, vitamin K as needed     12. Monitor closely for clinical changes, monitor for fevers     13. Emotional support provided, plan of care discussed and questions addressed     14. Patient education done regarding  plan of care, restrictions and discharge planning     I have written the above note for Dr. Larsen who performed service with me in the room.   Maru Del Rio  NP-C ([phone])    I have seen and examined patient with NP, I agree with above note as scribed. [facility] Team                                                      Critical / Counseling Time Provided: 30 minutes                                                                                                                                                        Chief Complaint: Allogeneic peripheral blood stem cell transplant from [facility] (sister 10/10)     S: Patient seen and examined with [facility] Transplant Team:   + menstrual flow decreased from yesterday.  + throat pain with swallowing (-7/10)   loose bowel movements resolved.    Denies dyspnea, cough, nausea, vomiting, diarrhea, abdominal pain.    O: Vitals:   Vital Signs Last 24 Hrs  T(C): 37 (07 Mar 2019 06:10), Max: 37.3 (07 Mar 2019 02:26)  T(F): 98.6 (07 Mar 2019 06:10), Max: 99.1 (07 Mar 2019 02:26)  HR: 98 (07 Mar 2019 06:10) (82 - 98)  BP: 120/78 (07 Mar 2019 06:10) (110/72 - 125/85)  BP(mean): --  RR: 18 (07 Mar 2019 06:10) (18 - 18)  SpO2: 100% (07 Mar 2019 06:10) (100% - 100%)    Admit weight: 88.2kg  Daily Weight in k (06 Mar 2019 10:17)  Today's weight: 87.2kg     Intake / Output:   03-06 @ 07:01  -  03-07 @ 07:00  --------------------------------------------------------  IN: 1786 mL / OUT: 1625 mL / NET: 161 mL    PE:   Oropharynx: no erythema or ulcerations  Oral Mucositis:              +                                         Grade: 1(throat)  CVS: S1, S2 RRR   Lungs: CTA throughout bilaterally   Abdomen:+ BS x 4, soft, NT, ND   Extremities: no edema  in BLE  Gastric Mucositis:       -                                           Grade: n/a  Intestinal Mucositis:     -                                         Grade: n/a  Skin: no rash   TLC: PICC line in right arm, c/d/i  Neuro: A&O x 3   Pain: (-5/10 throat)       Labs:   CBC Full  -  ( 06 Mar 2019 06:25 )  WBC Count : 0.4 K/uL  Hemoglobin : 10.7 g/dL  Hematocrit : 29.5 %  Platelet Count - Automated : 156 K/uL  Mean Cell Volume : 90.7 fl  Mean Cell Hemoglobin : 32.9 pg  Mean Cell Hemoglobin Concentration : 36.3 gm/dL  Auto Neutrophil # : x  Auto Lymphocyte # : x  Auto Monocyte # : x  Auto Eosinophil # : x  Auto Basophil # : x  Auto Neutrophil % : x  Auto Lymphocyte % : x  Auto Monocyte % : x  Auto Eosinophil % : x  Auto Basophil % : x                          10.7   0.4   )-----------( 156      ( 06 Mar 2019 06:25 )             29.5     03-06    140  |  104  |  8   ----------------------------<  87  4.2   |  20<L>  |  0.66    Ca    9.7      06 Mar 2019 06:25  Phos  4.4     03-06  Mg     1.7     -    TPro  6.5  /  Alb  3.9  /  TBili  0.3  /  DBili  <0.1  /  AST  17  /  ALT  13  /  AlkPhos  47  03-06    PT/INR - ( 06 Mar 2019 06:26 )   PT: 11.7 sec;   INR: 1.02 ratio         PTT - ( 06 Mar 2019 06:26 )  PTT:30.6 sec  LIVER FUNCTIONS - ( 06 Mar 2019 06:25 )  Alb: 3.9 g/dL / Pro: 6.5 g/dL / ALK PHOS: 47 U/L / ALT: 13 U/L / AST: 17 U/L / GGT: x                03- @ 09:34  Tacrolimus 5.0                Cyclosporine --      Meds:   Antimicrobials:   acyclovir   Oral Tab/Cap 400 milliGRAM(s) Oral three times a day  atovaquone Suspension 750 milliGRAM(s) Oral every 12 hours  ciprofloxacin     Tablet 500 milliGRAM(s) Oral every 12 hours  clotrimazole Lozenge 1 Lozenge Oral five times a day  voriconazole 200 milliGRAM(s) Oral every 12 hours      Heme / Onc:       GI:  docusate sodium 100 milliGRAM(s) Oral three times a day  pantoprazole    Tablet 40 milliGRAM(s) Oral before breakfast  senna 2 Tablet(s) Oral at bedtime  sodium bicarbonate Mouth Rinse 10 milliLiter(s) Swish and Spit five times a day  ursodiol Capsule 300 milliGRAM(s) Oral two times a day with meals      Cardiovascular:   amLODIPine   Tablet 5 milliGRAM(s) Oral daily      Immunologic:   epoetin bhupendra Injectable 55896 Unit(s) SubCutaneous <User Schedule>  immune   globulin 10% (GAMMAGARD) IVPB 20 Gram(s) IV Intermittent <User Schedule>  tacrolimus 1 milliGRAM(s) Oral every 12 hours      Other medications:   acetaminophen   Tablet .. 650 milliGRAM(s) Oral every 6 hours  acetaminophen   Tablet .. 650 milliGRAM(s) Oral <User Schedule>  ascorbic acid 250 milliGRAM(s) Oral daily  Biotene Dry Mouth Oral Rinse 5 milliLiter(s) Swish and Spit five times a day  chlorhexidine 4% Liquid 1 Application(s) Topical <User Schedule>  cholecalciferol 400 Unit(s) Oral daily  cyanocobalamin 1000 MICROGram(s) Oral daily  diphenhydrAMINE   Injectable 25 milliGRAM(s) IV Push every 3 weeks  fentaNYL   Patch  12 MICROgram(s)/Hr 1 Patch Transdermal every 72 hours  ferrous    sulfate 325 milliGRAM(s) Oral three times a day  folic acid 1 milliGRAM(s) Oral daily  magnesium oxide 400 milliGRAM(s) Oral three times a day with meals  medroxyPROGESTERone 10 milliGRAM(s) Oral daily  montelukast 10 milliGRAM(s) Oral daily  multivitamin 1 Tablet(s) Oral daily  phytonadione   Solution 10 milliGRAM(s) Oral every other day  sodium chloride 0.9%. 1000 milliLiter(s) IV Continuous <Continuous>      PRN:   acetaminophen   Tablet .. 650 milliGRAM(s) Oral every 6 hours PRN  artificial  tears Solution 1 Drop(s) Both EYES three times a day PRN  benzocaine 15 mG/menthol 3.6 mG Lozenge 1 Lozenge Oral every 3 hours PRN  diphenhydrAMINE   Injectable 25 milliGRAM(s) IV Push every 4 hours PRN  FIRST- Mouthwash  BLM 5 milliLiter(s) Swish and Spit every 8 hours PRN  HYDROmorphone  Injectable 0.5 milliGRAM(s) IV Push every 4 hours PRN  HYDROmorphone  Injectable 1 milliGRAM(s) IV Push every 3 hours PRN  metoclopramide Injectable 10 milliGRAM(s) IV Push every 6 hours PRN  sodium chloride 0.65% Nasal 1 Spray(s) Both Nostrils five times a day PRN  sodium chloride 0.9% lock flush 10 milliLiter(s) IV Push every 1 hour PRN      A/P:     33 year old female with a history of AML  Post  :  Allogeneic PBSCT day + 8  Neutropenic - started Cipro on ; if T >/= 38C pan cx, CXR and change Cipro to Cefepime.   Labs every other day ; continue vitamin C, vitamin B, folic acid, vitamin K & iron supplementation. Increased vitamin K to every other day as per standard guidelines for bloodless transplant.   Patient is neutropenic.  If spikes switch Cipro to Cefepime.  BP- 140s, started on Norvasc.  Hold for SBP <120  Mucositis (4-5/10 throat pain) likely related to MTX, continue supportive care     1. Infectious Disease:   acyclovir   Oral Tab/Cap 400 milliGRAM(s) Oral three times a day  atovaquone Suspension 750 milliGRAM(s) Oral every 12 hours  ciprofloxacin     Tablet 500 milliGRAM(s) Oral every 12 hours  clotrimazole Lozenge 1 Lozenge Oral five times a day  voriconazole 200 milliGRAM(s) Oral every 12 hours    2. VOD Prophylaxis: Actigall, Glutamine, Heparin (dosed at 100 units / kg / day)     3. GI Prophylaxis:    pantoprazole    Tablet 40 milliGRAM(s) Oral before breakfast    4. Mouthcare - NS / NaHCO3 rinses, Mycelex, Biotene; Skin care     5. GVHD prophylaxis -   tacrolimus 1 milliGRAM(s) Oral every 12 hours  MTX days 1,3,6,11    6. Transfuse & replete electrolytes prn - does not accept transfusion   magnesium oxide 400 milliGRAM(s) Oral three times a day with meals  phytonadione   Solution 10 milliGRAM(s) Oral every other day  ferrous    sulfate 325 milliGRAM(s) Oral three times a day  ascorbic acid 250 milliGRAM(s) Oral daily    7. IV hydration, daily weights, strict I&O, prn diuresis     8. PO intake as tolerated, nutrition follow up as needed, MVI, folic acid     9. Antiemetics, anti-diarrhea medications:   metoclopramide Injectable 10 milliGRAM(s) IV Push every 6 hours PRN    10. OOB as tolerated, physical therapy consult if needed     11. Monitor coags / fibrinogen 2x week, vitamin K as needed     12. Monitor closely for clinical changes, monitor for fevers     13. Emotional support provided, plan of care discussed and questions addressed     14. Patient education done regarding  plan of care, restrictions and discharge planning     I have written the above note for Dr. Larsen who performed service with me in the room.   Maru Del Rio  NP-C ([phone])    I have seen and examined patient with NP, I agree with above note as scribed. [facility] Team                                                      Critical / Counseling Time Provided: 30 minutes                                                                                                                                                        Chief Complaint: Allogeneic peripheral blood stem cell transplant from [facility] (sister 10/10)     S: Patient seen and examined with [facility] Transplant Team:   + menstrual flow decreased from yesterday.  + throat pain with swallowing (6-7/10)   loose bowel movements resolved.    Denies dyspnea, cough, nausea, vomiting, diarrhea, abdominal pain.    O: Vitals:   Vital Signs Last 24 Hrs  T(C): 37 (07 Mar 2019 06:10), Max: 37.3 (07 Mar 2019 02:26)  T(F): 98.6 (07 Mar 2019 06:10), Max: 99.1 (07 Mar 2019 02:26)  HR: 98 (07 Mar 2019 06:10) (82 - 98)  BP: 120/78 (07 Mar 2019 06:10) (110/72 - 125/85)  BP(mean): --  RR: 18 (07 Mar 2019 06:10) (18 - 18)  SpO2: 100% (07 Mar 2019 06:10) (100% - 100%)    Admit weight: 88.2kg  Daily Weight in k (06 Mar 2019 10:17)  Today's weight: 87.2kg     Intake / Output:   03-06 @ 07:01  -  03-07 @ 07:00  --------------------------------------------------------  IN: 1786 mL / OUT: 1625 mL / NET: 161 mL    PE:   Oropharynx: no erythema or ulcerations  Oral Mucositis:              +                                         Grade: 1-2(throat)  CVS: S1, S2 RRR   Lungs: CTA throughout bilaterally   Abdomen:+ BS x 4, soft, NT, ND   Extremities: no edema  in BLE  Gastric Mucositis:       -                                           Grade: n/a  Intestinal Mucositis:     -                                         Grade: n/a  Skin: no rash   TLC: PICC line in right arm, c/d/i  Neuro: A&O x 3   Pain: (6-7/10 throat)       Labs:   CBC Full  -  ( 06 Mar 2019 06:25 )  WBC Count : 0.4 K/uL  Hemoglobin : 10.7 g/dL  Hematocrit : 29.5 %  Platelet Count - Automated : 156 K/uL  Mean Cell Volume : 90.7 fl  Mean Cell Hemoglobin : 32.9 pg  Mean Cell Hemoglobin Concentration : 36.3 gm/dL  Auto Neutrophil # : x  Auto Lymphocyte # : x  Auto Monocyte # : x  Auto Eosinophil # : x  Auto Basophil # : x  Auto Neutrophil % : x  Auto Lymphocyte % : x  Auto Monocyte % : x  Auto Eosinophil % : x  Auto Basophil % : x                          10.7   0.4   )-----------( 156      ( 06 Mar 2019 06:25 )             29.5     03-06    140  |  104  |  8   ----------------------------<  87  4.2   |  20<L>  |  0.66    Ca    9.7      06 Mar 2019 06:25  Phos  4.4     03-06  Mg     1.7     -    TPro  6.5  /  Alb  3.9  /  TBili  0.3  /  DBili  <0.1  /  AST  17  /  ALT  13  /  AlkPhos  47  03-06    PT/INR - ( 06 Mar 2019 06:26 )   PT: 11.7 sec;   INR: 1.02 ratio         PTT - ( 06 Mar 2019 06:26 )  PTT:30.6 sec  LIVER FUNCTIONS - ( 06 Mar 2019 06:25 )  Alb: 3.9 g/dL / Pro: 6.5 g/dL / ALK PHOS: 47 U/L / ALT: 13 U/L / AST: 17 U/L / GGT: x                03-06 @ 09:34  Tacrolimus 5.0                Cyclosporine --      Meds:   Antimicrobials:   acyclovir   Oral Tab/Cap 400 milliGRAM(s) Oral three times a day  atovaquone Suspension 750 milliGRAM(s) Oral every 12 hours  ciprofloxacin     Tablet 500 milliGRAM(s) Oral every 12 hours  clotrimazole Lozenge 1 Lozenge Oral five times a day  voriconazole 200 milliGRAM(s) Oral every 12 hours      Heme / Onc:       GI:  docusate sodium 100 milliGRAM(s) Oral three times a day  pantoprazole    Tablet 40 milliGRAM(s) Oral before breakfast  senna 2 Tablet(s) Oral at bedtime  sodium bicarbonate Mouth Rinse 10 milliLiter(s) Swish and Spit five times a day  ursodiol Capsule 300 milliGRAM(s) Oral two times a day with meals      Cardiovascular:   amLODIPine   Tablet 5 milliGRAM(s) Oral daily      Immunologic:   epoetin bhupendra Injectable 95768 Unit(s) SubCutaneous <User Schedule>  immune   globulin 10% (GAMMAGARD) IVPB 20 Gram(s) IV Intermittent <User Schedule>  tacrolimus 1 milliGRAM(s) Oral every 12 hours      Other medications:   acetaminophen   Tablet .. 650 milliGRAM(s) Oral every 6 hours  acetaminophen   Tablet .. 650 milliGRAM(s) Oral <User Schedule>  ascorbic acid 250 milliGRAM(s) Oral daily  Biotene Dry Mouth Oral Rinse 5 milliLiter(s) Swish and Spit five times a day  chlorhexidine 4% Liquid 1 Application(s) Topical <User Schedule>  cholecalciferol 400 Unit(s) Oral daily  cyanocobalamin 1000 MICROGram(s) Oral daily  diphenhydrAMINE   Injectable 25 milliGRAM(s) IV Push every 3 weeks  fentaNYL   Patch  12 MICROgram(s)/Hr 1 Patch Transdermal every 72 hours  ferrous    sulfate 325 milliGRAM(s) Oral three times a day  folic acid 1 milliGRAM(s) Oral daily  magnesium oxide 400 milliGRAM(s) Oral three times a day with meals  medroxyPROGESTERone 10 milliGRAM(s) Oral daily  montelukast 10 milliGRAM(s) Oral daily  multivitamin 1 Tablet(s) Oral daily  phytonadione   Solution 10 milliGRAM(s) Oral every other day  sodium chloride 0.9%. 1000 milliLiter(s) IV Continuous <Continuous>      PRN:   acetaminophen   Tablet .. 650 milliGRAM(s) Oral every 6 hours PRN  artificial  tears Solution 1 Drop(s) Both EYES three times a day PRN  benzocaine 15 mG/menthol 3.6 mG Lozenge 1 Lozenge Oral every 3 hours PRN  diphenhydrAMINE   Injectable 25 milliGRAM(s) IV Push every 4 hours PRN  FIRST- Mouthwash  BLM 5 milliLiter(s) Swish and Spit every 8 hours PRN  HYDROmorphone  Injectable 0.5 milliGRAM(s) IV Push every 4 hours PRN  HYDROmorphone  Injectable 1 milliGRAM(s) IV Push every 3 hours PRN  metoclopramide Injectable 10 milliGRAM(s) IV Push every 6 hours PRN  sodium chloride 0.65% Nasal 1 Spray(s) Both Nostrils five times a day PRN  sodium chloride 0.9% lock flush 10 milliLiter(s) IV Push every 1 hour PRN      A/P:     33 year old female with a history of AML  Post  :  Allogeneic PBSCT day + 8  Neutropenic - started Cipro on ; if T >/= 38C pan cx, CXR and change Cipro to Cefepime.   Labs every other day ; continue vitamin C, vitamin B, folic acid, vitamin K & iron supplementation. Increased vitamin K to every other day as per standard guidelines for bloodless transplant.   Patient is neutropenic.  If spikes switch Cipro to Cefepime.  BP- 140s, started on Norvasc.  Hold for SBP <120  Mucositis (-7/10 throat pain) likely related to MTX, continue supportive care     1. Infectious Disease:   acyclovir   Oral Tab/Cap 400 milliGRAM(s) Oral three times a day  atovaquone Suspension 750 milliGRAM(s) Oral every 12 hours  ciprofloxacin     Tablet 500 milliGRAM(s) Oral every 12 hours  clotrimazole Lozenge 1 Lozenge Oral five times a day  voriconazole 200 milliGRAM(s) Oral every 12 hours    2. VOD Prophylaxis: Actigall, Glutamine.    3. GI Prophylaxis:    pantoprazole    Tablet 40 milliGRAM(s) Oral before breakfast    4. Mouthcare - NS / NaHCO3 rinses, Mycelex, Biotene; Skin care     5. GVHD prophylaxis -   tacrolimus 1 milliGRAM(s) Oral every 12 hours  MTX days 1,3,6,11    6. Transfuse & replete electrolytes prn - does not accept transfusion   magnesium oxide 400 milliGRAM(s) Oral three times a day with meals  phytonadione   Solution 10 milliGRAM(s) Oral every other day  ferrous    sulfate 325 milliGRAM(s) Oral three times a day  ascorbic acid 250 milliGRAM(s) Oral daily    7. IV hydration, daily weights, strict I&O, prn diuresis     8. PO intake as tolerated, nutrition follow up as needed, MVI, folic acid     9. Antiemetics, anti-diarrhea medications:   metoclopramide Injectable 10 milliGRAM(s) IV Push every 6 hours PRN    10. OOB as tolerated, physical therapy consult if needed     11. Monitor coags / fibrinogen 2x week, vitamin K as needed     12. Monitor closely for clinical changes, monitor for fevers     13. Emotional support provided, plan of care discussed and questions addressed     14. Patient education done regarding  plan of care, restrictions and discharge planning     I have written the above note for Dr. Larsen who performed service with me in the room.   Maru Del Rio  NP-C ([phone])    I have seen and examined patient with NP, I agree with above note as scribed. [facility] Transplant Team                                                      Critical / Counseling Time Provided: 30 minutes                                                                                                                                                        Chief Complaint: Allogeneic peripheral blood stem cell transplant from [facility] (sister 10/10)     S: Patient seen and examined with [facility] Transplant Team:   + menstrual flow decreased from yesterday.  + throat pain with swallowing (6-7/10)   loose bowel movements resolved.    Denies dyspnea, cough, nausea, vomiting, diarrhea, abdominal pain.    O: Vitals:   Vital Signs Last 24 Hrs  T(C): 37 (07 Mar 2019 06:10), Max: 37.3 (07 Mar 2019 02:26)  T(F): 98.6 (07 Mar 2019 06:10), Max: 99.1 (07 Mar 2019 02:26)  HR: 98 (07 Mar 2019 06:10) (82 - 98)  BP: 120/78 (07 Mar 2019 06:10) (110/72 - 125/85)  BP(mean): --  RR: 18 (07 Mar 2019 06:10) (18 - 18)  SpO2: 100% (07 Mar 2019 06:10) (100% - 100%)    Admit weight: 88.2kg  Daily Weight in k (06 Mar 2019 10:17)  Today's weight: 87.2kg     Intake / Output:   03-06 @ 07:01  -  03-07 @ 07:00  --------------------------------------------------------  IN: 1786 mL / OUT: 1625 mL / NET: 161 mL    PE:   Oropharynx: no erythema or ulcerations  Oral Mucositis:              +                                         Grade: 1-2(throat)  CVS: S1, S2 RRR   Lungs: CTA throughout bilaterally   Abdomen:+ BS x 4, soft, NT, ND   Extremities: no edema  in BLE  Gastric Mucositis:       +                                          rdGrdrrdarddrderd:rd rd3rd Intestinal Mucositis:     -                                          Grade: n/a  Skin: no rash   TLC: PICC line in right arm, c/d/i  Neuro: A&O x 3   Pain: (6-7/10 pain with swallowing)      Labs:   CBC Full  -  ( 06 Mar 2019 06:25 )  WBC Count : 0.4 K/uL  Hemoglobin : 10.7 g/dL  Hematocrit : 29.5 %  Platelet Count - Automated : 156 K/uL  Mean Cell Volume : 90.7 fl  Mean Cell Hemoglobin : 32.9 pg  Mean Cell Hemoglobin Concentration : 36.3 gm/dL  Auto Neutrophil # : x  Auto Lymphocyte # : x  Auto Monocyte # : x  Auto Eosinophil # : x  Auto Basophil # : x  Auto Neutrophil % : x  Auto Lymphocyte % : x  Auto Monocyte % : x  Auto Eosinophil % : x  Auto Basophil % : x                          10.7   0.4   )-----------( 156      ( 06 Mar 2019 06:25 )             29.5     03-06    140  |  104  |  8   ----------------------------<  87  4.2   |  20<L>  |  0.66    Ca    9.7      06 Mar 2019 06:25  Phos  4.4     03-06  Mg     1.7     -    TPro  6.5  /  Alb  3.9  /  TBili  0.3  /  DBili  <0.1  /  AST  17  /  ALT  13  /  AlkPhos  47  03-06    PT/INR - ( 06 Mar 2019 06:26 )   PT: 11.7 sec;   INR: 1.02 ratio         PTT - ( 06 Mar 2019 06:26 )  PTT:30.6 sec  LIVER FUNCTIONS - ( 06 Mar 2019 06:25 )  Alb: 3.9 g/dL / Pro: 6.5 g/dL / ALK PHOS: 47 U/L / ALT: 13 U/L / AST: 17 U/L / GGT: x                03-06 @ 09:34  Tacrolimus 5.0                Cyclosporine --      Meds:   Antimicrobials:   acyclovir   Oral Tab/Cap 400 milliGRAM(s) Oral three times a day  atovaquone Suspension 750 milliGRAM(s) Oral every 12 hours  ciprofloxacin     Tablet 500 milliGRAM(s) Oral every 12 hours  clotrimazole Lozenge 1 Lozenge Oral five times a day  voriconazole 200 milliGRAM(s) Oral every 12 hours      Heme / Onc:       GI:  docusate sodium 100 milliGRAM(s) Oral three times a day  pantoprazole    Tablet 40 milliGRAM(s) Oral before breakfast  senna 2 Tablet(s) Oral at bedtime  sodium bicarbonate Mouth Rinse 10 milliLiter(s) Swish and Spit five times a day  ursodiol Capsule 300 milliGRAM(s) Oral two times a day with meals      Cardiovascular:   amLODIPine   Tablet 5 milliGRAM(s) Oral daily      Immunologic:   epoetin bhupendra Injectable 32029 Unit(s) SubCutaneous <User Schedule>  immune   globulin 10% (GAMMAGARD) IVPB 20 Gram(s) IV Intermittent <User Schedule>  tacrolimus 1 milliGRAM(s) Oral every 12 hours      Other medications:   acetaminophen   Tablet .. 650 milliGRAM(s) Oral every 6 hours  acetaminophen   Tablet .. 650 milliGRAM(s) Oral <User Schedule>  ascorbic acid 250 milliGRAM(s) Oral daily  Biotene Dry Mouth Oral Rinse 5 milliLiter(s) Swish and Spit five times a day  chlorhexidine 4% Liquid 1 Application(s) Topical <User Schedule>  cholecalciferol 400 Unit(s) Oral daily  cyanocobalamin 1000 MICROGram(s) Oral daily  diphenhydrAMINE   Injectable 25 milliGRAM(s) IV Push every 3 weeks  fentaNYL   Patch  12 MICROgram(s)/Hr 1 Patch Transdermal every 72 hours  ferrous    sulfate 325 milliGRAM(s) Oral three times a day  folic acid 1 milliGRAM(s) Oral daily  magnesium oxide 400 milliGRAM(s) Oral three times a day with meals  medroxyPROGESTERone 10 milliGRAM(s) Oral daily  montelukast 10 milliGRAM(s) Oral daily  multivitamin 1 Tablet(s) Oral daily  phytonadione   Solution 10 milliGRAM(s) Oral every other day  sodium chloride 0.9%. 1000 milliLiter(s) IV Continuous <Continuous>      PRN:   acetaminophen   Tablet .. 650 milliGRAM(s) Oral every 6 hours PRN  artificial  tears Solution 1 Drop(s) Both EYES three times a day PRN  benzocaine 15 mG/menthol 3.6 mG Lozenge 1 Lozenge Oral every 3 hours PRN  diphenhydrAMINE   Injectable 25 milliGRAM(s) IV Push every 4 hours PRN  FIRST- Mouthwash  BLM 5 milliLiter(s) Swish and Spit every 8 hours PRN  HYDROmorphone  Injectable 0.5 milliGRAM(s) IV Push every 4 hours PRN  HYDROmorphone  Injectable 1 milliGRAM(s) IV Push every 3 hours PRN  metoclopramide Injectable 10 milliGRAM(s) IV Push every 6 hours PRN  sodium chloride 0.65% Nasal 1 Spray(s) Both Nostrils five times a day PRN  sodium chloride 0.9% lock flush 10 milliLiter(s) IV Push every 1 hour PRN      A/P:     33 year old female with a history of AML  Post  :  Allogeneic PBSCT day + 8  Neutropenic - started Cipro on ; if T >/= 38C pan cx, CXR and change Cipro to Cefepime.   Labs every other day ; continue vitamin C, vitamin B, folic acid, vitamin K & iron supplementation. Increased vitamin K to every other day as per standard guidelines for bloodless transplant.   Patient is neutropenic.  If spikes switch Cipro to Cefepime.  BP- 140s, started on Norvasc.  Hold for SBP <120  Mucositis (-7/10 throat pain with swallowing) likely related to MTX, continue supportive care- change meds to IV if possible, trial of Carafate slurry.    1. Infectious Disease:   acyclovir   Oral Tab/Cap 400 milliGRAM(s) Oral three times a day  atovaquone Suspension 750 milliGRAM(s) Oral every 12 hours  ciprofloxacin     Tablet 500 milliGRAM(s) Oral every 12 hours  clotrimazole Lozenge 1 Lozenge Oral five times a day  voriconazole 200 milliGRAM(s) Oral every 12 hours    2. VOD Prophylaxis: Actigall, Glutamine.    3. GI Prophylaxis:    pantoprazole    Tablet 40 milliGRAM(s) Oral before breakfast    4. Mouthcare - NS / NaHCO3 rinses, Mycelex, Biotene; Skin care     5. GVHD prophylaxis -   tacrolimus 1 milliGRAM(s) Oral every 12 hours  MTX days 1,3,6,11    6. Transfuse & replete electrolytes prn - does not accept transfusion   magnesium oxide 400 milliGRAM(s) Oral three times a day with meals  phytonadione   Solution 10 milliGRAM(s) Oral every other day  ferrous    sulfate 325 milliGRAM(s) Oral three times a day  ascorbic acid 250 milliGRAM(s) Oral daily    7. IV hydration, daily weights, strict I&O, prn diuresis     8. PO intake as tolerated, nutrition follow up as needed, MVI, folic acid     9. Antiemetics, anti-diarrhea medications:   metoclopramide Injectable 10 milliGRAM(s) IV Push every 6 hours PRN    10. OOB as tolerated, physical therapy consult if needed     11. Monitor coags / fibrinogen 2x week, vitamin K as needed     12. Monitor closely for clinical changes, monitor for fevers     13. Emotional support provided, plan of care discussed and questions addressed     14. Patient education done regarding  plan of care, restrictions and discharge planning     I have written the above note for Dr. Larsen who performed service with me in the room.   Maru Del Rio  NP-C ([phone])    I have seen and examined patient with NP, I agree with above note as scribed.

## 2019-03-07 NOTE — PROGRESS NOTE ADULT - PROBLEM SELECTOR PLAN 1
- would not uptitrate fentanyl patch in < 72 hours given time to reach steady state; 25mcg uptitrated too early, will switch back to 12mcg and plan to uptitrate over next 1-2 days (patch started 3/6 at 12mcg)  - c/w dilaudid: 0.5mg IV Q2 PRN moderate pain, 1mg IV Q2 PRN severe pain  - monitor BMs

## 2019-03-08 LAB
ALBUMIN SERPL ELPH-MCNC: 3.8 G/DL — SIGNIFICANT CHANGE UP (ref 3.3–5)
ALP SERPL-CCNC: 59 U/L — SIGNIFICANT CHANGE UP (ref 40–120)
ALT FLD-CCNC: 17 U/L — SIGNIFICANT CHANGE UP (ref 10–45)
ANION GAP SERPL CALC-SCNC: 14 MMOL/L — SIGNIFICANT CHANGE UP (ref 5–17)
APTT BLD: 28.2 SEC — SIGNIFICANT CHANGE UP (ref 27.5–36.3)
AST SERPL-CCNC: 16 U/L — SIGNIFICANT CHANGE UP (ref 10–40)
BILIRUB SERPL-MCNC: 0.2 MG/DL — SIGNIFICANT CHANGE UP (ref 0.2–1.2)
BUN SERPL-MCNC: 6 MG/DL — LOW (ref 7–23)
CALCIUM SERPL-MCNC: 9.7 MG/DL — SIGNIFICANT CHANGE UP (ref 8.4–10.5)
CHLORIDE SERPL-SCNC: 104 MMOL/L — SIGNIFICANT CHANGE UP (ref 96–108)
CO2 SERPL-SCNC: 22 MMOL/L — SIGNIFICANT CHANGE UP (ref 22–31)
CREAT SERPL-MCNC: 0.68 MG/DL — SIGNIFICANT CHANGE UP (ref 0.5–1.3)
ENGRAFTMENT PRE: SIGNIFICANT CHANGE UP
ENGRAFTMENT PRE: SIGNIFICANT CHANGE UP
FIBRINOGEN PPP-MCNC: 671 MG/DL — HIGH (ref 350–510)
GLUCOSE SERPL-MCNC: 87 MG/DL — SIGNIFICANT CHANGE UP (ref 70–99)
HCT VFR BLD CALC: 22.9 % — LOW (ref 34.5–45)
HGB BLD-MCNC: 8.5 G/DL — LOW (ref 11.5–15.5)
INR BLD: 1.03 RATIO — SIGNIFICANT CHANGE UP (ref 0.88–1.16)
LDH SERPL L TO P-CCNC: 174 U/L — SIGNIFICANT CHANGE UP (ref 50–242)
MAGNESIUM SERPL-MCNC: 1.8 MG/DL — SIGNIFICANT CHANGE UP (ref 1.6–2.6)
MCHC RBC-ENTMCNC: 33.1 PG — SIGNIFICANT CHANGE UP (ref 27–34)
MCHC RBC-ENTMCNC: 37.1 GM/DL — HIGH (ref 32–36)
MCV RBC AUTO: 89.2 FL — SIGNIFICANT CHANGE UP (ref 80–100)
PHOSPHATE SERPL-MCNC: 4.1 MG/DL — SIGNIFICANT CHANGE UP (ref 2.5–4.5)
PLATELET # BLD AUTO: 107 K/UL — LOW (ref 150–400)
POTASSIUM SERPL-MCNC: 4 MMOL/L — SIGNIFICANT CHANGE UP (ref 3.5–5.3)
POTASSIUM SERPL-SCNC: 4 MMOL/L — SIGNIFICANT CHANGE UP (ref 3.5–5.3)
PROT SERPL-MCNC: 6.8 G/DL — SIGNIFICANT CHANGE UP (ref 6–8.3)
PROTHROM AB SERPL-ACNC: 11.8 SEC — SIGNIFICANT CHANGE UP (ref 10–12.9)
RBC # BLD: 2.56 M/UL — LOW (ref 3.8–5.2)
RBC # FLD: 13.9 % — SIGNIFICANT CHANGE UP (ref 10.3–14.5)
SODIUM SERPL-SCNC: 140 MMOL/L — SIGNIFICANT CHANGE UP (ref 135–145)
TACROLIMUS SERPL-MCNC: 5.8 NG/ML — SIGNIFICANT CHANGE UP
WBC # BLD: 0.5 K/UL — CRITICAL LOW (ref 3.8–10.5)
WBC # FLD AUTO: 0.5 K/UL — CRITICAL LOW (ref 3.8–10.5)

## 2019-03-08 PROCEDURE — 99291 CRITICAL CARE FIRST HOUR: CPT

## 2019-03-08 PROCEDURE — 99233 SBSQ HOSP IP/OBS HIGH 50: CPT

## 2019-03-08 RX ORDER — ERYTHROPOIETIN 10000 [IU]/ML
20000 INJECTION, SOLUTION INTRAVENOUS; SUBCUTANEOUS
Qty: 0 | Refills: 0 | Status: DISCONTINUED | OUTPATIENT
Start: 2019-03-08 | End: 2019-03-12

## 2019-03-08 RX ORDER — VORICONAZOLE 10 MG/ML
200 INJECTION, POWDER, LYOPHILIZED, FOR SOLUTION INTRAVENOUS EVERY 12 HOURS
Qty: 0 | Refills: 0 | Status: DISCONTINUED | OUTPATIENT
Start: 2019-03-08 | End: 2019-03-12

## 2019-03-08 RX ORDER — IRON SUCROSE 20 MG/ML
100 INJECTION, SOLUTION INTRAVENOUS
Qty: 0 | Refills: 0 | Status: DISCONTINUED | OUTPATIENT
Start: 2019-03-08 | End: 2019-03-14

## 2019-03-08 RX ORDER — CIPROFLOXACIN LACTATE 400MG/40ML
400 VIAL (ML) INTRAVENOUS EVERY 12 HOURS
Qty: 0 | Refills: 0 | Status: DISCONTINUED | OUTPATIENT
Start: 2019-03-08 | End: 2019-03-14

## 2019-03-08 RX ORDER — MEDROXYPROGESTERONE ACETATE 150 MG/ML
20 INJECTION, SUSPENSION, EXTENDED RELEASE INTRAMUSCULAR DAILY
Qty: 0 | Refills: 0 | Status: DISCONTINUED | OUTPATIENT
Start: 2019-03-08 | End: 2019-03-18

## 2019-03-08 RX ADMIN — Medication 1 GRAM(S): at 17:19

## 2019-03-08 RX ADMIN — MAGNESIUM OXIDE 400 MG ORAL TABLET 400 MILLIGRAM(S): 241.3 TABLET ORAL at 17:19

## 2019-03-08 RX ADMIN — HYDROMORPHONE HYDROCHLORIDE 1 MILLIGRAM(S): 2 INJECTION INTRAMUSCULAR; INTRAVENOUS; SUBCUTANEOUS at 03:17

## 2019-03-08 RX ADMIN — MAGNESIUM OXIDE 400 MG ORAL TABLET 400 MILLIGRAM(S): 241.3 TABLET ORAL at 12:18

## 2019-03-08 RX ADMIN — HYDROMORPHONE HYDROCHLORIDE 1 MILLIGRAM(S): 2 INJECTION INTRAMUSCULAR; INTRAVENOUS; SUBCUTANEOUS at 08:45

## 2019-03-08 RX ADMIN — IRON SUCROSE 210 MILLIGRAM(S): 20 INJECTION, SOLUTION INTRAVENOUS at 11:58

## 2019-03-08 RX ADMIN — Medication 400 MILLIGRAM(S): at 21:15

## 2019-03-08 RX ADMIN — Medication 5 MILLILITER(S): at 12:18

## 2019-03-08 RX ADMIN — Medication 5 MILLILITER(S): at 23:31

## 2019-03-08 RX ADMIN — Medication 100 MILLIGRAM(S): at 06:05

## 2019-03-08 RX ADMIN — URSODIOL 300 MILLIGRAM(S): 250 TABLET, FILM COATED ORAL at 17:19

## 2019-03-08 RX ADMIN — PREGABALIN 1000 MICROGRAM(S): 225 CAPSULE ORAL at 12:18

## 2019-03-08 RX ADMIN — Medication 10 MILLILITER(S): at 16:07

## 2019-03-08 RX ADMIN — Medication 10 MILLIGRAM(S): at 08:45

## 2019-03-08 RX ADMIN — Medication 10 MILLIGRAM(S): at 21:40

## 2019-03-08 RX ADMIN — Medication 10 MILLILITER(S): at 08:59

## 2019-03-08 RX ADMIN — VORICONAZOLE 200 MILLIGRAM(S): 10 INJECTION, POWDER, LYOPHILIZED, FOR SOLUTION INTRAVENOUS at 06:06

## 2019-03-08 RX ADMIN — Medication 1 LOZENGE: at 23:31

## 2019-03-08 RX ADMIN — MONTELUKAST 10 MILLIGRAM(S): 4 TABLET, CHEWABLE ORAL at 12:18

## 2019-03-08 RX ADMIN — ERYTHROPOIETIN 20000 UNIT(S): 10000 INJECTION, SOLUTION INTRAVENOUS; SUBCUTANEOUS at 14:23

## 2019-03-08 RX ADMIN — Medication 1 TABLET(S): at 12:18

## 2019-03-08 RX ADMIN — MAGNESIUM OXIDE 400 MG ORAL TABLET 400 MILLIGRAM(S): 241.3 TABLET ORAL at 08:59

## 2019-03-08 RX ADMIN — Medication 200 MILLIGRAM(S): at 17:18

## 2019-03-08 RX ADMIN — Medication 10 MILLILITER(S): at 23:31

## 2019-03-08 RX ADMIN — VORICONAZOLE 125 MILLIGRAM(S): 10 INJECTION, POWDER, LYOPHILIZED, FOR SOLUTION INTRAVENOUS at 17:19

## 2019-03-08 RX ADMIN — Medication 100 MILLIGRAM(S): at 21:14

## 2019-03-08 RX ADMIN — Medication 1 LOZENGE: at 00:56

## 2019-03-08 RX ADMIN — HYDROMORPHONE HYDROCHLORIDE 1 MILLIGRAM(S): 2 INJECTION INTRAMUSCULAR; INTRAVENOUS; SUBCUTANEOUS at 22:05

## 2019-03-08 RX ADMIN — Medication 1 LOZENGE: at 16:07

## 2019-03-08 RX ADMIN — CHLORHEXIDINE GLUCONATE 1 APPLICATION(S): 213 SOLUTION TOPICAL at 06:04

## 2019-03-08 RX ADMIN — Medication 400 UNIT(S): at 12:17

## 2019-03-08 RX ADMIN — Medication 1 LOZENGE: at 12:19

## 2019-03-08 RX ADMIN — FENTANYL CITRATE 1 PATCH: 50 INJECTION INTRAVENOUS at 19:38

## 2019-03-08 RX ADMIN — Medication 1 GRAM(S): at 23:31

## 2019-03-08 RX ADMIN — TACROLIMUS 1 MILLIGRAM(S): 5 CAPSULE ORAL at 17:19

## 2019-03-08 RX ADMIN — Medication 1 LOZENGE: at 08:59

## 2019-03-08 RX ADMIN — ATOVAQUONE 750 MILLIGRAM(S): 750 SUSPENSION ORAL at 06:03

## 2019-03-08 RX ADMIN — TACROLIMUS 1 MILLIGRAM(S): 5 CAPSULE ORAL at 06:06

## 2019-03-08 RX ADMIN — ATOVAQUONE 750 MILLIGRAM(S): 750 SUSPENSION ORAL at 17:19

## 2019-03-08 RX ADMIN — Medication 5 MILLILITER(S): at 16:07

## 2019-03-08 RX ADMIN — HYDROMORPHONE HYDROCHLORIDE 1 MILLIGRAM(S): 2 INJECTION INTRAMUSCULAR; INTRAVENOUS; SUBCUTANEOUS at 02:46

## 2019-03-08 RX ADMIN — HYDROMORPHONE HYDROCHLORIDE 1 MILLIGRAM(S): 2 INJECTION INTRAMUSCULAR; INTRAVENOUS; SUBCUTANEOUS at 16:10

## 2019-03-08 RX ADMIN — Medication 500 MILLIGRAM(S): at 06:05

## 2019-03-08 RX ADMIN — Medication 10 MILLILITER(S): at 00:56

## 2019-03-08 RX ADMIN — Medication 5 MILLILITER(S): at 19:57

## 2019-03-08 RX ADMIN — Medication 10 MILLILITER(S): at 19:57

## 2019-03-08 RX ADMIN — Medication 5 MILLILITER(S): at 08:59

## 2019-03-08 RX ADMIN — HYDROMORPHONE HYDROCHLORIDE 1 MILLIGRAM(S): 2 INJECTION INTRAMUSCULAR; INTRAVENOUS; SUBCUTANEOUS at 21:34

## 2019-03-08 RX ADMIN — Medication 10 MILLIGRAM(S): at 15:50

## 2019-03-08 RX ADMIN — SENNA PLUS 2 TABLET(S): 8.6 TABLET ORAL at 21:13

## 2019-03-08 RX ADMIN — Medication 1 LOZENGE: at 19:57

## 2019-03-08 RX ADMIN — Medication 400 MILLIGRAM(S): at 06:07

## 2019-03-08 RX ADMIN — Medication 100 MILLIGRAM(S): at 14:24

## 2019-03-08 RX ADMIN — HYDROMORPHONE HYDROCHLORIDE 1 MILLIGRAM(S): 2 INJECTION INTRAMUSCULAR; INTRAVENOUS; SUBCUTANEOUS at 15:50

## 2019-03-08 RX ADMIN — HYDROMORPHONE HYDROCHLORIDE 1 MILLIGRAM(S): 2 INJECTION INTRAMUSCULAR; INTRAVENOUS; SUBCUTANEOUS at 09:00

## 2019-03-08 RX ADMIN — MEDROXYPROGESTERONE ACETATE 20 MILLIGRAM(S): 150 INJECTION, SUSPENSION, EXTENDED RELEASE INTRAMUSCULAR at 12:18

## 2019-03-08 RX ADMIN — Medication 5 MILLILITER(S): at 00:56

## 2019-03-08 RX ADMIN — Medication 400 MILLIGRAM(S): at 14:22

## 2019-03-08 RX ADMIN — Medication 1 GRAM(S): at 12:17

## 2019-03-08 RX ADMIN — Medication 1 GRAM(S): at 00:56

## 2019-03-08 RX ADMIN — PANTOPRAZOLE SODIUM 40 MILLIGRAM(S): 20 TABLET, DELAYED RELEASE ORAL at 12:20

## 2019-03-08 RX ADMIN — Medication 250 MILLIGRAM(S): at 12:17

## 2019-03-08 RX ADMIN — Medication 1 GRAM(S): at 06:06

## 2019-03-08 RX ADMIN — URSODIOL 300 MILLIGRAM(S): 250 TABLET, FILM COATED ORAL at 08:59

## 2019-03-08 RX ADMIN — Medication 325 MILLIGRAM(S): at 06:06

## 2019-03-08 RX ADMIN — AMLODIPINE BESYLATE 5 MILLIGRAM(S): 2.5 TABLET ORAL at 06:03

## 2019-03-08 RX ADMIN — FENTANYL CITRATE 1 PATCH: 50 INJECTION INTRAVENOUS at 12:19

## 2019-03-08 RX ADMIN — Medication 10 MILLILITER(S): at 12:19

## 2019-03-08 RX ADMIN — Medication 1 MILLIGRAM(S): at 12:18

## 2019-03-08 NOTE — PROGRESS NOTE ADULT - SUBJECTIVE AND OBJECTIVE BOX
HPI: 33F with PMH of AML/MDS, Mandaen, here for allo PSCT. Diagnosed during bloodwork for a preop R ankle ligament repair, and managed with idarubicin c/b DIC, neutropenic fever, and retinal hemorrhage; had salvage therapy; further findings raised the question of MDS over AML. Sister is donor. Planned for SCT 2/27/19.    INTERVAL EVENTS: D#9 post-transplant, has some mouth pain on opioids, with current dilaudid and fentanyl 12mcg pain goes from 9/10 to 4/10    ADVANCE DIRECTIVES:    DNR [ ]  MOLST  [ ]    Living Will  [ ]   DECISION MAKER(s):  [ ] Health Care Proxy(s)  [ ] Surrogate(s)  [ ] Guardian           Name(s) and Contact(s):     BASELINE (I)ADL(s) (prior to admission):  Cross: [ ]Total  [ ] Moderate [ ]Dependent    Allergies    No Known Allergies    Intolerances    MEDICATIONS  (STANDING):  acetaminophen   Tablet .. 650 milliGRAM(s) Oral every 6 hours  acetaminophen   Tablet .. 650 milliGRAM(s) Oral <User Schedule>  acyclovir    Suspension 400 milliGRAM(s) Oral every 8 hours  amLODIPine   Tablet 5 milliGRAM(s) Oral daily  ascorbic acid 250 milliGRAM(s) Oral daily  atovaquone Suspension 750 milliGRAM(s) Oral every 12 hours  Biotene Dry Mouth Oral Rinse 5 milliLiter(s) Swish and Spit five times a day  chlorhexidine 4% Liquid 1 Application(s) Topical <User Schedule>  cholecalciferol 400 Unit(s) Oral daily  ciprofloxacin   IVPB 400 milliGRAM(s) IV Intermittent every 12 hours  clotrimazole Lozenge 1 Lozenge Oral five times a day  cyanocobalamin 1000 MICROGram(s) Oral daily  diphenhydrAMINE   Injectable 25 milliGRAM(s) IV Push every 3 weeks  docusate sodium 100 milliGRAM(s) Oral three times a day  epoetin bhupendra Injectable 77001 Unit(s) SubCutaneous <User Schedule>  fentaNYL   Patch  12 MICROgram(s)/Hr 1 Patch Transdermal every 72 hours  folic acid 1 milliGRAM(s) Oral daily  immune   globulin 10% (GAMMAGARD) IVPB 20 Gram(s) IV Intermittent <User Schedule>  iron sucrose IVPB 100 milliGRAM(s) IV Intermittent every 7 days  magnesium oxide 400 milliGRAM(s) Oral three times a day with meals  medroxyPROGESTERone 20 milliGRAM(s) Oral daily  montelukast 10 milliGRAM(s) Oral daily  multivitamin 1 Tablet(s) Oral daily  pantoprazole  Injectable 40 milliGRAM(s) IV Push daily  phytonadione   Solution 10 milliGRAM(s) Oral every other day  senna 2 Tablet(s) Oral at bedtime  sodium bicarbonate Mouth Rinse 10 milliLiter(s) Swish and Spit five times a day  sodium chloride 0.9%. 1000 milliLiter(s) (20 mL/Hr) IV Continuous <Continuous>  sucralfate suspension 1 Gram(s) Oral four times a day  tacrolimus 1 milliGRAM(s) Oral every 12 hours  ursodiol Capsule 300 milliGRAM(s) Oral two times a day with meals  voriconazole IVPB 200 milliGRAM(s) IV Intermittent every 12 hours    MEDICATIONS  (PRN):  acetaminophen   Tablet .. 650 milliGRAM(s) Oral every 6 hours PRN Temp greater or equal to 38C (100.4F), Mild Pain (1 - 3)  artificial  tears Solution 1 Drop(s) Both EYES three times a day PRN Dry Eyes  benzocaine 15 mG/menthol 3.6 mG Lozenge 1 Lozenge Oral every 3 hours PRN Sore Throat  diphenhydrAMINE   Injectable 25 milliGRAM(s) IV Push every 4 hours PRN Allergy symptoms  FIRST- Mouthwash  BLM 5 milliLiter(s) Swish and Spit every 8 hours PRN Mouth Care  HYDROmorphone  Injectable 0.5 milliGRAM(s) IV Push every 2 hours PRN Moderate Pain (4 - 6)  HYDROmorphone  Injectable 1 milliGRAM(s) IV Push every 2 hours PRN Severe Pain (7 - 10)  metoclopramide Injectable 10 milliGRAM(s) IV Push every 6 hours PRN Nausea and/or Vomiting  sodium chloride 0.65% Nasal 1 Spray(s) Both Nostrils five times a day PRN Nasal Congestion  sodium chloride 0.9% lock flush 10 milliLiter(s) IV Push every 1 hour PRN Pre/post blood products, medications, blood draw, and to maintain line patency      PRESENT SYMPTOMS:   Source if other than patient:  [ ]Family   [ ]Team     Pain (Impact on QOL):    Location -         Minimal acceptable level (0-10 scale):                    Aggravating factors -  Quality -  Radiation -  Severity (0-10 scale) -    Timing -    PAIN AD Score:     [email or web address] (press ctrl +  left click to view)    Dyspnea:                           [ ]Mild [ ]Moderate [ ]Severe  Anxiety:                             [ ]Mild [ ]Moderate [ ]Severe  Fatigue:                             [X ]Mild [ ]Moderate [ ]Severe  Nausea:                             [ ]Mild [ ]Moderate [ ]Severe  Loss of appetite:              [X ]Mild [ ]Moderate [ ]Severe  Constipation:                    [ ]Mild [ ]Moderate [ ]Severe    Other Symptoms:  [X ]All other review of systems negative   [ ]Unable to obtain due to poor mentation     Karnofsky Performance Score/Palliative Performance Status Version 2:         %    PHYSICAL EXAM:  Vital Signs Last 24 Hrs  T(C): 37.3 (08 Mar 2019 14:00), Max: 37.3 (08 Mar 2019 14:00)  T(F): 99.1 (08 Mar 2019 14:00), Max: 99.1 (08 Mar 2019 14:00)  HR: 92 (08 Mar 2019 14:00) (90 - 95)  BP: 115/79 (08 Mar 2019 14:00) (111/71 - 127/80)  BP(mean): --  RR: 17 (08 Mar 2019 14:00) (17 - 18)  SpO2: 99% (08 Mar 2019 14:00) (99% - 100%)    Limited exam for patient comfort  GENERAL:  [X ]Alert  [ ]Oriented x   [ ]Lethargic  [ ]Cachexia  [ ]Unarousable  [ ]Verbal  [ ]Non-Verbal    Behavioral:   [ ] Anxiety  [ ] Delirium [ ] Agitation [ ] Other    HEENT:  [X ]Normal   [ ]Dry mouth   [ ]ET Tube/Trach  [ ]Oral lesions    PULMONARY:   [ ]Clear [ ]Tachypnea  [ ]Audible excessive secretions   [ ]Rhonchi        [ ]Right [ ]Left [ ]Bilateral  [ ]Crackles        [ ]Right [ ]Left [ ]Bilateral  [ ]Wheezing     [ ]Right [ ]Left [ ]Bilateral    CARDIOVASCULAR:    [ ]Regular [ ]Irregular [ ]Tachy  [ ]Lance [ ]Murmur [ ]Other    GASTROINTESTINAL:  [ ]Soft  [ ]Distended   [ ]+BS  [ ]Non tender [ ]Tender  [ ]PEG [ ]OGT/ NGT  Last BM:     GENITOURINARY:  [ ]Normal [ ] Incontinent   [ ]Oliguria/Anuria   [ ]Noonan    MUSCULOSKELETAL:   [X ]Normal   [ ]Weakness  [ ]Bed/Wheelchair bound [ ]Edema    NEUROLOGIC:   [X ]No focal deficits  [ ] Cognitive impairment  [ ] Dysphagia [ ]Dysarthria [ ] Paresis [ ]Other     SKIN:   [ ]Normal   [ ]Pressure ulcer(s)  [ ]Rash    CRITICAL CARE:  [ ] Shock Present  [ ]Septic [ ]Cardiogenic [ ]Neurologic [ ]Hypovolemic  [ ]  Vasopressors [ ]  Inotropes   [ ] Respiratory failure present  [ ] Acute  [ ] Chronic [ ] Hypoxic  [ ] Hypercarbic [ ] Other  [ ] Other organ failure     LABS: reviewed                          8.5    0.5   )-----------( 107      ( 08 Mar 2019 06:49 )             22.9     03-08    140  |  104  |  6<L>  ----------------------------<  87  4.0   |  22  |  0.68    Ca    9.7      08 Mar 2019 06:49  Phos  4.1     03-08  Mg     1.8     03-08        RADIOLOGY & ADDITIONAL STUDIES: none on this admission thus far    PROTEIN CALORIE MALNUTRITION:   [ ] PPSV2 < or = to 30% [ ] significant weight loss  [ ] poor nutritional intake [ ] catabolic state [ ] anasarca     Albumin, Serum: 4.2 g/dL (02-20-19 @ 13:04)  Artificial Nutrition [ ]     REFERRALS:   [ ]Chaplaincy  [ ] Hospice  [ ]Child Life  [ ]Social Work  [ ]Case management [ ]Holistic Therapy     Goals of Care Discussion Document:     Saeid Pal MD  Palliative Medicine  office: [phone] | [phone]  pager: [phone]

## 2019-03-08 NOTE — PROGRESS NOTE ADULT - ATTENDING COMMENTS
34 y/o F with h/o AML(now with MDS like findings on bone marrow evaluations), admitted for allogeneic peripheral blood stem cell transplant from [facility] (sister 10/10). Pt is [employer], and does not accept blood products for Hoah[employer]ism reasons   Day + 8 -  s/p MTX on days +1, +3, +6, serum creatinine in normal range. No oral mucositis visible.   Mucositis- throat soreness/pain with swallowing likely secondary to mucositis s/p MTX; monitor symptoms. Palliative care consult for pain management. Change meds to IV route if possible, add Carafate slurry.  Continue Tacrolimus 1 mg po q 12 hrs, monitor level  Begin Zarxio on day +12  Continue Acyclovir, Mepron, Vfend prophylaxis; on Cipro for neutropenia  Continue VOD prophylaxis with Actigall, glutamine supplement. Off heparin since 3/6 due to increased menstrual bleeding  Hypertension- possibly related to [place], other meds, fluids- continue Norvasc.  Labs every other day or at discretion of attending physician  Continue iron, folic acid, vitamin K, vitamin B-12, vitamin C as per standard practice for bloodless transplant  Anemia secondary to antineoplastic chemotherapy- begin Procrit for Hgb < 11g/dL, as she does not accept transfusion of pRBC. Cont iron supplement  Antiemetics, mouth care, skin care   OOB/ambulate 34 y/o F with h/o AML(now with 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 Pt is [employer], and does not accept blood products for Jewish reasons   Day + 9 -  s/p MTX on days +1, +3, +6, serum creatinine in normal range. No oral mucositis visible. Due for MTX on day +11  Mucositis- throat soreness/pain with swallowing likely secondary to mucositis s/p MTX; monitor symptoms. Palliative care consult for pain management. Change meds to IV route if possible, add Carafate slurry.  Continue Tacrolimus 1 mg po q 12 hrs, monitor level  Begin Zarxio on day +12  Continue Acyclovir, Mepron, Vfend prophylaxis; on Cipro for neutropenia. Cipro and Vfend changed to IV route.  Continue VOD prophylaxis with Actigall, glutamine supplement. Off heparin since 3/6 due to increased menstrual bleeding  Hypertension- possibly related to Tacro, other meds, fluids- continue Norvasc.  Labs every other day or at discretion of attending physician  Continue iron, folic acid, vitamin K, vitamin B-12, vitamin C as per standard practice for bloodless transplant  Anemia secondary to antineoplastic chemotherapy- begin Procrit for Hgb < 11g/dL, as she does not accept transfusion of pRBC. Cont iron supplement. As Hgb < 9, changed to Venofer IV and D/C oral iron; increase Procrit to 20,000 U 3X/week.   Antiemetics, mouth care, skin care   OOB/ambulate

## 2019-03-08 NOTE — PROGRESS NOTE ADULT - PROBLEM SELECTOR PLAN 1
- c/w fentanyl 12mcg patch, can consider uptitrating tomorrow based on dilaudid usage  - c/w dilaudid 0.5mg and 1mg IV Q2 PRN

## 2019-03-08 NOTE — PROGRESS NOTE ADULT - SUBJECTIVE AND OBJECTIVE BOX
HPC Transplant Team                                                      Critical / Counseling Time Provided: 30 minutes                                                                                                                                                        Chief Complaint: Allogeneic peripheral blood stem cell transplant from [facility] (sister 10/10)     S: Patient seen and examined with HPC Transplant Team:   + menstrual flow decreased from yesterday.  + throat pain with swallowing (6-7/10)   loose bowel movements resolved.    Denies dyspnea, cough, nausea, vomiting, diarrhea, abdominal pain.    Vital Signs Last 24 Hrs  T(C): 36 (08 Mar 2019 06:07), Max: 37.1 (07 Mar 2019 10:00)  T(F): 96.8 (08 Mar 2019 06:07), Max: 98.8 (07 Mar 2019 10:00)  HR: 93 (08 Mar 2019 06:07) (90 - 98)  BP: 123/79 (08 Mar 2019 06:07) (110/74 - 127/80)  BP(mean): --  RR: 18 (08 Mar 2019 06:07) (18 - 18)  SpO2: 99% (08 Mar 2019 06:07) (99% - 100%)    Admit weight: 88.2kg  Daily Weight in kG:     I&O's Summary    07 Mar 2019 07:01  -  08 Mar 2019 07:00  --------------------------------------------------------  IN: 2541 mL / OUT: 1700 mL / NET: 841 mL    PE:   Oropharynx: no erythema or ulcerations  Oral Mucositis:              +                                         Grade: 1-2(throat)  CVS: S1, S2 RRR   Lungs: CTA throughout bilaterally   Abdomen:+ BS x 4, soft, NT, ND   Extremities: no edema  in BLE  Gastric Mucositis:       +                                          stGstrstastdstest:st st1st Intestinal Mucositis:     -                                          Grade: n/a  Skin: no rash   TLC: PICC line in right arm, c/d/i  Neuro: A&O x 3   Pain: (6-7/10 pain with swallowing)    Labs:     03-06 @ 09:34  Tacrolimus 5.0                Cyclosporine --                 8.5    0.5   )-----------( 107      ( 08 Mar 2019 06:49 )             22.9     03-08    140  |  104  |  6<L>  ----------------------------<  87  4.0   |  22  |  0.68    Ca    9.7      08 Mar 2019 06:49  Phos  4.1     03-08  Mg     1.8     03-08    TPro  6.8  /  Alb  3.8  /  TBili  0.2  /  DBili  <0.1  /  AST  16  /  ALT  17  /  AlkPhos  59  03-08    PT/INR - ( 08 Mar 2019 06:49 )   PT: 11.8 sec;   INR: 1.03 ratio      PTT - ( 08 Mar 2019 06:49 )  PTT:28.2 sec    MEDICATIONS  (STANDING):  acetaminophen   Tablet .. 650 milliGRAM(s) Oral every 6 hours  acetaminophen   Tablet .. 650 milliGRAM(s) Oral <User Schedule>  acyclovir    Suspension 400 milliGRAM(s) Oral every 8 hours  amLODIPine   Tablet 5 milliGRAM(s) Oral daily  ascorbic acid 250 milliGRAM(s) Oral daily  atovaquone Suspension 750 milliGRAM(s) Oral every 12 hours  Biotene Dry Mouth Oral Rinse 5 milliLiter(s) Swish and Spit five times a day  chlorhexidine 4% Liquid 1 Application(s) Topical <User Schedule>  cholecalciferol 400 Unit(s) Oral daily  ciprofloxacin     Tablet 500 milliGRAM(s) Oral every 12 hours  clotrimazole Lozenge 1 Lozenge Oral five times a day  cyanocobalamin 1000 MICROGram(s) Oral daily  diphenhydrAMINE   Injectable 25 milliGRAM(s) IV Push every 3 weeks  docusate sodium 100 milliGRAM(s) Oral three times a day  epoetin bhupendra Injectable 65817 Unit(s) SubCutaneous <User Schedule>  fentaNYL   Patch  12 MICROgram(s)/Hr 1 Patch Transdermal every 72 hours  ferrous    sulfate 325 milliGRAM(s) Oral three times a day  folic acid 1 milliGRAM(s) Oral daily  immune   globulin 10% (GAMMAGARD) IVPB 20 Gram(s) IV Intermittent <User Schedule>  magnesium oxide 400 milliGRAM(s) Oral three times a day with meals  medroxyPROGESTERone 10 milliGRAM(s) Oral daily  montelukast 10 milliGRAM(s) Oral daily  multivitamin 1 Tablet(s) Oral daily  pantoprazole  Injectable 40 milliGRAM(s) IV Push daily  phytonadione   Solution 10 milliGRAM(s) Oral every other day  senna 2 Tablet(s) Oral at bedtime  sodium bicarbonate Mouth Rinse 10 milliLiter(s) Swish and Spit five times a day  sodium chloride 0.9%. 1000 milliLiter(s) (20 mL/Hr) IV Continuous <Continuous>  sucralfate suspension 1 Gram(s) Oral four times a day  tacrolimus 1 milliGRAM(s) Oral every 12 hours  ursodiol Capsule 300 milliGRAM(s) Oral two times a day with meals  voriconazole 200 milliGRAM(s) Oral every 12 hours    MEDICATIONS  (PRN):  acetaminophen   Tablet .. 650 milliGRAM(s) Oral every 6 hours PRN Temp greater or equal to 38C (100.4F), Mild Pain (1 - 3)  artificial  tears Solution 1 Drop(s) Both EYES three times a day PRN Dry Eyes  benzocaine 15 mG/menthol 3.6 mG Lozenge 1 Lozenge Oral every 3 hours PRN Sore Throat  diphenhydrAMINE   Injectable 25 milliGRAM(s) IV Push every 4 hours PRN Allergy symptoms  FIRST- Mouthwash  BLM 5 milliLiter(s) Swish and Spit every 8 hours PRN Mouth Care  HYDROmorphone  Injectable 0.5 milliGRAM(s) IV Push every 2 hours PRN Moderate Pain (4 - 6)  HYDROmorphone  Injectable 1 milliGRAM(s) IV Push every 2 hours PRN Severe Pain (7 - 10)  metoclopramide Injectable 10 milliGRAM(s) IV Push every 6 hours PRN Nausea and/or Vomiting  sodium chloride 0.65% Nasal 1 Spray(s) Both Nostrils five times a day PRN Nasal Congestion  sodium chloride 0.9% lock flush 10 milliLiter(s) IV Push every 1 hour PRN Pre/post blood products, medications, blood draw, and to maintain line patency    A/P:     33 year old female with a history of AML  Post  :  Allogeneic PBSCT day + 9  Neutropenic - started Cipro on 2/26; if T >/= 38C pan cx, CXR and change Cipro to Cefepime.   Labs every other day ; continue vitamin C, vitamin B, folic acid, vitamin K & iron supplementation. Increased vitamin K to every other day as per standard guidelines for bloodless transplant.   Patient is neutropenic.  If spikes switch Cipro to Cefepime.  BP- 140s, started on Norvasc.  Hold for SBP <120  Mucositis (6-7/10 throat pain with swallowing) likely related to MTX, continue supportive care- change meds to IV if possible, trial of Carafate slurry.    1. Infectious Disease:   acyclovir   Oral Tab/Cap 400 milliGRAM(s) Oral three times a day  atovaquone Suspension 750 milliGRAM(s) Oral every 12 hours  ciprofloxacin     Tablet 500 milliGRAM(s) Oral every 12 hours  clotrimazole Lozenge 1 Lozenge Oral five times a day  voriconazole 200 milliGRAM(s) Oral every 12 hours    2. VOD Prophylaxis: Actigall, Glutamine.    3. GI Prophylaxis:    pantoprazole    Tablet 40 milliGRAM(s) Oral before breakfast    4. Mouthcare - NS / NaHCO3 rinses, Mycelex, Biotene; Skin care     5. GVHD prophylaxis -   tacrolimus 1 milliGRAM(s) Oral every 12 hours  MTX days 1,3,6,11    6. Transfuse & replete electrolytes prn - does not accept transfusion   magnesium oxide 400 milliGRAM(s) Oral three times a day with meals  phytonadione   Solution 10 milliGRAM(s) Oral every other day  ferrous    sulfate 325 milliGRAM(s) Oral three times a day  ascorbic acid 250 milliGRAM(s) Oral daily    7. IV hydration, daily weights, strict I&O, prn diuresis     8. PO intake as tolerated, nutrition follow up as needed, MVI, folic acid     9. Antiemetics, anti-diarrhea medications:   metoclopramide Injectable 10 milliGRAM(s) IV Push every 6 hours PRN    10. OOB as tolerated, physical therapy consult if needed     11. Monitor coags / fibrinogen 2x week, vitamin K as needed     12. Monitor closely for clinical changes, monitor for fevers     13. Emotional support provided, plan of care discussed and questions addressed     14. Patient education done regarding  plan of care, restrictions and discharge planning     I have written the above note for Dr. Larsen who performed service with me in the room.   Dulce Maria Villatoro, ANP-BC ([phone])    I have seen and examined patient with NP, I agree with above note as scribed. HPC Transplant Team                                                      Critical / Counseling Time Provided: 30 minutes                                                                                                                                                        Chief Complaint: Allogeneic peripheral blood stem cell transplant from [facility] (sister 10/10)     S: Patient seen and examined with HPC Transplant Team:   + menstrual flow decreased overall but stable from yesterday  + throat pain with swallowing (9/10), 4/10 after pain medication  + nausea but only after pain medication    Denies dyspnea, cough, nausea, vomiting, diarrhea, abdominal pain.    Vital Signs Last 24 Hrs  T(C): 36 (08 Mar 2019 06:07), Max: 37.1 (07 Mar 2019 10:00)  T(F): 96.8 (08 Mar 2019 06:07), Max: 98.8 (07 Mar 2019 10:00)  HR: 93 (08 Mar 2019 06:07) (90 - 98)  BP: 123/79 (08 Mar 2019 06:07) (110/74 - 127/80)  BP(mean): --  RR: 18 (08 Mar 2019 06:07) (18 - 18)  SpO2: 99% (08 Mar 2019 06:07) (99% - 100%)    Admit weight: 88.2kg  Daily Weight in k kg    I&O's Summary    07 Mar 2019 07:01  -  08 Mar 2019 07:00  --------------------------------------------------------  IN: 2541 mL / OUT: 1700 mL / NET: 841 mL    PE:   Oropharynx: no erythema or ulcerations  Oral Mucositis:              +                                         Grade: 1-2(throat)  CVS: S1, S2 RRR   Lungs: CTA throughout bilaterally   Abdomen:+ BS x 4, soft, NT, ND   Extremities: no edema  in BLE  Gastric Mucositis:       +                                          rdGrdrrdarddrderd:rd rd3rd Intestinal Mucositis:     -                                          Grade: n/a  Skin: no rash   TLC: PICC line in right arm, c/d/i  Neuro: A&O x 3   Pain: (9/10 pain with swallowing)    Labs:      @ 09:34  Tacrolimus 5.0                               8.5    0.5   )-----------( 107      ( 08 Mar 2019 06:49 )             22.9     03-08    140  |  104  |  6<L>  ----------------------------<  87  4.0   |  22  |  0.68    Ca    9.7      08 Mar 2019 06:49  Phos  4.1     03-08  Mg     1.8     -    TPro  6.8  /  Alb  3.8  /  TBili  0.2  /  DBili  <0.1  /  AST  16  /  ALT  17  /  AlkPhos  59  03-08    PT/INR - ( 08 Mar 2019 06:49 )   PT: 11.8 sec;   INR: 1.03 ratio      PTT - ( 08 Mar 2019 06:49 )  PTT:28.2 sec    MEDICATIONS  (STANDING):  acetaminophen   Tablet .. 650 milliGRAM(s) Oral every 6 hours  acetaminophen   Tablet .. 650 milliGRAM(s) Oral <User Schedule>  acyclovir    Suspension 400 milliGRAM(s) Oral every 8 hours  amLODIPine   Tablet 5 milliGRAM(s) Oral daily  ascorbic acid 250 milliGRAM(s) Oral daily  atovaquone Suspension 750 milliGRAM(s) Oral every 12 hours  Biotene Dry Mouth Oral Rinse 5 milliLiter(s) Swish and Spit five times a day  chlorhexidine 4% Liquid 1 Application(s) Topical <User Schedule>  cholecalciferol 400 Unit(s) Oral daily  ciprofloxacin     Tablet 500 milliGRAM(s) Oral every 12 hours  clotrimazole Lozenge 1 Lozenge Oral five times a day  cyanocobalamin 1000 MICROGram(s) Oral daily  diphenhydrAMINE   Injectable 25 milliGRAM(s) IV Push every 3 weeks  docusate sodium 100 milliGRAM(s) Oral three times a day  epoetin bhupendra Injectable 72393 Unit(s) SubCutaneous <User Schedule>  fentaNYL   Patch  12 MICROgram(s)/Hr 1 Patch Transdermal every 72 hours  ferrous    sulfate 325 milliGRAM(s) Oral three times a day  folic acid 1 milliGRAM(s) Oral daily  immune   globulin 10% (GAMMAGARD) IVPB 20 Gram(s) IV Intermittent <User Schedule>  magnesium oxide 400 milliGRAM(s) Oral three times a day with meals  medroxyPROGESTERone 10 milliGRAM(s) Oral daily  montelukast 10 milliGRAM(s) Oral daily  multivitamin 1 Tablet(s) Oral daily  pantoprazole  Injectable 40 milliGRAM(s) IV Push daily  phytonadione   Solution 10 milliGRAM(s) Oral every other day  senna 2 Tablet(s) Oral at bedtime  sodium bicarbonate Mouth Rinse 10 milliLiter(s) Swish and Spit five times a day  sodium chloride 0.9%. 1000 milliLiter(s) (20 mL/Hr) IV Continuous <Continuous>  sucralfate suspension 1 Gram(s) Oral four times a day  tacrolimus 1 milliGRAM(s) Oral every 12 hours  ursodiol Capsule 300 milliGRAM(s) Oral two times a day with meals  voriconazole 200 milliGRAM(s) Oral every 12 hours    MEDICATIONS  (PRN):  acetaminophen   Tablet .. 650 milliGRAM(s) Oral every 6 hours PRN Temp greater or equal to 38C (100.4F), Mild Pain (1 - 3)  artificial  tears Solution 1 Drop(s) Both EYES three times a day PRN Dry Eyes  benzocaine 15 mG/menthol 3.6 mG Lozenge 1 Lozenge Oral every 3 hours PRN Sore Throat  diphenhydrAMINE   Injectable 25 milliGRAM(s) IV Push every 4 hours PRN Allergy symptoms  FIRST- Mouthwash  BLM 5 milliLiter(s) Swish and Spit every 8 hours PRN Mouth Care  HYDROmorphone  Injectable 0.5 milliGRAM(s) IV Push every 2 hours PRN Moderate Pain (4 - 6)  HYDROmorphone  Injectable 1 milliGRAM(s) IV Push every 2 hours PRN Severe Pain (7 - 10)  metoclopramide Injectable 10 milliGRAM(s) IV Push every 6 hours PRN Nausea and/or Vomiting  sodium chloride 0.65% Nasal 1 Spray(s) Both Nostrils five times a day PRN Nasal Congestion  sodium chloride 0.9% lock flush 10 milliLiter(s) IV Push every 1 hour PRN Pre/post blood products, medications, blood draw, and to maintain line patency    A/P:     33 year old female with a history of AML  Post  :  Allogeneic PBSCT day + 9  Neutropenic - started Cipro on ; if T >/= 38C pan cx, CXR and change Cipro to Cefepime.   Labs every other day ; continue vitamin C, vitamin B, folic acid, vitamin K & iron supplementation. Increased vitamin K to every other day as per standard guidelines for bloodless transplant.   Patient is neutropenic.  If spikes switch Cipro to Cefepime.  BP- 140s, started on Norvasc.  Hold for SBP <120  Mucositis likely related to MTX, continue supportive care- change meds to IV today, continue Carafate slurry.  Pain meds per palliative care.  Consider holding or delaying MTX on  if mouth pain still significant.  Increase Provera to 20 mg PO daily.  Hgb <10, switch oral Fe to Venofer.  Increase Procrit to 20,000 units TIW starting today.    1. Infectious Disease:   acyclovir   Oral Tab/Cap 400 milliGRAM(s) Oral three times a day  atovaquone Suspension 750 milliGRAM(s) Oral every 12 hours  ciprofloxacin     Tablet 500 milliGRAM(s) Oral every 12 hours  clotrimazole Lozenge 1 Lozenge Oral five times a day  voriconazole 200 milliGRAM(s) Oral every 12 hours    2. VOD Prophylaxis: Actigall, Glutamine.    3. GI Prophylaxis:    pantoprazole    Tablet 40 milliGRAM(s) Oral before breakfast    4. Mouthcare - NS / NaHCO3 rinses, Mycelex, Biotene; Skin care     5. GVHD prophylaxis -   tacrolimus 1 milliGRAM(s) Oral every 12 hours  MTX days 1,3,6,11    6. Transfuse & replete electrolytes prn - does not accept transfusion   magnesium oxide 400 milliGRAM(s) Oral three times a day with meals  phytonadione   Solution 10 milliGRAM(s) Oral every other day  ferrous    sulfate 325 milliGRAM(s) Oral three times a day  ascorbic acid 250 milliGRAM(s) Oral daily    7. IV hydration, daily weights, strict I&O, prn diuresis     8. PO intake as tolerated, nutrition follow up as needed, MVI, folic acid     9. Antiemetics, anti-diarrhea medications:   metoclopramide Injectable 10 milliGRAM(s) IV Push every 6 hours PRN    10. OOB as tolerated, physical therapy consult if needed     11. Monitor coags / fibrinogen 2x week, vitamin K as needed     12. Monitor closely for clinical changes, monitor for fevers     13. Emotional support provided, plan of care discussed and questions addressed     14. Patient education done regarding  plan of care, restrictions and discharge planning     I have written the above note for Dr. Larsen who performed service with me in the room.   Dulce Maria Villatoro, ANP-BC ([phone])    I have seen and examined patient with NP, I agree with above note as scribed. HPC Transplant Team                                                      Critical / Counseling Time Provided: 30 minutes                                                                                                                                                        Chief Complaint: Allogeneic peripheral blood stem cell transplant from [facility] (sister 10/10)     S: Patient seen and examined with HPC Transplant Team:   + menstrual flow decreased overall but stable from yesterday  + throat pain with swallowing (9/10), 4/10 after pain medication  + nausea but only after pain medication    Denies dyspnea, cough, vomiting, diarrhea, abdominal pain.    Vital Signs Last 24 Hrs  T(C): 36 (08 Mar 2019 06:07), Max: 37.1 (07 Mar 2019 10:00)  T(F): 96.8 (08 Mar 2019 06:07), Max: 98.8 (07 Mar 2019 10:00)  HR: 93 (08 Mar 2019 06:07) (90 - 98)  BP: 123/79 (08 Mar 2019 06:07) (110/74 - 127/80)  BP(mean): --  RR: 18 (08 Mar 2019 06:07) (18 - 18)  SpO2: 99% (08 Mar 2019 06:07) (99% - 100%)    Admit weight: 88.2kg  Daily Weight in k kg    I&O's Summary    07 Mar 2019 07:01  -  08 Mar 2019 07:00  --------------------------------------------------------  IN: 2541 mL / OUT: 1700 mL / NET: 841 mL    PE:   Oropharynx: no erythema or ulcerations  Oral Mucositis:              +                                         Grade: 1-2(throat)  CVS: S1, S2 RRR   Lungs: CTA throughout bilaterally   Abdomen:+ BS x 4, soft, NT, ND   Extremities: no edema  in BLE  Gastric Mucositis:       +                                          rdGrdrrdarddrderd:rd rd3rd Intestinal Mucositis:     -                                          Grade: n/a  Skin: no rash   TLC: PICC line in right arm, c/d/i  Neuro: A&O x 3   Pain: (9/10 pain with swallowing)    Labs:     - @ 09:34  Tacrolimus 5.0                               8.5    0.5   )-----------( 107      ( 08 Mar 2019 06:49 )             22.9     03-08    140  |  104  |  6<L>  ----------------------------<  87  4.0   |  22  |  0.68    Ca    9.7      08 Mar 2019 06:49  Phos  4.1     03-08  Mg     1.8     -    TPro  6.8  /  Alb  3.8  /  TBili  0.2  /  DBili  <0.1  /  AST  16  /  ALT  17  /  AlkPhos  59  03-08    PT/INR - ( 08 Mar 2019 06:49 )   PT: 11.8 sec;   INR: 1.03 ratio      PTT - ( 08 Mar 2019 06:49 )  PTT:28.2 sec    MEDICATIONS  (STANDING):  acetaminophen   Tablet .. 650 milliGRAM(s) Oral every 6 hours  acetaminophen   Tablet .. 650 milliGRAM(s) Oral <User Schedule>  acyclovir    Suspension 400 milliGRAM(s) Oral every 8 hours  amLODIPine   Tablet 5 milliGRAM(s) Oral daily  ascorbic acid 250 milliGRAM(s) Oral daily  atovaquone Suspension 750 milliGRAM(s) Oral every 12 hours  Biotene Dry Mouth Oral Rinse 5 milliLiter(s) Swish and Spit five times a day  chlorhexidine 4% Liquid 1 Application(s) Topical <User Schedule>  cholecalciferol 400 Unit(s) Oral daily  ciprofloxacin     Tablet 500 milliGRAM(s) Oral every 12 hours  clotrimazole Lozenge 1 Lozenge Oral five times a day  cyanocobalamin 1000 MICROGram(s) Oral daily  diphenhydrAMINE   Injectable 25 milliGRAM(s) IV Push every 3 weeks  docusate sodium 100 milliGRAM(s) Oral three times a day  epoetin bhupendra Injectable 81085 Unit(s) SubCutaneous <User Schedule>  fentaNYL   Patch  12 MICROgram(s)/Hr 1 Patch Transdermal every 72 hours  ferrous    sulfate 325 milliGRAM(s) Oral three times a day  folic acid 1 milliGRAM(s) Oral daily  immune   globulin 10% (GAMMAGARD) IVPB 20 Gram(s) IV Intermittent <User Schedule>  magnesium oxide 400 milliGRAM(s) Oral three times a day with meals  medroxyPROGESTERone 10 milliGRAM(s) Oral daily  montelukast 10 milliGRAM(s) Oral daily  multivitamin 1 Tablet(s) Oral daily  pantoprazole  Injectable 40 milliGRAM(s) IV Push daily  phytonadione   Solution 10 milliGRAM(s) Oral every other day  senna 2 Tablet(s) Oral at bedtime  sodium bicarbonate Mouth Rinse 10 milliLiter(s) Swish and Spit five times a day  sodium chloride 0.9%. 1000 milliLiter(s) (20 mL/Hr) IV Continuous <Continuous>  sucralfate suspension 1 Gram(s) Oral four times a day  tacrolimus 1 milliGRAM(s) Oral every 12 hours  ursodiol Capsule 300 milliGRAM(s) Oral two times a day with meals  voriconazole 200 milliGRAM(s) Oral every 12 hours    MEDICATIONS  (PRN):  acetaminophen   Tablet .. 650 milliGRAM(s) Oral every 6 hours PRN Temp greater or equal to 38C (100.4F), Mild Pain (1 - 3)  artificial  tears Solution 1 Drop(s) Both EYES three times a day PRN Dry Eyes  benzocaine 15 mG/menthol 3.6 mG Lozenge 1 Lozenge Oral every 3 hours PRN Sore Throat  diphenhydrAMINE   Injectable 25 milliGRAM(s) IV Push every 4 hours PRN Allergy symptoms  FIRST- Mouthwash  BLM 5 milliLiter(s) Swish and Spit every 8 hours PRN Mouth Care  HYDROmorphone  Injectable 0.5 milliGRAM(s) IV Push every 2 hours PRN Moderate Pain (4 - 6)  HYDROmorphone  Injectable 1 milliGRAM(s) IV Push every 2 hours PRN Severe Pain (7 - 10)  metoclopramide Injectable 10 milliGRAM(s) IV Push every 6 hours PRN Nausea and/or Vomiting  sodium chloride 0.65% Nasal 1 Spray(s) Both Nostrils five times a day PRN Nasal Congestion  sodium chloride 0.9% lock flush 10 milliLiter(s) IV Push every 1 hour PRN Pre/post blood products, medications, blood draw, and to maintain line patency    A/P:     33 year old female with a history of AML  Post  :  Allogeneic PBSCT day + 9  Neutropenic - started Cipro on ; if T >/= 38C pan cx, CXR and change Cipro to Cefepime.   Labs every other day ; continue vitamin C, vitamin B, folic acid, vitamin K & iron supplementation. Increased vitamin K to every other day as per standard guidelines for bloodless transplant.   Patient is neutropenic.  If spikes switch Cipro to Cefepime.  BP- 140s, started on Norvasc.  Hold for SBP <120  Mucositis likely related to MTX, continue supportive care- change meds to IV today, continue Carafate slurry.  Pain meds per palliative care.  Consider holding or delaying MTX on  if mouth pain still significant.  Increase Provera to 20 mg PO daily.  Hgb <10, switch oral Fe to Venofer.  Increase Procrit to 20,000 units TIW starting today.    1. Infectious Disease:   acyclovir   Oral Tab/Cap 400 milliGRAM(s) Oral three times a day  atovaquone Suspension 750 milliGRAM(s) Oral every 12 hours  ciprofloxacin     Tablet 500 milliGRAM(s) Oral every 12 hours  clotrimazole Lozenge 1 Lozenge Oral five times a day  voriconazole 200 milliGRAM(s) Oral every 12 hours    2. VOD Prophylaxis: Actigall, Glutamine. Off heparin drip secondary to heavy menses.    3. GI Prophylaxis:    pantoprazole    Tablet 40 milliGRAM(s) IV before breakfast    4. Mouth care - NS / NaHCO3 rinses, Mycelex, Biotene; Skin care     5. GVHD prophylaxis -   tacrolimus 1 milliGRAM(s) Oral every 12 hours  MTX days 1,3,6,11    6. Transfuse & replete electrolytes prn - does not accept transfusion   magnesium oxide 400 milliGRAM(s) Oral three times a day with meals  phytonadione   Solution 10 milliGRAM(s) Oral every other day  ferrous    sulfate 325 milliGRAM(s) Oral three times a day  ascorbic acid 250 milliGRAM(s) Oral daily    7. IV hydration, daily weights, strict I&O, prn diuresis     8. PO intake as tolerated, nutrition follow up as needed, MVI, folic acid     9. Antiemetics, anti-diarrhea medications:   metoclopramide Injectable 10 milliGRAM(s) IV Push every 6 hours PRN    10. OOB as tolerated, physical therapy consult if needed     11. Monitor coags / fibrinogen 2x week, vitamin K as needed     12. Monitor closely for clinical changes, monitor for fevers     13. Emotional support provided, plan of care discussed and questions addressed     14. Patient education done regarding  plan of care, restrictions and discharge planning     I have written the above note for Dr. Larsen who performed service with me in the room.   Dulce Maria Villatoro, ANP-BC ([phone])    I have seen and examined patient with NP, I agree with above note as scribed.

## 2019-03-09 PROCEDURE — 99291 CRITICAL CARE FIRST HOUR: CPT

## 2019-03-09 RX ADMIN — HYDROMORPHONE HYDROCHLORIDE 1 MILLIGRAM(S): 2 INJECTION INTRAMUSCULAR; INTRAVENOUS; SUBCUTANEOUS at 06:05

## 2019-03-09 RX ADMIN — Medication 1 LOZENGE: at 23:19

## 2019-03-09 RX ADMIN — Medication 10 MILLILITER(S): at 12:35

## 2019-03-09 RX ADMIN — CHLORHEXIDINE GLUCONATE 1 APPLICATION(S): 213 SOLUTION TOPICAL at 12:35

## 2019-03-09 RX ADMIN — Medication 1 GRAM(S): at 12:34

## 2019-03-09 RX ADMIN — MAGNESIUM OXIDE 400 MG ORAL TABLET 400 MILLIGRAM(S): 241.3 TABLET ORAL at 12:33

## 2019-03-09 RX ADMIN — Medication 100 MILLIGRAM(S): at 05:47

## 2019-03-09 RX ADMIN — Medication 1 LOZENGE: at 19:40

## 2019-03-09 RX ADMIN — FENTANYL CITRATE 1 PATCH: 50 INJECTION INTRAVENOUS at 06:25

## 2019-03-09 RX ADMIN — Medication 200 MILLIGRAM(S): at 05:26

## 2019-03-09 RX ADMIN — AMLODIPINE BESYLATE 5 MILLIGRAM(S): 2.5 TABLET ORAL at 05:46

## 2019-03-09 RX ADMIN — Medication 1 MILLIGRAM(S): at 12:33

## 2019-03-09 RX ADMIN — Medication 10 MILLILITER(S): at 08:43

## 2019-03-09 RX ADMIN — URSODIOL 300 MILLIGRAM(S): 250 TABLET, FILM COATED ORAL at 08:43

## 2019-03-09 RX ADMIN — SODIUM CHLORIDE 20 MILLILITER(S): 9 INJECTION INTRAMUSCULAR; INTRAVENOUS; SUBCUTANEOUS at 23:20

## 2019-03-09 RX ADMIN — Medication 1 GRAM(S): at 05:45

## 2019-03-09 RX ADMIN — TACROLIMUS 1 MILLIGRAM(S): 5 CAPSULE ORAL at 05:46

## 2019-03-09 RX ADMIN — ATOVAQUONE 750 MILLIGRAM(S): 750 SUSPENSION ORAL at 05:45

## 2019-03-09 RX ADMIN — Medication 5 MILLILITER(S): at 23:19

## 2019-03-09 RX ADMIN — PANTOPRAZOLE SODIUM 40 MILLIGRAM(S): 20 TABLET, DELAYED RELEASE ORAL at 12:34

## 2019-03-09 RX ADMIN — HYDROMORPHONE HYDROCHLORIDE 1 MILLIGRAM(S): 2 INJECTION INTRAMUSCULAR; INTRAVENOUS; SUBCUTANEOUS at 16:13

## 2019-03-09 RX ADMIN — HYDROMORPHONE HYDROCHLORIDE 1 MILLIGRAM(S): 2 INJECTION INTRAMUSCULAR; INTRAVENOUS; SUBCUTANEOUS at 22:10

## 2019-03-09 RX ADMIN — Medication 5 MILLILITER(S): at 19:39

## 2019-03-09 RX ADMIN — TACROLIMUS 1 MILLIGRAM(S): 5 CAPSULE ORAL at 17:14

## 2019-03-09 RX ADMIN — Medication 1 GRAM(S): at 23:19

## 2019-03-09 RX ADMIN — Medication 5 MILLILITER(S): at 12:35

## 2019-03-09 RX ADMIN — Medication 10 MILLIGRAM(S): at 12:34

## 2019-03-09 RX ADMIN — SENNA PLUS 2 TABLET(S): 8.6 TABLET ORAL at 21:08

## 2019-03-09 RX ADMIN — Medication 400 MILLIGRAM(S): at 21:08

## 2019-03-09 RX ADMIN — Medication 100 MILLIGRAM(S): at 21:08

## 2019-03-09 RX ADMIN — VORICONAZOLE 125 MILLIGRAM(S): 10 INJECTION, POWDER, LYOPHILIZED, FOR SOLUTION INTRAVENOUS at 06:24

## 2019-03-09 RX ADMIN — Medication 250 MILLIGRAM(S): at 12:32

## 2019-03-09 RX ADMIN — Medication 400 UNIT(S): at 12:33

## 2019-03-09 RX ADMIN — Medication 200 MILLIGRAM(S): at 17:28

## 2019-03-09 RX ADMIN — Medication 5 MILLILITER(S): at 16:13

## 2019-03-09 RX ADMIN — PREGABALIN 1000 MICROGRAM(S): 225 CAPSULE ORAL at 12:33

## 2019-03-09 RX ADMIN — MAGNESIUM OXIDE 400 MG ORAL TABLET 400 MILLIGRAM(S): 241.3 TABLET ORAL at 17:14

## 2019-03-09 RX ADMIN — URSODIOL 300 MILLIGRAM(S): 250 TABLET, FILM COATED ORAL at 17:14

## 2019-03-09 RX ADMIN — ATOVAQUONE 750 MILLIGRAM(S): 750 SUSPENSION ORAL at 17:14

## 2019-03-09 RX ADMIN — Medication 1 LOZENGE: at 12:35

## 2019-03-09 RX ADMIN — Medication 1 LOZENGE: at 08:43

## 2019-03-09 RX ADMIN — Medication 1 GRAM(S): at 17:14

## 2019-03-09 RX ADMIN — MAGNESIUM OXIDE 400 MG ORAL TABLET 400 MILLIGRAM(S): 241.3 TABLET ORAL at 08:43

## 2019-03-09 RX ADMIN — HYDROMORPHONE HYDROCHLORIDE 1 MILLIGRAM(S): 2 INJECTION INTRAMUSCULAR; INTRAVENOUS; SUBCUTANEOUS at 21:35

## 2019-03-09 RX ADMIN — Medication 1 LOZENGE: at 16:13

## 2019-03-09 RX ADMIN — Medication 5 MILLILITER(S): at 08:43

## 2019-03-09 RX ADMIN — MONTELUKAST 10 MILLIGRAM(S): 4 TABLET, CHEWABLE ORAL at 12:33

## 2019-03-09 RX ADMIN — HYDROMORPHONE HYDROCHLORIDE 1 MILLIGRAM(S): 2 INJECTION INTRAMUSCULAR; INTRAVENOUS; SUBCUTANEOUS at 05:34

## 2019-03-09 RX ADMIN — Medication 10 MILLILITER(S): at 23:19

## 2019-03-09 RX ADMIN — HYDROMORPHONE HYDROCHLORIDE 1 MILLIGRAM(S): 2 INJECTION INTRAMUSCULAR; INTRAVENOUS; SUBCUTANEOUS at 16:30

## 2019-03-09 RX ADMIN — Medication 400 MILLIGRAM(S): at 13:57

## 2019-03-09 RX ADMIN — Medication 1 TABLET(S): at 12:33

## 2019-03-09 RX ADMIN — FENTANYL CITRATE 1 PATCH: 50 INJECTION INTRAVENOUS at 17:15

## 2019-03-09 RX ADMIN — Medication 100 MILLIGRAM(S): at 13:57

## 2019-03-09 RX ADMIN — Medication 10 MILLILITER(S): at 16:13

## 2019-03-09 RX ADMIN — Medication 10 MILLIGRAM(S): at 05:35

## 2019-03-09 RX ADMIN — VORICONAZOLE 125 MILLIGRAM(S): 10 INJECTION, POWDER, LYOPHILIZED, FOR SOLUTION INTRAVENOUS at 17:28

## 2019-03-09 RX ADMIN — Medication 400 MILLIGRAM(S): at 05:46

## 2019-03-09 RX ADMIN — Medication 10 MILLILITER(S): at 19:40

## 2019-03-09 RX ADMIN — MEDROXYPROGESTERONE ACETATE 20 MILLIGRAM(S): 150 INJECTION, SUSPENSION, EXTENDED RELEASE INTRAMUSCULAR at 12:34

## 2019-03-09 NOTE — PROGRESS NOTE ADULT - SUBJECTIVE AND OBJECTIVE BOX
[facility] Transplant Team                                                      Critical / Counseling Time Provided: 30 minutes                                                                                                                                                        Chief Complaint: Allogeneic peripheral blood stem cell transplant from [facility] (sister 10/10)     O: Vitals:   Vital Signs Last 24 Hrs  T(C): 37.4 (09 Mar 2019 05:25), Max: 37.4 (09 Mar 2019 05:25)  T(F): 99.3 (09 Mar 2019 05:25), Max: 99.3 (09 Mar 2019 05:25)  HR: 93 (09 Mar 2019 05:25) (92 - 101)  BP: 119/87 (09 Mar 2019 05:25) (100/62 - 119/87)  BP(mean): --  RR: 18 (09 Mar 2019 05:25) (17 - 18)  SpO2: 100% (09 Mar 2019 05:25) (99% - 100%)    Admit weight: 88.2 kG  Daily Weight:    Intake / Output:   03-08 @ 07:01  -  03-09 @ 07:00  --------------------------------------------------------  IN: 2604 mL / OUT: 2050 mL / NET: 554 mL        PE:   Oropharynx: no erythema or ulcerations  Oral Mucositis:              +                                         Grade: 1-2(throat)  CVS: S1, S2 RRR   Lungs: CTA throughout bilaterally   Abdomen:+ BS x 4, soft, NT, ND   Extremities: no edema  in BLE  Gastric Mucositis:       +                                          rdGrdrrdarddrderd:rd rd3rd Intestinal Mucositis:     -                                          Grade: n/a  Skin: no rash   TLC: PICC line C/D/I  Neuro: A&O x 3   Pain: (9/10 pain with swallowing)        Labs: Completed every other day (last 3/8/19)    CBC Full  -  ( 08 Mar 2019 06:49 )  WBC Count : 0.5 K/uL  Hemoglobin : 8.5 g/dL  Hematocrit : 22.9 %  Platelet Count - Automated : 107 K/uL  Mean Cell Volume : 89.2 fl  Mean Cell Hemoglobin : 33.1 pg  Mean Cell Hemoglobin Concentration : 37.1 gm/dL  Auto Neutrophil # : x  Auto Lymphocyte # : x  Auto Monocyte # : x  Auto Eosinophil # : x  Auto Basophil # : x  Auto Neutrophil % : x  Auto Lymphocyte % : x  Auto Monocyte % : x  Auto Eosinophil % : x  Auto Basophil % : x                          8.5    0.5   )-----------( 107      ( 08 Mar 2019 06:49 )             22.9     03-08    140  |  104  |  6<L>  ----------------------------<  87  4.0   |  22  |  0.68    Ca    9.7      08 Mar 2019 06:49  Phos  4.1     03-08  Mg     1.8     03-08    TPro  6.8  /  Alb  3.8  /  TBili  0.2  /  DBili  <0.1  /  AST  16  /  ALT  17  /  AlkPhos  59  03-08    PT/INR - ( 08 Mar 2019 06:49 )   PT: 11.8 sec;   INR: 1.03 ratio         PTT - ( 08 Mar 2019 06:49 )  PTT:28.2 sec  LIVER FUNCTIONS - ( 08 Mar 2019 06:49 )  Alb: 3.8 g/dL / Pro: 6.8 g/dL / ALK PHOS: 59 U/L / ALT: 17 U/L / AST: 16 U/L / GGT: x                03-08 @ 10:02  Tacrolimus 5.8                Cyclosporine --      Meds:   Antimicrobials:   acyclovir    Suspension 400 milliGRAM(s) Oral every 8 hours  atovaquone Suspension 750 milliGRAM(s) Oral every 12 hours  ciprofloxacin   IVPB 400 milliGRAM(s) IV Intermittent every 12 hours  clotrimazole Lozenge 1 Lozenge Oral five times a day  voriconazole IVPB 200 milliGRAM(s) IV Intermittent every 12 hours        GI:  docusate sodium 100 milliGRAM(s) Oral three times a day  pantoprazole  Injectable 40 milliGRAM(s) IV Push daily  senna 2 Tablet(s) Oral at bedtime  sodium bicarbonate Mouth Rinse 10 milliLiter(s) Swish and Spit five times a day  sucralfate suspension 1 Gram(s) Oral four times a day  ursodiol Capsule 300 milliGRAM(s) Oral two times a day with meals      Cardiovascular:   amLODIPine   Tablet 5 milliGRAM(s) Oral daily      Immunologic:   epoetin bhupendra Injectable 59159 Unit(s) SubCutaneous <User Schedule>  immune   globulin 10% (GAMMAGARD) IVPB 20 Gram(s) IV Intermittent <User Schedule>  tacrolimus 1 milliGRAM(s) Oral every 12 hours      Other medications:   acetaminophen   Tablet .. 650 milliGRAM(s) Oral every 6 hours  acetaminophen   Tablet .. 650 milliGRAM(s) Oral <User Schedule>  ascorbic acid 250 milliGRAM(s) Oral daily  Biotene Dry Mouth Oral Rinse 5 milliLiter(s) Swish and Spit five times a day  chlorhexidine 4% Liquid 1 Application(s) Topical <User Schedule>  cholecalciferol 400 Unit(s) Oral daily  cyanocobalamin 1000 MICROGram(s) Oral daily  diphenhydrAMINE   Injectable 25 milliGRAM(s) IV Push every 3 weeks  fentaNYL   Patch  12 MICROgram(s)/Hr 1 Patch Transdermal every 72 hours  folic acid 1 milliGRAM(s) Oral daily  iron sucrose IVPB 100 milliGRAM(s) IV Intermittent every 7 days  magnesium oxide 400 milliGRAM(s) Oral three times a day with meals  medroxyPROGESTERone 20 milliGRAM(s) Oral daily  montelukast 10 milliGRAM(s) Oral daily  multivitamin 1 Tablet(s) Oral daily  phytonadione   Solution 10 milliGRAM(s) Oral every other day  sodium chloride 0.9%. 1000 milliLiter(s) IV Continuous <Continuous>      PRN:   acetaminophen   Tablet .. 650 milliGRAM(s) Oral every 6 hours PRN  artificial  tears Solution 1 Drop(s) Both EYES three times a day PRN  benzocaine 15 mG/menthol 3.6 mG Lozenge 1 Lozenge Oral every 3 hours PRN  diphenhydrAMINE   Injectable 25 milliGRAM(s) IV Push every 4 hours PRN  FIRST- Mouthwash  BLM 5 milliLiter(s) Swish and Spit every 8 hours PRN  HYDROmorphone  Injectable 0.5 milliGRAM(s) IV Push every 2 hours PRN  HYDROmorphone  Injectable 1 milliGRAM(s) IV Push every 2 hours PRN  metoclopramide Injectable 10 milliGRAM(s) IV Push every 6 hours PRN  sodium chloride 0.65% Nasal 1 Spray(s) Both Nostrils five times a day PRN  sodium chloride 0.9% lock flush 10 milliLiter(s) IV Push every 1 hour PRN      A/P:  33 year old female with a history of AML  Post:  Allogeneic PBSCT day + 10  Neutropenic - started Cipro on 2/26; if T >/= 38C pan cx, CXR and change Cipro to Cefepime.   Labs every other day ; continue vitamin C, vitamin B, folic acid, vitamin K & iron supplementation. Increased vitamin K to every other day as per standard guidelines for bloodless transplant.   Patient is neutropenic.  If spikes switch Cipro to Cefepime.  BP- 140s, started on Norvasc.  Hold for SBP <120  Mucositis likely related to MTX, continue supportive care- change meds to IV today, continue Carafate slurry.  Pain meds per palliative care.  Consider holding or delaying MTX on Sunday if mouth pain still significant.  Increase Provera to 20 mg PO daily.  Hgb <10, switch oral Fe to Venofer.  Increase Procrit to 20,000 units TIW starting today.    1. Infectious Disease:   acyclovir   Oral Tab/Cap 400 milliGRAM(s) Oral three times a day  atovaquone Suspension 750 milliGRAM(s) Oral every 12 hours  ciprofloxacin     Tablet 500 milliGRAM(s) Oral every 12 hours  clotrimazole Lozenge 1 Lozenge Oral five times a day  voriconazole 200 milliGRAM(s) Oral every 12 hours    2. VOD Prophylaxis: Actigall, Glutamine. Off heparin drip secondary to heavy menses.    3. GI Prophylaxis:    pantoprazole    Tablet 40 milliGRAM(s) IV before breakfast    4. Mouth care - NS / NaHCO3 rinses, Mycelex, Biotene; Skin care     5. GVHD prophylaxis -   tacrolimus 1 milliGRAM(s) Oral every 12 hours  MTX days 1,3,6,11    6. Transfuse & replete electrolytes prn - does not accept transfusion   magnesium oxide 400 milliGRAM(s) Oral three times a day with meals  phytonadione   Solution 10 milliGRAM(s) Oral every other day  ferrous    sulfate 325 milliGRAM(s) Oral three times a day  ascorbic acid 250 milliGRAM(s) Oral daily    7. IV hydration, daily weights, strict I&O, prn diuresis     8. PO intake as tolerated, nutrition follow up as needed, MVI, folic acid     9. Antiemetics, anti-diarrhea medications:   metoclopramide Injectable 10 milliGRAM(s) IV Push every 6 hours PRN    10. OOB as tolerated, physical therapy consult if needed     11. Monitor coags / fibrinogen 2x week, vitamin K as needed     12. Monitor closely for clinical changes, monitor for fevers     13. Emotional support provided, plan of care discussed and questions addressed     14. Patient education done regarding  plan of care, restrictions and discharge planning       I have written the above note for Dr. iSlva who performed service with me in the room.   Sofie Connors NP-C ([phone])    I have seen and examined patient with NP, I agree with above note as scribed. [facility] Transplant Team                                                      Critical / Counseling Time Provided: 30 minutes                                                                                                                                                        Chief Complaint: Allogeneic peripheral blood stem cell transplant from [facility] (sister 10/10)     O: Vitals:   Vital Signs Last 24 Hrs  T(C): 37.4 (09 Mar 2019 05:25), Max: 37.4 (09 Mar 2019 05:25)  T(F): 99.3 (09 Mar 2019 05:25), Max: 99.3 (09 Mar 2019 05:25)  HR: 93 (09 Mar 2019 05:25) (92 - 101)  BP: 119/87 (09 Mar 2019 05:25) (100/62 - 119/87)  BP(mean): --  RR: 18 (09 Mar 2019 05:25) (17 - 18)  SpO2: 100% (09 Mar 2019 05:25) (99% - 100%)    Admit weight: 88.2 kG  Daily Weight:    Intake / Output:   03-08 @ 07:01  -  03-09 @ 07:00  --------------------------------------------------------  IN: 2604 mL / OUT: 2050 mL / NET: 554 mL        PE:   Oropharynx: no erythema or ulcerations  Oral Mucositis:              +                                         Grade: 1-2(throat)  CVS: S1, S2 RRR   Lungs: CTA throughout bilaterally   Abdomen:+ BS x 4, soft, NT, ND   Extremities: no edema  in BLE  Gastric Mucositis:       +                                          rdGrdrrdarddrderd:rd rd3rd Intestinal Mucositis:     -                                          Grade: n/a  Skin: no rash   TLC: PICC line C/D/I  Neuro: A&O x 3   Pain: (9/10 pain with swallowing)        Labs: Completed every other day (last 3/8/19)    CBC Full  -  ( 08 Mar 2019 06:49 )  WBC Count : 0.5 K/uL  Hemoglobin : 8.5 g/dL  Hematocrit : 22.9 %  Platelet Count - Automated : 107 K/uL  Mean Cell Volume : 89.2 fl  Mean Cell Hemoglobin : 33.1 pg  Mean Cell Hemoglobin Concentration : 37.1 gm/dL  Auto Neutrophil # : x  Auto Lymphocyte # : x  Auto Monocyte # : x  Auto Eosinophil # : x  Auto Basophil # : x  Auto Neutrophil % : x  Auto Lymphocyte % : x  Auto Monocyte % : x  Auto Eosinophil % : x  Auto Basophil % : x                          8.5    0.5   )-----------( 107      ( 08 Mar 2019 06:49 )             22.9     03-08    140  |  104  |  6<L>  ----------------------------<  87  4.0   |  22  |  0.68    Ca    9.7      08 Mar 2019 06:49  Phos  4.1     03-08  Mg     1.8     03-08    TPro  6.8  /  Alb  3.8  /  TBili  0.2  /  DBili  <0.1  /  AST  16  /  ALT  17  /  AlkPhos  59  03-08    PT/INR - ( 08 Mar 2019 06:49 )   PT: 11.8 sec;   INR: 1.03 ratio         PTT - ( 08 Mar 2019 06:49 )  PTT:28.2 sec  LIVER FUNCTIONS - ( 08 Mar 2019 06:49 )  Alb: 3.8 g/dL / Pro: 6.8 g/dL / ALK PHOS: 59 U/L / ALT: 17 U/L / AST: 16 U/L / GGT: x                03-08 @ 10:02  Tacrolimus 5.8                Cyclosporine --      Meds:   Antimicrobials:   acyclovir    Suspension 400 milliGRAM(s) Oral every 8 hours  atovaquone Suspension 750 milliGRAM(s) Oral every 12 hours  ciprofloxacin   IVPB 400 milliGRAM(s) IV Intermittent every 12 hours  clotrimazole Lozenge 1 Lozenge Oral five times a day  voriconazole IVPB 200 milliGRAM(s) IV Intermittent every 12 hours        GI:  docusate sodium 100 milliGRAM(s) Oral three times a day  pantoprazole  Injectable 40 milliGRAM(s) IV Push daily  senna 2 Tablet(s) Oral at bedtime  sodium bicarbonate Mouth Rinse 10 milliLiter(s) Swish and Spit five times a day  sucralfate suspension 1 Gram(s) Oral four times a day  ursodiol Capsule 300 milliGRAM(s) Oral two times a day with meals      Cardiovascular:   amLODIPine   Tablet 5 milliGRAM(s) Oral daily      Immunologic:   epoetin bhupendra Injectable 25099 Unit(s) SubCutaneous <User Schedule>  immune   globulin 10% (GAMMAGARD) IVPB 20 Gram(s) IV Intermittent <User Schedule>  tacrolimus 1 milliGRAM(s) Oral every 12 hours      Other medications:   acetaminophen   Tablet .. 650 milliGRAM(s) Oral every 6 hours  acetaminophen   Tablet .. 650 milliGRAM(s) Oral <User Schedule>  ascorbic acid 250 milliGRAM(s) Oral daily  Biotene Dry Mouth Oral Rinse 5 milliLiter(s) Swish and Spit five times a day  chlorhexidine 4% Liquid 1 Application(s) Topical <User Schedule>  cholecalciferol 400 Unit(s) Oral daily  cyanocobalamin 1000 MICROGram(s) Oral daily  diphenhydrAMINE   Injectable 25 milliGRAM(s) IV Push every 3 weeks  fentaNYL   Patch  12 MICROgram(s)/Hr 1 Patch Transdermal every 72 hours  folic acid 1 milliGRAM(s) Oral daily  iron sucrose IVPB 100 milliGRAM(s) IV Intermittent every 7 days  magnesium oxide 400 milliGRAM(s) Oral three times a day with meals  medroxyPROGESTERone 20 milliGRAM(s) Oral daily  montelukast 10 milliGRAM(s) Oral daily  multivitamin 1 Tablet(s) Oral daily  phytonadione   Solution 10 milliGRAM(s) Oral every other day  sodium chloride 0.9%. 1000 milliLiter(s) IV Continuous <Continuous>      PRN:   acetaminophen   Tablet .. 650 milliGRAM(s) Oral every 6 hours PRN  artificial  tears Solution 1 Drop(s) Both EYES three times a day PRN  benzocaine 15 mG/menthol 3.6 mG Lozenge 1 Lozenge Oral every 3 hours PRN  diphenhydrAMINE   Injectable 25 milliGRAM(s) IV Push every 4 hours PRN  FIRST- Mouthwash  BLM 5 milliLiter(s) Swish and Spit every 8 hours PRN  HYDROmorphone  Injectable 0.5 milliGRAM(s) IV Push every 2 hours PRN  HYDROmorphone  Injectable 1 milliGRAM(s) IV Push every 2 hours PRN  metoclopramide Injectable 10 milliGRAM(s) IV Push every 6 hours PRN  sodium chloride 0.65% Nasal 1 Spray(s) Both Nostrils five times a day PRN  sodium chloride 0.9% lock flush 10 milliLiter(s) IV Push every 1 hour PRN      A/P:  33 year old female with a history of AML  Post:  Allogeneic PBSCT day + 10  Labs every other day ; continue vitamin C, vitamin B, folic acid, vitamin K & iron supplementation. Increased vitamin K to every other day as per standard guidelines for bloodless transplant  Continue Provera 20 mg PO daily,  Procrit to 20,000 units TIW and Venofer  BP- 140s, started on Norvasc.  Hold for SBP <120  Mucositis likely related to MTX meds changed to IV. Continue Carafate and supportive care. Consider holding or delaying MTX on Sunday if mouth pain still significant.  Pain meds per palliative care  Neutropenic, afebrile. Continue Cipro and if T >/= 38C Pan Cx, CXR and change Cipro to Cefepime.     1. Infectious Disease:   acyclovir   Oral Tab/Cap 400 milliGRAM(s) Oral three times a day  atovaquone Suspension 750 milliGRAM(s) Oral every 12 hours  ciprofloxacin     Tablet 500 milliGRAM(s) Oral every 12 hours  clotrimazole Lozenge 1 Lozenge Oral five times a day  voriconazole 200 milliGRAM(s) Oral every 12 hours    2. VOD Prophylaxis: Actigall, Glutamine. Off heparin drip secondary to heavy menses.    3. GI Prophylaxis:    pantoprazole    Tablet 40 milliGRAM(s) IV before breakfast    4. Mouth care - NS / NaHCO3 rinses, Mycelex, Biotene; Skin care     5. GVHD prophylaxis -   tacrolimus 1 milliGRAM(s) Oral every 12 hours  MTX days 1,3,6,11    6. Transfuse & replete electrolytes prn - does not accept transfusion   magnesium oxide 400 milliGRAM(s) Oral three times a day with meals  phytonadione   Solution 10 milliGRAM(s) Oral every other day  iron sucrose IVPB 100 milliGRAM(s) IV Intermittent every 7 days  ascorbic acid 250 milliGRAM(s) Oral daily    7. IV hydration, daily weights, strict I&O, prn diuresis     8. PO intake as tolerated, nutrition follow up as needed, MVI, folic acid     9. Antiemetics, anti-diarrhea medications:   metoclopramide Injectable 10 milliGRAM(s) IV Push every 6 hours PRN    10. OOB as tolerated, physical therapy consult if needed     11. Monitor coags / fibrinogen 2x week, vitamin K as needed     12. Monitor closely for clinical changes, monitor for fevers     13. Emotional support provided, plan of care discussed and questions addressed     14. Patient education done regarding  plan of care, restrictions and discharge planning       I have written the above note for Dr. Silva who performed service with me in the room.   Sofie Connors NP-C ([phone])    I have seen and examined patient with NP, I agree with above note as scribed. [facility] Transplant Team                                                      Critical / Counseling Time Provided: 30 minutes                                                                                                                                                        Chief Complaint: Allogeneic peripheral blood stem cell transplant from [facility] (sister 10/10)     + throat pain continues  + menstrual bleeding is improved to same as yesterday    O: Vitals:   Vital Signs Last 24 Hrs  T(C): 37.4 (09 Mar 2019 05:25), Max: 37.4 (09 Mar 2019 05:25)  T(F): 99.3 (09 Mar 2019 05:25), Max: 99.3 (09 Mar 2019 05:25)  HR: 93 (09 Mar 2019 05:25) (92 - 101)  BP: 119/87 (09 Mar 2019 05:25) (100/62 - 119/87)  BP(mean): --  RR: 18 (09 Mar 2019 05:25) (17 - 18)  SpO2: 100% (09 Mar 2019 05:25) (99% - 100%)    Admit weight: 88.2 kG  Daily Weight:    Intake / Output:   03-08 @ 07:01  -  03-09 @ 07:00  --------------------------------------------------------  IN: 2604 mL / OUT: 2050 mL / NET: 554 mL        PE:   Oropharynx: no erythema or ulcerations in oropharynx  Oral Mucositis:    +                                         Grade: 1-2(throat)  CVS: (+) S1/S2 RRR   Lungs: CTA throughout bilaterally   Abdomen: (+)BS x 4, soft, NT/ND   Extremities: no edema  in B/L LE  Gastric Mucositis:       +                                          stGstrstastdstest:st st1st Intestinal Mucositis:     -                                          Grade: n/a  Skin: no rash   TLC: PICC line C/D/I  Neuro: A&O x 3   Pain: 8/10 then 3/10 after pain medication        Labs: Completed every other day (last 3/8/19)    CBC Full  -  ( 08 Mar 2019 06:49 )  WBC Count : 0.5 K/uL  Hemoglobin : 8.5 g/dL  Hematocrit : 22.9 %  Platelet Count - Automated : 107 K/uL  Mean Cell Volume : 89.2 fl  Mean Cell Hemoglobin : 33.1 pg  Mean Cell Hemoglobin Concentration : 37.1 gm/dL  Auto Neutrophil # : x  Auto Lymphocyte # : x  Auto Monocyte # : x  Auto Eosinophil # : x  Auto Basophil # : x  Auto Neutrophil % : x  Auto Lymphocyte % : x  Auto Monocyte % : x  Auto Eosinophil % : x  Auto Basophil % : x                          8.5    0.5   )-----------( 107      ( 08 Mar 2019 06:49 )             22.9     03-08    140  |  104  |  6<L>  ----------------------------<  87  4.0   |  22  |  0.68    Ca    9.7      08 Mar 2019 06:49  Phos  4.1     03-08  Mg     1.8     03-08    TPro  6.8  /  Alb  3.8  /  TBili  0.2  /  DBili  <0.1  /  AST  16  /  ALT  17  /  AlkPhos  59  03-08    PT/INR - ( 08 Mar 2019 06:49 )   PT: 11.8 sec;   INR: 1.03 ratio         PTT - ( 08 Mar 2019 06:49 )  PTT:28.2 sec  LIVER FUNCTIONS - ( 08 Mar 2019 06:49 )  Alb: 3.8 g/dL / Pro: 6.8 g/dL / ALK PHOS: 59 U/L / ALT: 17 U/L / AST: 16 U/L / GGT: x                03-08 @ 10:02  Tacrolimus 5.8                Cyclosporine --      Meds:   Antimicrobials:   acyclovir    Suspension 400 milliGRAM(s) Oral every 8 hours  atovaquone Suspension 750 milliGRAM(s) Oral every 12 hours  ciprofloxacin   IVPB 400 milliGRAM(s) IV Intermittent every 12 hours  clotrimazole Lozenge 1 Lozenge Oral five times a day  voriconazole IVPB 200 milliGRAM(s) IV Intermittent every 12 hours        GI:  docusate sodium 100 milliGRAM(s) Oral three times a day  pantoprazole  Injectable 40 milliGRAM(s) IV Push daily  senna 2 Tablet(s) Oral at bedtime  sodium bicarbonate Mouth Rinse 10 milliLiter(s) Swish and Spit five times a day  sucralfate suspension 1 Gram(s) Oral four times a day  ursodiol Capsule 300 milliGRAM(s) Oral two times a day with meals      Cardiovascular:   amLODIPine   Tablet 5 milliGRAM(s) Oral daily      Immunologic:   epoetin bhupendra Injectable 24960 Unit(s) SubCutaneous <User Schedule>  immune   globulin 10% (GAMMAGARD) IVPB 20 Gram(s) IV Intermittent <User Schedule>  tacrolimus 1 milliGRAM(s) Oral every 12 hours      Other medications:   acetaminophen   Tablet .. 650 milliGRAM(s) Oral every 6 hours  acetaminophen   Tablet .. 650 milliGRAM(s) Oral <User Schedule>  ascorbic acid 250 milliGRAM(s) Oral daily  Biotene Dry Mouth Oral Rinse 5 milliLiter(s) Swish and Spit five times a day  chlorhexidine 4% Liquid 1 Application(s) Topical <User Schedule>  cholecalciferol 400 Unit(s) Oral daily  cyanocobalamin 1000 MICROGram(s) Oral daily  diphenhydrAMINE   Injectable 25 milliGRAM(s) IV Push every 3 weeks  fentaNYL   Patch  12 MICROgram(s)/Hr 1 Patch Transdermal every 72 hours  folic acid 1 milliGRAM(s) Oral daily  iron sucrose IVPB 100 milliGRAM(s) IV Intermittent every 7 days  magnesium oxide 400 milliGRAM(s) Oral three times a day with meals  medroxyPROGESTERone 20 milliGRAM(s) Oral daily  montelukast 10 milliGRAM(s) Oral daily  multivitamin 1 Tablet(s) Oral daily  phytonadione   Solution 10 milliGRAM(s) Oral every other day  sodium chloride 0.9%. 1000 milliLiter(s) IV Continuous <Continuous>      PRN:   acetaminophen   Tablet .. 650 milliGRAM(s) Oral every 6 hours PRN  artificial  tears Solution 1 Drop(s) Both EYES three times a day PRN  benzocaine 15 mG/menthol 3.6 mG Lozenge 1 Lozenge Oral every 3 hours PRN  diphenhydrAMINE   Injectable 25 milliGRAM(s) IV Push every 4 hours PRN  FIRST- Mouthwash  BLM 5 milliLiter(s) Swish and Spit every 8 hours PRN  HYDROmorphone  Injectable 0.5 milliGRAM(s) IV Push every 2 hours PRN  HYDROmorphone  Injectable 1 milliGRAM(s) IV Push every 2 hours PRN  metoclopramide Injectable 10 milliGRAM(s) IV Push every 6 hours PRN  sodium chloride 0.65% Nasal 1 Spray(s) Both Nostrils five times a day PRN  sodium chloride 0.9% lock flush 10 milliLiter(s) IV Push every 1 hour PRN      A/P:  33 year old female with a history of AML  Post:  Allogeneic PBSCT day + 10  Labs every other day ; continue vitamin C, vitamin B, folic acid, vitamin K & iron supplementation. Increased vitamin K to every other day as per standard guidelines for bloodless transplant  Continue Provera 20 mg PO daily,  Procrit to 20,000 units TIW and Venofer  BP- 140s, started on Norvasc.  Hold for SBP <120  Mucositis likely related to MTX meds changed to IV. Continue Carafate and supportive care. Consider holding or delaying MTX on Sunday if mouth pain still significant.  Pain meds per palliative care  Neutropenic, afebrile. Continue Cipro and if T >/= 38C Pan Cx, CXR and change Cipro to Cefepime.     1. Infectious Disease:   acyclovir   Oral Tab/Cap 400 milliGRAM(s) Oral three times a day  atovaquone Suspension 750 milliGRAM(s) Oral every 12 hours  ciprofloxacin     Tablet 500 milliGRAM(s) Oral every 12 hours  clotrimazole Lozenge 1 Lozenge Oral five times a day  voriconazole 200 milliGRAM(s) Oral every 12 hours    2. VOD Prophylaxis: Actigall, Glutamine. Off heparin drip secondary to heavy menses.    3. GI Prophylaxis:    pantoprazole    Tablet 40 milliGRAM(s) IV before breakfast    4. Mouth care - NS / NaHCO3 rinses, Mycelex, Biotene; Skin care     5. GVHD prophylaxis -   tacrolimus 1 milliGRAM(s) Oral every 12 hours  MTX days 1,3,6,11    6. Transfuse & replete electrolytes prn - does not accept transfusion   magnesium oxide 400 milliGRAM(s) Oral three times a day with meals  phytonadione   Solution 10 milliGRAM(s) Oral every other day  iron sucrose IVPB 100 milliGRAM(s) IV Intermittent every 7 days  ascorbic acid 250 milliGRAM(s) Oral daily    7. IV hydration, daily weights, strict I&O, prn diuresis     8. PO intake as tolerated, nutrition follow up as needed, MVI, folic acid     9. Antiemetics, anti-diarrhea medications:   metoclopramide Injectable 10 milliGRAM(s) IV Push every 6 hours PRN    10. OOB as tolerated, physical therapy consult if needed     11. Monitor coags / fibrinogen 2x week, vitamin K as needed     12. Monitor closely for clinical changes, monitor for fevers     13. Emotional support provided, plan of care discussed and questions addressed     14. Patient education done regarding  plan of care, restrictions and discharge planning       I have written the above note for Dr. Silva who performed service with me in the room.   Sofie Connors NP-C ([phone])    I have seen and examined patient with NP, I agree with above note as scribed. [facility] Transplant Team                                                      Critical / Counseling Time Provided: 30 minutes                                                                                                                                                        Chief Complaint: Allogeneic peripheral blood stem cell transplant from [facility] (sister 10/10)     + throat pain continues  + menstrual bleeding is improved to same as yesterday    O: Vitals:   Vital Signs Last 24 Hrs  T(C): 37.4 (09 Mar 2019 05:25), Max: 37.4 (09 Mar 2019 05:25)  T(F): 99.3 (09 Mar 2019 05:25), Max: 99.3 (09 Mar 2019 05:25)  HR: 93 (09 Mar 2019 05:25) (92 - 101)  BP: 119/87 (09 Mar 2019 05:25) (100/62 - 119/87)  BP(mean): --  RR: 18 (09 Mar 2019 05:25) (17 - 18)  SpO2: 100% (09 Mar 2019 05:25) (99% - 100%)    Admit weight: 88.2 kG  Daily Weight: 87.1 kG    Intake / Output:   03-08 @ 07:01  -  03-09 @ 07:00  --------------------------------------------------------  IN: 2604 mL / OUT: 2050 mL / NET: 554 mL        PE:   Oropharynx: no erythema or ulcerations in oropharynx  Oral Mucositis:    +                                         Grade: 1-2(throat)  CVS: (+) S1/S2 RRR   Lungs: CTA throughout bilaterally   Abdomen: (+)BS x 4, soft, NT/ND   Extremities: no edema  in B/L LE  Gastric Mucositis:       +                                          stGstrstastdstest:st st1st Intestinal Mucositis:     -                                          Grade: n/a  Skin: no rash   TLC: PICC line C/D/I  Neuro: A&O x 3   Pain: 8/10 then 3/10 after pain medication        Labs: Completed every other day (last 3/8/19)    CBC Full  -  ( 08 Mar 2019 06:49 )  WBC Count : 0.5 K/uL  Hemoglobin : 8.5 g/dL  Hematocrit : 22.9 %  Platelet Count - Automated : 107 K/uL  Mean Cell Volume : 89.2 fl  Mean Cell Hemoglobin : 33.1 pg  Mean Cell Hemoglobin Concentration : 37.1 gm/dL  Auto Neutrophil # : x  Auto Lymphocyte # : x  Auto Monocyte # : x  Auto Eosinophil # : x  Auto Basophil # : x  Auto Neutrophil % : x  Auto Lymphocyte % : x  Auto Monocyte % : x  Auto Eosinophil % : x  Auto Basophil % : x                          8.5    0.5   )-----------( 107      ( 08 Mar 2019 06:49 )             22.9     03-08    140  |  104  |  6<L>  ----------------------------<  87  4.0   |  22  |  0.68    Ca    9.7      08 Mar 2019 06:49  Phos  4.1     03-08  Mg     1.8     03-08    TPro  6.8  /  Alb  3.8  /  TBili  0.2  /  DBili  <0.1  /  AST  16  /  ALT  17  /  AlkPhos  59  03-08    PT/INR - ( 08 Mar 2019 06:49 )   PT: 11.8 sec;   INR: 1.03 ratio         PTT - ( 08 Mar 2019 06:49 )  PTT:28.2 sec  LIVER FUNCTIONS - ( 08 Mar 2019 06:49 )  Alb: 3.8 g/dL / Pro: 6.8 g/dL / ALK PHOS: 59 U/L / ALT: 17 U/L / AST: 16 U/L / GGT: x                03-08 @ 10:02  Tacrolimus 5.8                Cyclosporine --      Meds:   Antimicrobials:   acyclovir    Suspension 400 milliGRAM(s) Oral every 8 hours  atovaquone Suspension 750 milliGRAM(s) Oral every 12 hours  ciprofloxacin   IVPB 400 milliGRAM(s) IV Intermittent every 12 hours  clotrimazole Lozenge 1 Lozenge Oral five times a day  voriconazole IVPB 200 milliGRAM(s) IV Intermittent every 12 hours        GI:  docusate sodium 100 milliGRAM(s) Oral three times a day  pantoprazole  Injectable 40 milliGRAM(s) IV Push daily  senna 2 Tablet(s) Oral at bedtime  sodium bicarbonate Mouth Rinse 10 milliLiter(s) Swish and Spit five times a day  sucralfate suspension 1 Gram(s) Oral four times a day  ursodiol Capsule 300 milliGRAM(s) Oral two times a day with meals      Cardiovascular:   amLODIPine   Tablet 5 milliGRAM(s) Oral daily      Immunologic:   epoetin bhupendra Injectable 72755 Unit(s) SubCutaneous <User Schedule>  immune   globulin 10% (GAMMAGARD) IVPB 20 Gram(s) IV Intermittent <User Schedule>  tacrolimus 1 milliGRAM(s) Oral every 12 hours      Other medications:   acetaminophen   Tablet .. 650 milliGRAM(s) Oral every 6 hours  acetaminophen   Tablet .. 650 milliGRAM(s) Oral <User Schedule>  ascorbic acid 250 milliGRAM(s) Oral daily  Biotene Dry Mouth Oral Rinse 5 milliLiter(s) Swish and Spit five times a day  chlorhexidine 4% Liquid 1 Application(s) Topical <User Schedule>  cholecalciferol 400 Unit(s) Oral daily  cyanocobalamin 1000 MICROGram(s) Oral daily  diphenhydrAMINE   Injectable 25 milliGRAM(s) IV Push every 3 weeks  fentaNYL   Patch  12 MICROgram(s)/Hr 1 Patch Transdermal every 72 hours  folic acid 1 milliGRAM(s) Oral daily  iron sucrose IVPB 100 milliGRAM(s) IV Intermittent every 7 days  magnesium oxide 400 milliGRAM(s) Oral three times a day with meals  medroxyPROGESTERone 20 milliGRAM(s) Oral daily  montelukast 10 milliGRAM(s) Oral daily  multivitamin 1 Tablet(s) Oral daily  phytonadione   Solution 10 milliGRAM(s) Oral every other day  sodium chloride 0.9%. 1000 milliLiter(s) IV Continuous <Continuous>      PRN:   acetaminophen   Tablet .. 650 milliGRAM(s) Oral every 6 hours PRN  artificial  tears Solution 1 Drop(s) Both EYES three times a day PRN  benzocaine 15 mG/menthol 3.6 mG Lozenge 1 Lozenge Oral every 3 hours PRN  diphenhydrAMINE   Injectable 25 milliGRAM(s) IV Push every 4 hours PRN  FIRST- Mouthwash  BLM 5 milliLiter(s) Swish and Spit every 8 hours PRN  HYDROmorphone  Injectable 0.5 milliGRAM(s) IV Push every 2 hours PRN  HYDROmorphone  Injectable 1 milliGRAM(s) IV Push every 2 hours PRN  metoclopramide Injectable 10 milliGRAM(s) IV Push every 6 hours PRN  sodium chloride 0.65% Nasal 1 Spray(s) Both Nostrils five times a day PRN  sodium chloride 0.9% lock flush 10 milliLiter(s) IV Push every 1 hour PRN      A/P:  33 year old female with a history of AML  Post:  Allogeneic PBSCT day + 10  Labs every other day ; continue vitamin C, vitamin B, folic acid, vitamin K & iron supplementation. Increased vitamin K to every other day as per standard guidelines for bloodless transplant  Continue Provera 20 mg PO daily,  Procrit to 20,000 units TIW and Venofer  BP- 140s, started on Norvasc.  Hold for SBP <120  Mucositis likely related to MTX meds changed to IV. Continue Carafate and supportive care. Consider holding or delaying MTX on Sunday if mouth pain still significant.  Pain meds per palliative care  Neutropenic, afebrile. Continue Cipro and if T >/= 38C Pan Cx, CXR and change Cipro to Cefepime.     1. Infectious Disease:   acyclovir   Oral Tab/Cap 400 milliGRAM(s) Oral three times a day  atovaquone Suspension 750 milliGRAM(s) Oral every 12 hours  ciprofloxacin     Tablet 500 milliGRAM(s) Oral every 12 hours  clotrimazole Lozenge 1 Lozenge Oral five times a day  voriconazole 200 milliGRAM(s) Oral every 12 hours    2. VOD Prophylaxis: Actigall, Glutamine. Off heparin drip secondary to heavy menses.    3. GI Prophylaxis:    pantoprazole    Tablet 40 milliGRAM(s) IV before breakfast    4. Mouth care - NS / NaHCO3 rinses, Mycelex, Biotene; Skin care     5. GVHD prophylaxis -   tacrolimus 1 milliGRAM(s) Oral every 12 hours  MTX days 1,3,6,11    6. Transfuse & replete electrolytes prn - does not accept transfusion   magnesium oxide 400 milliGRAM(s) Oral three times a day with meals  phytonadione   Solution 10 milliGRAM(s) Oral every other day  iron sucrose IVPB 100 milliGRAM(s) IV Intermittent every 7 days  ascorbic acid 250 milliGRAM(s) Oral daily    7. IV hydration, daily weights, strict I&O, prn diuresis     8. PO intake as tolerated, nutrition follow up as needed, MVI, folic acid     9. Antiemetics, anti-diarrhea medications:   metoclopramide Injectable 10 milliGRAM(s) IV Push every 6 hours PRN    10. OOB as tolerated, physical therapy consult if needed     11. Monitor coags / fibrinogen 2x week, vitamin K as needed     12. Monitor closely for clinical changes, monitor for fevers     13. Emotional support provided, plan of care discussed and questions addressed     14. Patient education done regarding  plan of care, restrictions and discharge planning       I have written the above note for Dr. Silva who performed service with me in the room.   Sofie Connors NP-C ([phone])    I have seen and examined patient with NP, I agree with above note as scribed.

## 2019-03-09 NOTE — PROGRESS NOTE ADULT - ATTENDING COMMENTS
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 Pt is [employer], and does not accept blood products for [employer] reasons   Day + 10 -  s/p MTX on days +1, +3, +6, serum creatinine in normal range. No oral mucositis visible. Due for MTX on day +11  Mucositis- throat soreness/pain with swallowing likely secondary to mucositis s/p MTX; monitor symptoms. Palliative care consult for pain management. Change meds to IV route if possible, add Carafate slurry.  Continue Tacrolimus 1 mg po q 12 hrs, monitor level  Begin Zarxio on day +12  Continue Acyclovir, Mepron, Vfend prophylaxis; on Cipro for neutropenia. Cipro and Vfend changed to IV route.  Continue VOD prophylaxis with Actigall, glutamine supplement. Off heparin since 3/6 due to increased menstrual bleeding  Hypertension- possibly related to [place], other meds, fluids- continue Norvasc.  Labs every other day or at discretion of attending physician  Continue iron, folic acid, vitamin K, vitamin B-12, vitamin C as per standard practice for bloodless transplant  Anemia secondary to antineoplastic chemotherapy- begin Procrit for Hgb < 11g/dL, as she does not accept transfusion of pRBC. Cont iron supplement. As Hgb < 9, changed to Venofer IV and D/C oral iron; increase Procrit to 20,000 U 3X/week.   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 Pt is [employer], and does not accept blood products for [employer] reasons    Day + 10 - s/p MTX on days +1, +3, +6, serum creatinine in normal range. No oral mucositis visible. Due for MTX on day +11  Mucositis- throat soreness/pain with swallowing likely secondary to mucositis s/p MTX; monitor symptoms. Palliative care consult for pain management. Change meds to IV route if possible, add Carafate slurry.  Continue Tacrolimus 1 mg po q 12 hrs, monitor level  Begin Zarxio on day +12  Continue Acyclovir, Mepron, Vfend prophylaxis; on Cipro for neutropenia. Cipro and Vfend changed to IV route.  Continue VOD prophylaxis with Actigall, glutamine supplement. Off heparin since 3/6 due to increased menstrual bleeding  Hypertension- possibly related to [place], other meds, fluids- continue Norvasc.  Labs every other day or at discretion of attending physician  Continue iron, folic acid, vitamin K, vitamin B-12, vitamin C as per standard practice for bloodless transplant  Anemia secondary to antineoplastic chemotherapy- begin Procrit for Hgb < 11g/dL, as she does not accept transfusion of pRBC. Cont iron supplement. As Hgb < 9, changed to Venofer IV and D/C oral iron; increase Procrit to 20,000 U 3X/week.   Antiemetics, mouth care, skin care   OOB/ambulate

## 2019-03-10 LAB
ALBUMIN SERPL ELPH-MCNC: 3.7 G/DL — SIGNIFICANT CHANGE UP (ref 3.3–5)
ALP SERPL-CCNC: 67 U/L — SIGNIFICANT CHANGE UP (ref 40–120)
ALT FLD-CCNC: 14 U/L — SIGNIFICANT CHANGE UP (ref 10–45)
ANION GAP SERPL CALC-SCNC: 13 MMOL/L — SIGNIFICANT CHANGE UP (ref 5–17)
APTT BLD: 28.1 SEC — SIGNIFICANT CHANGE UP (ref 27.5–36.3)
AST SERPL-CCNC: 14 U/L — SIGNIFICANT CHANGE UP (ref 10–40)
BASOPHILS # BLD AUTO: 0 K/UL — SIGNIFICANT CHANGE UP (ref 0–0.2)
BILIRUB SERPL-MCNC: 0.1 MG/DL — LOW (ref 0.2–1.2)
BUN SERPL-MCNC: <4 MG/DL — LOW (ref 7–23)
CALCIUM SERPL-MCNC: 9.6 MG/DL — SIGNIFICANT CHANGE UP (ref 8.4–10.5)
CHLORIDE SERPL-SCNC: 107 MMOL/L — SIGNIFICANT CHANGE UP (ref 96–108)
CO2 SERPL-SCNC: 23 MMOL/L — SIGNIFICANT CHANGE UP (ref 22–31)
CREAT SERPL-MCNC: 0.64 MG/DL — SIGNIFICANT CHANGE UP (ref 0.5–1.3)
EOSINOPHIL # BLD AUTO: 0 K/UL — SIGNIFICANT CHANGE UP (ref 0–0.5)
FIBRINOGEN PPP-MCNC: 878 MG/DL — HIGH (ref 350–510)
GLUCOSE SERPL-MCNC: 93 MG/DL — SIGNIFICANT CHANGE UP (ref 70–99)
HCT VFR BLD CALC: 27.3 % — LOW (ref 34.5–45)
HGB BLD-MCNC: 10 G/DL — LOW (ref 11.5–15.5)
INR BLD: 1.08 RATIO — SIGNIFICANT CHANGE UP (ref 0.88–1.16)
LDH SERPL L TO P-CCNC: 197 U/L — SIGNIFICANT CHANGE UP (ref 50–242)
LYMPHOCYTES # BLD AUTO: 0.6 K/UL — LOW (ref 1–3.3)
LYMPHOCYTES # BLD AUTO: 72 % — HIGH (ref 13–44)
MAGNESIUM SERPL-MCNC: 1.7 MG/DL — SIGNIFICANT CHANGE UP (ref 1.6–2.6)
MCHC RBC-ENTMCNC: 32.5 PG — SIGNIFICANT CHANGE UP (ref 27–34)
MCHC RBC-ENTMCNC: 36.5 GM/DL — HIGH (ref 32–36)
MCV RBC AUTO: 89.1 FL — SIGNIFICANT CHANGE UP (ref 80–100)
MONOCYTES # BLD AUTO: 0.3 K/UL — SIGNIFICANT CHANGE UP (ref 0–0.9)
MONOCYTES NFR BLD AUTO: 28 % — HIGH (ref 2–14)
NEUTROPHILS # BLD AUTO: 0 K/UL — LOW (ref 1.8–7.4)
NEUTROPHILS NFR BLD AUTO: 0 % — LOW (ref 43–77)
PHOSPHATE SERPL-MCNC: 3.4 MG/DL — SIGNIFICANT CHANGE UP (ref 2.5–4.5)
PLATELET # BLD AUTO: 112 K/UL — LOW (ref 150–400)
POTASSIUM SERPL-MCNC: 3.8 MMOL/L — SIGNIFICANT CHANGE UP (ref 3.5–5.3)
POTASSIUM SERPL-SCNC: 3.8 MMOL/L — SIGNIFICANT CHANGE UP (ref 3.5–5.3)
PROT SERPL-MCNC: 6.7 G/DL — SIGNIFICANT CHANGE UP (ref 6–8.3)
PROTHROM AB SERPL-ACNC: 12.4 SEC — SIGNIFICANT CHANGE UP (ref 10–12.9)
RBC # BLD: 3.07 M/UL — LOW (ref 3.8–5.2)
RBC # FLD: 14.4 % — SIGNIFICANT CHANGE UP (ref 10.3–14.5)
SODIUM SERPL-SCNC: 143 MMOL/L — SIGNIFICANT CHANGE UP (ref 135–145)
TACROLIMUS SERPL-MCNC: 2.2 NG/ML — SIGNIFICANT CHANGE UP
WBC # BLD: 1 K/UL — CRITICAL LOW (ref 3.8–10.5)
WBC # FLD AUTO: 1 K/UL — CRITICAL LOW (ref 3.8–10.5)

## 2019-03-10 PROCEDURE — 99291 CRITICAL CARE FIRST HOUR: CPT

## 2019-03-10 RX ORDER — TACROLIMUS 5 MG/1
1.5 CAPSULE ORAL EVERY 12 HOURS
Qty: 0 | Refills: 0 | Status: DISCONTINUED | OUTPATIENT
Start: 2019-03-10 | End: 2019-03-18

## 2019-03-10 RX ORDER — METHOTREXATE 2.5 MG/1
9 TABLET ORAL ONCE
Qty: 0 | Refills: 0 | Status: COMPLETED | OUTPATIENT
Start: 2019-03-10 | End: 2019-03-10

## 2019-03-10 RX ADMIN — MEDROXYPROGESTERONE ACETATE 20 MILLIGRAM(S): 150 INJECTION, SUSPENSION, EXTENDED RELEASE INTRAMUSCULAR at 12:15

## 2019-03-10 RX ADMIN — URSODIOL 300 MILLIGRAM(S): 250 TABLET, FILM COATED ORAL at 17:27

## 2019-03-10 RX ADMIN — MAGNESIUM OXIDE 400 MG ORAL TABLET 400 MILLIGRAM(S): 241.3 TABLET ORAL at 07:54

## 2019-03-10 RX ADMIN — PREGABALIN 1000 MICROGRAM(S): 225 CAPSULE ORAL at 12:15

## 2019-03-10 RX ADMIN — Medication 1 LOZENGE: at 12:16

## 2019-03-10 RX ADMIN — FENTANYL CITRATE 1 PATCH: 50 INJECTION INTRAVENOUS at 17:28

## 2019-03-10 RX ADMIN — ATOVAQUONE 750 MILLIGRAM(S): 750 SUSPENSION ORAL at 17:22

## 2019-03-10 RX ADMIN — HYDROMORPHONE HYDROCHLORIDE 1 MILLIGRAM(S): 2 INJECTION INTRAMUSCULAR; INTRAVENOUS; SUBCUTANEOUS at 12:41

## 2019-03-10 RX ADMIN — Medication 5 MILLILITER(S): at 07:54

## 2019-03-10 RX ADMIN — CHLORHEXIDINE GLUCONATE 1 APPLICATION(S): 213 SOLUTION TOPICAL at 06:34

## 2019-03-10 RX ADMIN — URSODIOL 300 MILLIGRAM(S): 250 TABLET, FILM COATED ORAL at 07:54

## 2019-03-10 RX ADMIN — Medication 5 MILLILITER(S): at 12:16

## 2019-03-10 RX ADMIN — MONTELUKAST 10 MILLIGRAM(S): 4 TABLET, CHEWABLE ORAL at 12:15

## 2019-03-10 RX ADMIN — Medication 1 LOZENGE: at 17:21

## 2019-03-10 RX ADMIN — MAGNESIUM OXIDE 400 MG ORAL TABLET 400 MILLIGRAM(S): 241.3 TABLET ORAL at 12:15

## 2019-03-10 RX ADMIN — TACROLIMUS 1 MILLIGRAM(S): 5 CAPSULE ORAL at 05:53

## 2019-03-10 RX ADMIN — Medication 400 MILLIGRAM(S): at 14:36

## 2019-03-10 RX ADMIN — Medication 100 MILLIGRAM(S): at 14:36

## 2019-03-10 RX ADMIN — AMLODIPINE BESYLATE 5 MILLIGRAM(S): 2.5 TABLET ORAL at 05:53

## 2019-03-10 RX ADMIN — FENTANYL CITRATE 1 PATCH: 50 INJECTION INTRAVENOUS at 06:35

## 2019-03-10 RX ADMIN — Medication 400 UNIT(S): at 12:15

## 2019-03-10 RX ADMIN — Medication 200 MILLIGRAM(S): at 05:52

## 2019-03-10 RX ADMIN — TACROLIMUS 1.5 MILLIGRAM(S): 5 CAPSULE ORAL at 17:24

## 2019-03-10 RX ADMIN — HYDROMORPHONE HYDROCHLORIDE 1 MILLIGRAM(S): 2 INJECTION INTRAMUSCULAR; INTRAVENOUS; SUBCUTANEOUS at 22:15

## 2019-03-10 RX ADMIN — Medication 1 MILLIGRAM(S): at 12:15

## 2019-03-10 RX ADMIN — Medication 1 TABLET(S): at 12:15

## 2019-03-10 RX ADMIN — HYDROMORPHONE HYDROCHLORIDE 1 MILLIGRAM(S): 2 INJECTION INTRAMUSCULAR; INTRAVENOUS; SUBCUTANEOUS at 13:00

## 2019-03-10 RX ADMIN — Medication 100 MILLIGRAM(S): at 05:53

## 2019-03-10 RX ADMIN — Medication 1 LOZENGE: at 07:54

## 2019-03-10 RX ADMIN — METHOTREXATE 4.32 MILLIGRAM(S): 2.5 TABLET ORAL at 15:45

## 2019-03-10 RX ADMIN — MAGNESIUM OXIDE 400 MG ORAL TABLET 400 MILLIGRAM(S): 241.3 TABLET ORAL at 17:22

## 2019-03-10 RX ADMIN — ATOVAQUONE 750 MILLIGRAM(S): 750 SUSPENSION ORAL at 05:53

## 2019-03-10 RX ADMIN — Medication 10 MILLILITER(S): at 17:21

## 2019-03-10 RX ADMIN — Medication 1 GRAM(S): at 17:22

## 2019-03-10 RX ADMIN — Medication 5 MILLILITER(S): at 17:21

## 2019-03-10 RX ADMIN — SENNA PLUS 2 TABLET(S): 8.6 TABLET ORAL at 21:41

## 2019-03-10 RX ADMIN — Medication 1 LOZENGE: at 20:15

## 2019-03-10 RX ADMIN — Medication 250 MILLIGRAM(S): at 12:15

## 2019-03-10 RX ADMIN — VORICONAZOLE 125 MILLIGRAM(S): 10 INJECTION, POWDER, LYOPHILIZED, FOR SOLUTION INTRAVENOUS at 17:22

## 2019-03-10 RX ADMIN — Medication 10 MILLILITER(S): at 12:16

## 2019-03-10 RX ADMIN — Medication 100 MILLIGRAM(S): at 21:42

## 2019-03-10 RX ADMIN — PANTOPRAZOLE SODIUM 40 MILLIGRAM(S): 20 TABLET, DELAYED RELEASE ORAL at 12:15

## 2019-03-10 RX ADMIN — Medication 400 MILLIGRAM(S): at 05:53

## 2019-03-10 RX ADMIN — FENTANYL CITRATE 1 PATCH: 50 INJECTION INTRAVENOUS at 20:13

## 2019-03-10 RX ADMIN — Medication 400 MILLIGRAM(S): at 21:42

## 2019-03-10 RX ADMIN — FENTANYL CITRATE 1 PATCH: 50 INJECTION INTRAVENOUS at 18:00

## 2019-03-10 RX ADMIN — Medication 1 GRAM(S): at 12:14

## 2019-03-10 RX ADMIN — HYDROMORPHONE HYDROCHLORIDE 1 MILLIGRAM(S): 2 INJECTION INTRAMUSCULAR; INTRAVENOUS; SUBCUTANEOUS at 21:51

## 2019-03-10 RX ADMIN — Medication 10 MILLILITER(S): at 20:18

## 2019-03-10 RX ADMIN — Medication 5 MILLILITER(S): at 20:15

## 2019-03-10 RX ADMIN — Medication 10 MILLILITER(S): at 07:54

## 2019-03-10 RX ADMIN — Medication 1 GRAM(S): at 05:53

## 2019-03-10 RX ADMIN — Medication 200 MILLIGRAM(S): at 17:22

## 2019-03-10 RX ADMIN — VORICONAZOLE 125 MILLIGRAM(S): 10 INJECTION, POWDER, LYOPHILIZED, FOR SOLUTION INTRAVENOUS at 05:52

## 2019-03-10 NOTE — PROGRESS NOTE ADULT - ATTENDING COMMENTS
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 Pt is [employer], and does not accept blood products for Voodoo reasons    Day + 11- s/p MTX on days +1, +3, +6, serum creatinine in normal range. No oral mucositis visible. Due for MTX on day +11  Mucositis- throat soreness/pain with swallowing likely secondary to mucositis s/p MTX; monitor symptoms. Palliative care consult for pain management. Change meds to IV route if possible, add Carafate slurry.  Continue Tacrolimus 1 mg po q 12 hrs, monitor level  Begin Zarxio on day +12  Continue Acyclovir, Mepron, Vfend prophylaxis; on Cipro for neutropenia. Cipro and Vfend changed to IV route.  Continue VOD prophylaxis with Actigall, glutamine supplement. Off heparin since 3/6 due to increased menstrual bleeding  Hypertension- possibly related to [place], other meds, fluids- continue Norvasc.  Labs every other day or at discretion of attending physician  Continue iron, folic acid, vitamin K, vitamin B-12, vitamin C as per standard practice for bloodless transplant  Anemia secondary to antineoplastic chemotherapy- begin Procrit for Hgb < 11g/dL, as she does not accept transfusion of pRBC. Cont iron supplement. As Hgb < 9, changed to Venofer IV and D/C oral iron; increase Procrit to 20,000 U 3X/week.   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 Pt is Am[employer], and does not accept blood products for Sikh reasons    Day + 11- s/p MTX on days +1, +3, +6, serum creatinine in normal range. No oral mucositis visible. Due for MTX on day +11 (today)  Mucositis- throat soreness/pain with swallowing likely secondary to mucositis s/p MTX; monitor symptoms -now improved. Palliative care consult for pain management. Change meds to IV route if possible, add Carafate slurry.  Continue Tacrolimus 1 mg po q 12 hrs, monitor level  Begin Zarxio on day +12  Continue Acyclovir, Mepron, Vfend prophylaxis; on Cipro for neutropenia. Cipro and Vfend changed to IV route.  Continue VOD prophylaxis with Actigall, glutamine supplement. Off heparin since 3/6 due to increased menstrual bleeding  Hypertension- possibly related to [place], other meds, fluids- continue Norvasc.  Labs every other day or at discretion of attending physician  Continue iron, folic acid, vitamin K, vitamin B-12, vitamin C as per standard practice for bloodless transplant  Anemia secondary to antineoplastic chemotherapy- begin Procrit for Hgb < 11g/dL, as she does not accept transfusion of pRBC. Cont iron supplement. As Hgb < 9, changed to Venofer IV and D/C oral iron; increase Procrit to 20,000 U 3X/week.   Antiemetics, mouth care, skin care   OOB/ambulate

## 2019-03-10 NOTE — PROGRESS NOTE ADULT - SUBJECTIVE AND OBJECTIVE BOX
[facility] Transplant Team                                                      Critical / Counseling Time Provided: 30 minutes                                                                                                                                                        Chief Complaint: Allogeneic peripheral blood stem cell transplant from [facility] (sister 10/10)     + throat pain continues  + menstrual bleeding is improved to same as yesterday    O: Vitals:   Vital Signs Last 24 Hrs  T(C): 37.6 (10 Mar 2019 06:00), Max: 37.6 (10 Mar 2019 06:00)  T(F): 99.7 (10 Mar 2019 06:00), Max: 99.7 (10 Mar 2019 06:00)  HR: 102 (10 Mar 2019 06:00) (94 - 105)  BP: 105/75 (10 Mar 2019 06:00) (102/61 - 135/96)  BP(mean): --  RR: 18 (10 Mar 2019 06:00) (18 - 18)  SpO2: 100% (10 Mar 2019 06:00) (98% - 100%)    Admit weight: 88.2 kG   Daily Weight:    Intake / Output:   03-09 @ 06:01  -  03-10 @ 07:00  --------------------------------------------------------  IN: 3138 mL / OUT: 2650 mL / NET: 488 mL        PE:   Oropharynx: no erythema or ulcerations in oropharynx  Oral Mucositis:    +                                         Grade: 1-2(throat)  CVS: (+) S1/S2 RRR   Lungs: CTA throughout bilaterally   Abdomen: (+)BS x 4, soft, NT/ND   Extremities: no edema  in B/L LE  Gastric Mucositis:       +                                          stGstrstastdstest:st st1st Intestinal Mucositis:     -                                          Grade: n/a  Skin: no rash   TLC: PICC line C/D/I  Neuro: A&O x 3   Pain: 8/10 then 3/10 after pain medication        Labs:             Meds:   Antimicrobials:   acyclovir    Suspension 400 milliGRAM(s) Oral every 8 hours  atovaquone Suspension 750 milliGRAM(s) Oral every 12 hours  ciprofloxacin   IVPB 400 milliGRAM(s) IV Intermittent every 12 hours  clotrimazole Lozenge 1 Lozenge Oral five times a day  voriconazole IVPB 200 milliGRAM(s) IV Intermittent every 12 hours      GI:  docusate sodium 100 milliGRAM(s) Oral three times a day  pantoprazole  Injectable 40 milliGRAM(s) IV Push daily  senna 2 Tablet(s) Oral at bedtime  sodium bicarbonate Mouth Rinse 10 milliLiter(s) Swish and Spit five times a day  sucralfate suspension 1 Gram(s) Oral four times a day  ursodiol Capsule 300 milliGRAM(s) Oral two times a day with meals      Cardiovascular:   amLODIPine   Tablet 5 milliGRAM(s) Oral daily      Immunologic:   epoetin bhupendra Injectable 60243 Unit(s) SubCutaneous <User Schedule>  immune   globulin 10% (GAMMAGARD) IVPB 20 Gram(s) IV Intermittent <User Schedule>  tacrolimus 1 milliGRAM(s) Oral every 12 hours      Other medications:   acetaminophen   Tablet .. 650 milliGRAM(s) Oral every 6 hours  acetaminophen   Tablet .. 650 milliGRAM(s) Oral <User Schedule>  ascorbic acid 250 milliGRAM(s) Oral daily  Biotene Dry Mouth Oral Rinse 5 milliLiter(s) Swish and Spit five times a day  chlorhexidine 4% Liquid 1 Application(s) Topical <User Schedule>  cholecalciferol 400 Unit(s) Oral daily  cyanocobalamin 1000 MICROGram(s) Oral daily  diphenhydrAMINE   Injectable 25 milliGRAM(s) IV Push every 3 weeks  fentaNYL   Patch  12 MICROgram(s)/Hr 1 Patch Transdermal every 72 hours  folic acid 1 milliGRAM(s) Oral daily  iron sucrose IVPB 100 milliGRAM(s) IV Intermittent every 7 days  magnesium oxide 400 milliGRAM(s) Oral three times a day with meals  medroxyPROGESTERone 20 milliGRAM(s) Oral daily  montelukast 10 milliGRAM(s) Oral daily  multivitamin 1 Tablet(s) Oral daily  phytonadione   Solution 10 milliGRAM(s) Oral every other day  sodium chloride 0.9%. 1000 milliLiter(s) IV Continuous <Continuous>      PRN:   acetaminophen   Tablet .. 650 milliGRAM(s) Oral every 6 hours PRN  artificial  tears Solution 1 Drop(s) Both EYES three times a day PRN  benzocaine 15 mG/menthol 3.6 mG Lozenge 1 Lozenge Oral every 3 hours PRN  diphenhydrAMINE   Injectable 25 milliGRAM(s) IV Push every 4 hours PRN  FIRST- Mouthwash  BLM 5 milliLiter(s) Swish and Spit every 8 hours PRN  HYDROmorphone  Injectable 0.5 milliGRAM(s) IV Push every 2 hours PRN  HYDROmorphone  Injectable 1 milliGRAM(s) IV Push every 2 hours PRN  metoclopramide Injectable 10 milliGRAM(s) IV Push every 6 hours PRN  sodium chloride 0.65% Nasal 1 Spray(s) Both Nostrils five times a day PRN  sodium chloride 0.9% lock flush 10 milliLiter(s) IV Push every 1 hour PRN      A/P:  33 year old female with a history of AML  Post:  Allogeneic PBSCT Day + 11  Labs every other day ; continue vitamin C, vitamin B, folic acid, vitamin K & iron supplementation. Increased vitamin K to every other day as per standard guidelines for bloodless transplant  Continue Provera 20 mg PO daily,  Procrit to 20,000 units TIW and Venofer  BP- 140s, started on Norvasc.  Hold for SBP <120  Mucositis likely related to MTX meds changed to IV. Continue Carafate and supportive care. Consider holding or delaying MTX on Sunday if mouth pain still significant.  Pain meds per palliative care  Neutropenic, afebrile. Continue Cipro and if T >/= 38C Pan Cx, CXR and change Cipro to Cefepime.       1. Infectious Disease:   acyclovir   Oral Tab/Cap 400 milliGRAM(s) Oral three times a day  atovaquone Suspension 750 milliGRAM(s) Oral every 12 hours  ciprofloxacin     Tablet 500 milliGRAM(s) Oral every 12 hours  clotrimazole Lozenge 1 Lozenge Oral five times a day  voriconazole 200 milliGRAM(s) Oral every 12 hours    2. VOD Prophylaxis: Actigall, Glutamine. Off heparin drip secondary to heavy menses.    3. GI Prophylaxis:    pantoprazole    Tablet 40 milliGRAM(s) IV before breakfast    4. Mouth care - NS / NaHCO3 rinses, Mycelex, Biotene; Skin care     5. GVHD prophylaxis -   tacrolimus 1 milliGRAM(s) Oral every 12 hours  MTX days 1,3,6,11    6. Transfuse & replete electrolytes prn - does not accept transfusion   magnesium oxide 400 milliGRAM(s) Oral three times a day with meals  phytonadione   Solution 10 milliGRAM(s) Oral every other day  iron sucrose IVPB 100 milliGRAM(s) IV Intermittent every 7 days  ascorbic acid 250 milliGRAM(s) Oral daily    7. IV hydration, daily weights, strict I&O, prn diuresis     8. PO intake as tolerated, nutrition follow up as needed, MVI, folic acid     9. Antiemetics, anti-diarrhea medications:   metoclopramide Injectable 10 milliGRAM(s) IV Push every 6 hours PRN    10. OOB as tolerated, physical therapy consult if needed     11. Monitor coags / fibrinogen 2x week, vitamin K as needed     12. Monitor closely for clinical changes, monitor for fevers     13. Emotional support provided, plan of care discussed and questions addressed     14. Patient education done regarding  plan of care, restrictions and discharge planning       I have written the above note for Dr. Silva who performed service with me in the room.   Sofie Connors NP-C ([phone])      I have seen and examined patient with NP, I agree with above note as scribed. [facility] Transplant Team                                                      Critical / Counseling Time Provided: 30 minutes                                                                                                                                                        Chief Complaint: Allogeneic peripheral blood stem cell transplant from [facility] (sister 10/10)     + throat pain continues  + menstrual bleeding is improved to same as yesterday    O: Vitals:   Vital Signs Last 24 Hrs  T(C): 37.6 (10 Mar 2019 06:00), Max: 37.6 (10 Mar 2019 06:00)  T(F): 99.7 (10 Mar 2019 06:00), Max: 99.7 (10 Mar 2019 06:00)  HR: 102 (10 Mar 2019 06:00) (94 - 105)  BP: 105/75 (10 Mar 2019 06:00) (102/61 - 135/96)  BP(mean): --  RR: 18 (10 Mar 2019 06:00) (18 - 18)  SpO2: 100% (10 Mar 2019 06:00) (98% - 100%)    Admit weight: 88.2 kG   Daily Weight:    Intake / Output:   03-09 @ 06:01  -  03-10 @ 07:00  --------------------------------------------------------  IN: 3138 mL / OUT: 2650 mL / NET: 488 mL        PE:   Oropharynx: no erythema or ulcerations in oropharynx  Oral Mucositis:    +                                         Grade: 1-2(throat)  CVS: (+) S1/S2 RRR   Lungs: CTA throughout bilaterally   Abdomen: (+)BS x 4, soft, NT/ND   Extremities: no edema  in B/L LE  Gastric Mucositis:       +                                          rdGrdrrdarddrderd:rd rd3rd Intestinal Mucositis:     -                                          Grade: n/a  Skin: no rash   TLC: PICC line C/D/I  Neuro: A&O x 3   Pain: 8/10 then 3/10 after pain medication        Labs:                         10.0   1.0   )-----------( 112      ( 10 Mar 2019 07:36 )             27.3         Mean Cell Volume : 89.1 fl  Mean Cell Hemoglobin : 32.5 pg  Mean Cell Hemoglobin Concentration : 36.5 gm/dL  Auto Neutrophil # : x  Auto Lymphocyte # : x  Auto Monocyte # : x  Auto Eosinophil # : x  Auto Basophil # : x  Auto Neutrophil % : x  Auto Lymphocyte % : x  Auto Monocyte % : x  Auto Eosinophil % : x  Auto Basophil % : x      03-10    143  |  107  |  <4<L>  ----------------------------<  93  3.8   |  23  |  0.64    Ca    9.6      10 Mar 2019 07:36  Phos  3.4     03-10  Mg     1.7     03-10    TPro  6.7  /  Alb  3.7  /  TBili  0.1<L>  /  DBili  x   /  AST  14  /  ALT  14  /  AlkPhos  67  03-10      Mg 1.7  Phos 3.4      PT/INR - ( 10 Mar 2019 07:36 )   PT: 12.4 sec;   INR: 1.08 ratio         PTT - ( 10 Mar 2019 07:36 )  PTT:28.1 sec              Meds:   Antimicrobials:   acyclovir    Suspension 400 milliGRAM(s) Oral every 8 hours  atovaquone Suspension 750 milliGRAM(s) Oral every 12 hours  ciprofloxacin   IVPB 400 milliGRAM(s) IV Intermittent every 12 hours  clotrimazole Lozenge 1 Lozenge Oral five times a day  voriconazole IVPB 200 milliGRAM(s) IV Intermittent every 12 hours      GI:  docusate sodium 100 milliGRAM(s) Oral three times a day  pantoprazole  Injectable 40 milliGRAM(s) IV Push daily  senna 2 Tablet(s) Oral at bedtime  sodium bicarbonate Mouth Rinse 10 milliLiter(s) Swish and Spit five times a day  sucralfate suspension 1 Gram(s) Oral four times a day  ursodiol Capsule 300 milliGRAM(s) Oral two times a day with meals      Cardiovascular:   amLODIPine   Tablet 5 milliGRAM(s) Oral daily      Immunologic:   epoetin bhupendra Injectable 10351 Unit(s) SubCutaneous <User Schedule>  immune   globulin 10% (GAMMAGARD) IVPB 20 Gram(s) IV Intermittent <User Schedule>  tacrolimus 1 milliGRAM(s) Oral every 12 hours      Other medications:   acetaminophen   Tablet .. 650 milliGRAM(s) Oral every 6 hours  acetaminophen   Tablet .. 650 milliGRAM(s) Oral <User Schedule>  ascorbic acid 250 milliGRAM(s) Oral daily  Biotene Dry Mouth Oral Rinse 5 milliLiter(s) Swish and Spit five times a day  chlorhexidine 4% Liquid 1 Application(s) Topical <User Schedule>  cholecalciferol 400 Unit(s) Oral daily  cyanocobalamin 1000 MICROGram(s) Oral daily  diphenhydrAMINE   Injectable 25 milliGRAM(s) IV Push every 3 weeks  fentaNYL   Patch  12 MICROgram(s)/Hr 1 Patch Transdermal every 72 hours  folic acid 1 milliGRAM(s) Oral daily  iron sucrose IVPB 100 milliGRAM(s) IV Intermittent every 7 days  magnesium oxide 400 milliGRAM(s) Oral three times a day with meals  medroxyPROGESTERone 20 milliGRAM(s) Oral daily  montelukast 10 milliGRAM(s) Oral daily  multivitamin 1 Tablet(s) Oral daily  phytonadione   Solution 10 milliGRAM(s) Oral every other day  sodium chloride 0.9%. 1000 milliLiter(s) IV Continuous <Continuous>      PRN:   acetaminophen   Tablet .. 650 milliGRAM(s) Oral every 6 hours PRN  artificial  tears Solution 1 Drop(s) Both EYES three times a day PRN  benzocaine 15 mG/menthol 3.6 mG Lozenge 1 Lozenge Oral every 3 hours PRN  diphenhydrAMINE   Injectable 25 milliGRAM(s) IV Push every 4 hours PRN  FIRST- Mouthwash  BLM 5 milliLiter(s) Swish and Spit every 8 hours PRN  HYDROmorphone  Injectable 0.5 milliGRAM(s) IV Push every 2 hours PRN  HYDROmorphone  Injectable 1 milliGRAM(s) IV Push every 2 hours PRN  metoclopramide Injectable 10 milliGRAM(s) IV Push every 6 hours PRN  sodium chloride 0.65% Nasal 1 Spray(s) Both Nostrils five times a day PRN  sodium chloride 0.9% lock flush 10 milliLiter(s) IV Push every 1 hour PRN      A/P:  33 year old female with a history of AML  Post:  Allogeneic PBSCT Day + 11  Labs every other day ; continue vitamin C, vitamin B, folic acid, vitamin K & iron supplementation. Increased vitamin K to every other day as per standard guidelines for bloodless transplant  Continue Provera 20 mg PO daily,  Procrit to 20,000 units TIW and Venofer  BP- 140s, started on Norvasc.  Hold for SBP <120  Mucositis likely related to MTX meds changed to IV. Continue Carafate and supportive care. Consider holding or delaying MTX on Sunday if mouth pain still significant.  Pain meds per palliative care  Neutropenic, afebrile. Continue Cipro and if T >/= 38C Pan Cx, CXR and change Cipro to Cefepime.       1. Infectious Disease:   acyclovir   Oral Tab/Cap 400 milliGRAM(s) Oral three times a day  atovaquone Suspension 750 milliGRAM(s) Oral every 12 hours  ciprofloxacin     Tablet 500 milliGRAM(s) Oral every 12 hours  clotrimazole Lozenge 1 Lozenge Oral five times a day  voriconazole 200 milliGRAM(s) Oral every 12 hours    2. VOD Prophylaxis: Actigall, Glutamine. Off heparin drip secondary to heavy menses.    3. GI Prophylaxis:    pantoprazole    Tablet 40 milliGRAM(s) IV before breakfast    4. Mouth care - NS / NaHCO3 rinses, Mycelex, Biotene; Skin care     5. GVHD prophylaxis -   tacrolimus 1 milliGRAM(s) Oral every 12 hours  MTX days 1,3,6,11    6. Transfuse & replete electrolytes prn - does not accept transfusion   magnesium oxide 400 milliGRAM(s) Oral three times a day with meals  phytonadione   Solution 10 milliGRAM(s) Oral every other day  iron sucrose IVPB 100 milliGRAM(s) IV Intermittent every 7 days  ascorbic acid 250 milliGRAM(s) Oral daily    7. IV hydration, daily weights, strict I&O, prn diuresis     8. PO intake as tolerated, nutrition follow up as needed, MVI, folic acid     9. Antiemetics, anti-diarrhea medications:   metoclopramide Injectable 10 milliGRAM(s) IV Push every 6 hours PRN    10. OOB as tolerated, physical therapy consult if needed     11. Monitor coags / fibrinogen 2x week, vitamin K as needed     12. Monitor closely for clinical changes, monitor for fevers     13. Emotional support provided, plan of care discussed and questions addressed     14. Patient education done regarding  plan of care, restrictions and discharge planning       I have written the above note for Dr. Silva who performed service with me in the room.   Sofie Connors NP-C ([phone])      I have seen and examined patient with NP, I agree with above note as scribed. [facility] Transplant Team                                                      Critical / Counseling Time Provided: 30 minutes                                                                                                                                                        Chief Complaint: Allogeneic peripheral blood stem cell transplant from [facility] (sister 10/10)     + throat pain improved   + menstrual bleeding minimal today    O: Vitals:   Vital Signs Last 24 Hrs  T(C): 37.6 (10 Mar 2019 06:00), Max: 37.6 (10 Mar 2019 06:00)  T(F): 99.7 (10 Mar 2019 06:00), Max: 99.7 (10 Mar 2019 06:00)  HR: 102 (10 Mar 2019 06:00) (94 - 105)  BP: 105/75 (10 Mar 2019 06:00) (102/61 - 135/96)  BP(mean): --  RR: 18 (10 Mar 2019 06:00) (18 - 18)  SpO2: 100% (10 Mar 2019 06:00) (98% - 100%)    Admit weight: 88.2 kG   Daily Weight:    Intake / Output:   03-09 @ 06:01  -  03-10 @ 07:00  --------------------------------------------------------  IN: 3138 mL / OUT: 2650 mL / NET: 488 mL        PE:   Oropharynx: no erythema or ulcerations in oropharynx  Oral Mucositis:    +                                         Grade: 1-2(throat)  CVS: (+) S1/S2 RRR   Lungs: CTA throughout bilaterally   Abdomen: (+)BS x 4, soft, NT/ND   Extremities: no edema  in B/L LE  Gastric Mucositis:       +                                          stGstrstastdstest:st st1st Intestinal Mucositis:     -                                          Grade: n/a  Skin: no rash   TLC: PICC line C/D/I  Neuro: A&O x 3   Pain: 8/10 then 3/10 after pain medication        Labs:                         10.0   1.0   )-----------( 112      ( 10 Mar 2019 07:36 )             27.3         Mean Cell Volume : 89.1 fl  Mean Cell Hemoglobin : 32.5 pg  Mean Cell Hemoglobin Concentration : 36.5 gm/dL  Auto Neutrophil # : x  Auto Lymphocyte # : x  Auto Monocyte # : x  Auto Eosinophil # : x  Auto Basophil # : x  Auto Neutrophil % : x  Auto Lymphocyte % : x  Auto Monocyte % : x  Auto Eosinophil % : x  Auto Basophil % : x      03-10    143  |  107  |  <4<L>  ----------------------------<  93  3.8   |  23  |  0.64    Ca    9.6      10 Mar 2019 07:36  Phos  3.4     03-10  Mg     1.7     03-10    TPro  6.7  /  Alb  3.7  /  TBili  0.1<L>  /  DBili  x   /  AST  14  /  ALT  14  /  AlkPhos  67  03-10      Mg 1.7  Phos 3.4      PT/INR - ( 10 Mar 2019 07:36 )   PT: 12.4 sec;   INR: 1.08 ratio         PTT - ( 10 Mar 2019 07:36 )  PTT:28.1 sec              Meds:   Antimicrobials:   acyclovir    Suspension 400 milliGRAM(s) Oral every 8 hours  atovaquone Suspension 750 milliGRAM(s) Oral every 12 hours  ciprofloxacin   IVPB 400 milliGRAM(s) IV Intermittent every 12 hours  clotrimazole Lozenge 1 Lozenge Oral five times a day  voriconazole IVPB 200 milliGRAM(s) IV Intermittent every 12 hours      GI:  docusate sodium 100 milliGRAM(s) Oral three times a day  pantoprazole  Injectable 40 milliGRAM(s) IV Push daily  senna 2 Tablet(s) Oral at bedtime  sodium bicarbonate Mouth Rinse 10 milliLiter(s) Swish and Spit five times a day  sucralfate suspension 1 Gram(s) Oral four times a day  ursodiol Capsule 300 milliGRAM(s) Oral two times a day with meals      Cardiovascular:   amLODIPine   Tablet 5 milliGRAM(s) Oral daily      Immunologic:   epoetin bhupendra Injectable 46937 Unit(s) SubCutaneous <User Schedule>  immune   globulin 10% (GAMMAGARD) IVPB 20 Gram(s) IV Intermittent <User Schedule>  tacrolimus 1 milliGRAM(s) Oral every 12 hours      Other medications:   acetaminophen   Tablet .. 650 milliGRAM(s) Oral every 6 hours  acetaminophen   Tablet .. 650 milliGRAM(s) Oral <User Schedule>  ascorbic acid 250 milliGRAM(s) Oral daily  Biotene Dry Mouth Oral Rinse 5 milliLiter(s) Swish and Spit five times a day  chlorhexidine 4% Liquid 1 Application(s) Topical <User Schedule>  cholecalciferol 400 Unit(s) Oral daily  cyanocobalamin 1000 MICROGram(s) Oral daily  diphenhydrAMINE   Injectable 25 milliGRAM(s) IV Push every 3 weeks  fentaNYL   Patch  12 MICROgram(s)/Hr 1 Patch Transdermal every 72 hours  folic acid 1 milliGRAM(s) Oral daily  iron sucrose IVPB 100 milliGRAM(s) IV Intermittent every 7 days  magnesium oxide 400 milliGRAM(s) Oral three times a day with meals  medroxyPROGESTERone 20 milliGRAM(s) Oral daily  montelukast 10 milliGRAM(s) Oral daily  multivitamin 1 Tablet(s) Oral daily  phytonadione   Solution 10 milliGRAM(s) Oral every other day  sodium chloride 0.9%. 1000 milliLiter(s) IV Continuous <Continuous>      PRN:   acetaminophen   Tablet .. 650 milliGRAM(s) Oral every 6 hours PRN  artificial  tears Solution 1 Drop(s) Both EYES three times a day PRN  benzocaine 15 mG/menthol 3.6 mG Lozenge 1 Lozenge Oral every 3 hours PRN  diphenhydrAMINE   Injectable 25 milliGRAM(s) IV Push every 4 hours PRN  FIRST- Mouthwash  BLM 5 milliLiter(s) Swish and Spit every 8 hours PRN  HYDROmorphone  Injectable 0.5 milliGRAM(s) IV Push every 2 hours PRN  HYDROmorphone  Injectable 1 milliGRAM(s) IV Push every 2 hours PRN  metoclopramide Injectable 10 milliGRAM(s) IV Push every 6 hours PRN  sodium chloride 0.65% Nasal 1 Spray(s) Both Nostrils five times a day PRN  sodium chloride 0.9% lock flush 10 milliLiter(s) IV Push every 1 hour PRN      A/P:  33 year old female with a history of AML  Post:  Allogeneic PBSCT Day + 11  Labs every other day ; continue vitamin C, vitamin B, folic acid, vitamin K & iron supplementation. Increased vitamin K to every other day as per standard guidelines for bloodless transplant  Continue Provera 20 mg PO daily,  Procrit to 20,000 units TIW and Venofer  BP- 140s, started on Norvasc.  Hold for SBP <120  Mucositis likely related to MTX and meds changed to IV 3/8. Continue Carafate and supportive care. Pain improving will give MTX today  Pain meds per palliative care  Neutropenic, afebrile. Continue Cipro and if T >/= 38C Pan Cx, CXR and change Cipro to Cefepime.       1. Infectious Disease:   acyclovir   Oral Tab/Cap 400 milliGRAM(s) Oral three times a day  atovaquone Suspension 750 milliGRAM(s) Oral every 12 hours  ciprofloxacin     Tablet 500 milliGRAM(s) Oral every 12 hours  clotrimazole Lozenge 1 Lozenge Oral five times a day  voriconazole 200 milliGRAM(s) Oral every 12 hours    2. VOD Prophylaxis: Actigall, Glutamine. Off heparin drip secondary to heavy menses.    3. GI Prophylaxis:    pantoprazole    Tablet 40 milliGRAM(s) IV before breakfast    4. Mouth care - NS / NaHCO3 rinses, Mycelex, Biotene; Skin care     5. GVHD prophylaxis -   tacrolimus 1 milliGRAM(s) Oral every 12 hours  MTX days 1,3,6,11    6. Transfuse & replete electrolytes prn - does not accept transfusion   magnesium oxide 400 milliGRAM(s) Oral three times a day with meals  phytonadione   Solution 10 milliGRAM(s) Oral every other day  iron sucrose IVPB 100 milliGRAM(s) IV Intermittent every 7 days  ascorbic acid 250 milliGRAM(s) Oral daily    7. IV hydration, daily weights, strict I&O, prn diuresis     8. PO intake as tolerated, nutrition follow up as needed, MVI, folic acid     9. Antiemetics, anti-diarrhea medications:   metoclopramide Injectable 10 milliGRAM(s) IV Push every 6 hours PRN    10. OOB as tolerated, physical therapy consult if needed     11. Monitor coags / fibrinogen 2x week, vitamin K as needed     12. Monitor closely for clinical changes, monitor for fevers     13. Emotional support provided, plan of care discussed and questions addressed     14. Patient education done regarding  plan of care, restrictions and discharge planning       I have written the above note for Dr. Silva who performed service with me in the room.   Sofie Connors NP-C ([phone])      I have seen and examined patient with NP, I agree with above note as scribed. [facility] Transplant Team                                                      Critical / Counseling Time Provided: 30 minutes                                                                                                                                                        Chief Complaint: Allogeneic peripheral blood stem cell transplant from [facility] (sister 10/10)     + throat pain improved   + menstrual bleeding minimal today    O: Vitals:   Vital Signs Last 24 Hrs  T(C): 37.6 (10 Mar 2019 06:00), Max: 37.6 (10 Mar 2019 06:00)  T(F): 99.7 (10 Mar 2019 06:00), Max: 99.7 (10 Mar 2019 06:00)  HR: 102 (10 Mar 2019 06:00) (94 - 105)  BP: 105/75 (10 Mar 2019 06:00) (102/61 - 135/96)  BP(mean): --  RR: 18 (10 Mar 2019 06:00) (18 - 18)  SpO2: 100% (10 Mar 2019 06:00) (98% - 100%)    Admit weight: 88.2 kG   Daily Weight: 87.1 kG    Intake / Output:   03-09 @ 06:01  -  03-10 @ 07:00  --------------------------------------------------------  IN: 3138 mL / OUT: 2650 mL / NET: 488 mL        PE:   Oropharynx: no erythema or ulcerations in oropharynx  Oral Mucositis:    +                                         Grade: 1-2(throat)  CVS: (+) S1/S2 RRR   Lungs: CTA throughout bilaterally   Abdomen: (+)BS x 4, soft, NT/ND   Extremities: no edema  in B/L LE  Gastric Mucositis:       +                                          rdGrdrrdarddrderd:rd rd3rd Intestinal Mucositis:     -                                          Grade: n/a  Skin: no rash   TLC: PICC line C/D/I  Neuro: A&O x 3   Pain: 8/10 then 3/10 after pain medication        Labs:                         10.0   1.0   )-----------( 112      ( 10 Mar 2019 07:36 )             27.3         Mean Cell Volume : 89.1 fl  Mean Cell Hemoglobin : 32.5 pg  Mean Cell Hemoglobin Concentration : 36.5 gm/dL  Auto Neutrophil # : x  Auto Lymphocyte # : x  Auto Monocyte # : x  Auto Eosinophil # : x  Auto Basophil # : x  Auto Neutrophil % : x  Auto Lymphocyte % : x  Auto Monocyte % : x  Auto Eosinophil % : x  Auto Basophil % : x      03-10    143  |  107  |  <4<L>  ----------------------------<  93  3.8   |  23  |  0.64    Ca    9.6      10 Mar 2019 07:36  Phos  3.4     03-10  Mg     1.7     03-10    TPro  6.7  /  Alb  3.7  /  TBili  0.1<L>  /  DBili  x   /  AST  14  /  ALT  14  /  AlkPhos  67  03-10      Mg 1.7  Phos 3.4      PT/INR - ( 10 Mar 2019 07:36 )   PT: 12.4 sec;   INR: 1.08 ratio         PTT - ( 10 Mar 2019 07:36 )  PTT:28.1 sec              Meds:   Antimicrobials:   acyclovir    Suspension 400 milliGRAM(s) Oral every 8 hours  atovaquone Suspension 750 milliGRAM(s) Oral every 12 hours  ciprofloxacin   IVPB 400 milliGRAM(s) IV Intermittent every 12 hours  clotrimazole Lozenge 1 Lozenge Oral five times a day  voriconazole IVPB 200 milliGRAM(s) IV Intermittent every 12 hours      GI:  docusate sodium 100 milliGRAM(s) Oral three times a day  pantoprazole  Injectable 40 milliGRAM(s) IV Push daily  senna 2 Tablet(s) Oral at bedtime  sodium bicarbonate Mouth Rinse 10 milliLiter(s) Swish and Spit five times a day  sucralfate suspension 1 Gram(s) Oral four times a day  ursodiol Capsule 300 milliGRAM(s) Oral two times a day with meals      Cardiovascular:   amLODIPine   Tablet 5 milliGRAM(s) Oral daily      Immunologic:   epoetin bhupendra Injectable 31155 Unit(s) SubCutaneous <User Schedule>  immune   globulin 10% (GAMMAGARD) IVPB 20 Gram(s) IV Intermittent <User Schedule>  tacrolimus 1 milliGRAM(s) Oral every 12 hours      Other medications:   acetaminophen   Tablet .. 650 milliGRAM(s) Oral every 6 hours  acetaminophen   Tablet .. 650 milliGRAM(s) Oral <User Schedule>  ascorbic acid 250 milliGRAM(s) Oral daily  Biotene Dry Mouth Oral Rinse 5 milliLiter(s) Swish and Spit five times a day  chlorhexidine 4% Liquid 1 Application(s) Topical <User Schedule>  cholecalciferol 400 Unit(s) Oral daily  cyanocobalamin 1000 MICROGram(s) Oral daily  diphenhydrAMINE   Injectable 25 milliGRAM(s) IV Push every 3 weeks  fentaNYL   Patch  12 MICROgram(s)/Hr 1 Patch Transdermal every 72 hours  folic acid 1 milliGRAM(s) Oral daily  iron sucrose IVPB 100 milliGRAM(s) IV Intermittent every 7 days  magnesium oxide 400 milliGRAM(s) Oral three times a day with meals  medroxyPROGESTERone 20 milliGRAM(s) Oral daily  montelukast 10 milliGRAM(s) Oral daily  multivitamin 1 Tablet(s) Oral daily  phytonadione   Solution 10 milliGRAM(s) Oral every other day  sodium chloride 0.9%. 1000 milliLiter(s) IV Continuous <Continuous>      PRN:   acetaminophen   Tablet .. 650 milliGRAM(s) Oral every 6 hours PRN  artificial  tears Solution 1 Drop(s) Both EYES three times a day PRN  benzocaine 15 mG/menthol 3.6 mG Lozenge 1 Lozenge Oral every 3 hours PRN  diphenhydrAMINE   Injectable 25 milliGRAM(s) IV Push every 4 hours PRN  FIRST- Mouthwash  BLM 5 milliLiter(s) Swish and Spit every 8 hours PRN  HYDROmorphone  Injectable 0.5 milliGRAM(s) IV Push every 2 hours PRN  HYDROmorphone  Injectable 1 milliGRAM(s) IV Push every 2 hours PRN  metoclopramide Injectable 10 milliGRAM(s) IV Push every 6 hours PRN  sodium chloride 0.65% Nasal 1 Spray(s) Both Nostrils five times a day PRN  sodium chloride 0.9% lock flush 10 milliLiter(s) IV Push every 1 hour PRN      A/P:  33 year old female with a history of AML  Post:  Allogeneic PBSCT Day + 11  Labs every other day ; continue vitamin C, vitamin B, folic acid, vitamin K & iron supplementation. Increased vitamin K to every other day as per standard guidelines for bloodless transplant  Continue Provera 20 mg PO daily,  Procrit to 20,000 units TIW and Venofer  BP- 140s, started on Norvasc.  Hold for SBP <120  Mucositis likely related to MTX and meds changed to IV 3/8. Continue Carafate and supportive care. Pain improving will give MTX today  Pain meds per palliative care  Neutropenic, afebrile. Continue Cipro and if T >/= 38C Pan Cx, CXR and change Cipro to Cefepime  Tacro 2.2 today will increase to 1.5mg PO q 12 and follow up repeat level       1. Infectious Disease:   acyclovir   Oral Tab/Cap 400 milliGRAM(s) Oral three times a day  atovaquone Suspension 750 milliGRAM(s) Oral every 12 hours  ciprofloxacin     Tablet 500 milliGRAM(s) Oral every 12 hours  clotrimazole Lozenge 1 Lozenge Oral five times a day  voriconazole 200 milliGRAM(s) Oral every 12 hours    2. VOD Prophylaxis: Actigall, Glutamine. Off heparin drip secondary to heavy menses.    3. GI Prophylaxis:    pantoprazole    Tablet 40 milliGRAM(s) IV before breakfast    4. Mouth care - NS / NaHCO3 rinses, Mycelex, Biotene; Skin care     5. GVHD prophylaxis -   tacrolimus 1 milliGRAM(s) Oral every 12 hours  MTX days 1,3,6,11    6. Transfuse & replete electrolytes prn - does not accept transfusion   magnesium oxide 400 milliGRAM(s) Oral three times a day with meals  phytonadione   Solution 10 milliGRAM(s) Oral every other day  iron sucrose IVPB 100 milliGRAM(s) IV Intermittent every 7 days  ascorbic acid 250 milliGRAM(s) Oral daily    7. IV hydration, daily weights, strict I&O, prn diuresis     8. PO intake as tolerated, nutrition follow up as needed, MVI, folic acid     9. Antiemetics, anti-diarrhea medications:   metoclopramide Injectable 10 milliGRAM(s) IV Push every 6 hours PRN    10. OOB as tolerated, physical therapy consult if needed     11. Monitor coags / fibrinogen 2x week, vitamin K as needed     12. Monitor closely for clinical changes, monitor for fevers     13. Emotional support provided, plan of care discussed and questions addressed     14. Patient education done regarding  plan of care, restrictions and discharge planning       I have written the above note for Dr. Silva who performed service with me in the room.   Sofie Connors NP-C ([phone])      I have seen and examined patient with NP, I agree with above note as scribed.

## 2019-03-11 PROCEDURE — 99233 SBSQ HOSP IP/OBS HIGH 50: CPT

## 2019-03-11 PROCEDURE — 99291 CRITICAL CARE FIRST HOUR: CPT

## 2019-03-11 RX ORDER — FENTANYL CITRATE 50 UG/ML
1 INJECTION INTRAVENOUS
Qty: 0 | Refills: 0 | Status: DISCONTINUED | OUTPATIENT
Start: 2019-03-11 | End: 2019-03-15

## 2019-03-11 RX ADMIN — Medication 10 MILLILITER(S): at 11:33

## 2019-03-11 RX ADMIN — Medication 1 LOZENGE: at 16:17

## 2019-03-11 RX ADMIN — Medication 1 GRAM(S): at 06:43

## 2019-03-11 RX ADMIN — ERYTHROPOIETIN 20000 UNIT(S): 10000 INJECTION, SOLUTION INTRAVENOUS; SUBCUTANEOUS at 11:58

## 2019-03-11 RX ADMIN — Medication 1 LOZENGE: at 08:01

## 2019-03-11 RX ADMIN — Medication 5 MILLILITER(S): at 16:17

## 2019-03-11 RX ADMIN — Medication 10 MILLILITER(S): at 20:06

## 2019-03-11 RX ADMIN — Medication 1 MILLIGRAM(S): at 11:23

## 2019-03-11 RX ADMIN — Medication 250 MILLIGRAM(S): at 11:27

## 2019-03-11 RX ADMIN — MAGNESIUM OXIDE 400 MG ORAL TABLET 400 MILLIGRAM(S): 241.3 TABLET ORAL at 11:32

## 2019-03-11 RX ADMIN — Medication 400 UNIT(S): at 11:23

## 2019-03-11 RX ADMIN — HYDROMORPHONE HYDROCHLORIDE 0.5 MILLIGRAM(S): 2 INJECTION INTRAMUSCULAR; INTRAVENOUS; SUBCUTANEOUS at 12:39

## 2019-03-11 RX ADMIN — MEDROXYPROGESTERONE ACETATE 20 MILLIGRAM(S): 150 INJECTION, SUSPENSION, EXTENDED RELEASE INTRAMUSCULAR at 11:23

## 2019-03-11 RX ADMIN — HYDROMORPHONE HYDROCHLORIDE 0.5 MILLIGRAM(S): 2 INJECTION INTRAMUSCULAR; INTRAVENOUS; SUBCUTANEOUS at 12:55

## 2019-03-11 RX ADMIN — Medication 1 TABLET(S): at 11:23

## 2019-03-11 RX ADMIN — SENNA PLUS 2 TABLET(S): 8.6 TABLET ORAL at 21:48

## 2019-03-11 RX ADMIN — TACROLIMUS 1.5 MILLIGRAM(S): 5 CAPSULE ORAL at 17:15

## 2019-03-11 RX ADMIN — Medication 100 MILLIGRAM(S): at 06:43

## 2019-03-11 RX ADMIN — VORICONAZOLE 125 MILLIGRAM(S): 10 INJECTION, POWDER, LYOPHILIZED, FOR SOLUTION INTRAVENOUS at 17:17

## 2019-03-11 RX ADMIN — VORICONAZOLE 125 MILLIGRAM(S): 10 INJECTION, POWDER, LYOPHILIZED, FOR SOLUTION INTRAVENOUS at 06:44

## 2019-03-11 RX ADMIN — PANTOPRAZOLE SODIUM 40 MILLIGRAM(S): 20 TABLET, DELAYED RELEASE ORAL at 11:27

## 2019-03-11 RX ADMIN — Medication 5 MILLILITER(S): at 00:39

## 2019-03-11 RX ADMIN — Medication 10 MILLILITER(S): at 08:02

## 2019-03-11 RX ADMIN — MAGNESIUM OXIDE 400 MG ORAL TABLET 400 MILLIGRAM(S): 241.3 TABLET ORAL at 08:01

## 2019-03-11 RX ADMIN — HYDROMORPHONE HYDROCHLORIDE 1 MILLIGRAM(S): 2 INJECTION INTRAMUSCULAR; INTRAVENOUS; SUBCUTANEOUS at 22:15

## 2019-03-11 RX ADMIN — HYDROMORPHONE HYDROCHLORIDE 1 MILLIGRAM(S): 2 INJECTION INTRAMUSCULAR; INTRAVENOUS; SUBCUTANEOUS at 21:47

## 2019-03-11 RX ADMIN — Medication 200 MILLIGRAM(S): at 17:19

## 2019-03-11 RX ADMIN — Medication 5 MILLILITER(S): at 11:28

## 2019-03-11 RX ADMIN — MAGNESIUM OXIDE 400 MG ORAL TABLET 400 MILLIGRAM(S): 241.3 TABLET ORAL at 17:20

## 2019-03-11 RX ADMIN — URSODIOL 300 MILLIGRAM(S): 250 TABLET, FILM COATED ORAL at 17:17

## 2019-03-11 RX ADMIN — ATOVAQUONE 750 MILLIGRAM(S): 750 SUSPENSION ORAL at 06:43

## 2019-03-11 RX ADMIN — TACROLIMUS 1.5 MILLIGRAM(S): 5 CAPSULE ORAL at 06:44

## 2019-03-11 RX ADMIN — Medication 1 GRAM(S): at 00:39

## 2019-03-11 RX ADMIN — PREGABALIN 1000 MICROGRAM(S): 225 CAPSULE ORAL at 11:29

## 2019-03-11 RX ADMIN — Medication 10 MILLILITER(S): at 15:44

## 2019-03-11 RX ADMIN — Medication 1 LOZENGE: at 11:28

## 2019-03-11 RX ADMIN — ATOVAQUONE 750 MILLIGRAM(S): 750 SUSPENSION ORAL at 17:17

## 2019-03-11 RX ADMIN — URSODIOL 300 MILLIGRAM(S): 250 TABLET, FILM COATED ORAL at 08:00

## 2019-03-11 RX ADMIN — Medication 200 MILLIGRAM(S): at 06:44

## 2019-03-11 RX ADMIN — Medication 5 MILLILITER(S): at 20:06

## 2019-03-11 RX ADMIN — Medication 400 MILLIGRAM(S): at 06:45

## 2019-03-11 RX ADMIN — Medication 10 MILLIGRAM(S): at 11:28

## 2019-03-11 RX ADMIN — Medication 1 LOZENGE: at 20:06

## 2019-03-11 RX ADMIN — Medication 400 MILLIGRAM(S): at 21:48

## 2019-03-11 RX ADMIN — Medication 1 LOZENGE: at 00:39

## 2019-03-11 RX ADMIN — Medication 100 MILLIGRAM(S): at 21:48

## 2019-03-11 RX ADMIN — FENTANYL CITRATE 1 PATCH: 50 INJECTION INTRAVENOUS at 16:26

## 2019-03-11 RX ADMIN — Medication 10 MILLILITER(S): at 00:39

## 2019-03-11 RX ADMIN — FENTANYL CITRATE 1 PATCH: 50 INJECTION INTRAVENOUS at 06:44

## 2019-03-11 RX ADMIN — Medication 5 MILLILITER(S): at 08:00

## 2019-03-11 RX ADMIN — FENTANYL CITRATE 1 PATCH: 50 INJECTION INTRAVENOUS at 20:05

## 2019-03-11 RX ADMIN — Medication 400 MILLIGRAM(S): at 15:45

## 2019-03-11 RX ADMIN — AMLODIPINE BESYLATE 5 MILLIGRAM(S): 2.5 TABLET ORAL at 06:43

## 2019-03-11 RX ADMIN — CHLORHEXIDINE GLUCONATE 1 APPLICATION(S): 213 SOLUTION TOPICAL at 06:45

## 2019-03-11 RX ADMIN — FENTANYL CITRATE 1 PATCH: 50 INJECTION INTRAVENOUS at 16:16

## 2019-03-11 RX ADMIN — Medication 1 GRAM(S): at 11:23

## 2019-03-11 RX ADMIN — MONTELUKAST 10 MILLIGRAM(S): 4 TABLET, CHEWABLE ORAL at 11:24

## 2019-03-11 RX ADMIN — Medication 1 GRAM(S): at 17:15

## 2019-03-11 NOTE — PROGRESS NOTE ADULT - ATTENDING COMMENTS
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 Pt is Sabianism, and does not accept blood products for Zoroastrianism reasons    Day + 12- s/p MTX on days +1, +3, +6, serum creatinine in normal range. No oral mucositis visible. To start MMF, tacro, zarxio today   Mucositis- throat soreness/pain with swallowing likely secondary to mucositis s/p MTX; monitor symptoms -now improved. Palliative care consult for pain management. Change meds to IV route if possible, add Carafate slurry.  Continue Tacrolimus 1 mg po q 12 hrs, monitor level  Continue Acyclovir, Mepron, Vfend prophylaxis; on Cipro for neutropenia. Cipro and Vfend changed to IV route.  Continue VOD prophylaxis with Actigall, glutamine supplement. Off heparin since 3/6 due to increased menstrual bleeding  Hypertension- possibly related to Tacro, other meds, fluids- continue Norvasc.  Labs every other day or at discretion of attending physician  Continue iron, folic acid, vitamin K, vitamin B-12, vitamin C as per standard practice for bloodless transplant  Anemia secondary to antineoplastic chemotherapy- begin Procrit for Hgb < 11g/dL, as she does not accept transfusion of pRBC. Cont iron supplement. As Hgb < 9, changed to Venofer IV and D/C oral iron; increase Procrit to 20,000 U 3X/week.   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 Pt is Taoism, and does not accept blood products for Christian reasons    Day + 12- s/p MTX on days +1, +3, +6, serum creatinine in normal range. No oral mucositis visible. To start zarxio today   Mucositis- throat soreness/pain with swallowing likely secondary to mucositis s/p MTX; monitor symptoms -now improved. Palliative care consult for pain management. Change meds to IV route if possible, add Carafate slurry.  Continue Tacrolimus 1 mg po q 12 hrs, monitor level  Continue Acyclovir, Mepron, Vfend prophylaxis; on Cipro for neutropenia. Cipro and Vfend changed to IV route.  Continue VOD prophylaxis with Actigall, glutamine supplement. Off heparin since 3/6 due to increased menstrual bleeding  Hypertension- possibly related to Tacro, other meds, fluids- continue Norvasc.  Labs every other day or at discretion of attending physician  Continue iron, folic acid, vitamin K, vitamin B-12, vitamin C as per standard practice for bloodless transplant  Anemia secondary to antineoplastic chemotherapy- begin Procrit for Hgb < 11g/dL, as she does not accept transfusion of pRBC. Cont iron supplement. As Hgb < 9, changed to Venofer IV and D/C oral iron; increase Procrit to 20,000 U 3X/week.   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 Pt is Mosque, and does not accept blood products for Anglican reasons    Day + 12- s/p MTX on days +1, +3, +6, 11 ...serum creatinine in normal range. No oral mucositis visible. To start zarxio today ..will hold off for now..wbc coming up on own and given [facility] at time of transplant will hold off..monitor for gvhd  Mucositis- throat soreness/pain with swallowing likely secondary to mucositis s/p MTX; monitor symptoms -now improved. Palliative care consult for pain management. Change meds to IV route if possible, add Carafate slurry.  Continue Tacrolimus 1 mg po q 12 hrs, monitor level  Continue Acyclovir, Mepron, Vfend prophylaxis; on Cipro for neutropenia. Cipro and Vfend changed to IV route.  Continue VOD prophylaxis with Actigall, glutamine supplement. Off heparin since 3/6 due to increased menstrual bleeding  Hypertension- possibly related to Tacro, other meds, fluids- continue Norvasc.  Labs every other day or at discretion of attending physician  Continue iron, folic acid, vitamin K, vitamin B-12, vitamin C as per standard practice for bloodless transplant  Anemia secondary to antineoplastic chemotherapy- begin Procrit for Hgb < 11g/dL, as she does not accept transfusion of pRBC. Cont iron supplement. As Hgb < 9, changed to Venofer IV and D/C oral iron; increase Procrit to 20,000 U 3X/week.   Antiemetics, mouth care, skin care   OOB/ambulate

## 2019-03-11 NOTE — PROGRESS NOTE ADULT - SUBJECTIVE AND OBJECTIVE BOX
HPI: 33F with PMH of AML/MDS, Mu-ism, here for allo PSCT. Diagnosed during bloodwork for a preop R ankle ligament repair, and managed with idarubicin c/b DIC, neutropenic fever, and retinal hemorrhage; had salvage therapy; further findings raised the question of MDS over AML. Sister is donor. Planned for SCT 2/27/19.    INTERVAL EVENTS: D#12 post-transplant, has some mouth pain on opioids, with current dilaudid and fentanyl 12mcg pain goes from 8/10 to 4/10    ADVANCE DIRECTIVES:    DNR [ ]  MOLST  [ ]    Living Will  [ ]   DECISION MAKER(s):  [ ] Health Care Proxy(s)  [ ] Surrogate(s)  [ ] Guardian           Name(s) and Contact(s):     BASELINE (I)ADL(s) (prior to admission):  Mart: [ ]Total  [ ] Moderate [ ]Dependent    Allergies    No Known Allergies    Intolerances    MEDICATIONS  (STANDING):  acetaminophen   Tablet .. 650 milliGRAM(s) Oral every 6 hours  acetaminophen   Tablet .. 650 milliGRAM(s) Oral <User Schedule>  acyclovir    Suspension 400 milliGRAM(s) Oral every 8 hours  amLODIPine   Tablet 5 milliGRAM(s) Oral daily  ascorbic acid 250 milliGRAM(s) Oral daily  atovaquone Suspension 750 milliGRAM(s) Oral every 12 hours  Biotene Dry Mouth Oral Rinse 5 milliLiter(s) Swish and Spit five times a day  chlorhexidine 4% Liquid 1 Application(s) Topical <User Schedule>  cholecalciferol 400 Unit(s) Oral daily  ciprofloxacin   IVPB 400 milliGRAM(s) IV Intermittent every 12 hours  clotrimazole Lozenge 1 Lozenge Oral five times a day  cyanocobalamin 1000 MICROGram(s) Oral daily  diphenhydrAMINE   Injectable 25 milliGRAM(s) IV Push every 3 weeks  docusate sodium 100 milliGRAM(s) Oral three times a day  epoetin bhupendra Injectable 48531 Unit(s) SubCutaneous <User Schedule>  fentaNYL   Patch  25 MICROgram(s)/Hr 1 Patch Transdermal every 72 hours  [id number]-sndz Injectable 480 MICROGram(s) SubCutaneous every 24 hours  folic acid 1 milliGRAM(s) Oral daily  immune   globulin 10% (GAMMAGARD) IVPB 20 Gram(s) IV Intermittent <User Schedule>  iron sucrose IVPB 100 milliGRAM(s) IV Intermittent every 7 days  lidocaine/prilocaine Cream 1 Application(s) Topical daily  magnesium oxide 400 milliGRAM(s) Oral three times a day with meals  medroxyPROGESTERone 20 milliGRAM(s) Oral daily  montelukast 10 milliGRAM(s) Oral daily  multivitamin 1 Tablet(s) Oral daily  pantoprazole  Injectable 40 milliGRAM(s) IV Push daily  phytonadione   Solution 10 milliGRAM(s) Oral every other day  senna 2 Tablet(s) Oral at bedtime  sodium bicarbonate Mouth Rinse 10 milliLiter(s) Swish and Spit five times a day  sodium chloride 0.9%. 1000 milliLiter(s) (20 mL/Hr) IV Continuous <Continuous>  sucralfate suspension 1 Gram(s) Oral four times a day  tacrolimus 1.5 milliGRAM(s) Oral every 12 hours  ursodiol Capsule 300 milliGRAM(s) Oral two times a day with meals  voriconazole IVPB 200 milliGRAM(s) IV Intermittent every 12 hours    MEDICATIONS  (PRN):  acetaminophen   Tablet .. 650 milliGRAM(s) Oral every 6 hours PRN Temp greater or equal to 38C (100.4F), Mild Pain (1 - 3)  artificial  tears Solution 1 Drop(s) Both EYES three times a day PRN Dry Eyes  benzocaine 15 mG/menthol 3.6 mG Lozenge 1 Lozenge Oral every 3 hours PRN Sore Throat  diphenhydrAMINE   Injectable 25 milliGRAM(s) IV Push every 4 hours PRN Allergy symptoms  FIRST- Mouthwash  BLM 5 milliLiter(s) Swish and Spit every 8 hours PRN Mouth Care  HYDROmorphone  Injectable 0.5 milliGRAM(s) IV Push every 2 hours PRN Moderate Pain (4 - 6)  HYDROmorphone  Injectable 1 milliGRAM(s) IV Push every 2 hours PRN Severe Pain (7 - 10)  metoclopramide Injectable 10 milliGRAM(s) IV Push every 6 hours PRN Nausea and/or Vomiting  sodium chloride 0.65% Nasal 1 Spray(s) Both Nostrils five times a day PRN Nasal Congestion  sodium chloride 0.9% lock flush 10 milliLiter(s) IV Push every 1 hour PRN Pre/post blood products, medications, blood draw, and to maintain line patency        PRESENT SYMPTOMS:   Source if other than patient:  [ ]Family   [ ]Team     Pain (Impact on QOL):    Location -         Minimal acceptable level (0-10 scale):                    Aggravating factors -  Quality -  Radiation -  Severity (0-10 scale) -    Timing -    PAIN AD Score:     [email or web address] (press ctrl +  left click to view)    Dyspnea:                           [ ]Mild [ ]Moderate [ ]Severe  Anxiety:                             [ ]Mild [ ]Moderate [ ]Severe  Fatigue:                             [X ]Mild [ ]Moderate [ ]Severe  Nausea:                             [ ]Mild [ ]Moderate [ ]Severe  Loss of appetite:              [X ]Mild [ ]Moderate [ ]Severe  Constipation:                    [ ]Mild [ ]Moderate [ ]Severe    Other Symptoms:  [X ]All other review of systems negative   [ ]Unable to obtain due to poor mentation     Karnofsky Performance Score/Palliative Performance Status Version 2:         %    PHYSICAL EXAM:  Vital Signs Last 24 Hrs  T(C): 37.6 (11 Mar 2019 13:18), Max: 37.6 (10 Mar 2019 17:31)  T(F): 99.7 (11 Mar 2019 13:18), Max: 99.7 (11 Mar 2019 13:18)  HR: 94 (11 Mar 2019 13:18) (94 - 98)  BP: 102/71 (11 Mar 2019 13:18) (102/70 - 117/75)  BP(mean): --  RR: 18 (11 Mar 2019 13:18) (18 - 18)  SpO2: 100% (11 Mar 2019 13:18) (100% - 100%)    Limited exam for patient comfort  GENERAL:  [X ]Alert  [ ]Oriented x   [ ]Lethargic  [ ]Cachexia  [ ]Unarousable  [ ]Verbal  [ ]Non-Verbal    Behavioral:   [ ] Anxiety  [ ] Delirium [ ] Agitation [ ] Other    HEENT:  [X ]Normal   [ ]Dry mouth   [ ]ET Tube/Trach  [ ]Oral lesions    PULMONARY:   [ ]Clear [ ]Tachypnea  [ ]Audible excessive secretions   [ ]Rhonchi        [ ]Right [ ]Left [ ]Bilateral  [ ]Crackles        [ ]Right [ ]Left [ ]Bilateral  [ ]Wheezing     [ ]Right [ ]Left [ ]Bilateral    CARDIOVASCULAR:    [ ]Regular [ ]Irregular [ ]Tachy  [ ]Lance [ ]Murmur [ ]Other    GASTROINTESTINAL:  [ ]Soft  [ ]Distended   [ ]+BS  [ ]Non tender [ ]Tender  [ ]PEG [ ]OGT/ NGT  Last BM:     GENITOURINARY:  [ ]Normal [ ] Incontinent   [ ]Oliguria/Anuria   [ ]Noonan    MUSCULOSKELETAL:   [X ]Normal   [ ]Weakness  [ ]Bed/Wheelchair bound [ ]Edema    NEUROLOGIC:   [X ]No focal deficits  [ ] Cognitive impairment  [ ] Dysphagia [ ]Dysarthria [ ] Paresis [ ]Other     SKIN:   [ ]Normal   [ ]Pressure ulcer(s)  [ ]Rash    CRITICAL CARE:  [ ] Shock Present  [ ]Septic [ ]Cardiogenic [ ]Neurologic [ ]Hypovolemic  [ ]  Vasopressors [ ]  Inotropes   [ ] Respiratory failure present  [ ] Acute  [ ] Chronic [ ] Hypoxic  [ ] Hypercarbic [ ] Other  [ ] Other organ failure     LABS: reviewed                          10.0   1.0   )-----------( 112      ( 10 Mar 2019 07:36 )             27.3     03-10    143  |  107  |  <4<L>  ----------------------------<  93  3.8   |  23  |  0.64    Ca    9.6      10 Mar 2019 07:36  Phos  3.4     03-10  Mg     1.7     03-10        RADIOLOGY & ADDITIONAL STUDIES: none on this admission thus far    PROTEIN CALORIE MALNUTRITION:   [ ] PPSV2 < or = to 30% [ ] significant weight loss  [ ] poor nutritional intake [ ] catabolic state [ ] anasarca     Albumin, Serum: 4.2 g/dL (02-20-19 @ 13:04)  Artificial Nutrition [ ]     REFERRALS:   [ ]Chaplaincy  [ ] Hospice  [ ]Child Life  [ ]Social Work  [ ]Case management [ ]Holistic Therapy     Goals of Care Discussion Document:     Saeid Pal MD  Palliative Medicine  office: [phone] | [phone]  pager: [phone]

## 2019-03-11 NOTE — PROGRESS NOTE ADULT - SUBJECTIVE AND OBJECTIVE BOX
HPC Transplant Team                                                      Critical / Counseling Time Provided: 30 minutes                                                                                                                                                        Chief Complaint: Allogeneic peripheral blood stem cell transplant from [facility] (sister 8/8) for treatment of AML     S: Patient seen and examined with HPC Transplant Team:     Denies mouth / tongue / throat pain, dyspnea, cough, nausea, vomiting, diarrhea, abdominal pain     O: Vitals:   Vital Signs Last 24 Hrs  T(C): 37.1 (11 Mar 2019 06:23), Max: 37.6 (10 Mar 2019 17:31)  T(F): 98.8 (11 Mar 2019 06:23), Max: 99.6 (10 Mar 2019 17:31)  HR: 96 (11 Mar 2019 06:23) (96 - 100)  BP: 102/70 (11 Mar 2019 06:23) (102/63 - 122/82)  BP(mean): --  RR: 18 (11 Mar 2019 06:23) (17 - 18)  SpO2: 100% (10 Mar 2019 21:40) (100% - 100%)    Admit weight: 88.2 kG  Daily Weight in k.1 (10 Mar 2019 10:10)    Intake / Output:   03-10 @ 07:01  -  03-11 @ 07:00  --------------------------------------------------------  IN: 3049 mL / OUT: 2125 mL / NET: 924 mL    PE:   Oropharynx: no erythema or ulcerations in oropharynx  Oral Mucositis:    +                                         Grade: 1-2(throat)  CVS: (+) S1/S2 RRR   Lungs: CTA throughout bilaterally   Abdomen: (+)BS x 4, soft, NT/ND   Extremities: no edema  in B/L LE  Gastric Mucositis:       +                                          stGstrstastdstest:st st1st Intestinal Mucositis:     -                                          Grade: n/a  Skin: no rash   TLC: PICC line C/D/I  Neuro: A&O x 3   Pain: 8/10 then 3/10 after pain medication    Labs:   CBC Full  -  ( 10 Mar 2019 07:36 )  WBC Count : 1.0 K/uL  Hemoglobin : 10.0 g/dL  Hematocrit : 27.3 %  Platelet Count - Automated : 112 K/uL  Mean Cell Volume : 89.1 fl  Mean Cell Hemoglobin : 32.5 pg  Mean Cell Hemoglobin Concentration : 36.5 gm/dL  Auto Neutrophil # : 0.0 K/uL  Auto Lymphocyte # : 0.6 K/uL  Auto Monocyte # : 0.3 K/uL  Auto Eosinophil # : 0.0 K/uL  Auto Basophil # : 0.0 K/uL  Auto Neutrophil % : 0.0 %  Auto Lymphocyte % : 72.0 %  Auto Monocyte % : 28.0 %  Auto Eosinophil % : x  Auto Basophil % : x                          10.0   1.0   )-----------( 112      ( 10 Mar 2019 07:36 )             27.3     03-10    143  |  107  |  <4<L>  ----------------------------<  93  3.8   |  23  |  0.64    Ca    9.6      10 Mar 2019 07:36  Phos  3.4     03-10  Mg     1.7     03-10    TPro  6.7  /  Alb  3.7  /  TBili  0.1<L>  /  DBili  x   /  AST  14  /  ALT  14  /  AlkPhos  67  03-10    PT/INR - ( 10 Mar 2019 07:36 )   PT: 12.4 sec;   INR: 1.08 ratio         PTT - ( 10 Mar 2019 07:36 )  PTT:28.1 sec  LIVER FUNCTIONS - ( 10 Mar 2019 07:36 )  Alb: 3.7 g/dL / Pro: 6.7 g/dL / ALK PHOS: 67 U/L / ALT: 14 U/L / AST: 14 U/L / GGT: x                03-10 @ 10:27  Tacrolimus 2.2                Cyclosporine --    Meds:   Antimicrobials:   acyclovir    Suspension 400 milliGRAM(s) Oral every 8 hours  atovaquone Suspension 750 milliGRAM(s) Oral every 12 hours  ciprofloxacin   IVPB 400 milliGRAM(s) IV Intermittent every 12 hours  clotrimazole Lozenge 1 Lozenge Oral five times a day  voriconazole IVPB 200 milliGRAM(s) IV Intermittent every 12 hours      Heme / Onc:       GI:  docusate sodium 100 milliGRAM(s) Oral three times a day  pantoprazole  Injectable 40 milliGRAM(s) IV Push daily  senna 2 Tablet(s) Oral at bedtime  sodium bicarbonate Mouth Rinse 10 milliLiter(s) Swish and Spit five times a day  sucralfate suspension 1 Gram(s) Oral four times a day  ursodiol Capsule 300 milliGRAM(s) Oral two times a day with meals      Cardiovascular:   amLODIPine   Tablet 5 milliGRAM(s) Oral daily      Immunologic:   epoetin bhupendra Injectable 20978 Unit(s) SubCutaneous <User Schedule>  filgrastim-sndz Injectable 480 MICROGram(s) SubCutaneous every 24 hours  immune   globulin 10% (GAMMAGARD) IVPB 20 Gram(s) IV Intermittent <User Schedule>  tacrolimus 1.5 milliGRAM(s) Oral every 12 hours      Other medications:   acetaminophen   Tablet .. 650 milliGRAM(s) Oral every 6 hours  acetaminophen   Tablet .. 650 milliGRAM(s) Oral <User Schedule>  ascorbic acid 250 milliGRAM(s) Oral daily  Biotene Dry Mouth Oral Rinse 5 milliLiter(s) Swish and Spit five times a day  chlorhexidine 4% Liquid 1 Application(s) Topical <User Schedule>  cholecalciferol 400 Unit(s) Oral daily  cyanocobalamin 1000 MICROGram(s) Oral daily  diphenhydrAMINE   Injectable 25 milliGRAM(s) IV Push every 3 weeks  fentaNYL   Patch  12 MICROgram(s)/Hr 1 Patch Transdermal every 72 hours  folic acid 1 milliGRAM(s) Oral daily  iron sucrose IVPB 100 milliGRAM(s) IV Intermittent every 7 days  lidocaine/prilocaine Cream 1 Application(s) Topical daily  magnesium oxide 400 milliGRAM(s) Oral three times a day with meals  medroxyPROGESTERone 20 milliGRAM(s) Oral daily  montelukast 10 milliGRAM(s) Oral daily  multivitamin 1 Tablet(s) Oral daily  phytonadione   Solution 10 milliGRAM(s) Oral every other day  sodium chloride 0.9%. 1000 milliLiter(s) IV Continuous <Continuous>      PRN:   acetaminophen   Tablet .. 650 milliGRAM(s) Oral every 6 hours PRN  artificial  tears Solution 1 Drop(s) Both EYES three times a day PRN  benzocaine 15 mG/menthol 3.6 mG Lozenge 1 Lozenge Oral every 3 hours PRN  diphenhydrAMINE   Injectable 25 milliGRAM(s) IV Push every 4 hours PRN  FIRST- Mouthwash  BLM 5 milliLiter(s) Swish and Spit every 8 hours PRN  HYDROmorphone  Injectable 0.5 milliGRAM(s) IV Push every 2 hours PRN  HYDROmorphone  Injectable 1 milliGRAM(s) IV Push every 2 hours PRN  metoclopramide Injectable 10 milliGRAM(s) IV Push every 6 hours PRN  sodium chloride 0.65% Nasal 1 Spray(s) Both Nostrils five times a day PRN  sodium chloride 0.9% lock flush 10 milliLiter(s) IV Push every 1 hour PRN    A/P:  33 year old female with a history of AML  Post:  Allogeneic PBSCT Day + 12  Labs every other day ; continue vitamin C, vitamin B, folic acid, vitamin K & iron supplementation. Increased vitamin K to every other day as per standard guidelines for bloodless transplant  Continue Provera 20 mg PO daily,  Procrit to 20,000 units TIW and Venofer  BP- 140s, started on Norvasc.  Hold for SBP <120  Mucositis likely related to MTX and meds changed to IV 3/8. Continue Carafate and supportive care. Pain improving.  Pain meds per palliative care  Neutropenic, afebrile. Continue Cipro and if T >/= 38C Pan Cx, CXR and change Cipro to Cefepime    1. Infectious Disease:   acyclovir    Suspension 400 milliGRAM(s) Oral every 8 hours  atovaquone Suspension 750 milliGRAM(s) Oral every 12 hours  ciprofloxacin   IVPB 400 milliGRAM(s) IV Intermittent every 12 hours  clotrimazole Lozenge 1 Lozenge Oral five times a day  voriconazole IVPB 200 milliGRAM(s) IV Intermittent every 12 hours    2. VOD Prophylaxis: Actigall, Glutamine, Heparin (dosed at 100 units / kg / day)     3. GI Prophylaxis:    pantoprazole  Injectable 40 milliGRAM(s) IV Push daily    4. Mouthcare - NS / NaHCO3 rinses, Mycelex, Caphosol; Skin care     5. GVHD prophylaxis   tacrolimus 1.5 milliGRAM(s) Oral every 12 hours  MTX days 1,3,6,11     6. Transfuse & replete electrolytes prn   magnesium oxide 400 milliGRAM(s) Oral three times a day with meals  phytonadione   Solution 10 milliGRAM(s) Oral every other day  iron sucrose IVPB 100 milliGRAM(s) IV Intermittent every 7 days  cyanocobalamin 1000 MICROGram(s) Oral daily    7. IV hydration, daily weights, strict I&O, prn diuresis     8. PO intake as tolerated, nutrition follow up as needed, MVI, folic acid     9. Antiemetics, anti-diarrhea medications:   metoclopramide Injectable 10 milliGRAM(s) IV Push every 6 hours PRN    10. OOB as tolerated, physical therapy consult if needed     11. Monitor coags / fibrinogen 2x week, vitamin K as needed   phytonadione   Solution 10 milliGRAM(s) Oral every other day    12. Monitor closely for clinical changes, monitor for fevers     13. Emotional support provided, plan of care discussed and questions addressed     14. Patient education done regarding plan of care, restrictions and discharge planning     15. Continue regular social work input     I have written the above note for Dr. Lou who performed service with me in the room.   Racquel Leiva NP-C ([phone])    I have seen and examined patient with NP, I agree with above note as scribed. [facility]PC Transplant Team                                                      Critical / Counseling Time Provided: 30 minutes                                                                                                                                                        Chief Complaint: Allogeneic peripheral blood stem cell transplant from [facility] (sister 8/8) for treatment of AML     S: Patient seen and examined with HPC Transplant Team:     Denies mouth / tongue / throat pain, dyspnea, cough, nausea, vomiting, diarrhea, abdominal pain     O: Vitals:   Vital Signs Last 24 Hrs  T(C): 37.1 (11 Mar 2019 06:23), Max: 37.6 (10 Mar 2019 17:31)  T(F): 98.8 (11 Mar 2019 06:23), Max: 99.6 (10 Mar 2019 17:31)  HR: 96 (11 Mar 2019 06:23) (96 - 100)  BP: 102/70 (11 Mar 2019 06:23) (102/63 - 122/82)  BP(mean): --  RR: 18 (11 Mar 2019 06:23) (17 - 18)  SpO2: 100% (10 Mar 2019 21:40) (100% - 100%)    Admit weight: 88.2 kG  Daily Weight in k.1 (10 Mar 2019 10:10)  Today's weight: 87.5kg     Intake / Output:   03-10 @ 07:01  -  03-11 @ 07:00  --------------------------------------------------------  IN: 3049 mL / OUT: 2125 mL / NET: 924 mL    PE:   Oropharynx: no erythema or ulcerations in oropharynx  Oral Mucositis:    +                                         Grade: 1-2(throat)  CVS: (+) S1/S2 RRR   Lungs: CTA throughout bilaterally   Abdomen: (+)BS x 4, soft, NT/ND   Extremities: no edema  in B/L LE  Gastric Mucositis:       +                                          rdGrdrrdarddrderd:rd rd3rd Intestinal Mucositis:     -                                          Grade: n/a  Skin: no rash   TLC: PICC line C/D/I  Neuro: A&O x 3   Pain: 8/10 then 3/10 after pain medication    Labs:   CBC Full  -  ( 10 Mar 2019 07:36 )  WBC Count : 1.0 K/uL  Hemoglobin : 10.0 g/dL  Hematocrit : 27.3 %  Platelet Count - Automated : 112 K/uL  Mean Cell Volume : 89.1 fl  Mean Cell Hemoglobin : 32.5 pg  Mean Cell Hemoglobin Concentration : 36.5 gm/dL  Auto Neutrophil # : 0.0 K/uL  Auto Lymphocyte # : 0.6 K/uL  Auto Monocyte # : 0.3 K/uL  Auto Eosinophil # : 0.0 K/uL  Auto Basophil # : 0.0 K/uL  Auto Neutrophil % : 0.0 %  Auto Lymphocyte % : 72.0 %  Auto Monocyte % : 28.0 %  Auto Eosinophil % : x  Auto Basophil % : x                          10.0   1.0   )-----------( 112      ( 10 Mar 2019 07:36 )             27.3     03-10    143  |  107  |  <4<L>  ----------------------------<  93  3.8   |  23  |  0.64    Ca    9.6      10 Mar 2019 07:36  Phos  3.4     03-10  Mg     1.7     03-10    TPro  6.7  /  Alb  3.7  /  TBili  0.1<L>  /  DBili  x   /  AST  14  /  ALT  14  /  AlkPhos  67  03-10    PT/INR - ( 10 Mar 2019 07:36 )   PT: 12.4 sec;   INR: 1.08 ratio         PTT - ( 10 Mar 2019 07:36 )  PTT:28.1 sec  LIVER FUNCTIONS - ( 10 Mar 2019 07:36 )  Alb: 3.7 g/dL / Pro: 6.7 g/dL / ALK PHOS: 67 U/L / ALT: 14 U/L / AST: 14 U/L / GGT: x                03-10 @ 10:27  Tacrolimus 2.2                Cyclosporine --    Meds:   Antimicrobials:   acyclovir    Suspension 400 milliGRAM(s) Oral every 8 hours  atovaquone Suspension 750 milliGRAM(s) Oral every 12 hours  ciprofloxacin   IVPB 400 milliGRAM(s) IV Intermittent every 12 hours  clotrimazole Lozenge 1 Lozenge Oral five times a day  voriconazole IVPB 200 milliGRAM(s) IV Intermittent every 12 hours      Heme / Onc:       GI:  docusate sodium 100 milliGRAM(s) Oral three times a day  pantoprazole  Injectable 40 milliGRAM(s) IV Push daily  senna 2 Tablet(s) Oral at bedtime  sodium bicarbonate Mouth Rinse 10 milliLiter(s) Swish and Spit five times a day  sucralfate suspension 1 Gram(s) Oral four times a day  ursodiol Capsule 300 milliGRAM(s) Oral two times a day with meals      Cardiovascular:   amLODIPine   Tablet 5 milliGRAM(s) Oral daily      Immunologic:   epoetin bhupendra Injectable 30273 Unit(s) SubCutaneous <User Schedule>  filgrastim-sndz Injectable 480 MICROGram(s) SubCutaneous every 24 hours  immune   globulin 10% (GAMMAGARD) IVPB 20 Gram(s) IV Intermittent <User Schedule>  tacrolimus 1.5 milliGRAM(s) Oral every 12 hours      Other medications:   acetaminophen   Tablet .. 650 milliGRAM(s) Oral every 6 hours  acetaminophen   Tablet .. 650 milliGRAM(s) Oral <User Schedule>  ascorbic acid 250 milliGRAM(s) Oral daily  Biotene Dry Mouth Oral Rinse 5 milliLiter(s) Swish and Spit five times a day  chlorhexidine 4% Liquid 1 Application(s) Topical <User Schedule>  cholecalciferol 400 Unit(s) Oral daily  cyanocobalamin 1000 MICROGram(s) Oral daily  diphenhydrAMINE   Injectable 25 milliGRAM(s) IV Push every 3 weeks  fentaNYL   Patch  12 MICROgram(s)/Hr 1 Patch Transdermal every 72 hours  folic acid 1 milliGRAM(s) Oral daily  iron sucrose IVPB 100 milliGRAM(s) IV Intermittent every 7 days  lidocaine/prilocaine Cream 1 Application(s) Topical daily  magnesium oxide 400 milliGRAM(s) Oral three times a day with meals  medroxyPROGESTERone 20 milliGRAM(s) Oral daily  montelukast 10 milliGRAM(s) Oral daily  multivitamin 1 Tablet(s) Oral daily  phytonadione   Solution 10 milliGRAM(s) Oral every other day  sodium chloride 0.9%. 1000 milliLiter(s) IV Continuous <Continuous>      PRN:   acetaminophen   Tablet .. 650 milliGRAM(s) Oral every 6 hours PRN  artificial  tears Solution 1 Drop(s) Both EYES three times a day PRN  benzocaine 15 mG/menthol 3.6 mG Lozenge 1 Lozenge Oral every 3 hours PRN  diphenhydrAMINE   Injectable 25 milliGRAM(s) IV Push every 4 hours PRN  FIRST- Mouthwash  BLM 5 milliLiter(s) Swish and Spit every 8 hours PRN  HYDROmorphone  Injectable 0.5 milliGRAM(s) IV Push every 2 hours PRN  HYDROmorphone  Injectable 1 milliGRAM(s) IV Push every 2 hours PRN  metoclopramide Injectable 10 milliGRAM(s) IV Push every 6 hours PRN  sodium chloride 0.65% Nasal 1 Spray(s) Both Nostrils five times a day PRN  sodium chloride 0.9% lock flush 10 milliLiter(s) IV Push every 1 hour PRN    A/P:  33 year old female with a history of AML  Post:  Allogeneic PBSCT Day + 12  Labs every other day ; continue vitamin C, vitamin B, folic acid, vitamin K & iron supplementation. Increased vitamin K to every other day as per standard guidelines for bloodless transplant  Continue Provera 20 mg PO daily,  Procrit to 20,000 units TIW and Venofer  BP- 140s, started on Norvasc.  Hold for SBP <120  Mucositis likely related to MTX and meds changed to IV 3/8. Continue Carafate and supportive care. Pain improving.  Pain meds per palliative care  Neutropenic, afebrile. Continue Cipro and if T >/= 38C Pan Cx, CXR and change Cipro to Cefepime    1. Infectious Disease:   acyclovir    Suspension 400 milliGRAM(s) Oral every 8 hours  atovaquone Suspension 750 milliGRAM(s) Oral every 12 hours  ciprofloxacin   IVPB 400 milliGRAM(s) IV Intermittent every 12 hours  clotrimazole Lozenge 1 Lozenge Oral five times a day  voriconazole IVPB 200 milliGRAM(s) IV Intermittent every 12 hours    2. VOD Prophylaxis: Actigall, Glutamine, Heparin (dosed at 100 units / kg / day)     3. GI Prophylaxis:    pantoprazole  Injectable 40 milliGRAM(s) IV Push daily    4. Mouthcare - NS / NaHCO3 rinses, Mycelex, Caphosol; Skin care     5. GVHD prophylaxis   tacrolimus 1.5 milliGRAM(s) Oral every 12 hours  MTX days 1,3,6,11     6. Transfuse & replete electrolytes prn   magnesium oxide 400 milliGRAM(s) Oral three times a day with meals  phytonadione   Solution 10 milliGRAM(s) Oral every other day  iron sucrose IVPB 100 milliGRAM(s) IV Intermittent every 7 days  cyanocobalamin 1000 MICROGram(s) Oral daily    7. IV hydration, daily weights, strict I&O, prn diuresis     8. PO intake as tolerated, nutrition follow up as needed, MVI, folic acid     9. Antiemetics, anti-diarrhea medications:   metoclopramide Injectable 10 milliGRAM(s) IV Push every 6 hours PRN    10. OOB as tolerated, physical therapy consult if needed     11. Monitor coags / fibrinogen 2x week, vitamin K as needed   phytonadione   Solution 10 milliGRAM(s) Oral every other day    12. Monitor closely for clinical changes, monitor for fevers     13. Emotional support provided, plan of care discussed and questions addressed     14. Patient education done regarding plan of care, restrictions and discharge planning     15. Continue regular social work input     I have written the above note for Dr. Lou who performed service with me in the room.   Racquel Leiva NP-C ([phone])    I have seen and examined patient with NP, I agree with above note as scribed. [facility] Transplant Team                                                      Critical / Counseling Time Provided: 30 minutes                                                                                                                                                        Chief Complaint: Allogeneic peripheral blood stem cell transplant from [facility] (sister 8/8) for treatment of AML     S: Patient seen and examined with HPC Transplant Team:   Mouth feels better  throat still hurts      Denies mouth / tongue / throat pain, dyspnea, cough, nausea, vomiting, diarrhea, abdominal pain     O: Vitals:   Vital Signs Last 24 Hrs  T(C): 37.1 (11 Mar 2019 06:23), Max: 37.6 (10 Mar 2019 17:31)  T(F): 98.8 (11 Mar 2019 06:23), Max: 99.6 (10 Mar 2019 17:31)  HR: 96 (11 Mar 2019 06:23) (96 - 100)  BP: 102/70 (11 Mar 2019 06:23) (102/63 - 122/82)  BP(mean): --  RR: 18 (11 Mar 2019 06:23) (17 - 18)  SpO2: 100% (10 Mar 2019 21:40) (100% - 100%)    Admit weight: 88.2 kG  Daily Weight in k.1 (10 Mar 2019 10:10)  Today's weight: 87.5kg     Intake / Output:   03-10 @ 07:01  -  03-11 @ 07:00  --------------------------------------------------------  IN: 3049 mL / OUT: 2125 mL / NET: 924 mL    PE:   Oropharynx: no erythema or ulcerations in oropharynx  Oral Mucositis:    +                                         Grade: 1-2(throat)  CVS: (+) S1/S2 RRR   Lungs: CTA throughout bilaterally   Abdomen: (+)BS x 4, soft, NT/ND   Extremities: no edema  in B/L LE  Gastric Mucositis:       +                                          stGstrstastdstest:st st1st Intestinal Mucositis:     -                                          Grade: n/a  Skin: no rash, patches of darker areas.   TLC: PICC line C/D/I  Neuro: A&O x 3   Pain: 8/10 then 3/10 after pain medication    Labs:   CBC Full  -  ( 10 Mar 2019 07:36 )  WBC Count : 1.0 K/uL  Hemoglobin : 10.0 g/dL  Hematocrit : 27.3 %  Platelet Count - Automated : 112 K/uL  Mean Cell Volume : 89.1 fl  Mean Cell Hemoglobin : 32.5 pg  Mean Cell Hemoglobin Concentration : 36.5 gm/dL  Auto Neutrophil # : 0.0 K/uL  Auto Lymphocyte # : 0.6 K/uL  Auto Monocyte # : 0.3 K/uL  Auto Eosinophil # : 0.0 K/uL  Auto Basophil # : 0.0 K/uL  Auto Neutrophil % : 0.0 %  Auto Lymphocyte % : 72.0 %  Auto Monocyte % : 28.0 %  Auto Eosinophil % : x  Auto Basophil % : x                          10.0   1.0   )-----------( 112      ( 10 Mar 2019 07:36 )             27.3     03-10    143  |  107  |  <4<L>  ----------------------------<  93  3.8   |  23  |  0.64    Ca    9.6      10 Mar 2019 07:36  Phos  3.4     03-10  Mg     1.7     03-10    TPro  6.7  /  Alb  3.7  /  TBili  0.1<L>  /  DBili  x   /  AST  14  /  ALT  14  /  AlkPhos  67  03-10    PT/INR - ( 10 Mar 2019 07:36 )   PT: 12.4 sec;   INR: 1.08 ratio         PTT - ( 10 Mar 2019 07:36 )  PTT:28.1 sec  LIVER FUNCTIONS - ( 10 Mar 2019 07:36 )  Alb: 3.7 g/dL / Pro: 6.7 g/dL / ALK PHOS: 67 U/L / ALT: 14 U/L / AST: 14 U/L / GGT: x                03-10 @ 10:27  Tacrolimus 2.2                Cyclosporine --    Meds:   Antimicrobials:   acyclovir    Suspension 400 milliGRAM(s) Oral every 8 hours  atovaquone Suspension 750 milliGRAM(s) Oral every 12 hours  ciprofloxacin   IVPB 400 milliGRAM(s) IV Intermittent every 12 hours  clotrimazole Lozenge 1 Lozenge Oral five times a day  voriconazole IVPB 200 milliGRAM(s) IV Intermittent every 12 hours      Heme / Onc:       GI:  docusate sodium 100 milliGRAM(s) Oral three times a day  pantoprazole  Injectable 40 milliGRAM(s) IV Push daily  senna 2 Tablet(s) Oral at bedtime  sodium bicarbonate Mouth Rinse 10 milliLiter(s) Swish and Spit five times a day  sucralfate suspension 1 Gram(s) Oral four times a day  ursodiol Capsule 300 milliGRAM(s) Oral two times a day with meals      Cardiovascular:   amLODIPine   Tablet 5 milliGRAM(s) Oral daily      Immunologic:   epoetin bhupendra Injectable 25297 Unit(s) SubCutaneous <User Schedule>  filgrastim-sndz Injectable 480 MICROGram(s) SubCutaneous every 24 hours  immune   globulin 10% (GAMMAGARD) IVPB 20 Gram(s) IV Intermittent <User Schedule>  tacrolimus 1.5 milliGRAM(s) Oral every 12 hours      Other medications:   acetaminophen   Tablet .. 650 milliGRAM(s) Oral every 6 hours  acetaminophen   Tablet .. 650 milliGRAM(s) Oral <User Schedule>  ascorbic acid 250 milliGRAM(s) Oral daily  Biotene Dry Mouth Oral Rinse 5 milliLiter(s) Swish and Spit five times a day  chlorhexidine 4% Liquid 1 Application(s) Topical <User Schedule>  cholecalciferol 400 Unit(s) Oral daily  cyanocobalamin 1000 MICROGram(s) Oral daily  diphenhydrAMINE   Injectable 25 milliGRAM(s) IV Push every 3 weeks  fentaNYL   Patch  12 MICROgram(s)/Hr 1 Patch Transdermal every 72 hours  folic acid 1 milliGRAM(s) Oral daily  iron sucrose IVPB 100 milliGRAM(s) IV Intermittent every 7 days  lidocaine/prilocaine Cream 1 Application(s) Topical daily  magnesium oxide 400 milliGRAM(s) Oral three times a day with meals  medroxyPROGESTERone 20 milliGRAM(s) Oral daily  montelukast 10 milliGRAM(s) Oral daily  multivitamin 1 Tablet(s) Oral daily  phytonadione   Solution 10 milliGRAM(s) Oral every other day  sodium chloride 0.9%. 1000 milliLiter(s) IV Continuous <Continuous>      PRN:   acetaminophen   Tablet .. 650 milliGRAM(s) Oral every 6 hours PRN  artificial  tears Solution 1 Drop(s) Both EYES three times a day PRN  benzocaine 15 mG/menthol 3.6 mG Lozenge 1 Lozenge Oral every 3 hours PRN  diphenhydrAMINE   Injectable 25 milliGRAM(s) IV Push every 4 hours PRN  FIRST- Mouthwash  BLM 5 milliLiter(s) Swish and Spit every 8 hours PRN  HYDROmorphone  Injectable 0.5 milliGRAM(s) IV Push every 2 hours PRN  HYDROmorphone  Injectable 1 milliGRAM(s) IV Push every 2 hours PRN  metoclopramide Injectable 10 milliGRAM(s) IV Push every 6 hours PRN  sodium chloride 0.65% Nasal 1 Spray(s) Both Nostrils five times a day PRN  sodium chloride 0.9% lock flush 10 milliLiter(s) IV Push every 1 hour PRN    A/P:  33 year old female with a history of AML  Post:  Allogeneic PBSCT Day + 12  Labs every other day ; continue vitamin C, vitamin B, folic acid, vitamin K & iron supplementation. Increased vitamin K to every other day as per standard guidelines for bloodless transplant  Continue Provera 20 mg PO daily,  Procrit to 20,000 units TIW and Venofer  BP- 140s, started on Norvasc.  Hold for SBP <120  Mucositis likely related to MTX and meds changed to IV 3/8. Continue Carafate and supportive care. Pain improving.  Pain meds per palliative care  Neutropenic, afebrile. Continue Cipro and if T >/= 38C Pan Cx, CXR and change Cipro to Cefepime    1. Infectious Disease:   acyclovir    Suspension 400 milliGRAM(s) Oral every 8 hours  atovaquone Suspension 750 milliGRAM(s) Oral every 12 hours  ciprofloxacin   IVPB 400 milliGRAM(s) IV Intermittent every 12 hours  clotrimazole Lozenge 1 Lozenge Oral five times a day  voriconazole IVPB 200 milliGRAM(s) IV Intermittent every 12 hours    2. VOD Prophylaxis: Actigall, Glutamine, Heparin (dosed at 100 units / kg / day)     3. GI Prophylaxis:    pantoprazole  Injectable 40 milliGRAM(s) IV Push daily    4. Mouthcare - NS / NaHCO3 rinses, Mycelex, Caphosol; Skin care     5. GVHD prophylaxis   tacrolimus 1.5 milliGRAM(s) Oral every 12 hours  MTX days 1,3,6,11     6. Transfuse & replete electrolytes prn   magnesium oxide 400 milliGRAM(s) Oral three times a day with meals  phytonadione   Solution 10 milliGRAM(s) Oral every other day  iron sucrose IVPB 100 milliGRAM(s) IV Intermittent every 7 days  cyanocobalamin 1000 MICROGram(s) Oral daily    7. IV hydration, daily weights, strict I&O, prn diuresis     8. PO intake as tolerated, nutrition follow up as needed, MVI, folic acid     9. Antiemetics, anti-diarrhea medications:   metoclopramide Injectable 10 milliGRAM(s) IV Push every 6 hours PRN    10. OOB as tolerated, physical therapy consult if needed     11. Monitor coags / fibrinogen 2x week, vitamin K as needed   phytonadione   Solution 10 milliGRAM(s) Oral every other day    12. Monitor closely for clinical changes, monitor for fevers     13. Emotional support provided, plan of care discussed and questions addressed     14. Patient education done regarding plan of care, restrictions and discharge planning     15. Continue regular social work input     I have written the above note for Dr. Lou who performed service with me in the room.   Racquel Leiva NP-C ([phone])    I have seen and examined patient with NP, I agree with above note as scribed.

## 2019-03-11 NOTE — PROGRESS NOTE ADULT - PROBLEM SELECTOR PLAN 1
- used 2mg dilaudid in last 24 hours  - states baseline pain remains at 8/10 prior to PRN; will increase patch to 25mcg (2mg IV dilaudid = approx. 12mcg fentanyl without cross-tolerance adjustment given persistent severe pain)  - c/w dilaudid 0.5mg and 1mg IV Q2 PRN

## 2019-03-12 LAB
ALBUMIN SERPL ELPH-MCNC: 3.7 G/DL — SIGNIFICANT CHANGE UP (ref 3.3–5)
ALP SERPL-CCNC: 69 U/L — SIGNIFICANT CHANGE UP (ref 40–120)
ALT FLD-CCNC: 16 U/L — SIGNIFICANT CHANGE UP (ref 10–45)
ANION GAP SERPL CALC-SCNC: 14 MMOL/L — SIGNIFICANT CHANGE UP (ref 5–17)
APTT BLD: 25.3 SEC — LOW (ref 27.5–36.3)
AST SERPL-CCNC: 25 U/L — SIGNIFICANT CHANGE UP (ref 10–40)
BASOPHILS # BLD AUTO: 0 K/UL — SIGNIFICANT CHANGE UP (ref 0–0.2)
BILIRUB SERPL-MCNC: 0.2 MG/DL — SIGNIFICANT CHANGE UP (ref 0.2–1.2)
BUN SERPL-MCNC: <4 MG/DL — LOW (ref 7–23)
CALCIUM SERPL-MCNC: 9.5 MG/DL — SIGNIFICANT CHANGE UP (ref 8.4–10.5)
CHLORIDE SERPL-SCNC: 105 MMOL/L — SIGNIFICANT CHANGE UP (ref 96–108)
CO2 SERPL-SCNC: 23 MMOL/L — SIGNIFICANT CHANGE UP (ref 22–31)
CREAT SERPL-MCNC: 0.62 MG/DL — SIGNIFICANT CHANGE UP (ref 0.5–1.3)
EOSINOPHIL # BLD AUTO: 0 K/UL — SIGNIFICANT CHANGE UP (ref 0–0.5)
FIBRINOGEN PPP-MCNC: 816 MG/DL — HIGH (ref 350–510)
GLUCOSE SERPL-MCNC: 92 MG/DL — SIGNIFICANT CHANGE UP (ref 70–99)
HCT VFR BLD CALC: 31.9 % — LOW (ref 34.5–45)
HGB BLD-MCNC: 11.8 G/DL — SIGNIFICANT CHANGE UP (ref 11.5–15.5)
INR BLD: 1.04 RATIO — SIGNIFICANT CHANGE UP (ref 0.88–1.16)
LDH SERPL L TO P-CCNC: 337 U/L — HIGH (ref 50–242)
LYMPHOCYTES # BLD AUTO: 32 % — SIGNIFICANT CHANGE UP (ref 13–44)
LYMPHOCYTES # BLD AUTO: SIGNIFICANT CHANGE UP (ref 1–3.3)
MAGNESIUM SERPL-MCNC: 1.8 MG/DL — SIGNIFICANT CHANGE UP (ref 1.6–2.6)
MCHC RBC-ENTMCNC: 33.4 PG — SIGNIFICANT CHANGE UP (ref 27–34)
MCHC RBC-ENTMCNC: 37 GM/DL — HIGH (ref 32–36)
MCV RBC AUTO: 90.2 FL — SIGNIFICANT CHANGE UP (ref 80–100)
MONOCYTES # BLD AUTO: SIGNIFICANT CHANGE UP (ref 0–0.9)
MONOCYTES NFR BLD AUTO: 60 % — HIGH (ref 2–14)
NEUTROPHILS # BLD AUTO: 0.1 K/UL — LOW (ref 1.8–7.4)
NEUTROPHILS NFR BLD AUTO: 8 % — LOW (ref 43–77)
PHOSPHATE SERPL-MCNC: 3.5 MG/DL — SIGNIFICANT CHANGE UP (ref 2.5–4.5)
PLATELET # BLD AUTO: 162 K/UL — SIGNIFICANT CHANGE UP (ref 150–400)
POTASSIUM SERPL-MCNC: 3.6 MMOL/L — SIGNIFICANT CHANGE UP (ref 3.5–5.3)
POTASSIUM SERPL-SCNC: 3.6 MMOL/L — SIGNIFICANT CHANGE UP (ref 3.5–5.3)
PROT SERPL-MCNC: 6.5 G/DL — SIGNIFICANT CHANGE UP (ref 6–8.3)
PROTHROM AB SERPL-ACNC: 12 SEC — SIGNIFICANT CHANGE UP (ref 10–12.9)
RBC # BLD: 3.54 M/UL — LOW (ref 3.8–5.2)
RBC # FLD: 15.2 % — HIGH (ref 10.3–14.5)
SODIUM SERPL-SCNC: 142 MMOL/L — SIGNIFICANT CHANGE UP (ref 135–145)
WBC # BLD: 1.2 K/UL — LOW (ref 3.8–10.5)
WBC # FLD AUTO: 1.2 K/UL — LOW (ref 3.8–10.5)

## 2019-03-12 PROCEDURE — 99291 CRITICAL CARE FIRST HOUR: CPT

## 2019-03-12 RX ORDER — ALTEPLASE 100 MG
2 KIT INTRAVENOUS ONCE
Qty: 0 | Refills: 0 | Status: COMPLETED | OUTPATIENT
Start: 2019-03-12 | End: 2019-03-13

## 2019-03-12 RX ORDER — VORICONAZOLE 10 MG/ML
200 INJECTION, POWDER, LYOPHILIZED, FOR SOLUTION INTRAVENOUS EVERY 12 HOURS
Qty: 0 | Refills: 0 | Status: DISCONTINUED | OUTPATIENT
Start: 2019-03-12 | End: 2019-03-18

## 2019-03-12 RX ADMIN — Medication 1 LOZENGE: at 23:49

## 2019-03-12 RX ADMIN — Medication 1 GRAM(S): at 06:37

## 2019-03-12 RX ADMIN — FENTANYL CITRATE 1 PATCH: 50 INJECTION INTRAVENOUS at 06:38

## 2019-03-12 RX ADMIN — Medication 1 MILLIGRAM(S): at 12:37

## 2019-03-12 RX ADMIN — Medication 1 GRAM(S): at 12:36

## 2019-03-12 RX ADMIN — ATOVAQUONE 750 MILLIGRAM(S): 750 SUSPENSION ORAL at 06:37

## 2019-03-12 RX ADMIN — Medication 1 LOZENGE: at 12:38

## 2019-03-12 RX ADMIN — Medication 10 MILLILITER(S): at 20:03

## 2019-03-12 RX ADMIN — Medication 5 MILLILITER(S): at 15:45

## 2019-03-12 RX ADMIN — CHLORHEXIDINE GLUCONATE 1 APPLICATION(S): 213 SOLUTION TOPICAL at 06:38

## 2019-03-12 RX ADMIN — Medication 5 MILLILITER(S): at 08:24

## 2019-03-12 RX ADMIN — TACROLIMUS 1.5 MILLIGRAM(S): 5 CAPSULE ORAL at 17:30

## 2019-03-12 RX ADMIN — Medication 100 MILLIGRAM(S): at 21:36

## 2019-03-12 RX ADMIN — Medication 5 MILLILITER(S): at 12:36

## 2019-03-12 RX ADMIN — Medication 1 TABLET(S): at 12:37

## 2019-03-12 RX ADMIN — Medication 250 MILLIGRAM(S): at 12:38

## 2019-03-12 RX ADMIN — ATOVAQUONE 750 MILLIGRAM(S): 750 SUSPENSION ORAL at 17:29

## 2019-03-12 RX ADMIN — MAGNESIUM OXIDE 400 MG ORAL TABLET 400 MILLIGRAM(S): 241.3 TABLET ORAL at 12:37

## 2019-03-12 RX ADMIN — Medication 10 MILLILITER(S): at 12:47

## 2019-03-12 RX ADMIN — MEDROXYPROGESTERONE ACETATE 20 MILLIGRAM(S): 150 INJECTION, SUSPENSION, EXTENDED RELEASE INTRAMUSCULAR at 12:37

## 2019-03-12 RX ADMIN — Medication 100 MILLIGRAM(S): at 06:38

## 2019-03-12 RX ADMIN — SENNA PLUS 2 TABLET(S): 8.6 TABLET ORAL at 21:37

## 2019-03-12 RX ADMIN — Medication 5 MILLILITER(S): at 00:28

## 2019-03-12 RX ADMIN — Medication 10 MILLILITER(S): at 15:47

## 2019-03-12 RX ADMIN — MAGNESIUM OXIDE 400 MG ORAL TABLET 400 MILLIGRAM(S): 241.3 TABLET ORAL at 08:25

## 2019-03-12 RX ADMIN — Medication 400 MILLIGRAM(S): at 21:37

## 2019-03-12 RX ADMIN — PREGABALIN 1000 MICROGRAM(S): 225 CAPSULE ORAL at 12:37

## 2019-03-12 RX ADMIN — Medication 1 GRAM(S): at 17:29

## 2019-03-12 RX ADMIN — Medication 5 MILLILITER(S): at 23:48

## 2019-03-12 RX ADMIN — Medication 400 MILLIGRAM(S): at 06:38

## 2019-03-12 RX ADMIN — Medication 200 MILLIGRAM(S): at 06:37

## 2019-03-12 RX ADMIN — VORICONAZOLE 200 MILLIGRAM(S): 10 INJECTION, POWDER, LYOPHILIZED, FOR SOLUTION INTRAVENOUS at 17:31

## 2019-03-12 RX ADMIN — URSODIOL 300 MILLIGRAM(S): 250 TABLET, FILM COATED ORAL at 17:30

## 2019-03-12 RX ADMIN — Medication 1 LOZENGE: at 00:28

## 2019-03-12 RX ADMIN — Medication 10 MILLILITER(S): at 08:25

## 2019-03-12 RX ADMIN — Medication 10 MILLILITER(S): at 00:28

## 2019-03-12 RX ADMIN — MAGNESIUM OXIDE 400 MG ORAL TABLET 400 MILLIGRAM(S): 241.3 TABLET ORAL at 17:30

## 2019-03-12 RX ADMIN — URSODIOL 300 MILLIGRAM(S): 250 TABLET, FILM COATED ORAL at 08:24

## 2019-03-12 RX ADMIN — Medication 5 MILLILITER(S): at 20:03

## 2019-03-12 RX ADMIN — Medication 1 LOZENGE: at 08:24

## 2019-03-12 RX ADMIN — Medication 10 MILLILITER(S): at 23:49

## 2019-03-12 RX ADMIN — HYDROMORPHONE HYDROCHLORIDE 1 MILLIGRAM(S): 2 INJECTION INTRAMUSCULAR; INTRAVENOUS; SUBCUTANEOUS at 21:50

## 2019-03-12 RX ADMIN — HYDROMORPHONE HYDROCHLORIDE 1 MILLIGRAM(S): 2 INJECTION INTRAMUSCULAR; INTRAVENOUS; SUBCUTANEOUS at 22:15

## 2019-03-12 RX ADMIN — Medication 400 MILLIGRAM(S): at 15:44

## 2019-03-12 RX ADMIN — AMLODIPINE BESYLATE 5 MILLIGRAM(S): 2.5 TABLET ORAL at 06:38

## 2019-03-12 RX ADMIN — VORICONAZOLE 125 MILLIGRAM(S): 10 INJECTION, POWDER, LYOPHILIZED, FOR SOLUTION INTRAVENOUS at 06:37

## 2019-03-12 RX ADMIN — Medication 400 UNIT(S): at 12:37

## 2019-03-12 RX ADMIN — PANTOPRAZOLE SODIUM 40 MILLIGRAM(S): 20 TABLET, DELAYED RELEASE ORAL at 12:36

## 2019-03-12 RX ADMIN — Medication 1 LOZENGE: at 20:03

## 2019-03-12 RX ADMIN — TACROLIMUS 1.5 MILLIGRAM(S): 5 CAPSULE ORAL at 06:38

## 2019-03-12 RX ADMIN — Medication 1 GRAM(S): at 23:49

## 2019-03-12 RX ADMIN — Medication 1 GRAM(S): at 00:29

## 2019-03-12 RX ADMIN — Medication 1 LOZENGE: at 15:46

## 2019-03-12 RX ADMIN — Medication 200 MILLIGRAM(S): at 17:29

## 2019-03-12 RX ADMIN — FENTANYL CITRATE 1 PATCH: 50 INJECTION INTRAVENOUS at 17:32

## 2019-03-12 RX ADMIN — MONTELUKAST 10 MILLIGRAM(S): 4 TABLET, CHEWABLE ORAL at 12:37

## 2019-03-12 NOTE — PROGRESS NOTE ADULT - SUBJECTIVE AND OBJECTIVE BOX
HPC Transplant Team                                                      Critical / Counseling Time Provided: 30 minutes                                                                                                                                                        Chief Complaint: Allogeneic peripheral blood stem cell transplant from [facility] (sister 10/10) for treatment of AML    S: Patient seen and examined with HPC Transplant Team:     Denies mouth / tongue / throat pain, dyspnea, cough, nausea, vomiting, diarrhea, abdominal pain     O: Vitals:   Vital Signs Last 24 Hrs  T(C): 36.4 (12 Mar 2019 06:09), Max: 37.6 (11 Mar 2019 13:18)  T(F): 97.5 (12 Mar 2019 06:09), Max: 99.7 (11 Mar 2019 13:18)  HR: 102 (12 Mar 2019 06:09) (88 - 110)  BP: 110/76 (12 Mar 2019 06:09) (102/71 - 134/85)  BP(mean): --  RR: 18 (12 Mar 2019 06:09) (18 - 18)  SpO2: 98% (11 Mar 2019 21:35) (98% - 100%)    Admit weight: 88.2 kG  Daily Weight in k.5 (11 Mar 2019 09:42)    Intake / Output:   - @ 07:01  -  03-12 @ 07:00  --------------------------------------------------------  IN: 2993 mL / OUT: 2950 mL / NET: 43 mL      PE:   Oropharynx: no erythema or ulcerations in oropharynx  Oral Mucositis:    +                                         Grade: 1-2(throat)  CVS: (+) S1/S2 RRR   Lungs: CTA throughout bilaterally   Abdomen: (+)BS x 4, soft, NT/ND   Extremities: no edema  in B/L LE  Gastric Mucositis:       +                                          stGstrstastdstest:st st1st Intestinal Mucositis:     -                                          Grade: n/a  Skin: no rash, patches of darker areas.   TLC: PICC line C/D/I  Neuro: A&O x 3   Pain: 8/10 then 3/10 after pain medication    Labs:   CBC Full  -  ( 12 Mar 2019 07:11 )  WBC Count : 1.2 K/uL  Hemoglobin : 11.8 g/dL  Hematocrit : 31.9 %  Platelet Count - Automated : 162 K/uL  Mean Cell Volume : 90.2 fl  Mean Cell Hemoglobin : 33.4 pg  Mean Cell Hemoglobin Concentration : 37.0 gm/dL  Auto Neutrophil # : 0.1 K/uL  Auto Lymphocyte # : See Note  Auto Monocyte # : See Note  Auto Eosinophil # : 0.0 K/uL  Auto Basophil # : 0.0 K/uL  Auto Neutrophil % : 8.0 %  Auto Lymphocyte % : 32.0 %  Auto Monocyte % : 60.0 %  Auto Eosinophil % : x  Auto Basophil % : x                          11.8   1.2   )-----------( 162      ( 12 Mar 2019 07:11 )             31.9         142  |  105  |  <4<L>  ----------------------------<  92  3.6   |  23  |  0.62    Ca    9.5      12 Mar 2019 07:11  Phos  3.5       Mg     1.8         TPro  6.5  /  Alb  3.7  /  TBili  0.2  /  DBili  <0.1  /  AST  25  /  ALT  16  /  AlkPhos  69      PT/INR - ( 12 Mar 2019 07:10 )   PT: 12.0 sec;   INR: 1.04 ratio         PTT - ( 12 Mar 2019 07:10 )  PTT:25.3 sec  LIVER FUNCTIONS - ( 12 Mar 2019 07:11 )  Alb: 3.7 g/dL / Pro: 6.5 g/dL / ALK PHOS: 69 U/L / ALT: 16 U/L / AST: 25 U/L / GGT: x           Lactate Dehydrogenase, Serum: 337 U/L ( @ 07:11)      Meds:   Antimicrobials:   acyclovir    Suspension 400 milliGRAM(s) Oral every 8 hours  atovaquone Suspension 750 milliGRAM(s) Oral every 12 hours  ciprofloxacin   IVPB 400 milliGRAM(s) IV Intermittent every 12 hours  clotrimazole Lozenge 1 Lozenge Oral five times a day  voriconazole IVPB 200 milliGRAM(s) IV Intermittent every 12 hours      Heme / Onc:       GI:  docusate sodium 100 milliGRAM(s) Oral three times a day  pantoprazole  Injectable 40 milliGRAM(s) IV Push daily  senna 2 Tablet(s) Oral at bedtime  sodium bicarbonate Mouth Rinse 10 milliLiter(s) Swish and Spit five times a day  sucralfate suspension 1 Gram(s) Oral four times a day  ursodiol Capsule 300 milliGRAM(s) Oral two times a day with meals      Cardiovascular:   amLODIPine   Tablet 5 milliGRAM(s) Oral daily      Immunologic:   epoetin bhupendra Injectable 16821 Unit(s) SubCutaneous <User Schedule>  filgrastim-sndz Injectable 480 MICROGram(s) SubCutaneous every 24 hours  immune   globulin 10% (GAMMAGARD) IVPB 20 Gram(s) IV Intermittent <User Schedule>  tacrolimus 1.5 milliGRAM(s) Oral every 12 hours      Other medications:   acetaminophen   Tablet .. 650 milliGRAM(s) Oral every 6 hours  acetaminophen   Tablet .. 650 milliGRAM(s) Oral <User Schedule>  ascorbic acid 250 milliGRAM(s) Oral daily  Biotene Dry Mouth Oral Rinse 5 milliLiter(s) Swish and Spit five times a day  chlorhexidine 4% Liquid 1 Application(s) Topical <User Schedule>  cholecalciferol 400 Unit(s) Oral daily  cyanocobalamin 1000 MICROGram(s) Oral daily  diphenhydrAMINE   Injectable 25 milliGRAM(s) IV Push every 3 weeks  fentaNYL   Patch  25 MICROgram(s)/Hr 1 Patch Transdermal every 72 hours  folic acid 1 milliGRAM(s) Oral daily  iron sucrose IVPB 100 milliGRAM(s) IV Intermittent every 7 days  lidocaine/prilocaine Cream 1 Application(s) Topical daily  magnesium oxide 400 milliGRAM(s) Oral three times a day with meals  medroxyPROGESTERone 20 milliGRAM(s) Oral daily  montelukast 10 milliGRAM(s) Oral daily  multivitamin 1 Tablet(s) Oral daily  phytonadione   Solution 10 milliGRAM(s) Oral every other day  sodium chloride 0.9%. 1000 milliLiter(s) IV Continuous <Continuous>      PRN:   acetaminophen   Tablet .. 650 milliGRAM(s) Oral every 6 hours PRN  artificial  tears Solution 1 Drop(s) Both EYES three times a day PRN  benzocaine 15 mG/menthol 3.6 mG Lozenge 1 Lozenge Oral every 3 hours PRN  diphenhydrAMINE   Injectable 25 milliGRAM(s) IV Push every 4 hours PRN  FIRST- Mouthwash  BLM 5 milliLiter(s) Swish and Spit every 8 hours PRN  HYDROmorphone  Injectable 0.5 milliGRAM(s) IV Push every 2 hours PRN  HYDROmorphone  Injectable 1 milliGRAM(s) IV Push every 2 hours PRN  metoclopramide Injectable 10 milliGRAM(s) IV Push every 6 hours PRN  sodium chloride 0.65% Nasal 1 Spray(s) Both Nostrils five times a day PRN  sodium chloride 0.9% lock flush 10 milliLiter(s) IV Push every 1 hour PRN    A/P:  33 year old female with a history of AML  Post:  Allogeneic PBSCT Day + 13  Labs every other day ; continue vitamin C, vitamin B, folic acid, vitamin K & iron supplementation. Increased vitamin K to every other day as per standard guidelines for bloodless transplant  Continue Provera 20 mg PO daily,  Procrit to 20,000 units TIW and Venofer  BP- 140s, started on Norvasc.  Hold for SBP <120  Mucositis likely related to MTX and meds changed to IV 3/8. Continue Carafate and supportive care. Pain improving.  Pain meds per palliative care  Neutropenic, afebrile. Continue Cipro and if T >/= 38C Pan Cx, CXR and change Cipro to Cefepime    1. Infectious Disease:   acyclovir    Suspension 400 milliGRAM(s) Oral every 8 hours  atovaquone Suspension 750 milliGRAM(s) Oral every 12 hours  ciprofloxacin   IVPB 400 milliGRAM(s) IV Intermittent every 12 hours  clotrimazole Lozenge 1 Lozenge Oral five times a day  voriconazole IVPB 200 milliGRAM(s) IV Intermittent every 12 hours    2. VOD Prophylaxis: Actigall, Glutamine, Heparin (dosed at 100 units / kg / day)     3. GI Prophylaxis:    pantoprazole  Injectable 40 milliGRAM(s) IV Push daily    4. Mouthcare - NS / NaHCO3 rinses, Mycelex, Caphosol; Skin care     5. GVHD prophylaxis -  tacrolimus 1.5 milliGRAM(s) Oral every 12 hours    6. Transfuse & replete electrolytes prn   magnesium oxide 400 milliGRAM(s) Oral three times a day with meals    7. IV hydration, daily weights, strict I&O, prn diuresis     8. PO intake as tolerated, nutrition follow up as needed, MVI, folic acid     9. Antiemetics, anti-diarrhea medications:   metoclopramide Injectable 10 milliGRAM(s) IV Push every 6 hours PRN    10. OOB as tolerated, physical therapy consult if needed     11. Monitor coags / fibrinogen 2x week, vitamin K as needed   phytonadione   Solution 10 milliGRAM(s) Oral every other day    12. Monitor closely for clinical changes, monitor for fevers     13. Emotional support provided, plan of care discussed and questions addressed     14. Patient education done regarding plan of care, restrictions and discharge planning     15. Continue regular social work input     I have written the above note for Dr. Lou who performed service with me in the room.   Racquel Leiva NP-C ([phone])    I have seen and examined patient with NP, I agree with above note as scribed. HPC Transplant Team                                                      Critical / Counseling Time Provided: 30 minutes                                                                                                                                                        Chief Complaint: Allogeneic peripheral blood stem cell transplant from [facility] (sister 10/10) for treatment of AML    S: Patient seen and examined with HPC Transplant Team:     Vomited once this morning, no preceding nausea  No other acute complaints    Denies mouth / tongue / throat pain, dyspnea, cough, nausea, diarrhea, abdominal pain     O: Vitals:   Vital Signs Last 24 Hrs  T(C): 36.4 (12 Mar 2019 06:09), Max: 37.6 (11 Mar 2019 13:18)  T(F): 97.5 (12 Mar 2019 06:09), Max: 99.7 (11 Mar 2019 13:18)  HR: 102 (12 Mar 2019 06:09) (88 - 110)  BP: 110/76 (12 Mar 2019 06:09) (102/71 - 134/85)  BP(mean): --  RR: 18 (12 Mar 2019 06:09) (18 - 18)  SpO2: 98% (11 Mar 2019 21:35) (98% - 100%)    Admit weight: 88.2 kG  Daily Weight in k.5 (11 Mar 2019 09:42)    Intake / Output:    @ 07:01  -  03-12 @ 07:00  --------------------------------------------------------  IN: 2993 mL / OUT: 2950 mL / NET: 43 mL    PE:   Oropharynx: no erythema or ulcerations in oropharynx  Oral Mucositis:    +                                         Grade: 1-2(throat)  CVS: (+) S1/S2 RRR   Lungs: CTA throughout bilaterally   Abdomen: (+)BS x 4, soft, NT/ND   Extremities: no edema  in B/L LE  Gastric Mucositis:       +                                          rdGrdrrdarddrderd:rd rd3rd Intestinal Mucositis:     -                                          Grade: n/a  Skin: no rash, patches of darker areas.   TLC: PICC line C/D/I  Neuro: A&O x 3   Pain: 8/10 then 3/10 after pain medication    Labs:   CBC Full  -  ( 12 Mar 2019 07:11 )  WBC Count : 1.2 K/uL  Hemoglobin : 11.8 g/dL  Hematocrit : 31.9 %  Platelet Count - Automated : 162 K/uL  Mean Cell Volume : 90.2 fl  Mean Cell Hemoglobin : 33.4 pg  Mean Cell Hemoglobin Concentration : 37.0 gm/dL  Auto Neutrophil # : 0.1 K/uL  Auto Lymphocyte # : See Note  Auto Monocyte # : See Note  Auto Eosinophil # : 0.0 K/uL  Auto Basophil # : 0.0 K/uL  Auto Neutrophil % : 8.0 %  Auto Lymphocyte % : 32.0 %  Auto Monocyte % : 60.0 %  Auto Eosinophil % : x  Auto Basophil % : x                          11.8   1.2   )-----------( 162      ( 12 Mar 2019 07:11 )             31.9     03-    142  |  105  |  <4<L>  ----------------------------<  92  3.6   |  23  |  0.62    Ca    9.5      12 Mar 2019 07:11  Phos  3.5       Mg     1.8         TPro  6.5  /  Alb  3.7  /  TBili  0.2  /  DBili  <0.1  /  AST  25  /  ALT  16  /  AlkPhos  69  03-12    PT/INR - ( 12 Mar 2019 07:10 )   PT: 12.0 sec;   INR: 1.04 ratio         PTT - ( 12 Mar 2019 07:10 )  PTT:25.3 sec  LIVER FUNCTIONS - ( 12 Mar 2019 07:11 )  Alb: 3.7 g/dL / Pro: 6.5 g/dL / ALK PHOS: 69 U/L / ALT: 16 U/L / AST: 25 U/L / GGT: x           Lactate Dehydrogenase, Serum: 337 U/L ( @ 07:11)      Meds:   Antimicrobials:   acyclovir    Suspension 400 milliGRAM(s) Oral every 8 hours  atovaquone Suspension 750 milliGRAM(s) Oral every 12 hours  ciprofloxacin   IVPB 400 milliGRAM(s) IV Intermittent every 12 hours  clotrimazole Lozenge 1 Lozenge Oral five times a day  voriconazole IVPB 200 milliGRAM(s) IV Intermittent every 12 hours      Heme / Onc:       GI:  docusate sodium 100 milliGRAM(s) Oral three times a day  pantoprazole  Injectable 40 milliGRAM(s) IV Push daily  senna 2 Tablet(s) Oral at bedtime  sodium bicarbonate Mouth Rinse 10 milliLiter(s) Swish and Spit five times a day  sucralfate suspension 1 Gram(s) Oral four times a day  ursodiol Capsule 300 milliGRAM(s) Oral two times a day with meals      Cardiovascular:   amLODIPine   Tablet 5 milliGRAM(s) Oral daily      Immunologic:   epoetin bhupendra Injectable 45057 Unit(s) SubCutaneous <User Schedule>  filgrastim-sndz Injectable 480 MICROGram(s) SubCutaneous every 24 hours  immune   globulin 10% (GAMMAGARD) IVPB 20 Gram(s) IV Intermittent <User Schedule>  tacrolimus 1.5 milliGRAM(s) Oral every 12 hours      Other medications:   acetaminophen   Tablet .. 650 milliGRAM(s) Oral every 6 hours  acetaminophen   Tablet .. 650 milliGRAM(s) Oral <User Schedule>  ascorbic acid 250 milliGRAM(s) Oral daily  Biotene Dry Mouth Oral Rinse 5 milliLiter(s) Swish and Spit five times a day  chlorhexidine 4% Liquid 1 Application(s) Topical <User Schedule>  cholecalciferol 400 Unit(s) Oral daily  cyanocobalamin 1000 MICROGram(s) Oral daily  diphenhydrAMINE   Injectable 25 milliGRAM(s) IV Push every 3 weeks  fentaNYL   Patch  25 MICROgram(s)/Hr 1 Patch Transdermal every 72 hours  folic acid 1 milliGRAM(s) Oral daily  iron sucrose IVPB 100 milliGRAM(s) IV Intermittent every 7 days  lidocaine/prilocaine Cream 1 Application(s) Topical daily  magnesium oxide 400 milliGRAM(s) Oral three times a day with meals  medroxyPROGESTERone 20 milliGRAM(s) Oral daily  montelukast 10 milliGRAM(s) Oral daily  multivitamin 1 Tablet(s) Oral daily  phytonadione   Solution 10 milliGRAM(s) Oral every other day  sodium chloride 0.9%. 1000 milliLiter(s) IV Continuous <Continuous>      PRN:   acetaminophen   Tablet .. 650 milliGRAM(s) Oral every 6 hours PRN  artificial  tears Solution 1 Drop(s) Both EYES three times a day PRN  benzocaine 15 mG/menthol 3.6 mG Lozenge 1 Lozenge Oral every 3 hours PRN  diphenhydrAMINE   Injectable 25 milliGRAM(s) IV Push every 4 hours PRN  FIRST- Mouthwash  BLM 5 milliLiter(s) Swish and Spit every 8 hours PRN  HYDROmorphone  Injectable 0.5 milliGRAM(s) IV Push every 2 hours PRN  HYDROmorphone  Injectable 1 milliGRAM(s) IV Push every 2 hours PRN  metoclopramide Injectable 10 milliGRAM(s) IV Push every 6 hours PRN  sodium chloride 0.65% Nasal 1 Spray(s) Both Nostrils five times a day PRN  sodium chloride 0.9% lock flush 10 milliLiter(s) IV Push every 1 hour PRN    A/P:  33 year old female with a history of AML  Post:  Allogeneic PBSCT Day + 13  Labs every other day ; continue vitamin C, vitamin B, folic acid, vitamin K & iron supplementation. Increased vitamin K to every other day as per standard guidelines for bloodless transplant  Continue Provera 20 mg PO daily,  Procrit to 20,000 units TIW and Venofer  BP- 140s, started on Norvasc.  Hold for SBP <120  Mucositis likely related to MTX and meds changed to IV 3/8. Continue Carafate and supportive care. Pain improving.  Pain meds per palliative care  Neutropenic, afebrile. Continue Cipro and if T >/= 38C Pan Cx, CXR and change Cipro to Cefepime  Zarxio held yesterday and today for impending engraftment, re-eval need tomorrow.  D/C Epo.  Last Venofer Friday 3/8.    1. Infectious Disease:   acyclovir    Suspension 400 milliGRAM(s) Oral every 8 hours  atovaquone Suspension 750 milliGRAM(s) Oral every 12 hours  ciprofloxacin   IVPB 400 milliGRAM(s) IV Intermittent every 12 hours  clotrimazole Lozenge 1 Lozenge Oral five times a day  voriconazole IVPB 200 milliGRAM(s) IV Intermittent every 12 hours    2. VOD Prophylaxis: Actigall, Glutamine, Heparin (dosed at 100 units / kg / day)     3. GI Prophylaxis:    pantoprazole  Injectable 40 milliGRAM(s) IV Push daily    4. Mouthcare - NS / NaHCO3 rinses, Mycelex, Caphosol; Skin care     5. GVHD prophylaxis -    tacrolimus 1.5 milliGRAM(s) Oral every 12 hours    6. Transfuse & replete electrolytes prn   magnesium oxide 400 milliGRAM(s) Oral three times a day with meals    7. IV hydration, daily weights, strict I&O, prn diuresis     8. PO intake as tolerated, nutrition follow up as needed, MVI, folic acid     9. Antiemetics, anti-diarrhea medications:   metoclopramide Injectable 10 milliGRAM(s) IV Push every 6 hours PRN    10. OOB as tolerated, physical therapy consult if needed     11. Monitor coags / fibrinogen 2x week, vitamin K as needed   phytonadione   Solution 10 milliGRAM(s) Oral every other day    12. Monitor closely for clinical changes, monitor for fevers     13. Emotional support provided, plan of care discussed and questions addressed     14. Patient education done regarding plan of care, restrictions and discharge planning     15. Continue regular social work input     I have written the above note for Dr. Lou who performed service with me in the room.   Racquel Leiav NP-C ([phone])    I have seen and examined patient with NP, I agree with above note as scribed. [facility] Transplant Team                                                      Critical / Counseling Time Provided: 30 minutes                                                                                                                                                        Chief Complaint: Allogeneic peripheral blood stem cell transplant from [facility] (sister 10/10) for treatment of AML    S: Patient seen and examined with HPC Transplant Team:     Vomited once this morning, no preceding nausea  No other acute complaints    Denies mouth / tongue / throat pain, dyspnea, cough, nausea, diarrhea, abdominal pain     O: Vitals:   Vital Signs Last 24 Hrs  T(C): 36.4 (12 Mar 2019 06:09), Max: 37.6 (11 Mar 2019 13:18)  T(F): 97.5 (12 Mar 2019 06:09), Max: 99.7 (11 Mar 2019 13:18)  HR: 102 (12 Mar 2019 06:09) (88 - 110)  BP: 110/76 (12 Mar 2019 06:09) (102/71 - 134/85)  BP(mean): --  RR: 18 (12 Mar 2019 06:09) (18 - 18)  SpO2: 98% (11 Mar 2019 21:35) (98% - 100%)    Admit weight: 88.2 kG  Daily Weight in k.5 (11 Mar 2019 09:42)    Intake / Output:    @ 07:01  -  03-12 @ 07:00  --------------------------------------------------------  IN: 2993 mL / OUT: 2950 mL / NET: 43 mL    PE:   Oropharynx: no erythema or ulcerations in oropharynx  Oral Mucositis:    +                                         Grade: 1-2(throat)  CVS: (+) S1/S2 RRR   Lungs: CTA throughout bilaterally   Abdomen: (+)BS x 4, soft, NT/ND   Extremities: no edema  in B/L LE  Gastric Mucositis:       +                                          rdGrdrrdarddrderd:rd rd3rd Intestinal Mucositis:     -                                          Grade: n/a  Skin: no rash, patches of darker areas.   TLC: PICC line C/D/I  Neuro: A&O x 3   Pain: 8/10 then 3/10 after pain medication    Labs:   CBC Full  -  ( 12 Mar 2019 07:11 )  WBC Count : 1.2 K/uL  Hemoglobin : 11.8 g/dL  Hematocrit : 31.9 %  Platelet Count - Automated : 162 K/uL  Mean Cell Volume : 90.2 fl  Mean Cell Hemoglobin : 33.4 pg  Mean Cell Hemoglobin Concentration : 37.0 gm/dL  Auto Neutrophil # : 0.1 K/uL  Auto Lymphocyte # : See Note  Auto Monocyte # : See Note  Auto Eosinophil # : 0.0 K/uL  Auto Basophil # : 0.0 K/uL  Auto Neutrophil % : 8.0 %  Auto Lymphocyte % : 32.0 %  Auto Monocyte % : 60.0 %  Auto Eosinophil % : x  Auto Basophil % : x                          11.8   1.2   )-----------( 162      ( 12 Mar 2019 07:11 )             31.9     03-    142  |  105  |  <4<L>  ----------------------------<  92  3.6   |  23  |  0.62    Ca    9.5      12 Mar 2019 07:11  Phos  3.5       Mg     1.8         TPro  6.5  /  Alb  3.7  /  TBili  0.2  /  DBili  <0.1  /  AST  25  /  ALT  16  /  AlkPhos  69  03-12    PT/INR - ( 12 Mar 2019 07:10 )   PT: 12.0 sec;   INR: 1.04 ratio         PTT - ( 12 Mar 2019 07:10 )  PTT:25.3 sec  LIVER FUNCTIONS - ( 12 Mar 2019 07:11 )  Alb: 3.7 g/dL / Pro: 6.5 g/dL / ALK PHOS: 69 U/L / ALT: 16 U/L / AST: 25 U/L / GGT: x           Lactate Dehydrogenase, Serum: 337 U/L ( @ 07:11)      Meds:   Antimicrobials:   acyclovir    Suspension 400 milliGRAM(s) Oral every 8 hours  atovaquone Suspension 750 milliGRAM(s) Oral every 12 hours  ciprofloxacin   IVPB 400 milliGRAM(s) IV Intermittent every 12 hours  clotrimazole Lozenge 1 Lozenge Oral five times a day  voriconazole IVPB 200 milliGRAM(s) IV Intermittent every 12 hours      Heme / Onc:       GI:  docusate sodium 100 milliGRAM(s) Oral three times a day  pantoprazole  Injectable 40 milliGRAM(s) IV Push daily  senna 2 Tablet(s) Oral at bedtime  sodium bicarbonate Mouth Rinse 10 milliLiter(s) Swish and Spit five times a day  sucralfate suspension 1 Gram(s) Oral four times a day  ursodiol Capsule 300 milliGRAM(s) Oral two times a day with meals      Cardiovascular:   amLODIPine   Tablet 5 milliGRAM(s) Oral daily      Immunologic:   epoetin bhupendra Injectable 31901 Unit(s) SubCutaneous <User Schedule>  filgrastim-sndz Injectable 480 MICROGram(s) SubCutaneous every 24 hours  immune   globulin 10% (GAMMAGARD) IVPB 20 Gram(s) IV Intermittent <User Schedule>  tacrolimus 1.5 milliGRAM(s) Oral every 12 hours      Other medications:   acetaminophen   Tablet .. 650 milliGRAM(s) Oral every 6 hours  acetaminophen   Tablet .. 650 milliGRAM(s) Oral <User Schedule>  ascorbic acid 250 milliGRAM(s) Oral daily  Biotene Dry Mouth Oral Rinse 5 milliLiter(s) Swish and Spit five times a day  chlorhexidine 4% Liquid 1 Application(s) Topical <User Schedule>  cholecalciferol 400 Unit(s) Oral daily  cyanocobalamin 1000 MICROGram(s) Oral daily  diphenhydrAMINE   Injectable 25 milliGRAM(s) IV Push every 3 weeks  fentaNYL   Patch  25 MICROgram(s)/Hr 1 Patch Transdermal every 72 hours  folic acid 1 milliGRAM(s) Oral daily  iron sucrose IVPB 100 milliGRAM(s) IV Intermittent every 7 days  lidocaine/prilocaine Cream 1 Application(s) Topical daily  magnesium oxide 400 milliGRAM(s) Oral three times a day with meals  medroxyPROGESTERone 20 milliGRAM(s) Oral daily  montelukast 10 milliGRAM(s) Oral daily  multivitamin 1 Tablet(s) Oral daily  phytonadione   Solution 10 milliGRAM(s) Oral every other day  sodium chloride 0.9%. 1000 milliLiter(s) IV Continuous <Continuous>      PRN:   acetaminophen   Tablet .. 650 milliGRAM(s) Oral every 6 hours PRN  artificial  tears Solution 1 Drop(s) Both EYES three times a day PRN  benzocaine 15 mG/menthol 3.6 mG Lozenge 1 Lozenge Oral every 3 hours PRN  diphenhydrAMINE   Injectable 25 milliGRAM(s) IV Push every 4 hours PRN  FIRST- Mouthwash  BLM 5 milliLiter(s) Swish and Spit every 8 hours PRN  HYDROmorphone  Injectable 0.5 milliGRAM(s) IV Push every 2 hours PRN  HYDROmorphone  Injectable 1 milliGRAM(s) IV Push every 2 hours PRN  metoclopramide Injectable 10 milliGRAM(s) IV Push every 6 hours PRN  sodium chloride 0.65% Nasal 1 Spray(s) Both Nostrils five times a day PRN  sodium chloride 0.9% lock flush 10 milliLiter(s) IV Push every 1 hour PRN    A/P:  33 year old female with a history of AML  Post:  Allogeneic PBSCT Day + 13  Labs every other day ; continue vitamin C, vitamin B, folic acid, vitamin K & iron supplementation. Increased vitamin K to every other day as per standard guidelines for bloodless transplant  Continue Provera 20 mg PO daily,  Procrit to 20,000 units TIW and Venofer  BP- 140s, started on Norvasc.  Hold for SBP <120  Mucositis likely related to MTX and meds changed to IV 3/8. Continue Carafate and supportive care. Pain improving.  Pain meds per palliative care  Neutropenic, afebrile. Continue Cipro and if T >/= 38C Pan Cx, CXR and change Cipro to Cefepime  Zarxio held yesterday and today for impending engraftment, re-eval need tomorrow.  D/C Epo.  Last Venofer Friday 3/8.  Switch Vori to oral, continue Cipro IV.    1. Infectious Disease:   acyclovir    Suspension 400 milliGRAM(s) Oral every 8 hours  atovaquone Suspension 750 milliGRAM(s) Oral every 12 hours  ciprofloxacin   IVPB 400 milliGRAM(s) IV Intermittent every 12 hours  clotrimazole Lozenge 1 Lozenge Oral five times a day  voriconazole IVPB 200 milliGRAM(s) IV Intermittent every 12 hours    2. VOD Prophylaxis: Actigall, Glutamine, Heparin (dosed at 100 units / kg / day)     3. GI Prophylaxis:    pantoprazole  Injectable 40 milliGRAM(s) IV Push daily    4. Mouthcare - NS / NaHCO3 rinses, Mycelex, Caphosol; Skin care     5. GVHD prophylaxis -    tacrolimus 1.5 milliGRAM(s) Oral every 12 hours    6. Transfuse & replete electrolytes prn   magnesium oxide 400 milliGRAM(s) Oral three times a day with meals    7. IV hydration, daily weights, strict I&O, prn diuresis     8. PO intake as tolerated, nutrition follow up as needed, MVI, folic acid     9. Antiemetics, anti-diarrhea medications:   metoclopramide Injectable 10 milliGRAM(s) IV Push every 6 hours PRN    10. OOB as tolerated, physical therapy consult if needed     11. Monitor coags / fibrinogen 2x week, vitamin K as needed   phytonadione   Solution 10 milliGRAM(s) Oral every other day    12. Monitor closely for clinical changes, monitor for fevers     13. Emotional support provided, plan of care discussed and questions addressed     14. Patient education done regarding plan of care, restrictions and discharge planning     15. Continue regular social work input     I have written the above note for Dr. Lou who performed service with me in the room.   Racquel Leiva NP-C ([phone])    I have seen and examined patient with NP, I agree with above note as scribed. [facility] Transplant Team                                                      Critical / Counseling Time Provided: 30 minutes                                                                                                                                                        Chief Complaint: Allogeneic peripheral blood stem cell transplant from [facility] (sister 10/10) for treatment of AML    S: Patient seen and examined with HPC Transplant Team:     Vomited once this morning, no preceding nausea  No other acute complaints    Denies mouth / tongue / throat pain, dyspnea, cough, nausea, diarrhea, abdominal pain     O: Vitals:   Vital Signs Last 24 Hrs  T(C): 36.4 (12 Mar 2019 06:09), Max: 37.6 (11 Mar 2019 13:18)  T(F): 97.5 (12 Mar 2019 06:09), Max: 99.7 (11 Mar 2019 13:18)  HR: 102 (12 Mar 2019 06:09) (88 - 110)  BP: 110/76 (12 Mar 2019 06:09) (102/71 - 134/85)  BP(mean): --  RR: 18 (12 Mar 2019 06:09) (18 - 18)  SpO2: 98% (11 Mar 2019 21:35) (98% - 100%)    Admit weight: 88.2 kG  Daily Weight in k.1 kg    Intake / Output:    @ 07:01  -  -12 @ 07:00  --------------------------------------------------------  IN: 2993 mL / OUT: 2950 mL / NET: 43 mL    PE:   Oropharynx: no erythema or ulcerations in oropharynx  Oral Mucositis:    +                                         Grade: 1-2(throat)  CVS: (+) S1/S2 RRR   Lungs: CTA throughout bilaterally   Abdomen: (+)BS x 4, soft, NT/ND   Extremities: no edema  in B/L LE  Gastric Mucositis:       +                                          rdGrdrrdarddrderd:rd rd3rd Intestinal Mucositis:     -                                          Grade: n/a  Skin: no rash, patches of darker areas.   TLC: PICC line C/D/I  Neuro: A&O x 3   Pain: 8/10 then 3/10 after pain medication    Labs:   CBC Full  -  ( 12 Mar 2019 07:11 )  WBC Count : 1.2 K/uL  Hemoglobin : 11.8 g/dL  Hematocrit : 31.9 %  Platelet Count - Automated : 162 K/uL  Mean Cell Volume : 90.2 fl  Mean Cell Hemoglobin : 33.4 pg  Mean Cell Hemoglobin Concentration : 37.0 gm/dL  Auto Neutrophil # : 0.1 K/uL  Auto Lymphocyte # : See Note  Auto Monocyte # : See Note  Auto Eosinophil # : 0.0 K/uL  Auto Basophil # : 0.0 K/uL  Auto Neutrophil % : 8.0 %  Auto Lymphocyte % : 32.0 %  Auto Monocyte % : 60.0 %  Auto Eosinophil % : x  Auto Basophil % : x                          11.8   1.2   )-----------( 162      ( 12 Mar 2019 07:11 )             31.9         142  |  105  |  <4<L>  ----------------------------<  92  3.6   |  23  |  0.62    Ca    9.5      12 Mar 2019 07:11  Phos  3.5       Mg     1.8         TPro  6.5  /  Alb  3.7  /  TBili  0.2  /  DBili  <0.1  /  AST  25  /  ALT  16  /  AlkPhos  69  -12    PT/INR - ( 12 Mar 2019 07:10 )   PT: 12.0 sec;   INR: 1.04 ratio         PTT - ( 12 Mar 2019 07:10 )  PTT:25.3 sec  LIVER FUNCTIONS - ( 12 Mar 2019 07:11 )  Alb: 3.7 g/dL / Pro: 6.5 g/dL / ALK PHOS: 69 U/L / ALT: 16 U/L / AST: 25 U/L / GGT: x           Lactate Dehydrogenase, Serum: 337 U/L ( @ 07:11)      Meds:   Antimicrobials:   acyclovir    Suspension 400 milliGRAM(s) Oral every 8 hours  atovaquone Suspension 750 milliGRAM(s) Oral every 12 hours  ciprofloxacin   IVPB 400 milliGRAM(s) IV Intermittent every 12 hours  clotrimazole Lozenge 1 Lozenge Oral five times a day  voriconazole IVPB 200 milliGRAM(s) IV Intermittent every 12 hours      Heme / Onc:       GI:  docusate sodium 100 milliGRAM(s) Oral three times a day  pantoprazole  Injectable 40 milliGRAM(s) IV Push daily  senna 2 Tablet(s) Oral at bedtime  sodium bicarbonate Mouth Rinse 10 milliLiter(s) Swish and Spit five times a day  sucralfate suspension 1 Gram(s) Oral four times a day  ursodiol Capsule 300 milliGRAM(s) Oral two times a day with meals      Cardiovascular:   amLODIPine   Tablet 5 milliGRAM(s) Oral daily      Immunologic:   epoetin bhupendra Injectable 58593 Unit(s) SubCutaneous <User Schedule>  filgrastim-sndz Injectable 480 MICROGram(s) SubCutaneous every 24 hours  immune   globulin 10% (GAMMAGARD) IVPB 20 Gram(s) IV Intermittent <User Schedule>  tacrolimus 1.5 milliGRAM(s) Oral every 12 hours      Other medications:   acetaminophen   Tablet .. 650 milliGRAM(s) Oral every 6 hours  acetaminophen   Tablet .. 650 milliGRAM(s) Oral <User Schedule>  ascorbic acid 250 milliGRAM(s) Oral daily  Biotene Dry Mouth Oral Rinse 5 milliLiter(s) Swish and Spit five times a day  chlorhexidine 4% Liquid 1 Application(s) Topical <User Schedule>  cholecalciferol 400 Unit(s) Oral daily  cyanocobalamin 1000 MICROGram(s) Oral daily  diphenhydrAMINE   Injectable 25 milliGRAM(s) IV Push every 3 weeks  fentaNYL   Patch  25 MICROgram(s)/Hr 1 Patch Transdermal every 72 hours  folic acid 1 milliGRAM(s) Oral daily  iron sucrose IVPB 100 milliGRAM(s) IV Intermittent every 7 days  lidocaine/prilocaine Cream 1 Application(s) Topical daily  magnesium oxide 400 milliGRAM(s) Oral three times a day with meals  medroxyPROGESTERone 20 milliGRAM(s) Oral daily  montelukast 10 milliGRAM(s) Oral daily  multivitamin 1 Tablet(s) Oral daily  phytonadione   Solution 10 milliGRAM(s) Oral every other day  sodium chloride 0.9%. 1000 milliLiter(s) IV Continuous <Continuous>      PRN:   acetaminophen   Tablet .. 650 milliGRAM(s) Oral every 6 hours PRN  artificial  tears Solution 1 Drop(s) Both EYES three times a day PRN  benzocaine 15 mG/menthol 3.6 mG Lozenge 1 Lozenge Oral every 3 hours PRN  diphenhydrAMINE   Injectable 25 milliGRAM(s) IV Push every 4 hours PRN  FIRST- Mouthwash  BLM 5 milliLiter(s) Swish and Spit every 8 hours PRN  HYDROmorphone  Injectable 0.5 milliGRAM(s) IV Push every 2 hours PRN  HYDROmorphone  Injectable 1 milliGRAM(s) IV Push every 2 hours PRN  metoclopramide Injectable 10 milliGRAM(s) IV Push every 6 hours PRN  sodium chloride 0.65% Nasal 1 Spray(s) Both Nostrils five times a day PRN  sodium chloride 0.9% lock flush 10 milliLiter(s) IV Push every 1 hour PRN    A/P:  33 year old female with a history of AML  Post:  Allogeneic PBSCT Day + 13  Labs every other day ; continue vitamin C, vitamin B, folic acid, vitamin K & iron supplementation. Increased vitamin K to every other day as per standard guidelines for bloodless transplant  Continue Provera 20 mg PO daily,  Procrit to 20,000 units TIW and Venofer  BP- 140s, started on Norvasc.  Hold for SBP <120  Mucositis likely related to MTX and meds changed to IV 3/8. Continue Carafate and supportive care. Pain improving.  Pain meds per palliative care  Neutropenic, afebrile. Continue Cipro and if T >/= 38C Pan Cx, CXR and change Cipro to Cefepime  Zarxio held yesterday and today for impending engraftment, re-eval need tomorrow.  D/C Epo.  Last Venofer Friday 3/8.  Switch Vori to oral, continue Cipro IV.    1. Infectious Disease:   acyclovir    Suspension 400 milliGRAM(s) Oral every 8 hours  atovaquone Suspension 750 milliGRAM(s) Oral every 12 hours  ciprofloxacin   IVPB 400 milliGRAM(s) IV Intermittent every 12 hours  clotrimazole Lozenge 1 Lozenge Oral five times a day  voriconazole IVPB 200 milliGRAM(s) IV Intermittent every 12 hours    2. VOD Prophylaxis: Actigall, Glutamine, Heparin (dosed at 100 units / kg / day)     3. GI Prophylaxis:    pantoprazole  Injectable 40 milliGRAM(s) IV Push daily    4. Mouthcare - NS / NaHCO3 rinses, Mycelex, Caphosol; Skin care     5. GVHD prophylaxis -    tacrolimus 1.5 milliGRAM(s) Oral every 12 hours    6. Transfuse & replete electrolytes prn   magnesium oxide 400 milliGRAM(s) Oral three times a day with meals    7. IV hydration, daily weights, strict I&O, prn diuresis     8. PO intake as tolerated, nutrition follow up as needed, MVI, folic acid     9. Antiemetics, anti-diarrhea medications:   metoclopramide Injectable 10 milliGRAM(s) IV Push every 6 hours PRN    10. OOB as tolerated, physical therapy consult if needed     11. Monitor coags / fibrinogen 2x week, vitamin K as needed   phytonadione   Solution 10 milliGRAM(s) Oral every other day    12. Monitor closely for clinical changes, monitor for fevers     13. Emotional support provided, plan of care discussed and questions addressed     14. Patient education done regarding plan of care, restrictions and discharge planning     15. Continue regular social work input     I have written the above note for Dr. Lou who performed service with me in the room.   Racquel Leiva NP-C ([phone])    I have seen and examined patient with NP, I agree with above note as scribed.

## 2019-03-12 NOTE — CHART NOTE - NSCHARTNOTEFT_GEN_A_CORE
Nutrition Follow Up Note  Patient seen for: LOS follow up     Interim events. Pt c AML, S/P allogeneic PBSCT day+13, grade 1-2 oral and grade 2 gastric mucositis.     Source: pt    Diet : Regular diet c Glutasolve x 1 packet daily, Ensure Clear x 2 and Ensure Enlive x 3 daily     Patient reports: she has been trying to eat as best as possible, however her appetite is not fully back. States she has been taking Ensure Clear and Ensure Enlive daily. Willing to try Magic Cup and high protein gelato. Reports her throat pain is much improved. Reports BMs are loose, had 3 earlier today.     Daily Weight: 2/20: 193.7->3/6: 191.8->3/12: 192 pounds, stable at this time     Pertinent Medications: MEDICATIONS  (STANDING):  acetaminophen   Tablet .. 650 milliGRAM(s) Oral every 6 hours  acetaminophen   Tablet .. 650 milliGRAM(s) Oral <User Schedule>  acyclovir    Suspension 400 milliGRAM(s) Oral every 8 hours  amLODIPine   Tablet 5 milliGRAM(s) Oral daily  ascorbic acid 250 milliGRAM(s) Oral daily  atovaquone Suspension 750 milliGRAM(s) Oral every 12 hours  Biotene Dry Mouth Oral Rinse 5 milliLiter(s) Swish and Spit five times a day  chlorhexidine 4% Liquid 1 Application(s) Topical <User Schedule>  cholecalciferol 400 Unit(s) Oral daily  ciprofloxacin   IVPB 400 milliGRAM(s) IV Intermittent every 12 hours  clotrimazole Lozenge 1 Lozenge Oral five times a day  cyanocobalamin 1000 MICROGram(s) Oral daily  diphenhydrAMINE   Injectable 25 milliGRAM(s) IV Push every 3 weeks  docusate sodium 100 milliGRAM(s) Oral three times a day  fentaNYL   Patch  25 MICROgram(s)/Hr 1 Patch Transdermal every 72 hours  folic acid 1 milliGRAM(s) Oral daily  immune   globulin 10% (GAMMAGARD) IVPB 20 Gram(s) IV Intermittent <User Schedule>  iron sucrose IVPB 100 milliGRAM(s) IV Intermittent every 7 days  lidocaine/prilocaine Cream 1 Application(s) Topical daily  magnesium oxide 400 milliGRAM(s) Oral three times a day with meals  medroxyPROGESTERone 20 milliGRAM(s) Oral daily  montelukast 10 milliGRAM(s) Oral daily  multivitamin 1 Tablet(s) Oral daily  pantoprazole  Injectable 40 milliGRAM(s) IV Push daily  phytonadione   Solution 10 milliGRAM(s) Oral every other day  senna 2 Tablet(s) Oral at bedtime  sodium bicarbonate Mouth Rinse 10 milliLiter(s) Swish and Spit five times a day  sodium chloride 0.9%. 1000 milliLiter(s) (20 mL/Hr) IV Continuous <Continuous>  sucralfate suspension 1 Gram(s) Oral four times a day  tacrolimus 1.5 milliGRAM(s) Oral every 12 hours  ursodiol Capsule 300 milliGRAM(s) Oral two times a day with meals  voriconazole 200 milliGRAM(s) Oral every 12 hours    MEDICATIONS  (PRN):  acetaminophen   Tablet .. 650 milliGRAM(s) Oral every 6 hours PRN Temp greater or equal to 38C (100.4F), Mild Pain (1 - 3)  artificial  tears Solution 1 Drop(s) Both EYES three times a day PRN Dry Eyes  benzocaine 15 mG/menthol 3.6 mG Lozenge 1 Lozenge Oral every 3 hours PRN Sore Throat  diphenhydrAMINE   Injectable 25 milliGRAM(s) IV Push every 4 hours PRN Allergy symptoms  FIRST- Mouthwash  BLM 5 milliLiter(s) Swish and Spit every 8 hours PRN Mouth Care  HYDROmorphone  Injectable 0.5 milliGRAM(s) IV Push every 2 hours PRN Moderate Pain (4 - 6)  HYDROmorphone  Injectable 1 milliGRAM(s) IV Push every 2 hours PRN Severe Pain (7 - 10)  metoclopramide Injectable 10 milliGRAM(s) IV Push every 6 hours PRN Nausea and/or Vomiting  sodium chloride 0.65% Nasal 1 Spray(s) Both Nostrils five times a day PRN Nasal Congestion  sodium chloride 0.9% lock flush 10 milliLiter(s) IV Push every 1 hour PRN Pre/post blood products, medications, blood draw, and to maintain line patency    Pertinent Labs: 03-12 @ 07:11: Na 142, BUN <4<L>, Cr 0.62, BG 92, K+ 3.6, Phos 3.5, Mg 1.8, Alk Phos 69, ALT/SGPT 16, AST/SGOT 25, HbA1c --    Finger Sticks: None pertinent to address at this time.       Skin per nursing documentation: intact  Edema: none at thi stime     Estimated Needs:   [x] no change since previous assessment      Previous Nutrition Diagnosis: inadequate protein-energy intake   Nutrition Diagnosis continues, improving c increased acceptance of PO and supplements     New Nutrition Diagnosis: none at this time       Recommend  1) Continue c current diet. Continue w/ Ensure Enlive and Ensure Clear.   2) Encouraged PO intake-small, frequent meals and nutrient dense snacks. In house, encouraged pt to order nutrient dense snacks c meals to consume in between meals to mimic small, frequent meals. Discussed protein rich foods available on menu. Discussed consuming protein first at meal times and then eating the other foods. Reviewed nutrient snacks available.     Monitoring and Evaluation:     Continue to monitor Nutritional intake, Tolerance to diet prescription, weights, labs, skin integrity    RD remains available upon request and will follow up per protocol  Roxanne Theodore, MS, RD, , [facility] #[phone]

## 2019-03-12 NOTE — PROGRESS NOTE ADULT - SUBJECTIVE AND OBJECTIVE BOX
HPI: 33F with PMH of AML/MDS, Judaism, here for allo PSCT. Diagnosed during bloodwork for a preop R ankle ligament repair, and managed with idarubicin c/b DIC, neutropenic fever, and retinal hemorrhage; had salvage therapy; further findings raised the question of MDS over AML. Sister is donor. Planned for SCT 2/27/19.    INTERVAL EVENTS: D#13 post-transplant, has some mouth pain on opioids, states pain baseline improved to 4/10 with increase to 25mcg patch, used 1.5mg total dilaudid/24 hours    ADVANCE DIRECTIVES:    DNR [ ]  MOLST  [ ]    Living Will  [ ]   DECISION MAKER(s):  [ ] Health Care Proxy(s)  [ ] Surrogate(s)  [ ] Guardian           Name(s) and Contact(s):     BASELINE (I)ADL(s) (prior to admission):  [place]: [ ]Total  [ ] Moderate [ ]Dependent    Allergies    No Known Allergies    Intolerances    MEDICATIONS  (STANDING):  acetaminophen   Tablet .. 650 milliGRAM(s) Oral every 6 hours  acetaminophen   Tablet .. 650 milliGRAM(s) Oral <User Schedule>  acyclovir    Suspension 400 milliGRAM(s) Oral every 8 hours  amLODIPine   Tablet 5 milliGRAM(s) Oral daily  ascorbic acid 250 milliGRAM(s) Oral daily  atovaquone Suspension 750 milliGRAM(s) Oral every 12 hours  Biotene Dry Mouth Oral Rinse 5 milliLiter(s) Swish and Spit five times a day  chlorhexidine 4% Liquid 1 Application(s) Topical <User Schedule>  cholecalciferol 400 Unit(s) Oral daily  ciprofloxacin   IVPB 400 milliGRAM(s) IV Intermittent every 12 hours  clotrimazole Lozenge 1 Lozenge Oral five times a day  cyanocobalamin 1000 MICROGram(s) Oral daily  diphenhydrAMINE   Injectable 25 milliGRAM(s) IV Push every 3 weeks  docusate sodium 100 milliGRAM(s) Oral three times a day  fentaNYL   Patch  25 MICROgram(s)/Hr 1 Patch Transdermal every 72 hours  folic acid 1 milliGRAM(s) Oral daily  immune   globulin 10% (GAMMAGARD) IVPB 20 Gram(s) IV Intermittent <User Schedule>  iron sucrose IVPB 100 milliGRAM(s) IV Intermittent every 7 days  lidocaine/prilocaine Cream 1 Application(s) Topical daily  magnesium oxide 400 milliGRAM(s) Oral three times a day with meals  medroxyPROGESTERone 20 milliGRAM(s) Oral daily  montelukast 10 milliGRAM(s) Oral daily  multivitamin 1 Tablet(s) Oral daily  pantoprazole  Injectable 40 milliGRAM(s) IV Push daily  phytonadione   Solution 10 milliGRAM(s) Oral every other day  senna 2 Tablet(s) Oral at bedtime  sodium bicarbonate Mouth Rinse 10 milliLiter(s) Swish and Spit five times a day  sodium chloride 0.9%. 1000 milliLiter(s) (20 mL/Hr) IV Continuous <Continuous>  sucralfate suspension 1 Gram(s) Oral four times a day  tacrolimus 1.5 milliGRAM(s) Oral every 12 hours  ursodiol Capsule 300 milliGRAM(s) Oral two times a day with meals  voriconazole 200 milliGRAM(s) Oral every 12 hours    MEDICATIONS  (PRN):  acetaminophen   Tablet .. 650 milliGRAM(s) Oral every 6 hours PRN Temp greater or equal to 38C (100.4F), Mild Pain (1 - 3)  artificial  tears Solution 1 Drop(s) Both EYES three times a day PRN Dry Eyes  benzocaine 15 mG/menthol 3.6 mG Lozenge 1 Lozenge Oral every 3 hours PRN Sore Throat  diphenhydrAMINE   Injectable 25 milliGRAM(s) IV Push every 4 hours PRN Allergy symptoms  FIRST- Mouthwash  BLM 5 milliLiter(s) Swish and Spit every 8 hours PRN Mouth Care  HYDROmorphone  Injectable 0.5 milliGRAM(s) IV Push every 2 hours PRN Moderate Pain (4 - 6)  HYDROmorphone  Injectable 1 milliGRAM(s) IV Push every 2 hours PRN Severe Pain (7 - 10)  metoclopramide Injectable 10 milliGRAM(s) IV Push every 6 hours PRN Nausea and/or Vomiting  sodium chloride 0.65% Nasal 1 Spray(s) Both Nostrils five times a day PRN Nasal Congestion  sodium chloride 0.9% lock flush 10 milliLiter(s) IV Push every 1 hour PRN Pre/post blood products, medications, blood draw, and to maintain line patency          PRESENT SYMPTOMS:   Source if other than patient:  [ ]Family   [ ]Team     Pain (Impact on QOL):    Location -         Minimal acceptable level (0-10 scale):                    Aggravating factors -  Quality -  Radiation -  Severity (0-10 scale) -    Timing -    PAIN AD Score:     [email or web address] (press ctrl +  left click to view)    Dyspnea:                           [ ]Mild [ ]Moderate [ ]Severe  Anxiety:                             [ ]Mild [ ]Moderate [ ]Severe  Fatigue:                             [X ]Mild [ ]Moderate [ ]Severe  Nausea:                             [ ]Mild [ ]Moderate [ ]Severe  Loss of appetite:              [ ]Mild [ ]Moderate [ ]Severe  Constipation:                    [ ]Mild [ ]Moderate [ ]Severe    Other Symptoms:  [X ]All other review of systems negative   [ ]Unable to obtain due to poor mentation     Karnofsky Performance Score/Palliative Performance Status Version 2:         %    PHYSICAL EXAM:  Vital Signs Last 24 Hrs  T(C): 36.9 (12 Mar 2019 13:34), Max: 37.1 (11 Mar 2019 17:34)  T(F): 98.4 (12 Mar 2019 13:34), Max: 98.8 (11 Mar 2019 17:34)  HR: 105 (12 Mar 2019 13:34) (88 - 116)  BP: 107/74 (12 Mar 2019 13:34) (107/74 - 138/94)  BP(mean): --  RR: 18 (12 Mar 2019 13:34) (18 - 18)  SpO2: 100% (12 Mar 2019 13:34) (98% - 100%)    Limited exam for patient comfort  GENERAL:  [X ]Alert  [ ]Oriented x   [ ]Lethargic  [ ]Cachexia  [ ]Unarousable  [ ]Verbal  [ ]Non-Verbal    Behavioral:   [ ] Anxiety  [ ] Delirium [ ] Agitation [ ] Other    HEENT:  [X ]Normal   [ ]Dry mouth   [ ]ET Tube/Trach  [ ]Oral lesions    PULMONARY:   [ ]Clear [ ]Tachypnea  [ ]Audible excessive secretions   [ ]Rhonchi        [ ]Right [ ]Left [ ]Bilateral  [ ]Crackles        [ ]Right [ ]Left [ ]Bilateral  [ ]Wheezing     [ ]Right [ ]Left [ ]Bilateral    CARDIOVASCULAR:    [ ]Regular [ ]Irregular [ ]Tachy  [ ]Lance [ ]Murmur [ ]Other    GASTROINTESTINAL:  [ ]Soft  [ ]Distended   [ ]+BS  [ ]Non tender [ ]Tender  [ ]PEG [ ]OGT/ NGT  Last BM:     GENITOURINARY:  [ ]Normal [ ] Incontinent   [ ]Oliguria/Anuria   [ ][place]    MUSCULOSKELETAL:   [X ]Normal   [ ]Weakness  [ ]Bed/Wheelchair bound [ ]Edema    NEUROLOGIC:   [X ]No focal deficits  [ ] Cognitive impairment  [ ] Dysphagia [ ]Dysarthria [ ] Paresis [ ]Other     SKIN:   [ ]Normal   [ ]Pressure ulcer(s)  [ ]Rash    CRITICAL CARE:  [ ] Shock Present  [ ]Septic [ ]Cardiogenic [ ]Neurologic [ ]Hypovolemic  [ ]  Vasopressors [ ]  Inotropes   [ ] Respiratory failure present  [ ] Acute  [ ] Chronic [ ] Hypoxic  [ ] Hypercarbic [ ] Other  [ ] Other organ failure     LABS: reviewed                      11.8   1.2   )-----------( 162      ( 12 Mar 2019 07:11 )             31.9   03-12    142  |  105  |  <4<L>  ----------------------------<  92  3.6   |  23  |  0.62    Ca    9.5      12 Mar 2019 07:11  Phos  3.5     03-12  Mg     1.8     03-12            RADIOLOGY & ADDITIONAL STUDIES: none on this admission thus far    PROTEIN CALORIE MALNUTRITION:   [ ] PPSV2 < or = to 30% [ ] significant weight loss  [ ] poor nutritional intake [ ] catabolic state [ ] anasarca     Albumin, Serum: 4.2 g/dL (02-20-19 @ 13:04)  Artificial Nutrition [ ]     REFERRALS:   [ ]Chaplaincy  [ ] Hospice  [ ]Child Life  [ ]Social Work  [ ]Case management [ ]Holistic Therapy     Goals of Care Discussion Document:     Saeid Pal MD  Palliative Medicine  office: [phone] | [phone]  pager: [phone]

## 2019-03-12 NOTE — PROGRESS NOTE ADULT - NSICDXPROBLEM_GEN_ALL_CORE_FT
PROBLEM DIAGNOSES  Problem: Mucositis  Assessment and Plan: - c/w fentanyl patch (uptitrated to 25mcg 3/11/19)  - c/w dilaudid 0.5mg and 1mg PRN  - would not uptitrate patch until at least 3/14/19  - once pain improving, can downtitrate patch to 12mcg, and switch to PO medications (4mg PO Q3 PRN moderate, 6mg PO Q3 PRN severe pain)  - consider follow-up with Dr. Galaviz at [facility] if pain ongoing at time of discharge: [phone]    Problem: Stem cell transplant candidate  Assessment and Plan: - D#13 s/p BMT  - plan per BMT team    Problem: Encounter for palliative care  Assessment and Plan: - pain management as above  - will sign off for now, please reconsult PRN

## 2019-03-12 NOTE — PROGRESS NOTE ADULT - ATTENDING COMMENTS
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 Pt is J[employer], and does not accept blood products for [employer]al reasons    Day + 13- s/p MTX on days +1, +3, +6, 11 ...serum creatinine in normal range. No oral mucositis visible. To start zarxio today ..will hold off for now..wbc coming up on own and given [facility] at time of transplant will hold off..monitor for gvhd  Mucositis- throat soreness/pain with swallowing likely secondary to mucositis s/p MTX; monitor symptoms -now improved. Palliative care consult for pain management. Change meds to IV route if possible, add Carafate slurry.  Continue Tacrolimus 1 mg po q 12 hrs, monitor level  Continue Acyclovir, Mepron, Vfend prophylaxis; on Cipro for neutropenia. Cipro and Vfend changed to IV route.  Continue VOD prophylaxis with Actigall, glutamine supplement. Off heparin since 3/6 due to increased menstrual bleeding  Hypertension- possibly related to [place], other meds, fluids- continue Norvasc.  Labs every other day or at discretion of attending physician  Continue iron, folic acid, vitamin K, vitamin B-12, vitamin C as per standard practice for bloodless transplant  Anemia secondary to antineoplastic chemotherapy- begin Procrit for Hgb < 11g/dL, as she does not accept transfusion of pRBC. Cont iron supplement. As Hgb < 9, changed to Venofer IV and D/C oral iron; increase Procrit to 20,000 U 3X/week.   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 Pt is [employer], and does not accept blood products for Jainism reasons    Day + 13- s/p MTX on days +1, +3, +6, 11 ...serum creatinine in normal range. No oral mucositis visible. To start zarxio today ..will hold off for now..wbc coming up on own and given [facility] at time of transplant will hold off..monitor for gvhd  Mucositis- throat soreness/pain with swallowing likely secondary to mucositis s/p MTX; monitor symptoms -now improved. Palliative care consult for pain management. Change meds to IV route if possible, add Carafate slurry.  Continue Tacrolimus 1 mg po q 12 hrs, monitor level  Continue Acyclovir, Mepron, Vfend prophylaxis; on Cipro for neutropenia. Cipro and Vfend changed to IV route.  Continue VOD prophylaxis with Actigall, glutamine supplement. Off heparin since 3/6 due to increased menstrual bleeding  Hypertension- possibly related to [place], other meds, fluids- continue Norvasc.  Labs every other day or at discretion of attending physician  Continue iron, folic acid, vitamin K, vitamin B-12, vitamin C as per standard practice for bloodless transplant  Anemia secondary to antineoplastic chemotherapy- begin Procrit for Hgb < 11g/dL, as she does not accept transfusion of pRBC. Cont iron supplement. As Hgb < 9, changed to Venofer IV and D/C oral iron; increase Procrit to 20,000 U 3X/week.   Antiemetics, mouth care, skin care   OOB/ambulate.

## 2019-03-12 NOTE — PROGRESS NOTE ADULT - ASSESSMENT
33F with PMH of AML/MDS, [employer], here for allo PSCT. Diagnosed during bloodwork for a preop R ankle ligament repair, and managed with idarubicin c/b DIC, neutropenic fever, and retinal hemorrhage; had salvage therapy; further findings raised the question of MDS over AML. Sister is donor. s/p SCT 2/27/19.
33F with PMH of AML/MDS, [employer], here for allo PSCT. Diagnosed during bloodwork for a preop R ankle ligament repair, and managed with idarubicin c/b DIC, neutropenic fever, and retinal hemorrhage; had salvage therapy; further findings raised the question of MDS over AML. Sister is donor. s/p SCT 2/27/19.
33F with PMH of AML/MDS, [employer], here for allo PSCT. Diagnosed during bloodwork for a preop R ankle ligament repair, and managed with idarubicin c/b DIC, neutropenic fever, and retinal hemorrhage; had salvage therapy; further findings raised the question of MDS over AML. Sister is donor. s/p SCT 2/27/19.
33F with PMH of AML/MDS, [employer], here for allo PSCT. Diagnosed during bloodwork for a preop R ankle ligament repair, and managed with idarubicin c/b DIC, neutropenic fever, and retinal hemorrhage; had salvage therapy; further findings raised the question of MDS over AML. Sister is donor. s/p SCT 2/27/19.
33F with PMH of AML/MDS, [employer], here for allo PSCT. Diagnosed during bloodwork for a preop R ankle ligament repair, and managed with idarubicin c/b DIC, neutropenic fever, and retinal hemorrhage; had salvage therapy; further findings raised the question of MDS over AML. Sister is donor. s/p SCT 2/27/19.
33F with PMH of AML/MDS, S[employer]ism, here for allo PSCT. Diagnosed during bloodwork for a preop R ankle ligament repair, and managed with idarubicin c/b DIC, neutropenic fever, and retinal hemorrhage; had salvage therapy; further findings raised the question of MDS over AML. Sister is donor. s/p SCT 2/27/19.

## 2019-03-13 LAB
CMV DNA CSF QL NAA+PROBE: SIGNIFICANT CHANGE UP
CMV DNA SPEC NAA+PROBE-LOG#: SIGNIFICANT CHANGE UP LOGIU/ML
TACROLIMUS SERPL-MCNC: 4.5 NG/ML — SIGNIFICANT CHANGE UP

## 2019-03-13 PROCEDURE — 99291 CRITICAL CARE FIRST HOUR: CPT

## 2019-03-13 RX ORDER — ACYCLOVIR SODIUM 500 MG
400 VIAL (EA) INTRAVENOUS EVERY 8 HOURS
Qty: 0 | Refills: 0 | Status: DISCONTINUED | OUTPATIENT
Start: 2019-03-13 | End: 2019-03-18

## 2019-03-13 RX ADMIN — CHLORHEXIDINE GLUCONATE 1 APPLICATION(S): 213 SOLUTION TOPICAL at 06:59

## 2019-03-13 RX ADMIN — PANTOPRAZOLE SODIUM 40 MILLIGRAM(S): 20 TABLET, DELAYED RELEASE ORAL at 12:42

## 2019-03-13 RX ADMIN — Medication 1 TABLET(S): at 12:34

## 2019-03-13 RX ADMIN — Medication 10 MILLILITER(S): at 20:07

## 2019-03-13 RX ADMIN — Medication 5 MILLILITER(S): at 20:07

## 2019-03-13 RX ADMIN — Medication 10 MILLILITER(S): at 16:49

## 2019-03-13 RX ADMIN — FENTANYL CITRATE 1 PATCH: 50 INJECTION INTRAVENOUS at 18:01

## 2019-03-13 RX ADMIN — VORICONAZOLE 200 MILLIGRAM(S): 10 INJECTION, POWDER, LYOPHILIZED, FOR SOLUTION INTRAVENOUS at 17:54

## 2019-03-13 RX ADMIN — Medication 100 MILLIGRAM(S): at 06:58

## 2019-03-13 RX ADMIN — Medication 100 MILLIGRAM(S): at 15:23

## 2019-03-13 RX ADMIN — MONTELUKAST 10 MILLIGRAM(S): 4 TABLET, CHEWABLE ORAL at 12:34

## 2019-03-13 RX ADMIN — URSODIOL 300 MILLIGRAM(S): 250 TABLET, FILM COATED ORAL at 08:41

## 2019-03-13 RX ADMIN — Medication 10 MILLILITER(S): at 12:49

## 2019-03-13 RX ADMIN — Medication 1 GRAM(S): at 17:53

## 2019-03-13 RX ADMIN — ALTEPLASE 2 MILLIGRAM(S): KIT at 06:51

## 2019-03-13 RX ADMIN — Medication 400 UNIT(S): at 12:34

## 2019-03-13 RX ADMIN — Medication 10 MILLILITER(S): at 08:41

## 2019-03-13 RX ADMIN — Medication 1 LOZENGE: at 20:07

## 2019-03-13 RX ADMIN — Medication 400 MILLIGRAM(S): at 15:25

## 2019-03-13 RX ADMIN — TACROLIMUS 1.5 MILLIGRAM(S): 5 CAPSULE ORAL at 06:58

## 2019-03-13 RX ADMIN — ATOVAQUONE 750 MILLIGRAM(S): 750 SUSPENSION ORAL at 17:52

## 2019-03-13 RX ADMIN — Medication 1 GRAM(S): at 23:56

## 2019-03-13 RX ADMIN — Medication 1 LOZENGE: at 08:41

## 2019-03-13 RX ADMIN — SENNA PLUS 2 TABLET(S): 8.6 TABLET ORAL at 21:36

## 2019-03-13 RX ADMIN — Medication 400 MILLIGRAM(S): at 21:36

## 2019-03-13 RX ADMIN — Medication 1 GRAM(S): at 06:58

## 2019-03-13 RX ADMIN — VORICONAZOLE 200 MILLIGRAM(S): 10 INJECTION, POWDER, LYOPHILIZED, FOR SOLUTION INTRAVENOUS at 06:59

## 2019-03-13 RX ADMIN — Medication 5 MILLILITER(S): at 12:38

## 2019-03-13 RX ADMIN — Medication 5 MILLILITER(S): at 08:41

## 2019-03-13 RX ADMIN — PREGABALIN 1000 MICROGRAM(S): 225 CAPSULE ORAL at 12:40

## 2019-03-13 RX ADMIN — Medication 10 MILLILITER(S): at 23:56

## 2019-03-13 RX ADMIN — MAGNESIUM OXIDE 400 MG ORAL TABLET 400 MILLIGRAM(S): 241.3 TABLET ORAL at 17:53

## 2019-03-13 RX ADMIN — FENTANYL CITRATE 1 PATCH: 50 INJECTION INTRAVENOUS at 06:51

## 2019-03-13 RX ADMIN — Medication 1 LOZENGE: at 16:49

## 2019-03-13 RX ADMIN — Medication 200 MILLIGRAM(S): at 06:58

## 2019-03-13 RX ADMIN — Medication 100 MILLIGRAM(S): at 21:36

## 2019-03-13 RX ADMIN — TACROLIMUS 1.5 MILLIGRAM(S): 5 CAPSULE ORAL at 17:53

## 2019-03-13 RX ADMIN — MAGNESIUM OXIDE 400 MG ORAL TABLET 400 MILLIGRAM(S): 241.3 TABLET ORAL at 08:42

## 2019-03-13 RX ADMIN — Medication 10 MILLIGRAM(S): at 12:48

## 2019-03-13 RX ADMIN — AMLODIPINE BESYLATE 5 MILLIGRAM(S): 2.5 TABLET ORAL at 06:58

## 2019-03-13 RX ADMIN — Medication 1 LOZENGE: at 12:39

## 2019-03-13 RX ADMIN — Medication 400 MILLIGRAM(S): at 06:59

## 2019-03-13 RX ADMIN — URSODIOL 300 MILLIGRAM(S): 250 TABLET, FILM COATED ORAL at 17:53

## 2019-03-13 RX ADMIN — MEDROXYPROGESTERONE ACETATE 20 MILLIGRAM(S): 150 INJECTION, SUSPENSION, EXTENDED RELEASE INTRAMUSCULAR at 12:37

## 2019-03-13 RX ADMIN — Medication 1 MILLIGRAM(S): at 12:33

## 2019-03-13 RX ADMIN — Medication 250 MILLIGRAM(S): at 12:40

## 2019-03-13 RX ADMIN — Medication 5 MILLILITER(S): at 16:49

## 2019-03-13 RX ADMIN — Medication 5 MILLILITER(S): at 23:56

## 2019-03-13 RX ADMIN — ATOVAQUONE 750 MILLIGRAM(S): 750 SUSPENSION ORAL at 06:59

## 2019-03-13 RX ADMIN — MAGNESIUM OXIDE 400 MG ORAL TABLET 400 MILLIGRAM(S): 241.3 TABLET ORAL at 12:34

## 2019-03-13 RX ADMIN — Medication 200 MILLIGRAM(S): at 17:53

## 2019-03-13 RX ADMIN — Medication 1 LOZENGE: at 23:56

## 2019-03-13 RX ADMIN — Medication 1 GRAM(S): at 12:42

## 2019-03-13 NOTE — PROGRESS NOTE ADULT - SUBJECTIVE AND OBJECTIVE BOX
HPC Transplant Team                                                      Critical / Counseling Time Provided: 30 minutes    Chief Complaint: Allogeneic peripheral blood stem cell transplant from [facility] (sister 10/10) for treatment of AML    S: Patient seen and examined with HPC Transplant Team:     Vomited once this morning, no preceding nausea  No other acute complaints    Denies mouth / tongue / throat pain, dyspnea, cough, nausea, diarrhea, abdominal pain    O: Vitals:   Vital Signs Last 24 Hrs  T(C): 37.4 (13 Mar 2019 06:21), Max: 37.4 (13 Mar 2019 06:21)  T(F): 99.3 (13 Mar 2019 06:21), Max: 99.3 (13 Mar 2019 06:21)  HR: 92 (13 Mar 2019 06:21) (92 - 116)  BP: 120/80 (13 Mar 2019 06:21) (107/74 - 138/94)  BP(mean): --  RR: 18 (13 Mar 2019 06:21) (18 - 18)  SpO2: 100% (12 Mar 2019 21:00) (100% - 100%)    Admit weight: 88.2 kG  Daily Weight in kG:     Intake / Output:   03-12 @ 07:01  -  03-13 @ 07:00  --------------------------------------------------------  IN: 2191 mL / OUT: 3125 mL / NET: -934 mL    PE:   Oropharynx: no erythema or ulcerations in oropharynx  Oral Mucositis:    +                                         Grade: 1-2(throat)  CVS: (+) S1/S2 RRR   Lungs: CTA throughout bilaterally   Abdomen: (+)BS x 4, soft, NT/ND   Extremities: no edema  in B/L LE  Gastric Mucositis:       +                                          rdGrdrrdarddrderd:rd rd3rd Intestinal Mucositis:     -                                          Grade: n/a  Skin: no rash, patches of darker areas.   TLC: PICC line C/D/I  Neuro: A&O x 3   Pain: 8/10 then 3/10 after pain medication    Meds:   Antimicrobials:   acyclovir    Suspension 400 milliGRAM(s) Oral every 8 hours  atovaquone Suspension 750 milliGRAM(s) Oral every 12 hours  ciprofloxacin   IVPB 400 milliGRAM(s) IV Intermittent every 12 hours  clotrimazole Lozenge 1 Lozenge Oral five times a day  voriconazole 200 milliGRAM(s) Oral every 12 hours      Heme / Onc:   alteplase for catheter clearance 2 milliGRAM(s) Catheter once      GI:  docusate sodium 100 milliGRAM(s) Oral three times a day  pantoprazole  Injectable 40 milliGRAM(s) IV Push daily  senna 2 Tablet(s) Oral at bedtime  sodium bicarbonate Mouth Rinse 10 milliLiter(s) Swish and Spit five times a day  sucralfate suspension 1 Gram(s) Oral four times a day  ursodiol Capsule 300 milliGRAM(s) Oral two times a day with meals      Cardiovascular:   amLODIPine   Tablet 5 milliGRAM(s) Oral daily      Immunologic:   immune   globulin 10% (GAMMAGARD) IVPB 20 Gram(s) IV Intermittent <User Schedule>  tacrolimus 1.5 milliGRAM(s) Oral every 12 hours      Other medications:   acetaminophen   Tablet .. 650 milliGRAM(s) Oral every 6 hours  acetaminophen   Tablet .. 650 milliGRAM(s) Oral <User Schedule>  ascorbic acid 250 milliGRAM(s) Oral daily  Biotene Dry Mouth Oral Rinse 5 milliLiter(s) Swish and Spit five times a day  chlorhexidine 4% Liquid 1 Application(s) Topical <User Schedule>  cholecalciferol 400 Unit(s) Oral daily  cyanocobalamin 1000 MICROGram(s) Oral daily  diphenhydrAMINE   Injectable 25 milliGRAM(s) IV Push every 3 weeks  fentaNYL   Patch  25 MICROgram(s)/Hr 1 Patch Transdermal every 72 hours  folic acid 1 milliGRAM(s) Oral daily  iron sucrose IVPB 100 milliGRAM(s) IV Intermittent every 7 days  lidocaine/prilocaine Cream 1 Application(s) Topical daily  magnesium oxide 400 milliGRAM(s) Oral three times a day with meals  medroxyPROGESTERone 20 milliGRAM(s) Oral daily  montelukast 10 milliGRAM(s) Oral daily  multivitamin 1 Tablet(s) Oral daily  phytonadione   Solution 10 milliGRAM(s) Oral every other day  sodium chloride 0.9%. 1000 milliLiter(s) IV Continuous <Continuous>      PRN:   acetaminophen   Tablet .. 650 milliGRAM(s) Oral every 6 hours PRN  artificial  tears Solution 1 Drop(s) Both EYES three times a day PRN  benzocaine 15 mG/menthol 3.6 mG Lozenge 1 Lozenge Oral every 3 hours PRN  diphenhydrAMINE   Injectable 25 milliGRAM(s) IV Push every 4 hours PRN  FIRST- Mouthwash  BLM 5 milliLiter(s) Swish and Spit every 8 hours PRN  HYDROmorphone  Injectable 0.5 milliGRAM(s) IV Push every 2 hours PRN  HYDROmorphone  Injectable 1 milliGRAM(s) IV Push every 2 hours PRN  metoclopramide Injectable 10 milliGRAM(s) IV Push every 6 hours PRN  sodium chloride 0.65% Nasal 1 Spray(s) Both Nostrils five times a day PRN  sodium chloride 0.9% lock flush 10 milliLiter(s) IV Push every 1 hour PRN      A/P:  33 year old female with a history of AML  Post:  Allogeneic PBSCT Day + 14  Labs every other day ; continue vitamin C, vitamin B, folic acid, vitamin K & iron supplementation. Increased vitamin K to every other day as per standard guidelines for bloodless transplant  Continue Provera 20 mg PO daily,  Procrit to 20,000 units TIW and Venofer  BP- 140s, started on Norvasc.  Hold for SBP <120  Mucositis likely related to MTX and meds changed to IV 3/8. Continue Carafate and supportive care. Pain improving.  Pain meds per palliative care  Neutropenic, afebrile. Continue Cipro and if T >/= 38C Pan Cx, CXR and change Cipro to Cefepime  Zarxio held  impending engraftment, re-eval need tomorrow.  D/C Epo.  Last Venofer Friday 3/8.  Switch Vori to oral, continue Cipro IV.    1. Infectious Disease:   acyclovir    Suspension 400 milliGRAM(s) Oral every 8 hours  atovaquone Suspension 750 milliGRAM(s) Oral every 12 hours  ciprofloxacin   IVPB 400 milliGRAM(s) IV Intermittent every 12 hours  clotrimazole Lozenge 1 Lozenge Oral five times a day  voriconazole IVPB 200 milliGRAM(s) IV Intermittent every 12 hours    2. VOD Prophylaxis: Actigall, Glutamine, Heparin (dosed at 100 units / kg / day)     3. GI Prophylaxis:    pantoprazole  Injectable 40 milliGRAM(s) IV Push daily    4. Mouthcare - NS / NaHCO3 rinses, Mycelex, Caphosol; Skin care     5. GVHD prophylaxis -    tacrolimus 1.5 milliGRAM(s) Oral every 12 hours    6. Transfuse & replete electrolytes prn   magnesium oxide 400 milliGRAM(s) Oral three times a day with meals    7. IV hydration, daily weights, strict I&O, prn diuresis     8. PO intake as tolerated, nutrition follow up as needed, MVI, folic acid     9. Antiemetics, anti-diarrhea medications:   metoclopramide Injectable 10 milliGRAM(s) IV Push every 6 hours PRN    10. OOB as tolerated, physical therapy consult if needed     11. Monitor coags / fibrinogen 2x week, vitamin K as needed   phytonadione   Solution 10 milliGRAM(s) Oral every other day    12. Monitor closely for clinical changes, monitor for fevers     13. Emotional support provided, plan of care discussed and questions addressed     14. Patient education done regarding plan of care, restrictions and discharge planning     15. Continue regular social work input     I have written the above note for Dr. Lou who performed service with me in the room.   Ladonna Roldan NP-C ([phone])    I have seen and examined patient with NP, I agree with above note as scribed. HPC Transplant Team                                                      Critical / Counseling Time Provided: 30 minutes    Chief Complaint: Allogeneic peripheral blood stem cell transplant from [facility] (sister 10/10) for treatment of AML    S: Patient seen and examined with HPC Transplant Team:     Vomited once this morning, no preceding nausea  No other acute complaints    Denies mouth / tongue / throat pain, dyspnea, cough, nausea, diarrhea, abdominal pain    O: Vitals:   Vital Signs Last 24 Hrs  T(C): 37.4 (13 Mar 2019 06:21), Max: 37.4 (13 Mar 2019 06:21)  T(F): 99.3 (13 Mar 2019 06:21), Max: 99.3 (13 Mar 2019 06:21)  HR: 92 (13 Mar 2019 06:21) (92 - 116)  BP: 120/80 (13 Mar 2019 06:21) (107/74 - 138/94)  BP(mean): --  RR: 18 (13 Mar 2019 06:21) (18 - 18)  SpO2: 100% (12 Mar 2019 21:00) (100% - 100%)    Admit weight: 88.2 kG  Daily Weight in k.3kg    Intake / Output:    @ 07:01  -  13 @ 07:00  --------------------------------------------------------  IN: 2191 mL / OUT: 3125 mL / NET: -934 mL    PE:   Oropharynx: no erythema or ulcerations in oropharynx  Oral Mucositis:    +                                         Grade: 1-2(throat)  CVS: (+) S1/S2 RRR   Lungs: CTA throughout bilaterally   Abdomen: (+)BS x 4, soft, NT/ND   Extremities: no edema  in B/L LE  Gastric Mucositis:       +                                          stGstrstastdstest:st st1st Intestinal Mucositis:     -                                          Grade: n/a  Skin: no rash, patches of darker areas.   TLC: PICC line C/D/I  Neuro: A&O x 3   Pain: 8/10 then 3/10 after pain medication    Meds:   Antimicrobials:   acyclovir    Suspension 400 milliGRAM(s) Oral every 8 hours  atovaquone Suspension 750 milliGRAM(s) Oral every 12 hours  ciprofloxacin   IVPB 400 milliGRAM(s) IV Intermittent every 12 hours  clotrimazole Lozenge 1 Lozenge Oral five times a day  voriconazole 200 milliGRAM(s) Oral every 12 hours      Heme / Onc:   alteplase for catheter clearance 2 milliGRAM(s) Catheter once      GI:  docusate sodium 100 milliGRAM(s) Oral three times a day  pantoprazole  Injectable 40 milliGRAM(s) IV Push daily  senna 2 Tablet(s) Oral at bedtime  sodium bicarbonate Mouth Rinse 10 milliLiter(s) Swish and Spit five times a day  sucralfate suspension 1 Gram(s) Oral four times a day  ursodiol Capsule 300 milliGRAM(s) Oral two times a day with meals      Cardiovascular:   amLODIPine   Tablet 5 milliGRAM(s) Oral daily      Immunologic:   immune   globulin 10% (GAMMAGARD) IVPB 20 Gram(s) IV Intermittent <User Schedule>  tacrolimus 1.5 milliGRAM(s) Oral every 12 hours      Other medications:   acetaminophen   Tablet .. 650 milliGRAM(s) Oral every 6 hours  acetaminophen   Tablet .. 650 milliGRAM(s) Oral <User Schedule>  ascorbic acid 250 milliGRAM(s) Oral daily  Biotene Dry Mouth Oral Rinse 5 milliLiter(s) Swish and Spit five times a day  chlorhexidine 4% Liquid 1 Application(s) Topical <User Schedule>  cholecalciferol 400 Unit(s) Oral daily  cyanocobalamin 1000 MICROGram(s) Oral daily  diphenhydrAMINE   Injectable 25 milliGRAM(s) IV Push every 3 weeks  fentaNYL   Patch  25 MICROgram(s)/Hr 1 Patch Transdermal every 72 hours  folic acid 1 milliGRAM(s) Oral daily  iron sucrose IVPB 100 milliGRAM(s) IV Intermittent every 7 days  lidocaine/prilocaine Cream 1 Application(s) Topical daily  magnesium oxide 400 milliGRAM(s) Oral three times a day with meals  medroxyPROGESTERone 20 milliGRAM(s) Oral daily  montelukast 10 milliGRAM(s) Oral daily  multivitamin 1 Tablet(s) Oral daily  phytonadione   Solution 10 milliGRAM(s) Oral every other day  sodium chloride 0.9%. 1000 milliLiter(s) IV Continuous <Continuous>      PRN:   acetaminophen   Tablet .. 650 milliGRAM(s) Oral every 6 hours PRN  artificial  tears Solution 1 Drop(s) Both EYES three times a day PRN  benzocaine 15 mG/menthol 3.6 mG Lozenge 1 Lozenge Oral every 3 hours PRN  diphenhydrAMINE   Injectable 25 milliGRAM(s) IV Push every 4 hours PRN  FIRST- Mouthwash  BLM 5 milliLiter(s) Swish and Spit every 8 hours PRN  HYDROmorphone  Injectable 0.5 milliGRAM(s) IV Push every 2 hours PRN  HYDROmorphone  Injectable 1 milliGRAM(s) IV Push every 2 hours PRN  metoclopramide Injectable 10 milliGRAM(s) IV Push every 6 hours PRN  sodium chloride 0.65% Nasal 1 Spray(s) Both Nostrils five times a day PRN  sodium chloride 0.9% lock flush 10 milliLiter(s) IV Push every 1 hour PRN      A/P:  33 year old female with a history of AML  Post:  Allogeneic PBSCT Day + 14  Labs every other day ; continue vitamin C, vitamin B, folic acid, vitamin K & iron supplementation. Increased vitamin K to every other day as per standard guidelines for bloodless transplant  Continue Provera 20 mg PO daily,  Procrit to 20,000 units TIW and Venofer  BP- 140s, started on Norvasc.  Hold for SBP <120  Mucositis likely related to MTX and meds changed to IV 3/8. Continue Carafate and supportive care. Pain improving.  Pain meds per palliative care  Neutropenic, afebrile. Continue Cipro and if T >/= 38C Pan Cx, CXR and change Cipro to Cefepime  Zarxio held  impending engraftment, re-eval need tomorrow.  D/C Epo.  Last Venofer Friday 3/8.  Switch Vori to oral, continue Cipro IV.  VNTR today   Cipro for one more day      1. Infectious Disease:   acyclovir    Suspension 400 milliGRAM(s) Oral every 8 hours  atovaquone Suspension 750 milliGRAM(s) Oral every 12 hours  ciprofloxacin   IVPB 400 milliGRAM(s) IV Intermittent every 12 hours  clotrimazole Lozenge 1 Lozenge Oral five times a day  voriconazole IVPB 200 milliGRAM(s) IV Intermittent every 12 hours    2. VOD Prophylaxis: Actigall, Glutamine, Heparin (dosed at 100 units / kg / day)     3. GI Prophylaxis:    pantoprazole  Injectable 40 milliGRAM(s) IV Push daily    4. Mouthcare - NS / NaHCO3 rinses, Mycelex, Caphosol; Skin care     5. GVHD prophylaxis -    tacrolimus 1.5 milliGRAM(s) Oral every 12 hours    6. Transfuse & replete electrolytes prn   magnesium oxide 400 milliGRAM(s) Oral three times a day with meals    7. IV hydration, daily weights, strict I&O, prn diuresis     8. PO intake as tolerated, nutrition follow up as needed, MVI, folic acid     9. Antiemetics, anti-diarrhea medications:   metoclopramide Injectable 10 milliGRAM(s) IV Push every 6 hours PRN    10. OOB as tolerated, physical therapy consult if needed     11. Monitor coags / fibrinogen 2x week, vitamin K as needed   phytonadione   Solution 10 milliGRAM(s) Oral every other day    12. Monitor closely for clinical changes, monitor for fevers     13. Emotional support provided, plan of care discussed and questions addressed     14. Patient education done regarding plan of care, restrictions and discharge planning     15. Continue regular social work input     I have written the above note for Dr. Lou who performed service with me in the room.   Ladonna Roldan NP-C ([phone])    I have seen and examined patient with NP, I agree with above note as scribed. HPC Transplant Team                                                      Critical / Counseling Time Provided: 30 minutes    Chief Complaint: Allogeneic peripheral blood stem cell transplant from [facility] (sister 10/10) for treatment of AML    S: Patient seen and examined with HPC Transplant Team:     Vomited once this morning, no preceding nausea  No other acute complaints    Denies mouth / tongue / throat pain, dyspnea, cough, nausea, diarrhea, abdominal pain    O: Vitals:   Vital Signs Last 24 Hrs  T(C): 37.4 (13 Mar 2019 06:21), Max: 37.4 (13 Mar 2019 06:21)  T(F): 99.3 (13 Mar 2019 06:21), Max: 99.3 (13 Mar 2019 06:21)  HR: 92 (13 Mar 2019 06:21) (92 - 116)  BP: 120/80 (13 Mar 2019 06:21) (107/74 - 138/94)  BP(mean): --  RR: 18 (13 Mar 2019 06:21) (18 - 18)  SpO2: 100% (12 Mar 2019 21:00) (100% - 100%)    Admit weight: 88.2 kG  Daily Weight in k.3kg    Intake / Output:   -12 @ 07:01  -  -13 @ 07:00  --------------------------------------------------------  IN: 2191 mL / OUT: 3125 mL / NET: -934 mL    Karnofsky / [place]ky Scale:   GVHD: no evidence of acute GVHD   PE:   Oropharynx: no erythema or ulcerations in oropharynx  Oral Mucositis:    +                                         Grade: 1-2(throat)  CVS: (+) S1/S2 RRR   Lungs: CTA throughout bilaterally   Abdomen: (+)BS x 4, soft, NT/ND   Extremities: no edema  in B/L LE  Gastric Mucositis:       +                                          stGstrstastdstest:st st1st Intestinal Mucositis:     -                                          Grade: n/a  Skin: no rash, patches of darker areas.   TLC: PICC line C/D/I  Neuro: A&O x 3   Pain: 8/10 then 3/10 after pain medication    Meds:   Antimicrobials:   acyclovir    Suspension 400 milliGRAM(s) Oral every 8 hours  atovaquone Suspension 750 milliGRAM(s) Oral every 12 hours  ciprofloxacin   IVPB 400 milliGRAM(s) IV Intermittent every 12 hours  clotrimazole Lozenge 1 Lozenge Oral five times a day  voriconazole 200 milliGRAM(s) Oral every 12 hours      Heme / Onc:   alteplase for catheter clearance 2 milliGRAM(s) Catheter once      GI:  docusate sodium 100 milliGRAM(s) Oral three times a day  pantoprazole  Injectable 40 milliGRAM(s) IV Push daily  senna 2 Tablet(s) Oral at bedtime  sodium bicarbonate Mouth Rinse 10 milliLiter(s) Swish and Spit five times a day  sucralfate suspension 1 Gram(s) Oral four times a day  ursodiol Capsule 300 milliGRAM(s) Oral two times a day with meals      Cardiovascular:   amLODIPine   Tablet 5 milliGRAM(s) Oral daily      Immunologic:   immune   globulin 10% (GAMMAGARD) IVPB 20 Gram(s) IV Intermittent <User Schedule>  tacrolimus 1.5 milliGRAM(s) Oral every 12 hours      Other medications:   acetaminophen   Tablet .. 650 milliGRAM(s) Oral every 6 hours  acetaminophen   Tablet .. 650 milliGRAM(s) Oral <User Schedule>  ascorbic acid 250 milliGRAM(s) Oral daily  Biotene Dry Mouth Oral Rinse 5 milliLiter(s) Swish and Spit five times a day  chlorhexidine 4% Liquid 1 Application(s) Topical <User Schedule>  cholecalciferol 400 Unit(s) Oral daily  cyanocobalamin 1000 MICROGram(s) Oral daily  diphenhydrAMINE   Injectable 25 milliGRAM(s) IV Push every 3 weeks  fentaNYL   Patch  25 MICROgram(s)/Hr 1 Patch Transdermal every 72 hours  folic acid 1 milliGRAM(s) Oral daily  iron sucrose IVPB 100 milliGRAM(s) IV Intermittent every 7 days  lidocaine/prilocaine Cream 1 Application(s) Topical daily  magnesium oxide 400 milliGRAM(s) Oral three times a day with meals  medroxyPROGESTERone 20 milliGRAM(s) Oral daily  montelukast 10 milliGRAM(s) Oral daily  multivitamin 1 Tablet(s) Oral daily  phytonadione   Solution 10 milliGRAM(s) Oral every other day  sodium chloride 0.9%. 1000 milliLiter(s) IV Continuous <Continuous>      PRN:   acetaminophen   Tablet .. 650 milliGRAM(s) Oral every 6 hours PRN  artificial  tears Solution 1 Drop(s) Both EYES three times a day PRN  benzocaine 15 mG/menthol 3.6 mG Lozenge 1 Lozenge Oral every 3 hours PRN  diphenhydrAMINE   Injectable 25 milliGRAM(s) IV Push every 4 hours PRN  FIRST- Mouthwash  BLM 5 milliLiter(s) Swish and Spit every 8 hours PRN  HYDROmorphone  Injectable 0.5 milliGRAM(s) IV Push every 2 hours PRN  HYDROmorphone  Injectable 1 milliGRAM(s) IV Push every 2 hours PRN  metoclopramide Injectable 10 milliGRAM(s) IV Push every 6 hours PRN  sodium chloride 0.65% Nasal 1 Spray(s) Both Nostrils five times a day PRN  sodium chloride 0.9% lock flush 10 milliLiter(s) IV Push every 1 hour PRN      A/P:  33 year old female with a history of AML  Post:  Allogeneic PBSCT Day + 14  Labs every other day ; continue vitamin C, vitamin B, folic acid, vitamin K & iron supplementation. Increased vitamin K to every other day as per standard guidelines for bloodless transplant  Continue Provera 20 mg PO daily,  Procrit to 20,000 units TIW and Venofer  BP- 140s, started on Norvasc.  Hold for SBP <120  Mucositis likely related to MTX and meds changed to IV 3/8. Continue Carafate and supportive care. Pain improving.  Pain meds per palliative care  Neutropenic, afebrile. Continue Cipro and if T >/= 38C Pan Cx, CXR and change Cipro to Cefepime  Zarxio held  impending engraftment, re-eval need tomorrow.  D/C Epo.  Last Venofer Friday 3/8.  Switch Vori to oral, continue Cipro IV.  VNTR today   Cipro for one more day      1. Infectious Disease:   acyclovir    Suspension 400 milliGRAM(s) Oral every 8 hours  atovaquone Suspension 750 milliGRAM(s) Oral every 12 hours  ciprofloxacin   IVPB 400 milliGRAM(s) IV Intermittent every 12 hours  clotrimazole Lozenge 1 Lozenge Oral five times a day  voriconazole IVPB 200 milliGRAM(s) IV Intermittent every 12 hours    2. VOD Prophylaxis: Actigall, Glutamine, Heparin (dosed at 100 units / kg / day)     3. GI Prophylaxis:    pantoprazole  Injectable 40 milliGRAM(s) IV Push daily    4. Mouthcare - NS / NaHCO3 rinses, Mycelex, Caphosol; Skin care     5. GVHD prophylaxis -    tacrolimus 1.5 milliGRAM(s) Oral every 12 hours    6. Transfuse & replete electrolytes prn   magnesium oxide 400 milliGRAM(s) Oral three times a day with meals    7. IV hydration, daily weights, strict I&O, prn diuresis     8. PO intake as tolerated, nutrition follow up as needed, MVI, folic acid     9. Antiemetics, anti-diarrhea medications:   metoclopramide Injectable 10 milliGRAM(s) IV Push every 6 hours PRN    10. OOB as tolerated, physical therapy consult if needed     11. Monitor coags / fibrinogen 2x week, vitamin K as needed   phytonadione   Solution 10 milliGRAM(s) Oral every other day    12. Monitor closely for clinical changes, monitor for fevers     13. Emotional support provided, plan of care discussed and questions addressed     14. Patient education done regarding plan of care, restrictions and discharge planning     15. Continue regular social work input     I have written the above note for Dr. Lou who performed service with me in the room.   Ladonna Roldan NP-C ([phone])    I have seen and examined patient with NP, I agree with above note as scribed.

## 2019-03-13 NOTE — PROGRESS NOTE ADULT - ATTENDING COMMENTS
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 Pt is [facility], and does not accept blood products for [facility] reasons    Day + 13- s/p MTX on days +1, +3, +6, 11 ...serum creatinine in normal range. No oral mucositis visible. To start zarxio today ..will hold off for now..wbc coming up on own and given [facility] at time of transplant will hold off..monitor for gvhd  Mucositis- throat soreness/pain with swallowing likely secondary to mucositis s/p MTX; monitor symptoms -now improved. Palliative care consult for pain management. Change meds to IV route if possible, add Carafate slurry.  Continue Tacrolimus 1 mg po q 12 hrs, monitor level  Continue Acyclovir, Mepron, Vfend prophylaxis; on Cipro for neutropenia. Cipro and Vfend changed to IV route.  Continue VOD prophylaxis with Actigall, glutamine supplement. Off heparin since 3/6 due to increased menstrual bleeding  Hypertension- possibly related to [place], other meds, fluids- continue Norvasc.  Labs every other day or at discretion of attending physician  Continue iron, folic acid, vitamin K, vitamin B-12, vitamin C as per standard practice for bloodless transplant  Anemia secondary to antineoplastic chemotherapy- begin Procrit for Hgb < 11g/dL, as she does not accept transfusion of pRBC. Cont iron supplement. As Hgb < 9, changed to Venofer IV and D/C oral iron; increase Procrit to 20,000 U 3X/week.   Antiemetics, mouth care, skin care   OOB/ambulate.

## 2019-03-13 NOTE — PROGRESS NOTE ADULT - SUBJECTIVE AND OBJECTIVE BOX
HPC Transplant Team                                                      Critical / Counseling Time Provided: 30 minutes                                                                                                                                                        Chief Complaint:     S: Patient seen and examined with HPC Transplant Team:   Denies mouth / tongue / throat pain, dyspnea, cough, nausea, vomiting, diarrhea, abdominal pain     O: Vitals:   Vital Signs Last 24 Hrs  T(C): 37.4 (13 Mar 2019 06:21), Max: 37.4 (13 Mar 2019 06:21)  T(F): 99.3 (13 Mar 2019 06:21), Max: 99.3 (13 Mar 2019 06:21)  HR: 92 (13 Mar 2019 06:21) (92 - 116)  BP: 120/80 (13 Mar 2019 06:21) (107/74 - 138/94)  BP(mean): --  RR: 18 (13 Mar 2019 06:21) (18 - 18)  SpO2: 100% (12 Mar 2019 21:00) (100% - 100%)    Admit weight:   Daily     Daily Weight in k.1 (12 Mar 2019 10:19)    Intake / Output:   -12 @ 07:01  -  03-13 @ 07:00  --------------------------------------------------------  IN: 2191 mL / OUT: 3125 mL / NET: -934 mL          PE:   Oropharynx:   Oral Mucositis:                                                        Grade:   CVS:   Lungs:   Abdomen:  Extremities:   Gastric Mucositis:                                                  Grade:   Intestinal Mucositis:                                              Grade:   Skin:   TLC:   Neuro:   Pain:     Labs:   CBC Full  -  ( 12 Mar 2019 07:11 )  WBC Count : 1.2 K/uL  Hemoglobin : 11.8 g/dL  Hematocrit : 31.9 %  Platelet Count - Automated : 162 K/uL  Mean Cell Volume : 90.2 fl  Mean Cell Hemoglobin : 33.4 pg  Mean Cell Hemoglobin Concentration : 37.0 gm/dL  Auto Neutrophil # : 0.1 K/uL  Auto Lymphocyte # : See Note  Auto Monocyte # : See Note  Auto Eosinophil # : 0.0 K/uL  Auto Basophil # : 0.0 K/uL  Auto Neutrophil % : 8.0 %  Auto Lymphocyte % : 32.0 %  Auto Monocyte % : 60.0 %  Auto Eosinophil % : x  Auto Basophil % : x                          11.8   1.2   )-----------( 162      ( 12 Mar 2019 07:11 )             31.9     03-12    142  |  105  |  <4<L>  ----------------------------<  92  3.6   |  23  |  0.62    Ca    9.5      12 Mar 2019 07:11  Phos  3.5     03-12  Mg     1.8     -12    TPro  6.5  /  Alb  3.7  /  TBili  0.2  /  DBili  <0.1  /  AST  25  /  ALT  16  /  AlkPhos  69  03-12    PT/INR - ( 12 Mar 2019 07:10 )   PT: 12.0 sec;   INR: 1.04 ratio         PTT - ( 12 Mar 2019 07:10 )  PTT:25.3 sec  LIVER FUNCTIONS - ( 12 Mar 2019 07:11 )  Alb: 3.7 g/dL / Pro: 6.5 g/dL / ALK PHOS: 69 U/L / ALT: 16 U/L / AST: 25 U/L / GGT: x                   Karnofsky / Lansky Scale:   GVHD:   Skin:   Liver:   Gut:   Overall Grade:       Cultures:         Radiology:       Meds:   Antimicrobials:   acyclovir    Suspension 400 milliGRAM(s) Oral every 8 hours  atovaquone Suspension 750 milliGRAM(s) Oral every 12 hours  ciprofloxacin   IVPB 400 milliGRAM(s) IV Intermittent every 12 hours  clotrimazole Lozenge 1 Lozenge Oral five times a day  voriconazole 200 milliGRAM(s) Oral every 12 hours      Heme / Onc:   alteplase for catheter clearance 2 milliGRAM(s) Catheter once      GI:  docusate sodium 100 milliGRAM(s) Oral three times a day  pantoprazole  Injectable 40 milliGRAM(s) IV Push daily  senna 2 Tablet(s) Oral at bedtime  sodium bicarbonate Mouth Rinse 10 milliLiter(s) Swish and Spit five times a day  sucralfate suspension 1 Gram(s) Oral four times a day  ursodiol Capsule 300 milliGRAM(s) Oral two times a day with meals      Cardiovascular:   amLODIPine   Tablet 5 milliGRAM(s) Oral daily      Immunologic:   immune   globulin 10% (GAMMAGARD) IVPB 20 Gram(s) IV Intermittent <User Schedule>  tacrolimus 1.5 milliGRAM(s) Oral every 12 hours      Other medications:   acetaminophen   Tablet .. 650 milliGRAM(s) Oral every 6 hours  acetaminophen   Tablet .. 650 milliGRAM(s) Oral <User Schedule>  ascorbic acid 250 milliGRAM(s) Oral daily  Biotene Dry Mouth Oral Rinse 5 milliLiter(s) Swish and Spit five times a day  chlorhexidine 4% Liquid 1 Application(s) Topical <User Schedule>  cholecalciferol 400 Unit(s) Oral daily  cyanocobalamin 1000 MICROGram(s) Oral daily  diphenhydrAMINE   Injectable 25 milliGRAM(s) IV Push every 3 weeks  fentaNYL   Patch  25 MICROgram(s)/Hr 1 Patch Transdermal every 72 hours  folic acid 1 milliGRAM(s) Oral daily  iron sucrose IVPB 100 milliGRAM(s) IV Intermittent every 7 days  lidocaine/prilocaine Cream 1 Application(s) Topical daily  magnesium oxide 400 milliGRAM(s) Oral three times a day with meals  medroxyPROGESTERone 20 milliGRAM(s) Oral daily  montelukast 10 milliGRAM(s) Oral daily  multivitamin 1 Tablet(s) Oral daily  phytonadione   Solution 10 milliGRAM(s) Oral every other day  sodium chloride 0.9%. 1000 milliLiter(s) IV Continuous <Continuous>      PRN:   acetaminophen   Tablet .. 650 milliGRAM(s) Oral every 6 hours PRN  artificial  tears Solution 1 Drop(s) Both EYES three times a day PRN  benzocaine 15 mG/menthol 3.6 mG Lozenge 1 Lozenge Oral every 3 hours PRN  diphenhydrAMINE   Injectable 25 milliGRAM(s) IV Push every 4 hours PRN  FIRST- Mouthwash  BLM 5 milliLiter(s) Swish and Spit every 8 hours PRN  HYDROmorphone  Injectable 0.5 milliGRAM(s) IV Push every 2 hours PRN  HYDROmorphone  Injectable 1 milliGRAM(s) IV Push every 2 hours PRN  metoclopramide Injectable 10 milliGRAM(s) IV Push every 6 hours PRN  sodium chloride 0.65% Nasal 1 Spray(s) Both Nostrils five times a day PRN  sodium chloride 0.9% lock flush 10 milliLiter(s) IV Push every 1 hour PRN      A/P:   ___ year old ___  with a history of ______________________  Pre / Status Post :  Autologous / Allogeneic PBSCT / BMT day ____________    1. Infectious Disease:   Fluconazole, Acyclovir     2. VOD Prophylaxis: Actigall, Glutamine, Heparin (dosed at 100 units / kg / day)     3. GI Prophylaxis:  Protonix    4. Mouthcare - NS / NaHCO3 rinses, Mycelex, Caphosol, skin care     5. GVHD prophylaxis     6. Transfuse & replete electrolytes prn     7. IV hydration, daily weights, strict I&O, prn diuresis     8. PO intake as tolerated, nutrition follow up as needed, MVI, folic acid     9. Antiemetics, anti-diarrhea medications:   Reglan, Ativan    10. OOB as tolerated, physical therapy consult if needed     11. Monitor coags / fibrinogen 2x week, vitamin K as needed     12. Monitor closely for clinical changes, monitor for fevers     13. Emotional support provided, plan of care discussed and questions addressed     14. Patient education done regarding chemotherapy prep, plan of care, restrictions and discharge planning     15. Continue regular social work input     I have written the above note for Dr. Larsen who performed service with me in the room.   Racquel Cole NP-C ([phone])    I have seen and examined patient with NP, I agree with above note as scribed.

## 2019-03-14 ENCOUNTER — TRANSCRIPTION ENCOUNTER (OUTPATIENT)
Age: 34
End: 2019-03-14

## 2019-03-14 LAB
ALBUMIN SERPL ELPH-MCNC: 3.9 G/DL — SIGNIFICANT CHANGE UP (ref 3.3–5)
ALP SERPL-CCNC: 70 U/L — SIGNIFICANT CHANGE UP (ref 40–120)
ALT FLD-CCNC: 17 U/L — SIGNIFICANT CHANGE UP (ref 10–45)
ANION GAP SERPL CALC-SCNC: 13 MMOL/L — SIGNIFICANT CHANGE UP (ref 5–17)
APTT BLD: 28.3 SEC — SIGNIFICANT CHANGE UP (ref 27.5–36.3)
AST SERPL-CCNC: 22 U/L — SIGNIFICANT CHANGE UP (ref 10–40)
BASOPHILS # BLD AUTO: 0 K/UL — SIGNIFICANT CHANGE UP (ref 0–0.2)
BASOPHILS NFR BLD AUTO: 1 % — SIGNIFICANT CHANGE UP (ref 0–2)
BILIRUB SERPL-MCNC: 0.1 MG/DL — LOW (ref 0.2–1.2)
BUN SERPL-MCNC: <4 MG/DL — LOW (ref 7–23)
CALCIUM SERPL-MCNC: 9.7 MG/DL — SIGNIFICANT CHANGE UP (ref 8.4–10.5)
CHLORIDE SERPL-SCNC: 106 MMOL/L — SIGNIFICANT CHANGE UP (ref 96–108)
CO2 SERPL-SCNC: 22 MMOL/L — SIGNIFICANT CHANGE UP (ref 22–31)
CREAT SERPL-MCNC: 0.62 MG/DL — SIGNIFICANT CHANGE UP (ref 0.5–1.3)
EOSINOPHIL # BLD AUTO: 0.1 K/UL — SIGNIFICANT CHANGE UP (ref 0–0.5)
EOSINOPHIL NFR BLD AUTO: 0 % — SIGNIFICANT CHANGE UP (ref 0–6)
FIBRINOGEN PPP-MCNC: 669 MG/DL — HIGH (ref 350–510)
GLUCOSE SERPL-MCNC: 94 MG/DL — SIGNIFICANT CHANGE UP (ref 70–99)
HCT VFR BLD CALC: 30.6 % — LOW (ref 34.5–45)
HGB BLD-MCNC: 11.2 G/DL — LOW (ref 11.5–15.5)
INR BLD: 1.08 RATIO — SIGNIFICANT CHANGE UP (ref 0.88–1.16)
LDH SERPL L TO P-CCNC: 434 U/L — HIGH (ref 50–242)
LYMPHOCYTES # BLD AUTO: 27 % — SIGNIFICANT CHANGE UP (ref 13–44)
LYMPHOCYTES # BLD AUTO: SIGNIFICANT CHANGE UP (ref 1–3.3)
MAGNESIUM SERPL-MCNC: 1.8 MG/DL — SIGNIFICANT CHANGE UP (ref 1.6–2.6)
MCHC RBC-ENTMCNC: 33.2 PG — SIGNIFICANT CHANGE UP (ref 27–34)
MCHC RBC-ENTMCNC: 36.5 GM/DL — HIGH (ref 32–36)
MCV RBC AUTO: 90.8 FL — SIGNIFICANT CHANGE UP (ref 80–100)
MONOCYTES # BLD AUTO: SIGNIFICANT CHANGE UP (ref 0–0.9)
MONOCYTES NFR BLD AUTO: 50 % — HIGH (ref 2–14)
NEUTROPHILS # BLD AUTO: 0.6 K/UL — LOW (ref 1.8–7.4)
NEUTROPHILS NFR BLD AUTO: 20 % — LOW (ref 43–77)
PHOSPHATE SERPL-MCNC: 3.2 MG/DL — SIGNIFICANT CHANGE UP (ref 2.5–4.5)
PLATELET # BLD AUTO: 291 K/UL — SIGNIFICANT CHANGE UP (ref 150–400)
POTASSIUM SERPL-MCNC: 4 MMOL/L — SIGNIFICANT CHANGE UP (ref 3.5–5.3)
POTASSIUM SERPL-SCNC: 4 MMOL/L — SIGNIFICANT CHANGE UP (ref 3.5–5.3)
PROT SERPL-MCNC: 6.7 G/DL — SIGNIFICANT CHANGE UP (ref 6–8.3)
PROTHROM AB SERPL-ACNC: 12.3 SEC — SIGNIFICANT CHANGE UP (ref 10–12.9)
RBC # BLD: 3.37 M/UL — LOW (ref 3.8–5.2)
RBC # FLD: 15.5 % — HIGH (ref 10.3–14.5)
SODIUM SERPL-SCNC: 141 MMOL/L — SIGNIFICANT CHANGE UP (ref 135–145)
TACROLIMUS SERPL-MCNC: 5.6 NG/ML — SIGNIFICANT CHANGE UP
WBC # BLD: 3.1 K/UL — LOW (ref 3.8–10.5)
WBC # FLD AUTO: 3.1 K/UL — LOW (ref 3.8–10.5)

## 2019-03-14 PROCEDURE — 99291 CRITICAL CARE FIRST HOUR: CPT

## 2019-03-14 RX ORDER — PREGABALIN 225 MG/1
1 CAPSULE ORAL
Qty: 0 | Refills: 0 | COMMUNITY

## 2019-03-14 RX ORDER — CHOLECALCIFEROL (VITAMIN D3) 125 MCG
0 CAPSULE ORAL
Qty: 0 | Refills: 0 | COMMUNITY

## 2019-03-14 RX ORDER — PANTOPRAZOLE SODIUM 20 MG/1
40 TABLET, DELAYED RELEASE ORAL
Qty: 0 | Refills: 0 | Status: DISCONTINUED | OUTPATIENT
Start: 2019-03-14 | End: 2019-03-18

## 2019-03-14 RX ORDER — ASCORBIC ACID 60 MG
1 TABLET,CHEWABLE ORAL
Qty: 0 | Refills: 0 | COMMUNITY

## 2019-03-14 RX ORDER — FERROUS SULFATE 325(65) MG
0 TABLET ORAL
Qty: 0 | Refills: 0 | COMMUNITY

## 2019-03-14 RX ORDER — THIAMINE MONONITRATE (VIT B1) 100 MG
1 TABLET ORAL
Qty: 0 | Refills: 0 | COMMUNITY

## 2019-03-14 RX ADMIN — Medication 5 MILLILITER(S): at 16:38

## 2019-03-14 RX ADMIN — MAGNESIUM OXIDE 400 MG ORAL TABLET 400 MILLIGRAM(S): 241.3 TABLET ORAL at 08:50

## 2019-03-14 RX ADMIN — ATOVAQUONE 750 MILLIGRAM(S): 750 SUSPENSION ORAL at 06:13

## 2019-03-14 RX ADMIN — Medication 100 MILLIGRAM(S): at 14:10

## 2019-03-14 RX ADMIN — Medication 10 MILLILITER(S): at 12:44

## 2019-03-14 RX ADMIN — Medication 400 MILLIGRAM(S): at 06:14

## 2019-03-14 RX ADMIN — Medication 400 UNIT(S): at 12:41

## 2019-03-14 RX ADMIN — Medication 200 MILLIGRAM(S): at 06:14

## 2019-03-14 RX ADMIN — FENTANYL CITRATE 1 PATCH: 50 INJECTION INTRAVENOUS at 16:37

## 2019-03-14 RX ADMIN — PANTOPRAZOLE SODIUM 40 MILLIGRAM(S): 20 TABLET, DELAYED RELEASE ORAL at 13:24

## 2019-03-14 RX ADMIN — MONTELUKAST 10 MILLIGRAM(S): 4 TABLET, CHEWABLE ORAL at 12:42

## 2019-03-14 RX ADMIN — Medication 1 LOZENGE: at 19:25

## 2019-03-14 RX ADMIN — Medication 10 MILLILITER(S): at 16:38

## 2019-03-14 RX ADMIN — AMLODIPINE BESYLATE 5 MILLIGRAM(S): 2.5 TABLET ORAL at 06:48

## 2019-03-14 RX ADMIN — URSODIOL 300 MILLIGRAM(S): 250 TABLET, FILM COATED ORAL at 08:50

## 2019-03-14 RX ADMIN — Medication 10 MILLILITER(S): at 08:49

## 2019-03-14 RX ADMIN — VORICONAZOLE 200 MILLIGRAM(S): 10 INJECTION, POWDER, LYOPHILIZED, FOR SOLUTION INTRAVENOUS at 18:01

## 2019-03-14 RX ADMIN — TACROLIMUS 1.5 MILLIGRAM(S): 5 CAPSULE ORAL at 06:14

## 2019-03-14 RX ADMIN — Medication 1 GRAM(S): at 12:43

## 2019-03-14 RX ADMIN — Medication 1 LOZENGE: at 08:49

## 2019-03-14 RX ADMIN — FENTANYL CITRATE 1 PATCH: 50 INJECTION INTRAVENOUS at 18:13

## 2019-03-14 RX ADMIN — VORICONAZOLE 200 MILLIGRAM(S): 10 INJECTION, POWDER, LYOPHILIZED, FOR SOLUTION INTRAVENOUS at 06:14

## 2019-03-14 RX ADMIN — Medication 10 MILLILITER(S): at 19:25

## 2019-03-14 RX ADMIN — Medication 1 MILLIGRAM(S): at 12:41

## 2019-03-14 RX ADMIN — Medication 5 MILLILITER(S): at 12:44

## 2019-03-14 RX ADMIN — TACROLIMUS 1.5 MILLIGRAM(S): 5 CAPSULE ORAL at 18:03

## 2019-03-14 RX ADMIN — Medication 10 MILLILITER(S): at 23:19

## 2019-03-14 RX ADMIN — PREGABALIN 1000 MICROGRAM(S): 225 CAPSULE ORAL at 12:42

## 2019-03-14 RX ADMIN — Medication 1 TABLET(S): at 12:41

## 2019-03-14 RX ADMIN — Medication 1 LOZENGE: at 12:43

## 2019-03-14 RX ADMIN — Medication 400 MILLIGRAM(S): at 14:10

## 2019-03-14 RX ADMIN — URSODIOL 300 MILLIGRAM(S): 250 TABLET, FILM COATED ORAL at 18:04

## 2019-03-14 RX ADMIN — FENTANYL CITRATE 1 PATCH: 50 INJECTION INTRAVENOUS at 06:49

## 2019-03-14 RX ADMIN — Medication 1 GRAM(S): at 06:13

## 2019-03-14 RX ADMIN — Medication 5 MILLILITER(S): at 08:49

## 2019-03-14 RX ADMIN — Medication 1 LOZENGE: at 16:38

## 2019-03-14 RX ADMIN — Medication 5 MILLILITER(S): at 19:24

## 2019-03-14 RX ADMIN — Medication 1 LOZENGE: at 23:19

## 2019-03-14 RX ADMIN — CHLORHEXIDINE GLUCONATE 1 APPLICATION(S): 213 SOLUTION TOPICAL at 08:51

## 2019-03-14 RX ADMIN — Medication 400 MILLIGRAM(S): at 21:20

## 2019-03-14 RX ADMIN — Medication 5 MILLILITER(S): at 23:19

## 2019-03-14 RX ADMIN — Medication 100 MILLIGRAM(S): at 06:14

## 2019-03-14 RX ADMIN — Medication 250 MILLIGRAM(S): at 12:43

## 2019-03-14 RX ADMIN — ATOVAQUONE 750 MILLIGRAM(S): 750 SUSPENSION ORAL at 18:01

## 2019-03-14 RX ADMIN — MAGNESIUM OXIDE 400 MG ORAL TABLET 400 MILLIGRAM(S): 241.3 TABLET ORAL at 12:40

## 2019-03-14 RX ADMIN — MEDROXYPROGESTERONE ACETATE 20 MILLIGRAM(S): 150 INJECTION, SUSPENSION, EXTENDED RELEASE INTRAMUSCULAR at 12:41

## 2019-03-14 RX ADMIN — MAGNESIUM OXIDE 400 MG ORAL TABLET 400 MILLIGRAM(S): 241.3 TABLET ORAL at 18:04

## 2019-03-14 RX ADMIN — SODIUM CHLORIDE 20 MILLILITER(S): 9 INJECTION INTRAMUSCULAR; INTRAVENOUS; SUBCUTANEOUS at 19:25

## 2019-03-14 RX ADMIN — FENTANYL CITRATE 1 PATCH: 50 INJECTION INTRAVENOUS at 16:39

## 2019-03-14 NOTE — DISCHARGE NOTE PROVIDER - NSDCFUADDAPPT_GEN_ALL_CORE_FT
You have a follow up appointment with Dr. Lou at the [facility] on Thursday, 3/21/19 at 2:40pm. Please arrive 15 minutes early to have your blood drawn   You have a follow up appointment with Lidia Chapa NP at the [facility] on Thursday, 3/28/19 at 10am. Please arrive 15 minutes early to have your blood drawn   You have a follow up appointment with Lidia Chapa NP at the [facility] on Thursday, 4/4/19 at 10am. Please arrive 15 minutes early to have your blood drawn   You have a follow up appointment with Lidia Chapa NP at the [facility] on Thursday, 4/11/19 at 10am. Please arrive 15 minutes early to have your blood drawn

## 2019-03-14 NOTE — DISCHARGE NOTE PROVIDER - NSDCCPCAREPLAN_GEN_ALL_CORE_FT
PRINCIPAL DISCHARGE DIAGNOSIS  Diagnosis: Stem cell transplant candidate  Assessment and Plan of Treatment:       SECONDARY DISCHARGE DIAGNOSES  Diagnosis: Need for prophylactic measure  Assessment and Plan of Treatment: PRINCIPAL DISCHARGE DIAGNOSIS  Diagnosis: Stem cell transplant candidate  Assessment and Plan of Treatment: 1. Diet and activities as per [facility] discharge guidelines and safe food handling guidelines. NO RESTAURANT OR TAKE OUT FOOD AT THIS TIME, ONLY HOME COOKED PREPARED/FROZEN FOODS. You are allowed to have fresh baked pizza right out of the oven. This is the ONLY takeout food at this time.  2. Notify your physician if bleeding; swelling; persistent nausea and vomiting; unable to urinate; pain not relieved by medications; fever; numbness, tingling; excessive diarrhea, inability to tolerate liquids or foods; increased irritability or sluggishness, rash  3. Dr. Lou's phone number at the [facility] is [phone]. Dr. Lou's cell phone number IN CASE OF EMERGENCY is [phone]. Transplant unit phone number if you have questions or concerns is [phone].  4. On the day you have an appointment at the [facility], do NOT take your Tacrolimus morning dose. Instead, bring it with you to the [facility]. After you have your bloods drawn you may take your morning dose of Tacrolimus.        SECONDARY DISCHARGE DIAGNOSES  Diagnosis: Need for prophylactic measure  Assessment and Plan of Treatment: Continue your prophylactic medications as directed by Dr. Lou   Acyclovir - antiviral prophylaxis   Mepron - PCP prophylaxis   Voriconazole - antifungal treatment / prophylaxis   Protonix - GI prophylaxis   Provera - menstrual prophylaxis - the provera is available for  at the [facility] in the [facility]

## 2019-03-14 NOTE — DISCHARGE NOTE PROVIDER - CARE PROVIDERS DIRECT ADDRESSES
[email or web address]

## 2019-03-14 NOTE — DISCHARGE NOTE PROVIDER - NSDCACTIVITY_GEN_ALL_CORE
Bathing allowed/Showering allowed/Walking - Outdoors allowed/Stairs allowed/Walking - Indoors allowed

## 2019-03-14 NOTE — PROGRESS NOTE ADULT - ATTENDING COMMENTS
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 Pt is [employer], and does not accept blood products for Yazdanism reasons    Day + 15- s/p MTX on days +1, +3, +6, 11 ...serum creatinine in normal range. No oral mucositis visible. Hold zarxio today ..will hold off for now..wbc coming up on own and given MRD at time of transplant will hold off..monitor for gvhd  Mucositis- throat soreness/pain with swallowing likely secondary to mucositis s/p MTX; monitor symptoms -now improved. Palliative care consult for pain management. Change meds to IV route if possible, add Carafate slurry.  Continue Tacrolimus 1.5 mg po q 12 hrs, monitor level  Continue Acyclovir, Mepron, Vfend prophylaxis  Continue VOD prophylaxis with Actigall, glutamine supplement. Off heparin since 3/6 due to increased menstrual bleeding  Hypertension- possibly related to Tacro, other meds, fluids- continue Norvasc.  Labs every other day or at discretion of attending physician  Continue iron, folic acid, vitamin K, vitamin B-12, vitamin C as per standard practice for bloodless transplant  Anemia secondary to antineoplastic chemotherapy; Cont iron supplement.   Antiemetics, mouth care, skin care   OOB/ambulate. 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 Pt is Rastafari, and does not accept blood products for Rastafarian reasons    Day + 15- s/p MTX on days +1, +3, +6, 11 ...serum creatinine in normal range. No oral mucositis visible. Hold zarxio ..will hold off for now..wbc coming up on own and given MRD at time of transplant will hold off..monitor for gvhd  Mucositis- throat soreness/pain with swallowing likely secondary to mucositis s/p MTX; monitor symptoms -now improved. Palliative care consult for pain management. Changed meds to IV route ..now switch back to po  Continue Tacrolimus 1.5 mg po q 12 hrs, monitor level..check in am  Continue Acyclovir, Mepron, Vfend prophylaxis  Continue VOD prophylaxis with Actigall, glutamine supplement. Off heparin since 3/6 due to increased menstrual bleeding  Hypertension- possibly related to Tacro, other meds, fluids- continue Norvasc.  Labs every other day or at discretion of attending physician  Continue, folic acid, , vitamin B-12, vitamin C as per standard practice for bloodless transplant..d/c iron and vit k  Anemia secondary to antineoplastic chemotherapy; Cont iron supplement.   Antiemetics, mouth care, skin care   OOB/ambulate.  No GHVD

## 2019-03-14 NOTE — DISCHARGE NOTE PROVIDER - CARE PROVIDER_API CALL
Branden Lou)  Internal Medicine; Medical Oncology  [address] [place] [address]  Phone: [phone]  Fax: [phone]  Follow Up Time:     Irene Perales (NP; RN)  NP in Primary Care AdultGerontology  [address] [place] [address]  Phone: [phone]  Fax: [phone]  Follow Up Time:

## 2019-03-14 NOTE — DISCHARGE NOTE PROVIDER - HOSPITAL COURSE
Ms. 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 She is a [employer]ism and cannot receive blood products.        Ms. Gary was initially noted to be leukopenic during a preoperative evaluation of a right ankle ligament repair.  A bone marrow biopsy at that time (performed at [facility] on in 12/2017) showed AML.  She was transferred to [facility] for further management.  Repeat marrow later in the month showed 90% blasts with myeloid mutation arrest in a 40-50% cellular marrow. On flow blasts were negative for CD34 and HLA-DR; positive for CD38, CD58, CD33, and MPO, and partially positive for CD15, CD11b, , and CD64. CD45 was reported as dim. Karyotype showed +21 in 3 of 21 metaphases, but no t(15:17) was detected on karyotype of FISH. Molecular studies showed mutations in NPM1 (45%0, IDH1 (R132S; 39%), PTPN1 (23%), and CSF3R (5%) genes. There was lingering diagnostic uncertainty due to a largely aspicular and hypocellular specimen, and thus a marrow was repeated on 1/2/2018, confirming AML.        Ms. Gary started 7+3 in 1/2018 with Idarubicin.  Her induction course was complicated by DIC, neutropenic fever with klebsiella pneumonia, and bilateral retinal hemorrhage.  She was supported with Nplate, Procrit, IV iron, Amicar and Lupron.  She was discharged, and at the time was deemed not a candidate for further therapy given inability to receive transfusions.  She then saw Dr. [place] at [facility] who obtained a bone marrow on 2/13/2018 showing 30% cellularity with 45% blasts.  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 Cytogenetics and FISH were normal.         Ms. Gary then established care with Dr. Christie for consideration of salvage therapy with IDH1 inhibition given the risks of additional chemotherapy without blood products support. Initial marrow at [facility], on 3/1/2018, showed 69% blasts. NBGS confirmed IDH1 R132S, NPM1 M183Vgf 12, and CSF3R D810G mutations. She was started on Ivosidenib on [facility] protocol  and had completed four cycles to date (6/28/2018 = C5D1). Marrow blasts increased to 85% on 4/30 but declined to 36% by 5/31. A bone marrow aspirate and biopsy was obtained (6/28/2018); preliminary results show further responding disease with 10.3% blasts by flow cytometry. Subsequent marrow in October showed 5% blasts.  At that time she was started on Vidaza plus venetoclax.  Her follow up marrow in 1/2019 showed 4% blasts. Due to these findings, it was decided patient was behaving like more of an MDS like picture.        Ms. Nickerson's donor is her sister.  She is a 10/10 match. Both donor and patient are CMV +. She had completed all pre testing needed for transplant.  She will start reduced intensity Fludarabine/Busulfan chemo regimen today.        Upon admission, a right AC PICC line was placed in IR. Ms. Gary received IV hydration, pain management, antiemetics, anxiolysis, and antibacterial / antiviral / antifungal / PCP / GI and VOD (SOS) prophylaxis. Labs were monitored every other day, and she received electrolyte repletion as needed. She also received vitamin K, vitamin B, supplemental iron, vitamin C and procrit as directed by the institutional bloodless transplant policy.         On 2/27/19, Ms. Gary received 363 mLs of fresh, apheresed, mobilized, related allogeneic HPC over 80 minutes. Cell counts as follows:     Total MNCs ( x 10^8/kg): 6.21    CD 34+Cells ( x 10^6/kg): 3.37    CD3+ Cells ( x 10^7/kg): 12.46    Cell Viability (%): 100%        Engraftment was noted on 3/11/19. Ms. Gary did not receive zarxio due to her having minimal residual disease on admission, and a reduced intensity preparatory regimen to avoid stimulating myeloblasts.         Ms. Gary had a relatively uncomplicated transplant course. She did experience menses, which was suppressed with provera. She also had pancytopenia related to the high dose chemotherapy preparative regimen. She also experienced grade 1 oral mucositis, and grade 2 GI mucositis also related to the chemotherapy preparative regimen. Both were treated with supportive care.         Currently, Ms. Gary is stable for discharge home with outpatient follow up at the [facility].

## 2019-03-14 NOTE — PROGRESS NOTE ADULT - SUBJECTIVE AND OBJECTIVE BOX
HPC Transplant Team                                                      Critical / Counseling Time Provided: 30 minutes                                                                                                                                                        Chief Complaint: Allogeneic peripheral blood stem cell transplant from [facility] (sister 10/10) for treatment of AML    S: Patient seen and examined with HPC Transplant Team:     Denies mouth / tongue / throat pain, dyspnea, cough, nausea, vomiting, diarrhea, abdominal pain     O: Vitals:   Vital Signs Last 24 Hrs  T(C): 36.7 (14 Mar 2019 06:30), Max: 37.3 (13 Mar 2019 21:35)  T(F): 98.1 (14 Mar 2019 06:30), Max: 99.1 (13 Mar 2019 21:35)  HR: 98 (14 Mar 2019 06:30) (98 - 104)  BP: 122/78 (14 Mar 2019 06:30) (102/69 - 122/80)  BP(mean): --  RR: 18 (14 Mar 2019 06:30) (18 - 18)  SpO2: 100% (14 Mar 2019 06:30) (96% - 100%)    Admit weight: 88.2 kG  Daily Weight in k (14 Mar 2019 08:20)  Today's weight:     Intake / Output:    @ 07: @ 07:00  --------------------------------------------------------  IN: 3409 mL / OUT: 2550 mL / NET: 859 mL     @ 07: @ 09:09  --------------------------------------------------------  IN: 0 mL / OUT: 100 mL / NET: -100 mL      PE:   Oropharynx: no erythema or ulcerations in oropharynx  Oral Mucositis:    +                                         Grade: 1-2(throat)  CVS: (+) S1/S2 RRR   Lungs: CTA throughout bilaterally   Abdomen: (+)BS x 4, soft, NT/ND   Extremities: no edema  in B/L LE  Gastric Mucositis:       +                                          stGstrstastdstest:st st1st Intestinal Mucositis:     -                                          Grade: n/a  Skin: no rash, patches of darker areas.   TLC: PICC line C/D/I  Neuro: A&O x 3   Pain: 810 then 310 after pain medication        Labs:   CBC Full  -  ( 14 Mar 2019 06:59 )  WBC Count : 3.1 K/uL  Hemoglobin : 11.2 g/dL  Hematocrit : 30.6 %  Platelet Count - Automated : 291 K/uL  Mean Cell Volume : 90.8 fl  Mean Cell Hemoglobin : 33.2 pg  Mean Cell Hemoglobin Concentration : 36.5 gm/dL  Auto Neutrophil # : 0.6 K/uL  Auto Lymphocyte # : See Note  Auto Monocyte # : See Note  Auto Eosinophil # : 0.1 K/uL  Auto Basophil # : 0.0 K/uL  Auto Neutrophil % : 20.0 %  Auto Lymphocyte % : 27.0 %  Auto Monocyte % : 50.0 %  Auto Eosinophil % : 0.0 %  Auto Basophil % : 1.0 %                          11.2   3.1   )-----------( 291      ( 14 Mar 2019 06:59 )             30.6         141  |  106  |  <4<L>  ----------------------------<  94  4.0   |  22  |  0.62    Ca    9.7      14 Mar 2019 06:59  Phos  3.2     -  Mg     1.8         TPro  6.7  /  Alb  3.9  /  TBili  0.1<L>  /  DBili  x   /  AST  22  /  ALT  17  /  AlkPhos  70      PT/INR - ( 14 Mar 2019 07:00 )   PT: 12.3 sec;   INR: 1.08 ratio         PTT - ( 14 Mar 2019 07:00 )  PTT:28.3 sec  LIVER FUNCTIONS - ( 14 Mar 2019 06:59 )  Alb: 3.9 g/dL / Pro: 6.7 g/dL / ALK PHOS: 70 U/L / ALT: 17 U/L / AST: 22 U/L / GGT: x           Lactate Dehydrogenase, Serum: 434 U/L ( @ 06:59)        @ 09:26  Tacrolimus 4.5                Cyclosporine --    Meds:   Antimicrobials:   acyclovir   Oral Tab/Cap 400 milliGRAM(s) Oral every 8 hours  atovaquone Suspension 750 milliGRAM(s) Oral every 12 hours  clotrimazole Lozenge 1 Lozenge Oral five times a day  voriconazole 200 milliGRAM(s) Oral every 12 hours      Heme / Onc:       GI:  docusate sodium 100 milliGRAM(s) Oral three times a day  pantoprazole  Injectable 40 milliGRAM(s) IV Push daily  senna 2 Tablet(s) Oral at bedtime  sodium bicarbonate Mouth Rinse 10 milliLiter(s) Swish and Spit five times a day  sucralfate suspension 1 Gram(s) Oral four times a day  ursodiol Capsule 300 milliGRAM(s) Oral two times a day with meals      Cardiovascular:   amLODIPine   Tablet 5 milliGRAM(s) Oral daily      Immunologic:   immune   globulin 10% (GAMMAGARD) IVPB 20 Gram(s) IV Intermittent <User Schedule>  tacrolimus 1.5 milliGRAM(s) Oral every 12 hours      Other medications:   acetaminophen   Tablet .. 650 milliGRAM(s) Oral every 6 hours  acetaminophen   Tablet .. 650 milliGRAM(s) Oral <User Schedule>  ascorbic acid 250 milliGRAM(s) Oral daily  Biotene Dry Mouth Oral Rinse 5 milliLiter(s) Swish and Spit five times a day  chlorhexidine 4% Liquid 1 Application(s) Topical <User Schedule>  cholecalciferol 400 Unit(s) Oral daily  cyanocobalamin 1000 MICROGram(s) Oral daily  diphenhydrAMINE   Injectable 25 milliGRAM(s) IV Push every 3 weeks  fentaNYL   Patch  25 MICROgram(s)/Hr 1 Patch Transdermal every 72 hours  folic acid 1 milliGRAM(s) Oral daily  iron sucrose IVPB 100 milliGRAM(s) IV Intermittent every 7 days  lidocaine/prilocaine Cream 1 Application(s) Topical daily  magnesium oxide 400 milliGRAM(s) Oral three times a day with meals  medroxyPROGESTERone 20 milliGRAM(s) Oral daily  montelukast 10 milliGRAM(s) Oral daily  multivitamin 1 Tablet(s) Oral daily  phytonadione   Solution 10 milliGRAM(s) Oral every other day  sodium chloride 0.9%. 1000 milliLiter(s) IV Continuous <Continuous>      PRN:   acetaminophen   Tablet .. 650 milliGRAM(s) Oral every 6 hours PRN  artificial  tears Solution 1 Drop(s) Both EYES three times a day PRN  benzocaine 15 mG/menthol 3.6 mG Lozenge 1 Lozenge Oral every 3 hours PRN  diphenhydrAMINE   Injectable 25 milliGRAM(s) IV Push every 4 hours PRN  FIRST- Mouthwash  BLM 5 milliLiter(s) Swish and Spit every 8 hours PRN  HYDROmorphone  Injectable 0.5 milliGRAM(s) IV Push every 2 hours PRN  HYDROmorphone  Injectable 1 milliGRAM(s) IV Push every 2 hours PRN  metoclopramide Injectable 10 milliGRAM(s) IV Push every 6 hours PRN  sodium chloride 0.65% Nasal 1 Spray(s) Both Nostrils five times a day PRN  sodium chloride 0.9% lock flush 10 milliLiter(s) IV Push every 1 hour PRN    A/P:  33 year old female with a history of AML  Post:  Allogeneic PBSCT Day + 15  Labs every other day ; continue vitamin C, vitamin B, folic acid, vitamin K & iron supplementation. Increased vitamin K to every other day as per standard guidelines for bloodless transplant  Continue Provera 20 mg PO daily  BP- 140s, started on Norvasc.  Hold for SBP <120  Mucositis likely related to MTX and meds changed to IV 3/8. Continue Carafate and supportive care. Pain improving.  Pain meds per palliative care  Zarxio held  impending engraftment, re-eval need tomorrow.  D/C Epo.  Last Venofer Friday 3/8.  VNTR pending     1. Infectious Disease:   acyclovir   Oral Tab/Cap 400 milliGRAM(s) Oral every 8 hours  atovaquone Suspension 750 milliGRAM(s) Oral every 12 hours  clotrimazole Lozenge 1 Lozenge Oral five times a day  voriconazole 200 milliGRAM(s) Oral every 12 hours    2. VOD Prophylaxis: Actigall, Glutamine    3. GI Prophylaxis:    pantoprazole  Injectable 40 milliGRAM(s) IV Push daily    4. Mouthcare - NS / NaHCO3 rinses, Mycelex, Caphosol; Skin care     5. GVHD prophylaxis -  tacrolimus 1.5 milliGRAM(s) Oral every 12 hours    6. Transfuse & replete electrolytes prn   magnesium oxide 400 milliGRAM(s) Oral three times a day with meals    7. IV hydration, daily weights, strict I&O, prn diuresis     8. PO intake as tolerated, nutrition follow up as needed, MVI, folic acid     9. Antiemetics, anti-diarrhea medications:   metoclopramide Injectable 10 milliGRAM(s) IV Push every 6 hours PRN    10. OOB as tolerated, physical therapy consult if needed     11. Monitor coags / fibrinogen 2x week, vitamin K as needed   phytonadione   Solution 10 milliGRAM(s) Oral every other day    12. Monitor closely for clinical changes, monitor for fevers     13. Emotional support provided, plan of care discussed and questions addressed     14. Patient education done regarding plan of care, restrictions and discharge planning     15. Continue regular social work input     I have written the above note for Dr. Lou who performed service with me in the room.   Racquel Leiva NP-C ([phone])    I have seen and examined patient with NP, I agree with above note as scribed. HPC Transplant Team                                                      Critical / Counseling Time Provided: 30 minutes                                                                                                                                                        Chief Complaint: Allogeneic peripheral blood stem cell transplant from [facility] (sister 10/10) for treatment of AML    S: Patient seen and examined with HPC Transplant Team:   no complaints today ; feels ready for discharge planning   Denies mouth / tongue / throat pain, dyspnea, cough, nausea, vomiting, diarrhea, abdominal pain     O: Vitals:   Vital Signs Last 24 Hrs  T(C): 36.7 (14 Mar 2019 06:30), Max: 37.3 (13 Mar 2019 21:35)  T(F): 98.1 (14 Mar 2019 06:30), Max: 99.1 (13 Mar 2019 21:35)  HR: 98 (14 Mar 2019 06:30) (98 - 104)  BP: 122/78 (14 Mar 2019 06:30) (102/69 - 122/80)  BP(mean): --  RR: 18 (14 Mar 2019 06:30) (18 - 18)  SpO2: 100% (14 Mar 2019 06:30) (96% - 100%)    Admit weight: 88.2 kG  Daily Weight in k (14 Mar 2019 08:20)  Today's weight: 87kg     Intake / Output:    @ 07: @ 07:00  --------------------------------------------------------  IN: 3409 mL / OUT: 2550 mL / NET: 859 mL     @ 07: @ 09:09  --------------------------------------------------------  IN: 0 mL / OUT: 100 mL / NET: -100 mL      PE:   Oropharynx: no erythema or ulcerations in oropharynx  Oral Mucositis:    +                                         ndGndrndanddndend:nd nd2nd CVS: (+) S1/S2 RRR   Lungs: CTA throughout bilaterally   Abdomen: (+)BS x 4, soft, NT/ND   Extremities: no edema  in B/L LE  Gastric Mucositis:       +                                          rdGrdrrdarddrderd:rd rd3rd Intestinal Mucositis:     -                                          Grade: n/a  Skin: no rash, patches of darker areas.   TLC: PICC line C/D/I  Neuro: A&O x 3   Pain: denies pain         Labs:   CBC Full  -  ( 14 Mar 2019 06:59 )  WBC Count : 3.1 K/uL  Hemoglobin : 11.2 g/dL  Hematocrit : 30.6 %  Platelet Count - Automated : 291 K/uL  Mean Cell Volume : 90.8 fl  Mean Cell Hemoglobin : 33.2 pg  Mean Cell Hemoglobin Concentration : 36.5 gm/dL  Auto Neutrophil # : 0.6 K/uL  Auto Lymphocyte # : See Note  Auto Monocyte # : See Note  Auto Eosinophil # : 0.1 K/uL  Auto Basophil # : 0.0 K/uL  Auto Neutrophil % : 20.0 %  Auto Lymphocyte % : 27.0 %  Auto Monocyte % : 50.0 %  Auto Eosinophil % : 0.0 %  Auto Basophil % : 1.0 %                          11.2   3.1   )-----------( 291      ( 14 Mar 2019 06:59 )             30.6         141  |  106  |  <4<L>  ----------------------------<  94  4.0   |  22  |  0.62    Ca    9.7      14 Mar 2019 06:59  Phos  3.2     -  Mg     1.8         TPro  6.7  /  Alb  3.9  /  TBili  0.1<L>  /  DBili  x   /  AST  22  /  ALT  17  /  AlkPhos  70      PT/INR - ( 14 Mar 2019 07:00 )   PT: 12.3 sec;   INR: 1.08 ratio         PTT - ( 14 Mar 2019 07:00 )  PTT:28.3 sec  LIVER FUNCTIONS - ( 14 Mar 2019 06:59 )  Alb: 3.9 g/dL / Pro: 6.7 g/dL / ALK PHOS: 70 U/L / ALT: 17 U/L / AST: 22 U/L / GGT: x           Lactate Dehydrogenase, Serum: 434 U/L ( @ 06:59)        @ 09:26  Tacrolimus 4.5                Cyclosporine --    Meds:   Antimicrobials:   acyclovir   Oral Tab/Cap 400 milliGRAM(s) Oral every 8 hours  atovaquone Suspension 750 milliGRAM(s) Oral every 12 hours  clotrimazole Lozenge 1 Lozenge Oral five times a day  voriconazole 200 milliGRAM(s) Oral every 12 hours      Heme / Onc:       GI:  docusate sodium 100 milliGRAM(s) Oral three times a day  pantoprazole  Injectable 40 milliGRAM(s) IV Push daily  senna 2 Tablet(s) Oral at bedtime  sodium bicarbonate Mouth Rinse 10 milliLiter(s) Swish and Spit five times a day  sucralfate suspension 1 Gram(s) Oral four times a day  ursodiol Capsule 300 milliGRAM(s) Oral two times a day with meals      Cardiovascular:   amLODIPine   Tablet 5 milliGRAM(s) Oral daily      Immunologic:   immune   globulin 10% (GAMMAGARD) IVPB 20 Gram(s) IV Intermittent <User Schedule>  tacrolimus 1.5 milliGRAM(s) Oral every 12 hours      Other medications:   acetaminophen   Tablet .. 650 milliGRAM(s) Oral every 6 hours  acetaminophen   Tablet .. 650 milliGRAM(s) Oral <User Schedule>  ascorbic acid 250 milliGRAM(s) Oral daily  Biotene Dry Mouth Oral Rinse 5 milliLiter(s) Swish and Spit five times a day  chlorhexidine 4% Liquid 1 Application(s) Topical <User Schedule>  cholecalciferol 400 Unit(s) Oral daily  cyanocobalamin 1000 MICROGram(s) Oral daily  diphenhydrAMINE   Injectable 25 milliGRAM(s) IV Push every 3 weeks  fentaNYL   Patch  25 MICROgram(s)/Hr 1 Patch Transdermal every 72 hours  folic acid 1 milliGRAM(s) Oral daily  iron sucrose IVPB 100 milliGRAM(s) IV Intermittent every 7 days  lidocaine/prilocaine Cream 1 Application(s) Topical daily  magnesium oxide 400 milliGRAM(s) Oral three times a day with meals  medroxyPROGESTERone 20 milliGRAM(s) Oral daily  montelukast 10 milliGRAM(s) Oral daily  multivitamin 1 Tablet(s) Oral daily  phytonadione   Solution 10 milliGRAM(s) Oral every other day  sodium chloride 0.9%. 1000 milliLiter(s) IV Continuous <Continuous>      PRN:   acetaminophen   Tablet .. 650 milliGRAM(s) Oral every 6 hours PRN  artificial  tears Solution 1 Drop(s) Both EYES three times a day PRN  benzocaine 15 mG/menthol 3.6 mG Lozenge 1 Lozenge Oral every 3 hours PRN  diphenhydrAMINE   Injectable 25 milliGRAM(s) IV Push every 4 hours PRN  FIRST- Mouthwash  BLM 5 milliLiter(s) Swish and Spit every 8 hours PRN  HYDROmorphone  Injectable 0.5 milliGRAM(s) IV Push every 2 hours PRN  HYDROmorphone  Injectable 1 milliGRAM(s) IV Push every 2 hours PRN  metoclopramide Injectable 10 milliGRAM(s) IV Push every 6 hours PRN  sodium chloride 0.65% Nasal 1 Spray(s) Both Nostrils five times a day PRN  sodium chloride 0.9% lock flush 10 milliLiter(s) IV Push every 1 hour PRN    A/P:  33 year old female with a history of AML  Post:  Allogeneic PBSCT Day + 15  Labs every other day ; continue vitamin C, vitamin B, folic acid, vitamin K & iron supplementation. Increased vitamin K to every other day as per standard guidelines for bloodless transplant  Continue Provera 20 mg PO daily  BP- 140s, started on Norvasc.  Hold for SBP <120  Mucositis likely related to MTX and meds changed to IV 3/8. Continue Carafate and supportive care. Pain improving.  Pain meds per palliative care  Zarxio held  impending engraftment, re-eval need tomorrow.  D/C Epo.  Last Venofer Friday 3/8.  VNTR pending     1. Infectious Disease:   acyclovir   Oral Tab/Cap 400 milliGRAM(s) Oral every 8 hours  atovaquone Suspension 750 milliGRAM(s) Oral every 12 hours  clotrimazole Lozenge 1 Lozenge Oral five times a day  voriconazole 200 milliGRAM(s) Oral every 12 hours    2. VOD Prophylaxis: Actigall, Glutamine    3. GI Prophylaxis:    pantoprazole  Injectable 40 milliGRAM(s) IV Push daily    4. Mouthcare - NS / NaHCO3 rinses, Mycelex, Caphosol; Skin care     5. GVHD prophylaxis -  tacrolimus 1.5 milliGRAM(s) Oral every 12 hours    6. Transfuse & replete electrolytes prn   magnesium oxide 400 milliGRAM(s) Oral three times a day with meals    7. IV hydration, daily weights, strict I&O, prn diuresis     8. PO intake as tolerated, nutrition follow up as needed, MVI, folic acid     9. Antiemetics, anti-diarrhea medications:   metoclopramide Injectable 10 milliGRAM(s) IV Push every 6 hours PRN    10. OOB as tolerated, physical therapy consult if needed     11. Monitor coags / fibrinogen 2x week, vitamin K as needed   phytonadione   Solution 10 milliGRAM(s) Oral every other day    12. Monitor closely for clinical changes, monitor for fevers     13. Emotional support provided, plan of care discussed and questions addressed     14. Patient education done regarding plan of care, restrictions and discharge planning     15. Continue regular social work input     I have written the above note for Dr. Lou who performed service with me in the room.   Racquel Leiva NP-C ([phone])    I have seen and examined patient with NP, I agree with above note as scribed.

## 2019-03-14 NOTE — DISCHARGE NOTE PROVIDER - INSTRUCTIONS
Diet and activities as per [facility] discharge guidelines and safe food handling guidelines. NO RESTAURANT OR TAKE OUT FOOD AT THIS TIME, ONLY HOME COOKED PREPARED/FROZEN FOODS. You are allowed to have fresh baked pizza right out of the oven. This is the ONLY takeout food at this time.

## 2019-03-15 LAB
ALBUMIN SERPL ELPH-MCNC: 3.8 G/DL — SIGNIFICANT CHANGE UP (ref 3.3–5)
ALP SERPL-CCNC: 67 U/L — SIGNIFICANT CHANGE UP (ref 40–120)
ALT FLD-CCNC: 15 U/L — SIGNIFICANT CHANGE UP (ref 10–45)
ANION GAP SERPL CALC-SCNC: 13 MMOL/L — SIGNIFICANT CHANGE UP (ref 5–17)
AST SERPL-CCNC: 19 U/L — SIGNIFICANT CHANGE UP (ref 10–40)
BASOPHILS # BLD AUTO: 0 K/UL — SIGNIFICANT CHANGE UP (ref 0–0.2)
BASOPHILS NFR BLD AUTO: 1 % — SIGNIFICANT CHANGE UP (ref 0–2)
BILIRUB SERPL-MCNC: 0.1 MG/DL — LOW (ref 0.2–1.2)
BUN SERPL-MCNC: <4 MG/DL — LOW (ref 7–23)
CALCIUM SERPL-MCNC: 9.5 MG/DL — SIGNIFICANT CHANGE UP (ref 8.4–10.5)
CHLORIDE SERPL-SCNC: 107 MMOL/L — SIGNIFICANT CHANGE UP (ref 96–108)
CMV DNA CSF QL NAA+PROBE: SIGNIFICANT CHANGE UP
CMV DNA SPEC NAA+PROBE-LOG#: SIGNIFICANT CHANGE UP LOGIU/ML
CO2 SERPL-SCNC: 22 MMOL/L — SIGNIFICANT CHANGE UP (ref 22–31)
CREAT SERPL-MCNC: 0.63 MG/DL — SIGNIFICANT CHANGE UP (ref 0.5–1.3)
EOSINOPHIL # BLD AUTO: 0.1 K/UL — SIGNIFICANT CHANGE UP (ref 0–0.5)
GLUCOSE SERPL-MCNC: 92 MG/DL — SIGNIFICANT CHANGE UP (ref 70–99)
HCT VFR BLD CALC: 33.2 % — LOW (ref 34.5–45)
HGB BLD-MCNC: 11.2 G/DL — LOW (ref 11.5–15.5)
LYMPHOCYTES # BLD AUTO: 48 % — HIGH (ref 13–44)
LYMPHOCYTES # BLD AUTO: SIGNIFICANT CHANGE UP (ref 1–3.3)
MAGNESIUM SERPL-MCNC: 1.6 MG/DL — SIGNIFICANT CHANGE UP (ref 1.6–2.6)
MCHC RBC-ENTMCNC: 31.7 PG — SIGNIFICANT CHANGE UP (ref 27–34)
MCHC RBC-ENTMCNC: 33.7 GM/DL — SIGNIFICANT CHANGE UP (ref 32–36)
MCV RBC AUTO: 94.2 FL — SIGNIFICANT CHANGE UP (ref 80–100)
METAMYELOCYTES # FLD: 2 % — HIGH (ref 0–0)
MONOCYTES # BLD AUTO: SIGNIFICANT CHANGE UP (ref 0–0.9)
MONOCYTES NFR BLD AUTO: 36 % — HIGH (ref 2–14)
NEUTROPHILS # BLD AUTO: 0.7 K/UL — LOW (ref 1.8–7.4)
NEUTROPHILS NFR BLD AUTO: 12 % — LOW (ref 43–77)
NEUTS BAND # BLD: 1 % — SIGNIFICANT CHANGE UP (ref 0–8)
NRBC # BLD: 11 /100 — HIGH (ref 0–0)
PHOSPHATE SERPL-MCNC: 3.5 MG/DL — SIGNIFICANT CHANGE UP (ref 2.5–4.5)
PLAT MORPH BLD: NORMAL — SIGNIFICANT CHANGE UP
PLATELET # BLD AUTO: 289 K/UL — SIGNIFICANT CHANGE UP (ref 150–400)
POTASSIUM SERPL-MCNC: 3.9 MMOL/L — SIGNIFICANT CHANGE UP (ref 3.5–5.3)
POTASSIUM SERPL-SCNC: 3.9 MMOL/L — SIGNIFICANT CHANGE UP (ref 3.5–5.3)
PROT SERPL-MCNC: 6.5 G/DL — SIGNIFICANT CHANGE UP (ref 6–8.3)
RBC # BLD: 3.52 M/UL — LOW (ref 3.8–5.2)
RBC # FLD: 15.9 % — HIGH (ref 10.3–14.5)
RBC BLD AUTO: SIGNIFICANT CHANGE UP
SODIUM SERPL-SCNC: 142 MMOL/L — SIGNIFICANT CHANGE UP (ref 135–145)
TACROLIMUS SERPL-MCNC: 5.7 NG/ML — SIGNIFICANT CHANGE UP
WBC # BLD: 3.1 K/UL — LOW (ref 3.8–10.5)
WBC # FLD AUTO: 3.1 K/UL — LOW (ref 3.8–10.5)

## 2019-03-15 PROCEDURE — 99291 CRITICAL CARE FIRST HOUR: CPT

## 2019-03-15 RX ORDER — SENNA PLUS 8.6 MG/1
2 TABLET ORAL
Qty: 0 | Refills: 0 | Status: DISCONTINUED | OUTPATIENT
Start: 2019-03-15 | End: 2019-03-18

## 2019-03-15 RX ORDER — FENTANYL CITRATE 50 UG/ML
1 INJECTION INTRAVENOUS
Qty: 0 | Refills: 0 | Status: DISCONTINUED | OUTPATIENT
Start: 2019-03-15 | End: 2019-03-16

## 2019-03-15 RX ORDER — MEDROXYPROGESTERONE ACETATE 150 MG/ML
1 INJECTION, SUSPENSION, EXTENDED RELEASE INTRAMUSCULAR
Qty: 30 | Refills: 0 | OUTPATIENT
Start: 2019-03-15 | End: 2019-04-13

## 2019-03-15 RX ADMIN — Medication 1 LOZENGE: at 16:33

## 2019-03-15 RX ADMIN — Medication 10 MILLILITER(S): at 21:31

## 2019-03-15 RX ADMIN — CHLORHEXIDINE GLUCONATE 1 APPLICATION(S): 213 SOLUTION TOPICAL at 08:21

## 2019-03-15 RX ADMIN — Medication 10 MILLILITER(S): at 11:36

## 2019-03-15 RX ADMIN — Medication 10 MILLILITER(S): at 16:32

## 2019-03-15 RX ADMIN — ATOVAQUONE 750 MILLIGRAM(S): 750 SUSPENSION ORAL at 18:03

## 2019-03-15 RX ADMIN — Medication 1 LOZENGE: at 11:44

## 2019-03-15 RX ADMIN — Medication 10 MILLILITER(S): at 08:20

## 2019-03-15 RX ADMIN — MAGNESIUM OXIDE 400 MG ORAL TABLET 400 MILLIGRAM(S): 241.3 TABLET ORAL at 08:25

## 2019-03-15 RX ADMIN — TACROLIMUS 1.5 MILLIGRAM(S): 5 CAPSULE ORAL at 05:40

## 2019-03-15 RX ADMIN — VORICONAZOLE 200 MILLIGRAM(S): 10 INJECTION, POWDER, LYOPHILIZED, FOR SOLUTION INTRAVENOUS at 18:04

## 2019-03-15 RX ADMIN — VORICONAZOLE 200 MILLIGRAM(S): 10 INJECTION, POWDER, LYOPHILIZED, FOR SOLUTION INTRAVENOUS at 05:40

## 2019-03-15 RX ADMIN — URSODIOL 300 MILLIGRAM(S): 250 TABLET, FILM COATED ORAL at 08:25

## 2019-03-15 RX ADMIN — Medication 5 MILLILITER(S): at 23:43

## 2019-03-15 RX ADMIN — MONTELUKAST 10 MILLIGRAM(S): 4 TABLET, CHEWABLE ORAL at 11:44

## 2019-03-15 RX ADMIN — Medication 10 MILLILITER(S): at 23:43

## 2019-03-15 RX ADMIN — MAGNESIUM OXIDE 400 MG ORAL TABLET 400 MILLIGRAM(S): 241.3 TABLET ORAL at 18:04

## 2019-03-15 RX ADMIN — SODIUM CHLORIDE 20 MILLILITER(S): 9 INJECTION INTRAMUSCULAR; INTRAVENOUS; SUBCUTANEOUS at 21:31

## 2019-03-15 RX ADMIN — Medication 400 MILLIGRAM(S): at 21:32

## 2019-03-15 RX ADMIN — PANTOPRAZOLE SODIUM 40 MILLIGRAM(S): 20 TABLET, DELAYED RELEASE ORAL at 05:41

## 2019-03-15 RX ADMIN — MAGNESIUM OXIDE 400 MG ORAL TABLET 400 MILLIGRAM(S): 241.3 TABLET ORAL at 11:41

## 2019-03-15 RX ADMIN — Medication 1 LOZENGE: at 08:25

## 2019-03-15 RX ADMIN — FENTANYL CITRATE 1 PATCH: 50 INJECTION INTRAVENOUS at 18:25

## 2019-03-15 RX ADMIN — PREGABALIN 1000 MICROGRAM(S): 225 CAPSULE ORAL at 11:43

## 2019-03-15 RX ADMIN — AMLODIPINE BESYLATE 5 MILLIGRAM(S): 2.5 TABLET ORAL at 05:39

## 2019-03-15 RX ADMIN — Medication 5 MILLILITER(S): at 21:31

## 2019-03-15 RX ADMIN — Medication 1 TABLET(S): at 11:42

## 2019-03-15 RX ADMIN — Medication 1 LOZENGE: at 21:31

## 2019-03-15 RX ADMIN — Medication 5 MILLILITER(S): at 08:26

## 2019-03-15 RX ADMIN — MEDROXYPROGESTERONE ACETATE 20 MILLIGRAM(S): 150 INJECTION, SUSPENSION, EXTENDED RELEASE INTRAMUSCULAR at 11:43

## 2019-03-15 RX ADMIN — Medication 5 MILLILITER(S): at 11:44

## 2019-03-15 RX ADMIN — Medication 250 MILLIGRAM(S): at 11:37

## 2019-03-15 RX ADMIN — Medication 400 MILLIGRAM(S): at 13:15

## 2019-03-15 RX ADMIN — FENTANYL CITRATE 1 PATCH: 50 INJECTION INTRAVENOUS at 06:01

## 2019-03-15 RX ADMIN — Medication 400 UNIT(S): at 11:37

## 2019-03-15 RX ADMIN — TACROLIMUS 1.5 MILLIGRAM(S): 5 CAPSULE ORAL at 18:04

## 2019-03-15 RX ADMIN — Medication 1 LOZENGE: at 23:43

## 2019-03-15 RX ADMIN — Medication 1 MILLIGRAM(S): at 11:42

## 2019-03-15 RX ADMIN — URSODIOL 300 MILLIGRAM(S): 250 TABLET, FILM COATED ORAL at 18:04

## 2019-03-15 RX ADMIN — Medication 5 MILLILITER(S): at 16:33

## 2019-03-15 RX ADMIN — Medication 400 MILLIGRAM(S): at 05:39

## 2019-03-15 RX ADMIN — ATOVAQUONE 750 MILLIGRAM(S): 750 SUSPENSION ORAL at 05:38

## 2019-03-15 NOTE — PROGRESS NOTE ADULT - SUBJECTIVE AND OBJECTIVE BOX
HPC Transplant Team                                                      Critical / Counseling Time Provided: 30 minutes  Chief Complaint: Allogeneic peripheral blood stem cell transplant from [facility] (sister 10/10) for treatment of AML    S: Patient seen and examined with HPC Transplant Team:   no complaints today ; feels ready for discharge planning   Denies mouth / tongue / throat pain, dyspnea, cough, nausea, vomiting, diarrhea, abdominal pain       O: Vitals:   Vital Signs Last 24 Hrs  T(C): 36.7 (15 Mar 2019 05:59), Max: 37.2 (14 Mar 2019 17:33)  T(F): 98.1 (15 Mar 2019 05:59), Max: 99 (14 Mar 2019 17:33)  HR: 95 (15 Mar 2019 05:59) (95 - 111)  BP: 108/73 (15 Mar 2019 05:59) (104/70 - 120/77)  BP(mean): --  RR: 18 (15 Mar 2019 05:59) (18 - 19)  SpO2: 99% (15 Mar 2019 05:59) (98% - 100%)    Admit weight:   Daily     Daily Weight in k (14 Mar 2019 08:20)    Intake / Output:   03-14 @ 07:01  -  -15 @ 07:00  --------------------------------------------------------  IN: 2968 mL / OUT: 2600 mL / NET: 368 mL          PE:   Oropharynx: no erythema or ulcerations in oropharynx  Oral Mucositis:    +                                         ndGndrndanddndend:nd nd2nd CVS: (+) S1/S2 RRR   Lungs: CTA throughout bilaterally   Abdomen: (+)BS x 4, soft, NT/ND   Extremities: no edema  in B/L LE  Gastric Mucositis:       +                                          stGstrstastdstest:st st1st Intestinal Mucositis:     -                                          Grade: n/a  Skin: no rash, patches of darker areas.   TLC: PICC line C/D/I  Neuro: A&O x 3   Pain: denies pain         Labs:   CBC Full  -  ( 14 Mar 2019 06:59 )  WBC Count : 3.1 K/uL  Hemoglobin : 11.2 g/dL  Hematocrit : 30.6 %  Platelet Count - Automated : 291 K/uL  Mean Cell Volume : 90.8 fl  Mean Cell Hemoglobin : 33.2 pg  Mean Cell Hemoglobin Concentration : 36.5 gm/dL  Auto Neutrophil # : 0.6 K/uL  Auto Lymphocyte # : See Note  Auto Monocyte # : See Note  Auto Eosinophil # : 0.1 K/uL  Auto Basophil # : 0.0 K/uL  Auto Neutrophil % : 20.0 %  Auto Lymphocyte % : 27.0 %  Auto Monocyte % : 50.0 %  Auto Eosinophil % : 0.0 %  Auto Basophil % : 1.0 %                          11.2   3.1   )-----------( 291      ( 14 Mar 2019 06:59 )             30.6     03-14    141  |  106  |  <4<L>  ----------------------------<  94  4.0   |  22  |  0.62    Ca    9.7      14 Mar 2019 06:59  Phos  3.2     -  Mg     1.8         TPro  6.7  /  Alb  3.9  /  TBili  0.1<L>  /  DBili  x   /  AST  22  /  ALT  17  /  AlkPhos  70  -    PT/INR - ( 14 Mar 2019 07:00 )   PT: 12.3 sec;   INR: 1.08 ratio         PTT - ( 14 Mar 2019 07:00 )  PTT:28.3 sec  LIVER FUNCTIONS - ( 14 Mar 2019 06:59 )  Alb: 3.9 g/dL / Pro: 6.7 g/dL / ALK PHOS: 70 U/L / ALT: 17 U/L / AST: 22 U/L / GGT: x                 @ 09:56  Tacrolimus 5.6                Cyclosporine --      Karnofsky / Lansky Scale:   GVHD:   Skin:   Liver:   Gut:   Overall Grade:       Cultures:         Radiology:       Meds:   Antimicrobials:   acyclovir   Oral Tab/Cap 400 milliGRAM(s) Oral every 8 hours  atovaquone Suspension 750 milliGRAM(s) Oral every 12 hours  clotrimazole Lozenge 1 Lozenge Oral five times a day  voriconazole 200 milliGRAM(s) Oral every 12 hours      Heme / Onc:       GI:  docusate sodium 100 milliGRAM(s) Oral three times a day  pantoprazole    Tablet 40 milliGRAM(s) Oral before breakfast  senna 2 Tablet(s) Oral at bedtime  sodium bicarbonate Mouth Rinse 10 milliLiter(s) Swish and Spit five times a day  ursodiol Capsule 300 milliGRAM(s) Oral two times a day with meals      Cardiovascular:   amLODIPine   Tablet 5 milliGRAM(s) Oral daily      Immunologic:   immune   globulin 10% (GAMMAGARD) IVPB 20 Gram(s) IV Intermittent <User Schedule>  tacrolimus 1.5 milliGRAM(s) Oral every 12 hours      Other medications:   acetaminophen   Tablet .. 650 milliGRAM(s) Oral every 6 hours  acetaminophen   Tablet .. 650 milliGRAM(s) Oral <User Schedule>  ascorbic acid 250 milliGRAM(s) Oral daily  Biotene Dry Mouth Oral Rinse 5 milliLiter(s) Swish and Spit five times a day  chlorhexidine 4% Liquid 1 Application(s) Topical <User Schedule>  cholecalciferol 400 Unit(s) Oral daily  cyanocobalamin 1000 MICROGram(s) Oral daily  diphenhydrAMINE   Injectable 25 milliGRAM(s) IV Push every 3 weeks  fentaNYL   Patch  25 MICROgram(s)/Hr 1 Patch Transdermal every 72 hours  folic acid 1 milliGRAM(s) Oral daily  lidocaine/prilocaine Cream 1 Application(s) Topical daily  magnesium oxide 400 milliGRAM(s) Oral three times a day with meals  medroxyPROGESTERone 20 milliGRAM(s) Oral daily  montelukast 10 milliGRAM(s) Oral daily  multivitamin 1 Tablet(s) Oral daily  sodium chloride 0.9%. 1000 milliLiter(s) IV Continuous <Continuous>      PRN:   acetaminophen   Tablet .. 650 milliGRAM(s) Oral every 6 hours PRN  artificial  tears Solution 1 Drop(s) Both EYES three times a day PRN  benzocaine 15 mG/menthol 3.6 mG Lozenge 1 Lozenge Oral every 3 hours PRN  diphenhydrAMINE   Injectable 25 milliGRAM(s) IV Push every 4 hours PRN  FIRST- Mouthwash  BLM 5 milliLiter(s) Swish and Spit every 8 hours PRN  metoclopramide Injectable 10 milliGRAM(s) IV Push every 6 hours PRN  sodium chloride 0.65% Nasal 1 Spray(s) Both Nostrils five times a day PRN  sodium chloride 0.9% lock flush 10 milliLiter(s) IV Push every 1 hour PRN      A/P:  33 year old female with a history of AML  Post:  Allogeneic PBSCT Day + 16  Labs every other day ; continue vitamin C, vitamin B, folic acid, vitamin K & iron supplementation. Increased vitamin K to every other day as per standard guidelines for bloodless transplant  Continue Provera 20 mg PO daily  BP- 140s, started on Norvasc.  Hold for SBP <120  Mucositis likely related to MTX and meds changed to IV 3/8. Continue Carafate and supportive care. Pain improving.  Pain meds per palliative care  Zarxio held  impending engraftment, re-eval need tomorrow.  D/C Epo.  Last Venofer Friday 3/8.  VNTR pending     1. Infectious Disease:   acyclovir   Oral Tab/Cap 400 milliGRAM(s) Oral every 8 hours  atovaquone Suspension 750 milliGRAM(s) Oral every 12 hours  clotrimazole Lozenge 1 Lozenge Oral five times a day  voriconazole 200 milliGRAM(s) Oral every 12 hours    2. VOD Prophylaxis: Actigall, Glutamine    3. GI Prophylaxis:      pantoprazole  Injectable 40 milliGRAM(s) IV Push daily    4. Mouthcare - NS / NaHCO3 rinses, Mycelex, Caphosol; Skin care     5. GVHD prophylaxis -  tacrolimus 1.5 milliGRAM(s) Oral every 12 hours    6. Transfuse & replete electrolytes prn   magnesium oxide 400 milliGRAM(s) Oral three times a day with meals    7. IV hydration, daily weights, strict I&O, prn diuresis     8. PO intake as tolerated, nutrition follow up as needed, MVI, folic acid     9. Antiemetics, anti-diarrhea medications:   metoclopramide Injectable 10 milliGRAM(s) IV Push every 6 hours PRN    10. OOB as tolerated, physical therapy consult if needed     11. Monitor coags / fibrinogen 2x week, vitamin K as needed   phytonadione   Solution 10 milliGRAM(s) Oral every other day    12. Monitor closely for clinical changes, monitor for fevers     13. Emotional support provided, plan of care discussed and questions addressed     14. Patient education done regarding plan of care, restrictions and discharge planning     15. Continue regular social work input     I have written the above note for Dr. Lou who performed service with me in the room.   Darlin Marino NP-C ([phone])    I have seen and examined patient with NP, I agree with above note as scribed. [facility]PC Transplant Team                                                      Critical / Counseling Time Provided: 30 minutes    Chief Complaint: Allogeneic peripheral blood stem cell transplant from [facility] (sister 10/10) for treatment of AML    S: Patient seen and examined with HPC Transplant Team:       Denies mouth / tongue / throat pain, dyspnea, cough, nausea, vomiting, diarrhea, abdominal pain       O: Vitals:   Vital Signs Last 24 Hrs  T(C): 36.7 (15 Mar 2019 05:59), Max: 37.2 (14 Mar 2019 17:33)  T(F): 98.1 (15 Mar 2019 05:59), Max: 99 (14 Mar 2019 17:33)  HR: 95 (15 Mar 2019 05:59) (95 - 111)  BP: 108/73 (15 Mar 2019 05:59) (104/70 - 120/77)  BP(mean): --  RR: 18 (15 Mar 2019 05:59) (18 - 19)  SpO2: 99% (15 Mar 2019 05:59) (98% - 100%)    Admit weight: 88.2 kG  Daily Weight in k (14 Mar 2019 08:20)  Today's weight:     Intake / Output:   03-14 @ 07:01  -  03-15 @ 07:00  --------------------------------------------------------  IN: 2968 mL / OUT: 2600 mL / NET: 368 mL        PE:   Oropharynx: no erythema or ulcerations in oropharynx  Oral Mucositis:    +                                         ndGndrndanddndend:nd nd2nd CVS: (+) S1/S2 RRR   Lungs: CTA throughout bilaterally   Abdomen: (+)BS x 4, soft, NT/ND   Extremities: no edema  in B/L LE  Gastric Mucositis:       +                                          stGstrstastdstest:st st1st Intestinal Mucositis:     -                                          Grade: n/a  Skin: no rash, patches of darker areas.   TLC: PICC line C/D/I  Neuro: A&O x 3   Pain: denies pain         Labs:   CBC Full  -  ( 14 Mar 2019 06:59 )  WBC Count : 3.1 K/uL  Hemoglobin : 11.2 g/dL  Hematocrit : 30.6 %  Platelet Count - Automated : 291 K/uL  Mean Cell Volume : 90.8 fl  Mean Cell Hemoglobin : 33.2 pg  Mean Cell Hemoglobin Concentration : 36.5 gm/dL  Auto Neutrophil # : 0.6 K/uL  Auto Lymphocyte # : See Note  Auto Monocyte # : See Note  Auto Eosinophil # : 0.1 K/uL  Auto Basophil # : 0.0 K/uL  Auto Neutrophil % : 20.0 %  Auto Lymphocyte % : 27.0 %  Auto Monocyte % : 50.0 %  Auto Eosinophil % : 0.0 %  Auto Basophil % : 1.0 %                          11.2   3.1   )-----------( 291      ( 14 Mar 2019 06:59 )             30.6     03-    141  |  106  |  <4<L>  ----------------------------<  94  4.0   |  22  |  0.62    Ca    9.7      14 Mar 2019 06:59  Phos  3.2     -  Mg     1.8         TPro  6.7  /  Alb  3.9  /  TBili  0.1<L>  /  DBili  x   /  AST  22  /  ALT  17  /  AlkPhos  70      PT/INR - ( 14 Mar 2019 07:00 )   PT: 12.3 sec;   INR: 1.08 ratio         PTT - ( 14 Mar 2019 07:00 )  PTT:28.3 sec  LIVER FUNCTIONS - ( 14 Mar 2019 06:59 )  Alb: 3.9 g/dL / Pro: 6.7 g/dL / ALK PHOS: 70 U/L / ALT: 17 U/L / AST: 22 U/L / GGT: x                 @ 09:56  Tacrolimus 5.6                Cyclosporine --    Meds:   Antimicrobials:   acyclovir   Oral Tab/Cap 400 milliGRAM(s) Oral every 8 hours  atovaquone Suspension 750 milliGRAM(s) Oral every 12 hours  clotrimazole Lozenge 1 Lozenge Oral five times a day  voriconazole 200 milliGRAM(s) Oral every 12 hours      Heme / Onc:       GI:  docusate sodium 100 milliGRAM(s) Oral three times a day  pantoprazole    Tablet 40 milliGRAM(s) Oral before breakfast  senna 2 Tablet(s) Oral at bedtime  sodium bicarbonate Mouth Rinse 10 milliLiter(s) Swish and Spit five times a day  ursodiol Capsule 300 milliGRAM(s) Oral two times a day with meals      Cardiovascular:   amLODIPine   Tablet 5 milliGRAM(s) Oral daily      Immunologic:   immune   globulin 10% (GAMMAGARD) IVPB 20 Gram(s) IV Intermittent <User Schedule>  tacrolimus 1.5 milliGRAM(s) Oral every 12 hours      Other medications:   acetaminophen   Tablet .. 650 milliGRAM(s) Oral every 6 hours  acetaminophen   Tablet .. 650 milliGRAM(s) Oral <User Schedule>  ascorbic acid 250 milliGRAM(s) Oral daily  Biotene Dry Mouth Oral Rinse 5 milliLiter(s) Swish and Spit five times a day  chlorhexidine 4% Liquid 1 Application(s) Topical <User Schedule>  cholecalciferol 400 Unit(s) Oral daily  cyanocobalamin 1000 MICROGram(s) Oral daily  diphenhydrAMINE   Injectable 25 milliGRAM(s) IV Push every 3 weeks  fentaNYL   Patch  25 MICROgram(s)/Hr 1 Patch Transdermal every 72 hours  folic acid 1 milliGRAM(s) Oral daily  lidocaine/prilocaine Cream 1 Application(s) Topical daily  magnesium oxide 400 milliGRAM(s) Oral three times a day with meals  medroxyPROGESTERone 20 milliGRAM(s) Oral daily  montelukast 10 milliGRAM(s) Oral daily  multivitamin 1 Tablet(s) Oral daily  sodium chloride 0.9%. 1000 milliLiter(s) IV Continuous <Continuous>      PRN:   acetaminophen   Tablet .. 650 milliGRAM(s) Oral every 6 hours PRN  artificial  tears Solution 1 Drop(s) Both EYES three times a day PRN  benzocaine 15 mG/menthol 3.6 mG Lozenge 1 Lozenge Oral every 3 hours PRN  diphenhydrAMINE   Injectable 25 milliGRAM(s) IV Push every 4 hours PRN  FIRST- Mouthwash  BLM 5 milliLiter(s) Swish and Spit every 8 hours PRN  metoclopramide Injectable 10 milliGRAM(s) IV Push every 6 hours PRN  sodium chloride 0.65% Nasal 1 Spray(s) Both Nostrils five times a day PRN  sodium chloride 0.9% lock flush 10 milliLiter(s) IV Push every 1 hour PRN      A/P:  33 year old female with a history of AML  Post:  Allogeneic PBSCT Day + 16  Labs every other day ; continue vitamin C, vitamin B, folic acid, vitamin K & iron supplementation. Increased vitamin K to every other day as per standard guidelines for bloodless transplant  Continue Provera 20 mg PO daily  BP- 140s, started on Norvasc.  Hold for SBP <120  Mucositis likely related to MTX and meds changed to IV 3/8. Continue Carafate and supportive care. Pain improving.  Pain meds per palliative care  Zarxio held  impending engraftment, re-eval need tomorrow.  D/C Epo.  Last Venofer Friday 3/8.  VNTR pending     1. Infectious Disease:   acyclovir   Oral Tab/Cap 400 milliGRAM(s) Oral every 8 hours  atovaquone Suspension 750 milliGRAM(s) Oral every 12 hours  clotrimazole Lozenge 1 Lozenge Oral five times a day  voriconazole 200 milliGRAM(s) Oral every 12 hours    2. VOD Prophylaxis: Actigall, Glutamine    3. GI Prophylaxis:    pantoprazole    Tablet 40 milliGRAM(s) Oral before breakfast    4. Mouthcare - NS / NaHCO3 rinses, Mycelex, Caphosol; Skin care     5. GVHD prophylaxis -  tacrolimus 1.5 milliGRAM(s) Oral every 12 hours  MTX days 1,3,6,11    6. Transfuse & replete electrolytes prn   magnesium oxide 400 milliGRAM(s) Oral three times a day with meals    7. IV hydration, daily weights, strict I&O, prn diuresis     8. PO intake as tolerated, nutrition follow up as needed, MVI, folic acid     9. Antiemetics, anti-diarrhea medications:   metoclopramide Injectable 10 milliGRAM(s) IV Push every 6 hours PRN    10. OOB as tolerated, physical therapy consult if needed     11. Monitor coags / fibrinogen 2x week, vitamin K as needed     12. Monitor closely for clinical changes, monitor for fevers     13. Emotional support provided, plan of care discussed and questions addressed     14. Patient education done regarding plan of care, restrictions and discharge planning     15. Continue regular social work input     I have written the above note for Dr. Lou who performed service with me in the room.   Darlin Marino NP-C ([phone])    I have seen and examined patient with NP, I agree with above note as scribed. [facility] Transplant Team                                                      Critical / Counseling Time Provided: 30 minutes    Chief Complaint: Allogeneic peripheral blood stem cell transplant from [facility] (sister 10/10) for treatment of AML    S: Patient seen and examined with HPC Transplant Team:     Loose BM x 2 yesterday; improved appetite  Denies mouth / tongue / throat pain, dyspnea, cough, nausea, vomiting, abdominal pain       O: Vitals:   Vital Signs Last 24 Hrs  T(C): 36.7 (15 Mar 2019 05:59), Max: 37.2 (14 Mar 2019 17:33)  T(F): 98.1 (15 Mar 2019 05:59), Max: 99 (14 Mar 2019 17:33)  HR: 95 (15 Mar 2019 05:59) (95 - 111)  BP: 108/73 (15 Mar 2019 05:59) (104/70 - 120/77)  BP(mean): --  RR: 18 (15 Mar 2019 05:59) (18 - 19)  SpO2: 99% (15 Mar 2019 05:59) (98% - 100%)    Admit weight: 88.2 kG  Daily Weight in k (14 Mar 2019 08:20)  Today's weight: 87.8    Intake / Output:   14 @ 07:01  -  -15 @ 07:00  --------------------------------------------------------  IN: 2968 mL / OUT: 2600 mL / NET: 368 mL        PE:   Oropharynx: no erythema or ulcerations in oropharynx  Oral Mucositis:    +                                         ndGndrndanddndend:nd nd2nd CVS: (+) S1/S2 RRR   Lungs: CTA throughout bilaterally   Abdomen: (+)BS x 4, soft, NT/ND   Extremities: no edema  in B/L LE  Gastric Mucositis:       +                                          stGstrstastdstest:st st1st Intestinal Mucositis:     -                                          Grade: n/a  Skin: no rash, patches of darker areas.   TLC: PICC line C/D/I  Neuro: A&O x 3   Pain: denies pain     LABS:               11.2   3.1   )-----------( 289      ( 15 Mar 2019 07:04 )             33.2         Mean Cell Volume : 94.2 fl  Mean Cell Hemoglobin : 31.7 pg  Mean Cell Hemoglobin Concentration : 33.7 gm/dL  Auto Neutrophil # : 0.7 K/uL  Auto Lymphocyte # : See Note  Auto Monocyte # : See Note  Auto Eosinophil # : 0.1 K/uL  Auto Basophil # : 0.0 K/uL  Auto Neutrophil % : 12.0 %  Auto Lymphocyte % : 48.0 %  Auto Monocyte % : 36.0 %  Auto Eosinophil % : x  Auto Basophil % : 1.0 %      03-15    142  |  107  |  <4<L>  ----------------------------<  92  3.9   |  22  |  0.63    Ca    9.5      15 Mar 2019 07:04  Phos  3.5     03-15  Mg     1.6     03-15    TPro  6.5  /  Alb  3.8  /  TBili  0.1<L>  /  DBili  x   /  AST  19  /  ALT  15  /  AlkPhos  67  03-15      PT/INR - ( 14 Mar 2019 07:00 )   PT: 12.3 sec;   INR: 1.08 ratio         PTT - ( 14 Mar 2019 07:00 )  PTT:28.3 sec       @ 09:56  Tacrolimus 5.6                  Cytomegalovirus By PCR (19 @ 09:56)    CMVPCR Log: NotDetec: Assay lower limit of detection (LOD):  31.2 IU/mL (1.49 log10/mL)  Assay dynamic range:  50 to 156,000,000 (156M) CMV DNA IU/mL (1.70 log10/mL – 8.19 log10/mL)  Plasma CMV DNA Quantification by PCR using Abbott m2000:  The results of this test shouldbe interpreted with consideration of all  clinical and laboratory findings. In particular, caution should be used  when interpreting low level positive results when the test is used for  diagnostic purposes. Repeat testing on an additional sample is  recommended to confirm low level positive results. A result of "Not  Detected" does not preclude the possibility of infection with CMV.  CMV  DNA may be present below the detection limit of assay. LogIU/mL          Meds:   Antimicrobials:   acyclovir   Oral Tab/Cap 400 milliGRAM(s) Oral every 8 hours  atovaquone Suspension 750 milliGRAM(s) Oral every 12 hours  clotrimazole Lozenge 1 Lozenge Oral five times a day  voriconazole 200 milliGRAM(s) Oral every 12 hours      GI:  docusate sodium 100 milliGRAM(s) Oral three times a day  pantoprazole    Tablet 40 milliGRAM(s) Oral before breakfast  senna 2 Tablet(s) Oral at bedtime  sodium bicarbonate Mouth Rinse 10 milliLiter(s) Swish and Spit five times a day  ursodiol Capsule 300 milliGRAM(s) Oral two times a day with meals      Cardiovascular:   amLODIPine   Tablet 5 milliGRAM(s) Oral daily      Immunologic:   immune   globulin 10% (GAMMAGARD) IVPB 20 Gram(s) IV Intermittent <User Schedule>  tacrolimus 1.5 milliGRAM(s) Oral every 12 hours      Other medications:   acetaminophen   Tablet .. 650 milliGRAM(s) Oral every 6 hours  acetaminophen   Tablet .. 650 milliGRAM(s) Oral <User Schedule>  ascorbic acid 250 milliGRAM(s) Oral daily  Biotene Dry Mouth Oral Rinse 5 milliLiter(s) Swish and Spit five times a day  chlorhexidine 4% Liquid 1 Application(s) Topical <User Schedule>  cholecalciferol 400 Unit(s) Oral daily  cyanocobalamin 1000 MICROGram(s) Oral daily  diphenhydrAMINE   Injectable 25 milliGRAM(s) IV Push every 3 weeks  fentaNYL   Patch  25 MICROgram(s)/Hr 1 Patch Transdermal every 72 hours  folic acid 1 milliGRAM(s) Oral daily  lidocaine/prilocaine Cream 1 Application(s) Topical daily  magnesium oxide 400 milliGRAM(s) Oral three times a day with meals  medroxyPROGESTERone 20 milliGRAM(s) Oral daily  montelukast 10 milliGRAM(s) Oral daily  multivitamin 1 Tablet(s) Oral daily  sodium chloride 0.9%. 1000 milliLiter(s) IV Continuous <Continuous>      PRN:   acetaminophen   Tablet .. 650 milliGRAM(s) Oral every 6 hours PRN  artificial  tears Solution 1 Drop(s) Both EYES three times a day PRN  benzocaine 15 mG/menthol 3.6 mG Lozenge 1 Lozenge Oral every 3 hours PRN  diphenhydrAMINE   Injectable 25 milliGRAM(s) IV Push every 4 hours PRN  FIRST- Mouthwash  BLM 5 milliLiter(s) Swish and Spit every 8 hours PRN  metoclopramide Injectable 10 milliGRAM(s) IV Push every 6 hours PRN  sodium chloride 0.65% Nasal 1 Spray(s) Both Nostrils five times a day PRN  sodium chloride 0.9% lock flush 10 milliLiter(s) IV Push every 1 hour PRN      A/P:  33 year old female with a history of AML  Post:  Allogeneic PBSCT Day + 16  Labs every other day ; continue vitamin C, vitamin B, folic acid, vitamin K & iron supplementation. Increased vitamin K to every other day as per standard guidelines for bloodless transplant  Continue Provera 20 mg PO daily  BP- 140s, started on Norvasc.  Hold for SBP <120  Mucositis likely related to MTX and meds changed to IV 3/8, now resolved.   Zarxio held  impending engraftment. Epo d/c'ed.  Last Venofer on 3/8.  VNTR pending     1. Infectious Disease:   acyclovir   Oral Tab/Cap 400 milliGRAM(s) Oral every 8 hours  atovaquone Suspension 750 milliGRAM(s) Oral every 12 hours  clotrimazole Lozenge 1 Lozenge Oral five times a day  voriconazole 200 milliGRAM(s) Oral every 12 hours  panculture and CXR if fever    2. VOD Prophylaxis: Actigall, Glutamine    3. GI Prophylaxis:    pantoprazole    Tablet 40 milliGRAM(s) Oral before breakfast  Continue Colace TID  Change Senna to PRN    4. Mouthcare - NS / NaHCO3 rinses, Mycelex, Biotene; Skin care     5. GVHD prophylaxis -  tacrolimus 1.5 milliGRAM(s) Oral every 12 hours  Tacro level on 3/18    6. Transfuse & replete electrolytes prn   Hypomagnesemia: magnesium oxide 400 milliGRAM(s) Oral TID  To Continue Provera upon discharge    7. IV hydration, daily weights, strict I&O, prn diuresis     8. PO intake as tolerated, nutrition follow up as needed, MVI, folic acid     9. Antiemetics, anti-diarrhea medications:   metoclopramide Injectable 10 milliGRAM(s) IV Push every 6 hours PRN    10. OOB as tolerated, physical therapy consult if needed     11. Monitor coags / fibrinogen 2x week, vitamin K as needed     12. Monitor closely for clinical changes, monitor for fevers     13. Emotional support provided, plan of care discussed and questions addressed     14. Patient education done regarding plan of care, restrictions and discharge planning   Plan discharge home on 3/18    15. Continue regular social work input     I have written the above note for Dr. Lou who performed service with me in the room.   Darlin Marino NP-C ([phone])    I have seen and examined patient with NP, I agree with above note as scribed. [facility] Transplant Team                                                      Critical / Counseling Time Provided: 30 minutes    Chief Complaint: Allogeneic peripheral blood stem cell transplant from [facility] (sister 10/10) for treatment of AML    S: Patient seen and examined with HPC Transplant Team:     Loose BM x 2 yesterday; improved appetite  Denies mouth / tongue / throat pain, dyspnea, cough, nausea, vomiting, abdominal pain       O: Vitals:   Vital Signs Last 24 Hrs  T(C): 36.7 (15 Mar 2019 05:59), Max: 37.2 (14 Mar 2019 17:33)  T(F): 98.1 (15 Mar 2019 05:59), Max: 99 (14 Mar 2019 17:33)  HR: 95 (15 Mar 2019 05:59) (95 - 111)  BP: 108/73 (15 Mar 2019 05:59) (104/70 - 120/77)  BP(mean): --  RR: 18 (15 Mar 2019 05:59) (18 - 19)  SpO2: 99% (15 Mar 2019 05:59) (98% - 100%)    Admit weight: 88.2 kG  Daily Weight in k (14 Mar 2019 08:20)  Today's weight: 87.8    Intake / Output:   14 @ 07:01  -  -15 @ 07:00  --------------------------------------------------------  IN: 2968 mL / OUT: 2600 mL / NET: 368 mL        PE:   Oropharynx: no erythema or ulcerations in oropharynx  Oral Mucositis:    +                                         ndGndrndanddndend:nd nd2nd CVS: (+) S1/S2 RRR   Lungs: CTA throughout bilaterally   Abdomen: (+)BS x 4, soft, NT/ND   Extremities: no edema  in B/L LE  Gastric Mucositis:       +                                          rdGrdrrdarddrderd:rd rd3rd Intestinal Mucositis:     -                                          Grade: n/a  Skin: no rash, patches of darker areas.   TLC: PICC line C/D/I  Neuro: A&O x 3   Pain: denies pain     LABS:               11.2   3.1   )-----------( 289      ( 15 Mar 2019 07:04 )             33.2         Mean Cell Volume : 94.2 fl  Mean Cell Hemoglobin : 31.7 pg  Mean Cell Hemoglobin Concentration : 33.7 gm/dL  Auto Neutrophil # : 0.7 K/uL  Auto Lymphocyte # : See Note  Auto Monocyte # : See Note  Auto Eosinophil # : 0.1 K/uL  Auto Basophil # : 0.0 K/uL  Auto Neutrophil % : 12.0 %  Auto Lymphocyte % : 48.0 %  Auto Monocyte % : 36.0 %  Auto Eosinophil % : x  Auto Basophil % : 1.0 %      03-15    142  |  107  |  <4<L>  ----------------------------<  92  3.9   |  22  |  0.63    Ca    9.5      15 Mar 2019 07:04  Phos  3.5     03-15  Mg     1.6     03-15    TPro  6.5  /  Alb  3.8  /  TBili  0.1<L>  /  DBili  x   /  AST  19  /  ALT  15  /  AlkPhos  67  03-15      PT/INR - ( 14 Mar 2019 07:00 )   PT: 12.3 sec;   INR: 1.08 ratio         PTT - ( 14 Mar 2019 07:00 )  PTT:28.3 sec       @ 09:56  Tacrolimus 5.6                  Cytomegalovirus By PCR (19 @ 09:56)    CMVPCR Log: NotDetec: Assay lower limit of detection (LOD):  31.2 IU/mL (1.49 log10/mL)  Assay dynamic range:  50 to 156,000,000 (156M) CMV DNA IU/mL (1.70 log10/mL – 8.19 log10/mL)  Plasma CMV DNA Quantification by PCR using Abbott m2000:  The results of this test shouldbe interpreted with consideration of all  clinical and laboratory findings. In particular, caution should be used  when interpreting low level positive results when the test is used for  diagnostic purposes. Repeat testing on an additional sample is  recommended to confirm low level positive results. A result of "Not  Detected" does not preclude the possibility of infection with CMV.  CMV  DNA may be present below the detection limit of assay. LogIU/mL          Meds:   Antimicrobials:   acyclovir   Oral Tab/Cap 400 milliGRAM(s) Oral every 8 hours  atovaquone Suspension 750 milliGRAM(s) Oral every 12 hours  clotrimazole Lozenge 1 Lozenge Oral five times a day  voriconazole 200 milliGRAM(s) Oral every 12 hours      GI:  docusate sodium 100 milliGRAM(s) Oral three times a day  pantoprazole    Tablet 40 milliGRAM(s) Oral before breakfast  senna 2 Tablet(s) Oral at bedtime  sodium bicarbonate Mouth Rinse 10 milliLiter(s) Swish and Spit five times a day  ursodiol Capsule 300 milliGRAM(s) Oral two times a day with meals      Cardiovascular:   amLODIPine   Tablet 5 milliGRAM(s) Oral daily      Immunologic:   immune   globulin 10% (GAMMAGARD) IVPB 20 Gram(s) IV Intermittent <User Schedule>  tacrolimus 1.5 milliGRAM(s) Oral every 12 hours      Other medications:   acetaminophen   Tablet .. 650 milliGRAM(s) Oral every 6 hours  acetaminophen   Tablet .. 650 milliGRAM(s) Oral <User Schedule>  ascorbic acid 250 milliGRAM(s) Oral daily  Biotene Dry Mouth Oral Rinse 5 milliLiter(s) Swish and Spit five times a day  chlorhexidine 4% Liquid 1 Application(s) Topical <User Schedule>  cholecalciferol 400 Unit(s) Oral daily  cyanocobalamin 1000 MICROGram(s) Oral daily  diphenhydrAMINE   Injectable 25 milliGRAM(s) IV Push every 3 weeks  fentaNYL   Patch  25 MICROgram(s)/Hr 1 Patch Transdermal every 72 hours  folic acid 1 milliGRAM(s) Oral daily  lidocaine/prilocaine Cream 1 Application(s) Topical daily  magnesium oxide 400 milliGRAM(s) Oral three times a day with meals  medroxyPROGESTERone 20 milliGRAM(s) Oral daily  montelukast 10 milliGRAM(s) Oral daily  multivitamin 1 Tablet(s) Oral daily  sodium chloride 0.9%. 1000 milliLiter(s) IV Continuous <Continuous>      PRN:   acetaminophen   Tablet .. 650 milliGRAM(s) Oral every 6 hours PRN  artificial  tears Solution 1 Drop(s) Both EYES three times a day PRN  benzocaine 15 mG/menthol 3.6 mG Lozenge 1 Lozenge Oral every 3 hours PRN  diphenhydrAMINE   Injectable 25 milliGRAM(s) IV Push every 4 hours PRN  FIRST- Mouthwash  BLM 5 milliLiter(s) Swish and Spit every 8 hours PRN  metoclopramide Injectable 10 milliGRAM(s) IV Push every 6 hours PRN  sodium chloride 0.65% Nasal 1 Spray(s) Both Nostrils five times a day PRN  sodium chloride 0.9% lock flush 10 milliLiter(s) IV Push every 1 hour PRN      A/P:  33 year old female with a history of AML  Post:  Allogeneic PBSCT Day + 16  Labs every other day ; continue vitamin C, vitamin B, folic acid, vitamin K & iron supplementation. Increased vitamin K to every other day as per standard guidelines for bloodless transplant  Continue Provera 20 mg PO daily  BP- 140s, started on Norvasc.  Hold for SBP <120; consider reducing to 2.5 mg qd  Mucositis likely related to MTX and meds changed to IV 3/8, now resolved.   Zarxio held  impending engraftment. Epo d/c'ed.  Last Venofer on 3/8.  VNTR pending     1. Infectious Disease:   acyclovir   Oral Tab/Cap 400 milliGRAM(s) Oral every 8 hours  atovaquone Suspension 750 milliGRAM(s) Oral every 12 hours  clotrimazole Lozenge 1 Lozenge Oral five times a day  voriconazole 200 milliGRAM(s) Oral every 12 hours  panculture and CXR if fever    2. VOD Prophylaxis: Actigall, Glutamine    3. GI Prophylaxis:    pantoprazole    Tablet 40 milliGRAM(s) Oral before breakfast  Continue Colace TID  Change Senna to PRN    4. Mouthcare - NS / NaHCO3 rinses, Mycelex, Biotene; Skin care     5. GVHD prophylaxis -  tacrolimus 1.5 milliGRAM(s) Oral every 12 hours  Tacro level on 3/18    6. Transfuse & replete electrolytes prn   Hypomagnesemia: magnesium oxide 400 milliGRAM(s) Oral TID  To Continue Provera upon discharge    7. IV hydration, daily weights, strict I&O, prn diuresis     8. PO intake as tolerated, nutrition follow up as needed, MVI, folic acid     9. Antiemetics, anti-diarrhea medications:   metoclopramide Injectable 10 milliGRAM(s) IV Push every 6 hours PRN    10. OOB as tolerated, physical therapy consult if needed     11. Monitor coags / fibrinogen 2x week, vitamin K as needed     12. Monitor closely for clinical changes, monitor for fevers     13. Emotional support provided, plan of care discussed and questions addressed     14. Patient education done regarding plan of care, restrictions and discharge planning   Plan discharge home on 3/18    15. Continue regular social work input     I have written the above note for Dr. Lou who performed service with me in the room.   Darlin Marino NP-C ([phone])    I have seen and examined patient with NP, I agree with above note as scribed. [facility] Team                                                      Critical / Counseling Time Provided: 30 minutes    Chief Complaint: Allogeneic peripheral blood stem cell transplant from [facility] (sister 10/10) for treatment of AML    S: Patient seen and examined with [facility] Transplant Team:     Loose BM x 2 yesterday; improved appetite  Denies mouth / tongue / throat pain, dyspnea, cough, nausea, vomiting, abdominal pain       O: Vitals:   Vital Signs Last 24 Hrs  T(C): 36.7 (15 Mar 2019 05:59), Max: 37.2 (14 Mar 2019 17:33)  T(F): 98.1 (15 Mar 2019 05:59), Max: 99 (14 Mar 2019 17:33)  HR: 95 (15 Mar 2019 05:59) (95 - 111)  BP: 108/73 (15 Mar 2019 05:59) (104/70 - 120/77)  BP(mean): --  RR: 18 (15 Mar 2019 05:59) (18 - 19)  SpO2: 99% (15 Mar 2019 05:59) (98% - 100%)    Admit weight: 88.2 kG  Daily Weight in k (14 Mar 2019 08:20)  Today's weight: 87.8    Intake / Output:   14 @ 07:01  -  -15 @ 07:00  --------------------------------------------------------  IN: 2968 mL / OUT: 2600 mL / NET: 368 mL        PE:   Oropharynx: no erythema or ulcerations in oropharynx  Oral Mucositis:    +                                         ndGndrndanddndend:nd nd2nd CVS: (+) S1/S2 RRR   Lungs: CTA throughout bilaterally   Abdomen: (+)BS x 4, soft, NT/ND   Extremities: no edema  in B/L LE  Gastric Mucositis:       +                                          stGstrstastdstest:st st1st Intestinal Mucositis:     -                                          Grade: n/a  Skin: no rash, patches of darker areas.   TLC: PICC line C/D/I  Neuro: A&O x 3   Pain: denies pain     LABS:               11.2   3.1   )-----------( 289      ( 15 Mar 2019 07:04 )             33.2         Mean Cell Volume : 94.2 fl  Mean Cell Hemoglobin : 31.7 pg  Mean Cell Hemoglobin Concentration : 33.7 gm/dL  Auto Neutrophil # : 0.7 K/uL  Auto Lymphocyte # : See Note  Auto Monocyte # : See Note  Auto Eosinophil # : 0.1 K/uL  Auto Basophil # : 0.0 K/uL  Auto Neutrophil % : 12.0 %  Auto Lymphocyte % : 48.0 %  Auto Monocyte % : 36.0 %  Auto Eosinophil % : x  Auto Basophil % : 1.0 %      03-15    142  |  107  |  <4<L>  ----------------------------<  92  3.9   |  22  |  0.63    Ca    9.5      15 Mar 2019 07:04  Phos  3.5     03-15  Mg     1.6     03-15    TPro  6.5  /  Alb  3.8  /  TBili  0.1<L>  /  DBili  x   /  AST  19  /  ALT  15  /  AlkPhos  67  03-15      PT/INR - ( 14 Mar 2019 07:00 )   PT: 12.3 sec;   INR: 1.08 ratio         PTT - ( 14 Mar 2019 07:00 )  PTT:28.3 sec       @ 09:56  Tacrolimus 5.6                  Cytomegalovirus By PCR (19 @ 09:56)    CMVPCR Log: NotDetec: Assay lower limit of detection (LOD):  31.2 IU/mL (1.49 log10/mL)  Assay dynamic range:  50 to 156,000,000 (156M) CMV DNA IU/mL (1.70 log10/mL – 8.19 log10/mL)  Plasma CMV DNA Quantification by PCR using Abbott m2000:  The results of this test shouldbe interpreted with consideration of all  clinical and laboratory findings. In particular, caution should be used  when interpreting low level positive results when the test is used for  diagnostic purposes. Repeat testing on an additional sample is  recommended to confirm low level positive results. A result of "Not  Detected" does not preclude the possibility of infection with CMV.  CMV  DNA may be present below the detection limit of assay. LogIU/mL          Meds:   Antimicrobials:   acyclovir   Oral Tab/Cap 400 milliGRAM(s) Oral every 8 hours  atovaquone Suspension 750 milliGRAM(s) Oral every 12 hours  clotrimazole Lozenge 1 Lozenge Oral five times a day  voriconazole 200 milliGRAM(s) Oral every 12 hours      GI:  docusate sodium 100 milliGRAM(s) Oral three times a day  pantoprazole    Tablet 40 milliGRAM(s) Oral before breakfast  senna 2 Tablet(s) Oral at bedtime PRN  sodium bicarbonate Mouth Rinse 10 milliLiter(s) Swish and Spit five times a day  ursodiol Capsule 300 milliGRAM(s) Oral two times a day with meals      Cardiovascular:   amLODIPine   Tablet 5 milliGRAM(s) Oral daily      Immunologic:   immune   globulin 10% (GAMMAGARD) IVPB 20 Gram(s) IV Intermittent <User Schedule>  tacrolimus 1.5 milliGRAM(s) Oral every 12 hours      Other medications:   acetaminophen   Tablet .. 650 milliGRAM(s) Oral every 6 hours  acetaminophen   Tablet .. 650 milliGRAM(s) Oral <User Schedule>  ascorbic acid 250 milliGRAM(s) Oral daily  Biotene Dry Mouth Oral Rinse 5 milliLiter(s) Swish and Spit five times a day  chlorhexidine 4% Liquid 1 Application(s) Topical <User Schedule>  cholecalciferol 400 Unit(s) Oral daily  cyanocobalamin 1000 MICROGram(s) Oral daily  diphenhydrAMINE   Injectable 25 milliGRAM(s) IV Push every 3 weeks  fentaNYL   Patch  25 MICROgram(s)/Hr 1 Patch Transdermal every 72 hours  folic acid 1 milliGRAM(s) Oral daily  lidocaine/prilocaine Cream 1 Application(s) Topical daily  magnesium oxide 400 milliGRAM(s) Oral three times a day with meals  medroxyPROGESTERone 20 milliGRAM(s) Oral daily  montelukast 10 milliGRAM(s) Oral daily  multivitamin 1 Tablet(s) Oral daily  sodium chloride 0.9%. 1000 milliLiter(s) IV Continuous <Continuous>      PRN:   acetaminophen   Tablet .. 650 milliGRAM(s) Oral every 6 hours PRN  artificial  tears Solution 1 Drop(s) Both EYES three times a day PRN  benzocaine 15 mG/menthol 3.6 mG Lozenge 1 Lozenge Oral every 3 hours PRN  diphenhydrAMINE   Injectable 25 milliGRAM(s) IV Push every 4 hours PRN  FIRST- Mouthwash  BLM 5 milliLiter(s) Swish and Spit every 8 hours PRN  metoclopramide Injectable 10 milliGRAM(s) IV Push every 6 hours PRN  sodium chloride 0.65% Nasal 1 Spray(s) Both Nostrils five times a day PRN  sodium chloride 0.9% lock flush 10 milliLiter(s) IV Push every 1 hour PRN      A/P:  33 year old female with a history of AML  Post:  Allogeneic PBSCT Day + 16  Labs every other day ; continue vitamin C, vitamin B, folic acid, vitamin K & iron supplementation. Increased vitamin K to every other day as per standard guidelines for bloodless transplant  Continue Provera 20 mg PO daily  BP- 140s, started on Norvasc.  Hold for SBP <120; consider reducing to 2.5 mg qd  Mucositis likely related to MTX and meds changed to IV 3/8, now resolved.   Zarxio held  impending engraftment. Epo d/c'ed.  Last Venofer on 3/8.  VNTR pending     1. Infectious Disease:   acyclovir   Oral Tab/Cap 400 milliGRAM(s) Oral every 8 hours  atovaquone Suspension 750 milliGRAM(s) Oral every 12 hours  clotrimazole Lozenge 1 Lozenge Oral five times a day  voriconazole 200 milliGRAM(s) Oral every 12 hours  panculture and CXR if fever    2. VOD Prophylaxis: Actigall, Glutamine    3. GI Prophylaxis:    pantoprazole    Tablet 40 milliGRAM(s) Oral before breakfast  Continue Colace TID  Change Senna to PRN    4. Mouthcare - NS / NaHCO3 rinses, Mycelex, Biotene; Skin care     5. GVHD prophylaxis -  tacrolimus 1.5 milliGRAM(s) Oral every 12 hours  Tacro level on 3/18    6. Transfuse & replete electrolytes prn   Hypomagnesemia: magnesium oxide 400 milliGRAM(s) Oral TID  To Continue Provera upon discharge    7. IV hydration, daily weights, strict I&O, prn diuresis     8. PO intake as tolerated, nutrition follow up as needed, MVI, folic acid     9. Antiemetics, anti-diarrhea medications:   metoclopramide Injectable 10 milliGRAM(s) IV Push every 6 hours PRN    10. OOB as tolerated, physical therapy consult if needed     11. Monitor coags / fibrinogen 2x week, vitamin K as needed     12. Monitor closely for clinical changes, monitor for fevers     13. Emotional support provided, plan of care discussed and questions addressed     14. Patient education done regarding plan of care, restrictions and discharge planning   Plan discharge home on 3/18    15. Continue regular social work input     I have written the above note for Dr. Lou who performed service with me in the room.   Darlin Marino NP-C ([phone])    I have seen and examined patient with NP, I agree with above note as scribed.

## 2019-03-15 NOTE — PROGRESS NOTE ADULT - ATTENDING COMMENTS
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 Pt is [employer], and does not accept blood products for Taoism reasons    Day + 16- s/p MTX on days +1, +3, +6, 11 ...serum creatinine in normal range. No oral mucositis visible. Hold zarxio ..will hold off for now..wbc coming up on own and given [facility] at time of transplant will hold off..monitor for gvhd  Mucositis- throat soreness/pain with swallowing likely secondary to mucositis s/p MTX; monitor symptoms -now improved. Palliative care consult for pain management. Changed meds to IV route ..now switch back to po  Continue Tacrolimus 1.5 mg po q 12 hrs, monitor level..check in am  Continue Acyclovir, Mepron, Vfend prophylaxis  Continue VOD prophylaxis with Actigall, glutamine supplement. Off heparin since 3/6 due to increased menstrual bleeding  Hypertension- possibly related to Tacro, other meds, fluids- continue Norvasc.  Labs every other day or at discretion of attending physician  Continue, folic acid, , vitamin B-12, vitamin C as per standard practice for bloodless transplant..d/c iron and vit k  Anemia secondary to antineoplastic chemotherapy; Cont iron supplement.   Antiemetics, mouth care, skin care   OOB/ambulate.  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 Pt is [employer], and does not accept blood products for Orthodox reasons    Day + 16- s/p MTX on days +1, +3, +6, 11 ...serum creatinine in normal range. No oral mucositis visible. Hold zarxio ..will hold off for now..wbc coming up on own and given [facility] at time of transplant will hold off..monitor for gvhd  Mucositis- throat soreness/pain with swallowing likely secondary to mucositis s/p MTX; monitor symptoms -now improved. Palliative care consult for pain management. Changed meds to IV route ..now switch back to po  Continue Tacrolimus 1.5 mg po q 12 hrs, monitor level..check monday  Continue Acyclovir, Mepron, Vfend prophylaxis  Continue VOD prophylaxis with Actigall, glutamine supplement. Off heparin since 3/6 due to increased menstrual bleeding  Hypertension- possibly related to Tacro, other meds, fluids- continue Norvasc.  Labs every other day or at discretion of attending physician  Continue, folic acid, , vitamin B-12, vitamin C as per standard practice for bloodless transplant..d/c iron and vit k  Anemia secondary to antineoplastic chemotherapy; Cont iron supplement.   Antiemetics, mouth care, skin care   OOB/ambulate.  No GHVD..tolerating orals...d/c monday

## 2019-03-16 PROCEDURE — 99291 CRITICAL CARE FIRST HOUR: CPT

## 2019-03-16 RX ADMIN — TACROLIMUS 1.5 MILLIGRAM(S): 5 CAPSULE ORAL at 17:25

## 2019-03-16 RX ADMIN — VORICONAZOLE 200 MILLIGRAM(S): 10 INJECTION, POWDER, LYOPHILIZED, FOR SOLUTION INTRAVENOUS at 05:22

## 2019-03-16 RX ADMIN — Medication 10 MILLILITER(S): at 13:05

## 2019-03-16 RX ADMIN — URSODIOL 300 MILLIGRAM(S): 250 TABLET, FILM COATED ORAL at 17:24

## 2019-03-16 RX ADMIN — Medication 1 MILLIGRAM(S): at 13:00

## 2019-03-16 RX ADMIN — VORICONAZOLE 200 MILLIGRAM(S): 10 INJECTION, POWDER, LYOPHILIZED, FOR SOLUTION INTRAVENOUS at 17:24

## 2019-03-16 RX ADMIN — Medication 10 MILLILITER(S): at 08:08

## 2019-03-16 RX ADMIN — PREGABALIN 1000 MICROGRAM(S): 225 CAPSULE ORAL at 12:55

## 2019-03-16 RX ADMIN — MAGNESIUM OXIDE 400 MG ORAL TABLET 400 MILLIGRAM(S): 241.3 TABLET ORAL at 12:59

## 2019-03-16 RX ADMIN — URSODIOL 300 MILLIGRAM(S): 250 TABLET, FILM COATED ORAL at 08:13

## 2019-03-16 RX ADMIN — Medication 1 LOZENGE: at 19:23

## 2019-03-16 RX ADMIN — Medication 1 LOZENGE: at 12:55

## 2019-03-16 RX ADMIN — Medication 5 MILLILITER(S): at 12:55

## 2019-03-16 RX ADMIN — ATOVAQUONE 750 MILLIGRAM(S): 750 SUSPENSION ORAL at 05:21

## 2019-03-16 RX ADMIN — MEDROXYPROGESTERONE ACETATE 20 MILLIGRAM(S): 150 INJECTION, SUSPENSION, EXTENDED RELEASE INTRAMUSCULAR at 12:59

## 2019-03-16 RX ADMIN — Medication 400 MILLIGRAM(S): at 21:00

## 2019-03-16 RX ADMIN — PANTOPRAZOLE SODIUM 40 MILLIGRAM(S): 20 TABLET, DELAYED RELEASE ORAL at 05:25

## 2019-03-16 RX ADMIN — MONTELUKAST 10 MILLIGRAM(S): 4 TABLET, CHEWABLE ORAL at 12:55

## 2019-03-16 RX ADMIN — CHLORHEXIDINE GLUCONATE 1 APPLICATION(S): 213 SOLUTION TOPICAL at 08:13

## 2019-03-16 RX ADMIN — Medication 400 MILLIGRAM(S): at 05:22

## 2019-03-16 RX ADMIN — MAGNESIUM OXIDE 400 MG ORAL TABLET 400 MILLIGRAM(S): 241.3 TABLET ORAL at 08:12

## 2019-03-16 RX ADMIN — Medication 250 MILLIGRAM(S): at 12:56

## 2019-03-16 RX ADMIN — Medication 5 MILLILITER(S): at 19:23

## 2019-03-16 RX ADMIN — Medication 400 MILLIGRAM(S): at 13:04

## 2019-03-16 RX ADMIN — ATOVAQUONE 750 MILLIGRAM(S): 750 SUSPENSION ORAL at 17:25

## 2019-03-16 RX ADMIN — Medication 1 TABLET(S): at 12:59

## 2019-03-16 RX ADMIN — Medication 1 LOZENGE: at 16:43

## 2019-03-16 RX ADMIN — Medication 5 MILLILITER(S): at 08:11

## 2019-03-16 RX ADMIN — Medication 10 MILLILITER(S): at 19:23

## 2019-03-16 RX ADMIN — Medication 400 UNIT(S): at 12:59

## 2019-03-16 RX ADMIN — TACROLIMUS 1.5 MILLIGRAM(S): 5 CAPSULE ORAL at 05:22

## 2019-03-16 RX ADMIN — Medication 1 LOZENGE: at 08:11

## 2019-03-16 RX ADMIN — MAGNESIUM OXIDE 400 MG ORAL TABLET 400 MILLIGRAM(S): 241.3 TABLET ORAL at 17:24

## 2019-03-16 RX ADMIN — Medication 5 MILLILITER(S): at 16:42

## 2019-03-16 RX ADMIN — Medication 10 MILLILITER(S): at 16:41

## 2019-03-16 RX ADMIN — SODIUM CHLORIDE 20 MILLILITER(S): 9 INJECTION INTRAMUSCULAR; INTRAVENOUS; SUBCUTANEOUS at 19:23

## 2019-03-16 RX ADMIN — AMLODIPINE BESYLATE 5 MILLIGRAM(S): 2.5 TABLET ORAL at 05:22

## 2019-03-16 NOTE — PROGRESS NOTE ADULT - SUBJECTIVE AND OBJECTIVE BOX
[facility]PC Transplant Team                                                      Critical / Counseling Time Provided: 30 minutes                                                                                                                                                        Chief Complaint:     S: Patient seen and examined with HPC Transplant Team:     Denies mouth / tongue / throat pain, dyspnea, cough, nausea, vomiting, diarrhea, abdominal pain     O: Vitals:   Vital Signs Last 24 Hrs  T(C): 36.8 (16 Mar 2019 06:14), Max: 37.2 (15 Mar 2019 18:31)  T(F): 98.2 (16 Mar 2019 06:14), Max: 99 (15 Mar 2019 18:31)  HR: 96 (16 Mar 2019 06:14) (84 - 110)  BP: 123/83 (16 Mar 2019 06:14) (112/76 - 123/83)  BP(mean): --  RR: 18 (16 Mar 2019 06:14) (18 - 20)  SpO2: 100% (16 Mar 2019 06:14) (100% - 100%)    Admit weight:   Daily     Daily Weight in k.8 (15 Mar 2019 10:16)    Intake / Output:   03-15 @ 07:01  -  03-16 @ 07:00  --------------------------------------------------------  IN: 1776 mL / OUT: 2970 mL / NET: -1194 mL          PE:   Oropharynx:   Oral Mucositis:                                                        Grade:   CVS:   Lungs:   Abdomen:  Extremities:   Gastric Mucositis:                                                  Grade:   Intestinal Mucositis:                                              Grade:   Skin:   TLC:   Neuro:   Pain:     Labs:   CBC Full  -  ( 15 Mar 2019 07:04 )  WBC Count : 3.1 K/uL  Hemoglobin : 11.2 g/dL  Hematocrit : 33.2 %  Platelet Count - Automated : 289 K/uL  Mean Cell Volume : 94.2 fl  Mean Cell Hemoglobin : 31.7 pg  Mean Cell Hemoglobin Concentration : 33.7 gm/dL  Auto Neutrophil # : 0.7 K/uL  Auto Lymphocyte # : See Note  Auto Monocyte # : See Note  Auto Eosinophil # : 0.1 K/uL  Auto Basophil # : 0.0 K/uL  Auto Neutrophil % : 12.0 %  Auto Lymphocyte % : 48.0 %  Auto Monocyte % : 36.0 %  Auto Eosinophil % : x  Auto Basophil % : 1.0 %                          11.2   3.1   )-----------( 289      ( 15 Mar 2019 07:04 )             33.2     03-15    142  |  107  |  <4<L>  ----------------------------<  92  3.9   |  22  |  0.63    Ca    9.5      15 Mar 2019 07:04  Phos  3.5     03-15  Mg     1.6     -15    TPro  6.5  /  Alb  3.8  /  TBili  0.1<L>  /  DBili  x   /  AST  19  /  ALT  15  /  AlkPhos  67  03-15      LIVER FUNCTIONS - ( 15 Mar 2019 07:04 )  Alb: 3.8 g/dL / Pro: 6.5 g/dL / ALK PHOS: 67 U/L / ALT: 15 U/L / AST: 19 U/L / GGT: x                03-15 @ 10:09  Tacrolimus 5.7                Cyclosporine --      Karnofsky / Lansky Scale:   GVHD:   Skin:   Liver:   Gut:   Overall Grade:       Cultures:         Radiology:       Meds:   Antimicrobials:   acyclovir   Oral Tab/Cap 400 milliGRAM(s) Oral every 8 hours  atovaquone Suspension 750 milliGRAM(s) Oral every 12 hours  clotrimazole Lozenge 1 Lozenge Oral five times a day  voriconazole 200 milliGRAM(s) Oral every 12 hours      Heme / Onc:       GI:  docusate sodium 100 milliGRAM(s) Oral three times a day  pantoprazole    Tablet 40 milliGRAM(s) Oral before breakfast  senna 2 Tablet(s) Oral two times a day PRN  sodium bicarbonate Mouth Rinse 10 milliLiter(s) Swish and Spit five times a day  ursodiol Capsule 300 milliGRAM(s) Oral two times a day with meals      Cardiovascular:   amLODIPine   Tablet 5 milliGRAM(s) Oral daily      Immunologic:   immune   globulin 10% (GAMMAGARD) IVPB 20 Gram(s) IV Intermittent <User Schedule>  tacrolimus 1.5 milliGRAM(s) Oral every 12 hours      Other medications:   acetaminophen   Tablet .. 650 milliGRAM(s) Oral every 6 hours  acetaminophen   Tablet .. 650 milliGRAM(s) Oral <User Schedule>  ascorbic acid 250 milliGRAM(s) Oral daily  Biotene Dry Mouth Oral Rinse 5 milliLiter(s) Swish and Spit five times a day  chlorhexidine 4% Liquid 1 Application(s) Topical <User Schedule>  cholecalciferol 400 Unit(s) Oral daily  cyanocobalamin 1000 MICROGram(s) Oral daily  diphenhydrAMINE   Injectable 25 milliGRAM(s) IV Push every 3 weeks  fentaNYL   Patch  25 MICROgram(s)/Hr 1 Patch Transdermal every 72 hours  folic acid 1 milliGRAM(s) Oral daily  lidocaine/prilocaine Cream 1 Application(s) Topical daily  magnesium oxide 400 milliGRAM(s) Oral three times a day with meals  medroxyPROGESTERone 20 milliGRAM(s) Oral daily  montelukast 10 milliGRAM(s) Oral daily  multivitamin 1 Tablet(s) Oral daily  sodium chloride 0.9%. 1000 milliLiter(s) IV Continuous <Continuous>      PRN:   acetaminophen   Tablet .. 650 milliGRAM(s) Oral every 6 hours PRN  artificial  tears Solution 1 Drop(s) Both EYES three times a day PRN  benzocaine 15 mG/menthol 3.6 mG Lozenge 1 Lozenge Oral every 3 hours PRN  diphenhydrAMINE   Injectable 25 milliGRAM(s) IV Push every 4 hours PRN  FIRST- Mouthwash  BLM 5 milliLiter(s) Swish and Spit every 8 hours PRN  metoclopramide Injectable 10 milliGRAM(s) IV Push every 6 hours PRN  senna 2 Tablet(s) Oral two times a day PRN  sodium chloride 0.65% Nasal 1 Spray(s) Both Nostrils five times a day PRN  sodium chloride 0.9% lock flush 10 milliLiter(s) IV Push every 1 hour PRN      A/P:   ___ year old ___  with a history of ______________________  Pre / Status Post :  Autologous / Allogeneic PBSCT / BMT day ____________    1. Infectious Disease:     2. VOD Prophylaxis: Actigall, Glutamine, Heparin (dosed at 100 units / kg / day)     3. GI Prophylaxis:  Protonix    4. Mouthcare - NS / NaHCO3 rinses, Mycelex, Caphosol; Skin care     5. GVHD prophylaxis     6. Transfuse & replete electrolytes prn     7. IV hydration, daily weights, strict I&O, prn diuresis     8. PO intake as tolerated, nutrition follow up as needed, MVI, folic acid     9. Antiemetics, anti-diarrhea medications:        10. OOB as tolerated, physical therapy consult if needed     11. Monitor coags / fibrinogen 2x week, vitamin K as needed     12. Monitor closely for clinical changes, monitor for fevers     13. Emotional support provided, plan of care discussed and questions addressed     14. Patient education done regarding chemotherapy prep, plan of care, restrictions and discharge planning     15. Continue regular social work input     I have written the above note for ___________ who performed service with me in the room.   Roxanne SILVA [phone]    I have seen and examined patient with PA, I agree with above note as scribed. [facility] Transplant Team                                                      Critical / Counseling Time Provided: 30 minutes                                                                                                                                                        Chief Complaint:     S: Patient seen and examined with HPC Transplant Team:     Denies mouth / tongue / throat pain, dyspnea, cough, nausea, vomiting, diarrhea, abdominal pain     O: Vitals:   Vital Signs Last 24 Hrs  T(C): 36.8 (16 Mar 2019 06:14), Max: 37.2 (15 Mar 2019 18:31)  T(F): 98.2 (16 Mar 2019 06:14), Max: 99 (15 Mar 2019 18:31)  HR: 96 (16 Mar 2019 06:14) (84 - 110)  BP: 123/83 (16 Mar 2019 06:14) (112/76 - 123/83)  BP(mean): --  RR: 18 (16 Mar 2019 06:14) (18 - 20)  SpO2: 100% (16 Mar 2019 06:14) (100% - 100%)    Admit weight:   Daily     Daily Weight in k.8 (15 Mar 2019 10:16)    Intake / Output:   03-15 @ 07:01  -  03-16 @ 07:00  --------------------------------------------------------  IN: 1776 mL / OUT: 2970 mL / NET: -1194 mL    PE:   Oropharynx: no erythema or ulcerations in oropharynx  Oral Mucositis:    +                                         ndGndrndanddndend:nd nd2nd CVS: (+) S1/S2 RRR   Lungs: CTA throughout bilaterally   Abdomen: (+)BS x 4, soft, NT/ND   Extremities: no edema  in B/L LE  Gastric Mucositis:       +                                          stGstrstastdstest:st st1st Intestinal Mucositis:     -                                          Grade: n/a  Skin: no rash, patches of darker areas.   TLC: PICC line C/D/I  Neuro: A&O x 3   Pain: denies pain           Labs:   CBC Full  -  ( 15 Mar 2019 07:04 )  WBC Count : 3.1 K/uL  Hemoglobin : 11.2 g/dL  Hematocrit : 33.2 %  Platelet Count - Automated : 289 K/uL  Mean Cell Volume : 94.2 fl  Mean Cell Hemoglobin : 31.7 pg  Mean Cell Hemoglobin Concentration : 33.7 gm/dL  Auto Neutrophil # : 0.7 K/uL  Auto Lymphocyte # : See Note  Auto Monocyte # : See Note  Auto Eosinophil # : 0.1 K/uL  Auto Basophil # : 0.0 K/uL  Auto Neutrophil % : 12.0 %  Auto Lymphocyte % : 48.0 %  Auto Monocyte % : 36.0 %  Auto Eosinophil % : x  Auto Basophil % : 1.0 %                          11.2   3.1   )-----------( 289      ( 15 Mar 2019 07:04 )             33.2     03-15    142  |  107  |  <4<L>  ----------------------------<  92  3.9   |  22  |  0.63    Ca    9.5      15 Mar 2019 07:04  Phos  3.5     03-15  Mg     1.6     03-15    TPro  6.5  /  Alb  3.8  /  TBili  0.1<L>  /  DBili  x   /  AST  19  /  ALT  15  /  AlkPhos  67  03-15      LIVER FUNCTIONS - ( 15 Mar 2019 07:04 )  Alb: 3.8 g/dL / Pro: 6.5 g/dL / ALK PHOS: 67 U/L / ALT: 15 U/L / AST: 19 U/L / GGT: x                03-15 @ 10:09  Tacrolimus 5.7                Cyclosporine --          Cultures: Cytomegalovirus By PCR (19 @ 09:56)    Cytomegalovirus By PCR:   NotDetec    CMVPCR Log: NotDetec: Assay lower limit of detection (LOD):  31.2 IU/mL (1.49 log10/mL)  Assay dynamic range:  50 to 156,000,000 (156M) CMV DNA IU/mL (1.70 log10/mL – 8.19 log10/mL)  Plasma CMV DNA Quantification by PCR using Abbott m2000:  The results of this test shouldbe interpreted with consideration of all  clinical and laboratory findings. In particular, caution should be used  when interpreting low level positive results when the test is used for  diagnostic purposes. Repeat testing on an additional sample is  recommended to confirm low level positive results. A result of "Not  Detected" does not preclude the possibility of infection with CMV.  CMV  DNA may be present below the detection limit of assay. LogIU/mL            Radiology:     < from: Xray Sinuses Paranasal (18 @ 13:20) >  No air-fluid levels within the paranasal sinuses. No lytic or blastic   lesion.      < end of copied text >        Meds:   Antimicrobials:   acyclovir   Oral Tab/Cap 400 milliGRAM(s) Oral every 8 hours  atovaquone Suspension 750 milliGRAM(s) Oral every 12 hours  clotrimazole Lozenge 1 Lozenge Oral five times a day  voriconazole 200 milliGRAM(s) Oral every 12 hours      Heme / Onc:       GI:  docusate sodium 100 milliGRAM(s) Oral three times a day  pantoprazole    Tablet 40 milliGRAM(s) Oral before breakfast  senna 2 Tablet(s) Oral two times a day PRN  sodium bicarbonate Mouth Rinse 10 milliLiter(s) Swish and Spit five times a day  ursodiol Capsule 300 milliGRAM(s) Oral two times a day with meals      Cardiovascular:   amLODIPine   Tablet 5 milliGRAM(s) Oral daily      Immunologic:   immune   globulin 10% (GAMMAGARD) IVPB 20 Gram(s) IV Intermittent <User Schedule>  tacrolimus 1.5 milliGRAM(s) Oral every 12 hours      Other medications:   acetaminophen   Tablet .. 650 milliGRAM(s) Oral every 6 hours  acetaminophen   Tablet .. 650 milliGRAM(s) Oral <User Schedule>  ascorbic acid 250 milliGRAM(s) Oral daily  Biotene Dry Mouth Oral Rinse 5 milliLiter(s) Swish and Spit five times a day  chlorhexidine 4% Liquid 1 Application(s) Topical <User Schedule>  cholecalciferol 400 Unit(s) Oral daily  cyanocobalamin 1000 MICROGram(s) Oral daily  diphenhydrAMINE   Injectable 25 milliGRAM(s) IV Push every 3 weeks  fentaNYL   Patch  25 MICROgram(s)/Hr 1 Patch Transdermal every 72 hours  folic acid 1 milliGRAM(s) Oral daily  lidocaine/prilocaine Cream 1 Application(s) Topical daily  magnesium oxide 400 milliGRAM(s) Oral three times a day with meals  medroxyPROGESTERone 20 milliGRAM(s) Oral daily  montelukast 10 milliGRAM(s) Oral daily  multivitamin 1 Tablet(s) Oral daily  sodium chloride 0.9%. 1000 milliLiter(s) IV Continuous <Continuous>      PRN:   acetaminophen   Tablet .. 650 milliGRAM(s) Oral every 6 hours PRN  artificial  tears Solution 1 Drop(s) Both EYES three times a day PRN  benzocaine 15 mG/menthol 3.6 mG Lozenge 1 Lozenge Oral every 3 hours PRN  diphenhydrAMINE   Injectable 25 milliGRAM(s) IV Push every 4 hours PRN  FIRST- Mouthwash  BLM 5 milliLiter(s) Swish and Spit every 8 hours PRN  metoclopramide Injectable 10 milliGRAM(s) IV Push every 6 hours PRN  senna 2 Tablet(s) Oral two times a day PRN  sodium chloride 0.65% Nasal 1 Spray(s) Both Nostrils five times a day PRN  sodium chloride 0.9% lock flush 10 milliLiter(s) IV Push every 1 hour PRN      A/P:   ___ year old ___  with a history of ______________________  Pre / Status Post :  Autologous / Allogeneic PBSCT / BMT day ____________    1. Infectious Disease:     2. VOD Prophylaxis: Actigall, Glutamine, Heparin (dosed at 100 units / kg / day)     3. GI Prophylaxis:  Protonix    4. Mouthcare - NS / NaHCO3 rinses, Mycelex, Caphosol; Skin care     5. GVHD prophylaxis     6. Transfuse & replete electrolytes prn     7. IV hydration, daily weights, strict I&O, prn diuresis     8. PO intake as tolerated, nutrition follow up as needed, MVI, folic acid     9. Antiemetics, anti-diarrhea medications:        10. OOB as tolerated, physical therapy consult if needed     11. Monitor coags / fibrinogen 2x week, vitamin K as needed     12. Monitor closely for clinical changes, monitor for fevers     13. Emotional support provided, plan of care discussed and questions addressed     14. Patient education done regarding chemotherapy prep, plan of care, restrictions and discharge planning     15. Continue regular social work input     I have written the above note for ___________ who performed service with me in the room.   Roxanne SILVA [phone]    I have seen and examined patient with PA, I agree with above note as scribed. [facility] Transplant Team                                                      Critical / Counseling Time Provided: 30 minutes                                                                                                                                                        Chief Complaint: Allogeneic peripheral blood stem cell transplant from [facility] (sister 10/10) for treatment of AML    S: Patient seen and examined with [facility]PC Transplant Team:   No acute complaints, admits to feeling better.     Denies mouth / tongue / throat pain, dyspnea, cough, nausea, vomiting, diarrhea, abdominal pain     O: Vitals:   Vital Signs Last 24 Hrs  T(C): 36.8 (16 Mar 2019 06:14), Max: 37.2 (15 Mar 2019 18:31)  T(F): 98.2 (16 Mar 2019 06:14), Max: 99 (15 Mar 2019 18:31)  HR: 96 (16 Mar 2019 06:14) (84 - 110)  BP: 123/83 (16 Mar 2019 06:14) (112/76 - 123/83)  BP(mean): --  RR: 18 (16 Mar 2019 06:14) (18 - 20)  SpO2: 100% (16 Mar 2019 06:14) (100% - 100%)    Admit weight: 87.9 Kg   Daily Weight in k.4 Kg     Intake / Output:   03-15 @ 07:01  -  03-16 @ 07:00  --------------------------------------------------------  IN: 1776 mL / OUT: 2970 mL / NET: -1194 mL    PE:   Oropharynx: no erythema or ulcerations in oropharynx  Oral Mucositis:    +                                         ndGndrndanddndend:nd nd2nd CVS: (+) S1/S2 RRR   Lungs: CTA throughout bilaterally   Abdomen: (+)BS x 4, soft, NT/ND   Extremities: no edema  in B/L LE  Gastric Mucositis:       +                                          rdGrdrrdarddrderd:rd rd3rd Intestinal Mucositis:     -                                          Grade: n/a  Skin: no rash, patches of darker areas.   TLC: PICC line C/D/I  Neuro: A&O x 3   Pain: denies pain           Labs:   CBC Full  -  ( 15 Mar 2019 07:04 )  WBC Count : 3.1 K/uL  Hemoglobin : 11.2 g/dL  Hematocrit : 33.2 %  Platelet Count - Automated : 289 K/uL  Mean Cell Volume : 94.2 fl  Mean Cell Hemoglobin : 31.7 pg  Mean Cell Hemoglobin Concentration : 33.7 gm/dL  Auto Neutrophil # : 0.7 K/uL  Auto Lymphocyte # : See Note  Auto Monocyte # : See Note  Auto Eosinophil # : 0.1 K/uL  Auto Basophil # : 0.0 K/uL  Auto Neutrophil % : 12.0 %  Auto Lymphocyte % : 48.0 %  Auto Monocyte % : 36.0 %  Auto Eosinophil % : x  Auto Basophil % : 1.0 %                          11.2   3.1   )-----------( 289      ( 15 Mar 2019 07:04 )             33.2     03-15    142  |  107  |  <4<L>  ----------------------------<  92  3.9   |  22  |  0.63    Ca    9.5      15 Mar 2019 07:04  Phos  3.5     03-15  Mg     1.6     03-15    TPro  6.5  /  Alb  3.8  /  TBili  0.1<L>  /  DBili  x   /  AST  19  /  ALT  15  /  AlkPhos  67  15      LIVER FUNCTIONS - ( 15 Mar 2019 07:04 )  Alb: 3.8 g/dL / Pro: 6.5 g/dL / ALK PHOS: 67 U/L / ALT: 15 U/L / AST: 19 U/L / GGT: x                03-15 @ 10:09  Tacrolimus 5.7                Cyclosporine --          Cultures: Cytomegalovirus By PCR (19 @ 09:56)    Cytomegalovirus By PCR:   NotDetec    CMVPCR Log: NotDetec: Assay lower limit of detection (LOD):  31.2 IU/mL (1.49 log10/mL)  Assay dynamic range:  50 to 156,000,000 (156M) CMV DNA IU/mL (1.70 log10/mL – 8.19 log10/mL)  Plasma CMV DNA Quantification by PCR using Abbott m2000:  The results of this test shouldbe interpreted with consideration of all  clinical and laboratory findings. In particular, caution should be used  when interpreting low level positive results when the test is used for  diagnostic purposes. Repeat testing on an additional sample is  recommended to confirm low level positive results. A result of "Not  Detected" does not preclude the possibility of infection with CMV.  CMV  DNA may be present below the detection limit of assay. LogIU/mL            Radiology:     < from: Xray Sinuses Paranasal (18 @ 13:20) >  No air-fluid levels within the paranasal sinuses. No lytic or blastic   lesion.      < end of copied text >        Meds:   Antimicrobials:   acyclovir   Oral Tab/Cap 400 milliGRAM(s) Oral every 8 hours  atovaquone Suspension 750 milliGRAM(s) Oral every 12 hours  clotrimazole Lozenge 1 Lozenge Oral five times a day  voriconazole 200 milliGRAM(s) Oral every 12 hours      Heme / Onc:       GI:  docusate sodium 100 milliGRAM(s) Oral three times a day  pantoprazole    Tablet 40 milliGRAM(s) Oral before breakfast  senna 2 Tablet(s) Oral two times a day PRN  sodium bicarbonate Mouth Rinse 10 milliLiter(s) Swish and Spit five times a day  ursodiol Capsule 300 milliGRAM(s) Oral two times a day with meals      Cardiovascular:   amLODIPine   Tablet 5 milliGRAM(s) Oral daily      Immunologic:   immune   globulin 10% (GAMMAGARD) IVPB 20 Gram(s) IV Intermittent <User Schedule>  tacrolimus 1.5 milliGRAM(s) Oral every 12 hours      Other medications:   acetaminophen   Tablet .. 650 milliGRAM(s) Oral every 6 hours  acetaminophen   Tablet .. 650 milliGRAM(s) Oral <User Schedule>  ascorbic acid 250 milliGRAM(s) Oral daily  Biotene Dry Mouth Oral Rinse 5 milliLiter(s) Swish and Spit five times a day  chlorhexidine 4% Liquid 1 Application(s) Topical <User Schedule>  cholecalciferol 400 Unit(s) Oral daily  cyanocobalamin 1000 MICROGram(s) Oral daily  diphenhydrAMINE   Injectable 25 milliGRAM(s) IV Push every 3 weeks  fentaNYL   Patch  25 MICROgram(s)/Hr 1 Patch Transdermal every 72 hours  folic acid 1 milliGRAM(s) Oral daily  lidocaine/prilocaine Cream 1 Application(s) Topical daily  magnesium oxide 400 milliGRAM(s) Oral three times a day with meals  medroxyPROGESTERone 20 milliGRAM(s) Oral daily  montelukast 10 milliGRAM(s) Oral daily  multivitamin 1 Tablet(s) Oral daily  sodium chloride 0.9%. 1000 milliLiter(s) IV Continuous <Continuous>      PRN:   acetaminophen   Tablet .. 650 milliGRAM(s) Oral every 6 hours PRN  artificial  tears Solution 1 Drop(s) Both EYES three times a day PRN  benzocaine 15 mG/menthol 3.6 mG Lozenge 1 Lozenge Oral every 3 hours PRN  diphenhydrAMINE   Injectable 25 milliGRAM(s) IV Push every 4 hours PRN  FIRST- Mouthwash  BLM 5 milliLiter(s) Swish and Spit every 8 hours PRN  metoclopramide Injectable 10 milliGRAM(s) IV Push every 6 hours PRN  senna 2 Tablet(s) Oral two times a day PRN  sodium chloride 0.65% Nasal 1 Spray(s) Both Nostrils five times a day PRN  sodium chloride 0.9% lock flush 10 milliLiter(s) IV Push every 1 hour PRN    A/P:  33 year old female with a history of AML  Post:  Allogeneic PBSCT Day +17  Labs every other day ; continue vitamin C, vitamin B, folic acid, vitamin K & iron supplementation. Increased vitamin K to every other day as per standard guidelines for bloodless transplant  Continue Provera 20 mg PO daily  BP- 140s, started on Norvasc.  Hold for SBP <120; consider reducing to 2.5 mg qd  Mucositis likely related to MTX and meds changed to IV 3/8, now resolved.   Zarxio held  impending engraftment. Epo d/c'ed.  Last Venofer on 3/8.  VNTR pending     1. Infectious Disease:   acyclovir   Oral Tab/Cap 400 milliGRAM(s) Oral every 8 hours  atovaquone Suspension 750 milliGRAM(s) Oral every 12 hours  clotrimazole Lozenge 1 Lozenge Oral five times a day  voriconazole 200 milliGRAM(s) Oral every 12 hours    2. VOD Prophylaxis: Actigall, Glutamine, Heparin (dosed at 100 units / kg / day)     3. GI Prophylaxis:  Protonix 1 Tablet 40 milliGRAM(s) Oral before breakfast    4. Mouthcare - NS / NaHCO3 rinses, Mycelex, Caphosol; Skin care     5. GVHD prophylaxis   tacrolimus 1.5 milliGRAM(s) Oral every 12 hours  Tacro level on 3/18    6. Transfuse & replete electrolytes prn   No transfusions or electrolyte repletions required.     7. IV hydration, daily weights, strict I&O, prn diuresis   NS @ 20 ml/hr    8. PO intake as tolerated, nutrition follow up as needed, MVI, folic acid     9. Antiemetics, anti-diarrhea medications:   metoclopramide Injectable 10 milliGRAM(s) IV Push every 6 hours PRN     10. OOB as tolerated, physical therapy consult if needed     11. Monitor coags / fibrinogen 2x week, vitamin K as needed     12. Monitor closely for clinical changes, monitor for fevers     13. Emotional support provided, plan of care discussed and questions addressed     14. Patient education done regarding chemotherapy prep, plan of care, restrictions and discharge planning     15. Continue regular social work input     I have written the above note for , who performed service with me in the room.   Roxanne SILVA [phone]    I have seen and examined patient with PA, I agree with above note as scribed. [facility] Transplant Team                                                      Critical / Counseling Time Provided: 30 minutes                                                                                                                                                        Chief Complaint: Allogeneic peripheral blood stem cell transplant from [facility] (sister 10/10) for treatment of AML    S: Patient seen and examined with [facility]PC Transplant Team:   No acute complaints, admits to feeling better.     Denies mouth / tongue / throat pain, dyspnea, cough, nausea, vomiting, diarrhea, abdominal pain     O: Vitals:   Vital Signs Last 24 Hrs  T(C): 36.8 (16 Mar 2019 06:14), Max: 37.2 (15 Mar 2019 18:31)  T(F): 98.2 (16 Mar 2019 06:14), Max: 99 (15 Mar 2019 18:31)  HR: 96 (16 Mar 2019 06:14) (84 - 110)  BP: 123/83 (16 Mar 2019 06:14) (112/76 - 123/83)  BP(mean): --  RR: 18 (16 Mar 2019 06:14) (18 - 20)  SpO2: 100% (16 Mar 2019 06:14) (100% - 100%)    Admit weight: 87.9 Kg   Daily Weight in k.4 Kg     Intake / Output:   03-15 @ 07:01  -  03-16 @ 07:00  --------------------------------------------------------  IN: 1776 mL / OUT: 2970 mL / NET: -1194 mL    PE:   Oropharynx: no erythema or ulcerations in oropharynx  Oral Mucositis:    +                                         ndGndrndanddndend:nd nd2nd CVS: (+) S1/S2 + mild tachy regular  Lungs: CTA throughout bilaterally  Abdomen: (+)BS x 4, soft, NT/ND   Extremities: no edema  in B/L LE  Gastric Mucositis:       +                                          rdGrdrrdarddrderd:rd rd3rd Intestinal Mucositis:     -                                          Grade: n/a  Skin: no rash, patches of darker areas.   TLC: PICC line C/D/I  Neuro: A&O x 3   Pain: denies pain           Labs:   CBC Full  -  ( 15 Mar 2019 07:04 )  WBC Count : 3.1 K/uL  Hemoglobin : 11.2 g/dL  Hematocrit : 33.2 %  Platelet Count - Automated : 289 K/uL  Mean Cell Volume : 94.2 fl  Mean Cell Hemoglobin : 31.7 pg  Mean Cell Hemoglobin Concentration : 33.7 gm/dL  Auto Neutrophil # : 0.7 K/uL  Auto Lymphocyte # : See Note  Auto Monocyte # : See Note  Auto Eosinophil # : 0.1 K/uL  Auto Basophil # : 0.0 K/uL  Auto Neutrophil % : 12.0 %  Auto Lymphocyte % : 48.0 %  Auto Monocyte % : 36.0 %  Auto Eosinophil % : x  Auto Basophil % : 1.0 %                          11.2   3.1   )-----------( 289      ( 15 Mar 2019 07:04 )             33.2     03-15    142  |  107  |  <4<L>  ----------------------------<  92  3.9   |  22  |  0.63    Ca    9.5      15 Mar 2019 07:04  Phos  3.5     03-15  Mg     1.6     03-15    TPro  6.5  /  Alb  3.8  /  TBili  0.1<L>  /  DBili  x   /  AST  19  /  ALT  15  /  AlkPhos  67  -15      LIVER FUNCTIONS - ( 15 Mar 2019 07:04 )  Alb: 3.8 g/dL / Pro: 6.5 g/dL / ALK PHOS: 67 U/L / ALT: 15 U/L / AST: 19 U/L / GGT: x                03-15 @ 10:09  Tacrolimus 5.7                Cyclosporine --          Cultures: Cytomegalovirus By PCR (19 @ 09:56)    Cytomegalovirus By PCR:   NotDetec    CMVPCR Log: NotDetec: Assay lower limit of detection (LOD):  31.2 IU/mL (1.49 log10/mL)  Assay dynamic range:  50 to 156,000,000 (156M) CMV DNA IU/mL (1.70 log10/mL – 8.19 log10/mL)  Plasma CMV DNA Quantification by PCR using Abbott m2000:  The results of this test shouldbe interpreted with consideration of all  clinical and laboratory findings. In particular, caution should be used  when interpreting low level positive results when the test is used for  diagnostic purposes. Repeat testing on an additional sample is  recommended to confirm low level positive results. A result of "Not  Detected" does not preclude the possibility of infection with CMV.  CMV  DNA may be present below the detection limit of assay. LogIU/mL            Radiology:     < from: Xray Sinuses Paranasal (18 @ 13:20) >  No air-fluid levels within the paranasal sinuses. No lytic or blastic   lesion.      < end of copied text >        Meds:   Antimicrobials:   acyclovir   Oral Tab/Cap 400 milliGRAM(s) Oral every 8 hours  atovaquone Suspension 750 milliGRAM(s) Oral every 12 hours  clotrimazole Lozenge 1 Lozenge Oral five times a day  voriconazole 200 milliGRAM(s) Oral every 12 hours      Heme / Onc:       GI:  docusate sodium 100 milliGRAM(s) Oral three times a day  pantoprazole    Tablet 40 milliGRAM(s) Oral before breakfast  senna 2 Tablet(s) Oral two times a day PRN  sodium bicarbonate Mouth Rinse 10 milliLiter(s) Swish and Spit five times a day  ursodiol Capsule 300 milliGRAM(s) Oral two times a day with meals      Cardiovascular:   amLODIPine   Tablet 5 milliGRAM(s) Oral daily      Immunologic:   immune   globulin 10% (GAMMAGARD) IVPB 20 Gram(s) IV Intermittent <User Schedule>  tacrolimus 1.5 milliGRAM(s) Oral every 12 hours      Other medications:   acetaminophen   Tablet .. 650 milliGRAM(s) Oral every 6 hours  acetaminophen   Tablet .. 650 milliGRAM(s) Oral <User Schedule>  ascorbic acid 250 milliGRAM(s) Oral daily  Biotene Dry Mouth Oral Rinse 5 milliLiter(s) Swish and Spit five times a day  chlorhexidine 4% Liquid 1 Application(s) Topical <User Schedule>  cholecalciferol 400 Unit(s) Oral daily  cyanocobalamin 1000 MICROGram(s) Oral daily  diphenhydrAMINE   Injectable 25 milliGRAM(s) IV Push every 3 weeks  fentaNYL   Patch  25 MICROgram(s)/Hr 1 Patch Transdermal every 72 hours  folic acid 1 milliGRAM(s) Oral daily  lidocaine/prilocaine Cream 1 Application(s) Topical daily  magnesium oxide 400 milliGRAM(s) Oral three times a day with meals  medroxyPROGESTERone 20 milliGRAM(s) Oral daily  montelukast 10 milliGRAM(s) Oral daily  multivitamin 1 Tablet(s) Oral daily  sodium chloride 0.9%. 1000 milliLiter(s) IV Continuous <Continuous>      PRN:   acetaminophen   Tablet .. 650 milliGRAM(s) Oral every 6 hours PRN  artificial  tears Solution 1 Drop(s) Both EYES three times a day PRN  benzocaine 15 mG/menthol 3.6 mG Lozenge 1 Lozenge Oral every 3 hours PRN  diphenhydrAMINE   Injectable 25 milliGRAM(s) IV Push every 4 hours PRN  FIRST- Mouthwash  BLM 5 milliLiter(s) Swish and Spit every 8 hours PRN  metoclopramide Injectable 10 milliGRAM(s) IV Push every 6 hours PRN  senna 2 Tablet(s) Oral two times a day PRN  sodium chloride 0.65% Nasal 1 Spray(s) Both Nostrils five times a day PRN  sodium chloride 0.9% lock flush 10 milliLiter(s) IV Push every 1 hour PRN    A/P:  33 year old female with a history of AML  Post:  Allogeneic PBSCT Day +17  Labs every other day ; continue vitamin C, vitamin B, folic acid, vitamin K & iron supplementation. Increased vitamin K to every other day as per standard guidelines for bloodless transplant  Continue Provera 20 mg PO daily  BP- 140s, started on Norvasc.  Hold for SBP <120; consider reducing to 2.5 mg qd  Mucositis likely related to MTX and meds changed to IV 3/8, now resolved.   Zarxio held  impending engraftment. Epo d/c'ed.  Last Venofer on 3/8.  VNTR pending   3/16: Denies pain from mouth sores - Fentanyl patch discontinued     1. Infectious Disease:   acyclovir   Oral Tab/Cap 400 milliGRAM(s) Oral every 8 hours  atovaquone Suspension 750 milliGRAM(s) Oral every 12 hours  clotrimazole Lozenge 1 Lozenge Oral five times a day  voriconazole 200 milliGRAM(s) Oral every 12 hours    2. VOD Prophylaxis: Actigall, Glutamine, Heparin (dosed at 100 units / kg / day)     3. GI Prophylaxis:  Protonix 1 Tablet 40 milliGRAM(s) Oral before breakfast    4. Mouthcare - NS / NaHCO3 rinses, Mycelex, Caphosol; Skin care     5. GVHD prophylaxis   tacrolimus 1.5 milliGRAM(s) Oral every 12 hours  Tacro level on 3/18    6. Transfuse & replete electrolytes prn   No transfusions or electrolyte repletions required.     7. IV hydration, daily weights, strict I&O, prn diuresis   NS @ 20 ml/hr    8. PO intake as tolerated, nutrition follow up as needed, MVI, folic acid     9. Antiemetics, anti-diarrhea medications:   metoclopramide Injectable 10 milliGRAM(s) IV Push every 6 hours PRN     10. OOB as tolerated, physical therapy consult if needed     11. Monitor coags / fibrinogen 2x week, vitamin K as needed     12. Monitor closely for clinical changes, monitor for fevers     13. Emotional support provided, plan of care discussed and questions addressed     14. Patient education done regarding chemotherapy prep, plan of care, restrictions and discharge planning     15. Continue regular social work input     I have written the above note for , who performed service with me in the room.   Roxanne SILVA [phone]    I have seen and examined patient with PA, I agree with above note as scribed.

## 2019-03-16 NOTE — PROGRESS NOTE ADULT - ATTENDING COMMENTS
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 Pt is [employer], and does not accept blood products for [employer] reasons    Day + 16- s/p MTX on days +1, +3, +6, 11 ...serum creatinine in normal range. No oral mucositis visible. Hold zarxio ..will hold off for now..wbc coming up on own and given [facility] at time of transplant will hold off..monitor for gvhd  Mucositis- throat soreness/pain with swallowing likely secondary to mucositis s/p MTX; monitor symptoms -now improved. Palliative care consult for pain management. Changed meds to IV route ..now switch back to po  Continue Tacrolimus 1.5 mg po q 12 hrs, monitor level..check monday  Continue Acyclovir, Mepron, Vfend prophylaxis  Continue VOD prophylaxis with Actigall, glutamine supplement. Off heparin since 3/6 due to increased menstrual bleeding  Hypertension- possibly related to Tacro, other meds, fluids- continue Norvasc.  Labs every other day or at discretion of attending physician  Continue, folic acid, , vitamin B-12, vitamin C as per standard practice for bloodless transplant..d/c iron and vit k  Anemia secondary to antineoplastic chemotherapy; Cont iron supplement.   Antiemetics, mouth care, skin care   OOB/ambulate.  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 Pt is [employer], and does not accept blood products for [employer] reasons , day +17 engrafting, clinically stable, afebrile    - on 3/15, off Zarxio; no blood work this week-end  (pt does not take blood products, thus will limit phlebotomies)  - No oral mucositis visible  -Continue Tacrolimus 1.5 mg po q 12 hrs, monitor level at next draw on 3/18  -Continue Acyclovir, Mepron, Vfend prophylaxis  -Continue VOD prophylaxis with Actigall, glutamine supplement. Off heparin since 3/6 due to increased menstrual bleeding  -bp stable -was high, now on Norvasc 5mg po daily  -Continue, folic acid, , vitamin B-12, vitamin C as per standard practice for bloodless transplant  -Anemia secondary to antineoplastic chemotherapy; Cont iron supplement.   -Antiemetics, mouth care, skin care   -OOB/ambulate.  -d/c home on 3/18 if stable

## 2019-03-17 PROCEDURE — 99291 CRITICAL CARE FIRST HOUR: CPT

## 2019-03-17 RX ADMIN — Medication 5 MILLILITER(S): at 13:14

## 2019-03-17 RX ADMIN — Medication 1 TABLET(S): at 13:14

## 2019-03-17 RX ADMIN — Medication 400 MILLIGRAM(S): at 21:57

## 2019-03-17 RX ADMIN — Medication 10 MILLILITER(S): at 13:18

## 2019-03-17 RX ADMIN — Medication 1 LOZENGE: at 16:07

## 2019-03-17 RX ADMIN — Medication 1 LOZENGE: at 00:41

## 2019-03-17 RX ADMIN — MONTELUKAST 10 MILLIGRAM(S): 4 TABLET, CHEWABLE ORAL at 13:15

## 2019-03-17 RX ADMIN — MAGNESIUM OXIDE 400 MG ORAL TABLET 400 MILLIGRAM(S): 241.3 TABLET ORAL at 09:18

## 2019-03-17 RX ADMIN — Medication 5 MILLILITER(S): at 16:06

## 2019-03-17 RX ADMIN — URSODIOL 300 MILLIGRAM(S): 250 TABLET, FILM COATED ORAL at 09:18

## 2019-03-17 RX ADMIN — MEDROXYPROGESTERONE ACETATE 20 MILLIGRAM(S): 150 INJECTION, SUSPENSION, EXTENDED RELEASE INTRAMUSCULAR at 13:15

## 2019-03-17 RX ADMIN — ATOVAQUONE 750 MILLIGRAM(S): 750 SUSPENSION ORAL at 05:12

## 2019-03-17 RX ADMIN — Medication 400 UNIT(S): at 13:15

## 2019-03-17 RX ADMIN — ATOVAQUONE 750 MILLIGRAM(S): 750 SUSPENSION ORAL at 17:44

## 2019-03-17 RX ADMIN — Medication 5 MILLILITER(S): at 09:19

## 2019-03-17 RX ADMIN — Medication 250 MILLIGRAM(S): at 13:16

## 2019-03-17 RX ADMIN — CHLORHEXIDINE GLUCONATE 1 APPLICATION(S): 213 SOLUTION TOPICAL at 09:19

## 2019-03-17 RX ADMIN — VORICONAZOLE 200 MILLIGRAM(S): 10 INJECTION, POWDER, LYOPHILIZED, FOR SOLUTION INTRAVENOUS at 05:11

## 2019-03-17 RX ADMIN — FENTANYL CITRATE 1 PATCH: 50 INJECTION INTRAVENOUS at 16:07

## 2019-03-17 RX ADMIN — Medication 10 MILLILITER(S): at 16:08

## 2019-03-17 RX ADMIN — Medication 1 LOZENGE: at 09:19

## 2019-03-17 RX ADMIN — Medication 1 LOZENGE: at 13:14

## 2019-03-17 RX ADMIN — AMLODIPINE BESYLATE 5 MILLIGRAM(S): 2.5 TABLET ORAL at 05:12

## 2019-03-17 RX ADMIN — MAGNESIUM OXIDE 400 MG ORAL TABLET 400 MILLIGRAM(S): 241.3 TABLET ORAL at 13:15

## 2019-03-17 RX ADMIN — TACROLIMUS 1.5 MILLIGRAM(S): 5 CAPSULE ORAL at 17:43

## 2019-03-17 RX ADMIN — Medication 400 MILLIGRAM(S): at 05:12

## 2019-03-17 RX ADMIN — VORICONAZOLE 200 MILLIGRAM(S): 10 INJECTION, POWDER, LYOPHILIZED, FOR SOLUTION INTRAVENOUS at 17:44

## 2019-03-17 RX ADMIN — Medication 100 MILLIGRAM(S): at 21:58

## 2019-03-17 RX ADMIN — Medication 1 MILLIGRAM(S): at 13:14

## 2019-03-17 RX ADMIN — PREGABALIN 1000 MICROGRAM(S): 225 CAPSULE ORAL at 13:15

## 2019-03-17 RX ADMIN — Medication 10 MILLILITER(S): at 00:41

## 2019-03-17 RX ADMIN — Medication 5 MILLILITER(S): at 20:19

## 2019-03-17 RX ADMIN — TACROLIMUS 1.5 MILLIGRAM(S): 5 CAPSULE ORAL at 05:11

## 2019-03-17 RX ADMIN — Medication 1 LOZENGE: at 20:19

## 2019-03-17 RX ADMIN — PANTOPRAZOLE SODIUM 40 MILLIGRAM(S): 20 TABLET, DELAYED RELEASE ORAL at 05:12

## 2019-03-17 RX ADMIN — Medication 10 MILLILITER(S): at 20:19

## 2019-03-17 RX ADMIN — Medication 10 MILLILITER(S): at 09:04

## 2019-03-17 RX ADMIN — Medication 400 MILLIGRAM(S): at 13:17

## 2019-03-17 RX ADMIN — Medication 5 MILLILITER(S): at 00:41

## 2019-03-17 RX ADMIN — URSODIOL 300 MILLIGRAM(S): 250 TABLET, FILM COATED ORAL at 17:44

## 2019-03-17 RX ADMIN — MAGNESIUM OXIDE 400 MG ORAL TABLET 400 MILLIGRAM(S): 241.3 TABLET ORAL at 17:44

## 2019-03-17 NOTE — PROGRESS NOTE ADULT - SUBJECTIVE AND OBJECTIVE BOX
[facility] Transplant Team                                                      Critical / Counseling Time Provided: 30 minutes                                                                                                                                                        Chief Complaint: Allogeneic peripheral blood stem cell transplant from [facility] (sister 10/10) for treatment of AML    S: Patient seen and examined with [facility] Transplant Team:     Denies mouth / tongue / throat pain, dyspnea, cough, nausea, vomiting, diarrhea, abdominal pain     O: Vitals:   Vital Signs Last 24 Hrs  T(C): 36.9 (17 Mar 2019 05:20), Max: 37.5 (16 Mar 2019 17:55)  T(F): 98.4 (17 Mar 2019 05:20), Max: 99.5 (16 Mar 2019 17:55)  HR: 90 (17 Mar 2019 05:20) (85 - 104)  BP: 120/82 (17 Mar 2019 05:20) (106/73 - 126/83)  BP(mean): --  RR: 19 (17 Mar 2019 05:20) (18 - 20)  SpO2: 100% (17 Mar 2019 05:20) (100% - 100%)    Admit weight: 87.9 Kg   Daily     Intake / Output:   03-16 @ 07:01  -  03-17 @ 07:00  --------------------------------------------------------  IN: 2088 mL / OUT: 2430 mL / NET: -342 mL    PE:   Oropharynx: no erythema or ulcerations in oropharynx  Oral Mucositis:    +                                         ndGndrndanddndend:nd nd2nd CVS: (+) S1/S2 + mild tachy regular  Lungs: CTA throughout bilaterally  Abdomen: (+)BS x 4, soft, NT/ND   Extremities: no edema  in B/L LE  Gastric Mucositis:       +                                          stGstrstastdstest:st st1st Intestinal Mucositis:     -                                          Grade: n/a  Skin: no rash, patches of darker areas.   TLC: PICC line C/D/I  Neuro: A&O x 3   Pain: denies pain       Labs:                         Cultures: Cytomegalovirus By PCR (03.14.19 @ 09:56)    Cytomegalovirus By PCR:   NotDetec    CMVPCR Log: NotDetec: Assay lower limit of detection (LOD):  31.2 IU/mL (1.49 log10/mL)  Assay dynamic range:  50 to 156,000,000 (156M) CMV DNA IU/mL (1.70 log10/mL – 8.19 log10/mL)  Plasma CMV DNA Quantification by PCR using Abbott m2000:  The results of this test shouldbe interpreted with consideration of all  clinical and laboratory findings. In particular, caution should be used  when interpreting low level positive results when the test is used for  diagnostic purposes. Repeat testing on an additional sample is  recommended to confirm low level positive results. A result of "Not  Detected" does not preclude the possibility of infection with CMV.  CMV  Radiology:   Xray Sinuses Paranasal (09.12.18 @ 13:20)  No air-fluid levels within the paranasal sinuses. No lytic or blastic   lesion.              Meds:   Antimicrobials:   acyclovir   Oral Tab/Cap 400 milliGRAM(s) Oral every 8 hours  atovaquone Suspension 750 milliGRAM(s) Oral every 12 hours  clotrimazole Lozenge 1 Lozenge Oral five times a day  voriconazole 200 milliGRAM(s) Oral every 12 hours      Heme / Onc:       GI:  docusate sodium 100 milliGRAM(s) Oral three times a day  pantoprazole    Tablet 40 milliGRAM(s) Oral before breakfast  senna 2 Tablet(s) Oral two times a day PRN  sodium bicarbonate Mouth Rinse 10 milliLiter(s) Swish and Spit five times a day  ursodiol Capsule 300 milliGRAM(s) Oral two times a day with meals      Cardiovascular:   amLODIPine   Tablet 5 milliGRAM(s) Oral daily      Immunologic:   immune   globulin 10% (GAMMAGARD) IVPB 20 Gram(s) IV Intermittent <User Schedule>  tacrolimus 1.5 milliGRAM(s) Oral every 12 hours      Other medications:   acetaminophen   Tablet .. 650 milliGRAM(s) Oral every 6 hours  acetaminophen   Tablet .. 650 milliGRAM(s) Oral <User Schedule>  ascorbic acid 250 milliGRAM(s) Oral daily  Biotene Dry Mouth Oral Rinse 5 milliLiter(s) Swish and Spit five times a day  chlorhexidine 4% Liquid 1 Application(s) Topical <User Schedule>  cholecalciferol 400 Unit(s) Oral daily  cyanocobalamin 1000 MICROGram(s) Oral daily  diphenhydrAMINE   Injectable 25 milliGRAM(s) IV Push every 3 weeks  folic acid 1 milliGRAM(s) Oral daily  lidocaine/prilocaine Cream 1 Application(s) Topical daily  magnesium oxide 400 milliGRAM(s) Oral three times a day with meals  medroxyPROGESTERone 20 milliGRAM(s) Oral daily  montelukast 10 milliGRAM(s) Oral daily  multivitamin 1 Tablet(s) Oral daily  sodium chloride 0.9%. 1000 milliLiter(s) IV Continuous <Continuous>      PRN:   acetaminophen   Tablet .. 650 milliGRAM(s) Oral every 6 hours PRN  artificial  tears Solution 1 Drop(s) Both EYES three times a day PRN  benzocaine 15 mG/menthol 3.6 mG Lozenge 1 Lozenge Oral every 3 hours PRN  diphenhydrAMINE   Injectable 25 milliGRAM(s) IV Push every 4 hours PRN  FIRST- Mouthwash  BLM 5 milliLiter(s) Swish and Spit every 8 hours PRN  metoclopramide Injectable 10 milliGRAM(s) IV Push every 6 hours PRN  senna 2 Tablet(s) Oral two times a day PRN  sodium chloride 0.65% Nasal 1 Spray(s) Both Nostrils five times a day PRN  sodium chloride 0.9% lock flush 10 milliLiter(s) IV Push every 1 hour PRN    A/P:  33 year old female with a history of AML  Post:  Allogeneic PBSCT Day +18  Labs every other day ; continue vitamin C, vitamin B, folic acid, vitamin K & iron supplementation. Increased vitamin K to every other day as per standard guidelines for bloodless transplant  Continue Provera 20 mg PO daily  BP- 140s, started on Norvasc.  Hold for SBP <120; consider reducing to 2.5 mg qd  Mucositis likely related to MTX and meds changed to IV 3/8, now resolved.   Zarxio held  impending engraftment. Epo d/c'ed.  Last Venofer on 3/8.  VNTR pending     1. Infectious Disease:   acyclovir   Oral Tab/Cap 400 milliGRAM(s) Oral every 8 hours  atovaquone Suspension 750 milliGRAM(s) Oral every 12 hours  clotrimazole Lozenge 1 Lozenge Oral five times a day  voriconazole 200 milliGRAM(s) Oral every 12 hours    2. VOD Prophylaxis: Actigall, Glutamine, Heparin (dosed at 100 units / kg / day)     3. GI Prophylaxis:  Protonix 1 Tablet 40 milliGRAM(s) Oral before breakfast    4. Mouthcare - NS / NaHCO3 rinses, Mycelex, Caphosol; Skin care     5. GVHD prophylaxis   tacrolimus 1.5 milliGRAM(s) Oral every 12 hours  Tacro level on 3/18    6. Transfuse & replete electrolytes prn   No transfusions or electrolyte repletions required.     7. IV hydration, daily weights, strict I&O, prn diuresis   NS @ 20 ml/hr    8. PO intake as tolerated, nutrition follow up as needed, MVI, folic acid     9. Antiemetics, anti-diarrhea medications:   metoclopramide Injectable 10 milliGRAM(s) IV Push every 6 hours PRN     10. OOB as tolerated, physical therapy consult if needed     11. Monitor coags / fibrinogen 2x week, vitamin K as needed     12. Monitor closely for clinical changes, monitor for fevers     13. Emotional support provided, plan of care discussed and questions addressed     14. Patient education done regarding chemotherapy prep, plan of care, restrictions and discharge planning     15. Continue regular social work input     I have written the above note for , who performed service with me in the room.   Roxanne SILVA [phone]    I have seen and examined patient with PA, I agree with above note as scribed. [facility] Transplant Team                                                      Critical / Counseling Time Provided: 30 minutes                                                                                                                                                        Chief Complaint: Allogeneic peripheral blood stem cell transplant from [facility] (sister 10/10) for treatment of AML    S: Patient seen and examined with [facility] Transplant Team:     Denies mouth / tongue / throat pain, dyspnea, cough, nausea, vomiting, diarrhea, abdominal pain     O: Vitals:   Vital Signs Last 24 Hrs  T(C): 36.9 (17 Mar 2019 05:20), Max: 37.5 (16 Mar 2019 17:55)  T(F): 98.4 (17 Mar 2019 05:20), Max: 99.5 (16 Mar 2019 17:55)  HR: 90 (17 Mar 2019 05:20) (85 - 104)  BP: 120/82 (17 Mar 2019 05:20) (106/73 - 126/83)  BP(mean): --  RR: 19 (17 Mar 2019 05:20) (18 - 20)  SpO2: 100% (17 Mar 2019 05:20) (100% - 100%)    Admit weight: 87.9 Kg   Daily weight: 87.8 Kg (taken on 3/17/19 @ 9.10 am)    Intake / Output:   03-16 @ 07:01  -  03-17 @ 07:00  --------------------------------------------------------  IN: 2088 mL / OUT: 2430 mL / NET: -342 mL    PE:   Oropharynx: no erythema or ulcerations in oropharynx  Oral Mucositis:    +                                         ndGndrndanddndend:nd nd2nd CVS: (+) S1/S2 + mild tachy regular  Lungs: CTA throughout bilaterally  Abdomen: (+)BS x 4, soft, NT/ND   Extremities: no edema  in B/L LE  Gastric Mucositis:       +                                          rdGrdrrdarddrderd:rd rd3rd Intestinal Mucositis:     -                                          Grade: n/a  Skin: no rash, patches of darker areas.   TLC: PICC line C/D/I  Neuro: A&O x 3   Pain: denies pain       Labs:                         Cultures: Cytomegalovirus By PCR (03.14.19 @ 09:56)    Cytomegalovirus By PCR:   NotDetec    CMVPCR Log: NotDetec: Assay lower limit of detection (LOD):  31.2 IU/mL (1.49 log10/mL)  Assay dynamic range:  50 to 156,000,000 (156M) CMV DNA IU/mL (1.70 log10/mL – 8.19 log10/mL)  Plasma CMV DNA Quantification by PCR using Abbott m2000:  The results of this test shouldbe interpreted with consideration of all  clinical and laboratory findings. In particular, caution should be used  when interpreting low level positive results when the test is used for  diagnostic purposes. Repeat testing on an additional sample is  recommended to confirm low level positive results. A result of "Not  Detected" does not preclude the possibility of infection with CMV.  CMV  Radiology:   Xray Sinuses Paranasal (09.12.18 @ 13:20)  No air-fluid levels within the paranasal sinuses. No lytic or blastic   lesion.              Meds:   Antimicrobials:   acyclovir   Oral Tab/Cap 400 milliGRAM(s) Oral every 8 hours  atovaquone Suspension 750 milliGRAM(s) Oral every 12 hours  clotrimazole Lozenge 1 Lozenge Oral five times a day  voriconazole 200 milliGRAM(s) Oral every 12 hours      Heme / Onc:       GI:  docusate sodium 100 milliGRAM(s) Oral three times a day  pantoprazole    Tablet 40 milliGRAM(s) Oral before breakfast  senna 2 Tablet(s) Oral two times a day PRN  sodium bicarbonate Mouth Rinse 10 milliLiter(s) Swish and Spit five times a day  ursodiol Capsule 300 milliGRAM(s) Oral two times a day with meals      Cardiovascular:   amLODIPine   Tablet 5 milliGRAM(s) Oral daily      Immunologic:   immune   globulin 10% (GAMMAGARD) IVPB 20 Gram(s) IV Intermittent <User Schedule>  tacrolimus 1.5 milliGRAM(s) Oral every 12 hours      Other medications:   acetaminophen   Tablet .. 650 milliGRAM(s) Oral every 6 hours  acetaminophen   Tablet .. 650 milliGRAM(s) Oral <User Schedule>  ascorbic acid 250 milliGRAM(s) Oral daily  Biotene Dry Mouth Oral Rinse 5 milliLiter(s) Swish and Spit five times a day  chlorhexidine 4% Liquid 1 Application(s) Topical <User Schedule>  cholecalciferol 400 Unit(s) Oral daily  cyanocobalamin 1000 MICROGram(s) Oral daily  diphenhydrAMINE   Injectable 25 milliGRAM(s) IV Push every 3 weeks  folic acid 1 milliGRAM(s) Oral daily  lidocaine/prilocaine Cream 1 Application(s) Topical daily  magnesium oxide 400 milliGRAM(s) Oral three times a day with meals  medroxyPROGESTERone 20 milliGRAM(s) Oral daily  montelukast 10 milliGRAM(s) Oral daily  multivitamin 1 Tablet(s) Oral daily  sodium chloride 0.9%. 1000 milliLiter(s) IV Continuous <Continuous>      PRN:   acetaminophen   Tablet .. 650 milliGRAM(s) Oral every 6 hours PRN  artificial  tears Solution 1 Drop(s) Both EYES three times a day PRN  benzocaine 15 mG/menthol 3.6 mG Lozenge 1 Lozenge Oral every 3 hours PRN  diphenhydrAMINE   Injectable 25 milliGRAM(s) IV Push every 4 hours PRN  FIRST- Mouthwash  BLM 5 milliLiter(s) Swish and Spit every 8 hours PRN  metoclopramide Injectable 10 milliGRAM(s) IV Push every 6 hours PRN  senna 2 Tablet(s) Oral two times a day PRN  sodium chloride 0.65% Nasal 1 Spray(s) Both Nostrils five times a day PRN  sodium chloride 0.9% lock flush 10 milliLiter(s) IV Push every 1 hour PRN    A/P:  33 year old female with a history of AML  Post:  Allogeneic PBSCT Day +18  Labs every other day ; continue vitamin C, vitamin B, folic acid, vitamin K & iron supplementation. Increased vitamin K to every other day as per standard guidelines for bloodless transplant  Continue Provera 20 mg PO daily  BP- 140s, started on Norvasc.  Hold for SBP <120; consider reducing to 2.5 mg qd  Mucositis likely related to MTX and meds changed to IV 3/8, now resolved.   Zarxio held  impending engraftment. Epo d/c'ed.  Last Venofer on 3/8.  VNTR pending     1. Infectious Disease:   acyclovir   Oral Tab/Cap 400 milliGRAM(s) Oral every 8 hours  atovaquone Suspension 750 milliGRAM(s) Oral every 12 hours  clotrimazole Lozenge 1 Lozenge Oral five times a day  voriconazole 200 milliGRAM(s) Oral every 12 hours    2. VOD Prophylaxis: Actigall, Glutamine, Heparin (dosed at 100 units / kg / day)     3. GI Prophylaxis:  Protonix 1 Tablet 40 milliGRAM(s) Oral before breakfast    4. Mouthcare - NS / NaHCO3 rinses, Mycelex, Caphosol; Skin care     5. GVHD prophylaxis   tacrolimus 1.5 milliGRAM(s) Oral every 12 hours  Tacro level on 3/18    6. Transfuse & replete electrolytes prn   No transfusions or electrolyte repletions required.     7. IV hydration, daily weights, strict I&O, prn diuresis   NS @ 20 ml/hr    8. PO intake as tolerated, nutrition follow up as needed, MVI, folic acid     9. Antiemetics, anti-diarrhea medications:   metoclopramide Injectable 10 milliGRAM(s) IV Push every 6 hours PRN     10. OOB as tolerated, physical therapy consult if needed     11. Monitor coags / fibrinogen 2x week, vitamin K as needed     12. Monitor closely for clinical changes, monitor for fevers     13. Emotional support provided, plan of care discussed and questions addressed     14. Patient education done regarding chemotherapy prep, plan of care, restrictions and discharge planning     15. Continue regular social work input     I have written the above note for , who performed service with me in the room.   Roxanne SILVA [phone]    I have seen and examined patient with PA, I agree with above note as scribed.

## 2019-03-17 NOTE — PROGRESS NOTE ADULT - ATTENDING COMMENTS
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 Pt is Gnosticism, and does not accept blood products for Yazidism reasons , day +17 engrafting, clinically stable, afebrile    - on 3/15, off Zarxio; no blood work this week-end  (pt does not take blood products, thus will limit phlebotomies)  - No oral mucositis visible  -Continue Tacrolimus 1.5 mg po q 12 hrs, monitor level at next draw on 3/18  -Continue Acyclovir, Mepron, Vfend prophylaxis  -Continue VOD prophylaxis with Actigall, glutamine supplement. Off heparin since 3/6 due to increased menstrual bleeding  -bp stable -was high, now on Norvasc 5mg po daily  -Continue, folic acid, , vitamin B-12, vitamin C as per standard practice for bloodless transplant  -Anemia secondary to antineoplastic chemotherapy; Cont iron supplement.   -Antiemetics, mouth care, skin care   -OOB/ambulate.  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 Pt is Mormon, and does not accept blood products for Rastafarian reasons , day +18 engrafted, clinically stable, afebrile    - on 3/15, off Zarxio; no blood work this week-end  (pt does not take blood products, thus will limit phlebotomies)  - No oral mucositis visible, pt has no pain -stopped Fentanyl patch on 3/16  -Continue Tacrolimus 1.5 mg po q 12 hrs, monitor level at next draw on 3/18  -Continue Acyclovir, Mepron, Vfend prophylaxis  -Continue VOD prophylaxis with Actigall, glutamine supplement. Off heparin since 3/6 due to increased menstrual bleeding  -bp stable -was high, now on Norvasc 5mg po daily  -Continue, folic acid, , vitamin B-12, vitamin C as per standard practice for bloodless transplant  -Anemia secondary to antineoplastic chemotherapy; Cont iron supplement.   -Antiemetics, mouth care, skin care   -OOB/ambulate.  -d/c home on 3/18 if stable

## 2019-03-18 ENCOUNTER — OUTPATIENT (OUTPATIENT)
Dept: OUTPATIENT SERVICES | Facility: HOSPITAL | Age: 34
LOS: 1 days | Discharge: HOME | End: 2019-03-18

## 2019-03-18 ENCOUNTER — TRANSCRIPTION ENCOUNTER (OUTPATIENT)
Age: 34
End: 2019-03-18

## 2019-03-18 VITALS
OXYGEN SATURATION: 100 % | HEART RATE: 102 BPM | RESPIRATION RATE: 19 BRPM | TEMPERATURE: 99 F | SYSTOLIC BLOOD PRESSURE: 128 MMHG | DIASTOLIC BLOOD PRESSURE: 83 MMHG

## 2019-03-18 DIAGNOSIS — C92.00 ACUTE MYELOBLASTIC LEUKEMIA, NOT HAVING ACHIEVED REMISSION: ICD-10-CM

## 2019-03-18 DIAGNOSIS — Z90.3 ACQUIRED ABSENCE OF STOMACH [PART OF]: Chronic | ICD-10-CM

## 2019-03-18 LAB
ALBUMIN SERPL ELPH-MCNC: 3.9 G/DL — SIGNIFICANT CHANGE UP (ref 3.3–5)
ALP SERPL-CCNC: 65 U/L — SIGNIFICANT CHANGE UP (ref 40–120)
ALT FLD-CCNC: 8 U/L — LOW (ref 10–45)
ANION GAP SERPL CALC-SCNC: 12 MMOL/L — SIGNIFICANT CHANGE UP (ref 5–17)
AST SERPL-CCNC: 15 U/L — SIGNIFICANT CHANGE UP (ref 10–40)
BASOPHILS # BLD AUTO: 0.1 K/UL — SIGNIFICANT CHANGE UP (ref 0–0.2)
BILIRUB SERPL-MCNC: 0.2 MG/DL — SIGNIFICANT CHANGE UP (ref 0.2–1.2)
BUN SERPL-MCNC: 5 MG/DL — LOW (ref 7–23)
CALCIUM SERPL-MCNC: 9.7 MG/DL — SIGNIFICANT CHANGE UP (ref 8.4–10.5)
CHLORIDE SERPL-SCNC: 108 MMOL/L — SIGNIFICANT CHANGE UP (ref 96–108)
CO2 SERPL-SCNC: 22 MMOL/L — SIGNIFICANT CHANGE UP (ref 22–31)
CREAT SERPL-MCNC: 0.61 MG/DL — SIGNIFICANT CHANGE UP (ref 0.5–1.3)
EOSINOPHIL # BLD AUTO: 0 K/UL — SIGNIFICANT CHANGE UP (ref 0–0.5)
GLUCOSE SERPL-MCNC: 88 MG/DL — SIGNIFICANT CHANGE UP (ref 70–99)
HCT VFR BLD CALC: 34.8 % — SIGNIFICANT CHANGE UP (ref 34.5–45)
HGB BLD-MCNC: 11.7 G/DL — SIGNIFICANT CHANGE UP (ref 11.5–15.5)
LYMPHOCYTES # BLD AUTO: 0.8 K/UL — LOW (ref 1–3.3)
LYMPHOCYTES # BLD AUTO: 15 % — SIGNIFICANT CHANGE UP (ref 13–44)
MAGNESIUM SERPL-MCNC: 1.7 MG/DL — SIGNIFICANT CHANGE UP (ref 1.6–2.6)
MCHC RBC-ENTMCNC: 32.1 PG — SIGNIFICANT CHANGE UP (ref 27–34)
MCHC RBC-ENTMCNC: 33.6 GM/DL — SIGNIFICANT CHANGE UP (ref 32–36)
MCV RBC AUTO: 95.6 FL — SIGNIFICANT CHANGE UP (ref 80–100)
MONOCYTES # BLD AUTO: 1 K/UL — HIGH (ref 0–0.9)
MONOCYTES NFR BLD AUTO: 34 % — HIGH (ref 2–14)
MYELOCYTES NFR BLD: 1 % — HIGH (ref 0–0)
NEUTROPHILS # BLD AUTO: 2.3 K/UL — SIGNIFICANT CHANGE UP (ref 1.8–7.4)
NEUTROPHILS NFR BLD AUTO: 50 % — SIGNIFICANT CHANGE UP (ref 43–77)
NRBC # BLD: 1 /100 — HIGH (ref 0–0)
PHOSPHATE SERPL-MCNC: 3.2 MG/DL — SIGNIFICANT CHANGE UP (ref 2.5–4.5)
PLAT MORPH BLD: NORMAL — SIGNIFICANT CHANGE UP
PLATELET # BLD AUTO: 338 K/UL — SIGNIFICANT CHANGE UP (ref 150–400)
POTASSIUM SERPL-MCNC: 4.1 MMOL/L — SIGNIFICANT CHANGE UP (ref 3.5–5.3)
POTASSIUM SERPL-SCNC: 4.1 MMOL/L — SIGNIFICANT CHANGE UP (ref 3.5–5.3)
PROT SERPL-MCNC: 6.5 G/DL — SIGNIFICANT CHANGE UP (ref 6–8.3)
RBC # BLD: 3.64 M/UL — LOW (ref 3.8–5.2)
RBC # FLD: 17.7 % — HIGH (ref 10.3–14.5)
RBC BLD AUTO: SIGNIFICANT CHANGE UP
SODIUM SERPL-SCNC: 142 MMOL/L — SIGNIFICANT CHANGE UP (ref 135–145)
TACROLIMUS SERPL-MCNC: 8.2 NG/ML — SIGNIFICANT CHANGE UP
WBC # BLD: 4.2 K/UL — SIGNIFICANT CHANGE UP (ref 3.8–10.5)
WBC # FLD AUTO: 4.2 K/UL — SIGNIFICANT CHANGE UP (ref 3.8–10.5)

## 2019-03-18 PROCEDURE — 81267 CHIMERISM ANAL NO CELL SELEC: CPT

## 2019-03-18 PROCEDURE — C1751: CPT

## 2019-03-18 PROCEDURE — 84100 ASSAY OF PHOSPHORUS: CPT

## 2019-03-18 PROCEDURE — 81003 URINALYSIS AUTO W/O SCOPE: CPT

## 2019-03-18 PROCEDURE — 36573 INSJ PICC RS&I 5 YR+: CPT

## 2019-03-18 PROCEDURE — 82784 ASSAY IGA/IGD/IGG/IGM EACH: CPT

## 2019-03-18 PROCEDURE — 80197 ASSAY OF TACROLIMUS: CPT

## 2019-03-18 PROCEDURE — 38207 CRYOPRESERVE STEM CELLS: CPT

## 2019-03-18 PROCEDURE — 86901 BLOOD TYPING SEROLOGIC RH(D): CPT

## 2019-03-18 PROCEDURE — 86850 RBC ANTIBODY SCREEN: CPT

## 2019-03-18 PROCEDURE — 82785 ASSAY OF IGE: CPT

## 2019-03-18 PROCEDURE — 99291 CRITICAL CARE FIRST HOUR: CPT

## 2019-03-18 PROCEDURE — 81265 STR MARKERS SPECIMEN ANAL: CPT

## 2019-03-18 PROCEDURE — 80053 COMPREHEN METABOLIC PANEL: CPT

## 2019-03-18 PROCEDURE — 85027 COMPLETE CBC AUTOMATED: CPT

## 2019-03-18 PROCEDURE — 85045 AUTOMATED RETICULOCYTE COUNT: CPT

## 2019-03-18 PROCEDURE — 85610 PROTHROMBIN TIME: CPT

## 2019-03-18 PROCEDURE — 82955 ASSAY OF G6PD ENZYME: CPT

## 2019-03-18 PROCEDURE — 85384 FIBRINOGEN ACTIVITY: CPT

## 2019-03-18 PROCEDURE — 85730 THROMBOPLASTIN TIME PARTIAL: CPT

## 2019-03-18 PROCEDURE — 83615 LACTATE (LD) (LDH) ENZYME: CPT

## 2019-03-18 PROCEDURE — 86900 BLOOD TYPING SEROLOGIC ABO: CPT

## 2019-03-18 PROCEDURE — 83735 ASSAY OF MAGNESIUM: CPT

## 2019-03-18 PROCEDURE — 84702 CHORIONIC GONADOTROPIN TEST: CPT

## 2019-03-18 PROCEDURE — 82248 BILIRUBIN DIRECT: CPT

## 2019-03-18 RX ORDER — CHROMIUM 200 MCG
TABLET ORAL
Refills: 0 | Status: DISCONTINUED | COMMUNITY
End: 2019-03-18

## 2019-03-18 RX ORDER — MONTELUKAST SODIUM 10 MG/1
10 TABLET, FILM COATED ORAL
Refills: 0 | Status: DISCONTINUED | COMMUNITY
End: 2019-03-18

## 2019-03-18 RX ORDER — ACETAMINOPHEN 500 MG
TABLET ORAL
Refills: 0 | Status: DISCONTINUED | COMMUNITY
End: 2019-03-18

## 2019-03-18 RX ORDER — THIAMINE HCL 50 MG
TABLET ORAL
Refills: 0 | Status: DISCONTINUED | COMMUNITY
End: 2019-03-18

## 2019-03-18 RX ORDER — IVOSIDENIB 250 MG/1
250 TABLET, FILM COATED ORAL
Refills: 0 | Status: DISCONTINUED | COMMUNITY
End: 2019-03-18

## 2019-03-18 RX ORDER — FILGRASTIM-SNDZ 300 UG/.5ML
300 INJECTION, SOLUTION INTRAVENOUS; SUBCUTANEOUS
Qty: 10 | Refills: 0 | Status: DISCONTINUED | COMMUNITY
Start: 2019-01-29 | End: 2019-03-18

## 2019-03-18 RX ORDER — CYANOCOBALAMIN (VITAMIN B-12) 100 MCG
TABLET ORAL
Refills: 0 | Status: DISCONTINUED | COMMUNITY
End: 2019-03-18

## 2019-03-18 RX ORDER — IVOSIDENIB 250 MG/1
250 TABLET, FILM COATED ORAL DAILY
Refills: 0 | Status: DISCONTINUED | COMMUNITY
End: 2019-03-18

## 2019-03-18 RX ADMIN — PANTOPRAZOLE SODIUM 40 MILLIGRAM(S): 20 TABLET, DELAYED RELEASE ORAL at 06:59

## 2019-03-18 RX ADMIN — URSODIOL 300 MILLIGRAM(S): 250 TABLET, FILM COATED ORAL at 08:41

## 2019-03-18 RX ADMIN — Medication 1 LOZENGE: at 00:19

## 2019-03-18 RX ADMIN — Medication 10 MILLILITER(S): at 00:19

## 2019-03-18 RX ADMIN — CHLORHEXIDINE GLUCONATE 1 APPLICATION(S): 213 SOLUTION TOPICAL at 06:59

## 2019-03-18 RX ADMIN — Medication 5 MILLILITER(S): at 00:19

## 2019-03-18 RX ADMIN — MEDROXYPROGESTERONE ACETATE 20 MILLIGRAM(S): 150 INJECTION, SUSPENSION, EXTENDED RELEASE INTRAMUSCULAR at 11:58

## 2019-03-18 RX ADMIN — Medication 1 LOZENGE: at 08:42

## 2019-03-18 RX ADMIN — MAGNESIUM OXIDE 400 MG ORAL TABLET 400 MILLIGRAM(S): 241.3 TABLET ORAL at 08:41

## 2019-03-18 RX ADMIN — MAGNESIUM OXIDE 400 MG ORAL TABLET 400 MILLIGRAM(S): 241.3 TABLET ORAL at 11:58

## 2019-03-18 RX ADMIN — Medication 10 MILLILITER(S): at 08:42

## 2019-03-18 RX ADMIN — Medication 400 MILLIGRAM(S): at 06:59

## 2019-03-18 RX ADMIN — AMLODIPINE BESYLATE 5 MILLIGRAM(S): 2.5 TABLET ORAL at 06:58

## 2019-03-18 RX ADMIN — Medication 1 TABLET(S): at 11:58

## 2019-03-18 RX ADMIN — Medication 5 MILLILITER(S): at 08:42

## 2019-03-18 RX ADMIN — PREGABALIN 1000 MICROGRAM(S): 225 CAPSULE ORAL at 11:58

## 2019-03-18 RX ADMIN — Medication 400 UNIT(S): at 11:58

## 2019-03-18 RX ADMIN — MONTELUKAST 10 MILLIGRAM(S): 4 TABLET, CHEWABLE ORAL at 11:58

## 2019-03-18 RX ADMIN — Medication 250 MILLIGRAM(S): at 11:59

## 2019-03-18 RX ADMIN — VORICONAZOLE 200 MILLIGRAM(S): 10 INJECTION, POWDER, LYOPHILIZED, FOR SOLUTION INTRAVENOUS at 07:00

## 2019-03-18 RX ADMIN — TACROLIMUS 1.5 MILLIGRAM(S): 5 CAPSULE ORAL at 06:59

## 2019-03-18 RX ADMIN — ATOVAQUONE 750 MILLIGRAM(S): 750 SUSPENSION ORAL at 06:59

## 2019-03-18 NOTE — CHART NOTE - NSCHARTNOTEFT_GEN_A_CORE
Nutrition Follow Up Note  Patient seen for: LOS follow up    Pt c AML, S/P allogeneic PBSCT day+19, engrafted, awaiting DC later today.     Source: pt    Diet : Regular diet c Glutasolve x 1 packet daily, Ensure Enlive x 3 and Ensure Clear x 2     Patient reports: she has been eating fairly well. Reports she has been consuming about % of most of her meals. Reports she has been drinking Ensure Enlive and Ensure Clear daily. Reports she did try high protein gelato and did take it twice. Pt c some questions regarding food safety upon discharge. No specific GI distress at this time.     Daily Weight: 2/20: 193.7->3/12: 192->3/17: 193.5 pounds, stable at this time     Pertinent Medications: MEDICATIONS  (STANDING):  acetaminophen   Tablet .. 650 milliGRAM(s) Oral every 6 hours  acetaminophen   Tablet .. 650 milliGRAM(s) Oral <User Schedule>  acyclovir   Oral Tab/Cap 400 milliGRAM(s) Oral every 8 hours  amLODIPine   Tablet 5 milliGRAM(s) Oral daily  ascorbic acid 250 milliGRAM(s) Oral daily  atovaquone Suspension 750 milliGRAM(s) Oral every 12 hours  Biotene Dry Mouth Oral Rinse 5 milliLiter(s) Swish and Spit five times a day  chlorhexidine 4% Liquid 1 Application(s) Topical <User Schedule>  cholecalciferol 400 Unit(s) Oral daily  clotrimazole Lozenge 1 Lozenge Oral five times a day  cyanocobalamin 1000 MICROGram(s) Oral daily  diphenhydrAMINE   Injectable 25 milliGRAM(s) IV Push every 3 weeks  docusate sodium 100 milliGRAM(s) Oral three times a day  folic acid 1 milliGRAM(s) Oral daily  immune   globulin 10% (GAMMAGARD) IVPB 20 Gram(s) IV Intermittent <User Schedule>  lidocaine/prilocaine Cream 1 Application(s) Topical daily  magnesium oxide 400 milliGRAM(s) Oral three times a day with meals  medroxyPROGESTERone 20 milliGRAM(s) Oral daily  montelukast 10 milliGRAM(s) Oral daily  multivitamin 1 Tablet(s) Oral daily  pantoprazole    Tablet 40 milliGRAM(s) Oral before breakfast  sodium bicarbonate Mouth Rinse 10 milliLiter(s) Swish and Spit five times a day  sodium chloride 0.9%. 1000 milliLiter(s) (20 mL/Hr) IV Continuous <Continuous>  tacrolimus 1.5 milliGRAM(s) Oral every 12 hours  ursodiol Capsule 300 milliGRAM(s) Oral two times a day with meals  voriconazole 200 milliGRAM(s) Oral every 12 hours    MEDICATIONS  (PRN):  acetaminophen   Tablet .. 650 milliGRAM(s) Oral every 6 hours PRN Temp greater or equal to 38C (100.4F), Mild Pain (1 - 3)  artificial  tears Solution 1 Drop(s) Both EYES three times a day PRN Dry Eyes  benzocaine 15 mG/menthol 3.6 mG Lozenge 1 Lozenge Oral every 3 hours PRN Sore Throat  diphenhydrAMINE   Injectable 25 milliGRAM(s) IV Push every 4 hours PRN Allergy symptoms  FIRST- Mouthwash  BLM 5 milliLiter(s) Swish and Spit every 8 hours PRN Mouth Care  metoclopramide Injectable 10 milliGRAM(s) IV Push every 6 hours PRN Nausea and/or Vomiting  senna 2 Tablet(s) Oral two times a day PRN Constipation  sodium chloride 0.65% Nasal 1 Spray(s) Both Nostrils five times a day PRN Nasal Congestion  sodium chloride 0.9% lock flush 10 milliLiter(s) IV Push every 1 hour PRN Pre/post blood products, medications, blood draw, and to maintain line patency    Pertinent Labs: 03-18 @ 07:35: Na 142, BUN 5<L>, Cr 0.61, BG 88, K+ 4.1, Phos 3.2, Mg 1.7, Alk Phos 65, ALT/SGPT 8<L>, AST/SGOT 15, HbA1c --    Finger Sticks: none at this time       Skin per nursing documentation: intact  Edema: none at this time     Estimated Needs:   [x] no change since previous assessment      Previous Nutrition Diagnosis: inadequate protein energy intake   Nutrition Diagnosis is now resolved as pt c much improved intake     New Nutrition Diagnosis: none at this time       Recommend  1) Continue c current diet.   2) Continue w/ Ensure Enlive and Ensure Clear.   3) Reinforced food safety precautions upon discharge, discussed all questions. Encouraged continued good PO intake and taking protein c all meals and snacks. Discussed importance of preventing unintentional wt loss and meeting nutrient needs.     Monitoring and Evaluation:     Continue to monitor Nutritional intake, Tolerance to diet prescription, weights, labs, skin integrity    RD remains available upon request and will follow up per protocol  Roxanne Theodore, MS, RD, , [facility] #[phone]

## 2019-03-18 NOTE — PROGRESS NOTE ADULT - NSHPATTENDINGPLANDISCUSS_GEN_ALL_CORE
team

## 2019-03-18 NOTE — DISCHARGE NOTE NURSING/CASE MANAGEMENT/SOCIAL WORK - NSDCFUADDAPPT_GEN_ALL_CORE_FT
You have a follow up appointment with Dr. Lou at the [facility] on Thursday, 3/21/19 at 2:40pm. Please arrive 15 minutes early to have your blood drawn   You have a follow up appointment with Lidia Chapa NP at the [facility] on Thursday, 3/28/19 at 10am. Please arrive 15 minutes early to have your blood drawn   You have a follow up appointment with Lidia Chapa NP at the [facility] on Thursday, 4/4/19 at 10am. Please arrive 15 minutes early to have your blood drawn   You have a follow up appointment with Lidia Chapa NP at the [facility] on Thursday, 4/11/19 at 10am. Please arrive 15 minutes early to have your blood drawn

## 2019-03-18 NOTE — PROGRESS NOTE ADULT - ATTENDING COMMENTS
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 Pt is Religion, and does not accept blood products for Anabaptism reasons , day +18 engrafted, clinically stable, afebrile    - on 3/15, off Zarxio; no blood work this week-end  (pt does not take blood products, thus will limit phlebotomies)  - No oral mucositis visible, pt has no pain -stopped Fentanyl patch on 3/16  -Continue Tacrolimus 1.5 mg po q 12 hrs, monitor level at next draw on 3/18  -Continue Acyclovir, Mepron, Vfend prophylaxis  -Continue VOD prophylaxis with Actigall, glutamine supplement. Off heparin since 3/6 due to increased menstrual bleeding  -bp stable -was high, now on Norvasc 5mg po daily  -Continue, folic acid, , vitamin B-12, vitamin C as per standard practice for bloodless transplant  -Anemia secondary to antineoplastic chemotherapy; Cont iron supplement.   -Antiemetics, mouth care, skin care   -OOB/ambulate.  -d/c home on 3/18 if stable 34 y/o F with h/o AML(now with 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 Pt is Mandaen, and does not accept blood products for Buddhism reasons , day +19 engrafted, clinically stable, afebrile    - on 3/15, off Zarxio; no blood work this week-end  (pt does not take blood products, thus will limit phlebotomies) Full labs today with vntr's...to consider maintenance vidazza  - No oral mucositis visible, pt has no pain -stopped Fentanyl patch on 3/16  -Continue Tacrolimus 1.5 mg po q 12 hrs, monitor level at next draw on 3/18  -Continue Acyclovir, Mepron, Vfend prophylaxis  -Continue VOD prophylaxis with Actigall, glutamine supplement. Off heparin since 3/6 due to increased menstrual bleeding..d/c on provera  -bp stable -was high, now on Norvasc 5mg po daily  -Continue, folic acid, , vitamin B-12, vitamin C as per standard practice for bloodless transplant  -Anemia secondary to antineoplastic chemotherapy; Cont iron supplement.   -Antiemetics, mouth care, skin care   -OOB/ambulate.  -d/c home ...instruction given..no gvhd...f/u thurs

## 2019-03-18 NOTE — DISCHARGE NOTE NURSING/CASE MANAGEMENT/SOCIAL WORK - NSDCDPATPORTLINK_GEN_ALL_CORE
You can access the [facility], offered by [facility], by registering with the following website: [email or web address]

## 2019-03-18 NOTE — PROGRESS NOTE ADULT - SUBJECTIVE AND OBJECTIVE BOX
HPC Transplant Team                                                      Critical / Counseling Time Provided: 30 minutes                                                                                                                                                        Chief Complaint: Allogeneic peripheral blood stem cell transplant from [facility] (sister 10/10) for treatment of AML    S: Patient seen and examined with HPC Transplant Team:     Denies mouth / tongue / throat pain, dyspnea, cough, nausea, vomiting, diarrhea, abdominal pain     Vital Signs Last 24 Hrs  T(C): 37.1 (18 Mar 2019 06:18), Max: 37.7 (18 Mar 2019 02:24)  T(F): 98.8 (18 Mar 2019 06:18), Max: 99.9 (18 Mar 2019 02:24)  HR: 90 (18 Mar 2019 06:18) (90 - 110)  BP: 124/86 (18 Mar 2019 06:18) (110/76 - 125/75)  BP(mean): --  RR: 18 (18 Mar 2019 06:18) (18 - 18)  SpO2: 100% (17 Mar 2019 21:15) (98% - 100%)    Admit weight: 87.9 Kg   Daily weight:     I&O's Summary    17 Mar 2019 07:01  -  18 Mar 2019 07:00  --------------------------------------------------------  IN: 2325 mL / OUT: 1350 mL / NET: 975 mL    PE:   Oropharynx: no erythema or ulcerations in oropharynx  Oral Mucositis:    +                                         ndGndrndanddndend:nd nd2nd CVS: (+) S1/S2 + mild tachy regular  Lungs: CTA throughout bilaterally  Abdomen: (+)BS x 4, soft, NT/ND   Extremities: no edema  in B/L LE  Gastric Mucositis:       +                                          rdGrdrrdarddrderd:rd rd3rd Intestinal Mucositis:     -                                          Grade: n/a  Skin: no rash, patches of darker areas.   TLC: PICC line C/D/I  Neuro: A&O x 3   Pain: denies pain     Labs:                         11.7   4.2   )-----------( 338      ( 18 Mar 2019 07:35 )             34.8     03-18    142  |  108  |  5<L>  ----------------------------<  88  4.1   |  22  |  0.61    Ca    9.7      18 Mar 2019 07:35  Phos  3.2     03-18  Mg     1.7     03-18    TPro  6.5  /  Alb  3.9  /  TBili  0.2  /  DBili  x   /  AST  15  /  ALT  8<L>  /  AlkPhos  65  03-18    CAPILLARY BLOOD GLUCOSE    Cultures:   no recent    Cytomegalovirus By PCR (03.14.19 @ 09:56)    Cytomegalovirus By PCR:   NotDetechristophe    CMVPCR Log: NotDetec: Assay lower limit of detection (LOD):  31.2 IU/mL (1.49 log10/mL)  Assay dynamic range:  50 to 156,000,000 (156M) CMV DNA IU/mL (1.70 log10/mL – 8.19 log10/mL)  Plasma CMV DNA Quantification by PCR using Abbott m2000:  The results of this test shouldbe interpreted with consideration of all  clinical and laboratory findings. In particular, caution should be used  when interpreting low level positive results when the test is used for  diagnostic purposes. Repeat testing on an additional sample is  recommended to confirm low level positive results. A result of "Not  Detected" does not preclude the possibility of infection with CMV.  CMV    Radiology:   Xray Sinuses Paranasal (09.12.18 @ 13:20)  No air-fluid levels within the paranasal sinuses. No lytic or blastic   lesion.    MEDICATIONS  (STANDING):  acetaminophen   Tablet .. 650 milliGRAM(s) Oral every 6 hours  acetaminophen   Tablet .. 650 milliGRAM(s) Oral <User Schedule>  acyclovir   Oral Tab/Cap 400 milliGRAM(s) Oral every 8 hours  amLODIPine   Tablet 5 milliGRAM(s) Oral daily  ascorbic acid 250 milliGRAM(s) Oral daily  atovaquone Suspension 750 milliGRAM(s) Oral every 12 hours  Biotene Dry Mouth Oral Rinse 5 milliLiter(s) Swish and Spit five times a day  chlorhexidine 4% Liquid 1 Application(s) Topical <User Schedule>  cholecalciferol 400 Unit(s) Oral daily  clotrimazole Lozenge 1 Lozenge Oral five times a day  cyanocobalamin 1000 MICROGram(s) Oral daily  diphenhydrAMINE   Injectable 25 milliGRAM(s) IV Push every 3 weeks  docusate sodium 100 milliGRAM(s) Oral three times a day  folic acid 1 milliGRAM(s) Oral daily  immune   globulin 10% (GAMMAGARD) IVPB 20 Gram(s) IV Intermittent <User Schedule>  lidocaine/prilocaine Cream 1 Application(s) Topical daily  magnesium oxide 400 milliGRAM(s) Oral three times a day with meals  medroxyPROGESTERone 20 milliGRAM(s) Oral daily  montelukast 10 milliGRAM(s) Oral daily  multivitamin 1 Tablet(s) Oral daily  pantoprazole    Tablet 40 milliGRAM(s) Oral before breakfast  sodium bicarbonate Mouth Rinse 10 milliLiter(s) Swish and Spit five times a day  sodium chloride 0.9%. 1000 milliLiter(s) (20 mL/Hr) IV Continuous <Continuous>  tacrolimus 1.5 milliGRAM(s) Oral every 12 hours  ursodiol Capsule 300 milliGRAM(s) Oral two times a day with meals  voriconazole 200 milliGRAM(s) Oral every 12 hours    MEDICATIONS  (PRN):  acetaminophen   Tablet .. 650 milliGRAM(s) Oral every 6 hours PRN Temp greater or equal to 38C (100.4F), Mild Pain (1 - 3)  artificial  tears Solution 1 Drop(s) Both EYES three times a day PRN Dry Eyes  benzocaine 15 mG/menthol 3.6 mG Lozenge 1 Lozenge Oral every 3 hours PRN Sore Throat  diphenhydrAMINE   Injectable 25 milliGRAM(s) IV Push every 4 hours PRN Allergy symptoms  FIRST- Mouthwash  BLM 5 milliLiter(s) Swish and Spit every 8 hours PRN Mouth Care  metoclopramide Injectable 10 milliGRAM(s) IV Push every 6 hours PRN Nausea and/or Vomiting  senna 2 Tablet(s) Oral two times a day PRN Constipation  sodium chloride 0.65% Nasal 1 Spray(s) Both Nostrils five times a day PRN Nasal Congestion  sodium chloride 0.9% lock flush 10 milliLiter(s) IV Push every 1 hour PRN Pre/post blood products, medications, blood draw, and to maintain line patency    A/P:  33 year old female with a history of AML  Post:  Allogeneic PBSCT Day +19  Labs every other day ; continue vitamin C, vitamin B, folic acid, vitamin K & iron supplementation. Increased vitamin K to every other day as per standard guidelines for bloodless transplant  Continue Provera 20 mg PO daily  BP- 140s, started on Norvasc.  Hold for SBP <120; consider reducing to 2.5 mg qd  Mucositis likely related to MTX and meds changed to IV 3/8, now resolved.   Zarxio held  impending engraftment. Epo d/c'ed.  Last Venofer on 3/8.  VNTR/Tacro/CMV drawn this AM.    1. Infectious Disease:   acyclovir   Oral Tab/Cap 400 milliGRAM(s) Oral every 8 hours  atovaquone Suspension 750 milliGRAM(s) Oral every 12 hours  clotrimazole Lozenge 1 Lozenge Oral five times a day  voriconazole 200 milliGRAM(s) Oral every 12 hours    2. VOD Prophylaxis: Actigall, Glutamine, Heparin (dosed at 100 units / kg / day)     3. GI Prophylaxis:  Protonix 1 Tablet 40 milliGRAM(s) Oral before breakfast    4. Mouthcare - NS / NaHCO3 rinses, Mycelex, Caphosol; Skin care     5. GVHD prophylaxis   tacrolimus 1.5 milliGRAM(s) Oral every 12 hours  Tacro level on 3/18    6. Transfuse & replete electrolytes prn   No transfusions or electrolyte repletions required.     7. IV hydration, daily weights, strict I&O, prn diuresis   NS @ 20 ml/hr    8. PO intake as tolerated, nutrition follow up as needed, MVI, folic acid     9. Antiemetics, anti-diarrhea medications:   metoclopramide Injectable 10 milliGRAM(s) IV Push every 6 hours PRN     10. OOB as tolerated, physical therapy consult if needed     11. Monitor coags / fibrinogen 2x week, vitamin K as needed     12. Monitor closely for clinical changes, monitor for fevers     13. Emotional support provided, plan of care discussed and questions addressed     14. Patient education done regarding chemotherapy prep, plan of care, restrictions and discharge planning     15. Continue regular social work input     I have written the above note for Dr. Lou, who performed service with me in the room.   Dulce Maria Villatoro, ANP-BC [phone]    I have seen and examined patient with PA, I agree with above note as scribed. HPC Transplant Team                                                      Critical / Counseling Time Provided: 30 minutes                                                                                                                                                        Chief Complaint: Allogeneic peripheral blood stem cell transplant from [facility] (sister 10/10) for treatment of AML    S: Patient seen and examined with HPC Transplant Team:     no complaint  Denies mouth / tongue / throat pain, dyspnea, cough, nausea, vomiting, diarrhea, abdominal pain       Vital Signs Last 24 Hrs  T(C): 37.1 (18 Mar 2019 06:18), Max: 37.7 (18 Mar 2019 02:24)  T(F): 98.8 (18 Mar 2019 06:18), Max: 99.9 (18 Mar 2019 02:24)  HR: 90 (18 Mar 2019 06:18) (90 - 110)  BP: 124/86 (18 Mar 2019 06:18) (110/76 - 125/75)  BP(mean): --  RR: 18 (18 Mar 2019 06:18) (18 - 18)  SpO2: 100% (17 Mar 2019 21:15) (98% - 100%)      Admit weight: 87.9 Kg   Daily weight: 87.5 kg     I&O's Summary    17 Mar 2019 07:01  -  18 Mar 2019 07:00  --------------------------------------------------------  IN: 2325 mL / OUT: 1350 mL / NET: 975 mL      PE:   Oropharynx: no erythema or ulcerations in oropharynx  Oral Mucositis:    +                                         ndGndrndanddndend:nd nd2nd CVS: (+) S1/S2 + mild tachy regular  Lungs: CTA throughout bilaterally  Abdomen: (+)BS x 4, soft, NT/ND   Extremities: no edema  in B/L LE  Gastric Mucositis:       +                                          stGstrstastdstest:st st1st Intestinal Mucositis:     -                                          Grade: n/a  Skin: no rash, patches of darker areas.   TLC: PICC line C/D/I  Neuro: A&O x 3   Pain: denies pain     Labs:                         11.7   4.2   )-----------( 338      ( 18 Mar 2019 07:35 )             34.8     03-18    142  |  108  |  5<L>  ----------------------------<  88  4.1   |  22  |  0.61    Ca    9.7      18 Mar 2019 07:35  Phos  3.2     03-18  Mg     1.7     03-18    TPro  6.5  /  Alb  3.9  /  TBili  0.2  /  DBili  x   /  AST  15  /  ALT  8<L>  /  AlkPhos  65  03-18      Cultures:     Cytomegalovirus By PCR (03.14.19 @ 09:56)    Cytomegalovirus By PCR:   Cornellte    CMVPCR Log: NotDetec: Assay lower limit of detection (LOD):  31.2 IU/mL (1.49 log10/mL)  Assay dynamic range:  50 to 156,000,000 (156M) CMV DNA IU/mL (1.70 log10/mL – 8.19 log10/mL)  Plasma CMV DNA Quantification by PCR using Abbott m2000:  The results of this test shouldbe interpreted with consideration of all  clinical and laboratory findings. In particular, caution should be used  when interpreting low level positive results when the test is used for  diagnostic purposes. Repeat testing on an additional sample is  recommended to confirm low level positive results. A result of "Not  Detected" does not preclude the possibility of infection with CMV.  CMV      MEDICATIONS  (STANDING):  acetaminophen   Tablet .. 650 milliGRAM(s) Oral every 6 hours  acetaminophen   Tablet .. 650 milliGRAM(s) Oral <User Schedule>  acyclovir   Oral Tab/Cap 400 milliGRAM(s) Oral every 8 hours  amLODIPine   Tablet 5 milliGRAM(s) Oral daily  ascorbic acid 250 milliGRAM(s) Oral daily  atovaquone Suspension 750 milliGRAM(s) Oral every 12 hours  Biotene Dry Mouth Oral Rinse 5 milliLiter(s) Swish and Spit five times a day  chlorhexidine 4% Liquid 1 Application(s) Topical <User Schedule>  cholecalciferol 400 Unit(s) Oral daily  clotrimazole Lozenge 1 Lozenge Oral five times a day  cyanocobalamin 1000 MICROGram(s) Oral daily  diphenhydrAMINE   Injectable 25 milliGRAM(s) IV Push every 3 weeks  docusate sodium 100 milliGRAM(s) Oral three times a day  folic acid 1 milliGRAM(s) Oral daily  immune   globulin 10% (GAMMAGARD) IVPB 20 Gram(s) IV Intermittent <User Schedule>  lidocaine/prilocaine Cream 1 Application(s) Topical daily  magnesium oxide 400 milliGRAM(s) Oral three times a day with meals  medroxyPROGESTERone 20 milliGRAM(s) Oral daily  montelukast 10 milliGRAM(s) Oral daily  multivitamin 1 Tablet(s) Oral daily  pantoprazole    Tablet 40 milliGRAM(s) Oral before breakfast  sodium bicarbonate Mouth Rinse 10 milliLiter(s) Swish and Spit five times a day  sodium chloride 0.9%. 1000 milliLiter(s) (20 mL/Hr) IV Continuous <Continuous>  tacrolimus 1.5 milliGRAM(s) Oral every 12 hours  ursodiol Capsule 300 milliGRAM(s) Oral two times a day with meals  voriconazole 200 milliGRAM(s) Oral every 12 hours    MEDICATIONS  (PRN):  acetaminophen   Tablet .. 650 milliGRAM(s) Oral every 6 hours PRN Temp greater or equal to 38C (100.4F), Mild Pain (1 - 3)  artificial  tears Solution 1 Drop(s) Both EYES three times a day PRN Dry Eyes  benzocaine 15 mG/menthol 3.6 mG Lozenge 1 Lozenge Oral every 3 hours PRN Sore Throat  diphenhydrAMINE   Injectable 25 milliGRAM(s) IV Push every 4 hours PRN Allergy symptoms  FIRST- Mouthwash  BLM 5 milliLiter(s) Swish and Spit every 8 hours PRN Mouth Care  metoclopramide Injectable 10 milliGRAM(s) IV Push every 6 hours PRN Nausea and/or Vomiting  senna 2 Tablet(s) Oral two times a day PRN Constipation  sodium chloride 0.65% Nasal 1 Spray(s) Both Nostrils five times a day PRN Nasal Congestion  sodium chloride 0.9% lock flush 10 milliLiter(s) IV Push every 1 hour PRN Pre/post blood products, medications, blood draw, and to maintain line patency    A/P:  33 year old female with a history of AML  Post:  Allogeneic PBSCT Day +19  Labs every other day ; continue vitamin C, vitamin B, folic acid, vitamin K & iron supplementation. Increased vitamin K to every other day as per standard guidelines for bloodless transplant  Continue Provera 20 mg PO daily  BP- 140s, started on Norvasc.  Hold for SBP <120; consider reducing to 2.5 mg qd  Mucositis likely related to MTX and meds changed to IV 3/8, now resolved.   Zarxio held  impending engraftment. Epo d/c'ed.  Last Venofer on 3/8.  VNTR/Tacro/CMV drawn this AM.    1. Infectious Disease:   acyclovir   Oral Tab/Cap 400 milliGRAM(s) Oral every 8 hours  atovaquone Suspension 750 milliGRAM(s) Oral every 12 hours  clotrimazole Lozenge 1 Lozenge Oral five times a day  voriconazole 200 milliGRAM(s) Oral every 12 hours    2. VOD Prophylaxis: Actigall, Glutamine  Heparin d/c'ed since pt is being discharge home today     3. GI Prophylaxis:  Protonix 1 Tablet 40 milliGRAM(s) Oral before breakfast    4. Mouthcare - NS / NaHCO3 rinses, Mycelex, Biotene; Skin care     5. GVHD prophylaxis   tacrolimus 1.5 milliGRAM(s) Oral every 12 hours  Tacro level on 3/18, pending result     6. Transfuse & replete electrolytes prn   No transfusions or electrolyte repletions required.     7. IV hydration, daily weights, strict I&O, prn diuresis   NS @ 20 ml/hr    8. PO intake as tolerated, nutrition follow up as needed, MVI, folic acid     9. Antiemetics, anti-diarrhea medications:   metoclopramide Injectable 10 milliGRAM(s) IV Push every 6 hours PRN     10. OOB as tolerated, physical therapy consult if needed     11. Monitor coags / fibrinogen 2x week, vitamin K as needed     12. Monitor closely for clinical changes, monitor for fevers     13. Emotional support provided, plan of care discussed and questions addressed     14. Patient education done regarding chemotherapy prep, plan of care, restrictions and discharge planning   Stable for dc home today    15. Continue regular social work input     I have written the above note for Dr. Branden Lou, who performed service with me in the room.   Darlin Marino, ANP-BC [phone]    I have seen and examined patient with PA, I agree with above note as scribed.

## 2019-03-18 NOTE — PROGRESS NOTE ADULT - PROVIDER SPECIALTY LIST ADULT
BMT/SCT
Intervent Radiology
Palliative Care
BMT/SCT

## 2019-03-19 ENCOUNTER — INBOUND DOCUMENT (OUTPATIENT)
Age: 34
End: 2019-03-19

## 2019-03-21 ENCOUNTER — LABORATORY RESULT (OUTPATIENT)
Age: 34
End: 2019-03-21

## 2019-03-21 ENCOUNTER — RESULT REVIEW (OUTPATIENT)
Age: 34
End: 2019-03-21

## 2019-03-21 ENCOUNTER — APPOINTMENT (OUTPATIENT)
Dept: HEMATOLOGY ONCOLOGY | Facility: CLINIC | Age: 34
End: 2019-03-21
Payer: COMMERCIAL

## 2019-03-21 VITALS
TEMPERATURE: 98.2 F | SYSTOLIC BLOOD PRESSURE: 128 MMHG | OXYGEN SATURATION: 99 % | HEART RATE: 110 BPM | BODY MASS INDEX: 35.69 KG/M2 | RESPIRATION RATE: 17 BRPM | WEIGHT: 195.11 LBS | DIASTOLIC BLOOD PRESSURE: 74 MMHG

## 2019-03-21 LAB
ENGRAFTMENT - POST: SIGNIFICANT CHANGE UP
HCT VFR BLD CALC: 36.1 % — SIGNIFICANT CHANGE UP (ref 34.5–45)
HGB BLD-MCNC: 12.6 G/DL — SIGNIFICANT CHANGE UP (ref 11.5–15.5)
MCHC RBC-ENTMCNC: 32.7 PG — SIGNIFICANT CHANGE UP (ref 27–34)
MCHC RBC-ENTMCNC: 34.9 G/DL — SIGNIFICANT CHANGE UP (ref 32–36)
MCV RBC AUTO: 93.7 FL — SIGNIFICANT CHANGE UP (ref 80–100)
PLATELET # BLD AUTO: 327 K/UL — SIGNIFICANT CHANGE UP (ref 150–400)
RBC # BLD: 3.86 M/UL — SIGNIFICANT CHANGE UP (ref 3.8–5.2)
RBC # FLD: 16.9 % — HIGH (ref 10.3–14.5)
WBC # BLD: 5.8 K/UL — SIGNIFICANT CHANGE UP (ref 3.8–10.5)
WBC # FLD AUTO: 5.8 K/UL — SIGNIFICANT CHANGE UP (ref 3.8–10.5)

## 2019-03-21 PROCEDURE — 99215 OFFICE O/P EST HI 40 MIN: CPT

## 2019-03-21 NOTE — HISTORY OF PRESENT ILLNESS
[de-identified] : Ms. 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 Of note patient is a [employer] and cannot receive blood products. \par \par Patient was noted to be newly leukopenic to WBC 1.7 during preoperative evaluation in anticipation of right ankle ligament repair. Bone marrow aspirate and biopsy at [facility] on 12/2/2017 revealed AML with some suspicion for APL. She was transferred to [place] for further management. Peripheral counts from around this time are not available. \par \par Repeat marrow on 12/26/2017 showed 90% blasts with myeloid mutation arrest in a 40-50% cellular marrow. On flow blasts were negative for CD34 and HLA-DR; positive for CD38, CD58, CD33, and MPO, and partially positive for CD15, CD11b, , and CD64. CD45 was reported as dim. Karyotype showed +21 in 3 of 21 metaphases, but no t(15:17) was detected on karyotype of FISH. Molecular studies showed mutations in NPM1 (45%0, IDH1 (R132S; 39%), PTPN1 (23%), and CSF3R (5%) genes. There was lingering diagnostic uncertainty due to a largely aspicular and hypocellular specimen, and thus a marrow was repeated on 1/2/2018, confirming AML.\par \par On 1/4/2018, Ms. Gary began 7+3 with Idarubicin. Her induction course was complicated by DIC, neutropenic fever/ Klebsiella PNA, and bilateral retinal hemorrhage. She did not receive blood products and was instead supported with Nplate, Procrit, IV iron, Amicar, and Lupron. After achieving count stability, the patient was discharged; she was not deemed a candidate for further therapy given inability to receive transfusions and a day 14 marrow was not obtained. She subsequently saw Dr. [place] at [facility]. He obtained a recovery marrow on 2/13/2018 (day +40), which was 30% cellular with 45% blasts. 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 Cytogenetics and FISH were normal. \par \par She then established care with Dr. Christie for consideration of salvage therapy with IDH1 inhibition given the risks of additional chemotherapy without blood products support. [place] [facility], showed 69% blasts. [facility] panel confirmed IDH1 R132S, NPM1 A284Kpm 12, and CSF3R D810G mutations. She was started on Ivosidenib on [facility] protocol  and had completed four cycles to date (6/28/2018 = C5D1). Marrow blasts increased to 85% on 4/30 but declined to 36% by 5/31. A  bone marrow aspirate and biopsy  was obtained  (6/28/2018); preliminary results show further responding disease with 10.3% blasts by flow cytometry. Subsequent marrow in oct showed 5% blasts...she was then started on vidazza plus venetoclax. She completed 2 cycles...f/u marrow with 4% blasts..... [de-identified] : Patient presents today for a follow up s/p AlloPSCT sister [facility] (10/10) on 2/27/19, hospital course below. She is accompanied by her . Overall she reports doing well. She reports diffuse skin hyperpigmentation, but denies any irritation. She also c/o persistent rhinorrhea. Patient notes some ageusia. Otherwise she offers no acute concerns. Denies fever/chills, night sweats, mouth sores, eye dryness, blurred vision, nausea, vomiting, diarrhea. No CP, SOB or LE edema. She reports a healthy appetite. Patient is on Tacrolimus 1.5 mg BID, which she did not take today. \par \par Hospital Course:	 \par Ms. 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 She is a [employer] and cannot receive blood products. \par \par Ms. Nickerson's donor is her sister.  She is a 10/10 match. Both donor and patient are CMV +. She had completed all pre testing needed for transplant.  She will \par start reduced intensity Fludarabine/Busulfan chemo regimen today. \par \par Upon admission, a right AC PICC line was placed in IR. Ms. Gary received IV hydration, pain management, antiemetics, anxiolysis, and antibacterial / antiviral / antifungal / PCP / GI and VOD (SOS) prophylaxis. Labs were monitored every other day, and she received electrolyte repletion as needed. She also received vitamin K, vitamin B, supplemental iron, vitamin C and procrit as directed by the institutional bloodless transplant policy. \par \par On 2/27/19, Ms. Gary received 363 mLs of fresh, apheresed, mobilized, related allogeneic HPC over 80 minutes. Cell counts as follows: \par Total MNCs ( x 10^8/kg): 6.21 \par CD 34+Cells ( x 10^6/kg): 3.37 \par CD3+ Cells ( x 10^7/kg): 12.46 \par Cell Viability (%): 100% \par \par Engraftment was noted on 3/11/19. Ms. Gary did not receive zarxio due to her having minimal residual disease on admission, and a reduced intensity preparatory regimen to avoid stimulating myeloblasts. \par \par Ms. Gary had a relatively uncomplicated transplant course. She did experience menses, which was suppressed with provera. She also had pancytopenia related to the high dose chemotherapy preparative regimen. She also experienced grade 1 oral mucositis, and grade 2 GI mucositis also related to the chemotherapy preparative regimen. Both were treated with supportive care. \par \par Currently, Ms. Gary is stable for discharge home with outpatient follow up at the [facility].

## 2019-03-21 NOTE — PHYSICAL EXAM
[Ambulatory and capable of all self care but unable to carry out any work activities] : Status 2- Ambulatory and capable of all self care but unable to carry out any work activities. Up and about more than 50% of waking hours [Normal] : affect appropriate [de-identified] : diffuse hyperpigmentation

## 2019-03-21 NOTE — REVIEW OF SYSTEMS
[Negative] : Allergic/Immunologic [FreeTextEntry4] : ageusia, rhinorrhea [de-identified] : diffuse skin hyperpigmentation

## 2019-03-21 NOTE — RESULTS/DATA
[FreeTextEntry1] : Bone marrow biopsy 01/24/19:\par -Persistent minimal involvement by acute myeloid leukemia (4% blasts by aspirate differential count).\par -Hypocellular marrow with erythroid predominate trilineage maturing hematopoiesis. \par \par Bone marrow biopsy 09/28/18:\par -Trilineage hematopoiesis with maturation, increased myeloblasts (5% of cells), focally increased megakaryocytes and presents iron stores (history of AML with mutated NPM1 and [id number]).\par

## 2019-03-21 NOTE — ASSESSMENT
[FreeTextEntry1] : Ms. Gary is a 33 years old female with AML, presenting today for a f/u s/p Allo PSCT sister [facility] (10/10) on 2/27/19. Patient had a relatively uncomplicated transplant course. She had pancytopenia related to the high dose chemotherapy preparative regimen. She also experienced grade 1 oral mucositis, and grade 2 GI mucositis also related to the chemotherapy preparative regimen. Both were treated with supportive care. She was discharged on 3/18/19. Patient is a practicing Jewish. Accompanied by her  today. \par No gvhd...chimerism sent when wbc was 1 was 82% donor...f/u testing from 3/18\par Tacrolimus 1.5 mg BID. Pending today's level.\par Voriconazole \par Acyclovir 400 mg Q8h - to continue for 6 months post transplant.\par Atovaquone 750 mg/5 mL- 5 ml Q12h - to continue for 6 months post transplant.\par Pantoprazole 40 mg oral QD - PRN \par Ursodiol 300 mg BID\par Magnesium oxide 400 mg TID\par Multiple Vitamins QD\par Folic acid 1 mg QD\par \par Peripheral blood work was reviewed and and discussed with patient. No transfusions accepted by pt\par Labs sent today CMP, LDH, Mg, Tacrolimus level, CMV, Chimerism. \par Advised to maintain post-transplant diet and crowd restrictions. \par Advised to closely monitor for aGVHD si/sx including but not limited to severe diarrhea, nausea, vomiting, rash, mouth sores, dry or pain in the eyes. \par Well care stressed, question addressed, support provided. \par \par Maintain weekly appointments. Next visit is with JOLEEN Murillo on 3/28. Patient advised to contact immediately with any concerns or symptoms. Will try to obtain pediatric blood tubes.

## 2019-03-21 NOTE — ADDENDUM
[FreeTextEntry1] : Documented by Yolanda López acting as a scribe for Dr. Branden Lou on 3/21/2019.\par \par All medical record entries made by the Scribe were at my, Dr. Branden Lou's, direction and personally dictated by me on 3/21/2019. I have reviewed the chart and agree that  the record accurately reflects my personal performance of the history, physical exam, assessment and plan. I have also personally directed, reviewed, and agree with the discharge instructions.\par \par

## 2019-03-22 LAB
ALBUMIN SERPL ELPH-MCNC: 4.4 G/DL
ALP BLD-CCNC: 73 U/L
ALT SERPL-CCNC: 7 U/L
ANION GAP SERPL CALC-SCNC: 12 MMOL/L
AST SERPL-CCNC: 14 U/L
BILIRUB SERPL-MCNC: 0.2 MG/DL
BUN SERPL-MCNC: 6 MG/DL
CALCIUM SERPL-MCNC: 9.4 MG/DL
CHLORIDE SERPL-SCNC: 108 MMOL/L
CMV DNA SPEC QL NAA+PROBE: NOT DETECTED
CMVPCR LOG: NOT DETECTED LOGIU/ML
CO2 SERPL-SCNC: 23 MMOL/L
CREAT SERPL-MCNC: 0.78 MG/DL
GLUCOSE SERPL-MCNC: 111 MG/DL
LDH SERPL-CCNC: 291 U/L
MAGNESIUM SERPL-MCNC: 1.8 MG/DL
POTASSIUM SERPL-SCNC: 4.6 MMOL/L
PROT SERPL-MCNC: 6.8 G/DL
SODIUM SERPL-SCNC: 143 MMOL/L
TACROLIMUS SERPL-MCNC: 7.3 NG/ML

## 2019-03-25 LAB
ENGRAFTMENT - POST: SIGNIFICANT CHANGE UP

## 2019-03-28 ENCOUNTER — OUTPATIENT (OUTPATIENT)
Dept: OUTPATIENT SERVICES | Facility: HOSPITAL | Age: 34
LOS: 1 days | End: 2019-03-28

## 2019-03-28 ENCOUNTER — RESULT REVIEW (OUTPATIENT)
Age: 34
End: 2019-03-28

## 2019-03-28 ENCOUNTER — APPOINTMENT (OUTPATIENT)
Dept: HEMATOLOGY ONCOLOGY | Facility: CLINIC | Age: 34
End: 2019-03-28
Payer: COMMERCIAL

## 2019-03-28 VITALS
HEART RATE: 109 BPM | DIASTOLIC BLOOD PRESSURE: 84 MMHG | SYSTOLIC BLOOD PRESSURE: 119 MMHG | WEIGHT: 195.77 LBS | TEMPERATURE: 98.9 F | RESPIRATION RATE: 18 BRPM | OXYGEN SATURATION: 100 % | BODY MASS INDEX: 35.81 KG/M2

## 2019-03-28 DIAGNOSIS — Z90.3 ACQUIRED ABSENCE OF STOMACH [PART OF]: Chronic | ICD-10-CM

## 2019-03-28 DIAGNOSIS — Z94.84 STEM CELLS TRANSPLANT STATUS: ICD-10-CM

## 2019-03-28 LAB
BASOPHILS # BLD AUTO: 0 K/UL — SIGNIFICANT CHANGE UP (ref 0–0.2)
BASOPHILS NFR BLD AUTO: 0.6 % — SIGNIFICANT CHANGE UP (ref 0–2)
EOSINOPHIL # BLD AUTO: 0 K/UL — SIGNIFICANT CHANGE UP (ref 0–0.5)
EOSINOPHIL NFR BLD AUTO: 0.1 % — SIGNIFICANT CHANGE UP (ref 0–6)
HCT VFR BLD CALC: 37 % — SIGNIFICANT CHANGE UP (ref 34.5–45)
HGB BLD-MCNC: 12.9 G/DL — SIGNIFICANT CHANGE UP (ref 11.5–15.5)
LYMPHOCYTES # BLD AUTO: 1 K/UL — SIGNIFICANT CHANGE UP (ref 1–3.3)
LYMPHOCYTES # BLD AUTO: 16.6 % — SIGNIFICANT CHANGE UP (ref 13–44)
MCHC RBC-ENTMCNC: 33.2 PG — SIGNIFICANT CHANGE UP (ref 27–34)
MCHC RBC-ENTMCNC: 34.8 G/DL — SIGNIFICANT CHANGE UP (ref 32–36)
MCV RBC AUTO: 95.5 FL — SIGNIFICANT CHANGE UP (ref 80–100)
MONOCYTES # BLD AUTO: 0.8 K/UL — SIGNIFICANT CHANGE UP (ref 0–0.9)
MONOCYTES NFR BLD AUTO: 12.8 % — SIGNIFICANT CHANGE UP (ref 2–14)
NEUTROPHILS # BLD AUTO: 4.1 K/UL — SIGNIFICANT CHANGE UP (ref 1.8–7.4)
NEUTROPHILS NFR BLD AUTO: 69.9 % — SIGNIFICANT CHANGE UP (ref 43–77)
PLATELET # BLD AUTO: 266 K/UL — SIGNIFICANT CHANGE UP (ref 150–400)
RBC # BLD: 3.88 M/UL — SIGNIFICANT CHANGE UP (ref 3.8–5.2)
RBC # FLD: 16 % — HIGH (ref 10.3–14.5)
WBC # BLD: 5.9 K/UL — SIGNIFICANT CHANGE UP (ref 3.8–10.5)
WBC # FLD AUTO: 5.9 K/UL — SIGNIFICANT CHANGE UP (ref 3.8–10.5)

## 2019-03-28 PROCEDURE — 99214 OFFICE O/P EST MOD 30 MIN: CPT

## 2019-03-28 NOTE — ASSESSMENT
[FreeTextEntry1] : Ms. Gary is a 33 years old female Cheondoism with AML with the following comorbidities being managed:\par \par 1) AML\par S/p HLA 10/10 allo sib (sister) PBSCT on 2/27/19\par 3/13/19 chimerism 82.2%, repeat chimerism from 3/21 and 3/29 pending\par Post transplant BMbx pending\par \par 2) Heme\par Counts stable\par    WBC 5.9   ANC 4.1   Hgb 12.9   \par No transfusions as pt is practicing Cheondoism\par Continue multivitamin and folic acid daily\par \par 3) ID\par Continue ppx:\par - Acyclovir 400mg tid\par - Voriconazole 200mg bid\par - Mepron 750mg bid\par 3/21/19 CMV PCR not detected, continue to monitor weekly \par Post transplant restrictions reviewed\par \par 4) GVHD\par Skin 0   Liver 0   GI 0 - overall grade 0\par No signs of acute/chronic GVHD at this time\par Continue FK 1mg bid - pending today's level and chimerism \par Signs/symptoms of GVHD reviewed\par \par 5) GI\par Continue ppx:\par - Ursodiol 300mg bid\par May use PRN Tums or Pepcid for reflux following acyclovir at night, if persists will start Protonix \par PRN Zofran and Reglan for nausea\par \par 6) Other \par Hypomagnesemia - likely 2/2 tacrolimus, continue magnesium oxide 400mg tid\par Menses suppression - continue Provera 10mg daily, pt knows to notify provider if experiences vaginal bleeding \par HTN - continue amlodipine 5mg daily, BP remains WNL\par Allergies - continue Singulair 10mg daily, may use daily Claritin or similar anti-histamine for ongoing allergy symptoms \par \par 7) Plan/Dispo\par Pt and  educated regarding plan of care, all questions/concerns addressed\par Pt knows to call our office immediately if develops fever of 100.4F or higher or with any new/worsening symptoms \par F/u in 1wk with NP on 4/4/19\par \par

## 2019-03-28 NOTE — REASON FOR VISIT
[Follow-Up Visit] : a follow-up visit for [Acute Myeloid Leukemia] : acute myeloid leukemia [Spouse] : spouse [FreeTextEntry2] : s/p HLA 10/10 haplo sib (sister) SCT on 3/27/19

## 2019-03-28 NOTE — RESULTS/DATA
[FreeTextEntry1] : CBC reviewed with pt\par \par Bone marrow biopsy 01/24/19:\par -Persistent minimal involvement by acute myeloid leukemia (4% blasts by aspirate differential count).\par -Hypocellular marrow with erythroid predominate trilineage maturing hematopoiesis. \par \par Bone marrow biopsy 09/28/18:\par -Trilineage hematopoiesis with maturation, increased myeloblasts (5% of cells), focally increased megakaryocytes and presents iron stores (history of AML with mutated NPM1 and [id number]).\par

## 2019-03-28 NOTE — HISTORY OF PRESENT ILLNESS
[de-identified] : Ms. Gary is a 32 y/o female with AML associated with normal karyotype and NPM1 ans [id number] mutation, refractory to 7+3 and currently receiving Ivosidenib. Of note patient is a [employer] and cannot receive blood products. \par \par Patient was noted to be newly leukopenic to WBC 1.7 during preoperative evaluation in anticipation of right ankle ligament repair. Bone marrow aspirate and biopsy at [facility] on 12/2/2017 revealed AML with some suspicion for APL. She was transferred to [place] for further management. Peripheral counts from around this time are not available. \par \par Repeat marrow on 12/26/2017 showed 90% blasts with myeloid mutation arrest in a 40-50% cellular marrow. On flow blasts were negative for CD34 and HLA-DR; positive for CD38, CD58, CD33, and MPO, and partially positive for CD15, CD11b, , and CD64. CD45 was reported as dim. Karyotype showed +21 in 3 of 21 metaphases, but no t(15:17) was detected on karyotype of FISH. Molecular studies showed mutations in NPM1 (45%0, IDH1 (R132S; 39%), PTPN1 (23%), and CSF3R (5%) genes. There was lingering diagnostic uncertainty due to a largely aspicular and hypocellular specimen, and thus a marrow was repeated on 1/2/2018, confirming AML.\par \par On 1/4/2018, Ms. Gary began 7+3 with Idarubicin. Her induction course was complicated by DIC, neutropenic fever/ Klebsiella PNA, and bilateral retinal hemorrhage. She did not receive blood products and was instead supported with Nplate, Procrit, IV iron, Amicar, and Lupron. After achieving count stability, the patient was discharged; she was not deemed a candidate for further therapy given inability to receive transfusions and a day 14 marrow was not obtained. She subsequently saw Dr. [place] Obrien at [facility]. He obtained a recovery marrow on 2/13/2018 (day +40), which was 30% cellular with 45% blasts. 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 Cytogenetics and FISH were normal. \par \par She then established care with Dr. Christie for consideration of salvage therapy with IDH1 inhibition given the risks of additional chemotherapy without blood products support. Initial marrow at [facility], on 3/1/2018, showed 69% blasts. ThunderBolts panel confirmed IDH1 R132S, NPM1 C376Cmf 12, and CSF3R D810G mutations. She was started on Ivosidenib on [facility] protocol  and had completed four cycles to date (6/28/2018 = C5D1). Marrow blasts increased to 85% on 4/30 but declined to 36% by 5/31. A  bone marrow aspirate and biopsy  was obtained  (6/28/2018); preliminary results show further responding disease with 10.3% blasts by flow cytometry. Subsequent marrow in oct showed 5% blasts...she was then started on vidazza plus venetoclax. She completed 2 cycles...f/u marrow with 4% blasts.\par \par BMT Hospital Course:	 \par Ms. 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 She is a [employer] and cannot receive blood products. \par \par Ms. Nickerson's donor is her sister.  She is a 10/10 match. Both donor and patient are CMV +. She had completed all pre testing needed for transplant.  She will \par start reduced intensity Fludarabine/Busulfan chemo regimen today. \par \par Upon admission, a right AC PICC line was placed in IR. Ms. Gary received IV hydration, pain management, antiemetics, anxiolysis, and antibacterial / antiviral / antifungal / PCP / GI and VOD (SOS) prophylaxis. Labs were monitored every other day, and she received electrolyte repletion as needed. She also received vitamin K, vitamin B, supplemental iron, vitamin C and procrit as directed by the institutional bloodless transplant policy. \par \par On 2/27/19, Ms. Gary received 363 mLs of fresh, apheresed, mobilized, related allogeneic HPC over 80 minutes. Cell counts as follows: \par Total MNCs ( x 10^8/kg): 6.21 \par CD 34+Cells ( x 10^6/kg): 3.37 \par CD3+ Cells ( x 10^7/kg): 12.46 \par Cell Viability (%): 100% \par \par Engraftment was noted on 3/11/19. Ms. Gary did not receive zarxio due to her having minimal residual disease on admission, and a reduced intensity preparatory regimen to avoid stimulating myeloblasts. \par \par Ms. Gary had a relatively uncomplicated transplant course. She did experience menses, which was suppressed with provera. She also had pancytopenia related to the high dose chemotherapy preparative regimen. She also experienced grade 1 oral mucositis, and grade 2 GI mucositis also related to the chemotherapy preparative regimen. Both were treated with supportive care. \par \par Currently, Ms. Gary is stable for discharge home with outpatient follow up at the [facility].  [de-identified] : On 3/28/19 visit, day +29 of SCT today. Feeling well with decent energy. Adequate PO intake and hydration. Reports sneezing, likely allergies which she usually gets this time of year. Notes acid reflux at night after taking acyclovir. Stable right ankle pain 2/2 ligament tear in recent past. LMP reportedly during BMT hospitalization, lasted 3 days, no vaginal bleeding since. Compliant with post transplant meds and restrictions. Currently taking FK 1mg bid which she did not take prior to lab draw today. Denies fever, chills, headaches, dizziness, blurred vision, mucositis/odynophagia, chest pain, SOB, cough, nausea/vomiting, diarrhea, abdominal pain, dysuria, LE edema, rash, bleeding

## 2019-03-28 NOTE — OB HISTORY
[___] : Total Pregnancies: [unfilled] [Approximately ___ (Month)] : the LMP was approximately [unfilled] month(s) ago

## 2019-03-28 NOTE — PHYSICAL EXAM
[Ambulatory and capable of all self care but unable to carry out any work activities] : Status 2- Ambulatory and capable of all self care but unable to carry out any work activities. Up and about more than 50% of waking hours [Normal] : affect appropriate [de-identified] : tachycardic, regular rhythm, normal s1s2, no murmurs/rubs/gallops [de-identified] : no edema [de-identified] : diffuse hyperpigmentation

## 2019-03-28 NOTE — REVIEW OF SYSTEMS
[Negative] : Allergic/Immunologic [Fever] : no fever [Chills] : no chills [Fatigue] : no fatigue [Eye Pain] : no eye pain [Dry Eyes] : no dryness of the eyes [Vision Problems] : no vision problems [Dysphagia] : no dysphagia [Nosebleeds] : no nosebleeds [Odynophagia] : no odynophagia [Mucosal Pain] : no mucosal pain [Chest Pain] : no chest pain [Lower Ext Edema] : no lower extremity edema [Shortness Of Breath] : no shortness of breath [Cough] : no cough [Abdominal Pain] : no abdominal pain [Vomiting] : no vomiting [Constipation] : no constipation [Diarrhea] : no diarrhea [Dysuria] : no dysuria [Skin Rash] : no skin rash [Dizziness] : no dizziness [Fainting] : no fainting [FreeTextEntry4] : sneezing [FreeTextEntry7] : no nausea [FreeTextEntry9] : right ankle pain  [de-identified] : diffuse skin hyperpigmentation

## 2019-03-31 LAB
ALBUMIN SERPL ELPH-MCNC: 4.4 G/DL
ALP BLD-CCNC: 75 U/L
ALT SERPL-CCNC: 10 U/L
ANION GAP SERPL CALC-SCNC: 12 MMOL/L
AST SERPL-CCNC: 16 U/L
BILIRUB SERPL-MCNC: 0.3 MG/DL
BUN SERPL-MCNC: 7 MG/DL
CALCIUM SERPL-MCNC: 10 MG/DL
CHLORIDE SERPL-SCNC: 105 MMOL/L
CMV DNA SPEC QL NAA+PROBE: NOT DETECTED
CMVPCR LOG: NOT DETECTED LOGIU/ML
CO2 SERPL-SCNC: 23 MMOL/L
CREAT SERPL-MCNC: 0.73 MG/DL
ENGRAFTMNET-POST: NORMAL
GLUCOSE SERPL-MCNC: 93 MG/DL
LDH SERPL-CCNC: 217 U/L
MAGNESIUM SERPL-MCNC: 1.8 MG/DL
POTASSIUM SERPL-SCNC: 4.6 MMOL/L
PROT SERPL-MCNC: 7.3 G/DL
SODIUM SERPL-SCNC: 140 MMOL/L
TACROLIMUS SERPL-MCNC: 7 NG/ML

## 2019-04-03 LAB — ENGRAFTMNET-POST: NORMAL

## 2019-04-04 ENCOUNTER — RESULT REVIEW (OUTPATIENT)
Age: 34
End: 2019-04-04

## 2019-04-04 ENCOUNTER — APPOINTMENT (OUTPATIENT)
Dept: HEMATOLOGY ONCOLOGY | Facility: CLINIC | Age: 34
End: 2019-04-04
Payer: COMMERCIAL

## 2019-04-04 VITALS
BODY MASS INDEX: 36.01 KG/M2 | WEIGHT: 196.87 LBS | TEMPERATURE: 98.3 F | RESPIRATION RATE: 22 BRPM | OXYGEN SATURATION: 97 % | DIASTOLIC BLOOD PRESSURE: 82 MMHG | HEART RATE: 109 BPM | SYSTOLIC BLOOD PRESSURE: 118 MMHG

## 2019-04-04 LAB
ALBUMIN SERPL ELPH-MCNC: 4.3 G/DL
ALP BLD-CCNC: 81 U/L
ALT SERPL-CCNC: 7 U/L
ANION GAP SERPL CALC-SCNC: 13 MMOL/L
AST SERPL-CCNC: 16 U/L
BASOPHILS # BLD AUTO: 0 K/UL — SIGNIFICANT CHANGE UP (ref 0–0.2)
BASOPHILS NFR BLD AUTO: 0.4 % — SIGNIFICANT CHANGE UP (ref 0–2)
BILIRUB SERPL-MCNC: 0.2 MG/DL
BUN SERPL-MCNC: 7 MG/DL
CALCIUM SERPL-MCNC: 10 MG/DL
CHLORIDE SERPL-SCNC: 103 MMOL/L
CO2 SERPL-SCNC: 23 MMOL/L
CREAT SERPL-MCNC: 0.74 MG/DL
EOSINOPHIL # BLD AUTO: 0 K/UL — SIGNIFICANT CHANGE UP (ref 0–0.5)
EOSINOPHIL NFR BLD AUTO: 0.6 % — SIGNIFICANT CHANGE UP (ref 0–6)
GLUCOSE SERPL-MCNC: 90 MG/DL
HCT VFR BLD CALC: 36.9 % — SIGNIFICANT CHANGE UP (ref 34.5–45)
HGB BLD-MCNC: 12.7 G/DL — SIGNIFICANT CHANGE UP (ref 11.5–15.5)
LDH SERPL-CCNC: 199 U/L
LYMPHOCYTES # BLD AUTO: 1 K/UL — SIGNIFICANT CHANGE UP (ref 1–3.3)
LYMPHOCYTES # BLD AUTO: 15.6 % — SIGNIFICANT CHANGE UP (ref 13–44)
MAGNESIUM SERPL-MCNC: 1.7 MG/DL
MCHC RBC-ENTMCNC: 32.9 PG — SIGNIFICANT CHANGE UP (ref 27–34)
MCHC RBC-ENTMCNC: 34.3 G/DL — SIGNIFICANT CHANGE UP (ref 32–36)
MCV RBC AUTO: 95.9 FL — SIGNIFICANT CHANGE UP (ref 80–100)
MONOCYTES # BLD AUTO: 0.8 K/UL — SIGNIFICANT CHANGE UP (ref 0–0.9)
MONOCYTES NFR BLD AUTO: 12.1 % — SIGNIFICANT CHANGE UP (ref 2–14)
NEUTROPHILS # BLD AUTO: 4.4 K/UL — SIGNIFICANT CHANGE UP (ref 1.8–7.4)
NEUTROPHILS NFR BLD AUTO: 71.3 % — SIGNIFICANT CHANGE UP (ref 43–77)
PLATELET # BLD AUTO: 330 K/UL — SIGNIFICANT CHANGE UP (ref 150–400)
POTASSIUM SERPL-SCNC: 4.6 MMOL/L
PROT SERPL-MCNC: 6.8 G/DL
RBC # BLD: 3.85 M/UL — SIGNIFICANT CHANGE UP (ref 3.8–5.2)
RBC # FLD: 15.1 % — HIGH (ref 10.3–14.5)
SODIUM SERPL-SCNC: 139 MMOL/L
TACROLIMUS SERPL-MCNC: 8.5 NG/ML
WBC # BLD: 6.2 K/UL — SIGNIFICANT CHANGE UP (ref 3.8–10.5)
WBC # FLD AUTO: 6.2 K/UL — SIGNIFICANT CHANGE UP (ref 3.8–10.5)

## 2019-04-04 PROCEDURE — 99214 OFFICE O/P EST MOD 30 MIN: CPT

## 2019-04-05 LAB
CMV DNA SPEC QL NAA+PROBE: NOT DETECTED
CMVPCR LOG: NOT DETECTED LOGIU/ML

## 2019-04-05 NOTE — OB HISTORY
[Approximately ___ (Month)] : the LMP was approximately [unfilled] month(s) ago [___] : Total Pregnancies: [unfilled]

## 2019-04-05 NOTE — PHYSICAL EXAM
[Ambulatory and capable of all self care but unable to carry out any work activities] : Status 2- Ambulatory and capable of all self care but unable to carry out any work activities. Up and about more than 50% of waking hours [Normal] : affect appropriate [de-identified] : tachycardic, regular rhythm, normal s1s2, no murmurs/rubs/gallops [de-identified] : no edema [de-identified] : diffuse hyperpigmentation

## 2019-04-05 NOTE — HISTORY OF PRESENT ILLNESS
[de-identified] : Ms. Gary is a 34 y/o female with AML associated with normal karyotype and NPM1 ans [id number] mutation, refractory to 7+3 and currently receiving Ivosidenib. Of note patient is a [employer] and cannot receive blood products. \par \par Patient was noted to be newly leukopenic to WBC 1.7 during preoperative evaluation in anticipation of right ankle ligament repair. Bone marrow aspirate and biopsy at [facility] on 12/2/2017 revealed AML with some suspicion for APL. She was transferred to [place] for further management. Peripheral counts from around this time are not available. \par \par Repeat marrow on 12/26/2017 showed 90% blasts with myeloid mutation arrest in a 40-50% cellular marrow. On flow blasts were negative for CD34 and HLA-DR; positive for CD38, CD58, CD33, and MPO, and partially positive for CD15, CD11b, , and CD64. CD45 was reported as dim. Karyotype showed +21 in 3 of 21 metaphases, but no t(15:17) was detected on karyotype of FISH. Molecular studies showed mutations in NPM1 (45%0, IDH1 (R132S; 39%), PTPN1 (23%), and CSF3R (5%) genes. There was lingering diagnostic uncertainty due to a largely aspicular and hypocellular specimen, and thus a marrow was repeated on 1/2/2018, confirming AML.\par \par On 1/4/2018, Ms. Gary began 7+3 with Idarubicin. Her induction course was complicated by DIC, neutropenic fever/ Klebsiella PNA, and bilateral retinal hemorrhage. She did not receive blood products and was instead supported with Nplate, Procrit, IV iron, Amicar, and Lupron. After achieving count stability, the patient was discharged; she was not deemed a candidate for further therapy given inability to receive transfusions and a day 14 marrow was not obtained. She subsequently saw Dr. [place] Obrien at [facility]. He obtained a recovery marrow on 2/13/2018 (day +40), which was 30% cellular with 45% blasts. 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 Cytogenetics and FISH were normal. \par \par She then established care with Dr. Christie for consideration of salvage therapy with IDH1 inhibition given the risks of additional chemotherapy without blood products support. Initial marrow at [facility], on 3/1/2018, showed 69% blasts. ThunderBolts panel confirmed IDH1 R132S, NPM1 Q963Yfa 12, and CSF3R D810G mutations. She was started on Ivosidenib on [facility] protocol  and had completed four cycles to date (6/28/2018 = C5D1). Marrow blasts increased to 85% on 4/30 but declined to 36% by 5/31. A  bone marrow aspirate and biopsy  was obtained  (6/28/2018); preliminary results show further responding disease with 10.3% blasts by flow cytometry. Subsequent marrow in oct showed 5% blasts...she was then started on vidazza plus venetoclax. She completed 2 cycles...f/u marrow with 4% blasts.\par \par BMT Hospital Course:	 \par Ms. 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 She is a [employer] and cannot receive blood products. \par \par Ms. Nickerson's donor is her sister.  She is a 10/10 match. Both donor and patient are CMV +. She had completed all pre testing needed for transplant.  She will \par start reduced intensity Fludarabine/Busulfan chemo regimen today. \par \par Upon admission, a right AC PICC line was placed in IR. Ms. Gary received IV hydration, pain management, antiemetics, anxiolysis, and antibacterial / antiviral / antifungal / PCP / GI and VOD (SOS) prophylaxis. Labs were monitored every other day, and she received electrolyte repletion as needed. She also received vitamin K, vitamin B, supplemental iron, vitamin C and procrit as directed by the institutional bloodless transplant policy. \par \par On 2/27/19, Ms. Gary received 363 mLs of fresh, apheresed, mobilized, related allogeneic HPC over 80 minutes. Cell counts as follows: \par Total MNCs ( x 10^8/kg): 6.21 \par CD 34+Cells ( x 10^6/kg): 3.37 \par CD3+ Cells ( x 10^7/kg): 12.46 \par Cell Viability (%): 100% \par \par Engraftment was noted on 3/11/19. Ms. Gary did not receive zarxio due to her having minimal residual disease on admission, and a reduced intensity preparatory regimen to avoid stimulating myeloblasts. \par \par Ms. Gary had a relatively uncomplicated transplant course. She did experience menses, which was suppressed with provera. She also had pancytopenia related to the high dose chemotherapy preparative regimen. She also experienced grade 1 oral mucositis, and grade 2 GI mucositis also related to the chemotherapy preparative regimen. Both were treated with supportive care. \par \par Currently, Ms. Gary is stable for discharge home with outpatient follow up at the [facility].  [de-identified] : On 4/4/19 visit, day +36 of SCT today. Feeling well with good energy. Adequate PO intake and hydration. Acid reflux improved with Tums but notes having to avoid certain foods. Stable right ankle pain 2/2 ligament tear in recent past, no impaired mobility. LMP reportedly during BMT hospitalization, lasted 3 days, no vaginal bleeding since. Compliant with post transplant meds and restrictions. Currently taking FK 1mg bid which she did not take prior to lab draw today. Denies fever, chills, headaches, dizziness, blurred vision, mucositis/odynophagia, chest pain, SOB, cough, nausea/vomiting, diarrhea, abdominal pain, dysuria, LE edema, rash, bleeding

## 2019-04-05 NOTE — RESULTS/DATA
[FreeTextEntry1] : CBC reviewed with pt\par \par Bone marrow biopsy 01/24/19:\par -Persistent minimal involvement by acute myeloid leukemia (4% blasts by aspirate differential count).\par -Hypocellular marrow with erythroid predominate trilineage maturing hematopoiesis. \par \par Bone marrow biopsy 09/28/18:\par -Trilineage hematopoiesis with maturation, increased myeloblasts (5% of cells), focally increased megakaryocytes and presents iron stores (history of AML with mutated NPM1 and [id number]).\par

## 2019-04-05 NOTE — ASSESSMENT
[FreeTextEntry1] : Ms. Gary is a 33 years old female Taoist with AML with the following comorbidities being managed:\par \par 1) AML\par S/p HLA 10/10 allo sib (sister) PBSCT on 2/27/19\par 3/28/19 chimerism = 93.2% (CD33 99.2%, CD3 37%)\par Post transplant BMbx pending\par \par 2) Heme\par Counts stable\par    WBC 6.2   ANC 4.4   Hgb 12.7   \par No transfusions as pt is practicing Taoist\par Continue multivitamin and folic acid daily\par \par 3) ID\par Continue ppx:\par - Acyclovir 400mg tid\par - Voriconazole 200mg bid\par - Mepron 750mg bid\par 3/28/19 CMV PCR not detected, continue to monitor weekly \par Post transplant restrictions reviewed\par \par 4) GVHD\par Skin 0   Liver 0   GI 0 - overall grade 0\par No signs of acute/chronic GVHD at this time\par Continue FK 1mg bid - pending today's level \par Signs/symptoms of GVHD reviewed\par \par 5) GI\par Continue ppx:\par - Ursodiol 300mg bid\par - Protonix 40mg daily started 4/3/19 for acid reflux \par PRN Zofran and Reglan for nausea\par \par 6) Other \par Hypomagnesemia - likely 2/2 tacrolimus, continue magnesium oxide 400mg tid\par Menses suppression - continue Provera 10mg daily, pt knows to notify provider if experiences vaginal bleeding \par HTN - continue amlodipine 5mg daily, BP remains WNL\par Allergies - continue Singulair 10mg daily, may use daily Claritin or similar anti-histamine for ongoing allergy symptoms \par \par 7) Plan/Dispo\par Pt and  educated regarding plan of care, all questions/concerns addressed\par Pt knows to call our office immediately if develops fever of 100.4F or higher or with any new/worsening symptoms \par F/u in 1wk with NP on 4/11/19\par \par

## 2019-04-05 NOTE — REVIEW OF SYSTEMS
[Negative] : Allergic/Immunologic [Fever] : no fever [Chills] : no chills [Fatigue] : no fatigue [Eye Pain] : no eye pain [Dry Eyes] : no dryness of the eyes [Vision Problems] : no vision problems [Dysphagia] : no dysphagia [Nosebleeds] : no nosebleeds [Odynophagia] : no odynophagia [Mucosal Pain] : no mucosal pain [Chest Pain] : no chest pain [Lower Ext Edema] : no lower extremity edema [Shortness Of Breath] : no shortness of breath [Cough] : no cough [Abdominal Pain] : no abdominal pain [Vomiting] : no vomiting [Constipation] : no constipation [Diarrhea] : no diarrhea [Dysuria] : no dysuria [Skin Rash] : no skin rash [Dizziness] : no dizziness [Fainting] : no fainting [FreeTextEntry4] : sneezing [FreeTextEntry7] : no nausea [FreeTextEntry9] : stable right ankle pain  [de-identified] : diffuse skin hyperpigmentation

## 2019-04-11 ENCOUNTER — RESULT REVIEW (OUTPATIENT)
Age: 34
End: 2019-04-11

## 2019-04-11 ENCOUNTER — APPOINTMENT (OUTPATIENT)
Dept: HEMATOLOGY ONCOLOGY | Facility: CLINIC | Age: 34
End: 2019-04-11
Payer: COMMERCIAL

## 2019-04-11 VITALS
DIASTOLIC BLOOD PRESSURE: 80 MMHG | HEART RATE: 101 BPM | WEIGHT: 197.31 LBS | TEMPERATURE: 98.3 F | RESPIRATION RATE: 18 BRPM | BODY MASS INDEX: 36.09 KG/M2 | OXYGEN SATURATION: 100 % | SYSTOLIC BLOOD PRESSURE: 113 MMHG

## 2019-04-11 LAB
BASOPHILS # BLD AUTO: 0 K/UL — SIGNIFICANT CHANGE UP (ref 0–0.2)
BASOPHILS NFR BLD AUTO: 0.5 % — SIGNIFICANT CHANGE UP (ref 0–2)
EOSINOPHIL # BLD AUTO: 0 K/UL — SIGNIFICANT CHANGE UP (ref 0–0.5)
EOSINOPHIL NFR BLD AUTO: 0.4 % — SIGNIFICANT CHANGE UP (ref 0–6)
HCT VFR BLD CALC: 37 % — SIGNIFICANT CHANGE UP (ref 34.5–45)
HGB BLD-MCNC: 13 G/DL — SIGNIFICANT CHANGE UP (ref 11.5–15.5)
LYMPHOCYTES # BLD AUTO: 0.8 K/UL — LOW (ref 1–3.3)
LYMPHOCYTES # BLD AUTO: 14.4 % — SIGNIFICANT CHANGE UP (ref 13–44)
MCHC RBC-ENTMCNC: 33.3 PG — SIGNIFICANT CHANGE UP (ref 27–34)
MCHC RBC-ENTMCNC: 35.1 G/DL — SIGNIFICANT CHANGE UP (ref 32–36)
MCV RBC AUTO: 95 FL — SIGNIFICANT CHANGE UP (ref 80–100)
MONOCYTES # BLD AUTO: 0.6 K/UL — SIGNIFICANT CHANGE UP (ref 0–0.9)
MONOCYTES NFR BLD AUTO: 10.2 % — SIGNIFICANT CHANGE UP (ref 2–14)
NEUTROPHILS # BLD AUTO: 4.3 K/UL — SIGNIFICANT CHANGE UP (ref 1.8–7.4)
NEUTROPHILS NFR BLD AUTO: 74.5 % — SIGNIFICANT CHANGE UP (ref 43–77)
PLATELET # BLD AUTO: 358 K/UL — SIGNIFICANT CHANGE UP (ref 150–400)
RBC # BLD: 3.89 M/UL — SIGNIFICANT CHANGE UP (ref 3.8–5.2)
RBC # FLD: 14.2 % — SIGNIFICANT CHANGE UP (ref 10.3–14.5)
TACROLIMUS SERPL-MCNC: 9.7 NG/ML
WBC # BLD: 5.8 K/UL — SIGNIFICANT CHANGE UP (ref 3.8–10.5)
WBC # FLD AUTO: 5.8 K/UL — SIGNIFICANT CHANGE UP (ref 3.8–10.5)

## 2019-04-11 PROCEDURE — 99214 OFFICE O/P EST MOD 30 MIN: CPT

## 2019-04-11 NOTE — HISTORY OF PRESENT ILLNESS
[de-identified] : Ms. 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 Of note patient is a V[employer] and cannot receive blood products. \par \par Patient was noted to be newly leukopenic to WBC 1.7 during preoperative evaluation in anticipation of right ankle ligament repair. Bone marrow aspirate and biopsy at [facility] on 12/2/2017 revealed AML with some suspicion for APL. She was transferred to [place] for further management. Peripheral counts from around this time are not available. \par \par Repeat marrow on 12/26/2017 showed 90% blasts with myeloid mutation arrest in a 40-50% cellular marrow. On flow blasts were negative for CD34 and HLA-DR; positive for CD38, CD58, CD33, and MPO, and partially positive for CD15, CD11b, , and CD64. CD45 was reported as dim. Karyotype showed +21 in 3 of 21 metaphases, but no t(15:17) was detected on karyotype of FISH. Molecular studies showed mutations in NPM1 (45%0, IDH1 (R132S; 39%), PTPN1 (23%), and CSF3R (5%) genes. There was lingering diagnostic uncertainty due to a largely aspicular and hypocellular specimen, and thus a marrow was repeated on 1/2/2018, confirming AML.\par \par On 1/4/2018, Ms. Gary began 7+3 with Idarubicin. Her induction course was complicated by DIC, neutropenic fever/ Klebsiella PNA, and bilateral retinal hemorrhage. She did not receive blood products and was instead supported with Nplate, Procrit, IV iron, Amicar, and Lupron. After achieving count stability, the patient was discharged; she was not deemed a candidate for further therapy given inability to receive transfusions and a day 14 marrow was not obtained. She subsequently saw Dr. [place] Ob[place] at [facility]. He obtained a recovery marrow on 2/13/2018 (day +40), which was 30% cellular with 45% blasts. 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 Cytogenetics and FISH were normal. \par \par She then established care with Dr. Christie for consideration of salvage therapy with IDH1 inhibition given the risks of additional chemotherapy without blood products support. Initial marrow at [facility], on 3/1/2018, showed 69% blasts. ThunderBolts panel confirmed IDH1 R132S, NPM1 F070Duv 12, and CSF3R D810G mutations. She was started on Ivosidenib on [facility] protocol  and had completed four cycles to date (6/28/2018 = C5D1). Marrow blasts increased to 85% on 4/30 but declined to 36% by 5/31. A  bone marrow aspirate and biopsy  was obtained  (6/28/2018); preliminary results show further responding disease with 10.3% blasts by flow cytometry. Subsequent marrow in oct showed 5% blasts...she was then started on vidazza plus venetoclax. She completed 2 cycles...f/u marrow with 4% blasts.\par \par BMT Hospital Course:	 \par Ms. 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 She is a Voodoo and cannot receive blood products. \par \par Ms. Nickerson's donor is her sister.  She is a 10/10 match. Both donor and patient are CMV +. She had completed all pre testing needed for transplant.  She will \par start reduced intensity Fludarabine/Busulfan chemo regimen today. \par \par Upon admission, a right AC PICC line was placed in IR. Ms. Gary received IV hydration, pain management, antiemetics, anxiolysis, and antibacterial / antiviral / antifungal / PCP / GI and VOD (SOS) prophylaxis. Labs were monitored every other day, and she received electrolyte repletion as needed. She also received vitamin K, vitamin B, supplemental iron, vitamin C and procrit as directed by the institutional bloodless transplant policy. \par \par On 2/27/19, Ms. Gary received 363 mLs of fresh, apheresed, mobilized, related allogeneic HPC over 80 minutes. Cell counts as follows: \par Total MNCs ( x 10^8/kg): 6.21 \par CD 34+Cells ( x 10^6/kg): 3.37 \par CD3+ Cells ( x 10^7/kg): 12.46 \par Cell Viability (%): 100% \par \par Engraftment was noted on 3/11/19. Ms. Gary did not receive zarxio due to her having minimal residual disease on admission, and a reduced intensity preparatory regimen to avoid stimulating myeloblasts. \par \par Ms. Gary had a relatively uncomplicated transplant course. She did experience menses, which was suppressed with provera. She also had pancytopenia related to the high dose chemotherapy preparative regimen. She also experienced grade 1 oral mucositis, and grade 2 GI mucositis also related to the chemotherapy preparative regimen. Both were treated with supportive care. \par \par Currently, Ms. Gary is stable for discharge home with outpatient follow up at the [facility].  [de-identified] : On 4/11/19 visit, day +43 of SCT today. Feeling well with good energy. Adequate PO intake and hydration. Acid reflux resolved with Protonix. Stable right ankle pain 2/2 ligament tear in recent past, no impaired mobility. LMP reportedly during BMT hospitalization. On Mon 4/8 she had light vaginal spotting lasting less than 1 day. Compliant with post transplant meds and restrictions. Currently taking FK 1mg bid which she did not take prior to lab draw today. Denies fever, chills, headaches, dizziness, blurred vision, mucositis/odynophagia, chest pain, SOB, cough, nausea/vomiting, diarrhea, abdominal pain, dysuria, LE edema, rash, bleeding

## 2019-04-11 NOTE — REVIEW OF SYSTEMS
[Fever] : no fever [Chills] : no chills [Fatigue] : no fatigue [Eye Pain] : no eye pain [Dry Eyes] : no dryness of the eyes [Vision Problems] : no vision problems [Dysphagia] : no dysphagia [Nosebleeds] : no nosebleeds [Odynophagia] : no odynophagia [Mucosal Pain] : no mucosal pain [Chest Pain] : no chest pain [Cough] : no cough [Shortness Of Breath] : no shortness of breath [Lower Ext Edema] : no lower extremity edema [Abdominal Pain] : no abdominal pain [Constipation] : no constipation [Vomiting] : no vomiting [Diarrhea] : no diarrhea [Dysuria] : no dysuria [Dizziness] : no dizziness [Skin Rash] : no skin rash [Fainting] : no fainting [FreeTextEntry7] : no nausea [Negative] : Allergic/Immunologic [FreeTextEntry4] : occasional sneezing 2/2 allergies  [FreeTextEntry9] : stable right ankle pain  [de-identified] : diffuse skin hyperpigmentation particularly on BUEs, chest, abdomen, back

## 2019-04-11 NOTE — RESULTS/DATA
[FreeTextEntry1] : CBC reviewed with pt\par \par Bone marrow biopsy 01/24/19:\par -Persistent minimal involvement by acute myeloid leukemia (4% blasts by aspirate differential count).\par -Hypocellular marrow with erythroid predominate trilineage maturing hematopoiesis. \par \par Bone marrow biopsy 09/28/18:\par -Trilineage hematopoiesis with maturation, increased myeloblasts (5% of cells), focally increased megakaryocytes and presents iron stores (history of AML with mutated NPM1 and [id number]).\par

## 2019-04-11 NOTE — ASSESSMENT
[FreeTextEntry1] : Ms. Gary is a 33 years old female Taoism with AML with the following comorbidities being managed:\par \par 1) AML\par S/p HLA 10/10 allo sib (sister) PBSCT on 2/27/19\par 3/28/19 chimerism = 93.2% (CD33 99.2%, CD3 37%)\par Post transplant BMbx pending around day +100\par \par 2) Heme\par Counts stable\par    WBC 5.8   ANC 4.3   Hgb 13   \par No transfusions as pt is practicing Taoism\par Continue multivitamin and folic acid daily\par \par 3) ID\par Continue ppx:\par - Acyclovir 400mg tid\par - Voriconazole 200mg bid\par - Mepron 750mg bid\par 4/4/19 CMV PCR not detected, continue to monitor weekly \par Post transplant restrictions reviewed\par \par 4) GVHD\par Skin 0   Liver 0   GI 0 - overall grade 0\par No signs of acute/chronic GVHD at this time\par Plan to taper FK at 100 days if no signs of GVHD\par Continue FK 1mg bid - pending today's level \par Signs/symptoms of GVHD reviewed\par \par 5) GI\par Continue ppx:\par - Ursodiol 300mg bid\par - Protonix 40mg daily  \par PRN Zofran and Reglan for nausea\par \par 6) Other \par Hypomagnesemia - likely 2/2 tacrolimus, continue magnesium oxide 400mg tid\par Menses suppression - continue Provera 10mg daily, pt knows to notify provider if experiences vaginal bleeding \par HTN - continue amlodipine 5mg daily, BP remains WNL\par Allergies - continue Singulair 10mg daily, may use daily Claritin or similar anti-histamine for ongoing allergy symptoms \par \par 7) Plan/Dispo\par Pt and  educated regarding plan of care, all questions/concerns addressed\par Pt knows to call our office immediately if develops fever of 100.4F or higher or with any new/worsening symptoms \par F/u in 1wk with Dr. Lou on 4/18/19\par

## 2019-04-11 NOTE — PHYSICAL EXAM
[Ambulatory and capable of all self care but unable to carry out any work activities] : Status 2- Ambulatory and capable of all self care but unable to carry out any work activities. Up and about more than 50% of waking hours [Normal] : grossly intact [de-identified] : tachycardic, regular rhythm, normal s1s2, no murmurs/rubs/gallops [de-identified] : diffuse hyperpigmentation particularly of BUEs, chest, abdomen, and back [de-identified] : no edema

## 2019-04-12 ENCOUNTER — OUTPATIENT (OUTPATIENT)
Dept: OUTPATIENT SERVICES | Facility: HOSPITAL | Age: 34
LOS: 1 days | Discharge: HOME | End: 2019-04-12

## 2019-04-12 DIAGNOSIS — C92.00 ACUTE MYELOBLASTIC LEUKEMIA, NOT HAVING ACHIEVED REMISSION: ICD-10-CM

## 2019-04-12 DIAGNOSIS — Z90.3 ACQUIRED ABSENCE OF STOMACH [PART OF]: Chronic | ICD-10-CM

## 2019-04-12 LAB
ALBUMIN SERPL ELPH-MCNC: 4.2 G/DL
ALP BLD-CCNC: 82 U/L
ALT SERPL-CCNC: 6 U/L
ANION GAP SERPL CALC-SCNC: 14 MMOL/L
AST SERPL-CCNC: 14 U/L
BILIRUB SERPL-MCNC: 0.2 MG/DL
BUN SERPL-MCNC: 8 MG/DL
CALCIUM SERPL-MCNC: 9.8 MG/DL
CHLORIDE SERPL-SCNC: 106 MMOL/L
CO2 SERPL-SCNC: 21 MMOL/L
CREAT SERPL-MCNC: 0.71 MG/DL
GLUCOSE SERPL-MCNC: 97 MG/DL
LDH SERPL-CCNC: 191 U/L
MAGNESIUM SERPL-MCNC: 1.7 MG/DL
POTASSIUM SERPL-SCNC: 4.3 MMOL/L
PROT SERPL-MCNC: 7 G/DL
SODIUM SERPL-SCNC: 141 MMOL/L

## 2019-04-15 LAB
CMV DNA SPEC QL NAA+PROBE: NOT DETECTED
CMVPCR LOG: NOT DETECTED LOGIU/ML
ENGRAFTMNET-POST: NORMAL

## 2019-04-18 ENCOUNTER — APPOINTMENT (OUTPATIENT)
Dept: HEMATOLOGY ONCOLOGY | Facility: CLINIC | Age: 34
End: 2019-04-18
Payer: COMMERCIAL

## 2019-04-18 ENCOUNTER — RESULT REVIEW (OUTPATIENT)
Age: 34
End: 2019-04-18

## 2019-04-18 VITALS
RESPIRATION RATE: 16 BRPM | HEART RATE: 107 BPM | WEIGHT: 198.42 LBS | SYSTOLIC BLOOD PRESSURE: 121 MMHG | DIASTOLIC BLOOD PRESSURE: 82 MMHG | TEMPERATURE: 98.7 F | OXYGEN SATURATION: 98 % | BODY MASS INDEX: 36.29 KG/M2

## 2019-04-18 LAB
ALBUMIN SERPL ELPH-MCNC: 4.1 G/DL
ALP BLD-CCNC: 85 U/L
ALT SERPL-CCNC: 8 U/L
ANION GAP SERPL CALC-SCNC: 13 MMOL/L
AST SERPL-CCNC: 15 U/L
BASOPHILS # BLD AUTO: 0 K/UL — SIGNIFICANT CHANGE UP (ref 0–0.2)
BASOPHILS NFR BLD AUTO: 0.5 % — SIGNIFICANT CHANGE UP (ref 0–2)
BILIRUB SERPL-MCNC: 0.2 MG/DL
BUN SERPL-MCNC: 10 MG/DL
CALCIUM SERPL-MCNC: 9.4 MG/DL
CHLORIDE SERPL-SCNC: 105 MMOL/L
CO2 SERPL-SCNC: 21 MMOL/L
CREAT SERPL-MCNC: 0.71 MG/DL
EOSINOPHIL # BLD AUTO: 0 K/UL — SIGNIFICANT CHANGE UP (ref 0–0.5)
EOSINOPHIL NFR BLD AUTO: 0.5 % — SIGNIFICANT CHANGE UP (ref 0–6)
GLUCOSE SERPL-MCNC: 158 MG/DL
HCT VFR BLD CALC: 34.7 % — SIGNIFICANT CHANGE UP (ref 34.5–45)
HGB BLD-MCNC: 11.9 G/DL — SIGNIFICANT CHANGE UP (ref 11.5–15.5)
LDH SERPL-CCNC: 196 U/L
LYMPHOCYTES # BLD AUTO: 1 K/UL — SIGNIFICANT CHANGE UP (ref 1–3.3)
LYMPHOCYTES # BLD AUTO: 14.2 % — SIGNIFICANT CHANGE UP (ref 13–44)
MAGNESIUM SERPL-MCNC: 1.7 MG/DL
MCHC RBC-ENTMCNC: 32.7 PG — SIGNIFICANT CHANGE UP (ref 27–34)
MCHC RBC-ENTMCNC: 34.3 G/DL — SIGNIFICANT CHANGE UP (ref 32–36)
MCV RBC AUTO: 95.4 FL — SIGNIFICANT CHANGE UP (ref 80–100)
MONOCYTES # BLD AUTO: 0.6 K/UL — SIGNIFICANT CHANGE UP (ref 0–0.9)
MONOCYTES NFR BLD AUTO: 9.3 % — SIGNIFICANT CHANGE UP (ref 2–14)
NEUTROPHILS # BLD AUTO: 5.2 K/UL — SIGNIFICANT CHANGE UP (ref 1.8–7.4)
NEUTROPHILS NFR BLD AUTO: 75.6 % — SIGNIFICANT CHANGE UP (ref 43–77)
PLATELET # BLD AUTO: 366 K/UL — SIGNIFICANT CHANGE UP (ref 150–400)
POTASSIUM SERPL-SCNC: 4 MMOL/L
PROT SERPL-MCNC: 6.6 G/DL
RBC # BLD: 3.63 M/UL — LOW (ref 3.8–5.2)
RBC # FLD: 13.5 % — SIGNIFICANT CHANGE UP (ref 10.3–14.5)
SODIUM SERPL-SCNC: 139 MMOL/L
TACROLIMUS SERPL-MCNC: 6.6 NG/ML
WBC # BLD: 6.8 K/UL — SIGNIFICANT CHANGE UP (ref 3.8–10.5)
WBC # FLD AUTO: 6.8 K/UL — SIGNIFICANT CHANGE UP (ref 3.8–10.5)

## 2019-04-18 PROCEDURE — 99215 OFFICE O/P EST HI 40 MIN: CPT

## 2019-04-18 NOTE — ADDENDUM
[FreeTextEntry1] : Documented by Yolanda López acting as a scribe for Dr. Branden Lou on 04/18/2019.\par \par All medical record entries made by the Scribe were at my, Dr. Branden Lou's, direction and personally dictated by me on 04/18/2019. I have reviewed the chart and agree that  the record accurately reflects my personal performance of the history, physical exam, assessment and plan. I have also personally directed, reviewed, and agree with the discharge instructions.\par

## 2019-04-18 NOTE — ASSESSMENT
[Curative] : Goals of care discussed with patient: Curative [FreeTextEntry1] : Ms. Gary is a 33 years old female with AML, presenting today for a f/u s/p Allo PSCT sister MRD (10/10) on 2/27/19. Patient had a relatively uncomplicated transplant course. She had pancytopenia related to the high dose chemotherapy preparative regimen. She also experienced grade 1 oral mucositis, and grade 2 GI mucositis also related to the chemotherapy preparative regimen. Both were treated with supportive care. She was discharged on 3/18/19. Patient is a practicing Judaism. Accompanied by her  today. \par No gvhd...chimerism sent when wbc was 1 was 82% donor...Last chimerism was 94% on 4/04/19. \par \par Tacrolimus- currently 1mg QAM and 0.5mg QPM, decreased from 1mg BID on 4/11/19, when FK = 9.7. Instructed to decrease to 0.5mg BID on 4/19/19. \par Voriconazole \par Acyclovir 400 mg Q8h - to continue for 6 months post transplant.\par Atovaquone 750 mg/5 mL- 5 ml Q12h - to continue for 6 months post transplant.\par Pantoprazole 40 mg oral QD - PRN \par Ursodiol 300 mg BID\par Magnesium oxide 400 mg TID\par Multiple Vitamins QD\par Folic acid 1 mg QD\par \par Peripheral blood work was reviewed and and discussed with patient. No transfusions accepted by pt.\par Labs sent today CMP, LDH, Mg, Tacrolimus level, CMV, Chimerism. \par Advised to maintain post-transplant diet and crowd restrictions. \par Advised to closely monitor for aGVHD si/sx including but not limited to severe diarrhea, nausea, vomiting, rash, mouth sores, dry or pain in the eyes. \par Plan to taper off Tacro by ~100 days, then start low dose Vidaza, x 5 days. Reviewed rationale, benefits, risks and side effects, will revisit at later f/u. \par Well care stressed, question addressed, support provided. \par \par Maintain weekly appointments. Next visit is with JOLEEN Bonilla on 4/25. May reduce visits to every other week, pending next engraftment. Patient advised to contact immediately with any concerns or symptoms.

## 2019-04-18 NOTE — HISTORY OF PRESENT ILLNESS
[de-identified] : Ms. 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 Of note patient is a Jew and cannot receive blood products. \par \par Patient was noted to be newly leukopenic to WBC 1.7 during preoperative evaluation in anticipation of right ankle ligament repair. Bone marrow aspirate and biopsy at [facility] on 12/2/2017 revealed AML with some suspicion for APL. She was transferred to [place] for further management. Peripheral counts from around this time are not available. \par \par Repeat marrow on 12/26/2017 showed 90% blasts with myeloid mutation arrest in a 40-50% cellular marrow. On flow blasts were negative for CD34 and HLA-DR; positive for CD38, CD58, CD33, and MPO, and partially positive for CD15, CD11b, , and CD64. CD45 was reported as dim. Karyotype showed +21 in 3 of 21 metaphases, but no t(15:17) was detected on karyotype of FISH. Molecular studies showed mutations in NPM1 (45%0, IDH1 (R132S; 39%), PTPN1 (23%), and CSF3R (5%) genes. There was lingering diagnostic uncertainty due to a largely aspicular and hypocellular specimen, and thus a marrow was repeated on 1/2/2018, confirming AML.\par \par On 1/4/2018, Ms. Gary began 7+3 with Idarubicin. Her induction course was complicated by DIC, neutropenic fever/ Klebsiella PNA, and bilateral retinal hemorrhage. She did not receive blood products and was instead supported with Nplate, Procrit, IV iron, Amicar, and Lupron. After achieving count stability, the patient was discharged; she was not deemed a candidate for further therapy given inability to receive transfusions and a day 14 marrow was not obtained. She subsequently saw Dr. [place] Ob[place] at [facility]. He obtained a recovery marrow on 2/13/2018 (day +40), which was 30% cellular with 45% blasts. 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 Cytogenetics and FISH were normal. \par \par She then established care with Dr. Christie for consideration of salvage therapy with IDH1 inhibition given the risks of additional chemotherapy without blood products support. Initial marrow at [facility], on 3/1/2018, showed 69% blasts. ThunderBolts panel confirmed IDH1 R132S, NPM1 U300Prl 12, and CSF3R D810G mutations. She was started on Ivosidenib on [facility] protocol  and had completed four cycles to date (6/28/2018 = C5D1). Marrow blasts increased to 85% on 4/30 but declined to 36% by 5/31. A  bone marrow aspirate and biopsy  was obtained  (6/28/2018); preliminary results show further responding disease with 10.3% blasts by flow cytometry. Subsequent marrow in oct showed 5% blasts...she was then started on vidazza plus venetoclax. She completed 2 cycles...f/u marrow with 4% blasts.....\par \par Patient underwent an AlloPSCT sister MRD (10/10) on 2/27/19, hospital course as follows:\par Ms. 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 She is a Jew and cannot receive blood products. \par \par Ms. Nickerson's donor is her sister.  She is a 10/10 match. Both donor and patient are CMV +. She had completed all pre testing needed for transplant.  She will \par start reduced intensity Fludarabine/Busulfan chemo regimen today. \par \par Upon admission, a right AC PICC line was placed in IR. Ms. Gary received IV hydration, pain management, antiemetics, anxiolysis, and antibacterial / antiviral / antifungal / PCP / GI and VOD (SOS) prophylaxis. Labs were monitored every other day, and she received electrolyte repletion as needed. She also received vitamin K, vitamin B, supplemental iron, vitamin C and procrit as directed by the institutional bloodless transplant policy. \par \par On 2/27/19, Ms. Gary received 363 mLs of fresh, apheresed, mobilized, related allogeneic HPC over 80 minutes. Cell counts as follows: \par Total MNCs ( x 10^8/kg): 6.21 \par CD 34+Cells ( x 10^6/kg): 3.37 \par CD3+ Cells ( x 10^7/kg): 12.46 \par Cell Viability (%): 100% \par \par Engraftment was noted on 3/11/19. Ms. Gary did not receive zarxio due to her having minimal residual disease on admission, and a reduced intensity preparatory regimen to avoid stimulating myeloblasts. \par \par Ms. Gary had a relatively uncomplicated transplant course. She did experience menses, which was suppressed with provera. She also had pancytopenia related to the high dose chemotherapy preparative regimen. She also experienced grade 1 oral mucositis, and grade 2 GI mucositis also related to the chemotherapy preparative regimen. Both were treated with supportive care. \par \par Currently, Ms. Gary is stable for discharge home with outpatient follow up at the [facility].  [de-identified] : Patient presents today for a follow up s/p AlloPSCT. She is accompanied by her . Overall she reports doing well. She reports improved skin hyperpigmentation, and denies any irritation. She also notes nail ridging. Otherwise she offers no acute concerns. She is sleeping and eating well, weight stable. Denies fever/chills, night sweats, mouth sores, eye dryness, blurred vision, nausea, vomiting, diarrhea. No CP, SOB or LE edema. Decreased Tacrolimus to 1mg QAM and 0.5mg QPM on 4/11/19, which she did not take today prior to blood work. \par

## 2019-04-18 NOTE — PHYSICAL EXAM
[Ambulatory and capable of all self care but unable to carry out any work activities] : Status 2- Ambulatory and capable of all self care but unable to carry out any work activities. Up and about more than 50% of waking hours [Normal] : affect appropriate [de-identified] : diffuse hyperpigmentation- improving

## 2019-04-19 LAB — ENGRAFTMNET-POST: NORMAL

## 2019-04-20 LAB
CMV DNA SPEC QL NAA+PROBE: NOT DETECTED
CMVPCR LOG: NOT DETECTED LOGIU/ML

## 2019-04-25 ENCOUNTER — RESULT REVIEW (OUTPATIENT)
Age: 34
End: 2019-04-25

## 2019-04-25 ENCOUNTER — APPOINTMENT (OUTPATIENT)
Dept: HEMATOLOGY ONCOLOGY | Facility: CLINIC | Age: 34
End: 2019-04-25
Payer: COMMERCIAL

## 2019-04-25 VITALS
TEMPERATURE: 98.7 F | SYSTOLIC BLOOD PRESSURE: 113 MMHG | BODY MASS INDEX: 36.32 KG/M2 | WEIGHT: 198.59 LBS | OXYGEN SATURATION: 99 % | DIASTOLIC BLOOD PRESSURE: 80 MMHG | RESPIRATION RATE: 14 BRPM | HEART RATE: 98 BPM

## 2019-04-25 LAB
ALBUMIN SERPL ELPH-MCNC: 4.4 G/DL
ALP BLD-CCNC: 86 U/L
ALT SERPL-CCNC: 5 U/L
ANION GAP SERPL CALC-SCNC: 11 MMOL/L
AST SERPL-CCNC: 12 U/L
BASOPHILS # BLD AUTO: 0 K/UL — SIGNIFICANT CHANGE UP (ref 0–0.2)
BASOPHILS NFR BLD AUTO: 0.5 % — SIGNIFICANT CHANGE UP (ref 0–2)
BILIRUB SERPL-MCNC: 0.2 MG/DL
BUN SERPL-MCNC: 11 MG/DL
CALCIUM SERPL-MCNC: 10.1 MG/DL
CHLORIDE SERPL-SCNC: 109 MMOL/L
CO2 SERPL-SCNC: 22 MMOL/L
CREAT SERPL-MCNC: 0.75 MG/DL
EOSINOPHIL # BLD AUTO: 0 K/UL — SIGNIFICANT CHANGE UP (ref 0–0.5)
EOSINOPHIL NFR BLD AUTO: 0.3 % — SIGNIFICANT CHANGE UP (ref 0–6)
GLUCOSE SERPL-MCNC: 95 MG/DL
HCT VFR BLD CALC: 36.5 % — SIGNIFICANT CHANGE UP (ref 34.5–45)
HGB BLD-MCNC: 12.7 G/DL — SIGNIFICANT CHANGE UP (ref 11.5–15.5)
LDH SERPL-CCNC: 197 U/L
LYMPHOCYTES # BLD AUTO: 0.9 K/UL — LOW (ref 1–3.3)
LYMPHOCYTES # BLD AUTO: 13.6 % — SIGNIFICANT CHANGE UP (ref 13–44)
MAGNESIUM SERPL-MCNC: 2 MG/DL
MCHC RBC-ENTMCNC: 33.5 PG — SIGNIFICANT CHANGE UP (ref 27–34)
MCHC RBC-ENTMCNC: 34.9 G/DL — SIGNIFICANT CHANGE UP (ref 32–36)
MCV RBC AUTO: 96 FL — SIGNIFICANT CHANGE UP (ref 80–100)
MONOCYTES # BLD AUTO: 0.6 K/UL — SIGNIFICANT CHANGE UP (ref 0–0.9)
MONOCYTES NFR BLD AUTO: 9.8 % — SIGNIFICANT CHANGE UP (ref 2–14)
NEUTROPHILS # BLD AUTO: 4.9 K/UL — SIGNIFICANT CHANGE UP (ref 1.8–7.4)
NEUTROPHILS NFR BLD AUTO: 75.9 % — SIGNIFICANT CHANGE UP (ref 43–77)
PLATELET # BLD AUTO: 372 K/UL — SIGNIFICANT CHANGE UP (ref 150–400)
POTASSIUM SERPL-SCNC: 4.5 MMOL/L
PROT SERPL-MCNC: 6.9 G/DL
RBC # BLD: 3.81 M/UL — SIGNIFICANT CHANGE UP (ref 3.8–5.2)
RBC # FLD: 13.4 % — SIGNIFICANT CHANGE UP (ref 10.3–14.5)
SODIUM SERPL-SCNC: 142 MMOL/L
TACROLIMUS SERPL-MCNC: 6.2 NG/ML
WBC # BLD: 6.5 K/UL — SIGNIFICANT CHANGE UP (ref 3.8–10.5)
WBC # FLD AUTO: 6.5 K/UL — SIGNIFICANT CHANGE UP (ref 3.8–10.5)

## 2019-04-25 PROCEDURE — 99215 OFFICE O/P EST HI 40 MIN: CPT

## 2019-04-25 NOTE — PHYSICAL EXAM
[Ambulatory and capable of all self care but unable to carry out any work activities] : Status 2- Ambulatory and capable of all self care but unable to carry out any work activities. Up and about more than 50% of waking hours [Normal] : affect appropriate [de-identified] : diffuse hyperpigmentation- improving, Dry skin

## 2019-04-25 NOTE — REVIEW OF SYSTEMS
[Negative] : Respiratory [Shortness Of Breath] : no shortness of breath [Cough] : no cough [Wheezing] : no wheezing [Skin Rash] : no skin rash [Skin Wound] : no skin wound [de-identified] : diffuse skin hyperpigmentation- improving; Dry skin, nail ridging

## 2019-04-25 NOTE — HISTORY OF PRESENT ILLNESS
[de-identified] : Patient presents today for a follow up s/p AlloPSCT. She is accompanied by her . Overall she reports doing well. She reports improved skin hyperpigmentation, and denies any irritation. She also notes nail ridging. Otherwise she offers no acute concerns. She is sleeping and eating well, weight stable. Denies fever/chills, night sweats, mouth sores, eye dryness, blurred vision, nausea, vomiting, diarrhea. No CP, SOB or LE edema. Decreased Tacrolimus to 1mg QAM and 0.5mg QPM on 4/11/19, which she did not take today prior to blood work. \par \par On 4/25/19, patient presents for follow up visit. Today is +57 days post Allo PBSCT. Overall doing well and offers no acute concerns. Currently on tacrolimus 0.5 mg BID. Denies fever, chills, nausea, vomiting,diarrhea, dysuria, rash or mouth sores. Denies SOB, chest pain or B/L LE edema. Diffuse skin hyperpigmentation and dry skin. Remains compliant with post transplant diet and crowd restrictions.Remains compliant with post transplant medications. Denies any pain at this time.  [de-identified] : Ms. 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 Of note patient is a [employer] and cannot receive blood products. \par \par Patient was noted to be newly leukopenic to WBC 1.7 during preoperative evaluation in anticipation of right ankle ligament repair. Bone marrow aspirate and biopsy at [facility] on 12/2/2017 revealed AML with some suspicion for APL. She was transferred to [place] for further management. Peripheral counts from around this time are not available. \par \par Repeat marrow on 12/26/2017 showed 90% blasts with myeloid mutation arrest in a 40-50% cellular marrow. On flow blasts were negative for CD34 and HLA-DR; positive for CD38, CD58, CD33, and MPO, and partially positive for CD15, CD11b, , and CD64. CD45 was reported as dim. Karyotype showed +21 in 3 of 21 metaphases, but no t(15:17) was detected on karyotype of FISH. Molecular studies showed mutations in NPM1 (45%0, IDH1 (R132S; 39%), PTPN1 (23%), and CSF3R (5%) genes. There was lingering diagnostic uncertainty due to a largely aspicular and hypocellular specimen, and thus a marrow was repeated on 1/2/2018, confirming AML.\par \par On 1/4/2018, Ms. Gary began 7+3 with Idarubicin. Her induction course was complicated by DIC, neutropenic fever/ Klebsiella PNA, and bilateral retinal hemorrhage. She did not receive blood products and was instead supported with Nplate, Procrit, IV iron, Amicar, and Lupron. After achieving count stability, the patient was discharged; she was not deemed a candidate for further therapy given inability to receive transfusions and a day 14 marrow was not obtained. She subsequently saw Dr. North Obrien at [facility]. He obtained a recovery marrow on 2/13/2018 (day +40), which was 30% cellular with 45% blasts. 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 Cytogenetics and FISH were normal. \par \par She then established care with Dr. Christie for consideration of salvage therapy with IDH1 inhibition given the risks of additional chemotherapy without blood products support. Initial marrow at [facility], on 3/1/2018, showed 69% blasts. ThunderBolts panel confirmed IDH1 R132S, NPM1 T641Jab 12, and CSF3R D810G mutations. She was started on Ivosidenib on [facility] protocol  and had completed four cycles to date (6/28/2018 = C5D1). Marrow blasts increased to 85% on 4/30 but declined to 36% by 5/31. A  bone marrow aspirate and biopsy  was obtained  (6/28/2018); preliminary results show further responding disease with 10.3% blasts by flow cytometry. Subsequent marrow in oct showed 5% blasts...she was then started on vidazza plus venetoclax. She completed 2 cycles...f/u marrow with 4% blasts.....\par \par Patient underwent an AlloPSCT sister MRD (10/10) on 2/27/19, hospital course as follows:\par Ms. 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 She is a [employer] and cannot receive blood products. \par \par Ms. Nickerson's donor is her sister.  She is a 10/10 match. Both donor and patient are CMV +. She had completed all pre testing needed for transplant.  She will \par start reduced intensity Fludarabine/Busulfan chemo regimen today. \par \par Upon admission, a right AC PICC line was placed in IR. Ms. Gary received IV hydration, pain management, antiemetics, anxiolysis, and antibacterial / antiviral / antifungal / PCP / GI and VOD (SOS) prophylaxis. Labs were monitored every other day, and she received electrolyte repletion as needed. She also received vitamin K, vitamin B, supplemental iron, vitamin C and procrit as directed by the institutional bloodless transplant policy. \par \par On 2/27/19, Ms. Gary received 363 mLs of fresh, apheresed, mobilized, related allogeneic HPC over 80 minutes. Cell counts as follows: \par Total MNCs ( x 10^8/kg): 6.21 \par CD 34+Cells ( x 10^6/kg): 3.37 \par CD3+ Cells ( x 10^7/kg): 12.46 \par Cell Viability (%): 100% \par \par Engraftment was noted on 3/11/19. Ms. Gary did not receive zarxio due to her having minimal residual disease on admission, and a reduced intensity preparatory regimen to avoid stimulating myeloblasts. \par \par Ms. Gary had a relatively uncomplicated transplant course. She did experience menses, which was suppressed with provera. She also had pancytopenia related to the high dose chemotherapy preparative regimen. She also experienced grade 1 oral mucositis, and grade 2 GI mucositis also related to the chemotherapy preparative regimen. Both were treated with supportive care. \par \par Currently, Ms. Gary is stable for discharge home with outpatient follow up at the [facility].

## 2019-04-25 NOTE — ASSESSMENT
[Curative] : Goals of care discussed with patient: Curative [FreeTextEntry1] : Ms. Gary is a 33 years old female Religion with AML with the following comorbidities being managed:\par \par 1) AML\par S/p HLA 10/10 allo sib (sister) PBSCT on 2/27/19\par 4/11/19 chimerism = 94.5% (CD33 98%, CD3 38.8%)\par Post transplant BMbx pending around day +100\par \par 2) Heme\par Counts stable\par  WBC 6.5 ANC 4.9 Hgb 12.7 \par No transfusions as pt is practicing Religion\par Continue multivitamin and folic acid daily\par \par 3) ID\par Continue ppx:\par - Acyclovir 400 mg tid\par - Voriconazole 200 mg bid\par - Mepron 750 mg bid\par 4/18/19 CMV PCR not detected, continue to monitor weekly \par Post transplant restrictions reviewed\par \par 4) GVHD\par Skin 0 Liver 0 GI 0 - overall grade 0\par No signs of acute/chronic GVHD at this time\par Plan to taper FK at 100 days if no signs of GVHD\par Continue FK 0.5 mg bid - pending today's level \par Signs/symptoms of GVHD reviewed\par \par 5) GI\par Continue ppx:\par - Ursodiol 300 mg bid\par - Protonix 40 mg daily \par PRN Zofran and Reglan for nausea\par \par 6) Other \par Hypomagnesemia - likely 2/2 tacrolimus, continue magnesium oxide 400mg tid\par Menses suppression - continue Provera 10mg daily, pt knows to notify provider if experiences vaginal bleeding \par HTN - continue amlodipine 5mg daily, BP remains WNL\par Allergies - continue Singulair 10mg daily, may use daily Claritin or similar anti-histamine for ongoing allergy symptoms \par \par 7) Plan/Dispo\par Pt and  educated regarding plan of care, all questions/concerns addressed\par Pt knows to call our office immediately if develops fever of 100.4F or higher or with any new/worsening symptoms \par Follow up in two weeks with MD Lou\par \par

## 2019-04-26 LAB
CMV DNA SPEC QL NAA+PROBE: NOT DETECTED
CMVPCR LOG: NOT DETECTED LOGIU/ML

## 2019-04-27 LAB — ENGRAFTMNET-POST: NORMAL

## 2019-05-02 ENCOUNTER — APPOINTMENT (OUTPATIENT)
Dept: HEMATOLOGY ONCOLOGY | Facility: CLINIC | Age: 34
End: 2019-05-02

## 2019-05-03 LAB — ENGRAFTMNET-POST: NORMAL

## 2019-05-09 ENCOUNTER — RESULT REVIEW (OUTPATIENT)
Age: 34
End: 2019-05-09

## 2019-05-09 ENCOUNTER — APPOINTMENT (OUTPATIENT)
Dept: HEMATOLOGY ONCOLOGY | Facility: CLINIC | Age: 34
End: 2019-05-09
Payer: COMMERCIAL

## 2019-05-09 VITALS
WEIGHT: 198.2 LBS | TEMPERATURE: 98.3 F | HEART RATE: 111 BPM | OXYGEN SATURATION: 99 % | BODY MASS INDEX: 36.25 KG/M2 | DIASTOLIC BLOOD PRESSURE: 88 MMHG | RESPIRATION RATE: 16 BRPM | SYSTOLIC BLOOD PRESSURE: 120 MMHG

## 2019-05-09 LAB
ALBUMIN SERPL ELPH-MCNC: 4.6 G/DL
ALP BLD-CCNC: 83 U/L
ALT SERPL-CCNC: 8 U/L
ANION GAP SERPL CALC-SCNC: 13 MMOL/L
AST SERPL-CCNC: 14 U/L
BASOPHILS # BLD AUTO: 0.1 K/UL — SIGNIFICANT CHANGE UP (ref 0–0.2)
BASOPHILS NFR BLD AUTO: 1.3 % — SIGNIFICANT CHANGE UP (ref 0–2)
BILIRUB SERPL-MCNC: 0.2 MG/DL
BUN SERPL-MCNC: 11 MG/DL
CALCIUM SERPL-MCNC: 9.5 MG/DL
CHLORIDE SERPL-SCNC: 109 MMOL/L
CO2 SERPL-SCNC: 20 MMOL/L
CREAT SERPL-MCNC: 0.84 MG/DL
EOSINOPHIL # BLD AUTO: 0 K/UL — SIGNIFICANT CHANGE UP (ref 0–0.5)
EOSINOPHIL NFR BLD AUTO: 0.9 % — SIGNIFICANT CHANGE UP (ref 0–6)
GLUCOSE SERPL-MCNC: 94 MG/DL
HCT VFR BLD CALC: 39.1 % — SIGNIFICANT CHANGE UP (ref 34.5–45)
HGB BLD-MCNC: 13.2 G/DL — SIGNIFICANT CHANGE UP (ref 11.5–15.5)
LDH SERPL-CCNC: 197 U/L
LYMPHOCYTES # BLD AUTO: 1 K/UL — SIGNIFICANT CHANGE UP (ref 1–3.3)
LYMPHOCYTES # BLD AUTO: 20 % — SIGNIFICANT CHANGE UP (ref 13–44)
MAGNESIUM SERPL-MCNC: 2.2 MG/DL
MCHC RBC-ENTMCNC: 31.9 PG — SIGNIFICANT CHANGE UP (ref 27–34)
MCHC RBC-ENTMCNC: 33.9 G/DL — SIGNIFICANT CHANGE UP (ref 32–36)
MCV RBC AUTO: 94.2 FL — SIGNIFICANT CHANGE UP (ref 80–100)
MONOCYTES # BLD AUTO: 0.5 K/UL — SIGNIFICANT CHANGE UP (ref 0–0.9)
MONOCYTES NFR BLD AUTO: 9.9 % — SIGNIFICANT CHANGE UP (ref 2–14)
NEUTROPHILS # BLD AUTO: 3.2 K/UL — SIGNIFICANT CHANGE UP (ref 1.8–7.4)
NEUTROPHILS NFR BLD AUTO: 67.9 % — SIGNIFICANT CHANGE UP (ref 43–77)
PLATELET # BLD AUTO: 402 K/UL — HIGH (ref 150–400)
POTASSIUM SERPL-SCNC: 4.3 MMOL/L
PROT SERPL-MCNC: 7.3 G/DL
RBC # BLD: 4.15 M/UL — SIGNIFICANT CHANGE UP (ref 3.8–5.2)
RBC # FLD: 13.1 % — SIGNIFICANT CHANGE UP (ref 10.3–14.5)
SODIUM SERPL-SCNC: 142 MMOL/L
TACROLIMUS SERPL-MCNC: 4.8 NG/ML
WBC # BLD: 4.8 K/UL — SIGNIFICANT CHANGE UP (ref 3.8–10.5)
WBC # FLD AUTO: 4.8 K/UL — SIGNIFICANT CHANGE UP (ref 3.8–10.5)

## 2019-05-09 PROCEDURE — 99215 OFFICE O/P EST HI 40 MIN: CPT

## 2019-05-09 NOTE — REASON FOR VISIT
[Spouse] : spouse [Follow-Up Visit] : a follow-up visit for [Parent] : parent [FreeTextEntry2] : AML

## 2019-05-09 NOTE — ASSESSMENT
[Curative] : Goals of care discussed with patient: Curative [FreeTextEntry1] : Ms. Gary is a 34 years old female with AML, presenting today for a f/u s/p Allo PSCT sister MRD (10/10) on 2/27/19. Patient had a relatively uncomplicated transplant course. She had pancytopenia related to the high dose chemotherapy preparative regimen. She also experienced grade 1 oral mucositis, and grade 2 GI mucositis also related to the chemotherapy preparative regimen. Both were treated with supportive care. She was discharged on 3/18/19. Patient is a practicing [employer]. Accompanied by her mother today. \par No gvhd...chimerism sent when wbc was 1 was 82% donor...Last chimerism was 95% on 4/25/19. Chimerism on  cd 3 improved\par \par Tacrolimus- currently 0.5 mg BID as of 4/19/19. \par Voriconazole- 200 mg BID\par Acyclovir 400 mg Q8h - to continue for 6 months post transplant.\par Atovaquone 750 mg/5 mL- 5 ml Q12h - to continue for 6 months post transplant.\par Pantoprazole 40 mg oral QD - PRN \par Ursodiol 300 mg BID\par Magnesium oxide 400 mg TID\par Multiple Vitamins QD\par Folic acid 1 mg QD\par \par Peripheral blood work was reviewed and and discussed with patient. No transfusions accepted by pt.\par Labs sent today CMP, LDH, Mg, Tacrolimus level, CMV, Chimerism. \par Advised to maintain post-transplant diet and crowd restrictions. \par Advised to closely monitor for aGVHD si/sx including but not limited to severe diarrhea, nausea, vomiting, rash, mouth sores, dry or pain in the eyes. \par Plan to taper off Tacro by ~100 days, then start low dose Vidaza, x 5 days. Reviewed rationale, benefits, risks and side effects, will revisit at later f/u. \par Will discuss plan of care with Dr. Fermín Youngblood and Dr. Christie.\par Well care stressed, question addressed, support provided. \par \par RTC on 5/23 for f/u with JOLEEN Murillo, then on 6/06 for f/u with JOLEEN Bonilla & discuss tacro taper schedule. \par Patient advised to contact immediately with any concerns or symptoms.

## 2019-05-09 NOTE — HISTORY OF PRESENT ILLNESS
[de-identified] : Patient presents today for a follow up s/p AlloPSCT. She is accompanied by her mother. Overall she reports doing well. She reports improved skin hyperpigmentation, and denies any irritation. She also notes nail ridging. Otherwise she offers no acute concerns. She is sleeping and eating well, weight stable. Denies fever/chills, night sweats, mouth sores, eye dryness, blurred vision, nausea, vomiting, diarrhea. No CP, SOB or LE edema. Currently taking Tacrolimus to 0.5 mg BID. [de-identified] : Ms. 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 Of note patient is a [employer] and cannot receive blood products. \par \par Patient was noted to be newly leukopenic to WBC 1.7 during preoperative evaluation in anticipation of right ankle ligament repair. Bone marrow aspirate and biopsy at [facility] on 12/2/2017 revealed AML with some suspicion for APL. She was transferred to [place] for further management. Peripheral counts from around this time are not available. \par \par Repeat marrow on 12/26/2017 showed 90% blasts with myeloid mutation arrest in a 40-50% cellular marrow. On flow blasts were negative for CD34 and HLA-DR; positive for CD38, CD58, CD33, and MPO, and partially positive for CD15, CD11b, , and CD64. CD45 was reported as dim. Karyotype showed +21 in 3 of 21 metaphases, but no t(15:17) was detected on karyotype of FISH. Molecular studies showed mutations in NPM1 (45%0, IDH1 (R132S; 39%), PTPN1 (23%), and CSF3R (5%) genes. There was lingering diagnostic uncertainty due to a largely aspicular and hypocellular specimen, and thus a marrow was repeated on 1/2/2018, confirming AML.\par \par On 1/4/2018, Ms. Gary began 7+3 with Idarubicin. Her induction course was complicated by DIC, neutropenic fever/ Klebsiella PNA, and bilateral retinal hemorrhage. She did not receive blood products and was instead supported with Nplate, Procrit, IV iron, Amicar, and Lupron. After achieving count stability, the patient was discharged; she was not deemed a candidate for further therapy given inability to receive transfusions and a day 14 marrow was not obtained. She subsequently saw Dr. [place] Obrien at [facility]. He obtained a recovery marrow on 2/13/2018 (day +40), which was 30% cellular with 45% blasts. 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 Cytogenetics and FISH were normal. \par \par She then established care with Dr. Christie for consideration of salvage therapy with IDH1 inhibition given the risks of additional chemotherapy without blood products support. Initial marrow at [facility], on 3/1/2018, showed 69% blasts. ThunderBolts panel confirmed IDH1 R132S, NPM1 F249Mya 12, and CSF3R D810G mutations. She was started on Ivosidenib on [facility] protocol  and had completed four cycles to date (6/28/2018 = C5D1). Marrow blasts increased to 85% on 4/30 but declined to 36% by 5/31. A  bone marrow aspirate and biopsy  was obtained  (6/28/2018); preliminary results show further responding disease with 10.3% blasts by flow cytometry. Subsequent marrow in oct showed 5% blasts...she was then started on vidazza plus venetoclax. She completed 2 cycles...f/u marrow with 4% blasts.....\par \par Patient underwent an AlloPSCT sister MRD (10/10) on 2/27/19, hospital course as follows:\par Ms. 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 She is a [employer] and cannot receive blood products. \par \par Ms. Nickerson's donor is her sister.  She is a 10/10 match. Both donor and patient are CMV +. She had completed all pre testing needed for transplant.  She will \par start reduced intensity Fludarabine/Busulfan chemo regimen today. \par \par Upon admission, a right AC PICC line was placed in IR. Ms. Gary received IV hydration, pain management, antiemetics, anxiolysis, and antibacterial / antiviral / antifungal / PCP / GI and VOD (SOS) prophylaxis. Labs were monitored every other day, and she received electrolyte repletion as needed. She also received vitamin K, vitamin B, supplemental iron, vitamin C and procrit as directed by the institutional bloodless transplant policy. \par \par On 2/27/19, Ms. Gary received 363 mLs of fresh, apheresed, mobilized, related allogeneic HPC over 80 minutes. Cell counts as follows: \par Total MNCs ( x 10^8/kg): 6.21 \par CD 34+Cells ( x 10^6/kg): 3.37 \par CD3+ Cells ( x 10^7/kg): 12.46 \par Cell Viability (%): 100% \par \par Engraftment was noted on 3/11/19. Ms. Gary did not receive zarxio due to her having minimal residual disease on admission, and a reduced intensity preparatory regimen to avoid stimulating myeloblasts. \par \par Ms. Gary had a relatively uncomplicated transplant course. She did experience menses, which was suppressed with provera. She also had pancytopenia related to the high dose chemotherapy preparative regimen. She also experienced grade 1 oral mucositis, and grade 2 GI mucositis also related to the chemotherapy preparative regimen. Both were treated with supportive care. \par \par Currently, Ms. Gary is stable for discharge home with outpatient follow up at the [facility].

## 2019-05-09 NOTE — PHYSICAL EXAM
[Ambulatory and capable of all self care but unable to carry out any work activities] : Status 2- Ambulatory and capable of all self care but unable to carry out any work activities. Up and about more than 50% of waking hours [Normal] : affect appropriate [de-identified] : hypo & hyperpigmentation- improving

## 2019-05-09 NOTE — REVIEW OF SYSTEMS
[Negative] : Allergic/Immunologic [de-identified] : diffuse skin hyperpigmentation- improving; nail ridging

## 2019-05-14 LAB
CMV DNA SPEC QL NAA+PROBE: NOT DETECTED
CMVPCR LOG: NOT DETECTED LOGIU/ML

## 2019-05-16 ENCOUNTER — APPOINTMENT (OUTPATIENT)
Dept: HEMATOLOGY ONCOLOGY | Facility: CLINIC | Age: 34
End: 2019-05-16

## 2019-05-17 LAB — ENGRAFTMNET-POST: NORMAL

## 2019-05-21 ENCOUNTER — OUTPATIENT (OUTPATIENT)
Dept: OUTPATIENT SERVICES | Facility: HOSPITAL | Age: 34
LOS: 1 days | Discharge: HOME | End: 2019-05-21

## 2019-05-21 DIAGNOSIS — C92.00 ACUTE MYELOBLASTIC LEUKEMIA, NOT HAVING ACHIEVED REMISSION: ICD-10-CM

## 2019-05-21 DIAGNOSIS — Z90.3 ACQUIRED ABSENCE OF STOMACH [PART OF]: Chronic | ICD-10-CM

## 2019-05-23 ENCOUNTER — APPOINTMENT (OUTPATIENT)
Dept: HEMATOLOGY ONCOLOGY | Facility: CLINIC | Age: 34
End: 2019-05-23
Payer: COMMERCIAL

## 2019-05-23 ENCOUNTER — OUTPATIENT (OUTPATIENT)
Dept: OUTPATIENT SERVICES | Facility: HOSPITAL | Age: 34
LOS: 1 days | End: 2019-05-23

## 2019-05-23 ENCOUNTER — RESULT REVIEW (OUTPATIENT)
Age: 34
End: 2019-05-23

## 2019-05-23 VITALS
RESPIRATION RATE: 16 BRPM | TEMPERATURE: 98.2 F | BODY MASS INDEX: 36.65 KG/M2 | HEART RATE: 96 BPM | WEIGHT: 200.4 LBS | SYSTOLIC BLOOD PRESSURE: 116 MMHG | DIASTOLIC BLOOD PRESSURE: 81 MMHG | OXYGEN SATURATION: 100 %

## 2019-05-23 DIAGNOSIS — S99.911A UNSPECIFIED INJURY OF RIGHT ANKLE, INITIAL ENCOUNTER: ICD-10-CM

## 2019-05-23 DIAGNOSIS — Z94.84 STEM CELLS TRANSPLANT STATUS: ICD-10-CM

## 2019-05-23 DIAGNOSIS — Z90.3 ACQUIRED ABSENCE OF STOMACH [PART OF]: Chronic | ICD-10-CM

## 2019-05-23 LAB
ALBUMIN SERPL ELPH-MCNC: 4.6 G/DL
ALP BLD-CCNC: 89 U/L
ALT SERPL-CCNC: 9 U/L
ANION GAP SERPL CALC-SCNC: 14 MMOL/L
AST SERPL-CCNC: 15 U/L
BASOPHILS # BLD AUTO: 0.1 K/UL — SIGNIFICANT CHANGE UP (ref 0–0.2)
BASOPHILS NFR BLD AUTO: 1.3 % — SIGNIFICANT CHANGE UP (ref 0–2)
BILIRUB SERPL-MCNC: <0.2 MG/DL
BUN SERPL-MCNC: 9 MG/DL
CALCIUM SERPL-MCNC: 10.1 MG/DL
CHLORIDE SERPL-SCNC: 106 MMOL/L
CO2 SERPL-SCNC: 21 MMOL/L
CREAT SERPL-MCNC: 0.84 MG/DL
EOSINOPHIL # BLD AUTO: 0 K/UL — SIGNIFICANT CHANGE UP (ref 0–0.5)
EOSINOPHIL NFR BLD AUTO: 0.8 % — SIGNIFICANT CHANGE UP (ref 0–6)
GLUCOSE SERPL-MCNC: 92 MG/DL
HCT VFR BLD CALC: 37.5 % — SIGNIFICANT CHANGE UP (ref 34.5–45)
HGB BLD-MCNC: 13.3 G/DL — SIGNIFICANT CHANGE UP (ref 11.5–15.5)
LDH SERPL-CCNC: 183 U/L
LYMPHOCYTES # BLD AUTO: 1.1 K/UL — SIGNIFICANT CHANGE UP (ref 1–3.3)
LYMPHOCYTES # BLD AUTO: 23.5 % — SIGNIFICANT CHANGE UP (ref 13–44)
MAGNESIUM SERPL-MCNC: 2 MG/DL
MCHC RBC-ENTMCNC: 33.3 PG — SIGNIFICANT CHANGE UP (ref 27–34)
MCHC RBC-ENTMCNC: 35.6 G/DL — SIGNIFICANT CHANGE UP (ref 32–36)
MCV RBC AUTO: 93.5 FL — SIGNIFICANT CHANGE UP (ref 80–100)
MONOCYTES # BLD AUTO: 0.5 K/UL — SIGNIFICANT CHANGE UP (ref 0–0.9)
MONOCYTES NFR BLD AUTO: 12 % — SIGNIFICANT CHANGE UP (ref 2–14)
NEUTROPHILS # BLD AUTO: 2.8 K/UL — SIGNIFICANT CHANGE UP (ref 1.8–7.4)
NEUTROPHILS NFR BLD AUTO: 62.4 % — SIGNIFICANT CHANGE UP (ref 43–77)
PLATELET # BLD AUTO: 359 K/UL — SIGNIFICANT CHANGE UP (ref 150–400)
POTASSIUM SERPL-SCNC: 4.1 MMOL/L
PROT SERPL-MCNC: 7.4 G/DL
RBC # BLD: 4.01 M/UL — SIGNIFICANT CHANGE UP (ref 3.8–5.2)
RBC # FLD: 12.6 % — SIGNIFICANT CHANGE UP (ref 10.3–14.5)
SODIUM SERPL-SCNC: 141 MMOL/L
TACROLIMUS SERPL-MCNC: 5.1 NG/ML
WBC # BLD: 4.5 K/UL — SIGNIFICANT CHANGE UP (ref 3.8–10.5)
WBC # FLD AUTO: 4.5 K/UL — SIGNIFICANT CHANGE UP (ref 3.8–10.5)

## 2019-05-23 PROCEDURE — 99214 OFFICE O/P EST MOD 30 MIN: CPT

## 2019-05-24 NOTE — HISTORY OF PRESENT ILLNESS
[de-identified] : Ms. 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 Of note patient is a [employer] and cannot receive blood products. \par \par Patient was noted to be newly leukopenic to WBC 1.7 during preoperative evaluation in anticipation of right ankle ligament repair. Bone marrow aspirate and biopsy at  on 12/2/2017 revealed AML with some suspicion for APL. She was transferred to [place] for further management. Peripheral counts from around this time are not available. \par \par Repeat marrow on 12/26/2017 showed 90% blasts with myeloid mutation arrest in a 40-50% cellular marrow. On flow blasts were negative for CD34 and HLA-DR; positive for CD38, CD58, CD33, and MPO, and partially positive for CD15, CD11b, , and CD64. CD45 was reported as dim. Karyotype showed +21 in 3 of 21 metaphases, but no t(15:17) was detected on karyotype of FISH. Molecular studies showed mutations in NPM1 (45%0, IDH1 (R132S; 39%), PTPN1 (23%), and CSF3R (5%) genes. There was lingering diagnostic uncertainty due to a largely aspicular and hypocellular specimen, and thus a marrow was repeated on 1/2/2018, confirming AML.\par \par On 1/4/2018, Ms. Gary began 7+3 with Idarubicin. Her induction course was complicated by DIC, neutropenic fever/ Klebsiella PNA, and bilateral retinal hemorrhage. She did not receive blood products and was instead supported with Nplate, Procrit, IV iron, Amicar, and Lupron. After achieving count stability, the patient was discharged; she was not deemed a candidate for further therapy given inability to receive transfusions and a day 14 marrow was not obtained. She subsequently saw Dr. [place] Obrien at [facility]. He obtained a recovery marrow on 2/13/2018 (day +40), which was 30% cellular with 45% blasts. 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 Cytogenetics and FISH were normal. \par \par She then established care with Dr. Christie for consideration of salvage therapy with IDH1 inhibition given the risks of additional chemotherapy without blood products support. [place] [facility]2018, showed 69% blasts. ThunderBolts panel confirmed IDH1 R132S, NPM1 R371Cyh 12, and CSF3R D810G mutations. She was started on Ivosidenib on [facility] protocol  and had completed four cycles to date (6/28/2018 = C5D1). Marrow blasts increased to 85% on 4/30 but declined to 36% by 5/31. A  bone marrow aspirate and biopsy  was obtained  (6/28/2018); preliminary results show further responding disease with 10.3% blasts by flow cytometry. Subsequent marrow in oct showed 5% blasts...she was then started on vidazza plus venetoclax. She completed 2 cycles...f/u marrow with 4% blasts.\par \par BMT Hospital Course:	 \par Ms. 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 She is a [employer] and cannot receive blood products. \par \par Ms. Nickerson's donor is her sister.  She is a 10/10 match. Both donor and patient are CMV +. She had completed all pre testing needed for transplant.  She will \par start reduced intensity Fludarabine/Busulfan chemo regimen today. \par \par Upon admission, a right AC PICC line was placed in IR. Ms. Gary received IV hydration, pain management, antiemetics, anxiolysis, and antibacterial / antiviral / antifungal / PCP / GI and VOD (SOS) prophylaxis. Labs were monitored every other day, and she received electrolyte repletion as needed. She also received vitamin K, vitamin B, supplemental iron, vitamin C and procrit as directed by the institutional bloodless transplant policy. \par \par On 2/27/19, Ms. Gary received 363 mLs of fresh, apheresed, mobilized, related allogeneic HPC over 80 minutes. Cell counts as follows: \par Total MNCs ( x 10^8/kg): 6.21 \par CD 34+Cells ( x 10^6/kg): 3.37 \par CD3+ Cells ( x 10^7/kg): 12.46 \par Cell Viability (%): 100% \par \par Engraftment was noted on 3/11/19. Ms. Gary did not receive zarxio due to her having minimal residual disease on admission, and a reduced intensity preparatory regimen to avoid stimulating myeloblasts. \par \par Ms. Gary had a relatively uncomplicated transplant course. She did experience menses, which was suppressed with provera. She also had pancytopenia related to the high dose chemotherapy preparative regimen. She also experienced grade 1 oral mucositis, and grade 2 GI mucositis also related to the chemotherapy preparative regimen. Both were treated with supportive care. \par \par Currently, Ms. Gary is stable for discharge home with outpatient follow up at the [facility].  [de-identified] : On 5/23/19 visit, day +85 of SCT today. Feeling well. Adequate PO intake and hydration. LMP was first week of May 2019, very light bleeding at that time. Ongoing right ankle pain which she's had since prior to transplant. Compliant with post transplant meds and restrictions. Currently taking FK 0.5mg bid which she did not take prior to lab draw today. Denies fever, chills, headache, dizziness, blurred vision, mucositis/odynophagia, chest pain, SOB, cough, nausea/vomiting, diarrhea/constipation, abdominal pain, dysuria, LE edema, rash, bleeding, pain

## 2019-05-24 NOTE — RESULTS/DATA
[FreeTextEntry1] : CBC reviewed with pt\par \par Bone marrow biopsy 01/24/19:\par -Persistent minimal involvement by acute myeloid leukemia (4% blasts by aspirate differential count).\par -Hypocellular marrow with erythroid predominate trilineage maturing hematopoiesis. \par \par Bone marrow biopsy 09/28/18:\par -Trilineage hematopoiesis with maturation, increased myeloblasts (5% of cells), focally increased megakaryocytes and presents iron stores (history of AML with mutated NPM1 and [id number]).\par

## 2019-05-24 NOTE — PHYSICAL EXAM
[Ambulatory and capable of all self care but unable to carry out any work activities] : Status 2- Ambulatory and capable of all self care but unable to carry out any work activities. Up and about more than 50% of waking hours [de-identified] : no edema [Normal] : grossly intact

## 2019-05-24 NOTE — REVIEW OF SYSTEMS
[Fever] : no fever [Chills] : no chills [Fatigue] : no fatigue [Eye Pain] : no eye pain [Dry Eyes] : no dryness of the eyes [Vision Problems] : no vision problems [Nosebleeds] : no nosebleeds [Dysphagia] : no dysphagia [Odynophagia] : no odynophagia [Chest Pain] : no chest pain [Mucosal Pain] : no mucosal pain [Lower Ext Edema] : no lower extremity edema [Shortness Of Breath] : no shortness of breath [Cough] : no cough [Abdominal Pain] : no abdominal pain [Vomiting] : no vomiting [Constipation] : no constipation [Diarrhea] : no diarrhea [Skin Rash] : no skin rash [Dysuria] : no dysuria [Fainting] : no fainting [Dizziness] : no dizziness [Negative] : Allergic/Immunologic [FreeTextEntry7] : no nausea [FreeTextEntry9] : stable right ankle pain

## 2019-05-24 NOTE — ASSESSMENT
[FreeTextEntry1] : Ms. Gary is a 33 years old female Jehovah's witness with AML with the following comorbidities being managed:\par \par 1) AML\par S/p HLA 10/10 allo sib (sister) PBSCT on 2/27/19\par 5/9/19 chimerism = 93.5% (CD33 97%, CD3 40%), previously chimerism = 95.2% on 4/25/19\par Post transplant BMbx pending around day +100\par Plan for post transplant maintenance Vidaza once off immunosuppression\par \par 2) Heme\par Counts stable\par    WBC 4.5   ANC 2.8   Hgb 13.3   \par No transfusions as pt is practicing Jehovah's witness\par Continue multivitamin and folic acid daily\par \par 3) ID\par Continue ppx:\par - Acyclovir 400mg tid\par - Voriconazole 200mg bid\par - Mepron 750mg bid\par 5/9/19 CMV PCR not detected, continue to monitor weekly \par Post transplant restrictions reviewed\par \par 4) GVHD\par Skin 0   Liver 0   GI 0 - overall grade 0\par No signs of acute/chronic GVHD at this time\par Plan to taper FK by approx 100 days if no signs of GVHD\par On 5/23/19, FK decreased to 0.5mg bid Sun-Fri and 0.5mg daily on Sat. Continue to decrease by 1 dose per week \par Signs/symptoms of GVHD reviewed\par \par 5) GI\par Continue ppx:\par - Ursodiol 300mg bid\par - Protonix 40mg daily  \par PRN Zofran and Reglan for nausea\par \par 6) Other \par Hypomagnesemia - likely 2/2 tacrolimus, continue magnesium oxide 400mg tid\par GYN - continue Provera 10mg daily, reviewed need for contraception if resuming sexual activity\par HTN - continue amlodipine 5mg daily, BP remains WNL\par Allergies - continue Singulair 10mg daily, may use daily Claritin or similar anti-histamine for ongoing allergy symptoms \par \par 7) Plan/Dispo\par Pt and  educated regarding plan of care, all questions/concerns addressed\par F/u in 2wks with NP on 6/6/19

## 2019-05-29 LAB
CMV DNA SPEC QL NAA+PROBE: ABNORMAL IU/ML
CMVPCR LOG: ABNORMAL LOGIU/ML

## 2019-05-30 ENCOUNTER — APPOINTMENT (OUTPATIENT)
Dept: HEMATOLOGY ONCOLOGY | Facility: CLINIC | Age: 34
End: 2019-05-30

## 2019-05-31 ENCOUNTER — RX RENEWAL (OUTPATIENT)
Age: 34
End: 2019-05-31

## 2019-05-31 LAB — ENGRAFTMNET-POST: NORMAL

## 2019-06-06 ENCOUNTER — RESULT REVIEW (OUTPATIENT)
Age: 34
End: 2019-06-06

## 2019-06-06 ENCOUNTER — APPOINTMENT (OUTPATIENT)
Dept: HEMATOLOGY ONCOLOGY | Facility: CLINIC | Age: 34
End: 2019-06-06
Payer: COMMERCIAL

## 2019-06-06 VITALS
SYSTOLIC BLOOD PRESSURE: 152 MMHG | BODY MASS INDEX: 36.57 KG/M2 | WEIGHT: 199.96 LBS | TEMPERATURE: 98.8 F | OXYGEN SATURATION: 99 % | RESPIRATION RATE: 16 BRPM | HEART RATE: 114 BPM | DIASTOLIC BLOOD PRESSURE: 90 MMHG

## 2019-06-06 VITALS — DIASTOLIC BLOOD PRESSURE: 85 MMHG | SYSTOLIC BLOOD PRESSURE: 123 MMHG

## 2019-06-06 LAB
ALBUMIN SERPL ELPH-MCNC: 4.4 G/DL
ALP BLD-CCNC: 87 U/L
ALT SERPL-CCNC: 13 U/L
ANION GAP SERPL CALC-SCNC: 13 MMOL/L
AST SERPL-CCNC: 17 U/L
BASOPHILS # BLD AUTO: 0 K/UL — SIGNIFICANT CHANGE UP (ref 0–0.2)
BASOPHILS NFR BLD AUTO: 0.4 % — SIGNIFICANT CHANGE UP (ref 0–2)
BILIRUB SERPL-MCNC: 0.2 MG/DL
BUN SERPL-MCNC: 11 MG/DL
CALCIUM SERPL-MCNC: 10.1 MG/DL
CHLORIDE SERPL-SCNC: 107 MMOL/L
CO2 SERPL-SCNC: 22 MMOL/L
CREAT SERPL-MCNC: 0.84 MG/DL
EOSINOPHIL # BLD AUTO: 0.1 K/UL — SIGNIFICANT CHANGE UP (ref 0–0.5)
EOSINOPHIL NFR BLD AUTO: 1.3 % — SIGNIFICANT CHANGE UP (ref 0–6)
GLUCOSE SERPL-MCNC: 95 MG/DL
HCT VFR BLD CALC: 38.4 % — SIGNIFICANT CHANGE UP (ref 34.5–45)
HGB BLD-MCNC: 13.3 G/DL — SIGNIFICANT CHANGE UP (ref 11.5–15.5)
LDH SERPL-CCNC: 186 U/L
LYMPHOCYTES # BLD AUTO: 1.1 K/UL — SIGNIFICANT CHANGE UP (ref 1–3.3)
LYMPHOCYTES # BLD AUTO: 22.2 % — SIGNIFICANT CHANGE UP (ref 13–44)
MAGNESIUM SERPL-MCNC: 2 MG/DL
MCHC RBC-ENTMCNC: 32.6 PG — SIGNIFICANT CHANGE UP (ref 27–34)
MCHC RBC-ENTMCNC: 34.7 G/DL — SIGNIFICANT CHANGE UP (ref 32–36)
MCV RBC AUTO: 94 FL — SIGNIFICANT CHANGE UP (ref 80–100)
MONOCYTES # BLD AUTO: 0.7 K/UL — SIGNIFICANT CHANGE UP (ref 0–0.9)
MONOCYTES NFR BLD AUTO: 13.6 % — SIGNIFICANT CHANGE UP (ref 2–14)
NEUTROPHILS # BLD AUTO: 3.1 K/UL — SIGNIFICANT CHANGE UP (ref 1.8–7.4)
NEUTROPHILS NFR BLD AUTO: 62.5 % — SIGNIFICANT CHANGE UP (ref 43–77)
PLATELET # BLD AUTO: 365 K/UL — SIGNIFICANT CHANGE UP (ref 150–400)
POTASSIUM SERPL-SCNC: 4.6 MMOL/L
PROT SERPL-MCNC: 7.2 G/DL
RBC # BLD: 4.09 M/UL — SIGNIFICANT CHANGE UP (ref 3.8–5.2)
RBC # FLD: 11.8 % — SIGNIFICANT CHANGE UP (ref 10.3–14.5)
SODIUM SERPL-SCNC: 142 MMOL/L
TACROLIMUS SERPL-MCNC: 4.9 NG/ML
WBC # BLD: 5 K/UL — SIGNIFICANT CHANGE UP (ref 3.8–10.5)
WBC # FLD AUTO: 5 K/UL — SIGNIFICANT CHANGE UP (ref 3.8–10.5)

## 2019-06-06 PROCEDURE — 99214 OFFICE O/P EST MOD 30 MIN: CPT

## 2019-06-07 NOTE — OB HISTORY
[___] : Total Pregnancies: [unfilled] [Definite:  ___ (Date)] : the last menstrual period was [unfilled] [Spotting Between  Menses] : no spotting between menses

## 2019-06-07 NOTE — RESULTS/DATA
[FreeTextEntry1] : CBC reviewed with pt\par \par Bone marrow biopsy 01/24/19:\par -Persistent minimal involvement by acute myeloid leukemia (4% blasts by aspirate differential count).\par -Hypocellular marrow with erythroid predominate trilineage maturing hematopoiesis. \par \par Bone marrow biopsy 09/28/18:\par -Trilineage hematopoiesis with maturation, increased myeloblasts (5% of cells), focally increased megakaryocytes and presents iron stores (history of AML with mutated NPM1 and [id number]).\par

## 2019-06-07 NOTE — ASSESSMENT
[FreeTextEntry1] : Ms. Gary is a 33 years old female Taoism with AML with the following comorbidities being managed:\par \par 1) AML\par S/p HLA 10/10 allo sib (sister) PBSCT on 2/27/19\par 5/23/19 chimerism = 93.2% (CD33 97.2%, CD3 48%)\par Post transplant BMbx pending around day +100\par Plan for post transplant maintenance Vidaza once off immunosuppression\par \par 2) Heme\par Counts stable\par    WBC 5   ANC 3.1   Hgb 13.3   \par No transfusions as pt is practicing Taoism\par Continue multivitamin and folic acid daily\par \par 3) ID\par Continue ppx:\par - Acyclovir 400 mg tid\par - Voriconazole 200 mg bid\par - Mepron 750 mg bid\par 5/9/19 CMV PCR not detected, continue to monitor weekly \par Post transplant restrictions reviewed\par \par 4) GVHD\par Skin 0   Liver 0   GI 0 - overall grade 0\par No signs of acute/chronic GVHD at this time\par On 6/6/19, FK decreased to 0.5 mg daily. Signs/symptoms of GVHD reviewed\par \par 5) GI\par Continue ppx:\par - Ursodiol 300mg bid\par - Protonix 40mg daily  \par PRN Zofran and Reglan for nausea\par \par 6) Other \par Hypomagnesemia - likely 2/2 tacrolimus, continue magnesium oxide 400mg tid\par GYN - continue Provera 10mg daily\par HTN - continue amlodipine 5mg daily, BP remains WNL\par \par 7) Plan/Dispo\par Pt and  educated regarding plan of care, all questions/concerns addressed\par F/u in 2 weeks with NP on 6/20/19\par F/u in 4 weeks with MD Lou. BM bx scheduled for same day with JOLEEN Fraser.

## 2019-06-07 NOTE — REVIEW OF SYSTEMS
[Negative] : Allergic/Immunologic [Fever] : no fever [Chills] : no chills [Fatigue] : no fatigue [Eye Pain] : no eye pain [Vision Problems] : no vision problems [Dry Eyes] : no dryness of the eyes [Dysphagia] : no dysphagia [Nosebleeds] : no nosebleeds [Mucosal Pain] : no mucosal pain [Odynophagia] : no odynophagia [Chest Pain] : no chest pain [Lower Ext Edema] : no lower extremity edema [Shortness Of Breath] : no shortness of breath [Abdominal Pain] : no abdominal pain [Cough] : no cough [Vomiting] : no vomiting [Constipation] : no constipation [Diarrhea] : no diarrhea [Dysuria] : no dysuria [Skin Rash] : no skin rash [Dizziness] : no dizziness [Fainting] : no fainting [FreeTextEntry7] : no nausea [FreeTextEntry9] : stable right ankle pain

## 2019-06-07 NOTE — PHYSICAL EXAM
[Ambulatory and capable of all self care but unable to carry out any work activities] : Status 2- Ambulatory and capable of all self care but unable to carry out any work activities. Up and about more than 50% of waking hours [Normal] : affect appropriate [de-identified] : no edema

## 2019-06-07 NOTE — REASON FOR VISIT
[Follow-Up Visit] : a follow-up visit for [Spouse] : spouse [Acute Myeloid Leukemia] : acute myeloid leukemia [FreeTextEntry2] : s/p HLA 10/10 sib (sister) SCT on 2/27/19

## 2019-06-07 NOTE — HISTORY OF PRESENT ILLNESS
[de-identified] : Ms. Gary is a 32 y/o female with AML associated with normal karyotype and NPM1 ans [id number] mutation, refractory to 7+3 and currently receiving Ivosidenib. Of note patient is a [employer] and cannot receive blood products. \par \par Patient was noted to be newly leukopenic to WBC 1.7 during preoperative evaluation in anticipation of right ankle ligament repair. Bone marrow aspirate and biopsy at [facility] on 12/2/2017 revealed AML with some suspicion for APL. She was transferred to [place] for further management. Peripheral counts from around this time are not available. \par \par Repeat marrow on 12/26/2017 showed 90% blasts with myeloid mutation arrest in a 40-50% cellular marrow. On flow blasts were negative for CD34 and HLA-DR; positive for CD38, CD58, CD33, and MPO, and partially positive for CD15, CD11b, , and CD64. CD45 was reported as dim. Karyotype showed +21 in 3 of 21 metaphases, but no t(15:17) was detected on karyotype of FISH. Molecular studies showed mutations in NPM1 (45%0, IDH1 (R132S; 39%), PTPN1 (23%), and CSF3R (5%) genes. There was lingering diagnostic uncertainty due to a largely aspicular and hypocellular specimen, and thus a marrow was repeated on 1/2/2018, confirming AML.\par \par On 1/4/2018, Ms. Gary began 7+3 with Idarubicin. Her induction course was complicated by DIC, neutropenic fever/ Klebsiella PNA, and bilateral retinal hemorrhage. She did not receive blood products and was instead supported with Nplate, Procrit, IV iron, Amicar, and Lupron. After achieving count stability, the patient was discharged; she was not deemed a candidate for further therapy given inability to receive transfusions and a day 14 marrow was not obtained. She subsequently saw Dr. North Obrien at [facility]. He obtained a recovery marrow on 2/13/2018 (day +40), which was 30% cellular with 45% blasts. 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 Cytogenetics and FISH were normal. \par \par She then established care with Dr. Christie for consideration of salvage therapy with IDH1 inhibition given the risks of additional chemotherapy without blood products support. [place] [facility]/1/2018, showed 69% blasts. ThunderBolts panel confirmed IDH1 R132S, NPM1 [id number] 12, and CSF3R D810G mutations. She was started on Ivosidenib on [facility] protocol  and had completed four cycles to date (6/28/2018 = C5D1). Marrow blasts increased to 85% on 4/30 but declined to 36% by 5/31. A  bone marrow aspirate and biopsy  was obtained  (6/28/2018); preliminary results show further responding disease with 10.3% blasts by flow cytometry. Subsequent marrow in oct showed 5% blasts...she was then started on vidazza plus venetoclax. She completed 2 cycles...f/u marrow with 4% blasts.\par \par BMT Hospital Course:	 \par Ms. 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 She is a [employer] and cannot receive blood products. \par \par Ms. Nickerson's donor is her sister.  She is a 10/10 match. Both donor and patient are CMV +. She had completed all pre testing needed for transplant.  She will \par start reduced intensity Fludarabine/Busulfan chemo regimen today. \par \par Upon admission, a right AC PICC line was placed in IR. Ms. Gary received IV hydration, pain management, antiemetics, anxiolysis, and antibacterial / antiviral / antifungal / PCP / GI and VOD (SOS) prophylaxis. Labs were monitored every other day, and she received electrolyte repletion as needed. She also received vitamin K, vitamin B, supplemental iron, vitamin C and procrit as directed by the institutional bloodless transplant policy. \par \par On 2/27/19, Ms. Gary received 363 mLs of fresh, apheresed, mobilized, related allogeneic HPC over 80 minutes. Cell counts as follows: \par Total MNCs ( x 10^8/kg): 6.21 \par CD 34+Cells ( x 10^6/kg): 3.37 \par CD3+ Cells ( x 10^7/kg): 12.46 \par Cell Viability (%): 100% \par \par Engraftment was noted on 3/11/19. Ms. Gary did not receive zarxio due to her having minimal residual disease on admission, and a reduced intensity preparatory regimen to avoid stimulating myeloblasts. \par \par Ms. Gary had a relatively uncomplicated transplant course. She did experience menses, which was suppressed with provera. She also had pancytopenia related to the high dose chemotherapy preparative regimen. She also experienced grade 1 oral mucositis, and grade 2 GI mucositis also related to the chemotherapy preparative regimen. Both were treated with supportive care. \par \par Currently, Ms. Gary is stable for discharge home with outpatient follow up at the [facility].  [de-identified] : On 5/23/19 visit, day +85 of SCT today. Feeling well. Adequate PO intake and hydration. LMP was first week of May 2019, very light bleeding at that time. Ongoing right ankle pain which she's had since prior to transplant. Compliant with post transplant meds and restrictions. Currently taking FK 0.5mg bid which she did not take prior to lab draw today. Denies fever, chills, headache, dizziness, blurred vision, mucositis/odynophagia, chest pain, SOB, cough, nausea/vomiting, diarrhea/constipation, abdominal pain, dysuria, LE edema, rash, bleeding, pain\par \par On 6/6/19 visit, day +99 of SCT today. Overall feeling well. Ongoing right ankle pain which she's had since prior to transplant. Currently on tacrolimus 0.5 mg BID Sunday- Wednesday and tacrolimus 0.5 daily Thur-Saturday. Denies fever, chills, nausea, vomiting, diarrhea, rash, blurred vision or dysuria. Denies SOB, chest pain or B/L LE edema. Remains compliant with post transplant diet and crowd restrictions. Remains compliant with post transplant medications.

## 2019-06-08 LAB
CMV DNA SPEC QL NAA+PROBE: NOT DETECTED
CMVPCR LOG: NOT DETECTED LOGIU/ML

## 2019-06-13 ENCOUNTER — APPOINTMENT (OUTPATIENT)
Dept: HEMATOLOGY ONCOLOGY | Facility: CLINIC | Age: 34
End: 2019-06-13

## 2019-06-14 LAB — ENGRAFTMNET-POST: NORMAL

## 2019-06-20 ENCOUNTER — APPOINTMENT (OUTPATIENT)
Dept: HEMATOLOGY ONCOLOGY | Facility: CLINIC | Age: 34
End: 2019-06-20
Payer: COMMERCIAL

## 2019-06-20 ENCOUNTER — RESULT REVIEW (OUTPATIENT)
Age: 34
End: 2019-06-20

## 2019-06-20 VITALS
HEART RATE: 106 BPM | SYSTOLIC BLOOD PRESSURE: 111 MMHG | TEMPERATURE: 98.1 F | OXYGEN SATURATION: 98 % | BODY MASS INDEX: 36.61 KG/M2 | RESPIRATION RATE: 17 BRPM | WEIGHT: 200.18 LBS | DIASTOLIC BLOOD PRESSURE: 76 MMHG

## 2019-06-20 LAB
ALBUMIN SERPL ELPH-MCNC: 4.5 G/DL
ALP BLD-CCNC: 86 U/L
ALT SERPL-CCNC: 15 U/L
ANION GAP SERPL CALC-SCNC: 12 MMOL/L
AST SERPL-CCNC: 18 U/L
BASOPHILS # BLD AUTO: 0 K/UL — SIGNIFICANT CHANGE UP (ref 0–0.2)
BASOPHILS NFR BLD AUTO: 0.3 % — SIGNIFICANT CHANGE UP (ref 0–2)
BILIRUB SERPL-MCNC: 0.2 MG/DL
BUN SERPL-MCNC: 9 MG/DL
CALCIUM SERPL-MCNC: 10 MG/DL
CHLORIDE SERPL-SCNC: 108 MMOL/L
CO2 SERPL-SCNC: 22 MMOL/L
CREAT SERPL-MCNC: 0.86 MG/DL
EOSINOPHIL # BLD AUTO: 0.1 K/UL — SIGNIFICANT CHANGE UP (ref 0–0.5)
EOSINOPHIL NFR BLD AUTO: 2.7 % — SIGNIFICANT CHANGE UP (ref 0–6)
GLUCOSE SERPL-MCNC: 89 MG/DL
HCT VFR BLD CALC: 41.7 % — SIGNIFICANT CHANGE UP (ref 34.5–45)
HGB BLD-MCNC: 13.7 G/DL — SIGNIFICANT CHANGE UP (ref 11.5–15.5)
LDH SERPL-CCNC: 192 U/L
LYMPHOCYTES # BLD AUTO: 1.2 K/UL — SIGNIFICANT CHANGE UP (ref 1–3.3)
LYMPHOCYTES # BLD AUTO: 30.4 % — SIGNIFICANT CHANGE UP (ref 13–44)
MAGNESIUM SERPL-MCNC: 2.2 MG/DL
MCHC RBC-ENTMCNC: 31 PG — SIGNIFICANT CHANGE UP (ref 27–34)
MCHC RBC-ENTMCNC: 32.9 G/DL — SIGNIFICANT CHANGE UP (ref 32–36)
MCV RBC AUTO: 94.2 FL — SIGNIFICANT CHANGE UP (ref 80–100)
MONOCYTES # BLD AUTO: 0.5 K/UL — SIGNIFICANT CHANGE UP (ref 0–0.9)
MONOCYTES NFR BLD AUTO: 12.2 % — SIGNIFICANT CHANGE UP (ref 2–14)
NEUTROPHILS # BLD AUTO: 2.1 K/UL — SIGNIFICANT CHANGE UP (ref 1.8–7.4)
NEUTROPHILS NFR BLD AUTO: 54.4 % — SIGNIFICANT CHANGE UP (ref 43–77)
PLATELET # BLD AUTO: 375 K/UL — SIGNIFICANT CHANGE UP (ref 150–400)
POTASSIUM SERPL-SCNC: 4.8 MMOL/L
PROT SERPL-MCNC: 7.1 G/DL
RBC # BLD: 4.43 M/UL — SIGNIFICANT CHANGE UP (ref 3.8–5.2)
RBC # FLD: 11.9 % — SIGNIFICANT CHANGE UP (ref 10.3–14.5)
SODIUM SERPL-SCNC: 142 MMOL/L
TACROLIMUS SERPL-MCNC: 2.7 NG/ML
WBC # BLD: 3.9 K/UL — SIGNIFICANT CHANGE UP (ref 3.8–10.5)
WBC # FLD AUTO: 3.9 K/UL — SIGNIFICANT CHANGE UP (ref 3.8–10.5)

## 2019-06-20 PROCEDURE — 99214 OFFICE O/P EST MOD 30 MIN: CPT

## 2019-06-20 RX ORDER — MEDROXYPROGESTERONE ACETATE 10 MG/1
10 TABLET ORAL DAILY
Qty: 30 | Refills: 2 | Status: DISCONTINUED | COMMUNITY
Start: 2019-03-18 | End: 2019-06-20

## 2019-06-21 LAB
CMV DNA SPEC QL NAA+PROBE: NOT DETECTED
CMVPCR LOG: NOT DETECTED LOGIU/ML

## 2019-06-25 NOTE — PHYSICAL EXAM
[Ambulatory and capable of all self care but unable to carry out any work activities] : Status 2- Ambulatory and capable of all self care but unable to carry out any work activities. Up and about more than 50% of waking hours [Normal] : affect appropriate [de-identified] : no edema

## 2019-06-25 NOTE — RESULTS/DATA
[FreeTextEntry1] : CBC reviewed with pt\par \par Bone marrow biopsy 01/24/19:\par -Persistent minimal involvement by acute myeloid leukemia (4% blasts by aspirate differential count).\par -Hypocellular marrow with erythroid predominate trilineage maturing hematopoiesis. \par \par Bone marrow biopsy 09/28/18:\par -Trilineage hematopoiesis with maturation, increased myeloblasts (5% of cells), focally increased megakaryocytes and presents iron stores (history of AML with mutated NPM1 and [id number]).\par

## 2019-06-25 NOTE — REVIEW OF SYSTEMS
[Negative] : Allergic/Immunologic [Chills] : no chills [Fever] : no fever [Fatigue] : no fatigue [Dry Eyes] : no dryness of the eyes [Eye Pain] : no eye pain [Vision Problems] : no vision problems [Nosebleeds] : no nosebleeds [Dysphagia] : no dysphagia [Odynophagia] : no odynophagia [Mucosal Pain] : no mucosal pain [Chest Pain] : no chest pain [Lower Ext Edema] : no lower extremity edema [Shortness Of Breath] : no shortness of breath [Cough] : no cough [Constipation] : no constipation [Vomiting] : no vomiting [Abdominal Pain] : no abdominal pain [Diarrhea] : no diarrhea [Dysuria] : no dysuria [Skin Rash] : no skin rash [Dizziness] : no dizziness [Fainting] : no fainting [FreeTextEntry7] : no nausea [FreeTextEntry9] : stable right ankle pain

## 2019-06-25 NOTE — REASON FOR VISIT
[Follow-Up Visit] : a follow-up visit for [Acute Myeloid Leukemia] : acute myeloid leukemia [Spouse] : spouse [FreeTextEntry2] : s/p HLA 10/10 sib (sister) SCT on 2/27/19

## 2019-06-25 NOTE — HISTORY OF PRESENT ILLNESS
[de-identified] : Ms. Gary is a 32 y/o female with AML associated with normal karyotype and NPM1 ans [id number] mutation, refractory to 7+3 and currently receiving Ivosidenib. Of note patient is a [employer] and cannot receive blood products. \par \par Patient was noted to be newly leukopenic to WBC 1.7 during preoperative evaluation in anticipation of right ankle ligament repair. Bone marrow aspirate and biopsy at [facility] on 12/2/2017 revealed AML with some suspicion for APL. She was transferred to [place] for further management. Peripheral counts from around this time are not available. \par \par Repeat marrow on 12/26/2017 showed 90% blasts with myeloid mutation arrest in a 40-50% cellular marrow. On flow blasts were negative for CD34 and HLA-DR; positive for CD38, CD58, CD33, and MPO, and partially positive for CD15, CD11b, , and CD64. CD45 was reported as dim. Karyotype showed +21 in 3 of 21 metaphases, but no t(15:17) was detected on karyotype of FISH. Molecular studies showed mutations in NPM1 (45%0, IDH1 (R132S; 39%), PTPN1 (23%), and CSF3R (5%) genes. There was lingering diagnostic uncertainty due to a largely aspicular and hypocellular specimen, and thus a marrow was repeated on 1/2/2018, confirming AML.\par \par On 1/4/2018, Ms. Gary began 7+3 with Idarubicin. Her induction course was complicated by DIC, neutropenic fever/ Klebsiella PNA, and bilateral retinal hemorrhage. She did not receive blood products and was instead supported with Nplate, Procrit, IV iron, Amicar, and Lupron. After achieving count stability, the patient was discharged; she was not deemed a candidate for further therapy given inability to receive transfusions and a day 14 marrow was not obtained. She subsequently saw Dr. North Obrien at [facility]. He obtained a recovery marrow on 2/13/2018 (day +40), which was 30% cellular with 45% blasts. 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 Cytogenetics and FISH were normal. \par \par She then established care with Dr. Christie for consideration of salvage therapy with IDH1 inhibition given the risks of additional chemotherapy without blood products support. Initial marrow at [facility], on 3/1/2018, showed 69% blasts. ThunderBolts panel confirmed IDH1 R132S, NPM1 I038Hwm 12, and CSF3R D810G mutations. She was started on Ivosidenib on [facility] protocol  and had completed four cycles to date (6/28/2018 = C5D1). Marrow blasts increased to 85% on 4/30 but declined to 36% by 5/31. A  bone marrow aspirate and biopsy  was obtained  (6/28/2018); preliminary results show further responding disease with 10.3% blasts by flow cytometry. Subsequent marrow in oct showed 5% blasts...she was then started on vidazza plus venetoclax. She completed 2 cycles...f/u marrow with 4% blasts.\par \par BMT Hospital Course:	 \par Ms. 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 She is a [employer] and cannot receive blood products. \par \par Ms. Nickerson's donor is her sister.  She is a 10/10 match. Both donor and patient are CMV +. She had completed all pre testing needed for transplant.  She will \par start reduced intensity Fludarabine/Busulfan chemo regimen today. \par \par Upon admission, a right AC PICC line was placed in IR. Ms. Gary received IV hydration, pain management, antiemetics, anxiolysis, and antibacterial / antiviral / antifungal / PCP / GI and VOD (SOS) prophylaxis. Labs were monitored every other day, and she received electrolyte repletion as needed. She also received vitamin K, vitamin B, supplemental iron, vitamin C and procrit as directed by the institutional bloodless transplant policy. \par \par On 2/27/19, Ms. Gary received 363 mLs of fresh, apheresed, mobilized, related allogeneic HPC over 80 minutes. Cell counts as follows: \par Total MNCs ( x 10^8/kg): 6.21 \par CD 34+Cells ( x 10^6/kg): 3.37 \par CD3+ Cells ( x 10^7/kg): 12.46 \par Cell Viability (%): 100% \par \par Engraftment was noted on 3/11/19. Ms. Gary did not receive zarxio due to her having minimal residual disease on admission, and a reduced intensity preparatory regimen to avoid stimulating myeloblasts. \par \par Ms. Gary had a relatively uncomplicated transplant course. She did experience menses, which was suppressed with provera. She also had pancytopenia related to the high dose chemotherapy preparative regimen. She also experienced grade 1 oral mucositis, and grade 2 GI mucositis also related to the chemotherapy preparative regimen. Both were treated with supportive care. \par \par Currently, Ms. Gary is stable for discharge home with outpatient follow up at the [facility].  [de-identified] : On 5/23/19 visit, day +85 of SCT today. Feeling well. Adequate PO intake and hydration. LMP was first week of May 2019, very light bleeding at that time. Ongoing right ankle pain which she's had since prior to transplant. Compliant with post transplant meds and restrictions. Currently taking FK 0.5mg bid which she did not take prior to lab draw today. Denies fever, chills, headache, dizziness, blurred vision, mucositis/odynophagia, chest pain, SOB, cough, nausea/vomiting, diarrhea/constipation, abdominal pain, dysuria, LE edema, rash, bleeding, pain\par \par On 6/6/19 visit, day +99 of SCT today. Overall feeling well. Ongoing right ankle pain which she's had since prior to transplant. Currently on tacrolimus 0.5 mg BID Sunday- Wednesday and tacrolimus 0.5 daily Thur-Saturday. Denies fever, chills, nausea, vomiting, diarrhea, rash, blurred vision or dysuria. Denies SOB, chest pain or B/L LE edema. Remains compliant with post transplant diet and crowd restrictions. Remains compliant with post transplant medications. \par \par On 6/20/19 visit, day + 113 of SCT today. Currently on tacrolimus 0.5 mg daily for the past two weeks. Overall doing well and offers no acute concerns. Denies fever, chills, nausea,vomiting,diarrhea, rash, mouth sores, dysuria or any active signs of bleeding. Denies SOB, chest pain or B/L LE edema. Remains compliant with post transplant diet and crowd restrictions. Remains compliant with post transplant medications. Denies any pain at this time.

## 2019-06-25 NOTE — ASSESSMENT
[FreeTextEntry1] : Ms. Gary is a 33 years old female Latter day with AML with the following comorbidities being managed:\par \par 1) AML\par S/p HLA 10/10 allo sib (sister) PBSCT on 2/27/19\par 6/6/19 chimerism = 94.5% (CD33 99%, CD3 56%)\par Post transplant BMbx scheduled for 7/3/19\par Plan for post transplant maintenance Vidaza once off immunosuppression\par \par 2) Heme\par Counts stable\par  WBC 3.9   ANC 2.1  Hgb 13.7   \par No transfusions as pt is practicing Latter day\par Continue multivitamin and folic acid daily\par \par 3) ID\par Continue ppx:\par - Acyclovir 400 mg tid\par - Voriconazole 200 mg bid\par - Mepron 750 mg bid\par 5/9/19 CMV PCR not detected, continue to monitor weekly \par Post transplant restrictions reviewed\par \par 4) GVHD\par Skin 0   Liver 0   GI 0 - overall grade 0\par No signs of acute/chronic GVHD at this time\par Currently on FK 0.5 mg daily. As of today decrease tacrolimus to 0.5 mg every other day for one week then discontinue.\par Signs/symptoms of GVHD reviewed\par \par 5) GI\par Continue ppx:\par - Ursodiol 300 mg bid\par - Protonix 40 mg daily  \par PRN Zofran and Reglan for nausea\par \par 6) Other \par Hypomagnesemia - likely 2/2 tacrolimus, continue magnesium oxide 400mg tid\par GYN - continue Provera 10mg daily\par HTN - continue amlodipine 5mg daily, BP remains WNL\par \par 7) Plan/Dispo\par Pt and  educated regarding plan of care, all questions/concerns addressed\par F/u in 2 weeks with MD Lou. BM bx scheduled for same day with JOLEEN Fraser.

## 2019-06-25 NOTE — OB HISTORY
[Definite:  ___ (Date)] : the last menstrual period was [unfilled] [___] : Total Pregnancies: [unfilled] [Spotting Between  Menses] : no spotting between menses

## 2019-06-27 ENCOUNTER — APPOINTMENT (OUTPATIENT)
Dept: HEMATOLOGY ONCOLOGY | Facility: CLINIC | Age: 34
End: 2019-06-27

## 2019-06-30 ENCOUNTER — OUTPATIENT (OUTPATIENT)
Dept: OUTPATIENT SERVICES | Facility: HOSPITAL | Age: 34
LOS: 1 days | Discharge: HOME | End: 2019-06-30

## 2019-06-30 DIAGNOSIS — C92.00 ACUTE MYELOBLASTIC LEUKEMIA, NOT HAVING ACHIEVED REMISSION: ICD-10-CM

## 2019-06-30 DIAGNOSIS — Z90.3 ACQUIRED ABSENCE OF STOMACH [PART OF]: Chronic | ICD-10-CM

## 2019-07-02 ENCOUNTER — OUTPATIENT (OUTPATIENT)
Dept: OUTPATIENT SERVICES | Facility: HOSPITAL | Age: 34
LOS: 1 days | End: 2019-07-02
Payer: COMMERCIAL

## 2019-07-02 DIAGNOSIS — C92.00 ACUTE MYELOBLASTIC LEUKEMIA, NOT HAVING ACHIEVED REMISSION: ICD-10-CM

## 2019-07-02 DIAGNOSIS — Z90.3 ACQUIRED ABSENCE OF STOMACH [PART OF]: Chronic | ICD-10-CM

## 2019-07-03 ENCOUNTER — RESULT REVIEW (OUTPATIENT)
Age: 34
End: 2019-07-03

## 2019-07-03 ENCOUNTER — APPOINTMENT (OUTPATIENT)
Dept: HEMATOLOGY ONCOLOGY | Facility: CLINIC | Age: 34
End: 2019-07-03
Payer: COMMERCIAL

## 2019-07-03 ENCOUNTER — LABORATORY RESULT (OUTPATIENT)
Age: 34
End: 2019-07-03

## 2019-07-03 VITALS
OXYGEN SATURATION: 100 % | HEART RATE: 115 BPM | BODY MASS INDEX: 36.37 KG/M2 | TEMPERATURE: 98.1 F | RESPIRATION RATE: 16 BRPM | WEIGHT: 198.86 LBS | DIASTOLIC BLOOD PRESSURE: 74 MMHG | SYSTOLIC BLOOD PRESSURE: 110 MMHG

## 2019-07-03 LAB
ALBUMIN SERPL ELPH-MCNC: 4.2 G/DL
ALP BLD-CCNC: 80 U/L
ALT SERPL-CCNC: 21 U/L
ANION GAP SERPL CALC-SCNC: 14 MMOL/L
AST SERPL-CCNC: 33 U/L
BASOPHILS # BLD AUTO: 0 K/UL — SIGNIFICANT CHANGE UP (ref 0–0.2)
BASOPHILS NFR BLD AUTO: 0.7 % — SIGNIFICANT CHANGE UP (ref 0–2)
BILIRUB SERPL-MCNC: 0.2 MG/DL
BUN SERPL-MCNC: 11 MG/DL
CALCIUM SERPL-MCNC: 9.7 MG/DL
CHLORIDE SERPL-SCNC: 107 MMOL/L
CO2 SERPL-SCNC: 21 MMOL/L
CREAT SERPL-MCNC: 0.83 MG/DL
EOSINOPHIL # BLD AUTO: 0.3 K/UL — SIGNIFICANT CHANGE UP (ref 0–0.5)
EOSINOPHIL NFR BLD AUTO: 6.4 % — HIGH (ref 0–6)
GLUCOSE SERPL-MCNC: 85 MG/DL
HCT VFR BLD CALC: 38.7 % — SIGNIFICANT CHANGE UP (ref 34.5–45)
HGB BLD-MCNC: 13.2 G/DL — SIGNIFICANT CHANGE UP (ref 11.5–15.5)
LDH SERPL-CCNC: 220 U/L
LYMPHOCYTES # BLD AUTO: 0.9 K/UL — LOW (ref 1–3.3)
LYMPHOCYTES # BLD AUTO: 20.8 % — SIGNIFICANT CHANGE UP (ref 13–44)
MAGNESIUM SERPL-MCNC: 2.1 MG/DL
MCHC RBC-ENTMCNC: 31.9 PG — SIGNIFICANT CHANGE UP (ref 27–34)
MCHC RBC-ENTMCNC: 34 G/DL — SIGNIFICANT CHANGE UP (ref 32–36)
MCV RBC AUTO: 93.9 FL — SIGNIFICANT CHANGE UP (ref 80–100)
MONOCYTES # BLD AUTO: 0.6 K/UL — SIGNIFICANT CHANGE UP (ref 0–0.9)
MONOCYTES NFR BLD AUTO: 12.6 % — SIGNIFICANT CHANGE UP (ref 2–14)
NEUTROPHILS # BLD AUTO: 2.7 K/UL — SIGNIFICANT CHANGE UP (ref 1.8–7.4)
NEUTROPHILS NFR BLD AUTO: 59.5 % — SIGNIFICANT CHANGE UP (ref 43–77)
PLATELET # BLD AUTO: 325 K/UL — SIGNIFICANT CHANGE UP (ref 150–400)
POTASSIUM SERPL-SCNC: 4.3 MMOL/L
PROT SERPL-MCNC: 6.4 G/DL
RBC # BLD: 4.12 M/UL — SIGNIFICANT CHANGE UP (ref 3.8–5.2)
RBC # FLD: 12.5 % — SIGNIFICANT CHANGE UP (ref 10.3–14.5)
SODIUM SERPL-SCNC: 142 MMOL/L
TACROLIMUS SERPL-MCNC: <2 NG/ML
WBC # BLD: 4.5 K/UL — SIGNIFICANT CHANGE UP (ref 3.8–10.5)
WBC # FLD AUTO: 4.5 K/UL — SIGNIFICANT CHANGE UP (ref 3.8–10.5)

## 2019-07-03 PROCEDURE — 38222 DX BONE MARROW BX & ASPIR: CPT | Mod: RT

## 2019-07-03 PROCEDURE — 87205 SMEAR GRAM STAIN: CPT

## 2019-07-03 PROCEDURE — 88184 FLOWCYTOMETRY/ TC 1 MARKER: CPT

## 2019-07-03 PROCEDURE — 99215 OFFICE O/P EST HI 40 MIN: CPT | Mod: 25

## 2019-07-03 PROCEDURE — 88237 TISSUE CULTURE BONE MARROW: CPT

## 2019-07-03 PROCEDURE — 88189 FLOWCYTOMETRY/READ 16 & >: CPT

## 2019-07-03 PROCEDURE — 88264 CHROMOSOME ANALYSIS 20-25: CPT

## 2019-07-03 PROCEDURE — 88185 FLOWCYTOMETRY/TC ADD-ON: CPT

## 2019-07-03 PROCEDURE — 88280 CHROMOSOME KARYOTYPE STUDY: CPT

## 2019-07-03 NOTE — PROCEDURE
[Bone Marrow Biopsy] : bone marrow biopsy [Bone Marrow Aspiration] : bone marrow aspiration  [Patient] : the patient [Patient identification verified] : patient identification verified [Correct positioning] : correct positioning [Procedure verified and consent obtained] : procedure verified and consent obtained [Prone] : prone [Lidocaine was injected and into the periosteum overlying the site.] : Lidocaine was injected and into the periosteum overlying the site. [The right posterior iliac crest was prepped with betadine and draped, using sterile technique.] : The right posterior iliac crest was prepped with betadine and draped, using sterile technique. [Aspirate] : aspirate [Cytogenetics] : cytogenetics [Other ___] : [unfilled] [FISH] : FISH [Biopsy] : biopsy [Flow Cytometry] : flow cytometry [FreeTextEntry1] : AML s/p HLA 10/10 (sister) SCT 2/27/19.  Refractory to 7+3 now on  Ivosidenib.  BMBX to assess response. [] : The patient was instructed to remove the bandage the following AM. The patient may bathe. Acetaminophen may be taken for discomfort, as per package directions.If there are any other problems, the patient was instructed to call the office. The patient verbalized understanding, and is aware of the office contact numbers. [FreeTextEntry2] : CBC prior to procedure:\par WBC  4.5   \par HGB  13.2     HCT    38.7\par PLTS   325\par Extended length snaring BMBX successfully utilized to obtain core that pt notes frequently does not separate from her body.

## 2019-07-03 NOTE — REASON FOR VISIT
[Bone Marrow Biopsy] : bone marrow biopsy [Bone Marrow Aspiration] : bone marrow aspiration [Spouse] : spouse [FreeTextEntry2] : AML s/p HLA 10/10 (sister) SCT 2/27/19.  Refractory to 7+3 now on  Ivosidenib.  BMBX to assess response.

## 2019-07-04 NOTE — PHYSICAL EXAM
[Ambulatory and capable of all self care but unable to carry out any work activities] : Status 2- Ambulatory and capable of all self care but unable to carry out any work activities. Up and about more than 50% of waking hours [Normal] : affect appropriate [de-identified] : hypo & hyperpigmentation- improving

## 2019-07-04 NOTE — ASSESSMENT
[Curative] : Goals of care discussed with patient: Curative [FreeTextEntry1] : Ms. Gary is a 34 years old female with AML, presenting today for a f/u s/p Allo PSCT sister MRD (10/10) on 07/03/19. Patient had a relatively uncomplicated transplant course. She had pancytopenia related to the high dose chemotherapy preparative regimen. She also experienced grade 1 oral mucositis, and grade 2 GI mucositis also related to the chemotherapy preparative regimen. Both were treated with supportive care. She was discharged on 3/18/19. Patient is a practicing [employer]. Accompanied by her  today. \par No gvhd...chimerism sent when wbc was 1 was 82% donor...Last chimerism was 94.5% ...Chimerism on cd 3 improved\par \par Tacrolimus- Now off\par Voriconazole- 200 mg BID\par Acyclovir 400 mg Q8h - to continue for 6 months post transplant.\par Atovaquone 750 mg/5 mL- 5 ml Q12h - to continue for 6 months post transplant.\par Pantoprazole 40 mg oral QD - PRN \par Ursodiol 300 mg BID\par Magnesium oxide 400 mg TID\par Multiple Vitamins QD\par Folic acid 1 mg QD\par \par Peripheral blood work was reviewed and and discussed with patient. No transfusions accepted by pt.\par Labs sent today CMP, LDH, Mg, Tacrolimus level, CMV, Chimerism. \par Advised to maintain post-transplant diet and crowd restrictions. \par Advised to closely monitor for aGVHD si/sx including but not limited to severe diarrhea, nausea, vomiting, rash, mouth sores, dry or pain in the eyes. \par Now off Tacro by Plan to start low dose Vidaza 32 mg/m2 , x 5 days every month. Reviewed rationale, benefits, risks and side effects, will revisit at later f/u. \par Will discuss plan of care with Dr. Fermín Youngblood and Dr. Christie.\par Well care stressed, question addressed, support provided. \par \par RTC in 4 week for f/u with JOLEEN Bonilla and in 8 weeks for f/u with Dr. Lou\par Patient advised to contact immediately with any concerns or symptoms.

## 2019-07-04 NOTE — ADDENDUM
[FreeTextEntry1] : Documented by Liliana George acting as a scribe for Dr. Lou on 07/03/2019.\par \par All medical record entries made by a scribe were at my, Dr. Lou direction and personally dictated by me on 07/03/2019. I have reviewed the chart and agree that the record accurately reflects my personal performance of the history, physical exam, procedure and imaging.\par

## 2019-07-04 NOTE — HISTORY OF PRESENT ILLNESS
[de-identified] : Ms. 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 Of note patient is a Taoism and cannot receive blood products. \par \par Patient was noted to be newly leukopenic to WBC 1.7 during preoperative evaluation in anticipation of right ankle ligament repair. Bone marrow aspirate and biopsy at [facility] on 12/2/2017 revealed AML with some suspicion for APL. She was transferred to [place] for further management. Peripheral counts from around this time are not available. \par \par Repeat marrow on 12/26/2017 showed 90% blasts with myeloid mutation arrest in a 40-50% cellular marrow. On flow blasts were negative for CD34 and HLA-DR; positive for CD38, CD58, CD33, and MPO, and partially positive for CD15, CD11b, , and CD64. CD45 was reported as dim. Karyotype showed +21 in 3 of 21 metaphases, but no t(15:17) was detected on karyotype of FISH. Molecular studies showed mutations in NPM1 (45%0, IDH1 (R132S; 39%), PTPN1 (23%), and CSF3R (5%) genes. There was lingering diagnostic uncertainty due to a largely aspicular and hypocellular specimen, and thus a marrow was repeated on 1/2/2018, confirming AML.\par \par On 1/4/2018, Ms. Gary began 7+3 with Idarubicin. Her induction course was complicated by DIC, neutropenic fever/ Klebsiella PNA, and bilateral retinal hemorrhage. She did not receive blood products and was instead supported with Nplate, Procrit, IV iron, Amicar, and Lupron. After achieving count stability, the patient was discharged; she was not deemed a candidate for further therapy given inability to receive transfusions and a day 14 marrow was not obtained. She subsequently saw Dr. North Obrien at [facility]. He obtained a recovery marrow on 2/13/2018 (day +40), which was 30% cellular with 45% blasts. 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 Cytogenetics and FISH were normal. \par \par She then established care with Dr. Christie for consideration of salvage therapy with IDH1 inhibition given the risks of additional chemotherapy without blood products support. Initial marrow at [facility], on 3/1/2018, showed 69% blasts. ThunderBolts panel confirmed IDH1 R132S, NPM1 [id number] 12, and CSF3R D810G mutations. She was started on Ivosidenib on [facility] protocol  and had completed four cycles to date (6/28/2018 = C5D1). Marrow blasts increased to 85% on 4/30 but declined to 36% by 5/31. A  bone marrow aspirate and biopsy  was obtained  (6/28/2018); preliminary results show further responding disease with 10.3% blasts by flow cytometry. Subsequent marrow in oct showed 5% blasts...she was then started on vidazza plus venetoclax. She completed 2 cycles...f/u marrow with 4% blasts.....\par \par Patient underwent an AlloPSCT sister MRD (10/10) on 2/27/19, hospital course as follows:\par Ms. 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 She is a Taoism and cannot receive blood products. \par \par Ms. Nickerson's donor is her sister.  She is a 10/10 match. Both donor and patient are CMV +. She had completed all pre testing needed for transplant.  She will \par start reduced intensity Fludarabine/Busulfan chemo regimen today. \par \par Upon admission, a right AC PICC line was placed in IR. Ms. Gary received IV hydration, pain management, antiemetics, anxiolysis, and antibacterial / antiviral / antifungal / PCP / GI and VOD (SOS) prophylaxis. Labs were monitored every other day, and she received electrolyte repletion as needed. She also received vitamin K, vitamin B, supplemental iron, vitamin C and procrit as directed by the institutional bloodless transplant policy. \par \par On 2/27/19, Ms. Gary received 363 mLs of fresh, apheresed, mobilized, related allogeneic HPC over 80 minutes. Cell counts as follows: \par Total MNCs ( x 10^8/kg): 6.21 \par CD 34+Cells ( x 10^6/kg): 3.37 \par CD3+ Cells ( x 10^7/kg): 12.46 \par Cell Viability (%): 100% \par \par Engraftment was noted on 3/11/19. Ms. Gary did not receive zarxio due to her having minimal residual disease on admission, and a reduced intensity preparatory regimen to avoid stimulating myeloblasts. \par \par Ms. Gary had a relatively uncomplicated transplant course. She did experience menses, which was suppressed with provera. She also had pancytopenia related to the high dose chemotherapy preparative regimen. She also experienced grade 1 oral mucositis, and grade 2 GI mucositis also related to the chemotherapy preparative regimen. Both were treated with supportive care. \par \par Currently, Ms. Gary is stable for discharge home with outpatient follow up at the [facility].  [de-identified] : Patient presents today for a follow up s/p AlloPSCT. She is doing well overall. She continues to report improved skin hyperpigmentation. She also notes some oral irritation as well as nail ridging. She is sleeping and eating well, her weight is stable. Denies fever/chills, night sweats, mouth sores, eye dryness, blurred vision, nausea, vomiting, diarrhea. No CP, SOB or LE edema...BM BX today... Currently off Tacrolimus. She has been instructed that she would likely be able to travel by Spring time. \par \par

## 2019-07-04 NOTE — REVIEW OF SYSTEMS
[Negative] : Allergic/Immunologic [FreeTextEntry4] : occ mouth irritation [de-identified] : diffuse skin hyperpigmentation-  continue to have nail ridging.

## 2019-07-05 LAB
CMV DNA SPEC QL NAA+PROBE: NOT DETECTED
CMVPCR LOG: NOT DETECTED LOGIU/ML
TM INTERPRETATION: SIGNIFICANT CHANGE UP

## 2019-07-11 LAB — ENGRAFTMNET-POST: NORMAL

## 2019-07-17 LAB — CHROM ANALY OVERALL INTERP SPEC-IMP: SIGNIFICANT CHANGE UP

## 2019-07-29 ENCOUNTER — OUTPATIENT (OUTPATIENT)
Dept: OUTPATIENT SERVICES | Facility: HOSPITAL | Age: 34
LOS: 1 days | Discharge: HOME | End: 2019-07-29

## 2019-07-29 DIAGNOSIS — Z90.3 ACQUIRED ABSENCE OF STOMACH [PART OF]: Chronic | ICD-10-CM

## 2019-07-29 DIAGNOSIS — C92.00 ACUTE MYELOBLASTIC LEUKEMIA, NOT HAVING ACHIEVED REMISSION: ICD-10-CM

## 2019-07-30 ENCOUNTER — RX RENEWAL (OUTPATIENT)
Age: 34
End: 2019-07-30

## 2019-08-01 ENCOUNTER — APPOINTMENT (OUTPATIENT)
Dept: HEMATOLOGY ONCOLOGY | Facility: CLINIC | Age: 34
End: 2019-08-01
Payer: COMMERCIAL

## 2019-08-01 ENCOUNTER — RESULT REVIEW (OUTPATIENT)
Age: 34
End: 2019-08-01

## 2019-08-01 VITALS
SYSTOLIC BLOOD PRESSURE: 123 MMHG | BODY MASS INDEX: 35.4 KG/M2 | HEART RATE: 125 BPM | WEIGHT: 193.54 LBS | TEMPERATURE: 98.3 F | RESPIRATION RATE: 17 BRPM | DIASTOLIC BLOOD PRESSURE: 85 MMHG | OXYGEN SATURATION: 99 %

## 2019-08-01 LAB
ALBUMIN SERPL ELPH-MCNC: 3.7 G/DL
ALP BLD-CCNC: 130 U/L
ALT SERPL-CCNC: 155 U/L
ANION GAP SERPL CALC-SCNC: 15 MMOL/L
AST SERPL-CCNC: 97 U/L
BASOPHILS # BLD AUTO: 0.1 K/UL — SIGNIFICANT CHANGE UP (ref 0–0.2)
BASOPHILS NFR BLD AUTO: 1.4 % — SIGNIFICANT CHANGE UP (ref 0–2)
BILIRUB SERPL-MCNC: 0.3 MG/DL
BUN SERPL-MCNC: 9 MG/DL
CALCIUM SERPL-MCNC: 9.7 MG/DL
CHLORIDE SERPL-SCNC: 103 MMOL/L
CO2 SERPL-SCNC: 24 MMOL/L
CREAT SERPL-MCNC: 0.99 MG/DL
EOSINOPHIL # BLD AUTO: 0 K/UL — SIGNIFICANT CHANGE UP (ref 0–0.5)
EOSINOPHIL NFR BLD AUTO: 0.4 % — SIGNIFICANT CHANGE UP (ref 0–6)
GLUCOSE SERPL-MCNC: 110 MG/DL
HCT VFR BLD CALC: 44.1 % — SIGNIFICANT CHANGE UP (ref 34.5–45)
HGB BLD-MCNC: 14.7 G/DL — SIGNIFICANT CHANGE UP (ref 11.5–15.5)
LDH SERPL-CCNC: 230 U/L
LYMPHOCYTES # BLD AUTO: 1 K/UL — SIGNIFICANT CHANGE UP (ref 1–3.3)
LYMPHOCYTES # BLD AUTO: 22.8 % — SIGNIFICANT CHANGE UP (ref 13–44)
MAGNESIUM SERPL-MCNC: 2.1 MG/DL
MCHC RBC-ENTMCNC: 31.2 PG — SIGNIFICANT CHANGE UP (ref 27–34)
MCHC RBC-ENTMCNC: 33.2 G/DL — SIGNIFICANT CHANGE UP (ref 32–36)
MCV RBC AUTO: 94 FL — SIGNIFICANT CHANGE UP (ref 80–100)
MONOCYTES # BLD AUTO: 0.4 K/UL — SIGNIFICANT CHANGE UP (ref 0–0.9)
MONOCYTES NFR BLD AUTO: 9.2 % — SIGNIFICANT CHANGE UP (ref 2–14)
NEUTROPHILS # BLD AUTO: 2.8 K/UL — SIGNIFICANT CHANGE UP (ref 1.8–7.4)
NEUTROPHILS NFR BLD AUTO: 66.2 % — SIGNIFICANT CHANGE UP (ref 43–77)
PLATELET # BLD AUTO: 469 K/UL — HIGH (ref 150–400)
POTASSIUM SERPL-SCNC: 4.4 MMOL/L
PROT SERPL-MCNC: 5.9 G/DL
RBC # BLD: 4.69 M/UL — SIGNIFICANT CHANGE UP (ref 3.8–5.2)
RBC # FLD: 12.7 % — SIGNIFICANT CHANGE UP (ref 10.3–14.5)
SODIUM SERPL-SCNC: 142 MMOL/L
WBC # BLD: 4.3 K/UL — SIGNIFICANT CHANGE UP (ref 3.8–10.5)
WBC # FLD AUTO: 4.3 K/UL — SIGNIFICANT CHANGE UP (ref 3.8–10.5)

## 2019-08-01 PROCEDURE — 99215 OFFICE O/P EST HI 40 MIN: CPT

## 2019-08-01 RX ORDER — PREDNISONE 20 MG/1
20 TABLET ORAL
Qty: 40 | Refills: 0 | Status: DISCONTINUED | COMMUNITY
Start: 2019-07-29 | End: 2019-08-01

## 2019-08-01 RX ORDER — FLUOCINONIDE 1 MG/G
0.1 CREAM TOPICAL TWICE DAILY
Qty: 120 | Refills: 3 | Status: DISCONTINUED | COMMUNITY
Start: 2019-08-01 | End: 2019-08-01

## 2019-08-04 LAB
CMV DNA SPEC QL NAA+PROBE: NOT DETECTED
CMVPCR LOG: NOT DETECTED LOGIU/ML

## 2019-08-05 ENCOUNTER — MEDICATION RENEWAL (OUTPATIENT)
Age: 34
End: 2019-08-05

## 2019-08-06 NOTE — PHYSICAL EXAM
[Ambulatory and capable of all self care but unable to carry out any work activities] : Status 2- Ambulatory and capable of all self care but unable to carry out any work activities. Up and about more than 50% of waking hours [Normal] : grossly intact [de-identified] : Erythematous and diffuse skin hyperpigmentation of face, chest, back, bilateral upper and lower extremities

## 2019-08-06 NOTE — ASSESSMENT
[Curative] : Goals of care discussed with patient: Curative [FreeTextEntry1] : Ms. Gary is a 33 years old female Nondenominational with AML with the following comorbidities being managed:\par \par 1) AML\par S/p HLA 10/10 allo sib (sister) PBSCT on 2/27/19\par 7/3/19 chimerism =97 % donor chimerism\par Post transplant BMbx on 7/3/19 and results reviewed with patient\par Hold on starting maintenance Vidaza at this time.\par \par 2) Heme\par Counts stable\par  WBC 4.3 ANC 2.8 Hgb 14.7 \par No transfusions as pt is practicing Nondenominational\par Continue multivitamin and folic acid daily\par \par 3) ID\par Continue ppx:\par - Acyclovir 400 mg tid\par - Mepron 750 mg bid\par 7/3/19 CMV PCR not detected, continue to monitor weekly \par \par 4) GVHD\par Skin 2 Liver 0 GI 0 - overall grade 1\par Skin rash extent 38% BSA today. \par Currently on prednisone 20 mg daily as of 7/29/19. Instructed to increase prednisone on 8/1/19 to 40 mg daily on 8/1, 8/2, 8/3. \par On 8/4/19 decrease prednisone to 20 mg daily. Prednisone renewed and sent to local pharmacy\par Tacrolimus restarted on 8/1/19 at 0.5 mg BID.\par Lidex cream BID to be applied to body- sent to local pharmacy\par Hydrocortisone cream with moisturizer cream to be applied to face BID- instructed to avoid eyes\par \par 5) GI\par Continue ppx:\par - Ursodiol 300mg bid\par - Protonix 40mg daily \par PRN Zofran and Reglan for nausea\par \par 6) Transaminitis\par Follow up on CMP from today\par Recent blood work from [place] shows transaminits\par \par 7) Other \par Hypomagnesemia - likely 2/2 tacrolimus, continue magnesium oxide 400mg tid\par GYN - continue Provera 10mg daily, reviewed need for contraception if resuming sexual activity\par HTN - continue amlodipine 5mg daily, BP remains WNL\par Allergies - continue Singulair 10mg daily, may use daily Claritin or similar anti-histamine for ongoing allergy symptoms \par \par 8) Plan/Dispo\par Pt and  educated regarding plan of care, all questions/concerns addressed\par Patient aware to call immediately if experiences severe nausea,vomiting, diarrhea or rash worsens\par Hold on starting maintenance vidaza at this time. Resumed FK today.\par Follow up with NP Irene in one week. Following up with  ophthalmology same day.\par \par \par

## 2019-08-06 NOTE — REVIEW OF SYSTEMS
[Negative] : Allergic/Immunologic [Skin Rash] : skin rash [Skin Wound] : no skin wound [FreeTextEntry4] : occasional mouth irritation [de-identified] : Erythematous and diffuse skin hyperpigmentation of face, chest, back, bilateral upper and lower extremities

## 2019-08-06 NOTE — HISTORY OF PRESENT ILLNESS
[de-identified] : Ms. Gary is a 33 y/o female with AML associated with normal karyotype and NPM1 ans [id number] mutation, refractory to 7+3 and currently receiving Ivosidenib. Of note patient is a Qu[employer] and cannot receive blood products. \par \par Patient was noted to be newly leukopenic to WBC 1.7 during preoperative evaluation in anticipation of right ankle ligament repair. Bone marrow aspirate and biopsy at [facility] on 12/2/2017 revealed AML with some suspicion for APL. She was transferred to [place] for further management. Peripheral counts from around this time are not available. \par \par Repeat marrow on 12/26/2017 showed 90% blasts with myeloid mutation arrest in a 40-50% cellular marrow. On flow blasts were negative for CD34 and HLA-DR; positive for CD38, CD58, CD33, and MPO, and partially positive for CD15, CD11b, , and CD64. CD45 was reported as dim. Karyotype showed +21 in 3 of 21 metaphases, but no t(15:17) was detected on karyotype of FISH. Molecular studies showed mutations in NPM1 (45%0, IDH1 (R132S; 39%), PTPN1 (23%), and CSF3R (5%) genes. There was lingering diagnostic uncertainty due to a largely aspicular and hypocellular specimen, and thus a marrow was repeated on 1/2/2018, confirming AML.\par \par On 1/4/2018, Ms. Gary began 7+3 with Idarubicin. Her induction course was complicated by DIC, neutropenic fever/ Klebsiella PNA, and bilateral retinal hemorrhage. She did not receive blood products and was instead supported with Nplate, Procrit, IV iron, Amicar, and Lupron. After achieving count stability, the patient was discharged; she was not deemed a candidate for further therapy given inability to receive transfusions and a day 14 marrow was not obtained. She subsequently saw Dr. North Obrien at [facility]. He obtained a recovery marrow on 2/13/2018 (day +40), which was 30% cellular with 45% blasts. 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 Cytogenetics and FISH were normal. \par \par She then established care with Dr. Christie for consideration of salvage therapy with IDH1 inhibition given the risks of additional chemotherapy without blood products support. Initial marrow at [facility], on 3/1/2018, showed 69% blasts. ThunderBolts panel confirmed IDH1 R132S, NPM1 [id number] 12, and CSF3R D810G mutations. She was started on Ivosidenib on [facility] protocol  and had completed four cycles to date (6/28/2018 = C5D1). Marrow blasts increased to 85% on 4/30 but declined to 36% by 5/31. A  bone marrow aspirate and biopsy  was obtained  (6/28/2018); preliminary results show further responding disease with 10.3% blasts by flow cytometry. Subsequent marrow in oct showed 5% blasts...she was then started on vidazza plus venetoclax. She completed 2 cycles...f/u marrow with 4% blasts.....\par \par Patient underwent an AlloPSCT sister MRD (10/10) on 2/27/19, hospital course as follows:\par Ms. 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 She is a Qu[employer] and cannot receive blood products. \par \par Ms. Nickerson's donor is her sister.  She is a 10/10 match. Both donor and patient are CMV +. She had completed all pre testing needed for transplant.  She will \par start reduced intensity Fludarabine/Busulfan chemo regimen today. \par \par Upon admission, a right AC PICC line was placed in IR. Ms. Gary received IV hydration, pain management, antiemetics, anxiolysis, and antibacterial / antiviral / antifungal / PCP / GI and VOD (SOS) prophylaxis. Labs were monitored every other day, and she received electrolyte repletion as needed. She also received vitamin K, vitamin B, supplemental iron, vitamin C and procrit as directed by the institutional bloodless transplant policy. \par \par On 2/27/19, Ms. Gary received 363 mLs of fresh, apheresed, mobilized, related allogeneic HPC over 80 minutes. Cell counts as follows: \par Total MNCs ( x 10^8/kg): 6.21 \par CD 34+Cells ( x 10^6/kg): 3.37 \par CD3+ Cells ( x 10^7/kg): 12.46 \par Cell Viability (%): 100% \par \par Engraftment was noted on 3/11/19. Ms. Gary did not receive zarxio due to her having minimal residual disease on admission, and a reduced intensity preparatory regimen to avoid stimulating myeloblasts. \par \par Ms. Gary had a relatively uncomplicated transplant course. She did experience menses, which was suppressed with provera. She also had pancytopenia related to the high dose chemotherapy preparative regimen. She also experienced grade 1 oral mucositis, and grade 2 GI mucositis also related to the chemotherapy preparative regimen. Both were treated with supportive care. \par \par Currently, Ms. Gary is stable for discharge home with outpatient follow up at the [facility].  [de-identified] : Patient presents today for a follow up s/p AlloPSCT. She is doing well overall. She continues to report improved skin hyperpigmentation. She also notes some oral irritation as well as nail ridging. She is sleeping and eating well, her weight is stable. Denies fever/chills, night sweats, mouth sores, eye dryness, blurred vision, nausea, vomiting, diarrhea. No CP, SOB or LE edema...BM BX today... Currently off Tacrolimus. She has been instructed that she would likely be able to travel by Spring time. \par \par On 8/1/19 visit, patient presents for a follow up visit. States last week developed a rash of her face. Hydrocortisone cream applied BID with no improvement. Placed on prednisone 20 mg daily as of 7/29/19. Today has a erythematous/hyperpigmented rash on face, chest, back, bilateral upper and lower extremities. Eye dryness and finds her skin very taut. Denies fever, chills, nausea, vomiting,diarrhea, mouth sores or any active signs of bleeding. Denies SOB, chest pain or B/L LE edema. Recently met with MD Youngblood and Shahnaz in [place]. Holding on starting vidaza treatment at this time. Denies any pain at this time.

## 2019-08-06 NOTE — OB HISTORY
[___] : Total Pregnancies: [unfilled] [Definite:  ___ (Date)] : the last menstrual period was [unfilled] [Normal Amount/Duration] : was abnormal [Spotting Between  Menses] : no spotting between menses [Regular Cycle Intervals] : periods have been irregular

## 2019-08-07 ENCOUNTER — NON-APPOINTMENT (OUTPATIENT)
Age: 34
End: 2019-08-07

## 2019-08-07 ENCOUNTER — APPOINTMENT (OUTPATIENT)
Dept: HEMATOLOGY ONCOLOGY | Facility: CLINIC | Age: 34
End: 2019-08-07
Payer: COMMERCIAL

## 2019-08-07 ENCOUNTER — APPOINTMENT (OUTPATIENT)
Dept: OPHTHALMOLOGY | Facility: CLINIC | Age: 34
End: 2019-08-07
Payer: COMMERCIAL

## 2019-08-07 ENCOUNTER — RESULT REVIEW (OUTPATIENT)
Age: 34
End: 2019-08-07

## 2019-08-07 VITALS
DIASTOLIC BLOOD PRESSURE: 79 MMHG | HEART RATE: 112 BPM | BODY MASS INDEX: 34.35 KG/M2 | SYSTOLIC BLOOD PRESSURE: 111 MMHG | WEIGHT: 187.83 LBS | RESPIRATION RATE: 17 BRPM | OXYGEN SATURATION: 99 % | TEMPERATURE: 98.4 F

## 2019-08-07 LAB
ALBUMIN SERPL ELPH-MCNC: 3.9 G/DL
ALP BLD-CCNC: 88 U/L
ALT SERPL-CCNC: 274 U/L
ANION GAP SERPL CALC-SCNC: 14 MMOL/L
AST SERPL-CCNC: 108 U/L
BASOPHILS # BLD AUTO: 0 K/UL — SIGNIFICANT CHANGE UP (ref 0–0.2)
BASOPHILS NFR BLD AUTO: 0.1 % — SIGNIFICANT CHANGE UP (ref 0–2)
BILIRUB SERPL-MCNC: 0.5 MG/DL
BUN SERPL-MCNC: 9 MG/DL
CALCIUM SERPL-MCNC: 9.7 MG/DL
CHLORIDE SERPL-SCNC: 102 MMOL/L
CO2 SERPL-SCNC: 25 MMOL/L
CREAT SERPL-MCNC: 1.07 MG/DL
EOSINOPHIL # BLD AUTO: 0 K/UL — SIGNIFICANT CHANGE UP (ref 0–0.5)
EOSINOPHIL NFR BLD AUTO: 0.2 % — SIGNIFICANT CHANGE UP (ref 0–6)
GLUCOSE SERPL-MCNC: 106 MG/DL
HCT VFR BLD CALC: 45.2 % — HIGH (ref 34.5–45)
HGB BLD-MCNC: 14.8 G/DL — SIGNIFICANT CHANGE UP (ref 11.5–15.5)
LDH SERPL-CCNC: 228 U/L
LYMPHOCYTES # BLD AUTO: 0.9 K/UL — LOW (ref 1–3.3)
LYMPHOCYTES # BLD AUTO: 8.9 % — LOW (ref 13–44)
MAGNESIUM SERPL-MCNC: 2.3 MG/DL
MCHC RBC-ENTMCNC: 30.9 PG — SIGNIFICANT CHANGE UP (ref 27–34)
MCHC RBC-ENTMCNC: 32.8 G/DL — SIGNIFICANT CHANGE UP (ref 32–36)
MCV RBC AUTO: 94.1 FL — SIGNIFICANT CHANGE UP (ref 80–100)
MONOCYTES # BLD AUTO: 0.2 K/UL — SIGNIFICANT CHANGE UP (ref 0–0.9)
MONOCYTES NFR BLD AUTO: 2.2 % — SIGNIFICANT CHANGE UP (ref 2–14)
NEUTROPHILS # BLD AUTO: 8.4 K/UL — HIGH (ref 1.8–7.4)
NEUTROPHILS NFR BLD AUTO: 88.6 % — HIGH (ref 43–77)
PLATELET # BLD AUTO: 446 K/UL — HIGH (ref 150–400)
POTASSIUM SERPL-SCNC: 4.4 MMOL/L
PROT SERPL-MCNC: 5.9 G/DL
RBC # BLD: 4.8 M/UL — SIGNIFICANT CHANGE UP (ref 3.8–5.2)
RBC # FLD: 12.9 % — SIGNIFICANT CHANGE UP (ref 10.3–14.5)
SODIUM SERPL-SCNC: 141 MMOL/L
WBC # BLD: 9.5 K/UL — SIGNIFICANT CHANGE UP (ref 3.8–10.5)
WBC # FLD AUTO: 9.5 K/UL — SIGNIFICANT CHANGE UP (ref 3.8–10.5)

## 2019-08-07 PROCEDURE — 99215 OFFICE O/P EST HI 40 MIN: CPT

## 2019-08-07 PROCEDURE — 92002 INTRM OPH EXAM NEW PATIENT: CPT

## 2019-08-08 LAB — TACROLIMUS SERPL-MCNC: <2 NG/ML

## 2019-08-08 NOTE — OB HISTORY
[Definite:  ___ (Date)] : the last menstrual period was [unfilled] [___] : Total Pregnancies: [unfilled] [Normal Amount/Duration] : was abnormal [Spotting Between  Menses] : no spotting between menses [Regular Cycle Intervals] : periods have been irregular

## 2019-08-08 NOTE — REVIEW OF SYSTEMS
[Skin Rash] : skin rash [Negative] : Allergic/Immunologic [Dry Eyes] : dryness of the eyes [Skin Wound] : no skin wound [FreeTextEntry3] : D [FreeTextEntry4] : occasional mouth irritation, gum sensitivity  [de-identified] : Erythematous/ Hyperpigmented rash of face, neck chest, back, bilateral upper and lower extremities, abdomen

## 2019-08-08 NOTE — PHYSICAL EXAM
[Ambulatory and capable of all self care but unable to carry out any work activities] : Status 2- Ambulatory and capable of all self care but unable to carry out any work activities. Up and about more than 50% of waking hours [Normal] : affect appropriate [Ulcers] : no ulcers [Thrush] : no thrush [de-identified] : White lace like pattern on buccal mucosa [de-identified] : Erythematous/hyperpigmented rash of  face,neck, chest, back, bilateral upper and lower extremities, abdomen

## 2019-08-08 NOTE — ASSESSMENT
[Curative] : Goals of care discussed with patient: Curative [FreeTextEntry1] : Ms. Gary is a 33 years old female Episcopalian with AML with the following comorbidities being managed:\par \par 1) AML\par S/p HLA 10/10 allo sib (sister) PBSCT on 2/27/19\par 7/3/19 chimerism =97 % donor chimerism\par Post transplant BMbx on 7/3/19 and results reviewed with patient\par Hold on starting maintenance Vidaza at this time.\par \par 2) Heme\par Counts stable\par  WBC  9.5 ANC 8.4  Hgb 14.8  \par No transfusions as pt is practicing Episcopalian\par Continue multivitamin and folic acid daily\par \par 3) ID\par Continue ppx:\par - Acyclovir 400 mg tid\par - Mepron 750 mg bid\par 7/3/19 CMV PCR not detected, continue to monitor weekly \par \par 4) GVHD\par Skin 2 Liver 0 GI 0 - overall grade 1\par Skin rash extent 43% BSA today. \par Currently on prednisone 20 mg daily as of 7/29/19. Instructed to increase prednisone on 8/7/19 to 40 mg daily.\par Tacrolimus restarted on 8/1/19 at 0.5 mg BID. \par Lidex cream BID\par \par Date of onset of AGVHD 8/1/19 \par 8/1/19: Erythematous/hyperpigmented rash 38% BSA- face, chest, back, bilateral upper and lower extremities. Patient was placed on prednisone 20 mg daily as of 7/29/19. Instructed to increase prednisone on 8/1/19 to 40 mg daily on 8/1, 8/2, 8/3. Then decreased to prednisone 20 mg daily. Lidex cream BID to body and hydrocortisone cream to face BID.Acute GVHD SKin 2 Liver 0 GI 0- overall grade 1\par 8/7/19: Erythematous/hyperpigmented rash  43%  BSA- face, neck, chest, back, abdomen, bilateral upper and lower extremities. Increased prednisone 20mg daily to 40 mg daily. Lidex cream BID to entire body. FK at 0.5 mg BID. May have to increase FK dose if rash worsens. Acute GVHD SKin 2 Liver 0 GI 0- overall grade 1\par \par 5) GI\par Continue ppx:\par - Ursodiol 300 mg bid\par - Protonix 40 mg daily \par PRN Zofran and Reglan for nausea\par \par 6) Transaminitis\par Follow up on CMP from today\par Recent blood work from [place] shows transaminits\par \par 7) Other \par Hypomagnesemia - likely 2/2 tacrolimus, continue magnesium oxide 400mg tid\par GYN - continue Provera 10mg daily, reviewed need for contraception if resuming sexual activity\par HTN - continue amlodipine 5mg daily, BP remains WNL\par Allergies - continue Singulair 10 mg daily, may use daily Claritin or similar anti-histamine for ongoing allergy symptoms \par Sprintec- continue as prescribed\par \par 8) Plan/Dispo\par Pt and  educated regarding plan of care, all questions/concerns addressed\par Patient aware to call immediately if experiences severe nausea,vomiting, diarrhea or rash worsens\par Hold on starting maintenance vidaza at this time. Resumed FK today.\par Follow up with OJLEEN Bonilla in one week. Following up with  ophthalmology same day.\par \par \par

## 2019-08-08 NOTE — HISTORY OF PRESENT ILLNESS
[de-identified] : Ms. 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 Of note patient is a Gnosticism and cannot receive blood products. \par \par Patient was noted to be newly leukopenic to WBC 1.7 during preoperative evaluation in anticipation of right ankle ligament repair. Bone marrow aspirate and biopsy at [facility] on 12/2/2017 revealed AML with some suspicion for APL. She was transferred to [place] for further management. Peripheral counts from around this time are not available. \par \par Repeat marrow on 12/26/2017 showed 90% blasts with myeloid mutation arrest in a 40-50% cellular marrow. On flow blasts were negative for CD34 and HLA-DR; positive for CD38, CD58, CD33, and MPO, and partially positive for CD15, CD11b, , and CD64. CD45 was reported as dim. Karyotype showed +21 in 3 of 21 metaphases, but no t(15:17) was detected on karyotype of FISH. Molecular studies showed mutations in NPM1 (45%0, IDH1 (R132S; 39%), PTPN1 (23%), and CSF3R (5%) genes. There was lingering diagnostic uncertainty due to a largely aspicular and hypocellular specimen, and thus a marrow was repeated on 1/2/2018, confirming AML.\par \par On 1/4/2018, Ms. Gary began 7+3 with Idarubicin. Her induction course was complicated by DIC, neutropenic fever/ Klebsiella PNA, and bilateral retinal hemorrhage. She did not receive blood products and was instead supported with Nplate, Procrit, IV iron, Amicar, and Lupron. After achieving count stability, the patient was discharged; she was not deemed a candidate for further therapy given inability to receive transfusions and a day 14 marrow was not obtained. She subsequently saw Dr. North Obrien at [facility]. He obtained a recovery marrow on 2/13/2018 (day +40), which was 30% cellular with 45% blasts. 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 Cytogenetics and FISH were normal. \par \par She then established care with Dr. Christie for consideration of salvage therapy with IDH1 inhibition given the risks of additional chemotherapy without blood products support. [place] [facility], showed 69% blasts. ThunderBolts panel confirmed IDH1 R132S, NPM1 [id number], and CSF3R D810G mutations. She was started on Ivosidenib on [facility] protocol  and had completed four cycles to date (6/28/2018 = C5D1). Marrow blasts increased to 85% on 4/30 but declined to 36% by 5/31. A  bone marrow aspirate and biopsy  was obtained  (6/28/2018); preliminary results show further responding disease with 10.3% blasts by flow cytometry. Subsequent marrow in oct showed 5% blasts...she was then started on vidazza plus venetoclax. She completed 2 cycles...f/u marrow with 4% blasts.....\par \par Patient underwent an AlloPSCT sister MRD (10/10) on 2/27/19, hospital course as follows:\par Ms. 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 She is a Gnosticism and cannot receive blood products. \par \par Ms. Nickerson's donor is her sister.  She is a 10/10 match. Both donor and patient are CMV +. She had completed all pre testing needed for transplant.  She will \par start reduced intensity Fludarabine/Busulfan chemo regimen today. \par \par Upon admission, a right AC PICC line was placed in IR. Ms. Gary received IV hydration, pain management, antiemetics, anxiolysis, and antibacterial / antiviral / antifungal / PCP / GI and VOD (SOS) prophylaxis. Labs were monitored every other day, and she received electrolyte repletion as needed. She also received vitamin K, vitamin B, supplemental iron, vitamin C and procrit as directed by the institutional bloodless transplant policy. \par \par On 2/27/19, Ms. Gary received 363 mLs of fresh, apheresed, mobilized, related allogeneic HPC over 80 minutes. Cell counts as follows: \par Total MNCs ( x 10^8/kg): 6.21 \par CD 34+Cells ( x 10^6/kg): 3.37 \par CD3+ Cells ( x 10^7/kg): 12.46 \par Cell Viability (%): 100% \par \par Engraftment was noted on 3/11/19. Ms. Gary did not receive zarxio due to her having minimal residual disease on admission, and a reduced intensity preparatory regimen to avoid stimulating myeloblasts. \par \par Ms. Gary had a relatively uncomplicated transplant course. She did experience menses, which was suppressed with provera. She also had pancytopenia related to the high dose chemotherapy preparative regimen. She also experienced grade 1 oral mucositis, and grade 2 GI mucositis also related to the chemotherapy preparative regimen. Both were treated with supportive care. \par \par Currently, Ms. Gary is stable for discharge home with outpatient follow up at the [facility].  [de-identified] : Patient presents today for a follow up s/p AlloPSCT. She is doing well overall. She continues to report improved skin hyperpigmentation. She also notes some oral irritation as well as nail ridging. She is sleeping and eating well, her weight is stable. Denies fever/chills, night sweats, mouth sores, eye dryness, blurred vision, nausea, vomiting, diarrhea. No CP, SOB or LE edema...BM BX today... Currently off Tacrolimus. She has been instructed that she would likely be able to travel by Spring time. \par \par On 8/1/19 visit, patient presents for a follow up visit. States last week developed a rash of her face. Hydrocortisone cream applied BID with no improvement. Placed on prednisone 20 mg daily as of 7/29/19. Today has a erythematous/hyperpigmented rash on face, chest, back, bilateral upper and lower extremities. Eye dryness and finds her skin very taut. Denies fever, chills, nausea, vomiting,diarrhea, mouth sores or any active signs of bleeding. Denies SOB, chest pain or B/L LE edema. Recently met with MD Youngblood and Shahnaz in [place]. Holding on starting vidaza treatment at this time. Denies any pain at this time. \par \par On 8/7/19 visit, patient presents for follow up visit. Currently on prednisone 20 mg daily,  FK 0.5 mg BID and lidex cream BID. Generalized hyperpigmented/erythematous rash of face, neck, chest, bilateral upper and lower extremities, back and abdomen. Dry lips and gum sensitivity. Skin is very taut. Had diarrhea in the morning for the past 3 days. Weight loss and finds it very hard to eat with gum sensitivity. Eye dryness and wakes up in the morning with discharge. Has an appointment with ophthalmology today. Denies vomiting, fever or any active signs of bleeding. Denies SOB, chest pain or B/L LE edema. Remains compliant with post transplant medications.

## 2019-08-09 ENCOUNTER — APPOINTMENT (OUTPATIENT)
Dept: OPHTHALMOLOGY | Facility: CLINIC | Age: 34
End: 2019-08-09

## 2019-08-09 LAB
CMV DNA SPEC QL NAA+PROBE: NOT DETECTED
CMVPCR LOG: NOT DETECTED LOGIU/ML
ENGRAFTMNET-POST: NORMAL

## 2019-08-14 ENCOUNTER — APPOINTMENT (OUTPATIENT)
Dept: HEMATOLOGY ONCOLOGY | Facility: CLINIC | Age: 34
End: 2019-08-14
Payer: COMMERCIAL

## 2019-08-14 ENCOUNTER — NON-APPOINTMENT (OUTPATIENT)
Age: 34
End: 2019-08-14

## 2019-08-14 ENCOUNTER — RESULT REVIEW (OUTPATIENT)
Age: 34
End: 2019-08-14

## 2019-08-14 ENCOUNTER — LABORATORY RESULT (OUTPATIENT)
Age: 34
End: 2019-08-14

## 2019-08-14 ENCOUNTER — APPOINTMENT (OUTPATIENT)
Dept: OPHTHALMOLOGY | Facility: CLINIC | Age: 34
End: 2019-08-14
Payer: COMMERCIAL

## 2019-08-14 VITALS
SYSTOLIC BLOOD PRESSURE: 123 MMHG | OXYGEN SATURATION: 98 % | HEART RATE: 115 BPM | TEMPERATURE: 99 F | RESPIRATION RATE: 16 BRPM | BODY MASS INDEX: 34.03 KG/M2 | WEIGHT: 186.07 LBS | DIASTOLIC BLOOD PRESSURE: 84 MMHG

## 2019-08-14 LAB
BASOPHILS # BLD AUTO: 0 K/UL — SIGNIFICANT CHANGE UP (ref 0–0.2)
BASOPHILS NFR BLD AUTO: 0.1 % — SIGNIFICANT CHANGE UP (ref 0–2)
EOSINOPHIL # BLD AUTO: 0 K/UL — SIGNIFICANT CHANGE UP (ref 0–0.5)
EOSINOPHIL NFR BLD AUTO: 0 % — SIGNIFICANT CHANGE UP (ref 0–6)
HCT VFR BLD CALC: 44.9 % — SIGNIFICANT CHANGE UP (ref 34.5–45)
HGB BLD-MCNC: 14.6 G/DL — SIGNIFICANT CHANGE UP (ref 11.5–15.5)
LYMPHOCYTES # BLD AUTO: 0.8 K/UL — LOW (ref 1–3.3)
LYMPHOCYTES # BLD AUTO: 6.4 % — LOW (ref 13–44)
MCHC RBC-ENTMCNC: 30.8 PG — SIGNIFICANT CHANGE UP (ref 27–34)
MCHC RBC-ENTMCNC: 32.4 G/DL — SIGNIFICANT CHANGE UP (ref 32–36)
MCV RBC AUTO: 95.2 FL — SIGNIFICANT CHANGE UP (ref 80–100)
MONOCYTES # BLD AUTO: 0.2 K/UL — SIGNIFICANT CHANGE UP (ref 0–0.9)
MONOCYTES NFR BLD AUTO: 1.6 % — LOW (ref 2–14)
NEUTROPHILS # BLD AUTO: 11.4 K/UL — HIGH (ref 1.8–7.4)
NEUTROPHILS NFR BLD AUTO: 91.9 % — HIGH (ref 43–77)
PLATELET # BLD AUTO: 326 K/UL — SIGNIFICANT CHANGE UP (ref 150–400)
RBC # BLD: 4.72 M/UL — SIGNIFICANT CHANGE UP (ref 3.8–5.2)
RBC # FLD: 14.1 % — SIGNIFICANT CHANGE UP (ref 10.3–14.5)
TACROLIMUS SERPL-MCNC: <2 NG/ML
WBC # BLD: 12.4 K/UL — HIGH (ref 3.8–10.5)
WBC # FLD AUTO: 12.4 K/UL — HIGH (ref 3.8–10.5)

## 2019-08-14 PROCEDURE — 68761 CLOSE TEAR DUCT OPENING: CPT | Mod: E2,E4

## 2019-08-14 PROCEDURE — 99215 OFFICE O/P EST HI 40 MIN: CPT

## 2019-08-15 LAB
ALBUMIN SERPL ELPH-MCNC: 4.2 G/DL
ALP BLD-CCNC: 72 U/L
ALT SERPL-CCNC: 245 U/L
ANION GAP SERPL CALC-SCNC: 13 MMOL/L
APPEARANCE: CLEAR
AST SERPL-CCNC: 92 U/L
BILIRUB SERPL-MCNC: 0.4 MG/DL
BILIRUBIN URINE: NEGATIVE
BLOOD URINE: NEGATIVE
BUN SERPL-MCNC: 9 MG/DL
CALCIUM SERPL-MCNC: 9.7 MG/DL
CHLORIDE SERPL-SCNC: 103 MMOL/L
CMV DNA SPEC QL NAA+PROBE: 77 IU/ML
CMVPCR LOG: 1.89 LOGIU/ML
CO2 SERPL-SCNC: 26 MMOL/L
COLOR: YELLOW
CREAT SERPL-MCNC: 1.11 MG/DL
GLUCOSE QUALITATIVE U: NEGATIVE
GLUCOSE SERPL-MCNC: 111 MG/DL
KETONES URINE: NEGATIVE
LDH SERPL-CCNC: 238 U/L
LEUKOCYTE ESTERASE URINE: ABNORMAL
MAGNESIUM SERPL-MCNC: 2.4 MG/DL
NITRITE URINE: NEGATIVE
PH URINE: 7.5
POTASSIUM SERPL-SCNC: 5 MMOL/L
PROT SERPL-MCNC: 6.3 G/DL
PROTEIN URINE: NORMAL
SODIUM SERPL-SCNC: 142 MMOL/L
SPECIFIC GRAVITY URINE: 1.02
UROBILINOGEN URINE: NORMAL

## 2019-08-15 NOTE — REVIEW OF SYSTEMS
[Skin Rash] : skin rash [Dry Eyes] : dryness of the eyes [Negative] : Heme/Lymph [Mucosal Pain] : mucosal pain [Nosebleeds] : no nosebleeds [Eye Pain] : no eye pain [Loss of Hearing] : no loss of hearing [Skin Wound] : no skin wound [FreeTextEntry4] : gum sensitivity, mouth sores   [de-identified] : Erythematous rash of face. Hyperpigmented rash of neck, chest, back, abdomen,bilateral upper and lower extremities

## 2019-08-15 NOTE — HISTORY OF PRESENT ILLNESS
[de-identified] : Ms. 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 Of note patient is a L[employer] and cannot receive blood products. \par \par Patient was noted to be newly leukopenic to WBC 1.7 during preoperative evaluation in anticipation of right ankle ligament repair. Bone marrow aspirate and biopsy at [facility] on 12/2/2017 revealed AML with some suspicion for APL. She was transferred to [place] for further management. Peripheral counts from around this time are not available. \par \par Repeat marrow on 12/26/2017 showed 90% blasts with myeloid mutation arrest in a 40-50% cellular marrow. On flow blasts were negative for CD34 and HLA-DR; positive for CD38, CD58, CD33, and MPO, and partially positive for CD15, CD11b, , and CD64. CD45 was reported as dim. Karyotype showed +21 in 3 of 21 metaphases, but no t(15:17) was detected on karyotype of FISH. Molecular studies showed mutations in NPM1 (45%0, IDH1 (R132S; 39%), PTPN1 (23%), and CSF3R (5%) genes. There was lingering diagnostic uncertainty due to a largely aspicular and hypocellular specimen, and thus a marrow was repeated on 1/2/2018, confirming AML.\par \par On 1/4/2018, Ms. Gary began 7+3 with Idarubicin. Her induction course was complicated by DIC, neutropenic fever/ Klebsiella PNA, and bilateral retinal hemorrhage. She did not receive blood products and was instead supported with Nplate, Procrit, IV iron, Amicar, and Lupron. After achieving count stability, the patient was discharged; she was not deemed a candidate for further therapy given inability to receive transfusions and a day 14 marrow was not obtained. She subsequently saw Dr. North Obrien at [facility]. He obtained a recovery marrow on 2/13/2018 (day +40), which was 30% cellular with 45% blasts. 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 Cytogenetics and FISH were normal. \par \par She then established care with Dr. Christie for consideration of salvage therapy with IDH1 inhibition given the risks of additional chemotherapy without blood products support. Initial marrow at [facility], on 3/1/2018, showed 69% blasts. ThunderBolts panel confirmed IDH1 R132S, NPM1 [id number] 12, and CSF3R D810G mutations. She was started on Ivosidenib on [facility] protocol  and had completed four cycles to date (6/28/2018 = C5D1). Marrow blasts increased to 85% on 4/30 but declined to 36% by 5/31. A  bone marrow aspirate and biopsy  was obtained  (6/28/2018); preliminary results show further responding disease with 10.3% blasts by flow cytometry. Subsequent marrow in oct showed 5% blasts...she was then started on vidazza plus venetoclax. She completed 2 cycles...f/u marrow with 4% blasts.....\par \par Patient underwent an AlloPSCT sister MRD (10/10) on 2/27/19, hospital course as follows:\par Ms. 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 She is a Latter-day and cannot receive blood products. \par \par Ms. Nickerson's donor is her sister.  She is a 10/10 match. Both donor and patient are CMV +. She had completed all pre testing needed for transplant.  She will \par start reduced intensity Fludarabine/Busulfan chemo regimen today. \par \par Upon admission, a right AC PICC line was placed in IR. Ms. Gary received IV hydration, pain management, antiemetics, anxiolysis, and antibacterial / antiviral / antifungal / PCP / GI and VOD (SOS) prophylaxis. Labs were monitored every other day, and she received electrolyte repletion as needed. She also received vitamin K, vitamin B, supplemental iron, vitamin C and procrit as directed by the institutional bloodless transplant policy. \par \par On 2/27/19, Ms. Gary received 363 mLs of fresh, apheresed, mobilized, related allogeneic HPC over 80 minutes. Cell counts as follows: \par Total MNCs ( x 10^8/kg): 6.21 \par CD 34+Cells ( x 10^6/kg): 3.37 \par CD3+ Cells ( x 10^7/kg): 12.46 \par Cell Viability (%): 100% \par \par Engraftment was noted on 3/11/19. Ms. Gary did not receive zarxio due to her having minimal residual disease on admission, and a reduced intensity preparatory regimen to avoid stimulating myeloblasts. \par \par Ms. Gary had a relatively uncomplicated transplant course. She did experience menses, which was suppressed with provera. She also had pancytopenia related to the high dose chemotherapy preparative regimen. She also experienced grade 1 oral mucositis, and grade 2 GI mucositis also related to the chemotherapy preparative regimen. Both were treated with supportive care. \par \par Currently, Ms. Gary is stable for discharge home with outpatient follow up at the [facility].  [de-identified] : Patient presents today for a follow up s/p AlloPSCT. She is doing well overall. She continues to report improved skin hyperpigmentation. She also notes some oral irritation as well as nail ridging. She is sleeping and eating well, her weight is stable. Denies fever/chills, night sweats, mouth sores, eye dryness, blurred vision, nausea, vomiting, diarrhea. No CP, SOB or LE edema...BM BX today... Currently off Tacrolimus. She has been instructed that she would likely be able to travel by Spring time. \par \par On 8/1/19 visit, patient presents for a follow up visit. States last week developed a rash of her face. Hydrocortisone cream applied BID with no improvement. Placed on prednisone 20 mg daily as of 7/29/19. Today has a erythematous/hyperpigmented rash on face, chest, back, bilateral upper and lower extremities. Eye dryness and finds her skin very taut. Denies fever, chills, nausea, vomiting,diarrhea, mouth sores or any active signs of bleeding. Denies SOB, chest pain or B/L LE edema. Recently met with MD Youngblood and Shahnaz in [place]. Holding on starting vidaza treatment at this time. Denies any pain at this time. \par \par On 8/7/19 visit, patient presents for follow up visit. Currently on prednisone 20 mg daily,  FK 0.5 mg BID and lidex cream BID. Generalized hyperpigmented/erythematous rash of face, neck, chest, bilateral upper and lower extremities, back and abdomen. Dry lips and gum sensitivity. Skin is very taut. Had diarrhea in the morning for the past 3 days. Weight loss and finds it very hard to eat with gum sensitivity. Eye dryness and wakes up in the morning with discharge. Has an appointment with ophthalmology today. Denies vomiting, fever or any active signs of bleeding. Denies SOB, chest pain or B/L LE edema. Remains compliant with post transplant medications. \par \par On 8/14/19 visit, patient presents for follow up visit. Patient went to ophthalmology this morning. Last week ophthalmology inserted plugs into lower eyelids as patient is not producing tears. Applying artificial tears every hour. 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 Hyperpigmented rash of chest, neck, bilateral upper and lower extremities, abdomen, back. Erythematous rash of face. Complains of vaginal dryness and burning when urine touches her skin. Denies dysuria or discharge. Mouth sores but admits dexamethasone swish and spit is helping. Denies fever, chills, nausea, vomiting, diarrhea or any active signs of bleeding. Denies SOB, chest pain or B/L LE edema. Remains compliant with post transplant medications. Denies any pain at this time.

## 2019-08-15 NOTE — OB HISTORY
[Definite:  ___ (Date)] : the last menstrual period was [unfilled] [___] : Total Pregnancies: [unfilled] [Spotting Between  Menses] : no spotting between menses [Normal Amount/Duration] : was abnormal [Regular Cycle Intervals] : periods have been irregular

## 2019-08-15 NOTE — ASSESSMENT
[Curative] : Goals of care discussed with patient: Curative [FreeTextEntry1] : Ms. Gary is a 33 years old female Restorationism with AML with the following comorbidities being managed:\par \par 1) AML\par S/p HLA 10/10 allo sib (sister) PBSCT on 2/27/19\par 8/1/19 chimerism =99.3 % donor chimerism\par Post transplant BMbx on 7/3/19 and results reviewed with patient\par Hold on starting maintenance Vidaza at this time.\par \par 2) Heme\par Counts stable\par WBC 12.4 ANC 11.4  Hgb 14.6  \par No transfusions as pt is practicing Restorationism\par Continue multivitamin and folic acid daily\par \par 3) ID\par Continue ppx:\par - Acyclovir 400 mg tid\par - Mepron 750 mg bid\par \par 8/7/19 CMV PCR not detected, continue to monitor weekly \par \par 4) GVHD\par Skin 2 Liver 0 GI 0 - overall grade 1\par Skin rash extent 42% BSA today. \par See GVHD history below\par \par Date of onset of AGVHD 8/1/19 \par On 7/29/19 placed on prednisone 20 mg daily for erythematous rash of face as stated by patient.\par 8/1/19: Erythematous/hyperpigmented rash 38% BSA- rash of face, chest, back, bilateral upper and lower extremities. Patient was placed on prednisone 20 mg daily as of 7/29/19. Instructed to increase prednisone on 8/1/19 to 40 mg daily on 8/1, 8/2, 8/3. Then decreased to prednisone 20 mg daily.Tacrolimus restarted on 8/1/19 at 0.5 mg BID. Lidex cream BID to body and hydrocortisone cream to face BID. Acute GVHD SKin 2 Liver 0 GI 0- overall grade 1\par 8/7/19: Erythematous/hyperpigmented rash  43%  BSA- rash of face, neck, chest, back, abdomen, bilateral upper and lower extremities. Increased prednisone 20mg daily to 40 mg daily. Lidex cream BID to entire body. FK at 0.5 mg BID. May have to increase FK dose if rash worsens. Acute GVHD SKin 2 Liver 0 GI 0- overall grade 1\par 8/14/19: Erythematous rash of face. Hyperpigmented rash of chest, neck, abdomen, bilateral upper and lower extremities and back. Skin rash extent 42% BSA. Currently on prednisone 40 mg daily and lidex cream BID. Increased FK to 0.5 mg Am and 1 mg PM. \par \par Continue dexamethasone swish and spit.\par \par 5) GI\par Continue ppx:\par - Ursodiol 300 mg bid\par - Protonix 40 mg daily \par PRN Zofran and Reglan for nausea\par \par 6) Transaminitis\par 8/7/19    \par Follow up on CMP from today\par \par 7) \par Urine analysis and urine culture sent today. Will follow up on results \par \par 8) Other \par Hypomagnesemia - likely 2/2 tacrolimus, continue magnesium oxide 400 mg tid\par GYN - continue Sprintec daily, \par HTN - continue amlodipine 5 mg daily, BP remains WNL\par Allergies - continue Singulair 10 mg daily, may use daily Claritin or similar anti-histamine for ongoing allergy symptoms \par \par 8) Plan/Dispo\par Pt and friend educated regarding plan of care, all questions/concerns addressed\par Patient aware to call immediately if experiences severe nausea,vomiting, diarrhea or rash worsens\par Hold on starting maintenance vidaza at this time. \par Follow up with MD Lou on 8/29/19.\par \par \par

## 2019-08-15 NOTE — PHYSICAL EXAM
[Ambulatory and capable of all self care but unable to carry out any work activities] : Status 2- Ambulatory and capable of all self care but unable to carry out any work activities. Up and about more than 50% of waking hours [Normal] : grossly intact [Thrush] : no thrush [Ulcers] : no ulcers [de-identified] : White lace like pattern on buccal mucosa, mouth sores  [de-identified] : Erythematous rash of face. Hyperpigmented skin of neck, chest, back, bilateral upper and lower extremities, abdomen.

## 2019-08-19 LAB
BACTERIA UR CULT: NORMAL
ENGRAFTMNET-POST: NORMAL

## 2019-08-26 LAB — ENGRAFTMNET-POST: NORMAL

## 2019-08-28 ENCOUNTER — OUTPATIENT (OUTPATIENT)
Dept: OUTPATIENT SERVICES | Facility: HOSPITAL | Age: 34
LOS: 1 days | Discharge: HOME | End: 2019-08-28

## 2019-08-28 DIAGNOSIS — Z90.3 ACQUIRED ABSENCE OF STOMACH [PART OF]: Chronic | ICD-10-CM

## 2019-08-28 DIAGNOSIS — C92.00 ACUTE MYELOBLASTIC LEUKEMIA, NOT HAVING ACHIEVED REMISSION: ICD-10-CM

## 2019-08-29 ENCOUNTER — LABORATORY RESULT (OUTPATIENT)
Age: 34
End: 2019-08-29

## 2019-08-29 ENCOUNTER — RESULT REVIEW (OUTPATIENT)
Age: 34
End: 2019-08-29

## 2019-08-29 ENCOUNTER — APPOINTMENT (OUTPATIENT)
Dept: HEMATOLOGY ONCOLOGY | Facility: CLINIC | Age: 34
End: 2019-08-29
Payer: COMMERCIAL

## 2019-08-29 VITALS
HEART RATE: 84 BPM | TEMPERATURE: 98.9 F | WEIGHT: 181.88 LBS | SYSTOLIC BLOOD PRESSURE: 111 MMHG | DIASTOLIC BLOOD PRESSURE: 77 MMHG | OXYGEN SATURATION: 98 % | BODY MASS INDEX: 33.27 KG/M2 | RESPIRATION RATE: 16 BRPM

## 2019-08-29 LAB
ALBUMIN SERPL ELPH-MCNC: 4 G/DL
ALP BLD-CCNC: 50 U/L
ALT SERPL-CCNC: 137 U/L
ANION GAP SERPL CALC-SCNC: 14 MMOL/L
AST SERPL-CCNC: 57 U/L
BASOPHILS # BLD AUTO: 0 K/UL — SIGNIFICANT CHANGE UP (ref 0–0.2)
BASOPHILS NFR BLD AUTO: 0 % — SIGNIFICANT CHANGE UP (ref 0–2)
BILIRUB SERPL-MCNC: 0.4 MG/DL
BUN SERPL-MCNC: 11 MG/DL
CALCIUM SERPL-MCNC: 9.2 MG/DL
CHLORIDE SERPL-SCNC: 103 MMOL/L
CO2 SERPL-SCNC: 24 MMOL/L
CREAT SERPL-MCNC: 0.98 MG/DL
EOSINOPHIL # BLD AUTO: 0 K/UL — SIGNIFICANT CHANGE UP (ref 0–0.5)
EOSINOPHIL NFR BLD AUTO: 0.3 % — SIGNIFICANT CHANGE UP (ref 0–6)
GLUCOSE SERPL-MCNC: 97 MG/DL
HCT VFR BLD CALC: 43.9 % — SIGNIFICANT CHANGE UP (ref 34.5–45)
HGB BLD-MCNC: 14.4 G/DL — SIGNIFICANT CHANGE UP (ref 11.5–15.5)
LDH SERPL-CCNC: 204 U/L
LYMPHOCYTES # BLD AUTO: 1.1 K/UL — SIGNIFICANT CHANGE UP (ref 1–3.3)
LYMPHOCYTES # BLD AUTO: 14.6 % — SIGNIFICANT CHANGE UP (ref 13–44)
MAGNESIUM SERPL-MCNC: 2.2 MG/DL
MCHC RBC-ENTMCNC: 31.3 PG — SIGNIFICANT CHANGE UP (ref 27–34)
MCHC RBC-ENTMCNC: 32.7 G/DL — SIGNIFICANT CHANGE UP (ref 32–36)
MCV RBC AUTO: 95.8 FL — SIGNIFICANT CHANGE UP (ref 80–100)
MONOCYTES # BLD AUTO: 0.3 K/UL — SIGNIFICANT CHANGE UP (ref 0–0.9)
MONOCYTES NFR BLD AUTO: 4.4 % — SIGNIFICANT CHANGE UP (ref 2–14)
NEUTROPHILS # BLD AUTO: 5.9 K/UL — SIGNIFICANT CHANGE UP (ref 1.8–7.4)
NEUTROPHILS NFR BLD AUTO: 80.6 % — HIGH (ref 43–77)
PLATELET # BLD AUTO: 233 K/UL — SIGNIFICANT CHANGE UP (ref 150–400)
POTASSIUM SERPL-SCNC: 4.1 MMOL/L
PROT SERPL-MCNC: 6 G/DL
RBC # BLD: 4.59 M/UL — SIGNIFICANT CHANGE UP (ref 3.8–5.2)
RBC # FLD: 14.6 % — HIGH (ref 10.3–14.5)
SODIUM SERPL-SCNC: 141 MMOL/L
TACROLIMUS SERPL-MCNC: <2 NG/ML
WBC # BLD: 7.3 K/UL — SIGNIFICANT CHANGE UP (ref 3.8–10.5)
WBC # FLD AUTO: 7.3 K/UL — SIGNIFICANT CHANGE UP (ref 3.8–10.5)

## 2019-08-29 PROCEDURE — 99215 OFFICE O/P EST HI 40 MIN: CPT

## 2019-08-30 LAB
CMV DNA SPEC QL NAA+PROBE: 105 IU/ML
CMVPCR LOG: 2.02 LOGIU/ML

## 2019-09-06 NOTE — REVIEW OF SYSTEMS
[Negative] : Allergic/Immunologic [FreeTextEntry4] : oral irritation [de-identified] : diffuse skin hyperpigmentation and facial hypopigmentation; nail ridging improving; taut skin improving; improving skin erythema

## 2019-09-06 NOTE — PHYSICAL EXAM
[Ambulatory and capable of all self care but unable to carry out any work activities] : Status 2- Ambulatory and capable of all self care but unable to carry out any work activities. Up and about more than 50% of waking hours [Normal] : affect appropriate [de-identified] : hypo & hyperpigmentation- improving

## 2019-09-06 NOTE — ADDENDUM
[FreeTextEntry1] : Documented by Nelda Carvalho acting as a scribe for Dr. Branden Lou on 08/29/2019.\par \par All medical record entries made by the Scribe were at my, Dr. Branden Lou, direction and personally dictated by me on 08/29/2019. I have reviewed the chart and agree that  the record accurately reflects my personal performance of the history, physical exam, assessment and plan. I have also personally directed, reviewed, and agree with the discharge instructions.

## 2019-09-06 NOTE — ASSESSMENT
[Curative] : Goals of care discussed with patient: Curative [FreeTextEntry1] : Ms. Gary is a 34 years old female with AML, presenting today for a f/u s/p Allo PSCT sister MRD (10/10) on 07/03/19. Patient had a relatively uncomplicated transplant course. She had pancytopenia related to the high dose chemotherapy preparative regimen. She also experienced grade 1 oral mucositis, and grade 2 GI mucositis also related to the chemotherapy preparative regimen. Both were treated with supportive care. She was discharged on 3/18/19. Patient is a practicing Amish. Accompanied by her  today. \par ..chimerism sent when wbc was 1 was 82% donor...8/7/19 chimerism =99.2 % donor...Chimerism on cd 3 improved\par \par Tacrolimus- Restarted on 8/1/19 at 0.5 mg BID. For acute gvhd with fk taper..overall grade 1...skin 2, liver and GI 0.....some oral sores using dex rinse..Increased FK to 1mg Am and 0.5 mg PM. \par Voriconazole- 200 mg BID\par Acyclovir 400 mg Q8h - to continue for 6 months post transplant.\par Atovaquone 750 mg/5 mL- 5 ml Q12h - to continue for 6 months post transplant.\par Pantoprazole 40 mg oral QD - PRN \par Ursodiol 300 mg BID\par Magnesium oxide 400 mg TID\par Multiple Vitamins QD\par Folic acid 1 mg QD\par Prednisone increased on 8/7/19 to 40 mg daily. May decrease Prednisone to 35mg QD in a week. If symptoms improve, will plan to decrease Prednisone by 5mg every two weeks.\par Lidex cream BID\par Decadron mouth rinse BID increased on 8/29/19 to QID.\par Peripheral blood work was reviewed and and discussed with patient. No transfusions accepted by pt.\par Labs sent today CMP, LDH, Mg, Tacrolimus level, CMV, Chimerism. \par Post transplant BMbx on 7/3/19 and results reviewed with patient\par Advised to maintain post-transplant diet and crowd restrictions. \par Advised to closely monitor for aGVHD si/sx including but not limited to severe diarrhea, nausea, vomiting, rash, mouth sores, dry or pain in the eyes. \par Plan to start low dose Vidaza 32 mg/m2 , x 5 days every month. Reviewed rationale, benefits, risks and side effects, will revisit at later f/u, holding plan for now.\par Will discuss plan of care with Dr. Fermín Youngblood and Dr. Christie.\par Well care stressed, question addressed, support provided. \par \par RTC on 9/26 for f/u with JOLEEN Bonilla and on 10/24 for f/u with Dr. Lou\par Patient advised to contact immediately with any concerns or symptoms.

## 2019-09-06 NOTE — HISTORY OF PRESENT ILLNESS
[de-identified] : Ms. 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 Of note patient is a [employer]anda[employer] and cannot receive blood products. \par \par Patient was noted to be newly leukopenic to WBC 1.7 during preoperative evaluation in anticipation of right ankle ligament repair. Bone marrow aspirate and biopsy at [facility] on 12/2/2017 revealed AML with some suspicion for APL. She was transferred to [place] for further management. Peripheral counts from around this time are not available. \par \par Repeat marrow on 12/26/2017 showed 90% blasts with myeloid mutation arrest in a 40-50% cellular marrow. On flow blasts were negative for CD34 and HLA-DR; positive for CD38, CD58, CD33, and MPO, and partially positive for CD15, CD11b, , and CD64. CD45 was reported as dim. Karyotype showed +21 in 3 of 21 metaphases, but no t(15:17) was detected on karyotype of FISH. Molecular studies showed mutations in NPM1 (45%0, IDH1 (R132S; 39%), PTPN1 (23%), and CSF3R (5%) genes. There was lingering diagnostic uncertainty due to a largely aspicular and hypocellular specimen, and thus a marrow was repeated on 1/2/2018, confirming AML.\par \par On 1/4/2018, Ms. Gary began 7+3 with Idarubicin. Her induction course was complicated by DIC, neutropenic fever/ Klebsiella PNA, and bilateral retinal hemorrhage. She did not receive blood products and was instead supported with Nplate, Procrit, IV iron, Amicar, and Lupron. After achieving count stability, the patient was discharged; she was not deemed a candidate for further therapy given inability to receive transfusions and a day 14 marrow was not obtained. She subsequently saw Dr. [place] Obrien at [facility]. He obtained a recovery marrow on 2/13/2018 (day +40), which was 30% cellular with 45% blasts. 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 Cytogenetics and FISH were normal. \par \par She then established care with Dr. Christie for consideration of salvage therapy with IDH1 inhibition given the risks of additional chemotherapy without blood products support. Initial marrow at [facility], on 3/1/2018, showed 69% blasts. ThunderBolts panel confirmed IDH1 R132S, NPM1 Z500Pps 12, and CSF3R D810G mutations. She was started on Ivosidenib on [facility] protocol  and had completed four cycles to date (6/28/2018 = C5D1). Marrow blasts increased to 85% on 4/30 but declined to 36% by 5/31. A  bone marrow aspirate and biopsy  was obtained  (6/28/2018); preliminary results show further responding disease with 10.3% blasts by flow cytometry. Subsequent marrow in oct showed 5% blasts...she was then started on vidazza plus venetoclax. She completed 2 cycles...f/u marrow with 4% blasts.....\par \par Patient underwent an AlloPSCT sister MRD (10/10) on 2/27/19, hospital course as follows:\par Ms. 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 She is a Mandaen and cannot receive blood products. \par \par Ms. Nickerson's donor is her sister.  She is a 10/10 match. Both donor and patient are CMV +. She had completed all pre testing needed for transplant.  She will \par start reduced intensity Fludarabine/Busulfan chemo regimen today. \par \par Upon admission, a right AC PICC line was placed in IR. Ms. Gary received IV hydration, pain management, antiemetics, anxiolysis, and antibacterial / antiviral / antifungal / PCP / GI and VOD (SOS) prophylaxis. Labs were monitored every other day, and she received electrolyte repletion as needed. She also received vitamin K, vitamin B, supplemental iron, vitamin C and procrit as directed by the institutional bloodless transplant policy. \par \par On 2/27/19, Ms. Gary received 363 mLs of fresh, apheresed, mobilized, related allogeneic HPC over 80 minutes. Cell counts as follows: \par Total MNCs ( x 10^8/kg): 6.21 \par CD 34+Cells ( x 10^6/kg): 3.37 \par CD3+ Cells ( x 10^7/kg): 12.46 \par Cell Viability (%): 100% \par \par Engraftment was noted on 3/11/19. Ms. Gary did not receive zarxio due to her having minimal residual disease on admission, and a reduced intensity preparatory regimen to avoid stimulating myeloblasts. \par \par Ms. Gary had a relatively uncomplicated transplant course. She did experience menses, which was suppressed with provera. She also had pancytopenia related to the high dose chemotherapy preparative regimen. She also experienced grade 1 oral mucositis, and grade 2 GI mucositis also related to the chemotherapy preparative regimen. Both were treated with supportive care. \par \par Currently, Ms. Gayr is stable for discharge home with outpatient follow up at the [facility].  [de-identified] : Patient presents today for a follow up s/p AlloPSCT on 2/27/19. She is doing well overall. She continues to report skin hyperpigmentation and facial hypopigmentation, but improvement in erythema from facial rash. She also states her skin is less taut and her nail ridging has improved. She is taking 40mg of Prednisone daily and FK 1mg AM and 0.5mg PM. She is using the Decadron mouth rinse BID for oral irritation. Denies fever/chills, night sweats, eye dryness, blurred vision, nausea, vomiting, diarrhea. No CP, SOB or LE edema.

## 2019-09-18 ENCOUNTER — APPOINTMENT (OUTPATIENT)
Dept: OPHTHALMOLOGY | Facility: CLINIC | Age: 34
End: 2019-09-18

## 2019-09-25 ENCOUNTER — APPOINTMENT (OUTPATIENT)
Dept: OPHTHALMOLOGY | Facility: CLINIC | Age: 34
End: 2019-09-25

## 2019-09-26 ENCOUNTER — LABORATORY RESULT (OUTPATIENT)
Age: 34
End: 2019-09-26

## 2019-09-26 ENCOUNTER — RESULT REVIEW (OUTPATIENT)
Age: 34
End: 2019-09-26

## 2019-09-26 ENCOUNTER — APPOINTMENT (OUTPATIENT)
Dept: HEMATOLOGY ONCOLOGY | Facility: CLINIC | Age: 34
End: 2019-09-26
Payer: COMMERCIAL

## 2019-09-26 ENCOUNTER — OUTPATIENT (OUTPATIENT)
Dept: OUTPATIENT SERVICES | Facility: HOSPITAL | Age: 34
LOS: 1 days | End: 2019-09-26

## 2019-09-26 VITALS
RESPIRATION RATE: 17 BRPM | DIASTOLIC BLOOD PRESSURE: 85 MMHG | BODY MASS INDEX: 34.52 KG/M2 | SYSTOLIC BLOOD PRESSURE: 122 MMHG | TEMPERATURE: 98.4 F | OXYGEN SATURATION: 99 % | WEIGHT: 188.72 LBS | HEART RATE: 87 BPM

## 2019-09-26 DIAGNOSIS — Z90.3 ACQUIRED ABSENCE OF STOMACH [PART OF]: Chronic | ICD-10-CM

## 2019-09-26 DIAGNOSIS — Z94.84 STEM CELLS TRANSPLANT STATUS: ICD-10-CM

## 2019-09-26 LAB
ALBUMIN SERPL ELPH-MCNC: 4.2 G/DL
ALP BLD-CCNC: 42 U/L
ALT SERPL-CCNC: 25 U/L
ANION GAP SERPL CALC-SCNC: 12 MMOL/L
AST SERPL-CCNC: 15 U/L
BASOPHILS # BLD AUTO: 0 K/UL — SIGNIFICANT CHANGE UP (ref 0–0.2)
BASOPHILS NFR BLD AUTO: 0.2 % — SIGNIFICANT CHANGE UP (ref 0–2)
BILIRUB SERPL-MCNC: 0.3 MG/DL
BUN SERPL-MCNC: 13 MG/DL
CALCIUM SERPL-MCNC: 9.4 MG/DL
CHLORIDE SERPL-SCNC: 100 MMOL/L
CO2 SERPL-SCNC: 27 MMOL/L
CREAT SERPL-MCNC: 0.84 MG/DL
EOSINOPHIL # BLD AUTO: 0 K/UL — SIGNIFICANT CHANGE UP (ref 0–0.5)
EOSINOPHIL NFR BLD AUTO: 0.1 % — SIGNIFICANT CHANGE UP (ref 0–6)
GLUCOSE SERPL-MCNC: 99 MG/DL
HCT VFR BLD CALC: 44.1 % — SIGNIFICANT CHANGE UP (ref 34.5–45)
HGB BLD-MCNC: 14.6 G/DL — SIGNIFICANT CHANGE UP (ref 11.5–15.5)
LDH SERPL-CCNC: 227 U/L
LYMPHOCYTES # BLD AUTO: 1.9 K/UL — SIGNIFICANT CHANGE UP (ref 1–3.3)
LYMPHOCYTES # BLD AUTO: 16.9 % — SIGNIFICANT CHANGE UP (ref 13–44)
MAGNESIUM SERPL-MCNC: 2.2 MG/DL
MCHC RBC-ENTMCNC: 31.9 PG — SIGNIFICANT CHANGE UP (ref 27–34)
MCHC RBC-ENTMCNC: 33.2 G/DL — SIGNIFICANT CHANGE UP (ref 32–36)
MCV RBC AUTO: 96.1 FL — SIGNIFICANT CHANGE UP (ref 80–100)
MONOCYTES # BLD AUTO: 0.4 K/UL — SIGNIFICANT CHANGE UP (ref 0–0.9)
MONOCYTES NFR BLD AUTO: 3.5 % — SIGNIFICANT CHANGE UP (ref 2–14)
NEUTROPHILS # BLD AUTO: 8.9 K/UL — HIGH (ref 1.8–7.4)
NEUTROPHILS NFR BLD AUTO: 79.3 % — HIGH (ref 43–77)
PLATELET # BLD AUTO: 200 K/UL — SIGNIFICANT CHANGE UP (ref 150–400)
POTASSIUM SERPL-SCNC: 4.4 MMOL/L
PROT SERPL-MCNC: 6.1 G/DL
RBC # BLD: 4.59 M/UL — SIGNIFICANT CHANGE UP (ref 3.8–5.2)
RBC # FLD: 15.6 % — HIGH (ref 10.3–14.5)
SODIUM SERPL-SCNC: 139 MMOL/L
TACROLIMUS SERPL-MCNC: <2 NG/ML
WBC # BLD: 11.2 K/UL — HIGH (ref 3.8–10.5)
WBC # FLD AUTO: 11.2 K/UL — HIGH (ref 3.8–10.5)

## 2019-09-26 PROCEDURE — 99215 OFFICE O/P EST HI 40 MIN: CPT

## 2019-09-30 LAB
CMV DNA SPEC QL NAA+PROBE: ABNORMAL IU/ML
CMVPCR LOG: ABNORMAL LOGIU/ML

## 2019-09-30 NOTE — HISTORY OF PRESENT ILLNESS
[de-identified] : Ms. 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 Of note patient is a [employer] and cannot receive blood products. \par \par Patient was noted to be newly leukopenic to WBC 1.7 during preoperative evaluation in anticipation of right ankle ligament repair. Bone marrow aspirate and biopsy at [facility] on 12/2/2017 revealed AML with some suspicion for APL. She was transferred to [place] for further management. Peripheral counts from around this time are not available. \par \par Repeat marrow on 12/26/2017 showed 90% blasts with myeloid mutation arrest in a 40-50% cellular marrow. On flow blasts were negative for CD34 and HLA-DR; positive for CD38, CD58, CD33, and MPO, and partially positive for CD15, CD11b, , and CD64. CD45 was reported as dim. Karyotype showed +21 in 3 of 21 metaphases, but no t(15:17) was detected on karyotype of FISH. Molecular studies showed mutations in NPM1 (45%0, IDH1 (R132S; 39%), PTPN1 (23%), and CSF3R (5%) genes. There was lingering diagnostic uncertainty due to a largely aspicular and hypocellular specimen, and thus a marrow was repeated on 1/2/2018, confirming AML.\par \par On 1/4/2018, Ms. Gary began 7+3 with Idarubicin. Her induction course was complicated by DIC, neutropenic fever/ Klebsiella PNA, and bilateral retinal hemorrhage. She did not receive blood products and was instead supported with Nplate, Procrit, IV iron, Amicar, and Lupron. After achieving count stability, the patient was discharged; she was not deemed a candidate for further therapy given inability to receive transfusions and a day 14 marrow was not obtained. She subsequently saw Dr. North Obrien at [facility]. He obtained a recovery marrow on 2/13/2018 (day +40), which was 30% cellular with 45% blasts. 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 Cytogenetics and FISH were normal. \par \par She then established care with Dr. Christie for consideration of salvage therapy with IDH1 inhibition given the risks of additional chemotherapy without blood products support. Initial marrow at [facility], on 3/1/2018, showed 69% blasts. ThunderBolts panel confirmed IDH1 R132S, NPM1 Y020Ism 12, and CSF3R D810G mutations. She was started on Ivosidenib on [facility] protocol  and had completed four cycles to date (6/28/2018 = C5D1). Marrow blasts increased to 85% on 4/30 but declined to 36% by 5/31. A  bone marrow aspirate and biopsy  was obtained  (6/28/2018); preliminary results show further responding disease with 10.3% blasts by flow cytometry. Subsequent marrow in oct showed 5% blasts...she was then started on vidazza plus venetoclax. She completed 2 cycles...f/u marrow with 4% blasts.....\par \par Patient underwent an AlloPSCT sister MRD (10/10) on 2/27/19, hospital course as follows:\par Ms. 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 She is a [employer] and cannot receive blood products. \par \par Ms. Nickerson's donor is her sister.  She is a 10/10 match. Both donor and patient are CMV +. She had completed all pre testing needed for transplant.  She will \par start reduced intensity Fludarabine/Busulfan chemo regimen today. \par \par Upon admission, a right AC PICC line was placed in IR. Ms. Gary received IV hydration, pain management, antiemetics, anxiolysis, and antibacterial / antiviral / antifungal / PCP / GI and VOD (SOS) prophylaxis. Labs were monitored every other day, and she received electrolyte repletion as needed. She also received vitamin K, vitamin B, supplemental iron, vitamin C and procrit as directed by the institutional bloodless transplant policy. \par \par On 2/27/19, Ms. Gary received 363 mLs of fresh, apheresed, mobilized, related allogeneic HPC over 80 minutes. Cell counts as follows: \par Total MNCs ( x 10^8/kg): 6.21 \par CD 34+Cells ( x 10^6/kg): 3.37 \par CD3+ Cells ( x 10^7/kg): 12.46 \par Cell Viability (%): 100% \par \par Engraftment was noted on 3/11/19. Ms. Gary did not receive zarxio due to her having minimal residual disease on admission, and a reduced intensity preparatory regimen to avoid stimulating myeloblasts. \par \par Ms. Gary had a relatively uncomplicated transplant course. She did experience menses, which was suppressed with provera. She also had pancytopenia related to the high dose chemotherapy preparative regimen. She also experienced grade 1 oral mucositis, and grade 2 GI mucositis also related to the chemotherapy preparative regimen. Both were treated with supportive care. \par \par Currently, Ms. Gary is stable for discharge home with outpatient follow up at the [facility].  [de-identified] : Patient presents today for a follow up s/p AlloPSCT on 2/27/19. She is doing well overall. She continues to report skin hyperpigmentation and facial hypopigmentation, but improvement in erythema from facial rash. She also states her skin is less taut and her nail ridging has improved. She is taking 40mg of Prednisone daily and FK 1mg AM and 0.5mg PM. She is using the Decadron mouth rinse BID for oral irritation. Denies fever/chills, night sweats, eye dryness, blurred vision, nausea, vomiting, diarrhea. No CP, SOB or LE edema.\par \par On 9/26/19, patient presents with spouse for follow up visit. Overall doing well. She continues to report skin hyperpigmentation and facial hypopigmentation, but improvement in erythema from facial rash. She also states her skin is less taut. She is using the Decadron mouth rinse four times a day for oral irritation which has not changed since last visit. Overall fatigued. Currently on prednisone 30 mg daily and FK 1 mg in the AM and 0.5 mg in the PM. Denies fever, chills, nausea, vomiting, diarrhea, dysuria or any signs of active bleeding. Denies SOB, chest pain or B/L LE edema. Denies any pain at this time. Remains compliant with post transplant medications. \par

## 2019-09-30 NOTE — REVIEW OF SYSTEMS
[Negative] : Allergic/Immunologic [Fatigue] : fatigue [Mucosal Pain] : mucosal pain [Dry Eyes] : dryness of the eyes [Fever] : no fever [Chills] : no chills [Eye Pain] : no eye pain [Night Sweats] : no night sweats [Red Eyes] : eyes not red [Skin Wound] : no skin wound [Nosebleeds] : no nosebleeds [de-identified] : diffuse skin hyperpigmentation and facial hypopigmentation;  taut skin improving; improving skin erythema [FreeTextEntry4] : oral irritation

## 2019-09-30 NOTE — PHYSICAL EXAM
[Ambulatory and capable of all self care but unable to carry out any work activities] : Status 2- Ambulatory and capable of all self care but unable to carry out any work activities. Up and about more than 50% of waking hours [Normal] : affect appropriate [Thrush] : no thrush [de-identified] : hypo & hyperpigmentation- improving [de-identified] : oral irritation

## 2019-09-30 NOTE — ASSESSMENT
[Curative] : Goals of care discussed with patient: Curative [FreeTextEntry1] : Ms. Gary is a 33 years old female Jain with AML with the following comorbidities being managed:\par \par 1) AML\par S/p HLA 10/10 allo sib (sister) PBSCT on 2/27/19\par 8/29/19 chimerism= 99% donor chimerism\par Post transplant BMbx on 7/3/19 and results reviewed with patient\par Hold on starting maintenance Vidaza at this time.\par \par 2) Heme\par Counts stable\par WBC 11.2 ANC 8.9 Hgb 14.6 \par No transfusions as pt is practicing Jain\par Continue multivitamin and folic acid daily\par \par 3) ID\par Continue ppx:\par - Acyclovir 400 mg tid\par - Mepron 750 mg bid\par -Voriconazole- 200 mg BID\par 9/26/19 CMV PCR <50, continue to monitor weekly\par \par 4) GVHD\par Skin 2 Liver 0 GI 0 - overall grade 1\par Skin rash extent 42% BSA today. \par If symptoms improve, will plan to decrease Prednisone by 5 mg every two weeks.\par See GVHD history below\par \par Date of onset of AGVHD 8/1/19 \par On 7/29/19 placed on prednisone 20 mg daily for erythematous rash of face as stated by patient.\par 8/1/19: Erythematous/hyperpigmented rash 38% BSA- rash of face, chest, back, bilateral upper and lower extremities. Patient was placed on prednisone 20 mg daily as of 7/29/19. Instructed to increase prednisone on 8/1/19 to 40 mg daily on 8/1, 8/2, 8/3. Then decreased to prednisone 20 mg daily.Tacrolimus restarted on 8/1/19 at 0.5 mg BID. Lidex cream BID to body and hydrocortisone cream to face BID. Acute GVHD SKin 2 Liver 0 GI 0- overall grade 1\par 8/7/19: Erythematous/hyperpigmented rash 43% BSA- rash of face, neck, chest, back, abdomen, bilateral upper and lower extremities. Increased prednisone 20mg daily to 40 mg daily. Lidex cream BID to entire body. FK at 0.5 mg BID. May have to increase FK dose if rash worsens. Acute GVHD SKin 2 Liver 0 GI 0- overall grade 1\par 8/14/19: Erythematous rash of face. Hyperpigmented rash of chest, neck, abdomen, bilateral upper and lower extremities and back. Skin rash extent 42% BSA. Currently on prednisone 40 mg daily and lidex cream BID. Increased FK to 0.5 mg Am and 1 mg PM. \par 9/26/19: Skin hyperpigmentation and facial hypopigmentation, but improvement in erythema from facial rash. Some oral sores using dex rinse 4 times a day. Increased FK to 1 mg BID as of 9/26/19. Continue prednisone 30 mg daily. Decrease prednisone by 5 mg every two weeks.  Acute GVHD Skin 2 Liver 0 GI 0- overall grade 1. BSA 42%. Continue lidex cream BID.\par Continue dexamethasone swish and spit.\par \par 5) GI\par Continue ppx:\par - Ursodiol 300 mg bid\par - Protonix 40 mg daily \par PRN Zofran and Reglan for nausea\par \par 6) Transaminitis\par 8/7/19   \par Follow up on CMP from today\par \par 7) Other \par Hypomagnesemia - likely 2/2 tacrolimus, continue magnesium oxide 400 mg tid\par GYN - continue Sprintec daily, \par HTN - continue amlodipine 5 mg daily, BP remains WNL\par Allergies - continue Singulair 10 mg daily, may use daily Claritin or similar anti-histamine for ongoing allergy symptoms \par \par 8) Plan/Dispo\par Pt and friend educated regarding plan of care, all questions/concerns addressed\par Advised to closely monitor for aGVHD si/sx including but not limited to severe diarrhea, nausea, vomiting, rash, mouth sores, dry or pain in the eyes. \par Hold on starting maintenance vidaza at this time. \par Follow up with MD Lou on 10/24/19\par \par \par  \par \par

## 2019-10-04 LAB — ENGRAFTMNET-POST: NORMAL

## 2019-10-09 LAB — ENGRAFTMNET-POST: NORMAL

## 2019-10-10 ENCOUNTER — RX RENEWAL (OUTPATIENT)
Age: 34
End: 2019-10-10

## 2019-10-15 ENCOUNTER — RX RENEWAL (OUTPATIENT)
Age: 34
End: 2019-10-15

## 2019-10-16 ENCOUNTER — NON-APPOINTMENT (OUTPATIENT)
Age: 34
End: 2019-10-16

## 2019-10-16 ENCOUNTER — APPOINTMENT (OUTPATIENT)
Dept: OPHTHALMOLOGY | Facility: CLINIC | Age: 34
End: 2019-10-16
Payer: COMMERCIAL

## 2019-10-16 PROCEDURE — 92012 INTRM OPH EXAM EST PATIENT: CPT

## 2019-10-17 ENCOUNTER — OUTPATIENT (OUTPATIENT)
Dept: OUTPATIENT SERVICES | Facility: HOSPITAL | Age: 34
LOS: 1 days | Discharge: HOME | End: 2019-10-17

## 2019-10-17 DIAGNOSIS — Z90.3 ACQUIRED ABSENCE OF STOMACH [PART OF]: Chronic | ICD-10-CM

## 2019-10-17 DIAGNOSIS — C92.00 ACUTE MYELOBLASTIC LEUKEMIA, NOT HAVING ACHIEVED REMISSION: ICD-10-CM

## 2019-10-24 ENCOUNTER — NON-APPOINTMENT (OUTPATIENT)
Age: 34
End: 2019-10-24

## 2019-10-24 ENCOUNTER — LABORATORY RESULT (OUTPATIENT)
Age: 34
End: 2019-10-24

## 2019-10-24 ENCOUNTER — RESULT REVIEW (OUTPATIENT)
Age: 34
End: 2019-10-24

## 2019-10-24 ENCOUNTER — APPOINTMENT (OUTPATIENT)
Dept: OPHTHALMOLOGY | Facility: CLINIC | Age: 34
End: 2019-10-24
Payer: COMMERCIAL

## 2019-10-24 ENCOUNTER — APPOINTMENT (OUTPATIENT)
Dept: HEMATOLOGY ONCOLOGY | Facility: CLINIC | Age: 34
End: 2019-10-24
Payer: COMMERCIAL

## 2019-10-24 VITALS
DIASTOLIC BLOOD PRESSURE: 94 MMHG | RESPIRATION RATE: 17 BRPM | OXYGEN SATURATION: 100 % | WEIGHT: 196.65 LBS | BODY MASS INDEX: 35.97 KG/M2 | TEMPERATURE: 98.3 F | HEART RATE: 100 BPM | SYSTOLIC BLOOD PRESSURE: 143 MMHG

## 2019-10-24 LAB
ALBUMIN SERPL ELPH-MCNC: 4.1 G/DL
ALP BLD-CCNC: 36 U/L
ALT SERPL-CCNC: 24 U/L
ANION GAP SERPL CALC-SCNC: 15 MMOL/L
AST SERPL-CCNC: 15 U/L
BASOPHILS # BLD AUTO: 0 K/UL — SIGNIFICANT CHANGE UP (ref 0–0.2)
BASOPHILS NFR BLD AUTO: 0.3 % — SIGNIFICANT CHANGE UP (ref 0–2)
BILIRUB SERPL-MCNC: 0.3 MG/DL
BUN SERPL-MCNC: 12 MG/DL
CALCIUM SERPL-MCNC: 9.4 MG/DL
CHLORIDE SERPL-SCNC: 100 MMOL/L
CO2 SERPL-SCNC: 24 MMOL/L
CREAT SERPL-MCNC: 0.82 MG/DL
EOSINOPHIL # BLD AUTO: 0 K/UL — SIGNIFICANT CHANGE UP (ref 0–0.5)
EOSINOPHIL NFR BLD AUTO: 0.2 % — SIGNIFICANT CHANGE UP (ref 0–6)
GLUCOSE SERPL-MCNC: 90 MG/DL
HCT VFR BLD CALC: 42.6 % — SIGNIFICANT CHANGE UP (ref 34.5–45)
HGB BLD-MCNC: 14.5 G/DL — SIGNIFICANT CHANGE UP (ref 11.5–15.5)
LDH SERPL-CCNC: 294 U/L
LYMPHOCYTES # BLD AUTO: 1.8 K/UL — SIGNIFICANT CHANGE UP (ref 1–3.3)
LYMPHOCYTES # BLD AUTO: 18.7 % — SIGNIFICANT CHANGE UP (ref 13–44)
MAGNESIUM SERPL-MCNC: 2.1 MG/DL
MCHC RBC-ENTMCNC: 33.2 PG — SIGNIFICANT CHANGE UP (ref 27–34)
MCHC RBC-ENTMCNC: 34 G/DL — SIGNIFICANT CHANGE UP (ref 32–36)
MCV RBC AUTO: 97.4 FL — SIGNIFICANT CHANGE UP (ref 80–100)
MONOCYTES # BLD AUTO: 0.5 K/UL — SIGNIFICANT CHANGE UP (ref 0–0.9)
MONOCYTES NFR BLD AUTO: 4.9 % — SIGNIFICANT CHANGE UP (ref 2–14)
NEUTROPHILS # BLD AUTO: 7.4 K/UL — SIGNIFICANT CHANGE UP (ref 1.8–7.4)
NEUTROPHILS NFR BLD AUTO: 75.9 % — SIGNIFICANT CHANGE UP (ref 43–77)
PLATELET # BLD AUTO: 179 K/UL — SIGNIFICANT CHANGE UP (ref 150–400)
POTASSIUM SERPL-SCNC: 4.5 MMOL/L
PROT SERPL-MCNC: 6 G/DL
RBC # BLD: 4.37 M/UL — SIGNIFICANT CHANGE UP (ref 3.8–5.2)
RBC # FLD: 16.6 % — HIGH (ref 10.3–14.5)
SODIUM SERPL-SCNC: 139 MMOL/L
TACROLIMUS SERPL-MCNC: 2 NG/ML
WBC # BLD: 9.8 K/UL — SIGNIFICANT CHANGE UP (ref 3.8–10.5)
WBC # FLD AUTO: 9.8 K/UL — SIGNIFICANT CHANGE UP (ref 3.8–10.5)

## 2019-10-24 PROCEDURE — 92012 INTRM OPH EXAM EST PATIENT: CPT

## 2019-10-24 PROCEDURE — 99215 OFFICE O/P EST HI 40 MIN: CPT

## 2019-10-24 NOTE — REASON FOR VISIT
[Follow-Up Visit] : a follow-up visit for [Parent] : parent [Spouse] : spouse [FreeTextEntry2] : AML

## 2019-10-24 NOTE — ASSESSMENT
[Curative] : Goals of care discussed with patient: Curative [FreeTextEntry1] : Ms. Gary is a 34 years old female with AML, presenting today for a f/u s/p Allo PSCT sister MRD (10/10) on 07/03/19. Patient had a relatively uncomplicated transplant course. She had pancytopenia related to the high dose chemotherapy preparative regimen. She also experienced grade 1 oral mucositis, and grade 2 GI mucositis also related to the chemotherapy preparative regimen. Both were treated with supportive care. She was discharged on 3/18/19. Patient is a practicing [employer]. Accompanied by her  today. \par ..chimerism sent when wbc was 1 was 82% donor...8/7/19 chimerism =99.2 % donor...Chimerism on cd 3 improved. 8/29/19 chimerism= 99% donor. 9/26/19 chimerism = 100% donor.\par \par Tacrolimus- Restarted on 8/1/19 at 0.5 mg BID. For acute gvhd with fk taper..overall grade 1...skin 2, liver and GI 0.....some oral sores using dex rinse..Increased FK to 1mg Am and 0.5 mg PM. Increased FK further to 1mg BID. Will call patient tomorrow to increase the dose of FK to 1.5 mg...dx chronic oral, eye extensive moderate chronic gvhd..acute resolved\par Voriconazole- 200 mg BID on hold\par Acyclovir 400 mg Q8h - to continue for 6 months post transplant.\par Atovaquone 750 mg/5 mL- 5 ml Q12h - to continue for 6 months post transplant.\par Pantoprazole 40 mg oral QD - PRN \par Ursodiol 300 mg BID\par Magnesium oxide 400 mg TID\par Multiple Vitamins QD\par Folic acid 1 mg QD\par Prednisone increased on 8/7/19 to 40 mg daily. May decrease Prednisone to 35mg QD in a week. If symptoms improve, will plan to decrease Prednisone by 5mg every two weeks. Now, will continue Prednisone at 20mg QD. Decrease Prednisone to 15mg QD in two weeks.\par Lidex cream BID\par Decadron mouth rinse BID increased on 8/29/19 to QID.\par Peripheral blood work was reviewed and and discussed with patient. No transfusions accepted by pt.\par Labs sent today CMP, LDH, Mg, CMV by PCR, Engraftment, Tacrolimus Whole Blood\par Post transplant BMbx on 7/3/19 and results reviewed with patient\par Advised to maintain post-transplant diet and crowd precaution. \par Advised to closely monitor for aGVHD si/sx including but not limited to severe diarrhea, nausea, vomiting, rash, mouth sores, dry or pain in the eyes. \par Plan was  to start low dose Vidaza 32 mg/m2 , x 5 days every month. Reviewed rationale, benefits, risks and side effects, will revisit at later f/u, holding plan for now.\par Will discuss plan of care with Dr. Fermín Youngblood and Dr. Christei.\par Advised patient to hold off on steroid cream as rash has resolved and to use generous amounts of moisturizer to address tautness of skin.\par Well care stressed, question addressed, support provided. \par \par RTC on 11/21 for f/u with JOLEEN Bonilla and on 12/17 for f/u with Dr. Lou\par Patient advised to contact immediately with any concerns or symptoms.

## 2019-10-24 NOTE — REVIEW OF SYSTEMS
[Negative] : Endocrine [Dry Eyes] : dryness of the eyes [FreeTextEntry3] : "heavy" eyelids [FreeTextEntry4] : oral irritation-improving; mouths sores [de-identified] : diffuse skin hyperpigmentation and facial hypopigmentation; taut skin

## 2019-10-24 NOTE — PHYSICAL EXAM
[Ambulatory and capable of all self care but unable to carry out any work activities] : Status 2- Ambulatory and capable of all self care but unable to carry out any work activities. Up and about more than 50% of waking hours [Normal] : affect appropriate [Mucositis] : mucositis [de-identified] : mild swelling of rt eye...dryness bilaterally [de-identified] : hypo & hyperpigmentation- improving

## 2019-10-24 NOTE — HISTORY OF PRESENT ILLNESS
[de-identified] : Ms. 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 Of note patient is a Taoism and cannot receive blood products. \par \par Patient was noted to be newly leukopenic to WBC 1.7 during preoperative evaluation in anticipation of right ankle ligament repair. Bone marrow aspirate and biopsy at [facility] on 12/2/2017 revealed AML with some suspicion for APL. She was transferred to [place] for further management. Peripheral counts from around this time are not available. \par \par Repeat marrow on 12/26/2017 showed 90% blasts with myeloid mutation arrest in a 40-50% cellular marrow. On flow blasts were negative for CD34 and HLA-DR; positive for CD38, CD58, CD33, and MPO, and partially positive for CD15, CD11b, , and CD64. CD45 was reported as dim. Karyotype showed +21 in 3 of 21 metaphases, but no t(15:17) was detected on karyotype of FISH. Molecular studies showed mutations in NPM1 (45%0, IDH1 (R132S; 39%), PTPN1 (23%), and CSF3R (5%) genes. There was lingering diagnostic uncertainty due to a largely aspicular and hypocellular specimen, and thus a marrow was repeated on 1/2/2018, confirming AML.\par \par On 1/4/2018, Ms. Gary began 7+3 with Idarubicin. Her induction course was complicated by DIC, neutropenic fever/ Klebsiella PNA, and bilateral retinal hemorrhage. She did not receive blood products and was instead supported with Nplate, Procrit, IV iron, Amicar, and Lupron. After achieving count stability, the patient was discharged; she was not deemed a candidate for further therapy given inability to receive transfusions and a day 14 marrow was not obtained. She subsequently saw Dr. North Obrien at [facility]. He obtained a recovery marrow on 2/13/2018 (day +40), which was 30% cellular with 45% blasts. 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 Cytogenetics and FISH were normal. \par \par She then established care with Dr. Christie for consideration of salvage therapy with IDH1 inhibition given the risks of additional chemotherapy without blood products support. Initial marrow at [facility], on 3/1/2018, showed 69% blasts. ThunderBolts panel confirmed IDH1 R132S, NPM1 [id number] 12, and CSF3R D810G mutations. She was started on Ivosidenib on [facility] protocol  and had completed four cycles to date (6/28/2018 = C5D1). Marrow blasts increased to 85% on 4/30 but declined to 36% by 5/31. A  bone marrow aspirate and biopsy  was obtained  (6/28/2018); preliminary results show further responding disease with 10.3% blasts by flow cytometry. Subsequent marrow in oct showed 5% blasts...she was then started on vidazza plus venetoclax. She completed 2 cycles...f/u marrow with 4% blasts.....\par \par Patient underwent an AlloPSCT sister MRD (10/10) on 2/27/19, hospital course as follows:\par Ms. 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 She is a Taoism and cannot receive blood products. \par \par Ms. Nickerson's donor is her sister.  She is a 10/10 match. Both donor and patient are CMV +. She had completed all pre testing needed for transplant.  She will \par start reduced intensity Fludarabine/Busulfan chemo regimen today. \par \par Upon admission, a right AC PICC line was placed in IR. Ms. Gary received IV hydration, pain management, antiemetics, anxiolysis, and antibacterial / antiviral / antifungal / PCP / GI and VOD (SOS) prophylaxis. Labs were monitored every other day, and she received electrolyte repletion as needed. She also received vitamin K, vitamin B, supplemental iron, vitamin C and procrit as directed by the institutional bloodless transplant policy. \par \par On 2/27/19, Ms. Gary received 363 mLs of fresh, apheresed, mobilized, related allogeneic HPC over 80 minutes. Cell counts as follows: \par Total MNCs ( x 10^8/kg): 6.21 \par CD 34+Cells ( x 10^6/kg): 3.37 \par CD3+ Cells ( x 10^7/kg): 12.46 \par Cell Viability (%): 100% \par \par Engraftment was noted on 3/11/19. Ms. Gary did not receive zarxio due to her having minimal residual disease on admission, and a reduced intensity preparatory regimen to avoid stimulating myeloblasts. \par \par Ms. Gary had a relatively uncomplicated transplant course. She did experience menses, which was suppressed with provera. She also had pancytopenia related to the high dose chemotherapy preparative regimen. She also experienced grade 1 oral mucositis, and grade 2 GI mucositis also related to the chemotherapy preparative regimen. Both were treated with supportive care. \par \par Currently, Ms. Gary is stable for discharge home with outpatient follow up at the [facility].  [de-identified] : Patient presents today for a follow up s/p AlloPSCT on 2/27/19. She states she will be seeing an ophthalmologist. She states her eyes are dry and her eyelids feel heavy. She will see optho for possible removal of membranesShe states that her oral irritation is slightly improved, but that she still has mouth sores. She continues to report skin hyperpigmentation and facial hypopigmentation. She states that her skin is very tight at times. She is taking 20mg of Prednisone daily and FK 1mg BID. She is using the Decadron mouth rinse for oral irritation. Denies fever/chills, night sweats, eye dryness, blurred vision, nausea, vomiting, diarrhea. No CP, SOB or LE edema.

## 2019-10-28 LAB
CMV DNA SPEC QL NAA+PROBE: NOT DETECTED
CMVPCR LOG: NOT DETECTED LOGIU/ML

## 2019-11-01 ENCOUNTER — RX RENEWAL (OUTPATIENT)
Age: 34
End: 2019-11-01

## 2019-11-05 LAB — ENGRAFTMNET-POST: NORMAL

## 2019-11-16 ENCOUNTER — OUTPATIENT (OUTPATIENT)
Dept: OUTPATIENT SERVICES | Facility: HOSPITAL | Age: 34
LOS: 1 days | Discharge: HOME | End: 2019-11-16

## 2019-11-16 DIAGNOSIS — Z90.3 ACQUIRED ABSENCE OF STOMACH [PART OF]: Chronic | ICD-10-CM

## 2019-11-16 DIAGNOSIS — C92.00 ACUTE MYELOBLASTIC LEUKEMIA, NOT HAVING ACHIEVED REMISSION: ICD-10-CM

## 2019-11-21 ENCOUNTER — NON-APPOINTMENT (OUTPATIENT)
Age: 34
End: 2019-11-21

## 2019-11-21 ENCOUNTER — LABORATORY RESULT (OUTPATIENT)
Age: 34
End: 2019-11-21

## 2019-11-21 ENCOUNTER — RESULT REVIEW (OUTPATIENT)
Age: 34
End: 2019-11-21

## 2019-11-21 ENCOUNTER — APPOINTMENT (OUTPATIENT)
Dept: HEMATOLOGY ONCOLOGY | Facility: CLINIC | Age: 34
End: 2019-11-21
Payer: COMMERCIAL

## 2019-11-21 ENCOUNTER — APPOINTMENT (OUTPATIENT)
Dept: OPHTHALMOLOGY | Facility: CLINIC | Age: 34
End: 2019-11-21
Payer: COMMERCIAL

## 2019-11-21 VITALS
HEART RATE: 128 BPM | TEMPERATURE: 98.3 F | SYSTOLIC BLOOD PRESSURE: 125 MMHG | OXYGEN SATURATION: 96 % | WEIGHT: 203.71 LBS | RESPIRATION RATE: 17 BRPM | BODY MASS INDEX: 37.26 KG/M2 | DIASTOLIC BLOOD PRESSURE: 88 MMHG

## 2019-11-21 LAB
ALBUMIN SERPL ELPH-MCNC: 4.1 G/DL
ALP BLD-CCNC: 38 U/L
ALT SERPL-CCNC: 20 U/L
ANION GAP SERPL CALC-SCNC: 13 MMOL/L
AST SERPL-CCNC: 13 U/L
BASOPHILS # BLD AUTO: 0 K/UL — SIGNIFICANT CHANGE UP (ref 0–0.2)
BASOPHILS NFR BLD AUTO: 0.4 % — SIGNIFICANT CHANGE UP (ref 0–2)
BILIRUB SERPL-MCNC: 0.3 MG/DL
BUN SERPL-MCNC: 13 MG/DL
CALCIUM SERPL-MCNC: 9.3 MG/DL
CHLORIDE SERPL-SCNC: 101 MMOL/L
CO2 SERPL-SCNC: 25 MMOL/L
CREAT SERPL-MCNC: 0.81 MG/DL
EOSINOPHIL # BLD AUTO: 0 K/UL — SIGNIFICANT CHANGE UP (ref 0–0.5)
EOSINOPHIL NFR BLD AUTO: 0.3 % — SIGNIFICANT CHANGE UP (ref 0–6)
GLUCOSE SERPL-MCNC: 138 MG/DL
HCT VFR BLD CALC: 40.5 % — SIGNIFICANT CHANGE UP (ref 34.5–45)
HGB BLD-MCNC: 13.7 G/DL — SIGNIFICANT CHANGE UP (ref 11.5–15.5)
LDH SERPL-CCNC: 335 U/L
LYMPHOCYTES # BLD AUTO: 1.5 K/UL — SIGNIFICANT CHANGE UP (ref 1–3.3)
LYMPHOCYTES # BLD AUTO: 18 % — SIGNIFICANT CHANGE UP (ref 13–44)
MAGNESIUM SERPL-MCNC: 2.1 MG/DL
MCHC RBC-ENTMCNC: 33.8 G/DL — SIGNIFICANT CHANGE UP (ref 32–36)
MCHC RBC-ENTMCNC: 33.8 PG — SIGNIFICANT CHANGE UP (ref 27–34)
MCV RBC AUTO: 100 FL — SIGNIFICANT CHANGE UP (ref 80–100)
MONOCYTES # BLD AUTO: 0.5 K/UL — SIGNIFICANT CHANGE UP (ref 0–0.9)
MONOCYTES NFR BLD AUTO: 5.7 % — SIGNIFICANT CHANGE UP (ref 2–14)
NEUTROPHILS # BLD AUTO: 6.2 K/UL — SIGNIFICANT CHANGE UP (ref 1.8–7.4)
NEUTROPHILS NFR BLD AUTO: 75.5 % — SIGNIFICANT CHANGE UP (ref 43–77)
PLATELET # BLD AUTO: 191 K/UL — SIGNIFICANT CHANGE UP (ref 150–400)
POTASSIUM SERPL-SCNC: 4.4 MMOL/L
PROT SERPL-MCNC: 5.8 G/DL
RBC # BLD: 4.05 M/UL — SIGNIFICANT CHANGE UP (ref 3.8–5.2)
RBC # FLD: 15.3 % — HIGH (ref 10.3–14.5)
SODIUM SERPL-SCNC: 139 MMOL/L
TACROLIMUS SERPL-MCNC: 2.6 NG/ML
WBC # BLD: 8.2 K/UL — SIGNIFICANT CHANGE UP (ref 3.8–10.5)
WBC # FLD AUTO: 8.2 K/UL — SIGNIFICANT CHANGE UP (ref 3.8–10.5)

## 2019-11-21 PROCEDURE — 99215 OFFICE O/P EST HI 40 MIN: CPT

## 2019-11-21 PROCEDURE — 92012 INTRM OPH EXAM EST PATIENT: CPT

## 2019-11-22 ENCOUNTER — RX RENEWAL (OUTPATIENT)
Age: 34
End: 2019-11-22

## 2019-11-22 LAB
CMV DNA SPEC QL NAA+PROBE: NOT DETECTED
CMVPCR LOG: NOT DETECTED LOGIU/ML

## 2019-12-01 LAB — ENGRAFTMNET-POST: NORMAL

## 2019-12-01 NOTE — REVIEW OF SYSTEMS
[Dry Eyes] : dryness of the eyes [Negative] : Allergic/Immunologic [Fatigue] : fatigue [Recent Change In Weight] : ~T recent weight change [Fever] : no fever [Night Sweats] : no night sweats [Chills] : no chills [Red Eyes] : eyes not red [Eye Pain] : no eye pain [FreeTextEntry3] : "heavy" eyelids [FreeTextEntry4] : oral irritation-improving; mouths sores [de-identified] : Hypopigmentation of bilateral upper and lower extremities, back, abdomen and chest

## 2019-12-01 NOTE — PHYSICAL EXAM
[Ambulatory and capable of all self care but unable to carry out any work activities] : Status 2- Ambulatory and capable of all self care but unable to carry out any work activities. Up and about more than 50% of waking hours [Mucositis] : mucositis [Normal] : affect appropriate [de-identified] : dryness bilaterally [de-identified] : Hypopigmentation of bilateral upper and lower extremities, back, abdomen and chest- improving

## 2019-12-01 NOTE — HISTORY OF PRESENT ILLNESS
[de-identified] : Ms. 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 Of note patient is a Sabianist and cannot receive blood products. \par \par Patient was noted to be newly leukopenic to WBC 1.7 during preoperative evaluation in anticipation of right ankle ligament repair. Bone marrow aspirate and biopsy at [facility] on 12/2/2017 revealed AML with some suspicion for APL. She was transferred to [place] for further management. Peripheral counts from around this time are not available. \par \par Repeat marrow on 12/26/2017 showed 90% blasts with myeloid mutation arrest in a 40-50% cellular marrow. On flow blasts were negative for CD34 and HLA-DR; positive for CD38, CD58, CD33, and MPO, and partially positive for CD15, CD11b, , and CD64. CD45 was reported as dim. Karyotype showed +21 in 3 of 21 metaphases, but no t(15:17) was detected on karyotype of FISH. Molecular studies showed mutations in NPM1 (45%0, IDH1 (R132S; 39%), PTPN1 (23%), and CSF3R (5%) genes. There was lingering diagnostic uncertainty due to a largely aspicular and hypocellular specimen, and thus a marrow was repeated on 1/2/2018, confirming AML.\par \par On 1/4/2018, Ms. Gary began 7+3 with Idarubicin. Her induction course was complicated by DIC, neutropenic fever/ Klebsiella PNA, and bilateral retinal hemorrhage. She did not receive blood products and was instead supported with Nplate, Procrit, IV iron, Amicar, and Lupron. After achieving count stability, the patient was discharged; she was not deemed a candidate for further therapy given inability to receive transfusions and a day 14 marrow was not obtained. She subsequently saw Dr. [place] Obrien at [facility]. He obtained a recovery marrow on 2/13/2018 (day +40), which was 30% cellular with 45% blasts. 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 Cytogenetics and FISH were normal. \par \par She then established care with Dr. Christie for consideration of salvage therapy with IDH1 inhibition given the risks of additional chemotherapy without blood products support. [place] marrow [place] [facility] on [place]/1/2018, showed 69% blasts. ThunderBolts panel confirmed IDH1 R132S, NPM1 [id number] 12, and CSF3R D810G mutations. She was started on Ivosidenib on [facility] protocol  and had completed four cycles to date (6/28/2018 = C5D1). Marrow blasts increased to 85% on 4/30 but declined to 36% by 5/31. A  bone marrow aspirate and biopsy  was obtained  (6/28/2018); preliminary results show further responding disease with 10.3% blasts by flow cytometry. Subsequent marrow in oct showed 5% blasts...she was then started on vidazza plus venetoclax. She completed 2 cycles...f/u marrow with 4% blasts.....\par \par Patient underwent an AlloPSCT sister MRD (10/10) on 2/27/19, hospital course as follows:\par Ms. 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 She is a Sabianist and cannot receive blood products. \par \par Ms. Nickerson's donor is her sister.  She is a 10/10 match. Both donor and patient are CMV +. She had completed all pre testing needed for transplant.  She will \par start reduced intensity Fludarabine/Busulfan chemo regimen today. \par \par Upon admission, a right AC PICC line was placed in IR. Ms. Gary received IV hydration, pain management, antiemetics, anxiolysis, and antibacterial / antiviral / antifungal / PCP / GI and VOD (SOS) prophylaxis. Labs were monitored every other day, and she received electrolyte repletion as needed. She also received vitamin K, vitamin B, supplemental iron, vitamin C and procrit as directed by the institutional bloodless transplant policy. \par \par On 2/27/19, Ms. Gary received 363 mLs of fresh, apheresed, mobilized, related allogeneic HPC over 80 minutes. Cell counts as follows: \par Total MNCs ( x 10^8/kg): 6.21 \par CD 34+Cells ( x 10^6/kg): 3.37 \par CD3+ Cells ( x 10^7/kg): 12.46 \par Cell Viability (%): 100% \par \par Engraftment was noted on 3/11/19. Ms. Gary did not receive zarxio due to her having minimal residual disease on admission, and a reduced intensity preparatory regimen to avoid stimulating myeloblasts. \par \par Ms. Gary had a relatively uncomplicated transplant course. She did experience menses, which was suppressed with provera. She also had pancytopenia related to the high dose chemotherapy preparative regimen. She also experienced grade 1 oral mucositis, and grade 2 GI mucositis also related to the chemotherapy preparative regimen. Both were treated with supportive care. \par \par Currently, Ms. Gary is stable for discharge home with outpatient follow up at the [facility].  [de-identified] : Patient presents today for a follow up s/p AlloPSCT on 2/27/19. She states she will be seeing an ophthalmologist. She states her eyes are dry and her eyelids feel heavy. She will see optho for possible removal of membranesShe states that her oral irritation is slightly improved, but that she still has mouth sores. She continues to report skin hyperpigmentation and facial hypopigmentation. She states that her skin is very tight at times. She is taking 20mg of Prednisone daily and FK 1mg BID. She is using the Decadron mouth rinse for oral irritation. Denies fever/chills, night sweats, eye dryness, blurred vision, nausea, vomiting, diarrhea. No CP, SOB or LE edema.\par \par On 11/21/19, Patient presents today for a follow up s/p AlloPSCT on 2/27/19. Today is +267 days post transplant. Patient states she will be seeing an ophthalmologist today. Currently on Fk 1.5 mg BID and prednisone 15 mg daily. Continues to have dry eyes and applies eye drops throughout the day. Continues to complain of oral irritation that waxes and wanes. Using dexamethasone mouth rinse. Hypopigmentation of bilateral upper and lower extremities, back, abdomen and chest. Denies fever, chills, nausea, vomiting, diarrhea, dysuria or any signs of active bleeding. Denies SOB, chest pain or B/L LE edema. Denies any pain at this time.

## 2019-12-01 NOTE — ASSESSMENT
[Curative] : Goals of care discussed with patient: Curative [FreeTextEntry1] : Ms. Gary is a 34 years old female Religious with AML with the following comorbidities being managed:\par \par 1) AML\par S/p HLA 10/10 allo sib (sister) PBSCT on 2/27/19\par 10/24/19 VNTR 99% donor\par Post transplant BMbx on 7/3/19 and results reviewed with patient\par Hold on starting maintenance Vidaza at this time.\par \par 2) Heme\par Counts stable\par WBC 8.2 ANC 6.2 Hgb 13.7 \par No transfusions as pt is practicing Religious\par Continue multivitamin and folic acid daily\par \par 3) ID\par Continue ppx:\par - Acyclovir 400 mg tid\par - Mepron 750 mg bid\par -Voriconazole- 200 mg BID- ON HOLD\par \par 10/24/19 CMV PCR negative to date, continue to monitor weekly\par \par 4) GVHD\par Skin 0 Liver 0 GI 0 - overall grade 0. Chronic oral, Eye, Extensive moderate chronic gvhd..acute resolved.\par FK 1.5 mg BID\par Continue prednisone 15 mg daily. Decrease prednisone by 5 mg every two weeks as tolerated.\par Decadron mouth rinse BID increased on 8/29/19 to QID.\par See GVHD history below\par \par Date of onset of AGVHD 8/1/19 \par On 7/29/19 placed on prednisone 20 mg daily for erythematous rash of face as stated by patient.\par 8/1/19: Erythematous/hyperpigmented rash 38% BSA- rash of face, chest, back, bilateral upper and lower extremities. Patient was placed on prednisone 20 mg daily as of 7/29/19. Instructed to increase prednisone on 8/1/19 to 40 mg daily on 8/1, 8/2, 8/3. Then decreased to prednisone 20 mg daily.Tacrolimus restarted on 8/1/19 at 0.5 mg BID. Lidex cream BID to body and hydrocortisone cream to face BID. Acute GVHD SKin 2 Liver 0 GI 0- overall grade 1\par 8/7/19: Erythematous/hyperpigmented rash 43% BSA- rash of face, neck, chest, back, abdomen, bilateral upper and lower extremities. Increased prednisone 20mg daily to 40 mg daily. Lidex cream BID to entire body. FK at 0.5 mg BID. May have to increase FK dose if rash worsens. Acute GVHD SKin 2 Liver 0 GI 0- overall grade 1\par 8/14/19: Erythematous rash of face. Hyperpigmented rash of chest, neck, abdomen, bilateral upper and lower extremities and back. Skin rash extent 42% BSA. Currently on prednisone 40 mg daily and lidex cream BID. Increased FK to 0.5 mg Am and 1 mg PM. \par 9/26/19: Skin hyperpigmentation and facial hypopigmentation, but improvement in erythema from facial rash. Some oral sores using dex rinse 4 times a day. Increased FK to 1 mg BID as of 9/26/19. Continue prednisone 30 mg daily. Decrease prednisone by 5 mg every two weeks. Acute GVHD Skin 2 Liver 0 GI 0- overall grade 1. BSA 42%. Continue lidex cream BID.\par Continue dexamethasone swish and spit.\par \par 5) GI\par Continue ppx:\par - Ursodiol 300 mg bid\par - Protonix 40 mg daily \par PRN Zofran and Reglan for nausea\par \par 6) Transaminitis\par Resolved\par \par 7) Other \par Hypomagnesemia - likely 2/2 tacrolimus, continue magnesium oxide 400 mg tid\par GYN - continue Sprintec daily, \par HTN - continue amlodipine 5 mg daily, BP remains WNL\par Allergies - continue Singulair 10 mg daily, may use daily Claritin or similar anti-histamine for ongoing allergy symptoms \par \par 8) Plan/Dispo\par Pt and spouse educated regarding plan of care, all questions/concerns addressed\par Advised to closely monitor for aGVHD si/sx including but not limited to severe diarrhea, nausea, vomiting, rash, mouth sores, dry or pain in the eyes. \par Hold on starting maintenance vidaza at this time. \par Letter provided for patients spouse to work from home while patient is on FK.\par Follow up with MD Lou on 12/17/19\par

## 2019-12-11 ENCOUNTER — OUTPATIENT (OUTPATIENT)
Dept: OUTPATIENT SERVICES | Facility: HOSPITAL | Age: 34
LOS: 1 days | Discharge: HOME | End: 2019-12-11

## 2019-12-11 DIAGNOSIS — C92.00 ACUTE MYELOBLASTIC LEUKEMIA, NOT HAVING ACHIEVED REMISSION: ICD-10-CM

## 2019-12-11 DIAGNOSIS — Z90.3 ACQUIRED ABSENCE OF STOMACH [PART OF]: Chronic | ICD-10-CM

## 2019-12-17 ENCOUNTER — APPOINTMENT (OUTPATIENT)
Dept: HEMATOLOGY ONCOLOGY | Facility: CLINIC | Age: 34
End: 2019-12-17
Payer: COMMERCIAL

## 2019-12-27 ENCOUNTER — APPOINTMENT (OUTPATIENT)
Dept: HEMATOLOGY ONCOLOGY | Facility: CLINIC | Age: 34
End: 2019-12-27
Payer: COMMERCIAL

## 2019-12-27 ENCOUNTER — RESULT REVIEW (OUTPATIENT)
Age: 34
End: 2019-12-27

## 2019-12-27 ENCOUNTER — OUTPATIENT (OUTPATIENT)
Dept: OUTPATIENT SERVICES | Facility: HOSPITAL | Age: 34
LOS: 1 days | End: 2019-12-27

## 2019-12-27 VITALS
RESPIRATION RATE: 18 BRPM | HEART RATE: 122 BPM | OXYGEN SATURATION: 96 % | BODY MASS INDEX: 40.12 KG/M2 | SYSTOLIC BLOOD PRESSURE: 127 MMHG | WEIGHT: 219.36 LBS | DIASTOLIC BLOOD PRESSURE: 88 MMHG | TEMPERATURE: 98 F

## 2019-12-27 DIAGNOSIS — Z94.84 STEM CELLS TRANSPLANT STATUS: ICD-10-CM

## 2019-12-27 DIAGNOSIS — Z90.3 ACQUIRED ABSENCE OF STOMACH [PART OF]: Chronic | ICD-10-CM

## 2019-12-27 LAB
ALBUMIN SERPL ELPH-MCNC: 3.7 G/DL
ALP BLD-CCNC: 49 U/L
ALT SERPL-CCNC: 17 U/L
ANION GAP SERPL CALC-SCNC: 14 MMOL/L
AST SERPL-CCNC: 14 U/L
BASOPHILS # BLD AUTO: 0 K/UL — SIGNIFICANT CHANGE UP (ref 0–0.2)
BASOPHILS NFR BLD AUTO: 0.2 % — SIGNIFICANT CHANGE UP (ref 0–2)
BILIRUB SERPL-MCNC: 0.2 MG/DL
BUN SERPL-MCNC: 8 MG/DL
CALCIUM SERPL-MCNC: 9.1 MG/DL
CHLORIDE SERPL-SCNC: 107 MMOL/L
CO2 SERPL-SCNC: 21 MMOL/L
CREAT SERPL-MCNC: 0.82 MG/DL
EOSINOPHIL # BLD AUTO: 0 K/UL — SIGNIFICANT CHANGE UP (ref 0–0.5)
EOSINOPHIL NFR BLD AUTO: 0.2 % — SIGNIFICANT CHANGE UP (ref 0–6)
GLUCOSE SERPL-MCNC: 89 MG/DL
HCT VFR BLD CALC: 39.1 % — SIGNIFICANT CHANGE UP (ref 34.5–45)
HGB BLD-MCNC: 12.9 G/DL — SIGNIFICANT CHANGE UP (ref 11.5–15.5)
LDH SERPL-CCNC: 263 U/L
LYMPHOCYTES # BLD AUTO: 1.3 K/UL — SIGNIFICANT CHANGE UP (ref 1–3.3)
LYMPHOCYTES # BLD AUTO: 22.9 % — SIGNIFICANT CHANGE UP (ref 13–44)
MAGNESIUM SERPL-MCNC: 2 MG/DL
MCHC RBC-ENTMCNC: 33.1 G/DL — SIGNIFICANT CHANGE UP (ref 32–36)
MCHC RBC-ENTMCNC: 34.2 PG — HIGH (ref 27–34)
MCV RBC AUTO: 103 FL — HIGH (ref 80–100)
MONOCYTES # BLD AUTO: 0.5 K/UL — SIGNIFICANT CHANGE UP (ref 0–0.9)
MONOCYTES NFR BLD AUTO: 9 % — SIGNIFICANT CHANGE UP (ref 2–14)
NEUTROPHILS # BLD AUTO: 4 K/UL — SIGNIFICANT CHANGE UP (ref 1.8–7.4)
NEUTROPHILS NFR BLD AUTO: 67.7 % — SIGNIFICANT CHANGE UP (ref 43–77)
PLATELET # BLD AUTO: 278 K/UL — SIGNIFICANT CHANGE UP (ref 150–400)
POTASSIUM SERPL-SCNC: 4.4 MMOL/L
PROT SERPL-MCNC: 5.8 G/DL
RBC # BLD: 3.78 M/UL — LOW (ref 3.8–5.2)
RBC # FLD: 13.2 % — SIGNIFICANT CHANGE UP (ref 10.3–14.5)
SODIUM SERPL-SCNC: 142 MMOL/L
TACROLIMUS SERPL-MCNC: 2.8 NG/ML
WBC # BLD: 5.8 K/UL — SIGNIFICANT CHANGE UP (ref 3.8–10.5)
WBC # FLD AUTO: 5.8 K/UL — SIGNIFICANT CHANGE UP (ref 3.8–10.5)

## 2019-12-27 PROCEDURE — 99214 OFFICE O/P EST MOD 30 MIN: CPT

## 2019-12-27 RX ORDER — PREDNISONE 5 MG/1
5 TABLET ORAL
Qty: 168 | Refills: 0 | Status: DISCONTINUED | COMMUNITY
Start: 2019-08-29 | End: 2019-12-27

## 2019-12-27 RX ORDER — VORICONAZOLE 200 MG/1
200 TABLET ORAL
Qty: 180 | Refills: 2 | Status: DISCONTINUED | COMMUNITY
Start: 2019-03-18 | End: 2019-12-27

## 2019-12-27 RX ORDER — PREDNISONE 10 MG/1
10 TABLET ORAL
Qty: 90 | Refills: 0 | Status: DISCONTINUED | COMMUNITY
Start: 2019-08-01 | End: 2019-12-27

## 2019-12-27 RX ORDER — FLUOCINONIDE 1 MG/G
0.1 CREAM TOPICAL TWICE DAILY
Qty: 1 | Refills: 2 | Status: DISCONTINUED | COMMUNITY
Start: 2019-08-01 | End: 2019-12-27

## 2019-12-27 NOTE — REVIEW OF SYSTEMS
[Recent Change In Weight] : ~T recent weight change [Fatigue] : fatigue [Dry Eyes] : dryness of the eyes [Mucosal Pain] : mucosal pain [Joint Pain] : joint pain [Negative] : Heme/Lymph [Fever] : no fever [Chills] : no chills [Eye Pain] : no eye pain [Dysphagia] : no dysphagia [Nosebleeds] : no nosebleeds [Odynophagia] : no odynophagia [Chest Pain] : no chest pain [Lower Ext Edema] : no lower extremity edema [Shortness Of Breath] : no shortness of breath [Cough] : no cough [Abdominal Pain] : no abdominal pain [Vomiting] : no vomiting [Constipation] : no constipation [Diarrhea] : no diarrhea [Dysuria] : no dysuria [Fainting] : no fainting [Dizziness] : no dizziness [FreeTextEntry3] : dry eyes improved with scleral lenses [FreeTextEntry4] : mouth sores wax and wane [FreeTextEntry9] : stable right ankle pain since pre-transplant [de-identified] : hyperpigmentation of face with hypopigmentation on bilateral cheeks. Pinpoint hyperpigmentation rash on chest, abdomen, back, BUEs, BLEs

## 2019-12-27 NOTE — REASON FOR VISIT
[Follow-Up Visit] : a follow-up visit for [Friend] : friend [FreeTextEntry2] : AML s/p HLA 10/10 allo sib (sister) PBSCT on 2/27/19

## 2019-12-27 NOTE — ASSESSMENT
[Curative] : Goals of care discussed with patient: Curative [FreeTextEntry1] : Ms. Gary is a 34 years old female Roman Catholic with AML with the following comorbidities being managed:\par \par 1) AML\par S/p HLA 10/10 allo sib (sister) PBSCT on 2/27/19\par 7/3/19 BMbx: [facility], normal female karyotype\par 11/21/19 chimerism = 99% donor (CD33 = 100% and CD3 = 83% donor)\par Hold on starting maintenance Vidaza at this time given active GVHD issues \par \par 2) Heme\par Counts stable\par    12/27/19:   WBC 5.8   ANC 4   Hgb 12.9   \par No transfusions as pt is practicing Roman Catholic\par Continue multivitamin and folic acid daily\par \par 3) ID\par Continue ppx:\par - Acyclovir 400mg tid\par - Mepron 750mg bid\par - Voriconazole 200mg bid - currently on hold d/t transaminitis in past. To consider resuming at next office visit\par \par 11/21/19 CMV PCR negative to date, continue to monitor weekly\par \par 4) GVHD\par Skin 0   Liver 0   GI 0 - overall grade 0\par Prednisone d/c'd 12/22/19\par Continue FK 1.5mg bid \par See GVHD history below\par \par Date of onset of acute GVHD 8/1/19 \par On 7/29/19 placed on prednisone 20 mg daily for erythematous rash of face as stated by patient.\par 8/1/19: Erythematous/hyperpigmented rash 38% BSA- rash of face, chest, back, bilateral upper and lower extremities. Patient was placed on prednisone 20 mg daily as of 7/29/19. Instructed to increase prednisone on 8/1/19 to 40 mg daily on 8/1, 8/2, 8/3. Then decreased to prednisone 20 mg daily.Tacrolimus restarted on 8/1/19 at 0.5 mg BID. Lidex cream BID to body and hydrocortisone cream to face BID. Acute GVHD SKin 2 Liver 0 GI 0- overall grade 1\par 8/7/19: Erythematous/hyperpigmented rash 43% BSA- rash of face, neck, chest, back, abdomen, bilateral upper and lower extremities. Increased prednisone 20mg daily to 40 mg daily. Lidex cream BID to entire body. FK at 0.5 mg BID. May have to increase FK dose if rash worsens. Acute GVHD SKin 2 Liver 0 GI 0- overall grade 1\par 8/14/19: Erythematous rash of face. Hyperpigmented rash of chest, neck, abdomen, bilateral upper and lower extremities and back. Skin rash extent 42% BSA. Currently on prednisone 40 mg daily and lidex cream BID. Increased FK to 0.5 mg Am and 1 mg PM. \par 9/26/19: Skin hyperpigmentation and facial hypopigmentation, but improvement in erythema from facial rash. Some oral sores using dex rinse 4 times a day. Increased FK to 1 mg BID as of 9/26/19. Continue prednisone 30 mg daily. Decrease prednisone by 5 mg every two weeks. Acute GVHD Skin 2 Liver 0 GI 0- overall grade 1. BSA 42%. \par \par 12/27/19: no acute GVHD. Chronic GVHD: skin score 3 (>50% BSA), eyes score 3 (requiring scleral lenses), mouth score 2 - overall severe extensive chronic GVHD. Off prednisone as of 12/22/19. Continue FK 1.5mg bid and Decadron swish/spit qid, moisturize 2-3x/day \par \par 5) GI\par Continue ppx:\par - Ursodiol 300 mg bid\par - Protonix 40 mg daily \par PRN Zofran and Reglan for nausea\par \par 6) Other \par Hypomagnesemia - likely 2/2 tacrolimus, continue magnesium oxide 400mg bid \par GYN - continue Sprintec daily, instructed to wear condoms if resuming sexual activity \par HTN - continue amlodipine 5mg daily, BP remains WNL\par Allergies - continue Singulair 10mg daily, may use daily Claritin or similar anti-histamine for ongoing allergy symptoms \par \par 8) Plan/Dispo\par Plan for slow FK in taper in a couple months if GVHD sx stable to improved\par Hold off on Vidaza maintenance given GVHD issues\par Pt educated regarding plan of care, all questions/concerns addressed\par Instructed to contact our office with fever or new/worsening symptoms \par F/u in 4wks with NP on 1/23/19\par

## 2019-12-27 NOTE — PHYSICAL EXAM
[Ambulatory and capable of all self care but unable to carry out any work activities] : Status 2- Ambulatory and capable of all self care but unable to carry out any work activities. Up and about more than 50% of waking hours [Normal] : affect appropriate [de-identified] : erythema of bilateral buccal mucosa and posterior oropharynx, moist mucous membranes [de-identified] : no edema  [de-identified] : +BS; abdomen soft, non-distended, non-tender [de-identified] : hyperpigmentation of face. Pinpoint hyperpigmented somewhat scaly rash on chest, abdomen, back, BUEs, and BLEs

## 2019-12-27 NOTE — HISTORY OF PRESENT ILLNESS
[de-identified] : Ms. 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 Of note patient is a [employer] and cannot receive blood products. \par \par Patient was noted to be newly leukopenic to WBC 1.7 during preoperative evaluation in anticipation of right ankle ligament repair. Bone marrow aspirate and biopsy at [facility] on 12/2/2017 revealed AML with some suspicion for APL. She was transferred to [place] for further management. Peripheral counts from around this time are not available. \par \par Repeat marrow on 12/26/2017 showed 90% blasts with myeloid mutation arrest in a 40-50% cellular marrow. On flow blasts were negative for CD34 and HLA-DR; positive for CD38, CD58, CD33, and MPO, and partially positive for CD15, CD11b, , and CD64. CD45 was reported as dim. Karyotype showed +21 in 3 of 21 metaphases, but no t(15:17) was detected on karyotype of FISH. Molecular studies showed mutations in NPM1 (45%0, IDH1 (R132S; 39%), PTPN1 (23%), and CSF3R (5%) genes. There was lingering diagnostic uncertainty due to a largely aspicular and hypocellular specimen, and thus a marrow was repeated on 1/2/2018, confirming AML.\par \par On 1/4/2018, Ms. Gary began 7+3 with Idarubicin. Her induction course was complicated by DIC, neutropenic fever/ Klebsiella PNA, and bilateral retinal hemorrhage. She did not receive blood products and was instead supported with Nplate, Procrit, IV iron, Amicar, and Lupron. After achieving count stability, the patient was discharged; she was not deemed a candidate for further therapy given inability to receive transfusions and a day 14 marrow was not obtained. She subsequently saw Dr. [place] Obrien at [facility]. He obtained a recovery marrow on 2/13/2018 (day +40), which was 30% cellular with 45% blasts. 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 Cytogenetics and FISH were normal. \par \par She then established care with Dr. Christie for consideration of salvage therapy with IDH1 inhibition given the risks of additional chemotherapy without blood products support. Initial marrow at [facility], on 3/1/2018, showed 69% blasts. ThunderBolts panel confirmed IDH1 R132S, NPM1 G650Myl 12, and CSF3R D810G mutations. She was started on Ivosidenib on [facility] protocol  and had completed four cycles to date (6/28/2018 = C5D1). Marrow blasts increased to 85% on 4/30 but declined to 36% by 5/31. A  bone marrow aspirate and biopsy  was obtained  (6/28/2018); preliminary results show further responding disease with 10.3% blasts by flow cytometry. Subsequent marrow in oct showed 5% blasts...she was then started on vidazza plus venetoclax. She completed 2 cycles...f/u marrow with 4% blasts.....\par \par Patient underwent an AlloPSCT sister MRD (10/10) on 2/27/19, hospital course as follows:\par Ms. 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 She is a [employer] and cannot receive blood products. \par \par Ms. Nickerson's donor is her sister.  She is a 10/10 match. Both donor and patient are CMV +. She had completed all pre testing needed for transplant.  She will \par start reduced intensity Fludarabine/Busulfan chemo regimen today. \par \par Upon admission, a right AC PICC line was placed in IR. Ms. Gary received IV hydration, pain management, antiemetics, anxiolysis, and antibacterial / antiviral / antifungal / PCP / GI and VOD (SOS) prophylaxis. Labs were monitored every other day, and she received electrolyte repletion as needed. She also received vitamin K, vitamin B, supplemental iron, vitamin C and procrit as directed by the institutional bloodless transplant policy. \par \par On 2/27/19, Ms. Gary received 363 mLs of fresh, apheresed, mobilized, related allogeneic HPC over 80 minutes. Cell counts as follows: \par Total MNCs ( x 10^8/kg): 6.21 \par CD 34+Cells ( x 10^6/kg): 3.37 \par CD3+ Cells ( x 10^7/kg): 12.46 \par Cell Viability (%): 100% \par \par Engraftment was noted on 3/11/19. Ms. Gary did not receive zarxio due to her having minimal residual disease on admission, and a reduced intensity preparatory regimen to avoid stimulating myeloblasts. \par \par Ms. Gary had a relatively uncomplicated transplant course. She did experience menses, which was suppressed with provera. She also had pancytopenia related to the high dose chemotherapy preparative regimen. She also experienced grade 1 oral mucositis, and grade 2 GI mucositis also related to the chemotherapy preparative regimen. Both were treated with supportive care. \par \par Currently, Ms. Gary is stable for discharge home with outpatient follow up at the [facility].  [de-identified] : On 12/27/19 visit, day +303 of SCT today. Overall feeling well. Off prednisone as of 12/22/19. Mouth sores continue to wax and wane, not interfering with PO intake. Currently wearing scleral lenses for significant dry eye, lenses have significantly helped and now able to keep eyes open. No red rash but generalized areas of dry/scaly hyperpigmentatioin continues. Compliant with post transplant meds and restrictions. Currently taking FK 1.5mg bid which she did not take prior to lab draw today. Denies fever, chills, headache, dizziness, odynophagia, chest pain, SOB, cough, nausea/vomiting, diarrhea/constipation, abdominal pain, dysuria, LE edema, bleeding, pain

## 2019-12-30 LAB
CMV DNA SPEC QL NAA+PROBE: NOT DETECTED
CMVPCR LOG: NOT DETECTED LOG10IU/ML

## 2020-01-02 LAB — ENGRAFTMNET-POST: NORMAL

## 2020-01-06 ENCOUNTER — RX RENEWAL (OUTPATIENT)
Age: 35
End: 2020-01-06

## 2020-01-09 ENCOUNTER — APPOINTMENT (OUTPATIENT)
Dept: HEMATOLOGY ONCOLOGY | Facility: CLINIC | Age: 35
End: 2020-01-09

## 2020-01-22 ENCOUNTER — OUTPATIENT (OUTPATIENT)
Dept: OUTPATIENT SERVICES | Facility: HOSPITAL | Age: 35
LOS: 1 days | Discharge: HOME | End: 2020-01-22

## 2020-01-22 DIAGNOSIS — Z90.3 ACQUIRED ABSENCE OF STOMACH [PART OF]: Chronic | ICD-10-CM

## 2020-01-22 DIAGNOSIS — C92.00 ACUTE MYELOBLASTIC LEUKEMIA, NOT HAVING ACHIEVED REMISSION: ICD-10-CM

## 2020-01-23 ENCOUNTER — APPOINTMENT (OUTPATIENT)
Dept: HEMATOLOGY ONCOLOGY | Facility: CLINIC | Age: 35
End: 2020-01-23
Payer: COMMERCIAL

## 2020-01-23 ENCOUNTER — RESULT REVIEW (OUTPATIENT)
Age: 35
End: 2020-01-23

## 2020-01-23 VITALS
OXYGEN SATURATION: 97 % | RESPIRATION RATE: 17 BRPM | WEIGHT: 222.01 LBS | TEMPERATURE: 98.8 F | HEART RATE: 111 BPM | SYSTOLIC BLOOD PRESSURE: 123 MMHG | BODY MASS INDEX: 40.6 KG/M2 | DIASTOLIC BLOOD PRESSURE: 87 MMHG

## 2020-01-23 DIAGNOSIS — R63.5 ABNORMAL WEIGHT GAIN: ICD-10-CM

## 2020-01-23 LAB
ALBUMIN SERPL ELPH-MCNC: 3.8 G/DL
ALP BLD-CCNC: 72 U/L
ALT SERPL-CCNC: 8 U/L
ANION GAP SERPL CALC-SCNC: 13 MMOL/L
AST SERPL-CCNC: 13 U/L
BASOPHILS # BLD AUTO: 0 K/UL — SIGNIFICANT CHANGE UP (ref 0–0.2)
BASOPHILS NFR BLD AUTO: 0.4 % — SIGNIFICANT CHANGE UP (ref 0–2)
BILIRUB SERPL-MCNC: 0.2 MG/DL
BUN SERPL-MCNC: 5 MG/DL
CALCIUM SERPL-MCNC: 9.4 MG/DL
CHLORIDE SERPL-SCNC: 105 MMOL/L
CO2 SERPL-SCNC: 22 MMOL/L
CREAT SERPL-MCNC: 0.89 MG/DL
EOSINOPHIL # BLD AUTO: 0.1 K/UL — SIGNIFICANT CHANGE UP (ref 0–0.5)
EOSINOPHIL NFR BLD AUTO: 0.8 % — SIGNIFICANT CHANGE UP (ref 0–6)
GLUCOSE SERPL-MCNC: 81 MG/DL
HCT VFR BLD CALC: 40.9 % — SIGNIFICANT CHANGE UP (ref 34.5–45)
HGB BLD-MCNC: 13.4 G/DL — SIGNIFICANT CHANGE UP (ref 11.5–15.5)
LDH SERPL-CCNC: 183 U/L
LYMPHOCYTES # BLD AUTO: 1.9 K/UL — SIGNIFICANT CHANGE UP (ref 1–3.3)
LYMPHOCYTES # BLD AUTO: 21.4 % — SIGNIFICANT CHANGE UP (ref 13–44)
MAGNESIUM SERPL-MCNC: 1.8 MG/DL
MCHC RBC-ENTMCNC: 32.9 G/DL — SIGNIFICANT CHANGE UP (ref 32–36)
MCHC RBC-ENTMCNC: 33 PG — SIGNIFICANT CHANGE UP (ref 27–34)
MCV RBC AUTO: 100 FL — SIGNIFICANT CHANGE UP (ref 80–100)
MONOCYTES # BLD AUTO: 0.8 K/UL — SIGNIFICANT CHANGE UP (ref 0–0.9)
MONOCYTES NFR BLD AUTO: 8.5 % — SIGNIFICANT CHANGE UP (ref 2–14)
NEUTROPHILS # BLD AUTO: 6.2 K/UL — SIGNIFICANT CHANGE UP (ref 1.8–7.4)
NEUTROPHILS NFR BLD AUTO: 68.9 % — SIGNIFICANT CHANGE UP (ref 43–77)
PLATELET # BLD AUTO: 477 K/UL — HIGH (ref 150–400)
POTASSIUM SERPL-SCNC: 4 MMOL/L
PROT SERPL-MCNC: 5.9 G/DL
RBC # BLD: 4.07 M/UL — SIGNIFICANT CHANGE UP (ref 3.8–5.2)
RBC # FLD: 11.8 % — SIGNIFICANT CHANGE UP (ref 10.3–14.5)
SODIUM SERPL-SCNC: 141 MMOL/L
TACROLIMUS SERPL-MCNC: 3 NG/ML
WBC # BLD: 9 K/UL — SIGNIFICANT CHANGE UP (ref 3.8–10.5)
WBC # FLD AUTO: 9 K/UL — SIGNIFICANT CHANGE UP (ref 3.8–10.5)

## 2020-01-23 PROCEDURE — 99214 OFFICE O/P EST MOD 30 MIN: CPT

## 2020-01-24 LAB
CMV DNA SPEC QL NAA+PROBE: NOT DETECTED
CMVPCR LOG: NOT DETECTED LOG10IU/ML

## 2020-01-24 NOTE — ASSESSMENT
[FreeTextEntry1] : Ms. Gary is a 34 years old female [employer] with AML with the following comorbidities being managed:\par \par 1) AML\par S/p HLA 10/10 allo sib (sister) PBSCT on 2/27/19\par 7/3/19 BMbx: [facility]NA, normal female karyotype\par 11/21/19 chimerism = 99% donor (CD33 = 100% and CD3 = 83% donor)\par 12/27/19 chimerism = 98% donor\par Hold on starting maintenance Vidaza at this time given active GVHD issues \par \par 2) Heme\par Counts stable\par    1/23/20:   WBC 9   ANC 6.2   Hgb 13.4   \par No transfusions as pt is practicing [employer]\par Continue multivitamin and folic acid daily\par \par 3) ID\par Continue ppx:\par - Acyclovir 400mg tid\par - Mepron 750mg bid\par - Voriconazole 200mg bid - previously held d/t transaminitis, resumed 1/23/20\par \par 12/27/19 CMV PCR not detected, continue to monitor PCR weekly \par \par 4) GVHD\par Skin 0   Liver 0   GI 0 - overall grade 0\par Prednisone d/c'd 12/22/19\par Continue FK 1.5mg bid \par See GVHD history below\par \par Date of onset of acute GVHD 8/1/19 \par On 7/29/19 placed on prednisone 20 mg daily for erythematous rash of face as stated by patient.\par 8/1/19: Erythematous/hyperpigmented rash 38% BSA- rash of face, chest, back, bilateral upper and lower extremities. Patient was placed on prednisone 20 mg daily as of 7/29/19. Instructed to increase prednisone on 8/1/19 to 40 mg daily on 8/1, 8/2, 8/3. Then decreased to prednisone 20 mg daily.Tacrolimus restarted on 8/1/19 at 0.5 mg BID. Lidex cream BID to body and hydrocortisone cream to face BID. Acute GVHD SKin 2 Liver 0 GI 0- overall grade 1\par 8/7/19: Erythematous/hyperpigmented rash 43% BSA- rash of face, neck, chest, back, abdomen, bilateral upper and lower extremities. Increased prednisone 20mg daily to 40 mg daily. Lidex cream BID to entire body. FK at 0.5 mg BID. May have to increase FK dose if rash worsens. Acute GVHD SKin 2 Liver 0 GI 0- overall grade 1\par 8/14/19: Erythematous rash of face. Hyperpigmented rash of chest, neck, abdomen, bilateral upper and lower extremities and back. Skin rash extent 42% BSA. Currently on prednisone 40 mg daily and lidex cream BID. Increased FK to 0.5 mg Am and 1 mg PM. \par 9/26/19: Skin hyperpigmentation and facial hypopigmentation, but improvement in erythema from facial rash. Some oral sores using dex rinse 4 times a day. Increased FK to 1 mg BID as of 9/26/19. Continue prednisone 30 mg daily. Decrease prednisone by 5 mg every two weeks. Acute GVHD Skin 2 Liver 0 GI 0- overall grade 1. BSA 42%. \par \par 12/27/19: no acute GVHD. Chronic GVHD: skin score 3 (>50% BSA), eyes score 3 (requiring scleral lenses), mouth score 2 - overall severe extensive chronic GVHD. Off prednisone as of 12/22/19. Continue FK 1.5mg bid and Decadron swish/spit qid, moisturize 2-3x/day \par \par 1/23/20: no acute GVHD. Chronic GVHD: skin 3 (>50% BSA), eyes score 1, mouth score 1 - overall severe extensive chronic GVHD. Continue 1.5mg bid. Continue PRN Decadron swish/spit, instructed to avoid swallowing to prevent systemic absorption of steroid. Moisturize 2-3x/day\par \par 5) GI\par Continue ppx:\par - Ursodiol 300 mg bid\par - Protonix 40 mg daily \par PRN Zofran and Reglan for nausea\par \par 6) Other \par Hypomagnesemia - likely 2/2 tacrolimus, continue magnesium oxide 400mg bid \par GYN - continue Sprintec daily, instructed to wear condoms if resuming sexual activity. May use lubricant to gently massage external labia to soften tissue, to consider f/u with GYN for dyspareunia\par HTN - continue amlodipine 5mg daily, BP remains WNL\par Allergies - continue Singulair 10mg daily, may use daily Claritin or similar anti-histamine for ongoing allergy symptoms \par \par 8) Plan/Dispo\par Plan for slow FK in taper in a couple months if GVHD sx stable to improved\par Hold off on Vidaza maintenance given GVHD issues\par Pt educated regarding plan of care, all questions/concerns addressed\par Instructed to contact our office with fever or new/worsening symptoms \par F/u in 3wks with Dr. Lou on 2/12/20\par  [Curative] : Goals of care discussed with patient: Curative

## 2020-01-24 NOTE — HISTORY OF PRESENT ILLNESS
[de-identified] : Ms. 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 Of note patient is a [employer] and cannot receive blood products. \par \par Patient was noted to be newly leukopenic to WBC 1.7 during preoperative evaluation in anticipation of right ankle ligament repair. Bone marrow aspirate and biopsy at [facility] on 12/2/2017 revealed AML with some suspicion for APL. She was transferred to [place] for further management. Peripheral counts from around this time are not available. \par \par Repeat marrow on 12/26/2017 showed 90% blasts with myeloid mutation arrest in a 40-50% cellular marrow. On flow blasts were negative for CD34 and HLA-DR; positive for CD38, CD58, CD33, and MPO, and partially positive for CD15, CD11b, , and CD64. CD45 was reported as dim. Karyotype showed +21 in 3 of 21 metaphases, but no t(15:17) was detected on karyotype of FISH. Molecular studies showed mutations in NPM1 (45%0, IDH1 (R132S; 39%), PTPN1 (23%), and CSF3R (5%) genes. There was lingering diagnostic uncertainty due to a largely aspicular and hypocellular specimen, and thus a marrow was repeated on 1/2/2018, confirming AML.\par \par On 1/4/2018, Ms. Gary began 7+3 with Idarubicin. Her induction course was complicated by DIC, neutropenic fever/ Klebsiella PNA, and bilateral retinal hemorrhage. She did not receive blood products and was instead supported with Nplate, Procrit, IV iron, Amicar, and Lupron. After achieving count stability, the patient was discharged; she was not deemed a candidate for further therapy given inability to receive transfusions and a day 14 marrow was not obtained. She subsequently saw Dr. [place] Obrien at [facility]. He obtained a recovery marrow on 2/13/2018 (day +40), which was 30% cellular with 45% blasts. 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 Cytogenetics and FISH were normal. \par \par She then established care with Dr. Christie for consideration of salvage therapy with IDH1 inhibition given the risks of additional chemotherapy without blood products support. Initial marrow at [facility], on 3/1/2018, showed 69% blasts. ThunderBolts panel confirmed IDH1 R132S, NPM1 S616Tck 12, and CSF3R D810G mutations. She was started on Ivosidenib on [facility] protocol  and had completed four cycles to date (6/28/2018 = C5D1). Marrow blasts increased to 85% on 4/30 but declined to 36% by 5/31. A  bone marrow aspirate and biopsy  was obtained  (6/28/2018); preliminary results show further responding disease with 10.3% blasts by flow cytometry. Subsequent marrow in oct showed 5% blasts...she was then started on vidazza plus venetoclax. She completed 2 cycles...f/u marrow with 4% blasts.....\par \par Patient underwent an AlloPSCT sister MRD (10/10) on 2/27/19, hospital course as follows:\par Ms. 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 She is a [employer] and cannot receive blood products. \par \par Ms. Nickerson's donor is her sister.  She is a 10/10 match. Both donor and patient are CMV +. She had completed all pre testing needed for transplant.  She will \par start reduced intensity Fludarabine/Busulfan chemo regimen today. \par \par Upon admission, a right AC PICC line was placed in IR. Ms. Gary received IV hydration, pain management, antiemetics, anxiolysis, and antibacterial / antiviral / antifungal / PCP / GI and VOD (SOS) prophylaxis. Labs were monitored every other day, and she received electrolyte repletion as needed. She also received vitamin K, vitamin B, supplemental iron, vitamin C and procrit as directed by the institutional bloodless transplant policy. \par \par On 2/27/19, Ms. Gary received 363 mLs of fresh, apheresed, mobilized, related allogeneic HPC over 80 minutes. Cell counts as follows: \par Total MNCs ( x 10^8/kg): 6.21 \par CD 34+Cells ( x 10^6/kg): 3.37 \par CD3+ Cells ( x 10^7/kg): 12.46 \par Cell Viability (%): 100% \par \par Engraftment was noted on 3/11/19. Ms. Gary did not receive zarxio due to her having minimal residual disease on admission, and a reduced intensity preparatory regimen to avoid stimulating myeloblasts. \par \par Ms. Gary had a relatively uncomplicated transplant course. She did experience menses, which was suppressed with provera. She also had pancytopenia related to the high dose chemotherapy preparative regimen. She also experienced grade 1 oral mucositis, and grade 2 GI mucositis also related to the chemotherapy preparative regimen. Both were treated with supportive care. \par \par Currently, Ms. Gary is stable for discharge home with outpatient follow up at the [facility].  [de-identified] : On 1/23/20 visit, day +330 of SCT today. Overall continues to feel well. Ongoing SOB on exertion that she attributes to significant steroid-associated weight gain. Eyes are overall improved, light sensitivity R>L, has not worn scleral lenses in past 2 days d/t eye itching but able to see for the most part. Mouth sores wax/wane, not interfering with PO intake, using PRN Dex swish/spit but occasionally swallows to reach sores in back of throat. Occasional cough 2/2 post nasal drip. Ongoing generalized bumpy hypo/hyper pigmentation of skin, stable. Stable right ankle pain. LMP 11/2019. Notes vaginal tightness causing dyspareunia despite use of lubricants. Compliant with post transplant meds and restrictions. Currently taking FK 1.5mg bid which she did not take prior to lab draw today. Denies fever, chills, headache, dizziness, odynophagia, chest pain, nausea/vomiting, diarrhea/constipation, abdominal pain, dysuria, LE edema, bleeding

## 2020-01-24 NOTE — PHYSICAL EXAM
[Ambulatory and capable of all self care but unable to carry out any work activities] : Status 2- Ambulatory and capable of all self care but unable to carry out any work activities. Up and about more than 50% of waking hours [Normal Female] : normal external genitalia, urethral meatus without lesions or discharge. Bladder non-distended, without tenderness. Vagina and cervix normal on visual inspection. On bimanual exam uterus, adnexa, parametria all normal without any discrete masses. [Normal] : affect appropriate [de-identified] : erythema of bilateral buccal mucosa and posterior oropharynx, moist mucous membranes [de-identified] : +BS; abdomen soft, non-distended, non-tender [de-identified] : no edema  [de-identified] : external visualization with no noted abnormalities [de-identified] : hyperpigmentation of face. Pinpoint hyperpigmented somewhat scaly prash on chest, abdomen, back, BUEs, and BLEs

## 2020-01-24 NOTE — REVIEW OF SYSTEMS
[Fever] : no fever [Chills] : no chills [Fatigue] : fatigue [Recent Change In Weight] : ~T recent weight change [Eye Pain] : no eye pain [Dry Eyes] : dryness of the eyes [Red Eyes] : eyes not red [Dysphagia] : no dysphagia [Nosebleeds] : no nosebleeds [Odynophagia] : no odynophagia [Chest Pain] : no chest pain [Lower Ext Edema] : no lower extremity edema [Cough] : cough [SOB on Exertion] : shortness of breath during exertion [Vomiting] : no vomiting [Abdominal Pain] : no abdominal pain [Diarrhea] : no diarrhea [Constipation] : no constipation [Dizziness] : no dizziness [Joint Pain] : joint pain [Dysuria] : no dysuria [Fainting] : no fainting [Negative] : Heme/Lymph [FreeTextEntry3] : ongoing light sensitivity R>L, itchy eyes [FreeTextEntry4] : mouth sores wax and wane [FreeTextEntry6] : occasional cough 2/2 post nasal drip [de-identified] : Generalized scattered hyper and hypo pigmentation. Hyperpigmentation of face with hypopigmentation on bilateral cheeks. Bumpy pinpoint hyperpigmentation rash on chest, abdomen, back, BUEs, BLEs [FreeTextEntry9] : stable right ankle pain since pre-transplant

## 2020-01-24 NOTE — REASON FOR VISIT
[Follow-Up Visit] : a follow-up visit for [Parent] : parent [FreeTextEntry2] : AML s/p HLA 10/10 allo sib (sister) PBSCT on 2/27/19

## 2020-01-31 LAB
CD3 AND CD33 ENRICHMENT: NORMAL
ENGRAFTMNET-POST: NORMAL

## 2020-02-12 ENCOUNTER — OUTPATIENT (OUTPATIENT)
Dept: OUTPATIENT SERVICES | Facility: HOSPITAL | Age: 35
LOS: 1 days | End: 2020-02-12

## 2020-02-12 ENCOUNTER — RESULT REVIEW (OUTPATIENT)
Age: 35
End: 2020-02-12

## 2020-02-12 ENCOUNTER — APPOINTMENT (OUTPATIENT)
Dept: HEMATOLOGY ONCOLOGY | Facility: CLINIC | Age: 35
End: 2020-02-12
Payer: COMMERCIAL

## 2020-02-12 VITALS
HEART RATE: 137 BPM | OXYGEN SATURATION: 99 % | BODY MASS INDEX: 38.71 KG/M2 | DIASTOLIC BLOOD PRESSURE: 80 MMHG | SYSTOLIC BLOOD PRESSURE: 124 MMHG | TEMPERATURE: 98.1 F | WEIGHT: 211.64 LBS | RESPIRATION RATE: 17 BRPM

## 2020-02-12 DIAGNOSIS — C92.00 ACUTE MYELOBLASTIC LEUKEMIA, NOT HAVING ACHIEVED REMISSION: ICD-10-CM

## 2020-02-12 DIAGNOSIS — Z01.818 ENCOUNTER FOR OTHER PREPROCEDURAL EXAMINATION: ICD-10-CM

## 2020-02-12 DIAGNOSIS — Z90.3 ACQUIRED ABSENCE OF STOMACH [PART OF]: Chronic | ICD-10-CM

## 2020-02-12 LAB
BASOPHILS # BLD AUTO: 0 K/UL — SIGNIFICANT CHANGE UP (ref 0–0.2)
BASOPHILS NFR BLD AUTO: 0.4 % — SIGNIFICANT CHANGE UP (ref 0–2)
EOSINOPHIL # BLD AUTO: 0 K/UL — SIGNIFICANT CHANGE UP (ref 0–0.5)
EOSINOPHIL NFR BLD AUTO: 0.3 % — SIGNIFICANT CHANGE UP (ref 0–6)
HCT VFR BLD CALC: 43.9 % — SIGNIFICANT CHANGE UP (ref 34.5–45)
HGB BLD-MCNC: 14.8 G/DL — SIGNIFICANT CHANGE UP (ref 11.5–15.5)
LYMPHOCYTES # BLD AUTO: 2.8 K/UL — SIGNIFICANT CHANGE UP (ref 1–3.3)
LYMPHOCYTES # BLD AUTO: 29.9 % — SIGNIFICANT CHANGE UP (ref 13–44)
MCHC RBC-ENTMCNC: 32.1 PG — SIGNIFICANT CHANGE UP (ref 27–34)
MCHC RBC-ENTMCNC: 33.8 G/DL — SIGNIFICANT CHANGE UP (ref 32–36)
MCV RBC AUTO: 95.1 FL — SIGNIFICANT CHANGE UP (ref 80–100)
MONOCYTES # BLD AUTO: 0.9 K/UL — SIGNIFICANT CHANGE UP (ref 0–0.9)
MONOCYTES NFR BLD AUTO: 10.1 % — SIGNIFICANT CHANGE UP (ref 2–14)
NEUTROPHILS # BLD AUTO: 5.5 K/UL — SIGNIFICANT CHANGE UP (ref 1.8–7.4)
NEUTROPHILS NFR BLD AUTO: 59.2 % — SIGNIFICANT CHANGE UP (ref 43–77)
PLATELET # BLD AUTO: 499 K/UL — HIGH (ref 150–400)
RBC # BLD: 4.62 M/UL — SIGNIFICANT CHANGE UP (ref 3.8–5.2)
RBC # FLD: 11.5 % — SIGNIFICANT CHANGE UP (ref 10.3–14.5)
WBC # BLD: 9.2 K/UL — SIGNIFICANT CHANGE UP (ref 3.8–10.5)
WBC # FLD AUTO: 9.2 K/UL — SIGNIFICANT CHANGE UP (ref 3.8–10.5)

## 2020-02-12 PROCEDURE — 99215 OFFICE O/P EST HI 40 MIN: CPT

## 2020-02-12 NOTE — PHYSICAL EXAM
[Ambulatory and capable of all self care but unable to carry out any work activities] : Status 2- Ambulatory and capable of all self care but unable to carry out any work activities. Up and about more than 50% of waking hours [Mucositis] : mucositis [Normal] : affect appropriate [de-identified] : mild swelling of rt eye...dryness bilaterally [de-identified] : hypo & hyperpigmentation- improving

## 2020-02-12 NOTE — HISTORY OF PRESENT ILLNESS
[de-identified] : Ms. 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 Of note patient is a Yarsanism and cannot receive blood products. \par \par Patient was noted to be newly leukopenic to WBC 1.7 during preoperative evaluation in anticipation of right ankle ligament repair. Bone marrow aspirate and biopsy at [facility] on 12/2/2017 revealed AML with some suspicion for APL. She was transferred to [place] for further management. Peripheral counts from around this time are not available. \par \par Repeat marrow on 12/26/2017 showed 90% blasts with myeloid mutation arrest in a 40-50% cellular marrow. On flow blasts were negative for CD34 and HLA-DR; positive for CD38, CD58, CD33, and MPO, and partially positive for CD15, CD11b, , and CD64. CD45 was reported as dim. Karyotype showed +21 in 3 of 21 metaphases, but no t(15:17) was detected on karyotype of FISH. Molecular studies showed mutations in NPM1 (45%0, IDH1 (R132S; 39%), PTPN1 (23%), and CSF3R (5%) genes. There was lingering diagnostic uncertainty due to a largely aspicular and hypocellular specimen, and thus a marrow was repeated on 1/2/2018, confirming AML.\par \par On 1/4/2018, Ms. Gary began 7+3 with Idarubicin. Her induction course was complicated by DIC, neutropenic fever/ Klebsiella PNA, and bilateral retinal hemorrhage. She did not receive blood products and was instead supported with Nplate, Procrit, IV iron, Amicar, and Lupron. After achieving count stability, the patient was discharged; she was not deemed a candidate for further therapy given inability to receive transfusions and a day 14 marrow was not obtained. She subsequently saw Dr. North Obrien at [facility]. He obtained a recovery marrow on 2/13/2018 (day +40), which was 30% cellular with 45% blasts. 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 Cytogenetics and FISH were normal. \par \par She then established care with Dr. Christie for consideration of salvage therapy with IDH1 inhibition given the risks of additional chemotherapy without blood products support. [place] [facility], showed 69% blasts. ThunderBolts panel confirmed IDH1 R132S, NPM1 [id number] 12, and CSF3R D810G mutations. She was started on Ivosidenib on [facility] protocol  and had completed four cycles to date (6/28/2018 = C5D1). Marrow blasts increased to 85% on 4/30 but declined to 36% by 5/31. A  bone marrow aspirate and biopsy  was obtained  (6/28/2018); preliminary results show further responding disease with 10.3% blasts by flow cytometry. Subsequent marrow in oct showed 5% blasts...she was then started on vidazza plus venetoclax. She completed 2 cycles...f/u marrow with 4% blasts.....\par \par Patient underwent an AlloPSCT sister MRD (10/10) on 2/27/19, hospital course as follows:\par Ms. 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 She is a Yarsanism and cannot receive blood products. \par \par Ms. Nickerson's donor is her sister.  She is a 10/10 match. Both donor and patient are CMV +. She had completed all pre testing needed for transplant.  She will \par start reduced intensity Fludarabine/Busulfan chemo regimen today. \par \par Upon admission, a right AC PICC line was placed in IR. Ms. Gary received IV hydration, pain management, antiemetics, anxiolysis, and antibacterial / antiviral / antifungal / PCP / GI and VOD (SOS) prophylaxis. Labs were monitored every other day, and she received electrolyte repletion as needed. She also received vitamin K, vitamin B, supplemental iron, vitamin C and procrit as directed by the institutional bloodless transplant policy. \par \par On 2/27/19, Ms. Gary received 363 mLs of fresh, apheresed, mobilized, related allogeneic HPC over 80 minutes. Cell counts as follows: \par Total MNCs ( x 10^8/kg): 6.21 \par CD 34+Cells ( x 10^6/kg): 3.37 \par CD3+ Cells ( x 10^7/kg): 12.46 \par Cell Viability (%): 100% \par \par Engraftment was noted on 3/11/19. Ms. Gary did not receive zarxio due to her having minimal residual disease on admission, and a reduced intensity preparatory regimen to avoid stimulating myeloblasts. \par \par Ms. Gary had a relatively uncomplicated transplant course. She did experience menses, which was suppressed with provera. She also had pancytopenia related to the high dose chemotherapy preparative regimen. She also experienced grade 1 oral mucositis, and grade 2 GI mucositis also related to the chemotherapy preparative regimen. Both were treated with supportive care. \par \par Currently, Ms. Gary is stable for discharge home with outpatient follow up at the [facility].  [de-identified] : Patient presents today for a follow up s/p AlloPSCT on 2/27/19. She c/o of fatigue, continued dry skin as well as skin hyperpigmentation and facial hypopigmentation. She notes improvement with her oral irritation and mouth sores. She uses glasses for light sensitivity and states she had seen the ophthalmologist earlier this week. Next ophthalmologist is in 3 months.  She also states she has not had her menstrual cycle in 3 months. Advised patient to follow-up with Ob/Gyn if needed. Patient also notes feeling more SOB in general when doing everyday tasks. Prescribed an inhaler....She is complaint with all medications including TK 1.5 BID... Denies fever/chills, night sweats, eye dryness, blurred vision, nausea, vomiting, diarrhea. No CP or LE edema.

## 2020-02-12 NOTE — ASSESSMENT
[Curative] : Goals of care discussed with patient: Curative [FreeTextEntry1] : Ms. Gary is a 34 years old female with AML, presenting today for a f/u s/p Allo PSCT sister MRD (10/10) on 07/03/19. Patient had a relatively uncomplicated transplant course. She had pancytopenia related to the high dose chemotherapy preparative regimen. She also experienced grade 1 oral mucositis, and grade 2 GI mucositis also related to the chemotherapy preparative regimen. Both were treated with supportive care. She was discharged on 3/18/19. Patient is a practicing [employer]. Accompanied by her  today. \par ..chimerism sent when wbc was 1 was 82% donor...8/7/19 chimerism =99.2 % donor...Chimerism on cd 3 improved. 8/29/19 chimerism= 99% donor. 9/26/19 chimerism = 100% donor....most recent chimerism on whole blood is 98%..cd 33 100...cd2 88\par \par Tacrolimus- Restarted on 8/1/19 at 0.5 mg BID. For acute gvhd with fk taper..overall grade 1...skin 2, liver and GI 0.....some oral sores using dex rinse..Increased FK to 1mg Am and 0.5 mg PM. Increased FK further to 1mg BID. Increase the dose of FK to 1.5 mg...dx chronic oral, eye extensive moderate chronic gvhd..acute resolved\par Voriconazole- 200 mg BID on hold\par Acyclovir 400 mg Q8h - to continue for 6 months post transplant.\par Atovaquone 750 mg/5 mL- 5 ml Q12h - to continue for 6 months post transplant.\par Pantoprazole 40 mg oral QD - PRN \par Ursodiol 300 mg BID\par Magnesium oxide 400 mg TID\par Multiple Vitamins QD\par Folic acid 1 mg QD\par Prednisone increased on 8/7/19 to 40 mg daily. May decrease Prednisone to 35mg QD in a week. If symptoms improve, will plan to decrease Prednisone by 5mg every two weeks. Now, will continue Prednisone at 20mg QD. Decrease Prednisone to 15mg QD in two weeks. Off Presnisone \par Lidex cream BID\par Decadron mouth rinse BID increased on 8/29/19 to QID.\par Peripheral blood work was reviewed and and discussed with patient. No transfusions accepted by pt.\par Labs sent today CMP, LDH, Mg, CMV by PCR, Engraftment, Tacrolimus Whole Blood\par Post transplant BMbx on 7/3/19 and results reviewed with patient\par Advised to maintain post-transplant diet and crowd precaution. \par Advised to closely monitor for aGVHD si/sx including but not limited to severe diarrhea, nausea, vomiting, rash, mouth sores, dry or pain in the eyes. \par Plan was  to start low dose Vidaza 32 mg/m2 , x 5 days every month. Reviewed rationale, benefits, risks and side effects, will revisit at later f/u, holding plan for now.\par Will discuss plan of care with Dr. Fermín Youngblood and Dr. Christie.\par Advised patient to hold off on steroid cream as rash has resolved and to use generous amounts of moisturizer to address tautness of skin.\par Prescribed Proventil Inhaler on 2/12/20 given patient's complaint of SOB\par Recommend patient to follow-up with Ob/Gyn prn \par Discussed repeat vital organ one year post transplant testing with patient. Patient is instructed to obtain PFT on 3/10/20  visit and ECHO on 4/8/20 visit. Also check ferritin and TFT's as well as t cell subsets and quantitative immunoglogulins\par Well care stressed, question addressed, support provided. \par \par RTC on 3/10/20 for f/u with JOLEEN Bonilla. RTC on 4/8/20 for f/u with Dr. Lou. \par Patient advised to contact immediately with any concerns or symptoms.

## 2020-02-12 NOTE — ADDENDUM
[FreeTextEntry1] : Documented by Jordan Trejo acting as a scribe for Dr. Branden Lou on 02/12/2020 \par All medical record entries made by the Scribe were at my, Dr. Branden Lou, direction and personally dictated by me on 02/12/2020. I have reviewed the chart and agree that the record accurately reflects my personal performance of the history, physical exam, assessment and plan. I have also personally directed, reviewed, and agree with the discharge instructions.\par \par \par

## 2020-02-12 NOTE — REVIEW OF SYSTEMS
[Dry Eyes] : dryness of the eyes [Negative] : Allergic/Immunologic [Fatigue] : fatigue [FreeTextEntry3] : "heavy" eyelids, light sensitivity  [FreeTextEntry4] : oral irritation-improving; mouths sores - improving  [de-identified] : diffuse skin hyperpigmentation and facial hypopigmentation; taut skin

## 2020-02-13 LAB
ALBUMIN SERPL ELPH-MCNC: 4.1 G/DL
ALP BLD-CCNC: 111 U/L
ALT SERPL-CCNC: 41 U/L
ANION GAP SERPL CALC-SCNC: 18 MMOL/L
AST SERPL-CCNC: 65 U/L
BILIRUB SERPL-MCNC: 0.2 MG/DL
BUN SERPL-MCNC: 8 MG/DL
CALCIUM SERPL-MCNC: 9.7 MG/DL
CHLORIDE SERPL-SCNC: 105 MMOL/L
CO2 SERPL-SCNC: 17 MMOL/L
CREAT SERPL-MCNC: 1.28 MG/DL
GLUCOSE SERPL-MCNC: 109 MG/DL
LDH SERPL-CCNC: 260 U/L
MAGNESIUM SERPL-MCNC: 1.7 MG/DL
POTASSIUM SERPL-SCNC: 4.5 MMOL/L
PROT SERPL-MCNC: 6.7 G/DL
SODIUM SERPL-SCNC: 141 MMOL/L
TACROLIMUS SERPL-MCNC: 22.3 NG/ML

## 2020-02-14 LAB
CMV DNA SPEC QL NAA+PROBE: NOT DETECTED
CMVPCR LOG: NOT DETECTED LOG10IU/ML

## 2020-02-24 LAB
CD3 AND CD33 ENRICHMENT: NORMAL
ENGRAFTMNET-POST: NORMAL

## 2020-03-10 ENCOUNTER — OUTPATIENT (OUTPATIENT)
Dept: OUTPATIENT SERVICES | Facility: HOSPITAL | Age: 35
LOS: 1 days | Discharge: HOME | End: 2020-03-10

## 2020-03-10 ENCOUNTER — RESULT REVIEW (OUTPATIENT)
Age: 35
End: 2020-03-10

## 2020-03-10 ENCOUNTER — OUTPATIENT (OUTPATIENT)
Dept: OUTPATIENT SERVICES | Facility: HOSPITAL | Age: 35
LOS: 1 days | End: 2020-03-10

## 2020-03-10 ENCOUNTER — APPOINTMENT (OUTPATIENT)
Dept: PULMONOLOGY | Facility: CLINIC | Age: 35
End: 2020-03-10
Payer: COMMERCIAL

## 2020-03-10 ENCOUNTER — APPOINTMENT (OUTPATIENT)
Dept: CV DIAGNOSITCS | Facility: HOSPITAL | Age: 35
End: 2020-03-10
Payer: COMMERCIAL

## 2020-03-10 ENCOUNTER — APPOINTMENT (OUTPATIENT)
Dept: HEMATOLOGY ONCOLOGY | Facility: CLINIC | Age: 35
End: 2020-03-10
Payer: COMMERCIAL

## 2020-03-10 VITALS
DIASTOLIC BLOOD PRESSURE: 85 MMHG | OXYGEN SATURATION: 97 % | WEIGHT: 208.09 LBS | SYSTOLIC BLOOD PRESSURE: 124 MMHG | BODY MASS INDEX: 38.06 KG/M2 | RESPIRATION RATE: 17 BRPM | HEART RATE: 128 BPM

## 2020-03-10 DIAGNOSIS — R74.0 NONSPECIFIC ELEVATION OF LEVELS OF TRANSAMINASE AND LACTIC ACID DEHYDROGENASE [LDH]: ICD-10-CM

## 2020-03-10 DIAGNOSIS — Z90.3 ACQUIRED ABSENCE OF STOMACH [PART OF]: Chronic | ICD-10-CM

## 2020-03-10 DIAGNOSIS — K21.9 GASTRO-ESOPHAGEAL REFLUX DISEASE W/OUT ESOPHAGITIS: ICD-10-CM

## 2020-03-10 DIAGNOSIS — C92.01 ACUTE MYELOBLASTIC LEUKEMIA, IN REMISSION: ICD-10-CM

## 2020-03-10 DIAGNOSIS — Z00.00 ENCOUNTER FOR GENERAL ADULT MEDICAL EXAMINATION W/OUT ABNORMAL FINDINGS: ICD-10-CM

## 2020-03-10 DIAGNOSIS — C92.00 ACUTE MYELOBLASTIC LEUKEMIA, NOT HAVING ACHIEVED REMISSION: ICD-10-CM

## 2020-03-10 LAB
ALBUMIN SERPL ELPH-MCNC: 3.7 G/DL
ALP BLD-CCNC: 85 U/L
ALT SERPL-CCNC: 10 U/L
ANION GAP SERPL CALC-SCNC: 15 MMOL/L
AST SERPL-CCNC: 17 U/L
BASOPHILS # BLD AUTO: 0.04 K/UL — SIGNIFICANT CHANGE UP (ref 0–0.2)
BASOPHILS NFR BLD AUTO: 0.6 % — SIGNIFICANT CHANGE UP (ref 0–2)
BILIRUB SERPL-MCNC: 0.2 MG/DL
BUN SERPL-MCNC: 3 MG/DL
CALCIUM SERPL-MCNC: 9.7 MG/DL
CHLORIDE SERPL-SCNC: 105 MMOL/L
CO2 SERPL-SCNC: 22 MMOL/L
CREAT SERPL-MCNC: 0.9 MG/DL
EOSINOPHIL # BLD AUTO: 0.22 K/UL — SIGNIFICANT CHANGE UP (ref 0–0.5)
EOSINOPHIL NFR BLD AUTO: 3.4 % — SIGNIFICANT CHANGE UP (ref 0–6)
GLUCOSE SERPL-MCNC: 102 MG/DL
HCT VFR BLD CALC: 39.1 % — SIGNIFICANT CHANGE UP (ref 34.5–45)
HGB BLD-MCNC: 13 G/DL — SIGNIFICANT CHANGE UP (ref 11.5–15.5)
IMM GRANULOCYTES NFR BLD AUTO: 0.2 % — SIGNIFICANT CHANGE UP (ref 0–1.5)
LDH SERPL-CCNC: 188 U/L
LYMPHOCYTES # BLD AUTO: 2.02 K/UL — SIGNIFICANT CHANGE UP (ref 1–3.3)
LYMPHOCYTES # BLD AUTO: 30.9 % — SIGNIFICANT CHANGE UP (ref 13–44)
MAGNESIUM SERPL-MCNC: 2 MG/DL
MCHC RBC-ENTMCNC: 30.4 PG — SIGNIFICANT CHANGE UP (ref 27–34)
MCHC RBC-ENTMCNC: 33.2 GM/DL — SIGNIFICANT CHANGE UP (ref 32–36)
MCV RBC AUTO: 91.4 FL — SIGNIFICANT CHANGE UP (ref 80–100)
MONOCYTES # BLD AUTO: 0.73 K/UL — SIGNIFICANT CHANGE UP (ref 0–0.9)
MONOCYTES NFR BLD AUTO: 11.2 % — SIGNIFICANT CHANGE UP (ref 2–14)
NEUTROPHILS # BLD AUTO: 3.51 K/UL — SIGNIFICANT CHANGE UP (ref 1.8–7.4)
NEUTROPHILS NFR BLD AUTO: 53.7 % — SIGNIFICANT CHANGE UP (ref 43–77)
NRBC # BLD: 0 /100 WBCS — SIGNIFICANT CHANGE UP (ref 0–0)
PLATELET # BLD AUTO: 391 K/UL — SIGNIFICANT CHANGE UP (ref 150–400)
POTASSIUM SERPL-SCNC: 4.1 MMOL/L
PROT SERPL-MCNC: 5.9 G/DL
RBC # BLD: 4.28 M/UL — SIGNIFICANT CHANGE UP (ref 3.8–5.2)
RBC # FLD: 13 % — SIGNIFICANT CHANGE UP (ref 10.3–14.5)
SODIUM SERPL-SCNC: 142 MMOL/L
TACROLIMUS SERPL-MCNC: <2 NG/ML
WBC # BLD: 6.53 K/UL — SIGNIFICANT CHANGE UP (ref 3.8–10.5)
WBC # FLD AUTO: 6.53 K/UL — SIGNIFICANT CHANGE UP (ref 3.8–10.5)

## 2020-03-10 PROCEDURE — 93306 TTE W/DOPPLER COMPLETE: CPT | Mod: 26

## 2020-03-10 PROCEDURE — 94726 PLETHYSMOGRAPHY LUNG VOLUMES: CPT

## 2020-03-10 PROCEDURE — 99214 OFFICE O/P EST MOD 30 MIN: CPT

## 2020-03-10 PROCEDURE — 94060 EVALUATION OF WHEEZING: CPT

## 2020-03-10 PROCEDURE — 94729 DIFFUSING CAPACITY: CPT

## 2020-03-11 PROBLEM — R74.0 TRANSAMINITIS: Status: ACTIVE | Noted: 2020-03-10

## 2020-03-11 PROBLEM — Z00.00 ENCOUNTER FOR PREVENTIVE HEALTH EXAMINATION: Status: ACTIVE | Noted: 2018-07-06

## 2020-03-11 PROBLEM — K21.9 ACID REFLUX: Status: ACTIVE | Noted: 2019-03-28

## 2020-03-11 NOTE — REASON FOR VISIT
[Follow-Up Visit] : a follow-up visit for [Spouse] : spouse [FreeTextEntry2] : AML s/p HLA 10/10 allo sib (sister) PBSCT on 2/27/19

## 2020-03-11 NOTE — ASSESSMENT
[Curative] : Goals of care discussed with patient: Curative [FreeTextEntry1] : Ms. Gary is a 34 years old female Mu-ism with AML with the following comorbidities being managed:\par \par 1) AML\par S/p HLA 10/10 allo sib (sister) PBSCT on 2/27/19\par 7/3/19 BMbx: [facility], normal female karyotype\par 11/21/19 chimerism = 99% donor (CD33 = 100% and CD3 = 83% donor)\par 12/27/19 chimerism = 98% donor\par 2/12/20 chimerism = 99% donor (CD33 = 100% and CD3 = 88.8% donor)\par Hold on starting maintenance Vidaza at this time given active GVHD issues \par \par 2) Heme\par No transfusions as pt is practicing Mu-ism\par Counts stable\par    3/10/20:   WBC 6.53   ANC 3.51   Hgb 13   \par Continue multivitamin and folic acid daily\par \par 3) ID\par Continue ppx:\par - Acyclovir 400mg tid\par - Mepron 1500mg daily - encouraged to try Zofran 1hr before for nausea\par Voriconazole resumed 1/23/20 but subsequently held as of 2/19/20 for transaminitis\par Post transplant restrictions reviewed\par \par 2/12/20 CMV PCR not detected, continue to monitor PCR weekly \par \par 4) GVHD\par Skin 0   Liver 0   GI 0 - overall grade 0\par Prednisone d/c'd 12/22/19\par FK increased to 1mg bid on 3/10/20 as below - plan for slow FK taper when GVHD symptoms stable/improved\par See GVHD history below\par \par Date of onset of acute GVHD 8/1/19 \par - On 7/29/19 placed on prednisone 20 mg daily for erythematous rash of face as stated by patient.\par - 8/1/19: Erythematous/hyperpigmented rash 38% BSA- rash of face, chest, back, bilateral upper and lower extremities. Patient was placed on prednisone 20 mg daily as of 7/29/19. Instructed to increase prednisone on 8/1/19 to 40 mg daily on 8/1, 8/2, 8/3. Then decreased to prednisone 20 mg daily.Tacrolimus restarted on 8/1/19 at 0.5 mg BID. Lidex cream BID to body and hydrocortisone cream to face BID. Acute GVHD SKin 2 Liver 0 GI 0- overall grade 1\par - 8/7/19: Erythematous/hyperpigmented rash 43% BSA- rash of face, neck, chest, back, abdomen, bilateral upper and lower extremities. Increased prednisone 20mg daily to 40 mg daily. Lidex cream BID to entire body. FK at 0.5 mg BID. May have to increase FK dose if rash worsens. Acute GVHD SKin 2 Liver 0 GI 0- overall grade 1\par - 8/14/19: Erythematous rash of face. Hyperpigmented rash of chest, neck, abdomen, bilateral upper and lower extremities and back. Skin rash extent 42% BSA. Currently on prednisone 40 mg daily and lidex cream BID. Increased FK to 0.5 mg Am and 1 mg PM. \par - 9/26/19: Skin hyperpigmentation and facial hypopigmentation, but improvement in erythema from facial rash. Some oral sores using dex rinse 4 times a day. Increased FK to 1 mg BID as of 9/26/19. Continue prednisone 30 mg daily. Decrease prednisone by 5 mg every two weeks. Acute GVHD Skin 2 Liver 0 GI 0- overall grade 1. BSA 42%. \par - 12/27/19: no acute GVHD. Chronic GVHD: skin score 3 (>50% BSA), eyes score 3 (requiring scleral lenses), mouth score 2 - overall severe extensive chronic GVHD. Off prednisone as of 12/22/19. Continue FK 1.5mg bid and Decadron swish/spit qid, moisturize 2-3x/day \par - 1/23/20: no acute GVHD. Chronic GVHD: skin 3 (>50% BSA), eyes score 1, mouth score 1 - overall severe extensive chronic GVHD. Continue 1.5mg bid. Continue PRN Decadron swish/spit, instructed to avoid swallowing to prevent systemic absorption of steroid. Moisturize 2-3x/day\par - 3/10/20: no acute GVHD. Chronic GVHD eyes score 3, lung score 2, genital tract score 1 - overall severe extensive chronic GVHD. FK increased to 1mg bid and initiated FAM therapy as below for lung GVHD\par \par cGVHD eyes\par Dry eyes and blurred vision much improved with scleral lenses\par Continue scleral lenses as prescribed by optho\par Continue f/u with optho\par \par cGVHD mouth\par PRN Decadron swish and spit\par No symptoms at this time \par \par cGVHD lung\par 3/10/20 PFT with FEV1 of 48%\par Ongoing SOB with exertion \par Continue FAM therapy, initiated 3/10/20 (pt already on monteleukast for asthma/allergies) \par - Flovent inhaler 440mcg bid\par - Azithromycin 250mg bid\par - Monteleukast 10mg daily \par Will obtain CT chest for further evaluation, r/o BOOP\par Continue to monitor closely \par \par cGVHD vaginal area\par Sensation of skin tightening and discomfort of vaginal area\par May use lubricant to gently massage external labia to soften tissue\par To consider f/u with GYN if persists/worsens \par \par 5) GI\par Continue ppx:\par - Ursodiol 300 mg bid\par - Protonix 40 mg daily \par PRN Zofran and Reglan for nausea\par \par 6) Other \par Hypomagnesemia - likely 2/2 tacrolimus, continue magnesium oxide 400mg bid \par GYN - LMP November 2019, continue Sprintec daily, instructed to wear condoms if resuming sexual activity. To consider f/u with GYN if abnormal menses persists \par HTN - continue amlodipine 5mg daily, BP remains WNL\par Health maintenance - 1yr post transplant ECHO done 3/10/20 WNL, EF = 60%\par \par 8) Plan/Dispo\par Hold off on Vidaza maintenance given GVHD issues\par FK increased to 1mg bid and initiated FAM therapy for lung GVHD, will obtain CT chest\par Pt and  educated regarding plan of care, all questions/concerns addressed\par Instructed to contact our office with fever or new/worsening symptoms \par F/u with Dr. Lou on 4/8/20\par

## 2020-03-11 NOTE — HISTORY OF PRESENT ILLNESS
[de-identified] : Ms. 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 Of note patient is a Denominational and cannot receive blood products. \par \par Patient was noted to be newly leukopenic to WBC 1.7 during preoperative evaluation in anticipation of right ankle ligament repair. Bone marrow aspirate and biopsy at [facility] on 12/2/2017 revealed AML with some suspicion for APL. She was transferred to [place] for further management. Peripheral counts from around this time are not available. \par \par Repeat marrow on 12/26/2017 showed 90% blasts with myeloid mutation arrest in a 40-50% cellular marrow. On flow blasts were negative for CD34 and HLA-DR; positive for CD38, CD58, CD33, and MPO, and partially positive for CD15, CD11b, , and CD64. CD45 was reported as dim. Karyotype showed +21 in 3 of 21 metaphases, but no t(15:17) was detected on karyotype of FISH. Molecular studies showed mutations in NPM1 (45%0, IDH1 (R132S; 39%), PTPN1 (23%), and CSF3R (5%) genes. There was lingering diagnostic uncertainty due to a largely aspicular and hypocellular specimen, and thus a marrow was repeated on 1/2/2018, confirming AML.\par \par On 1/4/2018, Ms. Gary began 7+3 with Idarubicin. Her induction course was complicated by DIC, neutropenic fever/ Klebsiella PNA, and bilateral retinal hemorrhage. She did not receive blood products and was instead supported with Nplate, Procrit, IV iron, Amicar, and Lupron. After achieving count stability, the patient was discharged; she was not deemed a candidate for further therapy given inability to receive transfusions and a day 14 marrow was not obtained. She subsequently saw Dr. [place] Obrien at [facility]. He obtained a recovery marrow on 2/13/2018 (day +40), which was 30% cellular with 45% blasts. 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 Cytogenetics and FISH were normal. \par \par She then established care with Dr. Christie for consideration of salvage therapy with IDH1 inhibition given the risks of additional chemotherapy without blood products support. [place] [facility], showed 69% blasts. ThunderBolts panel confirmed IDH1 R132S, NPM1 [id number] 12, and CSF3R D810G mutations. She was started on Ivosidenib on [facility] protocol  and had completed four cycles to date (6/28/2018 = C5D1). Marrow blasts increased to 85% on 4/30 but declined to 36% by 5/31. A  bone marrow aspirate and biopsy  was obtained  (6/28/2018); preliminary results show further responding disease with 10.3% blasts by flow cytometry. Subsequent marrow in oct showed 5% blasts...she was then started on vidazza plus venetoclax. She completed 2 cycles...f/u marrow with 4% blasts.....\par \par Patient underwent an AlloPSCT sister MRD (10/10) on 2/27/19, hospital course as follows:\par Ms. 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 She is a Denominational and cannot receive blood products. \par \par Ms. Nickerson's donor is her sister.  She is a 10/10 match. Both donor and patient are CMV +. She had completed all pre testing needed for transplant.  She will \par start reduced intensity Fludarabine/Busulfan chemo regimen today. \par \par Upon admission, a right AC PICC line was placed in IR. Ms. Gary received IV hydration, pain management, antiemetics, anxiolysis, and antibacterial / antiviral / antifungal / PCP / GI and VOD (SOS) prophylaxis. Labs were monitored every other day, and she received electrolyte repletion as needed. She also received vitamin K, vitamin B, supplemental iron, vitamin C and procrit as directed by the institutional bloodless transplant policy. \par \par On 2/27/19, Ms. Gary received 363 mLs of fresh, apheresed, mobilized, related allogeneic HPC over 80 minutes. Cell counts as follows: \par Total MNCs ( x 10^8/kg): 6.21 \par CD 34+Cells ( x 10^6/kg): 3.37 \par CD3+ Cells ( x 10^7/kg): 12.46 \par Cell Viability (%): 100% \par \par Engraftment was noted on 3/11/19. Ms. Gary did not receive zarxio due to her having minimal residual disease on admission, and a reduced intensity preparatory regimen to avoid stimulating myeloblasts. \par \par Ms. Gary had a relatively uncomplicated transplant course. She did experience menses, which was suppressed with provera. She also had pancytopenia related to the high dose chemotherapy preparative regimen. She also experienced grade 1 oral mucositis, and grade 2 GI mucositis also related to the chemotherapy preparative regimen. Both were treated with supportive care. \par \par Currently, Ms. Gary is stable for discharge home with outpatient follow up at the [facility].  [de-identified] : On 3/10/20 visit, notes ongoing fatigue and taking several naps throughout the day. Ongoing SOB with exertion and coughing fits only 2/2 SOB. Ongoing dry eyes with intermittently blurred visioin, overall improved but a little worse over the past couple weeks only because she has not been using scleral lenses as she has to take them out when sleeping and does not want to keep taking them on and off throughout the day whenever she naps. Mouth pain and sores wax and wane though has not had any symptoms in past 4wks. Notes nausea and vomiting only with taking Mepron, has been holding Mepron for past couple days with resolution of symptoms. Generalized hyperpigmentation is stable to slightly improved. Ongoing vaginal dryness and tightness. Stable right ankle pain since before transplant. Compliant with post transplant meds and restrictions. Currently taking FK 0.5mg bid which she did not take prior to lab draw today. Denies fever, chills, headache, dizziness, mucositis/odynophagia, chest pain, diarrhea/constipation, abdominal pain, dysuria, LE edema, rash, bleeding, pain\par

## 2020-03-11 NOTE — RESULTS/DATA
[FreeTextEntry1] : Today's CBC reviewed with pt\par \par 3/10/20 PFT:\par FEV1 = 48%\par \par 3/10/20 ECHO:\par EF = 60%\par \par 7/9/19 BMbx:\par - Marrow with maturing trilineage hematopoiesis and no increased blasts; iron stores present. Normal female karyotype

## 2020-03-11 NOTE — PHYSICAL EXAM
[Ambulatory and capable of all self care but unable to carry out any work activities] : Status 2- Ambulatory and capable of all self care but unable to carry out any work activities. Up and about more than 50% of waking hours [Normal] : pharynx is unremarkable, moist mucus membrane, no oral lesions [de-identified] : cushingoid face [de-identified] : tachycardic, regular rhythm, normal s1s2 [de-identified] : no edema  [de-identified] : +BS; abdomen soft, non-distended, non-tender [de-identified] : hyperpigmentation of face except on apples of bilateral cheeks. Scattered hyperpigmentation on neck, chest, abdomen, back, BUEs, and BLEs appears somewhat lighter

## 2020-03-12 LAB
CMV DNA SPEC QL NAA+PROBE: NOT DETECTED
CMVPCR LOG: NOT DETECTED LOG10IU/ML

## 2020-03-16 ENCOUNTER — RX RENEWAL (OUTPATIENT)
Age: 35
End: 2020-03-16

## 2020-03-17 LAB
CD3 AND CD33 ENRICHMENT: NORMAL
ENGRAFTMNET-POST: NORMAL

## 2020-04-08 ENCOUNTER — RX RENEWAL (OUTPATIENT)
Age: 35
End: 2020-04-08

## 2020-04-08 ENCOUNTER — APPOINTMENT (OUTPATIENT)
Dept: HEMATOLOGY ONCOLOGY | Facility: CLINIC | Age: 35
End: 2020-04-08

## 2020-04-09 ENCOUNTER — APPOINTMENT (OUTPATIENT)
Dept: HEMATOLOGY ONCOLOGY | Facility: CLINIC | Age: 35
End: 2020-04-09
Payer: COMMERCIAL

## 2020-04-09 ENCOUNTER — RX RENEWAL (OUTPATIENT)
Age: 35
End: 2020-04-09

## 2020-04-09 DIAGNOSIS — B37.0 CANDIDAL STOMATITIS: ICD-10-CM

## 2020-04-09 DIAGNOSIS — R11.0 NAUSEA: ICD-10-CM

## 2020-04-09 PROCEDURE — 99442: CPT

## 2020-04-13 NOTE — HISTORY OF PRESENT ILLNESS
[Home] : at home, [unfilled] , at the time of the visit. [Medical Office: ([facility])___] : at ~his/her~ medical office located in [place] [Spouse] : spouse [Patient] : the patient [Self] : self [FreeTextEntry4] : S [de-identified] : Ms. 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 Of note patient is a Hinduism and cannot receive blood products. \par \par Patient was noted to be newly leukopenic to WBC 1.7 during preoperative evaluation in anticipation of right ankle ligament repair. Bone marrow aspirate and biopsy at [facility] on 12/2/2017 revealed AML with some suspicion for APL. She was transferred to [facility] for further management. Peripheral counts from around this time are not available. \par \par Repeat marrow on 12/26/2017 showed 90% blasts with myeloid mutation arrest in a 40-50% cellular marrow. On flow blasts were negative for CD34 and HLA-DR; positive for CD38, CD58, CD33, and MPO, and partially positive for CD15, CD11b, , and CD64. CD45 was reported as dim. Karyotype showed +21 in 3 of 21 metaphases, but no t(15:17) was detected on karyotype of FISH. Molecular studies showed mutations in NPM1 (45%0, IDH1 (R132S; 39%), PTPN1 (23%), and CSF3R (5%) genes. There was lingering diagnostic uncertainty due to a largely aspicular and hypocellular specimen, and thus a marrow was repeated on 1/2/2018, confirming AML.\par \par On 1/4/2018, Ms. Gary began 7+3 with Idarubicin. Her induction course was complicated by DIC, neutropenic fever/ Klebsiella PNA, and bilateral retinal hemorrhage. She did not receive blood products and was instead supported with Nplate, Procrit, IV iron, Amicar, and Lupron. After achieving count stability, the patient was discharged; she was not deemed a candidate for further therapy given inability to receive transfusions and a day 14 marrow was not obtained. She subsequently saw Dr. [place] at [facility]. He obtained a recovery marrow on 2/13/2018 (day +40), which was 30% cellular with 45% blasts. 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 Cytogenetics and FISH were normal. \par \par She then established care with Dr. Christie for consideration of salvage therapy with IDH1 inhibition given the risks of additional chemotherapy without blood products support. Initial marrow at [facility], on 3/1/2018, showed 69% blasts. [employer] panel confirmed IDH1 R132S, NPM1 U016Qli 12, and CSF3R D810G mutations. She was started on Ivosidenib on [facility] protocol  and had completed four cycles to date (6/28/2018 = C5D1). Marrow blasts increased to 85% on 4/30 but declined to 36% by 5/31. A  bone marrow aspirate and biopsy  was obtained  (6/28/2018); preliminary results show further responding disease with 10.3% blasts by flow cytometry. Subsequent marrow in oct showed 5% blasts...she was then started on vidazza plus venetoclax. She completed 2 cycles...f/u marrow with 4% blasts.....\par \par Patient underwent an AlloPSCT sister MRD (10/10) on 2/27/19, hospital course as follows:\par Ms. 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 She is a Hinduism and cannot receive blood products. \par \par Ms. Nickerson's donor is her sister.  She is a 10/10 match. Both donor and patient are CMV +. She had completed all pre testing needed for transplant.  She will \par start reduced intensity Fludarabine/Busulfan chemo regimen today. \par \par Upon admission, a right AC PICC line was placed in IR. Ms. Gary received IV hydration, pain management, antiemetics, anxiolysis, and antibacterial / antiviral / antifungal / PCP / GI and VOD (SOS) prophylaxis. Labs were monitored every other day, and she received electrolyte repletion as needed. She also received vitamin K, vitamin B, supplemental iron, vitamin C and procrit as directed by the institutional bloodless transplant policy. \par \par On 2/27/19, Ms. Gary received 363 mLs of fresh, apheresed, mobilized, related allogeneic HPC over 80 minutes. Cell counts as follows: \par Total MNCs ( x 10^8/kg): 6.21 \par CD 34+Cells ( x 10^6/kg): 3.37 \par CD3+ Cells ( x 10^7/kg): 12.46 \par Cell Viability (%): 100% \par \par Engraftment was noted on 3/11/19. Ms. Gary did not receive zarxio due to her having minimal residual disease on admission, and a reduced intensity preparatory regimen to avoid stimulating myeloblasts. \par \par Ms. Gary had a relatively uncomplicated transplant course. She did experience menses, which was suppressed with provera. She also had pancytopenia related to the high dose chemotherapy preparative regimen. She also experienced grade 1 oral mucositis, and grade 2 GI mucositis also related to the chemotherapy preparative regimen. Both were treated with supportive care. \par \par Currently, Ms. Gary is stable for discharge home with outpatient follow up at [facility].  [de-identified] : On 4/9/20 telephone visit, pt states overall feeling well. Fatigue is somewhat improved. 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 She has only been wearing scleral lenses intermittently because the day after wearing them she feels like her eyes are more irritated and cannot get the scleral lenses back in. Notes white patch on tongue, possibly thrush as she is on inhaled steroid now. Stable small bumps in throat which are non-painful. SOB with exertion is minimally improved since starting Flovent and azithromycin for lung GVHD. Nausea associated with taking Mepron has resolved with taking Zofran prior to Mepron. Feels her skin is smoother and arms appear less hyperpigmented. Ongoing vaginal dryness and tightness. Compliant with post transplant meds and restrictions. Currently taking FK 1mg bid. Denies fever, chills, headache, dizziness, mucositis/odynophagia, chest pain, cough, nausea/vomiting, diarrhea/constipation, abdominal pain, dysuria, LE edema, rash, bleeding, pain

## 2020-04-13 NOTE — REASON FOR VISIT
[Follow-Up Visit] : a follow-up visit for [FreeTextEntry2] : AML s/p HLA 10/10 allo sib (sister) PBSCT on 2/27/19

## 2020-04-13 NOTE — ASSESSMENT
[FreeTextEntry1] : Ms. Gary is a 34 years old female Anabaptism with AML with the following comorbidities being managed:\par \par 1) AML\par S/p HLA 10/10 allo sib (sister) PBSCT on 2/27/19\par 7/3/19 BMbx: LANA, normal female karyotype\par 11/21/19 chimerism = 99% donor (CD33 = 100% and CD3 = 83% donor)\par 12/27/19 chimerism = 98% donor\par 2/12/20 chimerism = 99% donor (CD33 = 100% and CD3 = 88.8% donor)\par 3/10/20 chimerism = 99% donor (CD33 = 100% and CD3 = 94% donor)\par Hold on starting maintenance Vidaza at this time given active GVHD issues \par \par 2) Heme\par No transfusions as pt is practicing Anabaptism\par Counts stable\par    4/7/20:   WBC 6.4   ANC 2.6   Hgb 13.5   \par Continue multivitamin and folic acid daily\par \par 3) ID\par Continue ppx:\par - Acyclovir 400mg tid\par - Mepron 1500mg daily - encouraged to try Zofran 1hr before for nausea\par Voriconazole resumed 1/23/20 but subsequently held as of 2/19/20 for transaminitis\par Post transplant restrictions reviewed\par \par 3/10/20 CMV PCR not detected, continue to monitor PCR weekly \par \par Oral thrush\par White patch on tongue reported 4/9/20\par Nystatin swish and spit qid x 5 days prescribed\par Continue to monitor closely \par \par 4) GVHD\par Skin 0   Liver 0   GI 0 - overall grade 0\par Prednisone d/c'd 12/22/19\par FK = 2.2 on 4/7/20, continue FK 1mg bid given symptoms stable - plan for slow FK taper when GVHD symptoms stable/improved\par See GVHD history below\par \par Date of onset of acute GVHD 8/1/19 \par - On 7/29/19 placed on prednisone 20 mg daily for erythematous rash of face as stated by patient.\par - 8/1/19: Erythematous/hyperpigmented rash 38% BSA- rash of face, chest, back, bilateral upper and lower extremities. Patient was placed on prednisone 20 mg daily as of 7/29/19. Instructed to increase prednisone on 8/1/19 to 40 mg daily on 8/1, 8/2, 8/3. Then decreased to prednisone 20 mg daily.Tacrolimus restarted on 8/1/19 at 0.5 mg BID. Lidex cream BID to body and hydrocortisone cream to face BID. Acute GVHD SKin 2 Liver 0 GI 0- overall grade 1\par - 8/7/19: Erythematous/hyperpigmented rash 43% BSA- rash of face, neck, chest, back, abdomen, bilateral upper and lower extremities. Increased prednisone 20mg daily to 40 mg daily. Lidex cream BID to entire body. FK at 0.5 mg BID. May have to increase FK dose if rash worsens. Acute GVHD SKin 2 Liver 0 GI 0- overall grade 1\par - 8/14/19: Erythematous rash of face. Hyperpigmented rash of chest, neck, abdomen, bilateral upper and lower extremities and back. Skin rash extent 42% BSA. Currently on prednisone 40 mg daily and lidex cream BID. Increased FK to 0.5 mg Am and 1 mg PM. \par - 9/26/19: Skin hyperpigmentation and facial hypopigmentation, but improvement in erythema from facial rash. Some oral sores using dex rinse 4 times a day. Increased FK to 1 mg BID as of 9/26/19. Continue prednisone 30 mg daily. Decrease prednisone by 5 mg every two weeks. Acute GVHD Skin 2 Liver 0 GI 0- overall grade 1. BSA 42%. \par - 12/27/19: no acute GVHD. Chronic GVHD: skin score 3 (>50% BSA), eyes score 3 (requiring scleral lenses), mouth score 2 - overall severe extensive chronic GVHD. Off prednisone as of 12/22/19. Continue FK 1.5mg bid and Decadron swish/spit qid, moisturize 2-3x/day \par - 1/23/20: no acute GVHD. Chronic GVHD: skin 3 (>50% BSA), eyes score 1, mouth score 1 - overall severe extensive chronic GVHD. Continue 1.5mg bid. Continue PRN Decadron swish/spit, instructed to avoid swallowing to prevent systemic absorption of steroid. Moisturize 2-3x/day\par - 3/10/20: no acute GVHD. Chronic GVHD eyes score 3, lung score 2, genital tract score 1 - overall severe extensive chronic GVHD. FK increased to 1mg bid and initiated FAM therapy as below for lung GVHD\par - 4/9/20: no acute GVHD. Chronic GVHD eyes score 2, lung score 2, genital tract score 1 - overall severe extensive chronic GVHD. Continue FK 1mg bid and FAM therapy\par \par cGVHD eyes\par Dry eyes and blurred vision much improved with scleral lenses\par Continue scleral lenses as prescribed by optho\par Continue PRN artificial tears and Systane ophthalmic ointment\par Continue f/u with optho\par \par cGVHD mouth\par PRN Decadron swish and spit\par No symptoms at this time \par \par cGVHD lung\par 3/10/20 PFT with FEV1 of 48%\par Ongoing SOB with exertion \par Continue FAM therapy, initiated 3/10/20 (pt already on monteleukast for asthma/allergies) \par - Flovent inhaler 440mcg bid\par - Azithromycin 250mg bid\par - Monteleukast 10mg daily \par Will obtain CT chest for further evaluation, r/o BOOP - postponed to May given covid-19 epidemic \par Minimal symptom improvement noted 4/9/10, continue to monitor closely \par \par cGVHD vaginal area\par Sensation of skin tightening and discomfort of vaginal area\par May use lubricant to gently massage external labia to soften tissue\par Encouraged f/u with GYN after covid-19 epidemic resolves\par \par 5) GI\par Continue ppx:\par - Ursodiol 300 mg bid\par - Protonix 40 mg daily \par PRN Zofran and Reglan for nausea\par \par 6) Other \par Hypomagnesemia - likely 2/2 tacrolimus, continue magnesium oxide 400mg bid \par GYN - LMP November 2019, continue Sprintec daily, instructed to wear condoms if resuming sexual activity. To consider f/u with GYN if abnormal menses persists \par HTN - continue amlodipine 5mg daily, BP remains WNL\par Health maintenance - 1yr post transplant ECHO done 3/10/20 WNL, EF = 60%\par \par 8) Plan/Dispo\par Hold off on Vidaza maintenance given GVHD issues\par CT chest to r/o BOOP still pending\par Pt educated regarding plan of care, all questions/concerns addressed\par Instructed to contact our office with fever or new/worsening symptoms \par F/u with NP on 5/6/20\par

## 2020-04-13 NOTE — RESULTS/DATA
[FreeTextEntry1] : 4/7/20 labs reviewed with pt \par \par 3/10/20 PFT:\par FEV1 = 48%\par \par 3/10/20 ECHO:\par EF = 60%\par \par 7/9/19 BMbx:\par - Marrow with maturing trilineage hematopoiesis and no increased blasts; iron stores present. Normal female karyotype

## 2020-05-01 ENCOUNTER — OUTPATIENT (OUTPATIENT)
Dept: OUTPATIENT SERVICES | Facility: HOSPITAL | Age: 35
LOS: 1 days | Discharge: HOME | End: 2020-05-01

## 2020-05-01 DIAGNOSIS — C92.00 ACUTE MYELOBLASTIC LEUKEMIA, NOT HAVING ACHIEVED REMISSION: ICD-10-CM

## 2020-05-01 DIAGNOSIS — Z90.3 ACQUIRED ABSENCE OF STOMACH [PART OF]: Chronic | ICD-10-CM

## 2020-05-06 ENCOUNTER — RESULT REVIEW (OUTPATIENT)
Age: 35
End: 2020-05-06

## 2020-05-06 ENCOUNTER — APPOINTMENT (OUTPATIENT)
Dept: HEMATOLOGY ONCOLOGY | Facility: CLINIC | Age: 35
End: 2020-05-06
Payer: COMMERCIAL

## 2020-05-06 ENCOUNTER — LABORATORY RESULT (OUTPATIENT)
Age: 35
End: 2020-05-06

## 2020-05-06 ENCOUNTER — APPOINTMENT (OUTPATIENT)
Dept: CT IMAGING | Facility: IMAGING CENTER | Age: 35
End: 2020-05-06
Payer: COMMERCIAL

## 2020-05-06 ENCOUNTER — OUTPATIENT (OUTPATIENT)
Dept: OUTPATIENT SERVICES | Facility: HOSPITAL | Age: 35
LOS: 1 days | End: 2020-05-06
Payer: COMMERCIAL

## 2020-05-06 VITALS
RESPIRATION RATE: 19 BRPM | SYSTOLIC BLOOD PRESSURE: 116 MMHG | WEIGHT: 201.72 LBS | DIASTOLIC BLOOD PRESSURE: 78 MMHG | TEMPERATURE: 98.2 F | BODY MASS INDEX: 36.9 KG/M2 | OXYGEN SATURATION: 98 % | HEART RATE: 122 BPM

## 2020-05-06 DIAGNOSIS — Z90.3 ACQUIRED ABSENCE OF STOMACH [PART OF]: Chronic | ICD-10-CM

## 2020-05-06 DIAGNOSIS — B07.9 VIRAL WART, UNSPECIFIED: ICD-10-CM

## 2020-05-06 DIAGNOSIS — D89.811 CHRONIC GRAFT-VERSUS-HOST DISEASE: ICD-10-CM

## 2020-05-06 LAB
ALBUMIN SERPL ELPH-MCNC: 3.7 G/DL
ALP BLD-CCNC: 78 U/L
ALT SERPL-CCNC: 10 U/L
ANION GAP SERPL CALC-SCNC: 13 MMOL/L
AST SERPL-CCNC: 23 U/L
BASOPHILS # BLD AUTO: 0.05 K/UL — SIGNIFICANT CHANGE UP (ref 0–0.2)
BASOPHILS NFR BLD AUTO: 0.7 % — SIGNIFICANT CHANGE UP (ref 0–2)
BILIRUB SERPL-MCNC: 0.3 MG/DL
BUN SERPL-MCNC: 5 MG/DL
CALCIUM SERPL-MCNC: 9.6 MG/DL
CHLORIDE SERPL-SCNC: 106 MMOL/L
CO2 SERPL-SCNC: 23 MMOL/L
CREAT SERPL-MCNC: 0.99 MG/DL
EOSINOPHIL # BLD AUTO: 0.75 K/UL — HIGH (ref 0–0.5)
EOSINOPHIL NFR BLD AUTO: 10.8 % — HIGH (ref 0–6)
GLUCOSE SERPL-MCNC: 93 MG/DL
HCT VFR BLD CALC: 40.9 % — SIGNIFICANT CHANGE UP (ref 34.5–45)
HGB BLD-MCNC: 13.2 G/DL — SIGNIFICANT CHANGE UP (ref 11.5–15.5)
IMM GRANULOCYTES NFR BLD AUTO: 0.1 % — SIGNIFICANT CHANGE UP (ref 0–1.5)
LDH SERPL-CCNC: 207 U/L
LYMPHOCYTES # BLD AUTO: 2.03 K/UL — SIGNIFICANT CHANGE UP (ref 1–3.3)
LYMPHOCYTES # BLD AUTO: 29.2 % — SIGNIFICANT CHANGE UP (ref 13–44)
MAGNESIUM SERPL-MCNC: 1.9 MG/DL
MCHC RBC-ENTMCNC: 28.8 PG — SIGNIFICANT CHANGE UP (ref 27–34)
MCHC RBC-ENTMCNC: 32.3 GM/DL — SIGNIFICANT CHANGE UP (ref 32–36)
MCV RBC AUTO: 89.1 FL — SIGNIFICANT CHANGE UP (ref 80–100)
MONOCYTES # BLD AUTO: 0.85 K/UL — SIGNIFICANT CHANGE UP (ref 0–0.9)
MONOCYTES NFR BLD AUTO: 12.2 % — SIGNIFICANT CHANGE UP (ref 2–14)
NEUTROPHILS # BLD AUTO: 3.27 K/UL — SIGNIFICANT CHANGE UP (ref 1.8–7.4)
NEUTROPHILS NFR BLD AUTO: 47 % — SIGNIFICANT CHANGE UP (ref 43–77)
NRBC # BLD: 0 /100 WBCS — SIGNIFICANT CHANGE UP (ref 0–0)
PLATELET # BLD AUTO: 464 K/UL — HIGH (ref 150–400)
POTASSIUM SERPL-SCNC: 4.6 MMOL/L
PROT SERPL-MCNC: 5.9 G/DL
RBC # BLD: 4.59 M/UL — SIGNIFICANT CHANGE UP (ref 3.8–5.2)
RBC # FLD: 16.1 % — HIGH (ref 10.3–14.5)
SODIUM SERPL-SCNC: 142 MMOL/L
TACROLIMUS SERPL-MCNC: <2 NG/ML
WBC # BLD: 6.96 K/UL — SIGNIFICANT CHANGE UP (ref 3.8–10.5)
WBC # FLD AUTO: 6.96 K/UL — SIGNIFICANT CHANGE UP (ref 3.8–10.5)

## 2020-05-06 PROCEDURE — 71250 CT THORAX DX C-: CPT

## 2020-05-06 PROCEDURE — 99214 OFFICE O/P EST MOD 30 MIN: CPT

## 2020-05-06 PROCEDURE — 71250 CT THORAX DX C-: CPT | Mod: 26

## 2020-05-06 NOTE — ASSESSMENT
[FreeTextEntry1] : Ms. Gary is a 35 years old female Confucianism with AML with the following comorbidities being managed:\par \par 1) AML\par S/p HLA 10/10 allo sib (sister) PBSCT on 2/27/19\par 7/3/19 BMbx: LANA, normal female karyotype\par 11/21/19 chimerism = 99% donor (CD33 = 100% and CD3 = 83% donor)\par 12/27/19 chimerism = 98% donor\par 2/12/20 chimerism = 99% donor (CD33 = 100% and CD3 = 88.8% donor)\par 3/10/20 chimerism = 99% donor (CD33 = 100% and CD3 = 94% donor)\par Current disease status: LANA\par Hold on starting maintenance Vidaza at this time given active GVHD issues \par \par 2) Heme\par No transfusions as pt is practicing Confucianism\par Counts stable\par    5/6/20:   WBC 6.96   ANC 3.27   Hgb 13.2   \par Continue multivitamin and folic acid daily\par \par 3) ID\par Continue ppx:\par - Acyclovir 400mg tid\par - Mepron 1500mg daily \par Voriconazole resumed 1/23/20 but subsequently held as of 2/19/20 for transaminitis\par Post transplant restrictions reviewed - in light of covid-19, [facility] recs reviewed, strict adherence to restriction reinforced\par \par 4/7/20 CMV PCR not detected, continue to monitor PCR weekly \par \par 4) GVHD\par Acute GVHD: Skin 0   Liver 0   GI 0 - overall grade 0\par Chronic GVHD: mouth score 1, eyes score 2, lungs score 2,  score 1 - severe extensive \par \par Prednisone d/c'd 12/22/19\par Continue FK 1mg bid - pending today's level\par See GVHD history below\par \par Date of onset of acute GVHD 8/1/19 \par - On 7/29/19 placed on prednisone 20 mg daily for erythematous rash of face as stated by patient.\par - 8/1/19: Erythematous/hyperpigmented rash 38% BSA- rash of face, chest, back, bilateral upper and lower extremities. Patient was placed on prednisone 20 mg daily as of 7/29/19. Instructed to increase prednisone on 8/1/19 to 40 mg daily on 8/1, 8/2, 8/3. Then decreased to prednisone 20 mg daily. Tacrolimus restarted on 8/1/19 at 0.5 mg BID. Lidex cream BID to body and hydrocortisone cream to face BID. Acute GVHD SKin 2 Liver 0 GI 0- overall grade 1\par - 8/7/19: Erythematous/hyperpigmented rash 43% BSA- rash of face, neck, chest, back, abdomen, bilateral upper and lower extremities. Increased prednisone 20mg daily to 40 mg daily. Lidex cream BID to entire body. FK at 0.5 mg BID. May have to increase FK dose if rash worsens. Acute GVHD SKin 2 Liver 0 GI 0- overall grade 1\par - 8/14/19: Erythematous rash of face. Hyperpigmented rash of chest, neck, abdomen, bilateral upper and lower extremities and back. Skin rash extent 42% BSA. Currently on prednisone 40 mg daily and lidex cream BID. Increased FK to 0.5 mg Am and 1 mg PM. \par - 9/26/19: Skin hyperpigmentation and facial hypopigmentation, but improvement in erythema from facial rash. Some oral sores using dex rinse 4 times a day. Increased FK to 1 mg BID as of 9/26/19. Continue prednisone 30 mg daily. Decrease prednisone by 5 mg every two weeks. Acute GVHD Skin 2 Liver 0 GI 0- overall grade 1. BSA 42%. \par - 12/27/19: no acute GVHD. Chronic GVHD: skin score 3 (>50% BSA), eyes score 3 (requiring scleral lenses), mouth score 2 - overall severe extensive chronic GVHD. Off prednisone as of 12/22/19. Continue FK 1.5mg bid and Decadron swish/spit qid, moisturize 2-3x/day \par - 1/23/20: no acute GVHD. Chronic GVHD: skin 3 (>50% BSA), eyes score 1, mouth score 1 - overall severe extensive chronic GVHD. Continue 1.5mg bid. Continue PRN Decadron swish/spit, instructed to avoid swallowing to prevent systemic absorption of steroid. Moisturize 2-3x/day\par - 3/10/20: no acute GVHD. Chronic GVHD eyes score 3, lung score 2, genital tract score 1 - overall severe extensive chronic GVHD. FK increased to 1mg bid and initiated FAM therapy as below for lung GVHD\par - 4/9/20: no acute GVHD. Chronic GVHD eyes score 2, lung score 2, genital tract score 1 - overall severe extensive chronic GVHD. Continue FK 1mg bid and FAM therapy\par - 5/6/20: no acute GVHD. Chronic GVHD mouth score 1, eyes score 2, lungs score 2, genitalia score 1 - overall severe extensive chronic GVHD. SOB on exertion somewhat improved. Continue FK 1mg bid and FAM therapy \par \par cGVHD eyes\par Ongoing dry eyes, photosensitivity, and redness \par No longer wearing scleral lenses at this time\par Continue PRN artificial tears and Systane ophthalmic ointment\par Continue f/u with optho\par \par cGVHD mouth\par PRN Decadron swish and spit\par Ulceration of bilateral sides of tongue, mild PO interference\par Continue to monitor closely \par \par cGVHD lung\par 3/10/20 PFT with FEV1 of 48%\par 5/6/20 CT chest: mild diffuse mosaic attenuation pattern to the lungs. ?BOOP, air trapping, small vessel disease. To consider repeat CT imaging in 3-4mo\par Ongoing SOB with exertion, minimally improved \par Continue FAM therapy, initiated 3/10/20 (pt already on monteleukast for asthma/allergies) \par - Flovent inhaler 440mcg bid\par - Azithromycin 250mg bid\par - Monteleukast 10mg daily \par \par cGVHD vaginal area\par Sensation of skin tightening and dryness of vaginal area \par May use lubricant to gently massage external labia to soften tissue\par Encouraged f/u with GYN after covid-19 epidemic resolves\par \par 5) GI\par Continue ppx:\par - Ursodiol 300 mg bid\par - Protonix 40 mg daily \par PRN Zofran and Reglan for nausea\par \par 6) Other \par Hypomagnesemia - likely 2/2 tacrolimus, continue magnesium oxide 400mg bid \par GYN - LMP November 2019, continue Sprintec daily, instructed to wear condoms if resuming sexual activity. To consider f/u with GYN if abnormal menses persists \par HTN - continue amlodipine 5mg daily, BP remains WNL\par Warts - likely viral warts on hand in setting of immunosuppression, continue to monitor closely \par Health maintenance - 1yr post transplant ECHO done 3/10/20 WNL, EF = 60%\par \par 8) Plan/Dispo\par Hold off on Vidaza maintenance given GVHD issues\par Pt educated regarding plan of care, all questions/concerns addressed\par Instructed to contact our office with fever or new/worsening symptoms \par F/u with Dr. Lou on 6/10/20 \par

## 2020-05-06 NOTE — PHYSICAL EXAM
[Ambulatory and capable of all self care but unable to carry out any work activities] : Status 2- Ambulatory and capable of all self care but unable to carry out any work activities. Up and about more than 50% of waking hours [Normal] : affect appropriate [de-identified] : ulceration and erythema of bilateral sides of tongue, moist mucous membranes  [de-identified] : tachycardic, regular rhythem, normal s1s2 [de-identified] : no edema [de-identified] : +BS; abdomen soft, non-distended, non-tender [de-identified] : 3-4 wart-like lesions on hands. Diffuse hyperpigmentation of face with patches of hypopigmentation over cheekbones. Generalized patches of hyperpigmentation of chest, abdomen, back, BUEs, and BLEs

## 2020-05-06 NOTE — HISTORY OF PRESENT ILLNESS
[Home] : at home, [unfilled] , at the time of the visit. [Medical Office: ([facility])___] : at ~his/her~ medical office located in [place] [Spouse] : spouse [Patient] : the patient [Self] : self [de-identified] : Ms. 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 Of note patient is a Hinduism and cannot receive blood products. \par \par Patient was noted to be newly leukopenic to WBC 1.7 during preoperative evaluation in anticipation of right ankle ligament repair. Bone marrow aspirate and biopsy at [facility] on 12/2/2017 revealed AML with some suspicion for APL. She was transferred to [place] for further management. Peripheral counts from around this time are not available. \par \par Repeat marrow on 12/26/2017 showed 90% blasts with myeloid mutation arrest in a 40-50% cellular marrow. On flow blasts were negative for CD34 and HLA-DR; positive for CD38, CD58, CD33, and MPO, and partially positive for CD15, CD11b, , and CD64. CD45 was reported as dim. Karyotype showed +21 in 3 of 21 metaphases, but no t(15:17) was detected on karyotype of FISH. Molecular studies showed mutations in NPM1 (45%0, IDH1 (R132S; 39%), PTPN1 (23%), and CSF3R (5%) genes. There was lingering diagnostic uncertainty due to a largely aspicular and hypocellular specimen, and thus a marrow was repeated on 1/2/2018, confirming AML.\par \par On 1/4/2018, Ms. Gary began 7+3 with Idarubicin. Her induction course was complicated by DIC, neutropenic fever/ Klebsiella PNA, and bilateral retinal hemorrhage. She did not receive blood products and was instead supported with Nplate, Procrit, IV iron, Amicar, and Lupron. After achieving count stability, the patient was discharged; she was not deemed a candidate for further therapy given inability to receive transfusions and a day 14 marrow was not obtained. She subsequently saw Dr. [place] Obrien at [facility]. He obtained a recovery marrow on 2/13/2018 (day +40), which was 30% cellular with 45% blasts. 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 Cytogenetics and FISH were normal. \par \par She then established care with Dr. Christie for consideration of salvage therapy with IDH1 inhibition given the risks of additional chemotherapy without blood products support. Initial marrow at [facility] [place], on 3/1/2018, showed 69% blasts. ThunderBolts panel confirmed IDH1 R132S, NPM1 [id number] 12, and CSF3R D810G mutations. She was started on Ivosidenib on [facility] protocol  and had completed four cycles to date (6/28/2018 = C5D1). Marrow blasts increased to 85% on 4/30 but declined to 36% by 5/31. A  bone marrow aspirate and biopsy  was obtained  (6/28/2018); preliminary results show further responding disease with 10.3% blasts by flow cytometry. Subsequent marrow in oct showed 5% blasts...she was then started on vidazza plus venetoclax. She completed 2 cycles...f/u marrow with 4% blasts.....\par \par Patient underwent an AlloPSCT sister MRD (10/10) on 2/27/19, hospital course as follows:\par Ms. 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 She is a Hinduism and cannot receive blood products. \par \par Ms. Nickerson's donor is her sister.  She is a 10/10 match. Both donor and patient are CMV +. She had completed all pre testing needed for transplant.  She will \par start reduced intensity Fludarabine/Busulfan chemo regimen today. \par \par Upon admission, a right AC PICC line was placed in IR. Ms. Gary received IV hydration, pain management, antiemetics, anxiolysis, and antibacterial / antiviral / antifungal / PCP / GI and VOD (SOS) prophylaxis. Labs were monitored every other day, and she received electrolyte repletion as needed. She also received vitamin K, vitamin B, supplemental iron, vitamin C and procrit as directed by the institutional bloodless transplant policy. \par \par On 2/27/19, Ms. Gary received 363 mLs of fresh, apheresed, mobilized, related allogeneic HPC over 80 minutes. Cell counts as follows: \par Total MNCs ( x 10^8/kg): 6.21 \par CD 34+Cells ( x 10^6/kg): 3.37 \par CD3+ Cells ( x 10^7/kg): 12.46 \par Cell Viability (%): 100% \par \par Engraftment was noted on 3/11/19. Ms. Gary did not receive zarxio due to her having minimal residual disease on admission, and a reduced intensity preparatory regimen to avoid stimulating myeloblasts. \par \par Ms. Gary had a relatively uncomplicated transplant course. She did experience menses, which was suppressed with provera. She also had pancytopenia related to the high dose chemotherapy preparative regimen. She also experienced grade 1 oral mucositis, and grade 2 GI mucositis also related to the chemotherapy preparative regimen. Both were treated with supportive care. \par \par Currently, Ms. Gary is stable for discharge home with outpatient follow up at the [facility].  [de-identified] : On 5/6/20 visit, overall feeling OK. Ongoing SOB with exertion is mildly improved. Ongoing GVHD of eyes with redness, dryness, and photosensitivity. Using eye gtts approx 8-10x/day and lacrilube ointment qhs, no longer wearing scleral lenses. Ongoing mouth pain, now primarily of the tongue with pain and raw sensation of bilateral sides of tongue where it rubs against the teeth, no significant impaired oral intake. Notes some non-painful bumps on soft palate, waxes and wanes. Generalized hyperpigmentation is somewhat improved. Notes a couple new bumps on her hands and 1 on her foot. Compliant with post transplant meds and restrictions. Denies fever, chills, headache, dizziness, chest pain, cough, nausea/vomiting, diarrhea/constipation, abdominal pain, dysuria, LE edema, rash, bleeding, pain\par

## 2020-05-06 NOTE — RESULTS/DATA
[FreeTextEntry1] : Today's CBC reviewed with pt \par \par 5/6/20 CT chest:\par Mid diffuse mosaic attentuation pattern to the lungs. This could represent bronchiolitis obliterans, air trapping, or small vessels disease. This is a focal groundglass nodule in the left lower lobe measuring 1.2cm \par \par 3/10/20 PFT:\par FEV1 = 48%\par \par 3/10/20 ECHO:\par EF = 60%\par \par 7/9/19 BMbx:\par - Marrow with maturing trilineage hematopoiesis and no increased blasts; iron stores present. Normal female karyotype

## 2020-05-06 NOTE — REASON FOR VISIT
[Follow-Up Visit] : a follow-up visit for [Acute Myeloid Leukemia] : acute myeloid leukemia [Other: _____] : [unfilled] [FreeTextEntry2] : AML s/p HLA 10/10 allo sib (sister) PBSCT on 2/27/19

## 2020-05-07 LAB
CMV DNA SPEC QL NAA+PROBE: NOT DETECTED
CMVPCR LOG: NOT DETECTED LOG10IU/ML

## 2020-05-11 LAB
CD3 AND CD33 ENRICHMENT: NORMAL
ENGRAFTMNET-POST: NORMAL

## 2020-06-05 ENCOUNTER — OUTPATIENT (OUTPATIENT)
Dept: OUTPATIENT SERVICES | Facility: HOSPITAL | Age: 35
LOS: 1 days | Discharge: HOME | End: 2020-06-05

## 2020-06-05 DIAGNOSIS — Z90.3 ACQUIRED ABSENCE OF STOMACH [PART OF]: Chronic | ICD-10-CM

## 2020-06-05 DIAGNOSIS — C92.00 ACUTE MYELOBLASTIC LEUKEMIA, NOT HAVING ACHIEVED REMISSION: ICD-10-CM

## 2020-06-10 ENCOUNTER — RESULT REVIEW (OUTPATIENT)
Age: 35
End: 2020-06-10

## 2020-06-10 ENCOUNTER — APPOINTMENT (OUTPATIENT)
Dept: HEMATOLOGY ONCOLOGY | Facility: CLINIC | Age: 35
End: 2020-06-10
Payer: COMMERCIAL

## 2020-06-10 VITALS
TEMPERATURE: 99.1 F | DIASTOLIC BLOOD PRESSURE: 76 MMHG | SYSTOLIC BLOOD PRESSURE: 102 MMHG | WEIGHT: 199.5 LBS | RESPIRATION RATE: 18 BRPM | OXYGEN SATURATION: 99 % | BODY MASS INDEX: 36.49 KG/M2 | HEART RATE: 122 BPM

## 2020-06-10 DIAGNOSIS — M79.18 MYALGIA, OTHER SITE: ICD-10-CM

## 2020-06-10 LAB
ALBUMIN SERPL ELPH-MCNC: 3.5 G/DL
ALP BLD-CCNC: 90 U/L
ALT SERPL-CCNC: 8 U/L
ANION GAP SERPL CALC-SCNC: 13 MMOL/L
AST SERPL-CCNC: 15 U/L
BASOPHILS # BLD AUTO: 0.04 K/UL — SIGNIFICANT CHANGE UP (ref 0–0.2)
BASOPHILS NFR BLD AUTO: 0.6 % — SIGNIFICANT CHANGE UP (ref 0–2)
BILIRUB SERPL-MCNC: 0.2 MG/DL
BUN SERPL-MCNC: 4 MG/DL
CALCIUM SERPL-MCNC: 9.1 MG/DL
CHLORIDE SERPL-SCNC: 106 MMOL/L
CO2 SERPL-SCNC: 22 MMOL/L
CREAT SERPL-MCNC: 0.97 MG/DL
EOSINOPHIL # BLD AUTO: 0.51 K/UL — HIGH (ref 0–0.5)
EOSINOPHIL NFR BLD AUTO: 7 % — HIGH (ref 0–6)
FERRITIN SERPL-MCNC: 152 NG/ML
GLUCOSE SERPL-MCNC: 89 MG/DL
HCT VFR BLD CALC: 39 % — SIGNIFICANT CHANGE UP (ref 34.5–45)
HGB BLD-MCNC: 12.4 G/DL — SIGNIFICANT CHANGE UP (ref 11.5–15.5)
IMM GRANULOCYTES NFR BLD AUTO: 0.3 % — SIGNIFICANT CHANGE UP (ref 0–1.5)
LDH SERPL-CCNC: 184 U/L
LYMPHOCYTES # BLD AUTO: 2 K/UL — SIGNIFICANT CHANGE UP (ref 1–3.3)
LYMPHOCYTES # BLD AUTO: 27.5 % — SIGNIFICANT CHANGE UP (ref 13–44)
MAGNESIUM SERPL-MCNC: 2 MG/DL
MCHC RBC-ENTMCNC: 29.2 PG — SIGNIFICANT CHANGE UP (ref 27–34)
MCHC RBC-ENTMCNC: 31.8 GM/DL — LOW (ref 32–36)
MCV RBC AUTO: 92 FL — SIGNIFICANT CHANGE UP (ref 80–100)
MONOCYTES # BLD AUTO: 0.81 K/UL — SIGNIFICANT CHANGE UP (ref 0–0.9)
MONOCYTES NFR BLD AUTO: 11.2 % — SIGNIFICANT CHANGE UP (ref 2–14)
NEUTROPHILS # BLD AUTO: 3.88 K/UL — SIGNIFICANT CHANGE UP (ref 1.8–7.4)
NEUTROPHILS NFR BLD AUTO: 53.4 % — SIGNIFICANT CHANGE UP (ref 43–77)
NRBC # BLD: 0 /100 WBCS — SIGNIFICANT CHANGE UP (ref 0–0)
PLATELET # BLD AUTO: 476 K/UL — HIGH (ref 150–400)
POTASSIUM SERPL-SCNC: 4.4 MMOL/L
PROT SERPL-MCNC: 6 G/DL
RBC # BLD: 4.24 M/UL — SIGNIFICANT CHANGE UP (ref 3.8–5.2)
RBC # FLD: 15.9 % — HIGH (ref 10.3–14.5)
SODIUM SERPL-SCNC: 141 MMOL/L
T3 SERPL-MCNC: 150 NG/DL
T4 SERPL-MCNC: 10.9 UG/DL
TACROLIMUS SERPL-MCNC: <2 NG/ML
TSH SERPL-ACNC: 2.52 UIU/ML
WBC # BLD: 7.26 K/UL — SIGNIFICANT CHANGE UP (ref 3.8–10.5)
WBC # FLD AUTO: 7.26 K/UL — SIGNIFICANT CHANGE UP (ref 3.8–10.5)

## 2020-06-10 PROCEDURE — 99214 OFFICE O/P EST MOD 30 MIN: CPT

## 2020-06-11 LAB
CMV DNA SPEC QL NAA+PROBE: NOT DETECTED
CMVPCR LOG: NOT DETECTED LOG10IU/ML

## 2020-06-12 PROBLEM — M79.18 MUSCULOSKELETAL PAIN: Status: ACTIVE | Noted: 2020-06-12

## 2020-06-12 NOTE — PHYSICAL EXAM
[Ambulatory and capable of all self care but unable to carry out any work activities] : Status 2- Ambulatory and capable of all self care but unable to carry out any work activities. Up and about more than 50% of waking hours [Normal] : affect appropriate [de-identified] : tachycardic, regular rhythem, normal s1s2 [de-identified] : exam limited by inability to open mouth wide d/t pain, erythematous mucosa [de-identified] : +BS; abdomen soft, non-distended, non-tender [de-identified] : trace edema of BLEs [de-identified] : n [de-identified] : Diffuse hyperpigmentation of face with patches of hypopigmentation over cheekbones. Generalized patches of hyperpigmentation of chest, abdomen, back, BUEs, and BLEs

## 2020-06-12 NOTE — HISTORY OF PRESENT ILLNESS
[de-identified] : Ms. Gary is a 35 y/o female with AML associated with normal karyotype and NPM1 ans [id number] mutation, refractory to 7+3 and currently receiving Ivosidenib. Of note patient is a Faith and cannot receive blood products. \par \par Patient was noted to be newly leukopenic to WBC 1.7 during preoperative evaluation in anticipation of right ankle ligament repair. Bone marrow aspirate and biopsy at [facility] on 12/2/2017 revealed AML with some suspicion for APL. She was transferred to [place] for further management. Peripheral counts from around this time are not available. \par \par Repeat marrow on 12/26/2017 showed 90% blasts with myeloid mutation arrest in a 40-50% cellular marrow. On flow blasts were negative for CD34 and HLA-DR; positive for CD38, CD58, CD33, and MPO, and partially positive for CD15, CD11b, , and CD64. CD45 was reported as dim. Karyotype showed +21 in 3 of 21 metaphases, but no t(15:17) was detected on karyotype of FISH. Molecular studies showed mutations in NPM1 (45%0, IDH1 (R132S; 39%), PTPN1 (23%), and CSF3R (5%) genes. There was lingering diagnostic uncertainty due to a largely aspicular and hypocellular specimen, and thus a marrow was repeated on 1/2/2018, confirming AML.\par \par On 1/4/2018, Ms. Gary began 7+3 with Idarubicin. Her induction course was complicated by DIC, neutropenic fever/ Klebsiella PNA, and bilateral retinal hemorrhage. She did not receive blood products and was instead supported with Nplate, Procrit, IV iron, Amicar, and Lupron. After achieving count stability, the patient was discharged; she was not deemed a candidate for further therapy given inability to receive transfusions and a day 14 marrow was not obtained. She subsequently saw Dr. [place] at [facility]. He obtained a recovery marrow on 2/13/2018 (day +40), which was 30% cellular with 45% blasts. 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 Cytogenetics and FISH were normal. \par \par She then established care with Dr. Christie for consideration of salvage therapy with IDH1 inhibition given the risks of additional chemotherapy without blood products support. [place] [facility] [place] on 3/1/2018, showed 69% blasts. ThunderBolts panel confirmed IDH1 R132S, NPM1 [id number] 12, and CSF3R D810G mutations. She was started on Ivosidenib on [facility] [place] protocol  and had completed four cycles to date (6/28/2018 = C5D1). Marrow blasts increased to 85% on 4/30 but declined to 36% by 5/31. A  bone marrow aspirate and biopsy  was obtained  (6/28/2018); preliminary results show further responding disease with 10.3% blasts by flow cytometry. Subsequent marrow in oct showed 5% blasts...she was then started on vidazza plus venetoclax. She completed 2 cycles...f/u marrow with 4% blasts.....\par \par Patient underwent an AlloPSCT sister MRD (10/10) on 2/27/19, hospital course as follows:\par Ms. 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 She is a Faith and cannot receive blood products. \par \par Ms. Nickerson's donor is her sister.  She is a 10/10 match. Both donor and patient are CMV +. She had completed all pre testing needed for transplant.  She will \par start reduced intensity Fludarabine/Busulfan chemo regimen today. \par \par Upon admission, a right AC PICC line was placed in IR. Ms. Gary received IV hydration, pain management, antiemetics, anxiolysis, and antibacterial / antiviral / antifungal / PCP / GI and VOD (SOS) prophylaxis. Labs were monitored every other day, and she received electrolyte repletion as needed. She also received vitamin K, vitamin B, supplemental iron, vitamin C and procrit as directed by the institutional bloodless transplant policy. \par \par On 2/27/19, Ms. Gary received 363 mLs of fresh, apheresed, mobilized, related allogeneic HPC over 80 minutes. Cell counts as follows: \par Total MNCs ( x 10^8/kg): 6.21 \par CD 34+Cells ( x 10^6/kg): 3.37 \par CD3+ Cells ( x 10^7/kg): 12.46 \par Cell Viability (%): 100% \par \par Engraftment was noted on 3/11/19. Ms. Gary did not receive zarxio due to her having minimal residual disease on admission, and a reduced intensity preparatory regimen to avoid stimulating myeloblasts. \par \par Ms. Gary had a relatively uncomplicated transplant course. She did experience menses, which was suppressed with provera. She also had pancytopenia related to the high dose chemotherapy preparative regimen. She also experienced grade 1 oral mucositis, and grade 2 GI mucositis also related to the chemotherapy preparative regimen. Both were treated with supportive care. \par \par Currently, Ms. Gary is stable for discharge home with outpatient follow up at the [facility].  [de-identified] : On 6/10/20 visit, overall feeling OK. Notes worsening mouth pain, now having pain with eating all the time regardless of eating bland soft foods. Stable SOB with exertion. Stable dry eyes without vision impairment, continues to use artificial tears 7-10x/day and lacrilube at night, has not been using scleral lenses but wishes to try again this week. Occasional nausea only with Mepron if she doesn't take Zofran before dose. Notes muscle tightness and pain of bilateral forearms, shoulders, and hamstrings with activity that involves stretching/flexing of those muscle groups. Ongoing vaginal dryness, not sexually active at this time, no f/u with GYN as of yet d/t covid. Trace BLE edema which she attributes to the long car ride from [place] this AM. Compliant with post transplant meds and restrictions. Currently taking FK 1mg bid which she did not take prior to lab draw today. Denies fever, chills, headache, dizziness, blurred vision, chest pain, cough, vomiting, diarrhea/constipation, abdominal pain, dysuria, rash, bleeding\par

## 2020-06-12 NOTE — ASSESSMENT
[FreeTextEntry1] : Ms. Gary is a 35 years old female Amish with AML with the following comorbidities being managed:\par \par 1) AML\par S/p HLA 10/10 allo sib (sister) PBSCT on 2/27/19\par 7/3/19 BMbx: LANA, normal female karyotype\par 11/21/19 chimerism = 99% donor (CD33 = 100% and CD3 = 83% donor)\par 12/27/19 chimerism = 98% donor\par 2/12/20 chimerism = 99% donor (CD33 = 100% and CD3 = 88.8% donor)\par 3/10/20 chimerism = 99% donor (CD33 = 100% and CD3 = 94% donor)\par 5/6/20 chimerism = 10)% donor (CD33 = 100% and CD3 = 97% donor)\par Current disease status: LANA based on 7/3/19 BMbx and peripheral blood counts \par Hold on starting maintenance Vidaza at this time given active GVHD issues \par \par 2) Heme\par No transfusions as pt is practicing Amish\par Counts stable\par    6/10/20:   WBC 7.26   ANC 3.88   Hgb 12.4   \par Continue multivitamin and folic acid daily\par \par 3) ID\par Continue ppx:\par - Acyclovir 400mg tid\par - Mepron 1500mg daily \par Voriconazole resumed 1/23/20 but subsequently held as of 2/19/20 for transaminitis\par Post transplant restrictions reviewed - in light of covid-19, CDC recs reviewed, strict adherence to restriction reinforced\par \par 5/6/20 CMV PCR not detected, continue to monitor PCR weekly \par \par 4) GVHD\par Acute GVHD: Skin 0   Liver 0   GI 0 - overall grade 0\par Chronic GVHD: mouth score 2, eyes score 2, lungs score 2,  score 1 - severe extensive \par \par Prednisone d/c'd 12/22/19\par FK increased to 1.5mg bid on 6/10/20 for worsening mucositis\par See GVHD history below\par \par Date of onset of acute GVHD 8/1/19 \par - On 7/29/19 placed on prednisone 20 mg daily for erythematous rash of face as stated by patient.\par - 8/1/19: Erythematous/hyperpigmented rash 38% BSA- rash of face, chest, back, bilateral upper and lower extremities. Patient was placed on prednisone 20 mg daily as of 7/29/19. Instructed to increase prednisone on 8/1/19 to 40 mg daily on 8/1, 8/2, 8/3. Then decreased to prednisone 20 mg daily. Tacrolimus restarted on 8/1/19 at 0.5 mg BID. Lidex cream BID to body and hydrocortisone cream to face BID. Acute GVHD SKin 2 Liver 0 GI 0- overall grade 1\par - 8/7/19: Erythematous/hyperpigmented rash 43% BSA- rash of face, neck, chest, back, abdomen, bilateral upper and lower extremities. Increased prednisone 20mg daily to 40 mg daily. Lidex cream BID to entire body. FK at 0.5 mg BID. May have to increase FK dose if rash worsens. Acute GVHD SKin 2 Liver 0 GI 0- overall grade 1\par - 8/14/19: Erythematous rash of face. Hyperpigmented rash of chest, neck, abdomen, bilateral upper and lower extremities and back. Skin rash extent 42% BSA. Currently on prednisone 40 mg daily and lidex cream BID. Increased FK to 0.5 mg Am and 1 mg PM. \par - 9/26/19: Skin hyperpigmentation and facial hypopigmentation, but improvement in erythema from facial rash. Some oral sores using dex rinse 4 times a day. Increased FK to 1 mg BID as of 9/26/19. Continue prednisone 30 mg daily. Decrease prednisone by 5 mg every two weeks. Acute GVHD Skin 2 Liver 0 GI 0- overall grade 1. BSA 42%. \par - 12/27/19: no acute GVHD. Chronic GVHD: skin score 3 (>50% BSA), eyes score 3 (requiring scleral lenses), mouth score 2 - overall severe extensive chronic GVHD. Off prednisone as of 12/22/19. Continue FK 1.5mg bid and Decadron swish/spit qid, moisturize 2-3x/day \par - 1/23/20: no acute GVHD. Chronic GVHD: skin 3 (>50% BSA), eyes score 1, mouth score 1 - overall severe extensive chronic GVHD. Continue 1.5mg bid. Continue PRN Decadron swish/spit, instructed to avoid swallowing to prevent systemic absorption of steroid. Moisturize 2-3x/day\par - 3/10/20: no acute GVHD. Chronic GVHD eyes score 3, lung score 2, genital tract score 1 - overall severe extensive chronic GVHD. FK increased to 1mg bid and initiated FAM therapy as below for lung GVHD\par - 4/9/20: no acute GVHD. Chronic GVHD eyes score 2, lung score 2, genital tract score 1 - overall severe extensive chronic GVHD. Continue FK 1mg bid and FAM therapy\par - 5/6/20: no acute GVHD. Chronic GVHD mouth score 1, eyes score 2, lungs score 2, genitalia score 1 - overall severe extensive chronic GVHD. SOB on exertion somewhat improved. Continue FK 1mg bid and FAM therapy\par 6/10/20: no acute GVHD. Chronic GVHD mouth score 1, eyes score 2, lung score 2, genitalia score 1 - overall severe extensive chronic GVHD. Worsening mucositis, FK increased to 1.5mg bid \par \par cGVHD eyes\par Ongoing dry eyes, photosensitivity, and redness \par No longer wearing scleral lenses at this time\par Continue PRN artificial tears and Systane ophthalmic ointment\par Continue f/u with optho\par \par cGVHD mouth\par PRN Decadron swish and spit\par Worsening pain with eating regardless of bland or soft textures, pain with opening mouth wide\par Continue to monitor closely \par \par cGVHD lung\par 3/10/20 PFT with FEV1 of 48%\par 5/6/20 CT chest: mild diffuse mosaic attenuation pattern to the lungs. ?BOOP, air trapping, small vessel disease. To consider repeat CT imaging in 3-4mo\par Ongoing SOB with exertion, minimally improved \par Continue FAM therapy, initiated 3/10/20 (pt already on monteleukast for asthma/allergies) \par - Flovent inhaler 440mcg bid\par - Azithromycin 250mg bid\par - Monteleukast 10mg daily \par \par cGVHD vaginal area\par Sensation of skin tightening and dryness of vaginal area \par May use lubricant to gently massage external labia to soften tissue\par Encouraged f/u with GYN after covid-19 epidemic resolves\par \par 5) GI\par Continue ppx:\par - Ursodiol 300 mg bid\par - Protonix 40 mg daily \par PRN Zofran and Reglan for nausea\par \par 6) Other \par Hypomagnesemia - likely 2/2 tacrolimus, continue magnesium oxide 400mg bid \par GYN - LMP November 2019, continue Sprintec daily, instructed to wear condoms if resuming sexual activity. To consider f/u with GYN if abnormal menses persists \par HTN - continue amlodipine 5mg daily, BP remains WNL\par Muscle pain - of bilateral forearms, shoulders, and hamstrings. May use PRN Tylenol for pain. Continue to monitor closely\par Warts - likely viral warts on hand in setting of immunosuppression, continue to monitor closely \par Health maintenance - 1yr post transplant ECHO done 3/10/20 WNL, EF = 60%\par \par 8) Plan/Dispo\par FK increased to 1.5mg bid for worsening oral GVHD\par Hold off on Vidaza maintenance given GVHD issues\par Pt educated regarding plan of care, all questions/concerns addressed\par Instructed to contact our office with fever or new/worsening symptoms \par F/u with NP on 7/14/20 \par

## 2020-06-16 LAB
CD3 AND CD33 ENRICHMENT: NORMAL
ENGRAFTMNET-POST: NORMAL

## 2020-07-05 ENCOUNTER — RX RENEWAL (OUTPATIENT)
Age: 35
End: 2020-07-05

## 2020-07-10 ENCOUNTER — OUTPATIENT (OUTPATIENT)
Dept: OUTPATIENT SERVICES | Facility: HOSPITAL | Age: 35
LOS: 1 days | Discharge: HOME | End: 2020-07-10

## 2020-07-10 DIAGNOSIS — C92.00 ACUTE MYELOBLASTIC LEUKEMIA, NOT HAVING ACHIEVED REMISSION: ICD-10-CM

## 2020-07-10 DIAGNOSIS — Z90.3 ACQUIRED ABSENCE OF STOMACH [PART OF]: Chronic | ICD-10-CM

## 2020-07-14 ENCOUNTER — RESULT REVIEW (OUTPATIENT)
Age: 35
End: 2020-07-14

## 2020-07-14 ENCOUNTER — OUTPATIENT (OUTPATIENT)
Dept: OUTPATIENT SERVICES | Facility: HOSPITAL | Age: 35
LOS: 1 days | End: 2020-07-14

## 2020-07-14 ENCOUNTER — APPOINTMENT (OUTPATIENT)
Dept: HEMATOLOGY ONCOLOGY | Facility: CLINIC | Age: 35
End: 2020-07-14
Payer: COMMERCIAL

## 2020-07-14 VITALS
OXYGEN SATURATION: 97 % | RESPIRATION RATE: 16 BRPM | DIASTOLIC BLOOD PRESSURE: 84 MMHG | BODY MASS INDEX: 35.97 KG/M2 | TEMPERATURE: 98.3 F | HEART RATE: 111 BPM | SYSTOLIC BLOOD PRESSURE: 116 MMHG | WEIGHT: 196.65 LBS

## 2020-07-14 DIAGNOSIS — K12.30 ORAL MUCOSITIS (ULCERATIVE), UNSPECIFIED: ICD-10-CM

## 2020-07-14 DIAGNOSIS — Z90.3 ACQUIRED ABSENCE OF STOMACH [PART OF]: Chronic | ICD-10-CM

## 2020-07-14 DIAGNOSIS — R53.83 OTHER FATIGUE: ICD-10-CM

## 2020-07-14 DIAGNOSIS — C92.00 ACUTE MYELOBLASTIC LEUKEMIA, NOT HAVING ACHIEVED REMISSION: ICD-10-CM

## 2020-07-14 DIAGNOSIS — M25.50 PAIN IN UNSPECIFIED JOINT: ICD-10-CM

## 2020-07-14 LAB
ALBUMIN SERPL ELPH-MCNC: 3.6 G/DL
ALP BLD-CCNC: 91 U/L
ALT SERPL-CCNC: 7 U/L
ANION GAP SERPL CALC-SCNC: 11 MMOL/L
AST SERPL-CCNC: 16 U/L
BASOPHILS # BLD AUTO: 0.04 K/UL — SIGNIFICANT CHANGE UP (ref 0–0.2)
BASOPHILS NFR BLD AUTO: 0.5 % — SIGNIFICANT CHANGE UP (ref 0–2)
BILIRUB SERPL-MCNC: <0.2 MG/DL
BUN SERPL-MCNC: 6 MG/DL
CALCIUM SERPL-MCNC: 9.2 MG/DL
CHLORIDE SERPL-SCNC: 107 MMOL/L
CO2 SERPL-SCNC: 23 MMOL/L
CREAT SERPL-MCNC: 1 MG/DL
EOSINOPHIL # BLD AUTO: 0.24 K/UL — SIGNIFICANT CHANGE UP (ref 0–0.5)
EOSINOPHIL NFR BLD AUTO: 3.2 % — SIGNIFICANT CHANGE UP (ref 0–6)
GLUCOSE SERPL-MCNC: 98 MG/DL
HCT VFR BLD CALC: 39.1 % — SIGNIFICANT CHANGE UP (ref 34.5–45)
HGB BLD-MCNC: 12.9 G/DL — SIGNIFICANT CHANGE UP (ref 11.5–15.5)
IMM GRANULOCYTES NFR BLD AUTO: 0.5 % — SIGNIFICANT CHANGE UP (ref 0–1.5)
LDH SERPL-CCNC: 180 U/L
LYMPHOCYTES # BLD AUTO: 2.04 K/UL — SIGNIFICANT CHANGE UP (ref 1–3.3)
LYMPHOCYTES # BLD AUTO: 27.5 % — SIGNIFICANT CHANGE UP (ref 13–44)
MAGNESIUM SERPL-MCNC: 1.9 MG/DL
MCHC RBC-ENTMCNC: 29.6 PG — SIGNIFICANT CHANGE UP (ref 27–34)
MCHC RBC-ENTMCNC: 33 GM/DL — SIGNIFICANT CHANGE UP (ref 32–36)
MCV RBC AUTO: 89.7 FL — SIGNIFICANT CHANGE UP (ref 80–100)
MONOCYTES # BLD AUTO: 0.84 K/UL — SIGNIFICANT CHANGE UP (ref 0–0.9)
MONOCYTES NFR BLD AUTO: 11.3 % — SIGNIFICANT CHANGE UP (ref 2–14)
NEUTROPHILS # BLD AUTO: 4.21 K/UL — SIGNIFICANT CHANGE UP (ref 1.8–7.4)
NEUTROPHILS NFR BLD AUTO: 57 % — SIGNIFICANT CHANGE UP (ref 43–77)
NRBC # BLD: 0 /100 WBCS — SIGNIFICANT CHANGE UP (ref 0–0)
PLATELET # BLD AUTO: 486 K/UL — HIGH (ref 150–400)
POTASSIUM SERPL-SCNC: 4.4 MMOL/L
PROT SERPL-MCNC: 6.3 G/DL
RBC # BLD: 4.36 M/UL — SIGNIFICANT CHANGE UP (ref 3.8–5.2)
RBC # FLD: 14.8 % — HIGH (ref 10.3–14.5)
SODIUM SERPL-SCNC: 141 MMOL/L
TACROLIMUS SERPL-MCNC: 3.3 NG/ML
WBC # BLD: 7.41 K/UL — SIGNIFICANT CHANGE UP (ref 3.8–10.5)
WBC # FLD AUTO: 7.41 K/UL — SIGNIFICANT CHANGE UP (ref 3.8–10.5)

## 2020-07-14 PROCEDURE — 99214 OFFICE O/P EST MOD 30 MIN: CPT

## 2020-07-14 RX ORDER — NYSTATIN 100000 [USP'U]/ML
100000 SUSPENSION ORAL 4 TIMES DAILY
Qty: 100 | Refills: 0 | Status: DISCONTINUED | COMMUNITY
Start: 2020-04-09 | End: 2020-07-14

## 2020-07-15 ENCOUNTER — RX RENEWAL (OUTPATIENT)
Age: 35
End: 2020-07-15

## 2020-07-15 LAB
CMV DNA SPEC QL NAA+PROBE: NOT DETECTED
CMVPCR LOG: NOT DETECTED LOG10IU/ML

## 2020-07-15 NOTE — PHYSICAL EXAM
[Ambulatory and capable of all self care but unable to carry out any work activities] : Status 2- Ambulatory and capable of all self care but unable to carry out any work activities. Up and about more than 50% of waking hours [Normal] : affect appropriate [de-identified] : trace edema of BLEs [de-identified] : exam limited by inability to open mouth wide d/t pain, erythematous mucosa [de-identified] : tachycardic, regular rhythem, normal s1s2 [de-identified] : Diffuse hyperpigmentation of face with patches of hypopigmentation over cheekbones. Generalized patches of hyperpigmentation of chest, abdomen, back, BUEs, and BLEs [de-identified] : +BS; abdomen soft, non-distended, non-tender

## 2020-07-15 NOTE — ASSESSMENT
[FreeTextEntry1] : Ms. Gary is a 35 years old female [employer]onden[employer]ational with AML with the following comorbidities being managed:\par \par 1) AML\par S/p HLA 10/10 allo sib (sister) PBSCT on 2/27/19\par 7/3/19 BMbx: LANA, normal female karyotype\par 11/21/19 chimerism = 99% donor (CD33 = 100% and CD3 = 83% donor)\par 12/27/19 chimerism = 98% donor\par 2/12/20 chimerism = 99% donor (CD33 = 100% and CD3 = 88.8% donor)\par 3/10/20 chimerism = 99% donor (CD33 = 100% and CD3 = 94% donor)\par 5/6/20 chimerism = 100% donor (CD33 = 100% and CD3 = 97% donor)\par 6/10/20 chimerism = 100% donor in CD3 and CD33 compartments\par Current disease status: LANA based on 7/3/19 BMbx and peripheral blood counts \par \par Hold on starting maintenance Vidaza at this time given active GVHD issues \par \par 2) Heme\par No transfusions as pt is practicing [employer]ond[employer]ominational\par Counts stable\par    7/14/20:   WBC 7.41   ANC 4.21   Hgb 12.9   \par Continue multivitamin and folic acid daily\par \par 3) ID\par Continue ppx:\par - Acyclovir 400mg tid\par - Mepron 1500mg daily \par Voriconazole resumed 1/23/20 but subsequently held as of 2/19/20 for transaminitis\par Post transplant restrictions reviewed - in light of covid-19, [facility] recs reviewed, strict adherence to restriction reinforced\par \par 6/10/20 CMV PCR not detected, continue to monitor PCR weekly \par \par 4) GVHD\par Acute GVHD: Skin 0   Liver 0   GI 0 - overall grade 0\par Chronic GVHD: mouth score 2, eyes score 2, lungs score 2,  score 1, joints score 2- severe extensive \par \par Prednisone d/c'd 12/22/19\par FK = 3.3 today, FK increased to 2.5mg bid as of 7/14/20\par To consider addition of Jakafi if GVHD symptoms persist/worsen \par See GVHD history below\par \par Date of onset of acute GVHD 8/1/19 \par - On 7/29/19 placed on prednisone 20 mg daily for erythematous rash of face as stated by patient.\par - 8/1/19: Erythematous/hyperpigmented rash 38% BSA- rash of face, chest, back, bilateral upper and lower extremities. Patient was placed on prednisone 20 mg daily as of 7/29/19. Instructed to increase prednisone on 8/1/19 to 40 mg daily on 8/1, 8/2, 8/3. Then decreased to prednisone 20 mg daily. Tacrolimus restarted on 8/1/19 at 0.5 mg BID. Lidex cream BID to body and hydrocortisone cream to face BID. Acute GVHD SKin 2 Liver 0 GI 0- overall grade 1\par - 8/7/19: Erythematous/hyperpigmented rash 43% BSA- rash of face, neck, chest, back, abdomen, bilateral upper and lower extremities. Increased prednisone 20mg daily to 40 mg daily. Lidex cream BID to entire body. FK at 0.5 mg BID. May have to increase FK dose if rash worsens. Acute GVHD SKin 2 Liver 0 GI 0- overall grade 1\par - 8/14/19: Erythematous rash of face. Hyperpigmented rash of chest, neck, abdomen, bilateral upper and lower extremities and back. Skin rash extent 42% BSA. Currently on prednisone 40 mg daily and lidex cream BID. Increased FK to 0.5 mg Am and 1 mg PM. \par - 9/26/19: Skin hyperpigmentation and facial hypopigmentation, but improvement in erythema from facial rash. Some oral sores using dex rinse 4 times a day. Increased FK to 1 mg BID as of 9/26/19. Continue prednisone 30 mg daily. Decrease prednisone by 5 mg every two weeks. Acute GVHD Skin 2 Liver 0 GI 0- overall grade 1. BSA 42%. \par - 12/27/19: no acute GVHD. Chronic GVHD: skin score 3 (>50% BSA), eyes score 3 (requiring scleral lenses), mouth score 2 - overall severe extensive chronic GVHD. Off prednisone as of 12/22/19. Continue FK 1.5mg bid and Decadron swish/spit qid, moisturize 2-3x/day \par - 1/23/20: no acute GVHD. Chronic GVHD: skin 3 (>50% BSA), eyes score 1, mouth score 1 - overall severe extensive chronic GVHD. Continue 1.5mg bid. Continue PRN Decadron swish/spit, instructed to avoid swallowing to prevent systemic absorption of steroid. Moisturize 2-3x/day\par - 3/10/20: no acute GVHD. Chronic GVHD eyes score 3, lung score 2, genital tract score 1 - overall severe extensive chronic GVHD. FK increased to 1mg bid and initiated FAM therapy as below for lung GVHD\par - 4/9/20: no acute GVHD. Chronic GVHD eyes score 2, lung score 2, genital tract score 1 - overall severe extensive chronic GVHD. Continue FK 1mg bid and FAM therapy\par - 5/6/20: no acute GVHD. Chronic GVHD mouth score 1, eyes score 2, lungs score 2, genitalia score 1 - overall severe extensive chronic GVHD. SOB on exertion somewhat improved. Continue FK 1mg bid and FAM therapy\par 6/10/20: no acute GVHD. Chronic GVHD mouth score 1, eyes score 2, lung score 2, genitalia score 1 - overall severe extensive chronic GVHD. Worsening mucositis, FK increased to 1.5mg bid \par \par cGVHD eyes\par Ongoing dry eyes, photosensitivity, and redness \par No longer wearing scleral lenses at this time\par Continue PRN artificial tears and Systane ophthalmic ointment\par Continue f/u with optho\par \par cGVHD mouth\par Pain with certain foods/spices\par Continue Decadron swish and spit qid\par Continue to monitor closely \par \par cGVHD lung\par 3/10/20 PFT with FEV1 of 48%\par 5/6/20 CT chest: mild diffuse mosaic attenuation pattern to the lungs. ?BOOP, air trapping, small vessel disease. To consider repeat CT imaging in 3-4mo\par Ongoing SOB with exertion, minimally improved \par Continue FAM therapy, initiated 3/10/20 (pt already on monteleukast for asthma/allergies) \par - Flovent inhaler 440mcg bid\par - Azithromycin 250mg bid\par - Monteleukast 10mg daily \par \par cGVHD vaginal area\par Sensation of skin tightening and dryness of vaginal area \par May use lubricant to gently massage external labia to soften tissue\par Encouraged f/u with GYN after covid-19 epidemic resolves\par \par cGVHD joints\par Noted 7/15/20; pain causing limited ROM of shoulders, elbows, wrists, finger joints. Unable to pick things up off of the floor without pain\par Continue to monitor closely \par \par 5) GI\par Continue ppx:\par - Ursodiol 300 mg bid\par - Protonix 40 mg daily \par PRN Zofran and Reglan for nausea\par \par 6) Other \par Hypomagnesemia - likely 2/2 tacrolimus, continue magnesium oxide 400mg bid \par GYN - LMP November 2019, continue Sprintec daily, instructed to wear condoms if resuming sexual activity. To consider f/u with GYN if abnormal menses persists \par HTN - continue amlodipine 5mg daily, BP remains WNL\par Warts - likely viral warts on hand in setting of immunosuppression, continue to monitor closely \par Health maintenance - 1yr post transplant ECHO done 3/10/20 WNL, EF = 60%\par \par 8) Plan/Dispo\par FK increased to 2.5mg bid for worsening oral GVHD\par Hold off on Vidaza maintenance given GVHD issues\par Pt educated regarding plan of care, all questions/concerns addressed\par Instructed to contact our office with fever or new/worsening symptoms \par F/u with Dr. Lou on 8/11/20 \par

## 2020-07-15 NOTE — REASON FOR VISIT
[Follow-Up Visit] : a follow-up visit for [Other: _____] : [unfilled] [Acute Myeloid Leukemia] : acute myeloid leukemia [FreeTextEntry2] : AML s/p HLA 10/10 allo sib (sister) PBSCT on 2/27/19

## 2020-07-15 NOTE — HISTORY OF PRESENT ILLNESS
[de-identified] : On 7/14/20 visit, overall stable. Ongoing fatigue. SOB with exertion is stable to slightly improved. Reports adequate PO intake and hydration. Stable dry eyes, continues to use artificial tears 7-10x/day, has not been able to use scleral lenses as they cause eye swelling the following day. Mucositis is improved yesterday and today though still having pain with brushing teeth and certain foods/spices. Worsening joint pain and musculoskeletal pain with stretching, now affecting ROM. Unable to close fists completely, raise arms to shoulder height or higher, extend bilateral wrists. Pain with bending elbows and pain in hamstrings when walking. Warts on hands resolving, stable on feet. Compliant with post transplant meds and restrictions. Currently taking FK 2mg bid which she did not take prior to lab draw. Denies fever, chills, headache, dizziness, blurred vision, odynophagia, chest pain, cough, nausea/vomiting, diarrhea/constipation, abdominal pain, dysuria, LE edema, rash, bleeding\par  [de-identified] : Ms. 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 Of note patient is a Jew and cannot receive blood products. \par \par Patient was noted to be newly leukopenic to WBC 1.7 during preoperative evaluation in anticipation of right ankle ligament repair. Bone marrow aspirate and biopsy at [facility] on 12/2/2017 revealed AML with some suspicion for APL. She was transferred to [place] for further management. Peripheral counts from around this time are not available. \par \par Repeat marrow on 12/26/2017 showed 90% blasts with myeloid mutation arrest in a 40-50% cellular marrow. On flow blasts were negative for CD34 and HLA-DR; positive for CD38, CD58, CD33, and MPO, and partially positive for CD15, CD11b, , and CD64. CD45 was reported as dim. Karyotype showed +21 in 3 of 21 metaphases, but no t(15:17) was detected on karyotype of FISH. Molecular studies showed mutations in NPM1 (45%0, IDH1 (R132S; 39%), PTPN1 (23%), and CSF3R (5%) genes. There was lingering diagnostic uncertainty due to a largely aspicular and hypocellular specimen, and thus a marrow was repeated on 1/2/2018, confirming AML.\par \par On 1/4/2018, Ms. Gary began 7+3 with Idarubicin. Her induction course was complicated by DIC, neutropenic fever/ Klebsiella PNA, and bilateral retinal hemorrhage. She did not receive blood products and was instead supported with Nplate, Procrit, IV iron, Amicar, and Lupron. After achieving count stability, the patient was discharged; she was not deemed a candidate for further therapy given inability to receive transfusions and a day 14 marrow was not obtained. She subsequently saw Dr. [place] Obrien at [facility]. He obtained a recovery marrow on 2/13/2018 (day +40), which was 30% cellular with 45% blasts. 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 Cytogenetics and FISH were normal. \par \par She then established care with Dr. Christie for consideration of salvage therapy with IDH1 inhibition given the risks of additional chemotherapy without blood products support. Initial marrow at [facility], on 3/1/2018, showed 69% blasts. ThunderBolts panel confirmed IDH1 R132S, NPM1 [id number] 12, and CSF3R D810G mutations. She was started on Ivosidenib on [facility] protocol  and had completed four cycles to date (6/28/2018 = C5D1). Marrow blasts increased to 85% on 4/30 but declined to 36% by 5/31. A  bone marrow aspirate and biopsy  was obtained  (6/28/2018); preliminary results show further responding disease with 10.3% blasts by flow cytometry. Subsequent marrow in oct showed 5% blasts...she was then started on vidazza plus venetoclax. She completed 2 cycles...f/u marrow with 4% blasts.....\par \par Patient underwent an AlloPSCT sister MRD (10/10) on 2/27/19, hospital course as follows:\par Ms. 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 She is a Jew and cannot receive blood products. \par \par Ms. Nickerson's donor is her sister.  She is a 10/10 match. Both donor and patient are CMV +. She had completed all pre testing needed for transplant.  She will \par start reduced intensity Fludarabine/Busulfan chemo regimen today. \par \par Upon admission, a right AC PICC line was placed in IR. Ms. Gary received IV hydration, pain management, antiemetics, anxiolysis, and antibacterial / antiviral / antifungal / PCP / GI and VOD (SOS) prophylaxis. Labs were monitored every other day, and she received electrolyte repletion as needed. She also received vitamin K, vitamin B, supplemental iron, vitamin C and procrit as directed by the institutional bloodless transplant policy. \par \par On 2/27/19, Ms. Gary received 363 mLs of fresh, apheresed, mobilized, related allogeneic HPC over 80 minutes. Cell counts as follows: \par Total MNCs ( x 10^8/kg): 6.21 \par CD 34+Cells ( x 10^6/kg): 3.37 \par CD3+ Cells ( x 10^7/kg): 12.46 \par Cell Viability (%): 100% \par \par Engraftment was noted on 3/11/19. Ms. Gary did not receive zarxio due to her having minimal residual disease on admission, and a reduced intensity preparatory regimen to avoid stimulating myeloblasts. \par \par Ms. Gary had a relatively uncomplicated transplant course. She did experience menses, which was suppressed with provera. She also had pancytopenia related to the high dose chemotherapy preparative regimen. She also experienced grade 1 oral mucositis, and grade 2 GI mucositis also related to the chemotherapy preparative regimen. Both were treated with supportive care. \par \par Currently, Ms. Gary is stable for discharge home with outpatient follow up at the [facility].

## 2020-07-21 LAB
CD3 AND CD33 ENRICHMENT: NORMAL
ENGRAFTMNET-POST: NORMAL

## 2020-08-07 ENCOUNTER — OUTPATIENT (OUTPATIENT)
Dept: OUTPATIENT SERVICES | Facility: HOSPITAL | Age: 35
LOS: 1 days | Discharge: HOME | End: 2020-08-07

## 2020-08-07 DIAGNOSIS — C92.00 ACUTE MYELOBLASTIC LEUKEMIA, NOT HAVING ACHIEVED REMISSION: ICD-10-CM

## 2020-08-07 DIAGNOSIS — Z90.3 ACQUIRED ABSENCE OF STOMACH [PART OF]: Chronic | ICD-10-CM

## 2020-08-11 ENCOUNTER — RESULT REVIEW (OUTPATIENT)
Age: 35
End: 2020-08-11

## 2020-08-11 ENCOUNTER — APPOINTMENT (OUTPATIENT)
Dept: HEMATOLOGY ONCOLOGY | Facility: CLINIC | Age: 35
End: 2020-08-11
Payer: COMMERCIAL

## 2020-08-11 VITALS
WEIGHT: 191.58 LBS | RESPIRATION RATE: 16 BRPM | OXYGEN SATURATION: 100 % | TEMPERATURE: 98.4 F | HEART RATE: 95 BPM | SYSTOLIC BLOOD PRESSURE: 117 MMHG | BODY MASS INDEX: 35.04 KG/M2 | DIASTOLIC BLOOD PRESSURE: 69 MMHG

## 2020-08-11 DIAGNOSIS — D89.810 ACUTE GRAFT-VERSUS-HOST DISEASE: ICD-10-CM

## 2020-08-11 LAB
ALBUMIN SERPL ELPH-MCNC: 3.6 G/DL
ALP BLD-CCNC: 99 U/L
ALT SERPL-CCNC: 8 U/L
ANION GAP SERPL CALC-SCNC: 10 MMOL/L
AST SERPL-CCNC: 13 U/L
BASOPHILS # BLD AUTO: 0.03 K/UL — SIGNIFICANT CHANGE UP (ref 0–0.2)
BASOPHILS NFR BLD AUTO: 0.4 % — SIGNIFICANT CHANGE UP (ref 0–2)
BILIRUB SERPL-MCNC: 0.3 MG/DL
BUN SERPL-MCNC: 6 MG/DL
CALCIUM SERPL-MCNC: 9.6 MG/DL
CHLORIDE SERPL-SCNC: 109 MMOL/L
CO2 SERPL-SCNC: 22 MMOL/L
CREAT SERPL-MCNC: 1.02 MG/DL
EOSINOPHIL # BLD AUTO: 0.13 K/UL — SIGNIFICANT CHANGE UP (ref 0–0.5)
EOSINOPHIL NFR BLD AUTO: 1.9 % — SIGNIFICANT CHANGE UP (ref 0–6)
GLUCOSE SERPL-MCNC: 101 MG/DL
HCT VFR BLD CALC: 38.2 % — SIGNIFICANT CHANGE UP (ref 34.5–45)
HGB BLD-MCNC: 12.3 G/DL — SIGNIFICANT CHANGE UP (ref 11.5–15.5)
IMM GRANULOCYTES NFR BLD AUTO: 0.3 % — SIGNIFICANT CHANGE UP (ref 0–1.5)
LDH SERPL-CCNC: 158 U/L
LYMPHOCYTES # BLD AUTO: 2.04 K/UL — SIGNIFICANT CHANGE UP (ref 1–3.3)
LYMPHOCYTES # BLD AUTO: 30.3 % — SIGNIFICANT CHANGE UP (ref 13–44)
MAGNESIUM SERPL-MCNC: 1.8 MG/DL
MCHC RBC-ENTMCNC: 29.6 PG — SIGNIFICANT CHANGE UP (ref 27–34)
MCHC RBC-ENTMCNC: 32.2 GM/DL — SIGNIFICANT CHANGE UP (ref 32–36)
MCV RBC AUTO: 92 FL — SIGNIFICANT CHANGE UP (ref 80–100)
MONOCYTES # BLD AUTO: 0.73 K/UL — SIGNIFICANT CHANGE UP (ref 0–0.9)
MONOCYTES NFR BLD AUTO: 10.8 % — SIGNIFICANT CHANGE UP (ref 2–14)
NEUTROPHILS # BLD AUTO: 3.79 K/UL — SIGNIFICANT CHANGE UP (ref 1.8–7.4)
NEUTROPHILS NFR BLD AUTO: 56.3 % — SIGNIFICANT CHANGE UP (ref 43–77)
NRBC # BLD: 0 /100 WBCS — SIGNIFICANT CHANGE UP (ref 0–0)
PLATELET # BLD AUTO: 495 K/UL — HIGH (ref 150–400)
POTASSIUM SERPL-SCNC: 4.9 MMOL/L
PROT SERPL-MCNC: 6.2 G/DL
RBC # BLD: 4.15 M/UL — SIGNIFICANT CHANGE UP (ref 3.8–5.2)
RBC # FLD: 14.8 % — HIGH (ref 10.3–14.5)
SODIUM SERPL-SCNC: 141 MMOL/L
TACROLIMUS SERPL-MCNC: 5 NG/ML
WBC # BLD: 6.74 K/UL — SIGNIFICANT CHANGE UP (ref 3.8–10.5)
WBC # FLD AUTO: 6.74 K/UL — SIGNIFICANT CHANGE UP (ref 3.8–10.5)

## 2020-08-11 PROCEDURE — 99215 OFFICE O/P EST HI 40 MIN: CPT

## 2020-08-11 NOTE — HISTORY OF PRESENT ILLNESS
[de-identified] : Ms. Gary is a 35 y/o female with AML associated with normal karyotype and NPM1 ans [id number] mutation, refractory to 7+3 and currently receiving Ivosidenib. Of note patient is a Pentec[employer]al and cannot receive blood products. \par \par Patient was noted to be newly leukopenic to WBC 1.7 during preoperative evaluation in anticipation of right ankle ligament repair. Bone marrow aspirate and biopsy at [facility] on 12/2/2017 revealed AML with some suspicion for APL. She was transferred to [place] for further management. Peripheral counts from around this time are not available. \par \par Repeat marrow on 12/26/2017 showed 90% blasts with myeloid mutation arrest in a 40-50% cellular marrow. On flow blasts were negative for CD34 and HLA-DR; positive for CD38, CD58, CD33, and MPO, and partially positive for CD15, CD11b, , and CD64. CD45 was reported as dim. Karyotype showed +21 in 3 of 21 metaphases, but no t(15:17) was detected on karyotype of FISH. Molecular studies showed mutations in NPM1 (45%0, IDH1 (R132S; 39%), PTPN1 (23%), and CSF3R (5%) genes. There was lingering diagnostic uncertainty due to a largely aspicular and hypocellular specimen, and thus a marrow was repeated on 1/2/2018, confirming AML.\par \par On 1/4/2018, Ms. Gary began 7+3 with Idarubicin. Her induction course was complicated by DIC, neutropenic fever/ Klebsiella PNA, and bilateral retinal hemorrhage. She did not receive blood products and was instead supported with Nplate, Procrit, IV iron, Amicar, and Lupron. After achieving count stability, the patient was discharged; she was not deemed a candidate for further therapy given inability to receive transfusions and a day 14 marrow was not obtained. She subsequently saw Dr. [place] Obrien at [facility]. He obtained a recovery marrow on 2/13/2018 (day +40), which was 30% cellular with 45% blasts. 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 Cytogenetics and FISH were normal. \par \par She then established care with Dr. Christie for consideration of salvage therapy with IDH1 inhibition given the risks of additional chemotherapy without blood products support. [place] marrow [place] [facility] on 3/1/2018, showed 69% blasts. ThunderBolts panel confirmed IDH1 R132S, NPM1 [id number] 12, and CSF3R D810G mutations. She was started on Ivosidenib on [facility] protocol  and had completed four cycles to date (6/28/2018 = C5D1). Marrow blasts increased to 85% on 4/30 but declined to 36% by 5/31. A  bone marrow aspirate and biopsy  was obtained  (6/28/2018); preliminary results show further responding disease with 10.3% blasts by flow cytometry. Subsequent marrow in oct showed 5% blasts...she was then started on vidazza plus venetoclax. She completed 2 cycles...f/u marrow with 4% blasts.....\par \par Patient underwent an AlloPSCT sister MRD (10/10) on 2/27/19, hospital course as follows:\par Ms. 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 She is a Pentecostal and cannot receive blood products. \par \par Ms. Nickerson's donor is her sister.  She is a 10/10 match. Both donor and patient are CMV +. She had completed all pre testing needed for transplant.  She will \par start reduced intensity Fludarabine/Busulfan chemo regimen today. \par \par Upon admission, a right AC PICC line was placed in IR. Ms. Gary received IV hydration, pain management, antiemetics, anxiolysis, and antibacterial / antiviral / antifungal / PCP / GI and VOD (SOS) prophylaxis. Labs were monitored every other day, and she received electrolyte repletion as needed. She also received vitamin K, vitamin B, supplemental iron, vitamin C and procrit as directed by the institutional bloodless transplant policy. \par \par On 2/27/19, Ms. Gary received 363 mLs of fresh, apheresed, mobilized, related allogeneic HPC over 80 minutes. Cell counts as follows: \par Total MNCs ( x 10^8/kg): 6.21 \par CD 34+Cells ( x 10^6/kg): 3.37 \par CD3+ Cells ( x 10^7/kg): 12.46 \par Cell Viability (%): 100% \par \par Engraftment was noted on 3/11/19. Ms. Gary did not receive zarxio due to her having minimal residual disease on admission, and a reduced intensity preparatory regimen to avoid stimulating myeloblasts. \par \par Ms. Gary had a relatively uncomplicated transplant course. She did experience menses, which was suppressed with provera. She also had pancytopenia related to the high dose chemotherapy preparative regimen. She also experienced grade 1 oral mucositis, and grade 2 GI mucositis also related to the chemotherapy preparative regimen. Both were treated with supportive care. \par \par Currently, Ms. Gary is stable for discharge home with outpatient follow up at the [facility].  [de-identified] : Overall improved... Ongoing fatigue. SOB with exertion is  improved. Reports adequate PO intake and hydration. Improved dry eyes, continues to use artificial tears 7-10x/day, has not been able to use scleral lenses as they cause eye swelling the following day. Mucositis is improved ..issue with  certain foods/spices.  Improved  joint pain and musculoskeletal stiffness, improved  ROM. Can now  close fists completely, raise arms to shoulder height or higher, extend bilateral wrists.  Warts on hands resolving, stable on feet. Compliant with post transplant meds and restrictions. Currently taking FK 2.5   bid which she did not take prior to lab draw. Denies fever, chills, headache, dizziness, blurred vision, odynophagia, chest pain, cough, nausea/vomiting, diarrhea/constipation, abdominal pain, dysuria, LE edema, rash, bleeding\par

## 2020-08-11 NOTE — PHYSICAL EXAM
[Ambulatory and capable of all self care but unable to carry out any work activities] : Status 2- Ambulatory and capable of all self care but unable to carry out any work activities. Up and about more than 50% of waking hours [Normal] : affect appropriate [de-identified] : exam limited by inability to open mouth wide d/t pain, erythematous mucosa [de-identified] : tachycardic, regular rhythem, normal s1s2 [de-identified] : trace edema of BLEs [de-identified] : Diffuse hyperpigmentation of face with patches of hypopigmentation over cheekbones. Generalized patches of hyperpigmentation of chest, abdomen, back, BUEs, and BLEs [de-identified] : +BS; abdomen soft, non-distended, non-tender

## 2020-08-11 NOTE — ASSESSMENT
[FreeTextEntry1] : Ms. Gary is a 35 years old female Pentecostalism with AML with the following comorbidities being managed:\par \par 1) AML\par S/p HLA 10/10 allo sib (sister) PBSCT on 2/27/19\par 7/3/19 BMbx: LANA, normal female karyotype\par 11/21/19 chimerism = 99% donor (CD33 = 100% and CD3 = 83% donor)\par 12/27/19 chimerism = 98% donor\par 2/12/20 chimerism = 99% donor (CD33 = 100% and CD3 = 88.8% donor)\par 3/10/20 chimerism = 99% donor (CD33 = 100% and CD3 = 94% donor)\par 5/6/20 chimerism = 100% donor (CD33 = 100% and CD3 = 97% donor)\par 6/10/20, 7/2020  chimerism = 100% donor in CD3 and CD33 compartments\par Current disease status: LANA based on 7/3/19 BMbx and peripheral blood counts \par \par Hold on starting maintenance Vidaza at this time given active GVHD issues \par \par 2) Heme\par No transfusions as pt is practicing Pentecostalism\par Counts WNL's\par Continue multivitamin and folic acid daily\par \par 3) ID\par Continue ppx:\par - Acyclovir 400mg tid\par - Mepron 1500mg daily ...consider bactrim at some point\par Voriconazole resumed 1/23/20 but subsequently held as of 2/19/20 for transaminitis\par Post transplant restrictions reviewed - in light of covid-19, [facility] recs reviewed, strict adherence to restriction reinforced\par \par 6/10/20 CMV PCR not detected, continue to monitor PCR weekly \par \par 4) GVHD\par Acute GVHD: Skin 0   Liver 0   GI 0 - overall grade 0\par Chronic GVHD: mouth score 1,  joints score 1- mild  extensive \par \par Prednisone d/c'd 12/22/19\par FK = 3.3 today, FK increased to 2.5mg bid as of 7/14/20..sx much improved\par To consider addition of Jakafi if GVHD symptoms worsen \par See GVHD history below\par \par Date of onset of acute GVHD 8/1/19 \par - On 7/29/19 placed on prednisone 20 mg daily for erythematous rash of face as stated by patient.\par - 8/1/19: Erythematous/hyperpigmented rash 38% BSA- rash of face, chest, back, bilateral upper and lower extremities. Patient was placed on prednisone 20 mg daily as of 7/29/19. Instructed to increase prednisone on 8/1/19 to 40 mg daily on 8/1, 8/2, 8/3. Then decreased to prednisone 20 mg daily. Tacrolimus restarted on 8/1/19 at 0.5 mg BID. Lidex cream BID to body and hydrocortisone cream to face BID. Acute GVHD SKin 2 Liver 0 GI 0- overall grade 1\par - 8/7/19: Erythematous/hyperpigmented rash 43% BSA- rash of face, neck, chest, back, abdomen, bilateral upper and lower extremities. Increased prednisone 20mg daily to 40 mg daily. Lidex cream BID to entire body. FK at 0.5 mg BID. May have to increase FK dose if rash worsens. Acute GVHD SKin 2 Liver 0 GI 0- overall grade 1\par - 8/14/19: Erythematous rash of face. Hyperpigmented rash of chest, neck, abdomen, bilateral upper and lower extremities and back. Skin rash extent 42% BSA. Currently on prednisone 40 mg daily and lidex cream BID. Increased FK to 0.5 mg Am and 1 mg PM. \par - 9/26/19: Skin hyperpigmentation and facial hypopigmentation, but improvement in erythema from facial rash. Some oral sores using dex rinse 4 times a day. Increased FK to 1 mg BID as of 9/26/19. Continue prednisone 30 mg daily. Decrease prednisone by 5 mg every two weeks. Acute GVHD Skin 2 Liver 0 GI 0- overall grade 1. BSA 42%. \par - 12/27/19: no acute GVHD. Chronic GVHD: skin score 3 (>50% BSA), eyes score 3 (requiring scleral lenses), mouth score 2 - overall severe extensive chronic GVHD. Off prednisone as of 12/22/19. Continue FK 1.5mg bid and Decadron swish/spit qid, moisturize 2-3x/day \par - 1/23/20: no acute GVHD. Chronic GVHD: skin 3 (>50% BSA), eyes score 1, mouth score 1 - overall severe extensive chronic GVHD. Continue 1.5mg bid. Continue PRN Decadron swish/spit, instructed to avoid swallowing to prevent systemic absorption of steroid. Moisturize 2-3x/day\par - 3/10/20: no acute GVHD. Chronic GVHD eyes score 3, lung score 2, genital tract score 1 - overall severe extensive chronic GVHD. FK increased to 1mg bid and initiated FAM therapy as below for lung GVHD\par - 4/9/20: no acute GVHD. Chronic GVHD eyes score 2, lung score 2, genital tract score 1 - overall severe extensive chronic GVHD. Continue FK 1mg bid and FAM therapy\par - 5/6/20: no acute GVHD. Chronic GVHD mouth score 1, eyes score 2, lungs score 2, genitalia score 1 - overall severe extensive chronic GVHD. SOB on exertion somewhat improved. Continue FK 1mg bid and FAM therapy\par 6/10/20: no acute GVHD. Chronic GVHD mouth score 1, eyes score 2, lung score 2, genitalia score 1 - overall severe extensive chronic GVHD. Worsening mucositis, FK increased to 1.5mg bid \par \par cGVHD eyes\par resolved\par No longer wearing scleral lenses at this time\par Continue PRN artificial tears and Systane ophthalmic ointment\par Continue f/u with optho\par \par cGVHD mouth\par Pain with certain foods/spices\par Continue Decadron rinse prn\par Continue to monitor closely \par \par cGVHD lung??\par 3/10/20 PFT with FEV1 of 48%\par 5/6/20 CT chest: mild diffuse mosaic attenuation pattern to the lungs. ?BOOP, air trapping, small vessel disease. To consider repeat CT imaging in 3-4mo\par  SOB  improved \par Continue FAM therapy, initiated 3/10/20 (pt already on monteleukast for asthma/allergies) \par - Flovent inhaler 440mcg bid\par - Azithromycin 250mg bid\par - Monteleukast 10mg daily \par \par cGVHD vaginal area...resolved\par Sensation of skin tightening and dryness of vaginal area \par May use lubricant to gently massage external labia to soften tissue\par Encouraged f/u with GYN after covid-19 epidemic resolves\par \par cGVHD joints\par Noted 7/15/20; pain causing limited ROM of shoulders, elbows, wrists, finger joints. Unable to pick things up off of the floor without pain..ROM improved\par Continue to monitor closely \par \par 5) GI\par Continue ppx:\par - Ursodiol 300 mg bid\par - Protonix 40 mg daily \par PRN Zofran and Reglan for nausea\par \par 6) Other \par Hypomagnesemia - likely 2/2 tacrolimus, continue magnesium oxide 400mg bid \par GYN - LMP November 2019, continue Sprintec daily, instructed to wear condoms if resuming sexual activity. To consider f/u with GYN if abnormal menses persists \par HTN - continue amlodipine 5mg daily, BP remains WNL\par Warts - likely viral warts on hand in setting of immunosuppression, continue to monitor closely \par Health maintenance - 1yr post transplant ECHO done 3/10/20 WNL, EF = 60%\par \par 8) Plan/Dispo\par FK increased to 2.5mg bid for worsening oral GVHD...goal for level 5-10\par Hold off on Vidaza maintenance given GVHD issues\par Pt educated regarding plan of care, all questions/concerns addressed\par Instructed to contact our office with fever or new/worsening symptoms \par F/u with 8, 16 weeks\par

## 2020-08-18 LAB
CD3 AND CD33 ENRICHMENT: NORMAL
ENGRAFTMNET-POST: NORMAL

## 2020-08-19 LAB — CMV DNA SPEC QL NAA+PROBE: ABNORMAL IU/ML

## 2020-09-30 ENCOUNTER — OUTPATIENT (OUTPATIENT)
Dept: OUTPATIENT SERVICES | Facility: HOSPITAL | Age: 35
LOS: 1 days | Discharge: HOME | End: 2020-09-30

## 2020-09-30 DIAGNOSIS — C92.00 ACUTE MYELOBLASTIC LEUKEMIA, NOT HAVING ACHIEVED REMISSION: ICD-10-CM

## 2020-09-30 DIAGNOSIS — Z90.3 ACQUIRED ABSENCE OF STOMACH [PART OF]: Chronic | ICD-10-CM

## 2020-10-04 ENCOUNTER — RX RENEWAL (OUTPATIENT)
Age: 35
End: 2020-10-04

## 2020-10-05 ENCOUNTER — APPOINTMENT (OUTPATIENT)
Dept: HEMATOLOGY ONCOLOGY | Facility: CLINIC | Age: 35
End: 2020-10-05
Payer: COMMERCIAL

## 2020-10-05 ENCOUNTER — RESULT REVIEW (OUTPATIENT)
Age: 35
End: 2020-10-05

## 2020-10-05 VITALS
OXYGEN SATURATION: 97 % | TEMPERATURE: 97.2 F | BODY MASS INDEX: 33.71 KG/M2 | SYSTOLIC BLOOD PRESSURE: 121 MMHG | RESPIRATION RATE: 17 BRPM | DIASTOLIC BLOOD PRESSURE: 86 MMHG | WEIGHT: 184.28 LBS | HEART RATE: 125 BPM

## 2020-10-05 LAB
BASOPHILS # BLD AUTO: 0.02 K/UL — SIGNIFICANT CHANGE UP (ref 0–0.2)
BASOPHILS NFR BLD AUTO: 0.3 % — SIGNIFICANT CHANGE UP (ref 0–2)
EOSINOPHIL # BLD AUTO: 0.18 K/UL — SIGNIFICANT CHANGE UP (ref 0–0.5)
EOSINOPHIL NFR BLD AUTO: 2.9 % — SIGNIFICANT CHANGE UP (ref 0–6)
HCT VFR BLD CALC: 39.8 % — SIGNIFICANT CHANGE UP (ref 34.5–45)
HGB BLD-MCNC: 13 G/DL — SIGNIFICANT CHANGE UP (ref 11.5–15.5)
IMM GRANULOCYTES NFR BLD AUTO: 0 % — SIGNIFICANT CHANGE UP (ref 0–1.5)
LYMPHOCYTES # BLD AUTO: 2.61 K/UL — SIGNIFICANT CHANGE UP (ref 1–3.3)
LYMPHOCYTES # BLD AUTO: 42.5 % — SIGNIFICANT CHANGE UP (ref 13–44)
MCHC RBC-ENTMCNC: 29.3 PG — SIGNIFICANT CHANGE UP (ref 27–34)
MCHC RBC-ENTMCNC: 32.7 G/DL — SIGNIFICANT CHANGE UP (ref 32–36)
MCV RBC AUTO: 89.6 FL — SIGNIFICANT CHANGE UP (ref 80–100)
MONOCYTES # BLD AUTO: 0.71 K/UL — SIGNIFICANT CHANGE UP (ref 0–0.9)
MONOCYTES NFR BLD AUTO: 11.6 % — SIGNIFICANT CHANGE UP (ref 2–14)
NEUTROPHILS # BLD AUTO: 2.62 K/UL — SIGNIFICANT CHANGE UP (ref 1.8–7.4)
NEUTROPHILS NFR BLD AUTO: 42.7 % — LOW (ref 43–77)
NRBC # BLD: 0 /100 WBCS — SIGNIFICANT CHANGE UP (ref 0–0)
PLATELET # BLD AUTO: 481 K/UL — HIGH (ref 150–400)
RBC # BLD: 4.44 M/UL — SIGNIFICANT CHANGE UP (ref 3.8–5.2)
RBC # FLD: 14.3 % — SIGNIFICANT CHANGE UP (ref 10.3–14.5)
WBC # BLD: 6.14 K/UL — SIGNIFICANT CHANGE UP (ref 3.8–10.5)
WBC # FLD AUTO: 6.14 K/UL — SIGNIFICANT CHANGE UP (ref 3.8–10.5)

## 2020-10-05 PROCEDURE — 99215 OFFICE O/P EST HI 40 MIN: CPT

## 2020-10-06 LAB
ALBUMIN SERPL ELPH-MCNC: 4 G/DL
ALP BLD-CCNC: 99 U/L
ALT SERPL-CCNC: 6 U/L
ANION GAP SERPL CALC-SCNC: 12 MMOL/L
AST SERPL-CCNC: 16 U/L
BILIRUB SERPL-MCNC: 0.3 MG/DL
BUN SERPL-MCNC: 7 MG/DL
CALCIUM SERPL-MCNC: 10.1 MG/DL
CHLORIDE SERPL-SCNC: 107 MMOL/L
CO2 SERPL-SCNC: 24 MMOL/L
CREAT SERPL-MCNC: 0.86 MG/DL
GLUCOSE SERPL-MCNC: 95 MG/DL
LDH SERPL-CCNC: 178 U/L
MAGNESIUM SERPL-MCNC: 1.6 MG/DL
POTASSIUM SERPL-SCNC: 4.1 MMOL/L
PROT SERPL-MCNC: 6.7 G/DL
SODIUM SERPL-SCNC: 143 MMOL/L
TACROLIMUS SERPL-MCNC: 5.5 NG/ML

## 2020-10-07 LAB — CMV DNA SPEC QL NAA+PROBE: NOT DETECTED IU/ML

## 2020-10-09 NOTE — REVIEW OF SYSTEMS
[Negative] : Heme/Lymph [Eye Pain] : no eye pain [Red Eyes] : eyes not red [Dry Eyes] : dryness of the eyes [Vision Problems] : no vision problems [Shortness Of Breath] : no shortness of breath [Wheezing] : no wheezing [Cough] : no cough [SOB on Exertion] : shortness of breath during exertion [Dysuria] : no dysuria [Vaginal Discharge] : no vaginal discharge [Joint Stiffness] : joint stiffness [Muscle Pain] : no muscle pain [Muscle Weakness] : no muscle weakness [FreeTextEntry8] : vaginal dryness

## 2020-10-09 NOTE — PHYSICAL EXAM
[Ambulatory and capable of all self care but unable to carry out any work activities] : Status 2- Ambulatory and capable of all self care but unable to carry out any work activities. Up and about more than 50% of waking hours [Normal] : normoactive bowel sounds, soft and nontender, no hepatosplenomegaly or masses appreciated [de-identified] :  erythematous mucosa [de-identified] : tachycardic, regular rhythem, normal s1s2 [de-identified] : +BS; abdomen soft, non-distended, non-tender [de-identified] : limited range of motion of hands [de-identified] : Diffuse hyperpigmentation of face with patches of hypopigmentation over cheekbones. Generalized patches of hyperpigmentation of chest, abdomen, back, BUEs, and BLEs

## 2020-10-09 NOTE — ASSESSMENT
[FreeTextEntry1] : Ms. Gary is a 35 years old female Gnosticist with AML with the following comorbidities being managed:\par \par 1) AML\par S/p HLA 10/10 allo sib (sister) PBSCT on 2/27/19\par 7/3/19 BMbx: LANA, normal female karyotype\par 11/21/19 chimerism = 99% donor (CD33 = 100% and CD3 = 83% donor)\par 12/27/19 chimerism = 98% donor\par 2/12/20 chimerism = 99% donor (CD33 = 100% and CD3 = 88.8% donor)\par 3/10/20 chimerism = 99% donor (CD33 = 100% and CD3 = 94% donor)\par 5/6/20 chimerism = 100% donor (CD33 = 100% and CD3 = 97% donor)\par 6/10/20, 7/2020  chimerism = 100% donor in CD3 and CD33 compartments\par 8/11/20 chimerism= 100% donor in all compartments\par Current disease status: LANA based on 7/3/19 BMbx and peripheral blood counts \par \par Hold on starting maintenance Vidaza at this time given active GVHD issues \par \par 2) Heme\par No transfusions as pt is practicing Gnosticist\par Counts WNL's\par 10/5/20:  WBC  6.14  H/H 13/39.8    ANC 2.62\par Continue multivitamin and folic acid daily\par \par 3) ID\par Continue ppx:\par - Acyclovir 400 mg tid\par - Mepron 1500 mg daily ...consider bactrim at some point\par Voriconazole resumed 1/23/20 but subsequently held as of 2/19/20 for transaminitis\par Post transplant restrictions reviewed - in light of covid-19, CDC recs reviewed, strict adherence to restriction reinforced\par \par 7/14/20 CMV PCR not detected, continue to monitor PCR weekly \par \par 4) GVHD\par Acute GVHD: Skin 0   Liver 0   GI 0 - overall grade 0\par Chronic GVHD: mouth score 1, eyes score 1, lungs score 1, joints score 2, genital tract score 1- moderate  extensive \par \par Prednisone d/c'd 12/22/19\par FK increased to 2.5mg bid as of 7/14/20..sx much improved\par Continue FK 2.5 mg BID\par To consider addition of Jakafi if GVHD symptoms worsen \par See GVHD history below\par \par Date of onset of acute GVHD 8/1/19 \par - On 7/29/19 placed on prednisone 20 mg daily for erythematous rash of face as stated by patient.\par - 8/1/19: Erythematous/hyperpigmented rash 38% BSA- rash of face, chest, back, bilateral upper and lower extremities. Patient was placed on prednisone 20 mg daily as of 7/29/19. Instructed to increase prednisone on 8/1/19 to 40 mg daily on 8/1, 8/2, 8/3. Then decreased to prednisone 20 mg daily. Tacrolimus restarted on 8/1/19 at 0.5 mg BID. Lidex cream BID to body and hydrocortisone cream to face BID. Acute GVHD SKin 2 Liver 0 GI 0- overall grade 1\par - 8/7/19: Erythematous/hyperpigmented rash 43% BSA- rash of face, neck, chest, back, abdomen, bilateral upper and lower extremities. Increased prednisone 20mg daily to 40 mg daily. Lidex cream BID to entire body. FK at 0.5 mg BID. May have to increase FK dose if rash worsens. Acute GVHD SKin 2 Liver 0 GI 0- overall grade 1\par - 8/14/19: Erythematous rash of face. Hyperpigmented rash of chest, neck, abdomen, bilateral upper and lower extremities and back. Skin rash extent 42% BSA. Currently on prednisone 40 mg daily and lidex cream BID. Increased FK to 0.5 mg Am and 1 mg PM. \par - 9/26/19: Skin hyperpigmentation and facial hypopigmentation, but improvement in erythema from facial rash. Some oral sores using dex rinse 4 times a day. Increased FK to 1 mg BID as of 9/26/19. Continue prednisone 30 mg daily. Decrease prednisone by 5 mg every two weeks. Acute GVHD Skin 2 Liver 0 GI 0- overall grade 1. BSA 42%. \par - 12/27/19: no acute GVHD. Chronic GVHD: skin score 3 (>50% BSA), eyes score 3 (requiring scleral lenses), mouth score 2 - overall severe extensive chronic GVHD. Off prednisone as of 12/22/19. Continue FK 1.5mg bid and Decadron swish/spit qid, moisturize 2-3x/day \par - 1/23/20: no acute GVHD. Chronic GVHD: skin 3 (>50% BSA), eyes score 1, mouth score 1 - overall severe extensive chronic GVHD. Continue 1.5mg bid. Continue PRN Decadron swish/spit, instructed to avoid swallowing to prevent systemic absorption of steroid. Moisturize 2-3x/day\par - 3/10/20: no acute GVHD. Chronic GVHD eyes score 3, lung score 2, genital tract score 1 - overall severe extensive chronic GVHD. FK increased to 1mg bid and initiated FAM therapy as below for lung GVHD\par - 4/9/20: no acute GVHD. Chronic GVHD eyes score 2, lung score 2, genital tract score 1 - overall severe extensive chronic GVHD. Continue FK 1mg bid and FAM therapy\par - 5/6/20: no acute GVHD. Chronic GVHD mouth score 1, eyes score 2, lungs score 2, genitalia score 1 - overall severe extensive chronic GVHD. SOB on exertion somewhat improved. Continue FK 1mg bid and FAM therapy\par 6/10/20: no acute GVHD. Chronic GVHD mouth score 1, eyes score 2, lung score 2, genitalia score 1 - overall severe extensive chronic GVHD. Worsening mucositis, FK increased to 1.5mg bid \par \par cGVHD eyes\par resolved\par No longer wearing scleral lenses at this time\par Continue PRN artificial tears and Systane ophthalmic ointment\par Continue f/u with optho\par \par cGVHD mouth\par Pain with certain foods/spices\par Continue Decadron rinse prn\par Continue to monitor closely \par \par cGVHD lung??\par 3/10/20 PFT with FEV1 of 48%\par 5/6/20 CT chest: mild diffuse mosaic attenuation pattern to the lungs. ?BOOP, air trapping, small vessel disease. To consider repeat CT imaging in 3-4mo\par  SOB  improved \par Continue FAM therapy, initiated 3/10/20 (pt already on monteleukast for asthma/allergies) \par - Flovent inhaler 440mcg bid\par - Azithromycin 250mg bid\par - Monteleukast 10mg daily \par \par cGVHD vaginal area\par Sensation of skin tightening and dryness of vaginal area \par May use lubricant to gently massage external labia to soften tissue\par Encouraged f/u with GYN after covid-19 epidemic resolves. Recommended patient to see an OBGYN. Telephone number provided for a physician within [facility].\par \par cGVHD joints\par Noted 7/15/20; pain causing limited ROM of shoulders, elbows, wrists, finger joints. Unable to pick things up off of the floor without pain..ROM improved\par Continue to monitor closely \par \par 5) GI\par Continue ppx:\par - Ursodiol 300 mg bid\par - Protonix 40 mg daily \par PRN Zofran and Reglan for nausea\par \par 6) Other \par Hypomagnesemia - likely 2/2 tacrolimus, continue magnesium oxide 400mg bid \par GYN - LMP November 2019, continue Sprintec daily, instructed to wear condoms if resuming sexual activity. To consider f/u with GYN if abnormal menses persists \par HTN - continue amlodipine 5mg daily, BP remains WNL\par Warts - likely viral warts on hand in setting of immunosuppression, continue to monitor closely \par Health maintenance - 1yr post transplant ECHO done 3/10/20 WNL, EF = 60%\par \par 8) Plan/Dispo\par Continue 2.5 mg bid for worsening oral GVHD...goal for level 5-10\par Hold off on Vidaza maintenance given GVHD issues\par Pt educated regarding plan of care, all questions/concerns addressed\par Instructed to contact our office with fever or new/worsening symptoms \par F/u with 8 week with Dr Lou\par

## 2020-10-09 NOTE — HISTORY OF PRESENT ILLNESS
[de-identified] : Ms. 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 Of note patient is a Mandaen and cannot receive blood products. \par \par Patient was noted to be newly leukopenic to WBC 1.7 during preoperative evaluation in anticipation of right ankle ligament repair. Bone marrow aspirate and biopsy at [facility] on 12/2/2017 revealed AML with some suspicion for APL. She was transferred to [place] for further management. Peripheral counts from around this time are not available. \par \par Repeat marrow on 12/26/2017 showed 90% blasts with myeloid mutation arrest in a 40-50% cellular marrow. On flow blasts were negative for CD34 and HLA-DR; positive for CD38, CD58, CD33, and MPO, and partially positive for CD15, CD11b, , and CD64. CD45 was reported as dim. Karyotype showed +21 in 3 of 21 metaphases, but no t(15:17) was detected on karyotype of FISH. Molecular studies showed mutations in NPM1 (45%0, IDH1 (R132S; 39%), PTPN1 (23%), and CSF3R (5%) genes. There was lingering diagnostic uncertainty due to a largely aspicular and hypocellular specimen, and thus a marrow was repeated on 1/2/2018, confirming AML.\par \par On 1/4/2018, Ms. Gary began 7+3 with Idarubicin. Her induction course was complicated by DIC, neutropenic fever/ Klebsiella PNA, and bilateral retinal hemorrhage. She did not receive blood products and was instead supported with Nplate, Procrit, IV iron, Amicar, and Lupron. After achieving count stability, the patient was discharged; she was not deemed a candidate for further therapy given inability to receive transfusions and a day 14 marrow was not obtained. She subsequently saw Dr. [place] at [facility]. He obtained a recovery marrow on 2/13/2018 (day +40), which was 30% cellular with 45% blasts. 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 Cytogenetics and FISH were normal. \par \par She then established care with Dr. Christie for consideration of salvage therapy with IDH1 inhibition given the risks of additional chemotherapy without blood products support. Initial marrow at [facility], on 3/1/2018, showed 69% blasts. [employer] panel confirmed IDH1 R132S, NPM1 [id number], and CSF3R D810G mutations. She was started on Ivosidenib on [facility] protocol  and had completed four cycles to date (6/28/2018 = C5D1). Marrow blasts increased to 85% on 4/30 but declined to 36% by 5/31. A  bone marrow aspirate and biopsy  was obtained  (6/28/2018); preliminary results show further responding disease with 10.3% blasts by flow cytometry. Subsequent marrow in oct showed 5% blasts...she was then started on vidazza plus venetoclax. She completed 2 cycles...f/u marrow with 4% blasts.....\par \par Patient underwent an AlloPSCT sister MRD (10/10) on 2/27/19, hospital course as follows:\par Ms. 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 She is a Mandaen and cannot receive blood products. \par \par Ms. Nickerson's donor is her sister.  She is a 10/10 match. Both donor and patient are CMV +. She had completed all pre testing needed for transplant.  She will \par start reduced intensity Fludarabine/Busulfan chemo regimen today. \par \par Upon admission, a right AC PICC line was placed in IR. Ms. Gary received IV hydration, pain management, antiemetics, anxiolysis, and antibacterial / antiviral / antifungal / PCP / GI and VOD (SOS) prophylaxis. Labs were monitored every other day, and she received electrolyte repletion as needed. She also received vitamin K, vitamin B, supplemental iron, vitamin C and procrit as directed by the institutional bloodless transplant policy. \par \par On 2/27/19, Ms. Gary received 363 mLs of fresh, apheresed, mobilized, related allogeneic HPC over 80 minutes. Cell counts as follows: \par Total MNCs ( x 10^8/kg): 6.21 \par CD 34+Cells ( x 10^6/kg): 3.37 \par CD3+ Cells ( x 10^7/kg): 12.46 \par Cell Viability (%): 100% \par \par Engraftment was noted on 3/11/19. Ms. Gary did not receive zarxio due to her having minimal residual disease on admission, and a reduced intensity preparatory regimen to avoid stimulating myeloblasts. \par \par Ms. Gary had a relatively uncomplicated transplant course. She did experience menses, which was suppressed with provera. She also had pancytopenia related to the high dose chemotherapy preparative regimen. She also experienced grade 1 oral mucositis, and grade 2 GI mucositis also related to the chemotherapy preparative regimen. Both were treated with supportive care. \par \par Currently, Ms. Gary is stable for discharge home with outpatient follow up at the [facility].  [de-identified] : Overall improved... Ongoing fatigue. SOB with exertion is  improved. Reports adequate PO intake and hydration. Improved dry eyes, continues to use artificial tears 7-10x/day, has not been able to use scleral lenses as they cause eye swelling the following day. Mucositis is improved ..issue with  certain foods/spices.  Improved  joint pain and musculoskeletal stiffness, improved  ROM. Can now  close fists completely, raise arms to shoulder height or higher, extend bilateral wrists.  Warts on hands resolving, stable on feet. Compliant with post transplant meds and restrictions. Currently taking FK 2.5   bid which she did not take prior to lab draw. Denies fever, chills, headache, dizziness, blurred vision, odynophagia, chest pain, cough, nausea/vomiting, diarrhea/constipation, abdominal pain, dysuria, LE edema, rash, bleeding\par \par On 10/5/20, patient presents for a follow up visit. Currently on FK 2.5 mg BID and did not take prior to blood work today. Complains of joint stiffness and is unable to open medication bottles. Mucositis is improved and avoids certain spices. Ongoing vaginal dryness and would like to see an OBGYN. Continues to apply eye drops every few hours. Dyspnea on exertion.  Denies fever, chills, nausea, vomiting, diarrhea, rash, dysuria or any signs of active bleeding. Denies chest pain or B/L LE  edema. Remains compliant with post transplant medications.

## 2020-10-12 LAB
CD3 AND CD33 ENRICHMENT: NORMAL
ENGRAFTMNET-POST: NORMAL

## 2020-10-20 ENCOUNTER — RX RENEWAL (OUTPATIENT)
Age: 35
End: 2020-10-20

## 2020-11-17 ENCOUNTER — APPOINTMENT (OUTPATIENT)
Dept: OBGYN | Facility: CLINIC | Age: 35
End: 2020-11-17
Payer: COMMERCIAL

## 2020-11-17 VITALS
DIASTOLIC BLOOD PRESSURE: 80 MMHG | WEIGHT: 177 LBS | BODY MASS INDEX: 32.57 KG/M2 | SYSTOLIC BLOOD PRESSURE: 116 MMHG | HEIGHT: 62 IN | TEMPERATURE: 97.1 F

## 2020-11-17 DIAGNOSIS — Z01.419 ENCOUNTER FOR GYNECOLOGICAL EXAMINATION (GENERAL) (ROUTINE) W/OUT ABNORMAL FINDINGS: ICD-10-CM

## 2020-11-17 DIAGNOSIS — N95.2 POSTMENOPAUSAL ATROPHIC VAGINITIS: ICD-10-CM

## 2020-11-17 PROCEDURE — 99385 PREV VISIT NEW AGE 18-39: CPT

## 2020-11-17 PROCEDURE — 99072 ADDL SUPL MATRL&STAF TM PHE: CPT

## 2020-11-17 NOTE — HISTORY OF PRESENT ILLNESS
[Patient reported PAP Smear was normal] : Patient reported PAP Smear was normal [N] : Patient denies prior pregnancies [FreeTextEntry1] : Patient with AML and stem cell transplant 2/19\par Patient is currently in remission\par She has put on OCP during treatment and stopped the 8/20\par No menses since.\par She has hot flashes and has vaginal atrophy\par She tried to have sex and could not \par Patient currently has graft vs host  [PapSmeardate] : 3 years ago

## 2020-11-17 NOTE — PLAN
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 Lou and see if any objection to hormone replacement if menopausal(patient on OCP ) and vaginal estrogen cream

## 2020-11-17 NOTE — PHYSICAL EXAM
[Appropriately responsive] : appropriately responsive [Alert] : alert [No Acute Distress] : no acute distress [No Lymphadenopathy] : no lymphadenopathy [Soft] : soft [Non-tender] : non-tender [Non-distended] : non-distended [No HSM] : No HSM [No Lesions] : no lesions [No Mass] : no mass [Oriented x3] : oriented x3 [FreeTextEntry2] : skin with graft vs host  [Examination Of The Breasts] : a normal appearance [No Masses] : no breast masses were palpable [Labia Majora] : normal [Labia Minora] : normal [Atrophy] : atrophy [Normal] : normal [Uterine Adnexae] : normal [FreeTextEntry4] : very atrophic

## 2020-11-19 LAB — HPV HIGH+LOW RISK DNA PNL CVX: NOT DETECTED

## 2020-11-20 ENCOUNTER — OUTPATIENT (OUTPATIENT)
Dept: OUTPATIENT SERVICES | Facility: HOSPITAL | Age: 35
LOS: 1 days | Discharge: HOME | End: 2020-11-20

## 2020-11-20 DIAGNOSIS — Z90.3 ACQUIRED ABSENCE OF STOMACH [PART OF]: Chronic | ICD-10-CM

## 2020-11-20 DIAGNOSIS — C92.00 ACUTE MYELOBLASTIC LEUKEMIA, NOT HAVING ACHIEVED REMISSION: ICD-10-CM

## 2020-11-23 LAB — CYTOLOGY CVX/VAG DOC THIN PREP: NORMAL

## 2020-11-24 ENCOUNTER — APPOINTMENT (OUTPATIENT)
Dept: HEMATOLOGY ONCOLOGY | Facility: CLINIC | Age: 35
End: 2020-11-24
Payer: COMMERCIAL

## 2020-11-24 ENCOUNTER — RESULT REVIEW (OUTPATIENT)
Age: 35
End: 2020-11-24

## 2020-11-24 VITALS
DIASTOLIC BLOOD PRESSURE: 89 MMHG | BODY MASS INDEX: 32.66 KG/M2 | SYSTOLIC BLOOD PRESSURE: 130 MMHG | OXYGEN SATURATION: 99 % | HEART RATE: 99 BPM | TEMPERATURE: 97.3 F | WEIGHT: 178.55 LBS | RESPIRATION RATE: 16 BRPM

## 2020-11-24 DIAGNOSIS — N94.9 UNSPECIFIED CONDITION ASSOCIATED WITH FEMALE GENITAL ORGANS AND MENSTRUAL CYCLE: ICD-10-CM

## 2020-11-24 LAB
ALBUMIN SERPL ELPH-MCNC: 4.1 G/DL
ALP BLD-CCNC: 104 U/L
ALT SERPL-CCNC: 8 U/L
ANION GAP SERPL CALC-SCNC: 12 MMOL/L
AST SERPL-CCNC: 16 U/L
BASOPHILS # BLD AUTO: 0.03 K/UL — SIGNIFICANT CHANGE UP (ref 0–0.2)
BASOPHILS NFR BLD AUTO: 0.5 % — SIGNIFICANT CHANGE UP (ref 0–2)
BILIRUB SERPL-MCNC: 0.4 MG/DL
BUN SERPL-MCNC: 5 MG/DL
CALCIUM SERPL-MCNC: 10.3 MG/DL
CHLORIDE SERPL-SCNC: 106 MMOL/L
CO2 SERPL-SCNC: 23 MMOL/L
CREAT SERPL-MCNC: 0.85 MG/DL
EOSINOPHIL # BLD AUTO: 0.13 K/UL — SIGNIFICANT CHANGE UP (ref 0–0.5)
EOSINOPHIL NFR BLD AUTO: 2.2 % — SIGNIFICANT CHANGE UP (ref 0–6)
GLUCOSE SERPL-MCNC: 99 MG/DL
HCT VFR BLD CALC: 40.3 % — SIGNIFICANT CHANGE UP (ref 34.5–45)
HGB BLD-MCNC: 13 G/DL — SIGNIFICANT CHANGE UP (ref 11.5–15.5)
IMM GRANULOCYTES NFR BLD AUTO: 0.2 % — SIGNIFICANT CHANGE UP (ref 0–1.5)
LDH SERPL-CCNC: 184 U/L
LYMPHOCYTES # BLD AUTO: 2.25 K/UL — SIGNIFICANT CHANGE UP (ref 1–3.3)
LYMPHOCYTES # BLD AUTO: 38.9 % — SIGNIFICANT CHANGE UP (ref 13–44)
MAGNESIUM SERPL-MCNC: 1.6 MG/DL
MCHC RBC-ENTMCNC: 29 PG — SIGNIFICANT CHANGE UP (ref 27–34)
MCHC RBC-ENTMCNC: 32.3 G/DL — SIGNIFICANT CHANGE UP (ref 32–36)
MCV RBC AUTO: 90 FL — SIGNIFICANT CHANGE UP (ref 80–100)
MONOCYTES # BLD AUTO: 0.66 K/UL — SIGNIFICANT CHANGE UP (ref 0–0.9)
MONOCYTES NFR BLD AUTO: 11.4 % — SIGNIFICANT CHANGE UP (ref 2–14)
NEUTROPHILS # BLD AUTO: 2.71 K/UL — SIGNIFICANT CHANGE UP (ref 1.8–7.4)
NEUTROPHILS NFR BLD AUTO: 46.8 % — SIGNIFICANT CHANGE UP (ref 43–77)
NRBC # BLD: 0 /100 WBCS — SIGNIFICANT CHANGE UP (ref 0–0)
PLATELET # BLD AUTO: 487 K/UL — HIGH (ref 150–400)
POTASSIUM SERPL-SCNC: 4 MMOL/L
PROT SERPL-MCNC: 7 G/DL
RBC # BLD: 4.48 M/UL — SIGNIFICANT CHANGE UP (ref 3.8–5.2)
RBC # FLD: 14.3 % — SIGNIFICANT CHANGE UP (ref 10.3–14.5)
SODIUM SERPL-SCNC: 142 MMOL/L
TACROLIMUS SERPL-MCNC: 6.5 NG/ML
WBC # BLD: 5.79 K/UL — SIGNIFICANT CHANGE UP (ref 3.8–10.5)
WBC # FLD AUTO: 5.79 K/UL — SIGNIFICANT CHANGE UP (ref 3.8–10.5)

## 2020-11-24 PROCEDURE — 99215 OFFICE O/P EST HI 40 MIN: CPT

## 2020-11-24 NOTE — ASSESSMENT
[FreeTextEntry1] : Ms. Gary is a 35 years old female Worship with AML with the following comorbidities being managed:\par \par 1) AML\par S/p HLA 10/10 allo sib (sister) PBSCT on 2/27/19\par 7/3/19 BMbx: LANA, normal female karyotype\par 11/21/19 chimerism = 99% donor (CD33 = 100% and CD3 = 83% donor)\par 12/27/19 chimerism = 98% donor\par 2/12/20 chimerism = 99% donor (CD33 = 100% and CD3 = 88.8% donor)\par 3/10/20 chimerism = 99% donor (CD33 = 100% and CD3 = 94% donor)\par 5/6/20 chimerism = 100% donor (CD33 = 100% and CD3 = 97% donor)\par 6/10/20, 7/2020  chimerism = 100% donor in CD3 and CD33 compartments\par oct 2020 same\par Current disease status: LANA based on 7/3/19 BMbx and peripheral blood counts \par \par Hold on starting maintenance Vidaza at this time given  GVHD issues \par \par 2) Heme\par No transfusions as pt is practicing Worship\par Counts WNL's\par Continue multivitamin and folic acid daily\par \par 3) ID\par Continue ppx:\par - Acyclovir 400mg tid\par - Mepron 1500mg daily ...consider bactrim at some point\par Voriconazole resumed 1/23/20 but subsequently held as of 2/19/20 for transaminitis\par Post transplant restrictions reviewed - in light of covid-19, [facility] recs reviewed, strict adherence to restriction reinforced\par \par 6/10/20 CMV PCR not detected, continue to monitor PCR weekly \par \par 4) GVHD\par Acute GVHD: Skin 0   Liver 0   GI 0 - overall grade 0\par Chronic GVHD: mouth score 0,  joints score 1 vaginal 1 - mild  extensive \par \par Prednisone d/c'd 12/22/19\par  FK increased to 2.5mg bid as of 7/14/20..sx much improved\par To consider addition of Jakafi if GVHD symptoms worsen \par See GVHD history below\par \par Date of onset of acute GVHD 8/1/19 \par - On 7/29/19 placed on prednisone 20 mg daily for erythematous rash of face as stated by patient.\par - 8/1/19: Erythematous/hyperpigmented rash 38% BSA- rash of face, chest, back, bilateral upper and lower extremities. Patient was placed on prednisone 20 mg daily as of 7/29/19. Instructed to increase prednisone on 8/1/19 to 40 mg daily on 8/1, 8/2, 8/3. Then decreased to prednisone 20 mg daily. Tacrolimus restarted on 8/1/19 at 0.5 mg BID. Lidex cream BID to body and hydrocortisone cream to face BID. Acute GVHD SKin 2 Liver 0 GI 0- overall grade 1\par - 8/7/19: Erythematous/hyperpigmented rash 43% BSA- rash of face, neck, chest, back, abdomen, bilateral upper and lower extremities. Increased prednisone 20mg daily to 40 mg daily. Lidex cream BID to entire body. FK at 0.5 mg BID. May have to increase FK dose if rash worsens. Acute GVHD SKin 2 Liver 0 GI 0- overall grade 1\par - 8/14/19: Erythematous rash of face. Hyperpigmented rash of chest, neck, abdomen, bilateral upper and lower extremities and back. Skin rash extent 42% BSA. Currently on prednisone 40 mg daily and lidex cream BID. Increased FK to 0.5 mg Am and 1 mg PM. \par - 9/26/19: Skin hyperpigmentation and facial hypopigmentation, but improvement in erythema from facial rash. Some oral sores using dex rinse 4 times a day. Increased FK to 1 mg BID as of 9/26/19. Continue prednisone 30 mg daily. Decrease prednisone by 5 mg every two weeks. Acute GVHD Skin 2 Liver 0 GI 0- overall grade 1. BSA 42%. \par - 12/27/19: no acute GVHD. Chronic GVHD: skin score 3 (>50% BSA), eyes score 3 (requiring scleral lenses), mouth score 2 - overall severe extensive chronic GVHD. Off prednisone as of 12/22/19. Continue FK 1.5mg bid and Decadron swish/spit qid, moisturize 2-3x/day \par - 1/23/20: no acute GVHD. Chronic GVHD: skin 3 (>50% BSA), eyes score 1, mouth score 1 - overall severe extensive chronic GVHD. Continue 1.5mg bid. Continue PRN Decadron swish/spit, instructed to avoid swallowing to prevent systemic absorption of steroid. Moisturize 2-3x/day\par - 3/10/20: no acute GVHD. Chronic GVHD eyes score 3, lung score 2, genital tract score 1 - overall severe extensive chronic GVHD. FK increased to 1mg bid and initiated FAM therapy as below for lung GVHD\par - 4/9/20: no acute GVHD. Chronic GVHD eyes score 2, lung score 2, genital tract score 1 - overall severe extensive chronic GVHD. Continue FK 1mg bid and FAM therapy\par - 5/6/20: no acute GVHD. Chronic GVHD mouth score 1, eyes score 2, lungs score 2, genitalia score 1 - overall severe extensive chronic GVHD. SOB on exertion somewhat improved. Continue FK 1mg bid and FAM therapy\par 6/10/20: no acute GVHD. Chronic GVHD mouth score 1, eyes score 2, lung score 2, genitalia score 1 - overall severe extensive chronic GVHD. Worsening mucositis, FK increased to 1.5mg bid \par \par cGVHD eyes\par resolved\par No longer wearing scleral lenses at this time\par Continue PRN artificial tears and Systane ophthalmic ointment\par Continue f/u with optho\par \par cGVHD mouth\par improved\par Continue Decadron rinse prn\par Continue to monitor closely \par \par cGVHD lung??\par 3/10/20 PFT with FEV1 of 48%\par 5/6/20 CT chest: mild diffuse mosaic attenuation pattern to the lungs. ?BOOP, air trapping, small vessel disease. To consider repeat CT imaging in 3-4mo\par  SOB  improved \par Continue FAM therapy, initiated 3/10/20 (pt already on monteleukast for asthma/allergies) \par - Flovent inhaler 440mcg bid\par - Azithromycin 250mg bid\par - Monteleukast 10mg daily \par \par cGVHD vaginal area...resolved\par Sensation of skin tightening and dryness of vaginal area \par May use lubricant to gently massage external labia to soften tissue\par Encouraged f/u with GYN after covid-19 epidemic resolves\par \par cGVHD joints\par Noted 7/15/20; pain causing limited ROM of shoulders, elbows, wrists, finger joints. Unable to pick things up off of the floor without pain..ROM improved\par Continue to monitor closely \par \par 5) GI\par Continue ppx:\par - Ursodiol 300 mg bid\par - Protonix 40 mg daily \par PRN Zofran and Reglan for nausea\par \par 6) Other \par Hypomagnesemia - likely 2/2 tacrolimus, continue magnesium oxide 400mg bid \par GYN - LMP November 2019, continue Sprintec daily, instructed to wear condoms if resuming sexual activity.  f/u with GYN if abnormal menses persists \par HTN - continue amlodipine 5mg daily, BP remains WNL\par Warts - likely viral warts on hand in setting of immunosuppression, continue to monitor closely \par Health maintenance - 1yr post transplant ECHO done 3/10/20 WNL, EF = 60%\par \par 8) Plan/Dispo\par FK increased to 2.5mg bid for worsening oral GVHD...goal for level 5-10\par pft's in spring\par Hold off on Vidaza maintenance given GVHD issues\par Pt educated regarding plan of care, all questions/concerns addressed\par Instructed to contact our office with fever or new/worsening symptoms \par F/u with 10 weeks\par

## 2020-11-24 NOTE — PHYSICAL EXAM
[Ambulatory and capable of all self care but unable to carry out any work activities] : Status 2- Ambulatory and capable of all self care but unable to carry out any work activities. Up and about more than 50% of waking hours [Normal] : affect appropriate [de-identified] : tachycardic, regular rhythem, normal s1s2 [de-identified] : +BS; abdomen soft, non-distended, non-tender [de-identified] : Diffuse hyperpigmentation of face with patches of hypopigmentation over cheekbones. Generalized patches of hyperpigmentation of chest, abdomen, back, BUEs, and BLEs

## 2020-11-24 NOTE — HISTORY OF PRESENT ILLNESS
[de-identified] : Ms. Gary is a 35 y/o female with AML associated with normal karyotype and NPM1 ans [id number] mutation, refractory to 7+3 and currently receiving Ivosidenib. Of note patient is a Judaism and cannot receive blood products. \par \par Patient was noted to be newly leukopenic to WBC 1.7 during preoperative evaluation in anticipation of right ankle ligament repair. Bone marrow aspirate and biopsy at [facility] on 12/2/2017 revealed AML with some suspicion for APL. She was transferred to [place] for further management. Peripheral counts from around this time are not available. \par \par Repeat marrow on 12/26/2017 showed 90% blasts with myeloid mutation arrest in a 40-50% cellular marrow. On flow blasts were negative for CD34 and HLA-DR; positive for CD38, CD58, CD33, and MPO, and partially positive for CD15, CD11b, , and CD64. CD45 was reported as dim. Karyotype showed +21 in 3 of 21 metaphases, but no t(15:17) was detected on karyotype of FISH. Molecular studies showed mutations in NPM1 (45%0, IDH1 (R132S; 39%), PTPN1 (23%), and CSF3R (5%) genes. There was lingering diagnostic uncertainty due to a largely aspicular and hypocellular specimen, and thus a marrow was repeated on 1/2/2018, confirming AML.\par \par On 1/4/2018, Ms. Gary began 7+3 with Idarubicin. Her induction course was complicated by DIC, neutropenic fever/ Klebsiella PNA, and bilateral retinal hemorrhage. She did not receive blood products and was instead supported with Nplate, Procrit, IV iron, Amicar, and Lupron. After achieving count stability, the patient was discharged; she was not deemed a candidate for further therapy given inability to receive transfusions and a day 14 marrow was not obtained. She subsequently saw Dr. North Obrien at [facility]. He obtained a recovery marrow on 2/13/2018 (day +40), which was 30% cellular with 45% blasts. 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 Cytogenetics and FISH were normal. \par \par She then established care with Dr. Christie for consideration of salvage therapy with IDH1 inhibition given the risks of additional chemotherapy without blood products support. Initial marrow at [facility], on 3/1/2018, showed 69% blasts. ThunderBolts panel confirmed IDH1 R132S, NPM1 [id number], and CSF3R D810G mutations. She was started on Ivosidenib on [facility] protocol  and had completed four cycles to date (6/28/2018 = C5D1). Marrow blasts increased to 85% on 4/30 but declined to 36% by 5/31. A  bone marrow aspirate and biopsy  was obtained  (6/28/2018); preliminary results show further responding disease with 10.3% blasts by flow cytometry. Subsequent marrow in oct showed 5% blasts...she was then started on vidazza plus venetoclax. She completed 2 cycles...f/u marrow with 4% blasts.....\par \par Patient underwent an AlloPSCT sister MRD (10/10) on 2/27/19, hospital course as follows:\par Ms. 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 She is a Judaism and cannot receive blood products. \par \par Ms. Nickerson's donor is her sister.  She is a 10/10 match. Both donor and patient are CMV +. She had completed all pre testing needed for transplant.  She will \par start reduced intensity Fludarabine/Busulfan chemo regimen today. \par \par Upon admission, a right AC PICC line was placed in IR. Ms. Gary received IV hydration, pain management, antiemetics, anxiolysis, and antibacterial / antiviral / antifungal / PCP / GI and VOD (SOS) prophylaxis. Labs were monitored every other day, and she received electrolyte repletion as needed. She also received vitamin K, vitamin B, supplemental iron, vitamin C and procrit as directed by the institutional bloodless transplant policy. \par \par On 2/27/19, Ms. Gary received 363 mLs of fresh, apheresed, mobilized, related allogeneic HPC over 80 minutes. Cell counts as follows: \par Total MNCs ( x 10^8/kg): 6.21 \par CD 34+Cells ( x 10^6/kg): 3.37 \par CD3+ Cells ( x 10^7/kg): 12.46 \par Cell Viability (%): 100% \par \par Engraftment was noted on 3/11/19. Ms. Gary did not receive zarxio due to her having minimal residual disease on admission, and a reduced intensity preparatory regimen to avoid stimulating myeloblasts. \par \par Ms. Gary had a relatively uncomplicated transplant course. She did experience menses, which was suppressed with provera. She also had pancytopenia related to the high dose chemotherapy preparative regimen. She also experienced grade 1 oral mucositis, and grade 2 GI mucositis also related to the chemotherapy preparative regimen. Both were treated with supportive care. \par \par Currently, Ms. Gary is stable for discharge home with outpatient follow up at the [facility].  [de-identified] : Overall improved... Less fatigue. SOB with exertion is much  improved. Reports adequate PO intake and hydration. Improved dry eyes, continues to use artificial tears... has not been able to use scleral lenses as they cause eye swelling the following day. Mucositis is  much improved ...  Improved  joint pain and musculoskeletal stiffness, improved  ROM. Can now  close fists completely, raise arms to shoulder height or higher, extend bilateral wrists.  Warts on hands resolving, stable on feet. Compliant with post transplant meds and restrictions. Currently taking FK 2.5   bid which she did not take prior to lab draw. Denies fever, chills, headache, dizziness, blurred vision, odynophagia, chest pain, cough, nausea/vomiting, diarrhea/constipation, abdominal pain, dysuria, LE edema, rash, bleeding...seen by gyn..using vaginal dilator..occ hot flash\par

## 2020-11-27 LAB — CMV DNA SPEC QL NAA+PROBE: NOT DETECTED IU/ML

## 2020-11-30 DIAGNOSIS — E28.39 OTHER PRIMARY OVARIAN FAILURE: ICD-10-CM

## 2020-12-01 ENCOUNTER — NON-APPOINTMENT (OUTPATIENT)
Age: 35
End: 2020-12-01

## 2020-12-02 LAB
CD3 AND CD33 ENRICHMENT: NORMAL
ENGRAFTMNET-POST: NORMAL

## 2020-12-28 ENCOUNTER — RX RENEWAL (OUTPATIENT)
Age: 35
End: 2020-12-28

## 2021-02-05 ENCOUNTER — OUTPATIENT (OUTPATIENT)
Dept: OUTPATIENT SERVICES | Facility: HOSPITAL | Age: 36
LOS: 1 days | Discharge: HOME | End: 2021-02-05

## 2021-02-05 DIAGNOSIS — Z90.3 ACQUIRED ABSENCE OF STOMACH [PART OF]: Chronic | ICD-10-CM

## 2021-02-05 DIAGNOSIS — C92.00 ACUTE MYELOBLASTIC LEUKEMIA, NOT HAVING ACHIEVED REMISSION: ICD-10-CM

## 2021-02-09 ENCOUNTER — APPOINTMENT (OUTPATIENT)
Dept: HEMATOLOGY ONCOLOGY | Facility: CLINIC | Age: 36
End: 2021-02-09
Payer: COMMERCIAL

## 2021-02-09 DIAGNOSIS — Z80.0 FAMILY HISTORY OF MALIGNANT NEOPLASM OF DIGESTIVE ORGANS: ICD-10-CM

## 2021-02-09 DIAGNOSIS — Z86.39 PERSONAL HISTORY OF OTHER ENDOCRINE, NUTRITIONAL AND METABOLIC DISEASE: ICD-10-CM

## 2021-02-09 PROCEDURE — 99215 OFFICE O/P EST HI 40 MIN: CPT | Mod: 95

## 2021-02-09 NOTE — HISTORY OF PRESENT ILLNESS
[de-identified] : Ms. Gary is a 35 y/o female with AML associated with normal karyotype and NPM1 ans [id number] mutation, refractory to 7+3 and currently receiving Ivosidenib. Of note patient is a [employer]ond[employer] and cannot receive blood products. \par \par Patient was noted to be newly leukopenic to WBC 1.7 during preoperative evaluation in anticipation of right ankle ligament repair. Bone marrow aspirate and biopsy at [facility] on 12/2/2017 revealed AML with some suspicion for APL. She was transferred to [place] for further management. Peripheral counts from around this time are not available. \par \par Repeat marrow on 12/26/2017 showed 90% blasts with myeloid mutation arrest in a 40-50% cellular marrow. On flow blasts were negative for CD34 and HLA-DR; positive for CD38, CD58, CD33, and MPO, and partially positive for CD15, CD11b, , and CD64. CD45 was reported as dim. Karyotype showed +21 in 3 of 21 metaphases, but no t(15:17) was detected on karyotype of FISH. Molecular studies showed mutations in NPM1 (45%0, IDH1 (R132S; 39%), PTPN1 (23%), and CSF3R (5%) genes. There was lingering diagnostic uncertainty due to a largely aspicular and hypocellular specimen, and thus a marrow was repeated on 1/2/2018, confirming AML.\par \par On 1/4/2018, Ms. Gary began 7+3 with Idarubicin. Her induction course was complicated by DIC, neutropenic fever/ Klebsiella PNA, and bilateral retinal hemorrhage. She did not receive blood products and was instead supported with Nplate, Procrit, IV iron, Amicar, and Lupron. After achieving count stability, the patient was discharged; she was not deemed a candidate for further therapy given inability to receive transfusions and a day 14 marrow was not obtained. She subsequently saw Dr. [place] Obrien at [facility]. He obtained a recovery marrow on 2/13/2018 (day +40), which was 30% cellular with 45% blasts. 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 Cytogenetics and FISH were normal. \par \par She then established care with Dr. Christie for consideration of salvage therapy with IDH1 inhibition given the risks of additional chemotherapy without blood products support. [place] [facility]2018, showed 69% blasts. ThunderBolts panel confirmed IDH1 R132S, NPM1 [id number] 12, and CSF3R D810G mutations. She was started on Ivosidenib on [facility] protocol  and had completed four cycles to date (6/28/2018 = C5D1). Marrow blasts increased to 85% on 4/30 but declined to 36% by 5/31. A  bone marrow aspirate and biopsy  was obtained  (6/28/2018); preliminary results show further responding disease with 10.3% blasts by flow cytometry. Subsequent marrow in oct showed 5% blasts...she was then started on vidazza plus venetoclax. She completed 2 cycles...f/u marrow with 4% blasts.....\par \par Patient underwent an AlloPSCT sister MRD (10/10) on 2/27/19, hospital course as follows:\par Ms. 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 She is a [employer] and cannot receive blood products. \par \par Ms. Nickerson's donor is her sister.  She is a 10/10 match. Both donor and patient are CMV +. She had completed all pre testing needed for transplant.  She will \par start reduced intensity Fludarabine/Busulfan chemo regimen today. \par \par Upon admission, a right AC PICC line was placed in IR. Ms. Gary received IV hydration, pain management, antiemetics, anxiolysis, and antibacterial / antiviral / antifungal / PCP / GI and VOD (SOS) prophylaxis. Labs were monitored every other day, and she received electrolyte repletion as needed. She also received vitamin K, vitamin B, supplemental iron, vitamin C and procrit as directed by the institutional bloodless transplant policy. \par \par On 2/27/19, Ms. Gary received 363 mLs of fresh, apheresed, mobilized, related allogeneic HPC over 80 minutes. Cell counts as follows: \par Total MNCs ( x 10^8/kg): 6.21 \par CD 34+Cells ( x 10^6/kg): 3.37 \par CD3+ Cells ( x 10^7/kg): 12.46 \par Cell Viability (%): 100% \par \par Engraftment was noted on 3/11/19. Ms. Gary did not receive zarxio due to her having minimal residual disease on admission, and a reduced intensity preparatory regimen to avoid stimulating myeloblasts. \par \par Ms. Gary had a relatively uncomplicated transplant course. She did experience menses, which was suppressed with provera. She also had pancytopenia related to the high dose chemotherapy preparative regimen. She also experienced grade 1 oral mucositis, and grade 2 GI mucositis also related to the chemotherapy preparative regimen. Both were treated with supportive care. \par \par Currently, Ms. Gary is stable for discharge home with outpatient follow up at the [facility].  [de-identified] : Consents to f/u via [facility]Overall improved... Less fatigue. SOB with exertion is much  improved. Reports adequate PO intake and hydration. Improved dry eyes, continues to use artificial tears... has not been able to use scleral lenses as they cause eye swelling the following day. Mucositis is  much improved ...  Improved  joint pain and musculoskeletal stiffness, improved  ROM overall. Can raise arms to shoulder height or higher, extend bilateral wrists. Trouble making a fist again... Warts on hands resolving, stable on feet. Compliant with post transplant meds and restrictions. Currently taking FK 2.5   bid . Denies fever, chills, headache, dizziness, blurred vision, odynophagia, chest pain, cough, nausea/vomiting, diarrhea/constipation, abdominal pain, dysuria, LE edema, rash, bleeding...seen by gyn..using vaginal dilator..occ hot flash\par

## 2021-02-09 NOTE — PHYSICAL EXAM
[Restricted in physically strenuous activity but ambulatory and able to carry out work of a light or sedentary nature] : Status 1- Restricted in physically strenuous activity but ambulatory and able to carry out work of a light or sedentary nature, e.g., light house work, office work [Mild dry eye symptoms not affecting ADL(requirement of lubricant drops = 3x/day)] : Score 1 [FEV1 60-79%] : Score 1 [Mild signs and females with or without discomfort on exam] : Score 1 [Moderate] : Moderate [Extensive] : Extensive [No] : No [Yes] : Yes [de-identified] : hypo / hyper pigmented

## 2021-02-09 NOTE — ASSESSMENT
[FreeTextEntry1] : Ms. Gary is a 35 years old female Jew with AML with the following comorbidities being managed:\par high complexity decision making\par 1) AML\par S/p HLA 10/10 allo sib (sister) PBSCT on 2/27/19\par 7/3/19 BMbx: LANA, normal female karyotype\par 11/21/19 chimerism = 99% donor (CD33 = 100% and CD3 = 83% donor)\par 12/27/19 chimerism = 98% donor\par 2/12/20 chimerism = 99% donor (CD33 = 100% and CD3 = 88.8% donor)\par 3/10/20 chimerism = 99% donor (CD33 = 100% and CD3 = 94% donor)\par 5/6/20 chimerism = 100% donor (CD33 = 100% and CD3 = 97% donor)\par 6/10/20, 7/2020  chimerism = 100% donor in CD3 and CD33 compartments\par oct 2020 same\par Current disease status: LANA based on 7/3/19 BMbx and peripheral blood counts \par \par Hold on starting maintenance Vidaza at this time given  GVHD issues \par \par 2) Heme\par No transfusions as pt is practicing Jew\par Counts WNL's\par Continue multivitamin and folic acid daily\par \par 3) ID\par Continue ppx:\par - Acyclovir 400mg tid\par - Mepron 1500mg daily ...consider bactrim at some point\par Voriconazole resumed 1/23/20 but subsequently held as of 2/19/20 for transaminitis\par Post transplant restrictions reviewed - in light of covid-19, [facility] recs reviewed, strict adherence to restriction reinforced\par \par 6/10/20 CMV PCR not detected, continue to monitor PCR weekly \par \par 4) GVHD\par Acute GVHD: Skin 0   Liver 0   GI 0 - overall grade 0\par Chronic GVHD: mouth score 0,  lung 1, joint 1,  1 vaginal 1 eye 1 - mod extensive \par \par Prednisone d/c'd 12/22/19\par  FK increased to 2.5mg bid as of 7/14/20..sx much improved\par To consider addition of Jakafi if GVHD symptoms worsen ...discussed at length\par See GVHD history below\par \par Date of onset of acute GVHD 8/1/19 \par - On 7/29/19 placed on prednisone 20 mg daily for erythematous rash of face as stated by patient.\par - 8/1/19: Erythematous/hyperpigmented rash 38% BSA- rash of face, chest, back, bilateral upper and lower extremities. Patient was placed on prednisone 20 mg daily as of 7/29/19. Instructed to increase prednisone on 8/1/19 to 40 mg daily on 8/1, 8/2, 8/3. Then decreased to prednisone 20 mg daily. Tacrolimus restarted on 8/1/19 at 0.5 mg BID. Lidex cream BID to body and hydrocortisone cream to face BID. Acute GVHD SKin 2 Liver 0 GI 0- overall grade 1\par - 8/7/19: Erythematous/hyperpigmented rash 43% BSA- rash of face, neck, chest, back, abdomen, bilateral upper and lower extremities. Increased prednisone 20mg daily to 40 mg daily. Lidex cream BID to entire body. FK at 0.5 mg BID. May have to increase FK dose if rash worsens. Acute GVHD SKin 2 Liver 0 GI 0- overall grade 1\par - 8/14/19: Erythematous rash of face. Hyperpigmented rash of chest, neck, abdomen, bilateral upper and lower extremities and back. Skin rash extent 42% BSA. Currently on prednisone 40 mg daily and lidex cream BID. Increased FK to 0.5 mg Am and 1 mg PM. \par - 9/26/19: Skin hyperpigmentation and facial hypopigmentation, but improvement in erythema from facial rash. Some oral sores using dex rinse 4 times a day. Increased FK to 1 mg BID as of 9/26/19. Continue prednisone 30 mg daily. Decrease prednisone by 5 mg every two weeks. Acute GVHD Skin 2 Liver 0 GI 0- overall grade 1. BSA 42%. \par - 12/27/19: no acute GVHD. Chronic GVHD: skin score 3 (>50% BSA), eyes score 3 (requiring scleral lenses), mouth score 2 - overall severe extensive chronic GVHD. Off prednisone as of 12/22/19. Continue FK 1.5mg bid and Decadron swish/spit qid, moisturize 2-3x/day \par - 1/23/20: no acute GVHD. Chronic GVHD: skin 3 (>50% BSA), eyes score 1, mouth score 1 - overall severe extensive chronic GVHD. Continue 1.5mg bid. Continue PRN Decadron swish/spit, instructed to avoid swallowing to prevent systemic absorption of steroid. Moisturize 2-3x/day\par - 3/10/20: no acute GVHD. Chronic GVHD eyes score 3, lung score 2, genital tract score 1 - overall severe extensive chronic GVHD. FK increased to 1mg bid and initiated FAM therapy as below for lung GVHD\par - 4/9/20: no acute GVHD. Chronic GVHD eyes score 2, lung score 2, genital tract score 1 - overall severe extensive chronic GVHD. Continue FK 1mg bid and FAM therapy\par - 5/6/20: no acute GVHD. Chronic GVHD mouth score 1, eyes score 2, lungs score 2, genitalia score 1 - overall severe extensive chronic GVHD. SOB on exertion somewhat improved. Continue FK 1mg bid and FAM therapy\par 6/10/20: no acute GVHD. Chronic GVHD mouth score 1, eyes score 2, lung score 2, genitalia score 1 - overall severe extensive chronic GVHD. Worsening mucositis, FK increased to 1.5mg bid \par \par cGVHD eyes\par No longer wearing scleral lenses at this time\par Continue PRN artificial tears and Systane ophthalmic ointment\par Continue f/u with optho\par \par cGVHD mouth\par improved\par Continue Decadron rinse prn\par Continue to monitor closely \par \par cGVHD lung??\par 3/10/20 PFT with FEV1 of 48%\par 5/6/20 CT chest: mild diffuse mosaic attenuation pattern to the lungs. ?BOOP, air trapping, small vessel disease. To consider repeat CT imaging in 3-4mo..repeat pft's\par  SOB  improved \par Continue FAM therapy, initiated 3/10/20 (pt already on monteleukast for asthma/allergies) \par - Flovent inhaler 440mcg bid\par - Azithromycin 250mg bid\par - Monteleukast 10mg daily \par \par cGVHD vaginal area..\par Sensation of skin tightening and dryness of vaginal area \par May use lubricant to gently massage external labia to soften tissue\par using dilators\par Encouraged f/u with GYN after covid-19 epidemic resolves\par \par cGVHD joints\par Noted 7/15/20; pain causing limited ROM of shoulders, elbows, wrists, finger joints. Unable to pick things up off of the floor without pain..ROM improved\par Continue to monitor closely \par \par 5) GI\par Continue ppx:\par - Ursodiol 300 mg bid\par - Protonix 40 mg daily \par PRN Zofran and Reglan for nausea\par \par 6) Other \par Hypomagnesemia - likely 2/2 tacrolimus, continue magnesium oxide 400mg bid \par GYN - LMP November 2019, continue Sprintec daily, instructed to wear condoms if resuming sexual activity.  f/u with GYN if abnormal menses persists \par HTN - continue amlodipine 5mg daily, BP remains WNL\par Warts - likely viral warts on hand in setting of immunosuppression, continue to monitor closely \par Health maintenance - 1yr post transplant ECHO done 3/10/20 WNL, EF = 60%\par \par 8) Plan/Dispo\par FK increased to 2.5mg bid for worsening oral GVHD...goal for level 5-10\par pft's in spring\par Hold off on Vidaza maintenance given GVHD issues\par Pt educated regarding plan of care, all questions/concerns addressed\par Instructed to contact our office with fever or new/worsening symptoms \par F/u April\par do labs locally in next 1 to 2 weeks\par

## 2021-04-05 ENCOUNTER — NON-APPOINTMENT (OUTPATIENT)
Age: 36
End: 2021-04-05

## 2021-04-15 DIAGNOSIS — C92.00 ACUTE MYELOBLASTIC LEUKEMIA, NOT HAVING ACHIEVED REMISSION: ICD-10-CM

## 2021-04-19 ENCOUNTER — RX RENEWAL (OUTPATIENT)
Age: 36
End: 2021-04-19

## 2021-04-23 ENCOUNTER — OUTPATIENT (OUTPATIENT)
Dept: OUTPATIENT SERVICES | Facility: HOSPITAL | Age: 36
LOS: 1 days | Discharge: HOME | End: 2021-04-23

## 2021-04-23 DIAGNOSIS — Z90.3 ACQUIRED ABSENCE OF STOMACH [PART OF]: Chronic | ICD-10-CM

## 2021-04-23 DIAGNOSIS — C92.00 ACUTE MYELOBLASTIC LEUKEMIA, NOT HAVING ACHIEVED REMISSION: ICD-10-CM

## 2021-04-27 ENCOUNTER — APPOINTMENT (OUTPATIENT)
Dept: HEMATOLOGY ONCOLOGY | Facility: CLINIC | Age: 36
End: 2021-04-27
Payer: COMMERCIAL

## 2021-04-27 ENCOUNTER — RESULT REVIEW (OUTPATIENT)
Age: 36
End: 2021-04-27

## 2021-04-27 VITALS
DIASTOLIC BLOOD PRESSURE: 88 MMHG | WEIGHT: 181.22 LBS | HEART RATE: 97 BPM | BODY MASS INDEX: 33.15 KG/M2 | OXYGEN SATURATION: 97 % | TEMPERATURE: 97.3 F | SYSTOLIC BLOOD PRESSURE: 129 MMHG | RESPIRATION RATE: 16 BRPM

## 2021-04-27 LAB
ALBUMIN SERPL ELPH-MCNC: 4 G/DL
ALP BLD-CCNC: 119 U/L
ALT SERPL-CCNC: <5 U/L
ANION GAP SERPL CALC-SCNC: 11 MMOL/L
AST SERPL-CCNC: 12 U/L
BASOPHILS # BLD AUTO: 0.02 K/UL — SIGNIFICANT CHANGE UP (ref 0–0.2)
BASOPHILS NFR BLD AUTO: 0.3 % — SIGNIFICANT CHANGE UP (ref 0–2)
BILIRUB SERPL-MCNC: 0.4 MG/DL
BUN SERPL-MCNC: 7 MG/DL
CALCIUM SERPL-MCNC: 9.5 MG/DL
CHLORIDE SERPL-SCNC: 106 MMOL/L
CO2 SERPL-SCNC: 21 MMOL/L
CREAT SERPL-MCNC: 0.98 MG/DL
EOSINOPHIL # BLD AUTO: 0.09 K/UL — SIGNIFICANT CHANGE UP (ref 0–0.5)
EOSINOPHIL NFR BLD AUTO: 1.4 % — SIGNIFICANT CHANGE UP (ref 0–6)
GLUCOSE SERPL-MCNC: 96 MG/DL
HCT VFR BLD CALC: 41.5 % — SIGNIFICANT CHANGE UP (ref 34.5–45)
HGB BLD-MCNC: 13.7 G/DL — SIGNIFICANT CHANGE UP (ref 11.5–15.5)
IMM GRANULOCYTES NFR BLD AUTO: 0.2 % — SIGNIFICANT CHANGE UP (ref 0–1.5)
LDH SERPL-CCNC: 146 U/L
LYMPHOCYTES # BLD AUTO: 2.19 K/UL — SIGNIFICANT CHANGE UP (ref 1–3.3)
LYMPHOCYTES # BLD AUTO: 33.7 % — SIGNIFICANT CHANGE UP (ref 13–44)
MAGNESIUM SERPL-MCNC: 2 MG/DL
MCHC RBC-ENTMCNC: 30 PG — SIGNIFICANT CHANGE UP (ref 27–34)
MCHC RBC-ENTMCNC: 33 G/DL — SIGNIFICANT CHANGE UP (ref 32–36)
MCV RBC AUTO: 90.8 FL — SIGNIFICANT CHANGE UP (ref 80–100)
MONOCYTES # BLD AUTO: 0.63 K/UL — SIGNIFICANT CHANGE UP (ref 0–0.9)
MONOCYTES NFR BLD AUTO: 9.7 % — SIGNIFICANT CHANGE UP (ref 2–14)
NEUTROPHILS # BLD AUTO: 3.55 K/UL — SIGNIFICANT CHANGE UP (ref 1.8–7.4)
NEUTROPHILS NFR BLD AUTO: 54.7 % — SIGNIFICANT CHANGE UP (ref 43–77)
NRBC # BLD: 0 /100 WBCS — SIGNIFICANT CHANGE UP (ref 0–0)
PLATELET # BLD AUTO: 384 K/UL — SIGNIFICANT CHANGE UP (ref 150–400)
POTASSIUM SERPL-SCNC: 4.2 MMOL/L
PROT SERPL-MCNC: 6.9 G/DL
RBC # BLD: 4.57 M/UL — SIGNIFICANT CHANGE UP (ref 3.8–5.2)
RBC # FLD: 14.4 % — SIGNIFICANT CHANGE UP (ref 10.3–14.5)
SODIUM SERPL-SCNC: 139 MMOL/L
TACROLIMUS SERPL-MCNC: 4.5 NG/ML
WBC # BLD: 6.49 K/UL — SIGNIFICANT CHANGE UP (ref 3.8–10.5)
WBC # FLD AUTO: 6.49 K/UL — SIGNIFICANT CHANGE UP (ref 3.8–10.5)

## 2021-04-27 PROCEDURE — 99072 ADDL SUPL MATRL&STAF TM PHE: CPT

## 2021-04-27 PROCEDURE — 99215 OFFICE O/P EST HI 40 MIN: CPT

## 2021-04-29 LAB — CMV DNA SPEC QL NAA+PROBE: NOT DETECTED IU/ML

## 2021-04-29 NOTE — ASSESSMENT
[FreeTextEntry1] : Ms. Gary is a 35 years old female [employer] with AML with the following comorbidities being managed:\par high complexity decision making\par 1) AML\par S/p HLA 10/10 allo sib (sister) PBSCT on 2/27/19\par 7/3/19 BMbx: LANA, normal female karyotype\par 11/21/19 chimerism = 99% donor (CD33 = 100% and CD3 = 83% donor)\par 12/27/19 chimerism = 98% donor\par 2/12/20 chimerism = 99% donor (CD33 = 100% and CD3 = 88.8% donor)\par 3/10/20 chimerism = 99% donor (CD33 = 100% and CD3 = 94% donor)\par 5/6/20 chimerism = 100% donor (CD33 = 100% and CD3 = 97% donor)\par 6/10/20, 7/2020  chimerism = 100% donor in CD3 and CD33 compartments\par oct 2020 same\par Current disease status: LANA based on 7/3/19 BMbx and peripheral blood counts \par \par Hold on starting maintenance Vidaza at this time given  GVHD issues \par \par 2) Heme\par No transfusions as pt is practicing [employer]avian\par Counts WNL's\par Continue multivitamin and folic acid daily\par \par 3) ID\par Continue ppx:\par - Acyclovir 400mg tid\par - Mepron 1500mg daily ...consider bactrim at some point\par Voriconazole resumed 1/23/20 but subsequently held as of 2/19/20 for transaminitis\par Post transplant restrictions reviewed - in light of covid-19, [facility] recs reviewed, strict adherence to restriction reinforced\par \par 6/10/20 CMV PCR not detected, continue to monitor PCR weekly \par \par 4) GVHD\par Acute GVHD: Skin 0   Liver 0   GI 0 - overall grade 0\par Chronic GVHD: mouth score 0,  lung 1, joint 1,  - mild extensive \par \par Prednisone d/c'd 12/22/19\par Advised to decrease FK to 2.0 mg BID  4/2721 as symptoms have remained stable  to improved\par To consider addition of Jakafi if GVHD symptoms worsen ...discussed at length\par See GVHD history below\par \par Date of onset of acute GVHD 8/1/19 \par - On 7/29/19 placed on prednisone 20 mg daily for erythematous rash of face as stated by patient.\par - 8/1/19: Erythematous/hyperpigmented rash 38% BSA- rash of face, chest, back, bilateral upper and lower extremities. Patient was placed on prednisone 20 mg daily as of 7/29/19. Instructed to increase prednisone on 8/1/19 to 40 mg daily on 8/1, 8/2, 8/3. Then decreased to prednisone 20 mg daily. Tacrolimus restarted on 8/1/19 at 0.5 mg BID. Lidex cream BID to body and hydrocortisone cream to face BID. Acute GVHD SKin 2 Liver 0 GI 0- overall grade 1\par - 8/7/19: Erythematous/hyperpigmented rash 43% BSA- rash of face, neck, chest, back, abdomen, bilateral upper and lower extremities. Increased prednisone 20mg daily to 40 mg daily. Lidex cream BID to entire body. FK at 0.5 mg BID. May have to increase FK dose if rash worsens. Acute GVHD SKin 2 Liver 0 GI 0- overall grade 1\par - 8/14/19: Erythematous rash of face. Hyperpigmented rash of chest, neck, abdomen, bilateral upper and lower extremities and back. Skin rash extent 42% BSA. Currently on prednisone 40 mg daily and lidex cream BID. Increased FK to 0.5 mg Am and 1 mg PM. \par - 9/26/19: Skin hyperpigmentation and facial hypopigmentation, but improvement in erythema from facial rash. Some oral sores using dex rinse 4 times a day. Increased FK to 1 mg BID as of 9/26/19. Continue prednisone 30 mg daily. Decrease prednisone by 5 mg every two weeks. Acute GVHD Skin 2 Liver 0 GI 0- overall grade 1. BSA 42%. \par - 12/27/19: no acute GVHD. Chronic GVHD: skin score 3 (>50% BSA), eyes score 3 (requiring scleral lenses), mouth score 2 - overall severe extensive chronic GVHD. Off prednisone as of 12/22/19. Continue FK 1.5mg bid and Decadron swish/spit qid, moisturize 2-3x/day \par - 1/23/20: no acute GVHD. Chronic GVHD: skin 3 (>50% BSA), eyes score 1, mouth score 1 - overall severe extensive chronic GVHD. Continue 1.5mg bid. Continue PRN Decadron swish/spit, instructed to avoid swallowing to prevent systemic absorption of steroid. Moisturize 2-3x/day\par - 3/10/20: no acute GVHD. Chronic GVHD eyes score 3, lung score 2, genital tract score 1 - overall severe extensive chronic GVHD. FK increased to 1mg bid and initiated FAM therapy as below for lung GVHD\par - 4/9/20: no acute GVHD. Chronic GVHD eyes score 2, lung score 2, genital tract score 1 - overall severe extensive chronic GVHD. Continue FK 1mg bid and FAM therapy\par - 5/6/20: no acute GVHD. Chronic GVHD mouth score 1, eyes score 2, lungs score 2, genitalia score 1 - overall severe extensive chronic GVHD. SOB on exertion somewhat improved. Continue FK 1mg bid and FAM therapy\par 6/10/20: no acute GVHD. Chronic GVHD mouth score 1, eyes score 2, lung score 2, genitalia score 1 - overall severe extensive chronic GVHD. Worsening mucositis, FK increased to 1.5mg bid \par \par cGVHD eyes\par No longer wearing scleral lenses at this time\par Continue PRN artificial tears and Systane ophthalmic ointment\par Continue f/u with optho\par \par cGVHD mouth\par improved\par Continue Decadron rinse prn\par Continue to monitor closely \par \par cGVHD lung??\par 3/10/20 PFT with FEV1 of 48%\par (Repeated outpatient  4/2021- Nor esults available for review_ \par 5/6/20 CT chest: mild diffuse mosaic attenuation pattern to the lungs. ?BOOP, air trapping, small vessel disease. To consider repeat CT imaging in 3-4mo..repeat pft's\par  SOB  improved \par Continue FAM therapy, initiated 3/10/20 (pt already on monteleukast for asthma/allergies) \par - Flovent inhaler 440mcg bid\par - Azithromycin 250mg bid - Reflill sent  \par - Monteleukast 10mg daily \par \par cGVHD vaginal area..\par Sensation of skin tightening and dryness of vaginal area \par May use lubricant to gently massage external labia to soften tissue\par using dilators\par Encouraged f/u with GYN after covid-19 epidemic resolves\par \par cGVHD joints\par Noted 7/15/20; pain causing limited ROM of shoulders, elbows, wrists, finger joints. Unable to pick things up off of the floor without pain..ROM improved\par Continue to monitor closely \par \par 5) GI\par Continue ppx:\par - Ursodiol 300 mg bid\par - Protonix 40 mg daily \par PRN Zofran and Reglan for nausea\par \par 6) Other \par Hypomagnesemia - likely 2/2 tacrolimus, continue magnesium oxide 400mg bid \par GYN - LMP November 2019, continue Sprintec daily, instructed to wear condoms if resuming sexual activity.  f/u with GYN if abnormal menses persists \par HTN - continue amlodipine 5mg daily, BP remains WNL\par Warts - likely viral warts on hand in setting of immunosuppression, continue to monitor closely \par Health maintenance - 1yr post transplant ECHO done 3/10/20 WNL, EF = 60%\par \par 8) Plan/Dispo\par pft's done this  spring reviewed report \par Hold off on Vidaza maintenance given GVHD issues\par Pt educated regarding plan of care, all questions/concerns addressed\par Instructed to contact our office with fever or new/worsening symptoms \par F/u in 8 weeks tele visit..can do labs locally\par

## 2021-04-29 NOTE — HISTORY OF PRESENT ILLNESS
[de-identified] : Ms. 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 Of note patient is a [employer] and cannot receive blood products. \par \par Patient was noted to be newly leukopenic to WBC 1.7 during preoperative evaluation in anticipation of right ankle ligament repair. Bone marrow aspirate and biopsy at [facility] on 12/2/2017 revealed AML with some suspicion for APL. She was transferred to [place] for further management. Peripheral counts from around this time are not available. \par \par Repeat marrow on 12/26/2017 showed 90% blasts with myeloid mutation arrest in a 40-50% cellular marrow. On flow blasts were negative for CD34 and HLA-DR; positive for CD38, CD58, CD33, and MPO, and partially positive for CD15, CD11b, , and CD64. CD45 was reported as dim. Karyotype showed +21 in 3 of 21 metaphases, but no t(15:17) was detected on karyotype of FISH. Molecular studies showed mutations in NPM1 (45%0, IDH1 (R132S; 39%), PTPN1 (23%), and CSF3R (5%) genes. There was lingering diagnostic uncertainty due to a largely aspicular and hypocellular specimen, and thus a marrow was repeated on 1/2/2018, confirming AML.\par \par On 1/4/2018, Ms. Gary began 7+3 with Idarubicin. Her induction course was complicated by DIC, neutropenic fever/ Klebsiella PNA, and bilateral retinal hemorrhage. She did not receive blood products and was instead supported with Nplate, Procrit, IV iron, Amicar, and Lupron. After achieving count stability, the patient was discharged; she was not deemed a candidate for further therapy given inability to receive transfusions and a day 14 marrow was not obtained. She subsequently saw Dr. [place] at [facility]. He obtained a recovery marrow on 2/13/2018 (day +40), which was 30% cellular with 45% blasts. 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 Cytogenetics and FISH were normal. \par \par She then established care with Dr. Christie for consideration of salvage therapy with IDH1 inhibition given the risks of additional chemotherapy without blood products support. [place] [facility], showed 69% blasts. [facility] panel confirmed IDH1 R132S, NPM1 [id number] 12, and CSF3R D810G mutations. She was started on Ivosidenib [place] [facility] [place]  and had completed four cycles to date (6/28/2018 = C5D1). Marrow blasts increased to 85% on 4/30 but declined to 36% by 5/31. A  bone marrow aspirate and biopsy  was obtained  (6/28/2018); preliminary results show further responding disease with 10.3% blasts by flow cytometry. Subsequent marrow in oct showed 5% blasts...she was then started on vidazza plus venetoclax. She completed 2 cycles...f/u marrow with 4% blasts.....\par \par Patient underwent an AlloPSCT sister MRD (10/10) on 2/27/19, hospital course as follows:\par Ms. 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 She is a [employer] and cannot receive blood products. \par \par Ms. Nickerson's donor is her sister.  She is a 10/10 match. Both donor and patient are CMV +. She had completed all pre testing needed for transplant.  She will \par start reduced intensity Fludarabine/Busulfan chemo regimen today. \par \par Upon admission, a right AC PICC line was placed in IR. Ms. Gary received IV hydration, pain management, antiemetics, anxiolysis, and antibacterial / antiviral / antifungal / PCP / GI and VOD (SOS) prophylaxis. Labs were monitored every other day, and she received electrolyte repletion as needed. She also received vitamin K, vitamin B, supplemental iron, vitamin C and procrit as directed by the institutional bloodless transplant policy. \par \par On 2/27/19, Ms. Gary received 363 mLs of fresh, apheresed, mobilized, related allogeneic HPC over 80 minutes. Cell counts as follows: \par Total MNCs ( x 10^8/kg): 6.21 \par CD 34+Cells ( x 10^6/kg): 3.37 \par CD3+ Cells ( x 10^7/kg): 12.46 \par Cell Viability (%): 100% \par \par Engraftment was noted on 3/11/19. Ms. Gary did not receive zarxio due to her having minimal residual disease on admission, and a reduced intensity preparatory regimen to avoid stimulating myeloblasts. \par \par Ms. Gary had a relatively uncomplicated transplant course. She did experience menses, which was suppressed with provera. She also had pancytopenia related to the high dose chemotherapy preparative regimen. She also experienced grade 1 oral mucositis, and grade 2 GI mucositis also related to the chemotherapy preparative regimen. Both were treated with supportive care. \par \par Currently, Ms. Gary is stable for discharge home with outpatient follow up at the [facility].  [de-identified] : .Overall improved... Less fatigue. SOB with exertion is much  improved. Reports adequate PO intake and hydration. Improved dry eyes, continues to use artificial tears... has not been able to use scleral lenses as they cause eye swelling the following day. Mucositis is  much improved ...  Improved  joint pain and musculoskeletal stiffness, improved  ROM overall. Can raise arms to shoulder height or higher, extend bilateral wrists. Trouble making a fist again... Warts on hands resolving, stable on feet. Compliant with post transplant meds and restrictions. Currently taking FK 2.5   bid . Denies fever, chills, headache, dizziness, blurred vision, odynophagia, chest pain, cough, nausea/vomiting, diarrhea/constipation, abdominal pain, dysuria, LE edema, rash, bleeding...seen by gyn..using vaginal dilator..occ hot flash\par \par \par 4/27/21\par Pt reports feeling well\par She is s/p PFTs at local facility \par She denies any exacerbations with GVHD to the skin \par REports continued dryness to the eyes and many  dental carries\par No mouth sores or dysphagia \par DU with climbing stairs.  No respiratory infection \par Stable weight/appetite \par Arthralgias to the bilateral wrists. \par Wants to get Moderna Vaccine \par \par

## 2021-04-29 NOTE — PHYSICAL EXAM
[Restricted in physically strenuous activity but ambulatory and able to carry out work of a light or sedentary nature] : Status 1- Restricted in physically strenuous activity but ambulatory and able to carry out work of a light or sedentary nature, e.g., light house work, office work [FEV1 60-79%] : Score 1 [Extensive] : Extensive [No] : No [Yes] : Yes [Normal] : affect appropriate [No symptoms] : Score 0 [Mild tightness of arms or legs, normal or mild decreased range of motion AND not affecting ADL] : Score 1 [No signs] : Score 0 [Mild] : Mild [de-identified] : hypo / hyper pigmented

## 2021-05-03 LAB
CD3 AND CD33 ENRICHMENT: NORMAL
ENGRAFTMNET-POST: NORMAL

## 2021-05-26 ENCOUNTER — NON-APPOINTMENT (OUTPATIENT)
Age: 36
End: 2021-05-26

## 2021-05-27 ENCOUNTER — NON-APPOINTMENT (OUTPATIENT)
Age: 36
End: 2021-05-27

## 2021-06-01 ENCOUNTER — NON-APPOINTMENT (OUTPATIENT)
Age: 36
End: 2021-06-01

## 2021-06-16 ENCOUNTER — RX RENEWAL (OUTPATIENT)
Age: 36
End: 2021-06-16

## 2021-06-17 ENCOUNTER — OUTPATIENT (OUTPATIENT)
Dept: OUTPATIENT SERVICES | Facility: HOSPITAL | Age: 36
LOS: 1 days | Discharge: HOME | End: 2021-06-17

## 2021-06-17 DIAGNOSIS — Z90.3 ACQUIRED ABSENCE OF STOMACH [PART OF]: Chronic | ICD-10-CM

## 2021-06-17 DIAGNOSIS — C92.00 ACUTE MYELOBLASTIC LEUKEMIA, NOT HAVING ACHIEVED REMISSION: ICD-10-CM

## 2021-06-18 ENCOUNTER — RX RENEWAL (OUTPATIENT)
Age: 36
End: 2021-06-18

## 2021-06-21 ENCOUNTER — APPOINTMENT (OUTPATIENT)
Dept: HEMATOLOGY ONCOLOGY | Facility: CLINIC | Age: 36
End: 2021-06-21
Payer: COMMERCIAL

## 2021-06-21 DIAGNOSIS — R06.02 SHORTNESS OF BREATH: ICD-10-CM

## 2021-06-21 PROCEDURE — 99215 OFFICE O/P EST HI 40 MIN: CPT | Mod: 95

## 2021-06-21 NOTE — PHYSICAL EXAM
[Restricted in physically strenuous activity but ambulatory and able to carry out work of a light or sedentary nature] : Status 1- Restricted in physically strenuous activity but ambulatory and able to carry out work of a light or sedentary nature, e.g., light house work, office work [FEV1 60-79%] : Score 1 [Mild tightness of arms or legs, normal or mild decreased range of motion AND not affecting ADL] : Score 1 [Mild] : Mild [Extensive] : Extensive [No] : No [] :  [No active (erythematous_ GVHD rash)] : Skin: No active (erythematous_ GVHD rash) [< 2 mg/dl] : Liver: < 2 mg/dl [No or intermittent] : Upper GI: No or intermittent nausea, vomiting or anorexia [<500 ml/day or < 3 episodes/day] : Lower GI (stool output/day): <500 ml/day or <3 episodes/day [Yes] : Yes [No symptoms] : Score 0 [FreeTextEntry1] : 02/27/2019

## 2021-06-21 NOTE — REVIEW OF SYSTEMS
[Dry Eyes] : dryness of the eyes [SOB on Exertion] : shortness of breath during exertion [Joint Pain] : joint pain [Negative] : Endocrine [Eye Pain] : no eye pain [Red Eyes] : eyes not red [Vision Problems] : no vision problems [Shortness Of Breath] : no shortness of breath [Wheezing] : no wheezing [Cough] : no cough [Joint Stiffness] : no joint stiffness [Muscle Pain] : no muscle pain [FreeTextEntry9] : States wrists are painful.

## 2021-06-21 NOTE — HISTORY OF PRESENT ILLNESS
[de-identified] : Ms. 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 Of note patient is a [employer] and cannot receive blood products. \par \par Patient was noted to be newly leukopenic to WBC 1.7 during preoperative evaluation in anticipation of right ankle ligament repair. Bone marrow aspirate and biopsy at [facility] on 12/2/2017 revealed AML with some suspicion for APL. She was transferred to [place] for further management. Peripheral counts from around this time are not available. \par \par Repeat marrow on 12/26/2017 showed 90% blasts with myeloid mutation arrest in a 40-50% cellular marrow. On flow blasts were negative for CD34 and HLA-DR; positive for CD38, CD58, CD33, and MPO, and partially positive for CD15, CD11b, , and CD64. CD45 was reported as dim. Karyotype showed +21 in 3 of 21 metaphases, but no t(15:17) was detected on karyotype of FISH. Molecular studies showed mutations in NPM1 (45%0, IDH1 (R132S; 39%), PTPN1 (23%), and CSF3R (5%) genes. There was lingering diagnostic uncertainty due to a largely aspicular and hypocellular specimen, and thus a marrow was repeated on 1/2/2018, confirming AML.\par \par On 1/4/2018, Ms. Gary began 7+3 with Idarubicin. Her induction course was complicated by DIC, neutropenic fever/ Klebsiella PNA, and bilateral retinal hemorrhage. She did not receive blood products and was instead supported with Nplate, Procrit, IV iron, Amicar, and Lupron. After achieving count stability, the patient was discharged; she was not deemed a candidate for further therapy given inability to receive transfusions and a day 14 marrow was not obtained. She subsequently saw Dr. [place] at [facility]. He obtained a recovery marrow on 2/13/2018 (day +40), which was 30% cellular with 45% blasts. 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 Cytogenetics and FISH were normal. \par \par She then established care with Dr. Christie for consideration of salvage therapy with IDH1 inhibition given the risks of additional chemotherapy without blood products support. Initial marrow at [facility], on 3/1/2018, showed 69% blasts. [employer] panel confirmed IDH1 R132S, NPM1 [id number], and CSF3R D810G mutations. She was started on Ivosidenib on [facility] protocol  and had completed four cycles to date (6/28/2018 = C5D1). Marrow blasts increased to 85% on 4/30 but declined to 36% by 5/31. A  bone marrow aspirate and biopsy  was obtained  (6/28/2018); preliminary results show further responding disease with 10.3% blasts by flow cytometry. Subsequent marrow in oct showed 5% blasts...she was then started on vidazza plus venetoclax. She completed 2 cycles...f/u marrow with 4% blasts.....\par \par Patient underwent an AlloPSCT sister MRD (10/10) on 2/27/19, hospital course as follows:\par Ms. 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 She is a [employer] and cannot receive blood products. \par \par Ms. Nickerson's donor is her sister.  She is a 10/10 match. Both donor and patient are CMV +. She had completed all pre testing needed for transplant.  She will \par start reduced intensity Fludarabine/Busulfan chemo regimen today. \par \par Upon admission, a right AC PICC line was placed in IR. Ms. Gary received IV hydration, pain management, antiemetics, anxiolysis, and antibacterial / antiviral / antifungal / PCP / GI and VOD (SOS) prophylaxis. Labs were monitored every other day, and she received electrolyte repletion as needed. She also received vitamin K, vitamin B, supplemental iron, vitamin C and procrit as directed by the institutional bloodless transplant policy. \par \par On 2/27/19, Ms. Gary received 363 mLs of fresh, apheresed, mobilized, related allogeneic HPC over 80 minutes. Cell counts as follows: \par Total MNCs ( x 10^8/kg): 6.21 \par CD 34+Cells ( x 10^6/kg): 3.37 \par CD3+ Cells ( x 10^7/kg): 12.46 \par Cell Viability (%): 100% \par \par Engraftment was noted on 3/11/19. Ms. Gary did not receive zarxio due to her having minimal residual disease on admission, and a reduced intensity preparatory regimen to avoid stimulating myeloblasts. \par \par Ms. Gary had a relatively uncomplicated transplant course. She did experience menses, which was suppressed with provera. She also had pancytopenia related to the high dose chemotherapy preparative regimen. She also experienced grade 1 oral mucositis, and grade 2 GI mucositis also related to the chemotherapy preparative regimen. Both were treated with supportive care. \par \par Currently, Ms. Gary is stable for discharge home with outpatient follow up at the [facility].  [de-identified] : .Overall improved... Less fatigue. SOB with exertion is much  improved. Reports adequate PO intake and hydration. Improved dry eyes, continues to use artificial tears... has not been able to use scleral lenses as they cause eye swelling the following day. Mucositis is  much improved ...  Improved  joint pain and musculoskeletal stiffness, improved  ROM overall. Can raise arms to shoulder height or higher, extend bilateral wrists. Trouble making a fist again... Warts on hands resolving, stable on feet. Compliant with post transplant meds and restrictions. Currently taking FK 2.5   bid . Denies fever, chills, headache, dizziness, blurred vision, odynophagia, chest pain, cough, nausea/vomiting, diarrhea/constipation, abdominal pain, dysuria, LE edema, rash, bleeding...seen by gyn..using vaginal dilator..occ hot flash\par \par \par 4/27/21\par Pt reports feeling well\par She is s/p PFTs at local facility \par She denies any exacerbations with GVHD to the skin \par REports continued dryness to the eyes and many  dental carries\par No mouth sores or dysphagia \par DU with climbing stairs.  No respiratory infection \par Stable weight/appetite \par Arthralgias to the bilateral wrists. \par Wants to get Moderna Vaccine \par \par On 6/21/21, verbal consent obtained for today's telehealth visit. Nany was in her home today during out visit. Overall patient is well and offers no acute concerns. Currently on FK 2 mg BID. States when tacrolimus was tapered recently she had pruritus which has resolved. Admits has not needed her scleral lenses recently. Applies eye drops 15 times a day. Complains of dyspnea on exertion and wrists are painful. Recently followed up with neurologist to rule out carpal tunnel syndrome.  Denies fever, chills, nausea, vomiting, diarrhea, rash, mouth sores or any signs of active bleeding. Remains compliant with post transplant medications. \par \par

## 2021-06-21 NOTE — ASSESSMENT
[FreeTextEntry1] : Ms. Gary is a 35 years old female [employer] with AML with the following comorbidities being managed:\par high complexity decision making\par \par 1) AML\par S/p HLA 10/10 allo sib (sister) PBSCT on 2/27/19\par 7/3/19 BMbx: LANA, normal female karyotype\par 11/21/19 chimerism = 99% donor (CD33 = 100% and CD3 = 83% donor)\par 12/27/19 chimerism = 98% donor\par 2/12/20 chimerism = 99% donor (CD33 = 100% and CD3 = 88.8% donor)\par 3/10/20 chimerism = 99% donor (CD33 = 100% and CD3 = 94% donor)\par 5/6/20 chimerism = 100% donor (CD33 = 100% and CD3 = 97% donor)\par 6/10/20, 7/2020  chimerism = 100% donor in CD3 and CD33 compartments\par 4/27/21 chimerism= 100% donor ( YP62=682%, and CD 3=100%)\par Current disease status: LANA based on 7/3/19 BMbx and peripheral blood counts \par \par Hold on starting maintenance Vidaza at this time given  GVHD issues \par \par 2) Heme\par No transfusions as pt is practicing [employer]\par Counts WNL's\par Continue multivitamin and folic acid daily\par \par 3) ID\par Continue ppx:\par - Acyclovir 400 mg tid\par - Mepron 1500 mg daily ...consider bactrim at some point\par Voriconazole resumed 1/23/20 but subsequently HELD as of 2/19/20 for transaminitis\par Post transplant restrictions reviewed - in light of covid-19, CDC recs reviewed, strict adherence to restriction reinforced\par \par 4/27/21 CMV PCR not detected, continue to monitor PCR weekly \par \par 4) GVHD\par Acute GVHD: Skin 0   Liver 0   GI 0 - overall grade 0\par Chronic GVHD: mouth score 0,  lung 1, joint 1,  - mild extensive \par \par Prednisone d/c'd 12/22/19\par Continue FK to 2.0 mg BID \par To consider addition of Jakafi if GVHD symptoms worsen ...discussed at length\par See GVHD history below\par \par Date of onset of acute GVHD 8/1/19 \par - On 7/29/19 placed on prednisone 20 mg daily for erythematous rash of face as stated by patient.\par - 8/1/19: Erythematous/hyperpigmented rash 38% BSA- rash of face, chest, back, bilateral upper and lower extremities. Patient was placed on prednisone 20 mg daily as of 7/29/19. Instructed to increase prednisone on 8/1/19 to 40 mg daily on 8/1, 8/2, 8/3. Then decreased to prednisone 20 mg daily. Tacrolimus restarted on 8/1/19 at 0.5 mg BID. Lidex cream BID to body and hydrocortisone cream to face BID. Acute GVHD SKin 2 Liver 0 GI 0- overall grade 1\par - 8/7/19: Erythematous/hyperpigmented rash 43% BSA- rash of face, neck, chest, back, abdomen, bilateral upper and lower extremities. Increased prednisone 20mg daily to 40 mg daily. Lidex cream BID to entire body. FK at 0.5 mg BID. May have to increase FK dose if rash worsens. Acute GVHD SKin 2 Liver 0 GI 0- overall grade 1\par - 8/14/19: Erythematous rash of face. Hyperpigmented rash of chest, neck, abdomen, bilateral upper and lower extremities and back. Skin rash extent 42% BSA. Currently on prednisone 40 mg daily and lidex cream BID. Increased FK to 0.5 mg Am and 1 mg PM. \par - 9/26/19: Skin hyperpigmentation and facial hypopigmentation, but improvement in erythema from facial rash. Some oral sores using dex rinse 4 times a day. Increased FK to 1 mg BID as of 9/26/19. Continue prednisone 30 mg daily. Decrease prednisone by 5 mg every two weeks. Acute GVHD Skin 2 Liver 0 GI 0- overall grade 1. BSA 42%. \par - 12/27/19: no acute GVHD. Chronic GVHD: skin score 3 (>50% BSA), eyes score 3 (requiring scleral lenses), mouth score 2 - overall severe extensive chronic GVHD. Off prednisone as of 12/22/19. Continue FK 1.5mg bid and Decadron swish/spit qid, moisturize 2-3x/day \par - 1/23/20: no acute GVHD. Chronic GVHD: skin 3 (>50% BSA), eyes score 1, mouth score 1 - overall severe extensive chronic GVHD. Continue 1.5mg bid. Continue PRN Decadron swish/spit, instructed to avoid swallowing to prevent systemic absorption of steroid. Moisturize 2-3x/day\par - 3/10/20: no acute GVHD. Chronic GVHD eyes score 3, lung score 2, genital tract score 1 - overall severe extensive chronic GVHD. FK increased to 1mg bid and initiated FAM therapy as below for lung GVHD\par - 4/9/20: no acute GVHD. Chronic GVHD eyes score 2, lung score 2, genital tract score 1 - overall severe extensive chronic GVHD. Continue FK 1mg bid and FAM therapy\par - 5/6/20: no acute GVHD. Chronic GVHD mouth score 1, eyes score 2, lungs score 2, genitalia score 1 - overall severe extensive chronic GVHD. SOB on exertion somewhat improved. Continue FK 1mg bid and FAM therapy\par 6/10/20: no acute GVHD. Chronic GVHD mouth score 1, eyes score 2, lung score 2, genitalia score 1 - overall severe extensive chronic GVHD. Worsening mucositis, FK increased to 1.5mg bid \par \par cGVHD eyes\par No longer wearing scleral lenses at this time\par Continue PRN artificial tears and Systane ophthalmic ointment\par Continue f/u with optho\par \par cGVHD mouth\par improved\par Continue Decadron rinse prn\par Continue to monitor closely \par Resolved\par \par cGVHD lung??\par 3/10/20 PFT with FEV1 of 48%\par (Repeated outpatient  4/2021- Nor esults available for review_ \par 5/6/20 CT chest: mild diffuse mosaic attenuation pattern to the lungs. ?BOOP, air trapping, small vessel disease. To consider repeat CT imaging in 3-4mo\par PFT repeated on 4/8/21: Pulmonary function test are severe obstruction with no bronchodilatation response\par Continue FAM therapy, initiated 3/10/20 (pt already on monteleukast for asthma/allergies) \par - Flovent inhaler 440 mcg bid\par - Azithromycin 250 mg- take one tablet Monday/Wednesday/Friday\par - Monteleukast 10 mg daily \par \par cGVHD vaginal area..\par Sensation of skin tightening and dryness of vaginal area \par May use lubricant to gently massage external labia to soften tissue\par using dilators\par Encouraged f/u with GYN after covid-19 epidemic resolves\par Resolved \par \par cGVHD joints\par Noted 7/15/20; pain causing limited ROM of shoulders, elbows, wrists, finger joints. Unable to pick things up off of the floor without pain..ROM improved\par Continue to monitor closely \par \par 5) GI\par Continue ppx:\par - Ursodiol 300 mg bid\par - Protonix 40 mg daily \par PRN Zofran and Reglan for nausea\par \par 6) Other \par Hypomagnesemia - likely 2/2 tacrolimus, continue magnesium oxide 400 mg bid \par GYN - LMP November 2019, continue Sprintec daily, instructed to wear condoms if resuming sexual activity.  f/u with GYN if abnormal menses persists \par HTN - continue amlodipine 5mg daily, BP remains WNL\par Warts - likely viral warts on hand in setting of immunosuppression, continue to monitor closely. RESOLVED.\par Health maintenance - 1yr post transplant ECHO done 3/10/20 WNL, EF = 60%\par \par 8) Plan/Dispo\par Hold off on Vidaza maintenance given GVHD issues\par Pt educated regarding plan of care, all questions/concerns addressed\par Instructed to contact our office with fever or new/worsening symptoms \par Medications sent to mail away pharmacy \par Time spent on telehealth visit: 16 minutes \par F/u in 8 weeks tele visit..can do labs locally\par

## 2021-07-08 ENCOUNTER — NON-APPOINTMENT (OUTPATIENT)
Age: 36
End: 2021-07-08

## 2021-08-13 ENCOUNTER — OUTPATIENT (OUTPATIENT)
Dept: OUTPATIENT SERVICES | Facility: HOSPITAL | Age: 36
LOS: 1 days | Discharge: HOME | End: 2021-08-13

## 2021-08-13 DIAGNOSIS — Z90.3 ACQUIRED ABSENCE OF STOMACH [PART OF]: Chronic | ICD-10-CM

## 2021-08-13 DIAGNOSIS — C92.00 ACUTE MYELOBLASTIC LEUKEMIA, NOT HAVING ACHIEVED REMISSION: ICD-10-CM

## 2021-08-17 ENCOUNTER — APPOINTMENT (OUTPATIENT)
Dept: HEMATOLOGY ONCOLOGY | Facility: CLINIC | Age: 36
End: 2021-08-17
Payer: COMMERCIAL

## 2021-08-17 PROCEDURE — 99215 OFFICE O/P EST HI 40 MIN: CPT | Mod: 95

## 2021-08-17 RX ORDER — FLUTICASONE PROPIONATE 220 UG/1
220 AEROSOL, METERED RESPIRATORY (INHALATION) TWICE DAILY
Qty: 3 | Refills: 3 | Status: ACTIVE | COMMUNITY
Start: 2020-03-10 | End: 1900-01-01

## 2021-08-17 NOTE — ASSESSMENT
[FreeTextEntry1] : Ms. Gary is a 36 years old female [employer] with AML with the following comorbidities being managed:\par high complexity decision making\par \par 1) AML\par S/p HLA 10/10 allo sib (sister) PBSCT on 2/27/19\par 7/3/19 BMbx: LANA, normal female karyotype\par 11/21/19 chimerism = 99% donor (CD33 = 100% and CD3 = 83% donor)\par 12/27/19 chimerism = 98% donor\par 2/12/20 chimerism = 99% donor (CD33 = 100% and CD3 = 88.8% donor)\par 3/10/20 chimerism = 99% donor (CD33 = 100% and CD3 = 94% donor)\par 5/6/20 chimerism = 100% donor (CD33 = 100% and CD3 = 97% donor)\par 6/10/20, 7/2020  chimerism = 100% donor in CD3 and CD33 compartments\par 4/27/21 chimerism= 100% donor ( CA33=617%, and CD 3=100%)\par Current disease status: LANA based on 7/3/19 BMbx and peripheral blood counts \par \par Hold on starting maintenance Vidaza at this time given  GVHD issues \par \par 2) Heme\par No transfusions as pt is practicing [employer]\par Counts WNL's...labs reviewed from june 2021\par Continue multivitamin and folic acid daily\par \par 3) ID\par Continue ppx:\par - Acyclovir 400 mg tid\par - Mepron 1500 mg daily ...consider bactrim at some point\par Voriconazole resumed 1/23/20 but subsequently HELD as of 2/19/20 for transaminitis\par Post transplant restrictions reviewed - in light of covid-19, [facility] recs reviewed, strict adherence to restriction reinforced\par \par 4/27/21 CMV PCR not detected, continue to monitor PCR weekly \par \par 4) GVHD\par Acute GVHD: Skin 0   Liver 0   GI 0 - overall grade 0\par Chronic GVHD: mouth score 0,  lung 1, joint 1,  - mild extensive \par \par Prednisone d/c'd 12/22/19\par Continue FK taper  to 1.5  mg BID \par To consider addition of Jakafi if GVHD symptoms worsen ...discussed at length\par See GVHD history below\par \par Date of onset of acute GVHD 8/1/19 \par - On 7/29/19 placed on prednisone 20 mg daily for erythematous rash of face as stated by patient.\par - 8/1/19: Erythematous/hyperpigmented rash 38% BSA- rash of face, chest, back, bilateral upper and lower extremities. Patient was placed on prednisone 20 mg daily as of 7/29/19. Instructed to increase prednisone on 8/1/19 to 40 mg daily on 8/1, 8/2, 8/3. Then decreased to prednisone 20 mg daily. Tacrolimus restarted on 8/1/19 at 0.5 mg BID. Lidex cream BID to body and hydrocortisone cream to face BID. Acute GVHD SKin 2 Liver 0 GI 0- overall grade 1\par - 8/7/19: Erythematous/hyperpigmented rash 43% BSA- rash of face, neck, chest, back, abdomen, bilateral upper and lower extremities. Increased prednisone 20mg daily to 40 mg daily. Lidex cream BID to entire body. FK at 0.5 mg BID. May have to increase FK dose if rash worsens. Acute GVHD SKin 2 Liver 0 GI 0- overall grade 1\par - 8/14/19: Erythematous rash of face. Hyperpigmented rash of chest, neck, abdomen, bilateral upper and lower extremities and back. Skin rash extent 42% BSA. Currently on prednisone 40 mg daily and lidex cream BID. Increased FK to 0.5 mg Am and 1 mg PM. \par - 9/26/19: Skin hyperpigmentation and facial hypopigmentation, but improvement in erythema from facial rash. Some oral sores using dex rinse 4 times a day. Increased FK to 1 mg BID as of 9/26/19. Continue prednisone 30 mg daily. Decrease prednisone by 5 mg every two weeks. Acute GVHD Skin 2 Liver 0 GI 0- overall grade 1. BSA 42%. \par - 12/27/19: no acute GVHD. Chronic GVHD: skin score 3 (>50% BSA), eyes score 3 (requiring scleral lenses), mouth score 2 - overall severe extensive chronic GVHD. Off prednisone as of 12/22/19. Continue FK 1.5mg bid and Decadron swish/spit qid, moisturize 2-3x/day \par - 1/23/20: no acute GVHD. Chronic GVHD: skin 3 (>50% BSA), eyes score 1, mouth score 1 - overall severe extensive chronic GVHD. Continue 1.5mg bid. Continue PRN Decadron swish/spit, instructed to avoid swallowing to prevent systemic absorption of steroid. Moisturize 2-3x/day\par - 3/10/20: no acute GVHD. Chronic GVHD eyes score 3, lung score 2, genital tract score 1 - overall severe extensive chronic GVHD. FK increased to 1mg bid and initiated FAM therapy as below for lung GVHD\par - 4/9/20: no acute GVHD. Chronic GVHD eyes score 2, lung score 2, genital tract score 1 - overall severe extensive chronic GVHD. Continue FK 1mg bid and FAM therapy\par - 5/6/20: no acute GVHD. Chronic GVHD mouth score 1, eyes score 2, lungs score 2, genitalia score 1 - overall severe extensive chronic GVHD. SOB on exertion somewhat improved. Continue FK 1mg bid and FAM therapy\par 6/10/20: no acute GVHD. Chronic GVHD mouth score 1, eyes score 2, lung score 2, genitalia score 1 - overall severe extensive chronic GVHD. Worsening mucositis, FK increased to 1.5mg bid \par \par cGVHD eyes\par No longer wearing scleral lenses at this time\par Continue PRN artificial tears and Systane ophthalmic ointment\par Continue f/u with optho\par \par cGVHD mouth\par improved\par Continue Decadron rinse prn\par Continue to monitor closely \par Resolved\par \par cGVHD lung??\par 3/10/20 PFT with FEV1 of 48%\par (Repeated outpatient  4/2021- Nor esults available for review_ \par 5/6/20 CT chest: mild diffuse mosaic attenuation pattern to the lungs. ?BOOP, air trapping, small vessel disease. To consider repeat CT imaging in 3-4mo\par PFT repeated on 4/8/21: Pulmonary function test are severe obstruction with no bronchodilatation response\par Continue FAM therapy, initiated 3/10/20 (pt already on monteleukast for asthma/allergies) \par - Flovent inhaler 440 mcg bid\par - Azithromycin 250 mg- take one tablet Monday/Wednesday/Friday\par - Monteleukast 10 mg daily \par -consider pulm eval\par \par cGVHD vaginal area..\par Sensation of skin tightening and dryness of vaginal area \par May use lubricant to gently massage external labia to soften tissue\par using dilators\par Encouraged f/u with GYN after covid-19 epidemic resolves\par Resolved \par \par cGVHD joints\par Noted 7/15/20; pain causing limited ROM of shoulders, elbows, wrists, finger joints. Unable to pick things up off of the floor without pain..ROM improved\par Continue to monitor closely \par \par 5) GI\par Continue ppx:\par - Ursodiol 300 mg bid\par - Protonix 40 mg daily \par PRN Zofran and Reglan for nausea\par \par 6) Other \par Hypomagnesemia - likely 2/2 tacrolimus, continue magnesium oxide 400 mg bid \par GYN - LMP November 2019, continue Sprintec daily, instructed to wear condoms if resuming sexual activity.  f/u with GYN if abnormal menses persists \par HTN - continue amlodipine 5mg daily, BP remains WNL\par Warts - likely viral warts on hand in setting of immunosuppression, continue to monitor closely. RESOLVED.\par Health maintenance - 1yr post transplant ECHO done 3/10/20 WNL, EF = 60%\par \par 8) Plan/Dispo\par Hold off on Vidaza maintenance given GVHD issues\par Pt educated regarding plan of care, all questions/concerns addressed\par Instructed to contact our office with fever or new/worsening symptoms \par Medications sent to mail away pharmacy \par F/u in 8 weeks tele visit..can do labs locally\par

## 2021-08-17 NOTE — REVIEW OF SYSTEMS
[SOB on Exertion] : shortness of breath during exertion [Negative] : Eyes [Eye Pain] : no eye pain [Red Eyes] : eyes not red [Vision Problems] : no vision problems [Dry Eyes] : no dryness of the eyes [Shortness Of Breath] : no shortness of breath [Wheezing] : no wheezing [Cough] : no cough [Joint Pain] : no joint pain [Joint Stiffness] : no joint stiffness [Muscle Pain] : no muscle pain [FreeTextEntry6] : improved [FreeTextEntry9] : wrists were  painful. improved

## 2021-08-17 NOTE — PHYSICAL EXAM
[Restricted in physically strenuous activity but ambulatory and able to carry out work of a light or sedentary nature] : Status 1- Restricted in physically strenuous activity but ambulatory and able to carry out work of a light or sedentary nature, e.g., light house work, office work [] :  [No active (erythematous_ GVHD rash)] : Skin: No active (erythematous_ GVHD rash) [< 2 mg/dl] : Liver: < 2 mg/dl [No or intermittent] : Upper GI: No or intermittent nausea, vomiting or anorexia [<500 ml/day or < 3 episodes/day] : Lower GI (stool output/day): <500 ml/day or <3 episodes/day [No symptoms] : Score 0 [FEV1 60-79%] : Score 1 [Mild tightness of arms or legs, normal or mild decreased range of motion AND not affecting ADL] : Score 1 [Mild] : Mild [Extensive] : Extensive [No] : No [Yes] : Yes [FreeTextEntry1] : 02/27/2019

## 2021-08-17 NOTE — HISTORY OF PRESENT ILLNESS
[de-identified] : Ms. 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 Of note patient is a Jehovah's witness and cannot receive blood products. \par \par Patient was noted to be newly leukopenic to WBC 1.7 during preoperative evaluation in anticipation of right ankle ligament repair. Bone marrow aspirate and biopsy at [facility] on 12/2/2017 revealed AML with some suspicion for APL. She was transferred to [place] for further management. Peripheral counts from around this time are not available. \par \par Repeat marrow on 12/26/2017 showed 90% blasts with myeloid mutation arrest in a 40-50% cellular marrow. On flow blasts were negative for CD34 and HLA-DR; positive for CD38, CD58, CD33, and MPO, and partially positive for CD15, CD11b, , and CD64. CD45 was reported as dim. Karyotype showed +21 in 3 of 21 metaphases, but no t(15:17) was detected on karyotype of FISH. Molecular studies showed mutations in NPM1 (45%0, IDH1 (R132S; 39%), PTPN1 (23%), and CSF3R (5%) genes. There was lingering diagnostic uncertainty due to a largely aspicular and hypocellular specimen, and thus a marrow was repeated on 1/2/2018, confirming AML.\par \par On 1/4/2018, Ms. Gary began 7+3 with Idarubicin. Her induction course was complicated by DIC, neutropenic fever/ Klebsiella PNA, and bilateral retinal hemorrhage. She did not receive blood products and was instead supported with Nplate, Procrit, IV iron, Amicar, and Lupron. After achieving count stability, the patient was discharged; she was not deemed a candidate for further therapy given inability to receive transfusions and a day 14 marrow was not obtained. She subsequently saw Dr. North Obrien at [facility]. He obtained a recovery marrow on 2/13/2018 (day +40), which was 30% cellular with 45% blasts. 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 Cytogenetics and FISH were normal. \par \par She then established care with Dr. Christie for consideration of salvage therapy with IDH1 inhibition given the risks of additional chemotherapy without blood products support. [place] [facility], showed 69% blasts. ThunderBolts panel confirmed IDH1 R132S, NPM1 [id number] 12, and CSF3R D810G mutations. She was started on Ivosidenib [place] [facility] [place]  and had completed four cycles to date (6/28/2018 = C5D1). Marrow blasts increased to 85% on 4/30 but declined to 36% by 5/31. A  bone marrow aspirate and biopsy  was obtained  (6/28/2018); preliminary results show further responding disease with 10.3% blasts by flow cytometry. Subsequent marrow in oct showed 5% blasts...she was then started on vidazza plus venetoclax. She completed 2 cycles...f/u marrow with 4% blasts.....\par \par Patient underwent an AlloPSCT sister MRD (10/10) on 2/27/19, hospital course as follows:\par Ms. 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 She is a Jehovah's witness and cannot receive blood products. \par \par Ms. Nickerson's donor is her sister.  She is a 10/10 match. Both donor and patient are CMV +. She had completed all pre testing needed for transplant.  She will \par start reduced intensity Fludarabine/Busulfan chemo regimen today. \par \par Upon admission, a right AC PICC line was placed in IR. Ms. Gary received IV hydration, pain management, antiemetics, anxiolysis, and antibacterial / antiviral / antifungal / PCP / GI and VOD (SOS) prophylaxis. Labs were monitored every other day, and she received electrolyte repletion as needed. She also received vitamin K, vitamin B, supplemental iron, vitamin C and procrit as directed by the institutional bloodless transplant policy. \par \par On 2/27/19, Ms. Gary received 363 mLs of fresh, apheresed, mobilized, related allogeneic HPC over 80 minutes. Cell counts as follows: \par Total MNCs ( x 10^8/kg): 6.21 \par CD 34+Cells ( x 10^6/kg): 3.37 \par CD3+ Cells ( x 10^7/kg): 12.46 \par Cell Viability (%): 100% \par \par Engraftment was noted on 3/11/19. Ms. Gary did not receive zarxio due to her having minimal residual disease on admission, and a reduced intensity preparatory regimen to avoid stimulating myeloblasts. \par \par Ms. Gary had a relatively uncomplicated transplant course. She did experience menses, which was suppressed with provera. She also had pancytopenia related to the high dose chemotherapy preparative regimen. She also experienced grade 1 oral mucositis, and grade 2 GI mucositis also related to the chemotherapy preparative regimen. Both were treated with supportive care. \par \par Currently, Ms. Gary is stable for discharge home with outpatient follow up at the [facility].  [de-identified] : \par 4/27/21\par Pt reports feeling well\par She is s/p PFTs at local facility \par She denies any exacerbations with GVHD to the skin \par REports continued dryness to the eyes and many  dental carries\par No mouth sores or dysphagia \par DU with climbing stairs.  No respiratory infection \par Stable weight/appetite \par Arthralgias to the bilateral wrists. \par Wants to get Moderna Vaccine \par \par On 8/17//21, verbal consent obtained for today's telehealth visit. Nany was in her home today during out visit. Overall patient is well and offers no acute concerns. Currently on FK 2 mg BID. States when tacrolimus was tapered recently she had pruritus which has resolved. Admits has not needed her scleral lenses recently. Applies eye drops multiple times a day. Complains of improved dyspnea on exertion and wrist  pain. Recently followed up with neurologist to rule out carpal tunnel syndrome.  Denies fever, chills, nausea, vomiting, diarrhea, rash, mouth sores or any signs of active bleeding. Remains compliant with post transplant medications. \par \par

## 2021-09-07 ENCOUNTER — NON-APPOINTMENT (OUTPATIENT)
Age: 36
End: 2021-09-07

## 2021-09-08 ENCOUNTER — RX RENEWAL (OUTPATIENT)
Age: 36
End: 2021-09-08

## 2021-10-18 ENCOUNTER — OUTPATIENT (OUTPATIENT)
Dept: OUTPATIENT SERVICES | Facility: HOSPITAL | Age: 36
LOS: 1 days | Discharge: HOME | End: 2021-10-18

## 2021-10-18 DIAGNOSIS — C92.00 ACUTE MYELOBLASTIC LEUKEMIA, NOT HAVING ACHIEVED REMISSION: ICD-10-CM

## 2021-10-18 DIAGNOSIS — Z90.3 ACQUIRED ABSENCE OF STOMACH [PART OF]: Chronic | ICD-10-CM

## 2021-10-19 ENCOUNTER — RESULT REVIEW (OUTPATIENT)
Age: 36
End: 2021-10-19

## 2021-10-19 ENCOUNTER — APPOINTMENT (OUTPATIENT)
Dept: HEMATOLOGY ONCOLOGY | Facility: CLINIC | Age: 36
End: 2021-10-19
Payer: COMMERCIAL

## 2021-10-19 VITALS
HEART RATE: 97 BPM | RESPIRATION RATE: 16 BRPM | WEIGHT: 182.1 LBS | DIASTOLIC BLOOD PRESSURE: 84 MMHG | BODY MASS INDEX: 33.09 KG/M2 | OXYGEN SATURATION: 96 % | SYSTOLIC BLOOD PRESSURE: 131 MMHG | HEIGHT: 62.01 IN | TEMPERATURE: 97.2 F

## 2021-10-19 LAB
BASOPHILS # BLD AUTO: 0.04 K/UL — SIGNIFICANT CHANGE UP (ref 0–0.2)
BASOPHILS NFR BLD AUTO: 0.7 % — SIGNIFICANT CHANGE UP (ref 0–2)
EOSINOPHIL # BLD AUTO: 0.09 K/UL — SIGNIFICANT CHANGE UP (ref 0–0.5)
EOSINOPHIL NFR BLD AUTO: 1.5 % — SIGNIFICANT CHANGE UP (ref 0–6)
HCT VFR BLD CALC: 43.5 % — SIGNIFICANT CHANGE UP (ref 34.5–45)
HGB BLD-MCNC: 14.2 G/DL — SIGNIFICANT CHANGE UP (ref 11.5–15.5)
IMM GRANULOCYTES NFR BLD AUTO: 0.2 % — SIGNIFICANT CHANGE UP (ref 0–1.5)
LYMPHOCYTES # BLD AUTO: 2.27 K/UL — SIGNIFICANT CHANGE UP (ref 1–3.3)
LYMPHOCYTES # BLD AUTO: 39 % — SIGNIFICANT CHANGE UP (ref 13–44)
MCHC RBC-ENTMCNC: 30 PG — SIGNIFICANT CHANGE UP (ref 27–34)
MCHC RBC-ENTMCNC: 32.6 G/DL — SIGNIFICANT CHANGE UP (ref 32–36)
MCV RBC AUTO: 91.8 FL — SIGNIFICANT CHANGE UP (ref 80–100)
MONOCYTES # BLD AUTO: 0.49 K/UL — SIGNIFICANT CHANGE UP (ref 0–0.9)
MONOCYTES NFR BLD AUTO: 8.4 % — SIGNIFICANT CHANGE UP (ref 2–14)
NEUTROPHILS # BLD AUTO: 2.92 K/UL — SIGNIFICANT CHANGE UP (ref 1.8–7.4)
NEUTROPHILS NFR BLD AUTO: 50.2 % — SIGNIFICANT CHANGE UP (ref 43–77)
NRBC # BLD: 0 /100 WBCS — SIGNIFICANT CHANGE UP (ref 0–0)
PLATELET # BLD AUTO: 409 K/UL — HIGH (ref 150–400)
RBC # BLD: 4.74 M/UL — SIGNIFICANT CHANGE UP (ref 3.8–5.2)
RBC # FLD: 13.4 % — SIGNIFICANT CHANGE UP (ref 10.3–14.5)
TACROLIMUS SERPL-MCNC: 2.8 NG/ML
WBC # BLD: 5.82 K/UL — SIGNIFICANT CHANGE UP (ref 3.8–10.5)
WBC # FLD AUTO: 5.82 K/UL — SIGNIFICANT CHANGE UP (ref 3.8–10.5)

## 2021-10-19 PROCEDURE — 99215 OFFICE O/P EST HI 40 MIN: CPT

## 2021-10-20 LAB
ALBUMIN SERPL ELPH-MCNC: 4 G/DL
ALP BLD-CCNC: 115 U/L
ALT SERPL-CCNC: 7 U/L
ANION GAP SERPL CALC-SCNC: 14 MMOL/L
AST SERPL-CCNC: 16 U/L
BILIRUB SERPL-MCNC: 0.2 MG/DL
BUN SERPL-MCNC: 6 MG/DL
CALCIUM SERPL-MCNC: 9.5 MG/DL
CHLORIDE SERPL-SCNC: 107 MMOL/L
CO2 SERPL-SCNC: 18 MMOL/L
CREAT SERPL-MCNC: 0.96 MG/DL
GLUCOSE SERPL-MCNC: 99 MG/DL
LDH SERPL-CCNC: 196 U/L
MAGNESIUM SERPL-MCNC: 1.8 MG/DL
POTASSIUM SERPL-SCNC: 4 MMOL/L
PROT SERPL-MCNC: 6.5 G/DL
SODIUM SERPL-SCNC: 139 MMOL/L

## 2021-10-21 LAB — CMV DNA SPEC QL NAA+PROBE: NOT DETECTED IU/ML

## 2021-10-24 NOTE — ASSESSMENT
[FreeTextEntry1] : Ms. Gary is a 36 years old female Caodaism with AML with the following comorbidities being managed:\par high complexity decision making\par \par 1) AML\par S/p HLA 10/10 allo sib (sister) PBSCT on 2/27/19\par 7/3/19 BMbx: LANA, normal female karyotype\par 11/21/19 chimerism = 99% donor (CD33 = 100% and CD3 = 83% donor)\par 12/27/19 chimerism = 98% donor\par 2/12/20 chimerism = 99% donor (CD33 = 100% and CD3 = 88.8% donor)\par 3/10/20 chimerism = 99% donor (CD33 = 100% and CD3 = 94% donor)\par 5/6/20 chimerism = 100% donor (CD33 = 100% and CD3 = 97% donor)\par 6/10/20, 7/2020  chimerism = 100% donor in CD3 and CD33 compartments\par 4/27/21 chimerism= 100% donor ( IS97=210%, and CD 3=100%)\par Current disease status: LANA based on 7/3/19 BMbx and peripheral blood counts \par \par Hold on starting maintenance Vidaza at this time given  GVHD issues \par \par 2) Heme\par No transfusions as pt is practicing Caodaism\par Counts WNL's...labs reviewed from june 2021\par Continue multivitamin and folic acid daily\par \par 3) ID\par Continue ppx:\par - Acyclovir 400 mg tid\par - Mepron 1500 mg daily ...consider bactrim at some point\par Voriconazole resumed 1/23/20 but subsequently HELD as of 2/19/20 for transaminitis\par Post transplant restrictions reviewed - in light of covid-19, [facility] recs reviewed, strict adherence to restriction reinforced\par \par 4/27/21 CMV PCR not detected, continue to monitor PCR weekly \par \par 4) GVHD\par Acute GVHD: Skin 0   Liver 0   GI 0 - overall grade 0\par Chronic GVHD: mouth score 0,  lung 1, joint 1,  - mild extensive \par \par Prednisone d/c'd 12/22/19\par Continue FK taper  to 1.5  mg BID ...1 mg at 6 months f/u\par To consider addition of Jakafi if GVHD symptoms worsen ...discussed at length\par See GVHD history below\par \par Date of onset of acute GVHD 8/1/19 \par - On 7/29/19 placed on prednisone 20 mg daily for erythematous rash of face as stated by patient.\par - 8/1/19: Erythematous/hyperpigmented rash 38% BSA- rash of face, chest, back, bilateral upper and lower extremities. Patient was placed on prednisone 20 mg daily as of 7/29/19. Instructed to increase prednisone on 8/1/19 to 40 mg daily on 8/1, 8/2, 8/3. Then decreased to prednisone 20 mg daily. Tacrolimus restarted on 8/1/19 at 0.5 mg BID. Lidex cream BID to body and hydrocortisone cream to face BID. Acute GVHD SKin 2 Liver 0 GI 0- overall grade 1\par - 8/7/19: Erythematous/hyperpigmented rash 43% BSA- rash of face, neck, chest, back, abdomen, bilateral upper and lower extremities. Increased prednisone 20mg daily to 40 mg daily. Lidex cream BID to entire body. FK at 0.5 mg BID. May have to increase FK dose if rash worsens. Acute GVHD SKin 2 Liver 0 GI 0- overall grade 1\par - 8/14/19: Erythematous rash of face. Hyperpigmented rash of chest, neck, abdomen, bilateral upper and lower extremities and back. Skin rash extent 42% BSA. Currently on prednisone 40 mg daily and lidex cream BID. Increased FK to 0.5 mg Am and 1 mg PM. \par - 9/26/19: Skin hyperpigmentation and facial hypopigmentation, but improvement in erythema from facial rash. Some oral sores using dex rinse 4 times a day. Increased FK to 1 mg BID as of 9/26/19. Continue prednisone 30 mg daily. Decrease prednisone by 5 mg every two weeks. Acute GVHD Skin 2 Liver 0 GI 0- overall grade 1. BSA 42%. \par - 12/27/19: no acute GVHD. Chronic GVHD: skin score 3 (>50% BSA), eyes score 3 (requiring scleral lenses), mouth score 2 - overall severe extensive chronic GVHD. Off prednisone as of 12/22/19. Continue FK 1.5mg bid and Decadron swish/spit qid, moisturize 2-3x/day \par - 1/23/20: no acute GVHD. Chronic GVHD: skin 3 (>50% BSA), eyes score 1, mouth score 1 - overall severe extensive chronic GVHD. Continue 1.5mg bid. Continue PRN Decadron swish/spit, instructed to avoid swallowing to prevent systemic absorption of steroid. Moisturize 2-3x/day\par - 3/10/20: no acute GVHD. Chronic GVHD eyes score 3, lung score 2, genital tract score 1 - overall severe extensive chronic GVHD. FK increased to 1mg bid and initiated FAM therapy as below for lung GVHD\par - 4/9/20: no acute GVHD. Chronic GVHD eyes score 2, lung score 2, genital tract score 1 - overall severe extensive chronic GVHD. Continue FK 1mg bid and FAM therapy\par - 5/6/20: no acute GVHD. Chronic GVHD mouth score 1, eyes score 2, lungs score 2, genitalia score 1 - overall severe extensive chronic GVHD. SOB on exertion somewhat improved. Continue FK 1mg bid and FAM therapy\par 6/10/20: no acute GVHD. Chronic GVHD mouth score 1, eyes score 2, lung score 2, genitalia score 1 - overall severe extensive chronic GVHD. Worsening mucositis, FK increased to 1.5mg bid \par \par cGVHD eyes\par No longer wearing scleral lenses at this time\par Continue PRN artificial tears and Systane ophthalmic ointment\par Continue f/u with optho\par \par cGVHD mouth\par improved\par Continue Decadron rinse prn\par Continue to monitor closely \par Resolved\par \par cGVHD lung??\par 3/10/20 PFT with FEV1 of 48%\par (Repeated outpatient  4/2021- Nor esults available for review_ \par 5/6/20 CT chest: mild diffuse mosaic attenuation pattern to the lungs. ?BOOP, air trapping, small vessel disease. To consider repeat CT imaging in 3-4mo\par PFT repeated on 4/8/21: Pulmonary function test are severe obstruction with no bronchodilatation response\par Continue FAM therapy, initiated 3/10/20 (pt already on monteleukast for asthma/allergies) \par - Flovent inhaler 440 mcg bid\par - Azithromycin 250 mg- take one tablet Monday/Wednesday/Friday\par - Monteleukast 10 mg daily \par -f/u pulm eval\par \par cGVHD vaginal area..\par Sensation of skin tightening and dryness of vaginal area \par May use lubricant to gently massage external labia to soften tissue\par using dilators\par Encouraged f/u with GYN after covid-19 epidemic resolves\par Resolved \par \par cGVHD joints\par Noted 7/15/20; pain causing limited ROM of shoulders, elbows, wrists, finger joints. Unable to pick things up off of the floor without pain..ROM improved\par Continue to monitor closely \par \par 5) GI\par Continue ppx:\par - Ursodiol 300 mg bid\par - Protonix 40 mg daily \par PRN Zofran and Reglan for nausea\par \par 6) Other \par Hypomagnesemia - likely 2/2 tacrolimus, continue magnesium oxide 400 mg bid \par GYN - LMP November 2019, continue Sprintec daily, instructed to wear condoms if resuming sexual activity.  f/u with GYN if abnormal menses persists \par HTN - continue amlodipine 5mg daily, BP remains WNL\par Warts - likely viral warts on hand in setting of immunosuppression, continue to monitor closely. RESOLVED.\par Health maintenance - 1yr post transplant ECHO done 3/10/20 WNL, EF = 60%\par \par 8) Plan/Dispo\par Hold off on Vidaza maintenance given GVHD issues\par Pt educated regarding plan of care, all questions/concerns addressed\par Instructed to contact our office with fever or new/worsening symptoms \par Medications sent to mail away pharmacy prn\par F/u in 12 weeks tele visit..in person with labs 6 months\par f/u pulm\par

## 2021-10-24 NOTE — PHYSICAL EXAM
[Restricted in physically strenuous activity but ambulatory and able to carry out work of a light or sedentary nature] : Status 1- Restricted in physically strenuous activity but ambulatory and able to carry out work of a light or sedentary nature, e.g., light house work, office work [] :  [No active (erythematous_ GVHD rash)] : Skin: No active (erythematous_ GVHD rash) [< 2 mg/dl] : Liver: < 2 mg/dl [No or intermittent] : Upper GI: No or intermittent nausea, vomiting or anorexia [<500 ml/day or < 3 episodes/day] : Lower GI (stool output/day): <500 ml/day or <3 episodes/day [No symptoms] : Score 0 [FEV1 60-79%] : Score 1 [Mild tightness of arms or legs, normal or mild decreased range of motion AND not affecting ADL] : Score 1 [Mild] : Mild [Extensive] : Extensive [No] : No [Yes] : Yes [Normal] : affect appropriate [de-identified] : hypo / hyper pigmented  [FreeTextEntry1] : 02/27/2019

## 2021-10-24 NOTE — HISTORY OF PRESENT ILLNESS
[de-identified] : Ms. 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 Of note patient is a Yazdanism and cannot receive blood products. \par \par Patient was noted to be newly leukopenic to WBC 1.7 during preoperative evaluation in anticipation of right ankle ligament repair. Bone marrow aspirate and biopsy at [facility] on 12/2/2017 revealed AML with some suspicion for APL. She was transferred to [place] for further management. Peripheral counts from around this time are not available. \par \par Repeat marrow on 12/26/2017 showed 90% blasts with myeloid mutation arrest in a 40-50% cellular marrow. On flow blasts were negative for CD34 and HLA-DR; positive for CD38, CD58, CD33, and MPO, and partially positive for CD15, CD11b, , and CD64. CD45 was reported as dim. Karyotype showed +21 in 3 of 21 metaphases, but no t(15:17) was detected on karyotype of FISH. Molecular studies showed mutations in NPM1 (45%0, IDH1 (R132S; 39%), PTPN1 (23%), and CSF3R (5%) genes. There was lingering diagnostic uncertainty due to a largely aspicular and hypocellular specimen, and thus a marrow was repeated on 1/2/2018, confirming AML.\par \par On 1/4/2018, Ms. Gary began 7+3 with Idarubicin. Her induction course was complicated by DIC, neutropenic fever/ Klebsiella PNA, and bilateral retinal hemorrhage. She did not receive blood products and was instead supported with Nplate, Procrit, IV iron, Amicar, and Lupron. After achieving count stability, the patient was discharged; she was not deemed a candidate for further therapy given inability to receive transfusions and a day 14 marrow was not obtained. She subsequently saw Dr. [place] Obrien at [facility]. He obtained a recovery marrow on 2/13/2018 (day +40), which was 30% cellular with 45% blasts. 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 Cytogenetics and FISH were normal. \par \par She then established care with Dr. Christie for consideration of salvage therapy with IDH1 inhibition given the risks of additional chemotherapy without blood products support. Initial marrow at [facility], on 3/1/2018, showed 69% blasts. ThunderBolts panel confirmed IDH1 R132S, NPM1 [id number] 12, and CSF3R D810G mutations. She was started on Ivosidenib on [facility] protocol  and had completed four cycles to date (6/28/2018 = C5D1). Marrow blasts increased to 85% on 4/30 but declined to 36% by 5/31. A  bone marrow aspirate and biopsy  was obtained  (6/28/2018); preliminary results show further responding disease with 10.3% blasts by flow cytometry. Subsequent marrow in oct showed 5% blasts...she was then started on vidazza plus venetoclax. She completed 2 cycles...f/u marrow with 4% blasts.....\par \par Patient underwent an AlloPSCT sister MRD (10/10) on 2/27/19, hospital course as follows:\par Ms. 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 She is a Yazdanism and cannot receive blood products. \par \par Ms. Nickerson's donor is her sister.  She is a 10/10 match. Both donor and patient are CMV +. She had completed all pre testing needed for transplant.  She will \par start reduced intensity Fludarabine/Busulfan chemo regimen today. \par \par Upon admission, a right AC PICC line was placed in IR. Ms. Gary received IV hydration, pain management, antiemetics, anxiolysis, and antibacterial / antiviral / antifungal / PCP / GI and VOD (SOS) prophylaxis. Labs were monitored every other day, and she received electrolyte repletion as needed. She also received vitamin K, vitamin B, supplemental iron, vitamin C and procrit as directed by the institutional bloodless transplant policy. \par \par On 2/27/19, Ms. Gary received 363 mLs of fresh, apheresed, mobilized, related allogeneic HPC over 80 minutes. Cell counts as follows: \par Total MNCs ( x 10^8/kg): 6.21 \par CD 34+Cells ( x 10^6/kg): 3.37 \par CD3+ Cells ( x 10^7/kg): 12.46 \par Cell Viability (%): 100% \par \par Engraftment was noted on 3/11/19. Ms. Gary did not receive zarxio due to her having minimal residual disease on admission, and a reduced intensity preparatory regimen to avoid stimulating myeloblasts. \par \par Ms. Gary had a relatively uncomplicated transplant course. She did experience menses, which was suppressed with provera. She also had pancytopenia related to the high dose chemotherapy preparative regimen. She also experienced grade 1 oral mucositis, and grade 2 GI mucositis also related to the chemotherapy preparative regimen. Both were treated with supportive care. \par \par Currently, Ms. Gary is stable for discharge home with outpatient follow up at the [facility].  [de-identified] : \par 4/27/21\par Pt reports feeling well\par She is s/p PFTs at local facility \par She denies any exacerbations with GVHD to the skin \par REports continued dryness to the eyes and many  dental carries\par No mouth sores or dysphagia \par DU with climbing stairs.  No respiratory infection \par Stable weight/appetite \par Arthralgias to the bilateral wrists. \par Wants to get Moderna Vaccine \par \par On 8/17//21, verbal consent obtained for today's telehealth visit. Nany was in her home today during out visit. Overall patient is well and offers no acute concerns. Currently on FK 2 mg BID. States when tacrolimus was tapered recently she had pruritus which has resolved. Admits has not needed her scleral lenses recently. Applies eye drops multiple times a day. Complains of improved dyspnea on exertion and wrist  pain. Recently followed up with neurologist to rule out carpal tunnel syndrome.  Denies fever, chills, nausea, vomiting, diarrhea, rash, mouth sores or any signs of active bleeding. Remains compliant with post transplant medications. \par \par 10/19/21 No new issues....in good spirits.....sees pulm....on [id number]....received 3rd moderna vaccine on sept 1\par \par

## 2021-10-24 NOTE — REVIEW OF SYSTEMS
[SOB on Exertion] : shortness of breath during exertion [Negative] : Heme/Lymph [Eye Pain] : no eye pain [Red Eyes] : eyes not red [Dry Eyes] : no dryness of the eyes [Vision Problems] : no vision problems [Shortness Of Breath] : no shortness of breath [Wheezing] : no wheezing [Cough] : no cough [Joint Pain] : no joint pain [Joint Stiffness] : no joint stiffness [Muscle Pain] : no muscle pain [FreeTextEntry6] : improved [FreeTextEntry9] : wrists were  painful. improved

## 2021-10-26 LAB
CD3 AND CD33 ENRICHMENT: NORMAL
ENGRAFTMNET-POST: NORMAL

## 2021-12-07 ENCOUNTER — RX RENEWAL (OUTPATIENT)
Age: 36
End: 2021-12-07

## 2022-01-18 ENCOUNTER — OUTPATIENT (OUTPATIENT)
Dept: OUTPATIENT SERVICES | Facility: HOSPITAL | Age: 37
LOS: 1 days | Discharge: HOME | End: 2022-01-18

## 2022-01-18 DIAGNOSIS — C92.00 ACUTE MYELOBLASTIC LEUKEMIA, NOT HAVING ACHIEVED REMISSION: ICD-10-CM

## 2022-01-18 DIAGNOSIS — Z90.3 ACQUIRED ABSENCE OF STOMACH [PART OF]: Chronic | ICD-10-CM

## 2022-01-19 ENCOUNTER — APPOINTMENT (OUTPATIENT)
Dept: HEMATOLOGY ONCOLOGY | Facility: CLINIC | Age: 37
End: 2022-01-19
Payer: COMMERCIAL

## 2022-01-19 PROCEDURE — 99215 OFFICE O/P EST HI 40 MIN: CPT | Mod: 95

## 2022-01-21 NOTE — PHYSICAL EXAM
[Restricted in physically strenuous activity but ambulatory and able to carry out work of a light or sedentary nature] : Status 1- Restricted in physically strenuous activity but ambulatory and able to carry out work of a light or sedentary nature, e.g., light house work, office work [Normal] : affect appropriate [] :  [No active (erythematous_ GVHD rash)] : Skin: No active (erythematous_ GVHD rash) [< 2 mg/dl] : Liver: < 2 mg/dl [No or intermittent] : Upper GI: No or intermittent nausea, vomiting or anorexia [<500 ml/day or < 3 episodes/day] : Lower GI (stool output/day): <500 ml/day or <3 episodes/day [No symptoms] : Score 0 [FEV1 60-79%] : Score 1 [Mild tightness of arms or legs, normal or mild decreased range of motion AND not affecting ADL] : Score 1 [Mild] : Mild [Extensive] : Extensive [No] : No [Yes] : Yes [de-identified] : hypo / hyper pigmented  [FreeTextEntry1] : 02/27/2019

## 2022-01-21 NOTE — HISTORY OF PRESENT ILLNESS
[de-identified] : Ms. 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 Of note patient is a [employer] and cannot receive blood products. \par \par Patient was noted to be newly leukopenic to WBC 1.7 during preoperative evaluation in anticipation of right ankle ligament repair. Bone marrow aspirate and biopsy at [facility] on 12/2/2017 revealed AML with some suspicion for APL. She was transferred to [place] for further management. Peripheral counts from around this time are not available. \par \par Repeat marrow on 12/26/2017 showed 90% blasts with myeloid mutation arrest in a 40-50% cellular marrow. On flow blasts were negative for CD34 and HLA-DR; positive for CD38, CD58, CD33, and MPO, and partially positive for CD15, CD11b, , and CD64. CD45 was reported as dim. Karyotype showed +21 in 3 of 21 metaphases, but no t(15:17) was detected on karyotype of FISH. Molecular studies showed mutations in NPM1 (45%0, IDH1 (R132S; 39%), PTPN1 (23%), and CSF3R (5%) genes. There was lingering diagnostic uncertainty due to a largely aspicular and hypocellular specimen, and thus a marrow was repeated on 1/2/2018, confirming AML.\par \par On 1/4/2018, Ms. Gary began 7+3 with Idarubicin. Her induction course was complicated by DIC, neutropenic fever/ Klebsiella PNA, and bilateral retinal hemorrhage. She did not receive blood products and was instead supported with Nplate, Procrit, IV iron, Amicar, and Lupron. After achieving count stability, the patient was discharged; she was not deemed a candidate for further therapy given inability to receive transfusions and a day 14 marrow was not obtained. She subsequently saw Dr. [place] Obrien at [facility]. He obtained a recovery marrow on 2/13/2018 (day +40), which was 30% cellular with 45% blasts. 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 Cytogenetics and FISH were normal. \par \par She then established care with Dr. Christie for consideration of salvage therapy with IDH1 inhibition given the risks of additional chemotherapy without blood products support. Initial marrow at [facility], on 3/1/2018, showed 69% blasts. ThunderBolts panel confirmed IDH1 R132S, NPM1 U022Mom 12, and CSF3R D810G mutations. She was started on Ivosidenib on [facility] protocol  and had completed four cycles to date (6/28/2018 = C5D1). Marrow blasts increased to 85% on 4/30 but declined to 36% by 5/31. A  bone marrow aspirate and biopsy  was obtained  (6/28/2018); preliminary results show further responding disease with 10.3% blasts by flow cytometry. Subsequent marrow in oct showed 5% blasts...she was then started on vidazza plus venetoclax. She completed 2 cycles...f/u marrow with 4% blasts.....\par \par Patient underwent an AlloPSCT sister MRD (10/10) on 2/27/19, hospital course as follows:\par Ms. 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 She is a [employer] and cannot receive blood products. \par \par Ms. Nickerson's donor is her sister.  She is a 10/10 match. Both donor and patient are CMV +. She had completed all pre testing needed for transplant.  She will \par start reduced intensity Fludarabine/Busulfan chemo regimen today. \par \par Upon admission, a right AC PICC line was placed in IR. Ms. Gary received IV hydration, pain management, antiemetics, anxiolysis, and antibacterial / antiviral / antifungal / PCP / GI and VOD (SOS) prophylaxis. Labs were monitored every other day, and she received electrolyte repletion as needed. She also received vitamin K, vitamin B, supplemental iron, vitamin C and procrit as directed by the institutional bloodless transplant policy. \par \par On 2/27/19, Ms. Gary received 363 mLs of fresh, apheresed, mobilized, related allogeneic HPC over 80 minutes. Cell counts as follows: \par Total MNCs ( x 10^8/kg): 6.21 \par CD 34+Cells ( x 10^6/kg): 3.37 \par CD3+ Cells ( x 10^7/kg): 12.46 \par Cell Viability (%): 100% \par \par Engraftment was noted on 3/11/19. Ms. Gary did not receive zarxio due to her having minimal residual disease on admission, and a reduced intensity preparatory regimen to avoid stimulating myeloblasts. \par \par Ms. Gary had a relatively uncomplicated transplant course. She did experience menses, which was suppressed with provera. She also had pancytopenia related to the high dose chemotherapy preparative regimen. She also experienced grade 1 oral mucositis, and grade 2 GI mucositis also related to the chemotherapy preparative regimen. Both were treated with supportive care. \par \par Currently, Ms. Gary is stable for discharge home with outpatient follow up at the [facility].  [de-identified] : \par 4/27/21\par Pt reports feeling well\par She is s/p PFTs at local facility \par She denies any exacerbations with GVHD to the skin \par REports continued dryness to the eyes and many  dental carries\par No mouth sores or dysphagia \par DU with climbing stairs.  No respiratory infection \par Stable weight/appetite \par Arthralgias to the bilateral wrists. \par Wants to get Moderna Vaccine \par \par On 8/17//21, verbal consent obtained for today's telehealth visit. Nany was in her home today during out visit. Overall patient is well and offers no acute concerns. Currently on FK 2 mg BID. States when tacrolimus was tapered recently she had pruritus which has resolved. Admits has not needed her scleral lenses recently. Applies eye drops multiple times a day. Complains of improved dyspnea on exertion and wrist  pain. Recently followed up with neurologist to rule out carpal tunnel syndrome.  Denies fever, chills, nausea, vomiting, diarrhea, rash, mouth sores or any signs of active bleeding. Remains compliant with post transplant medications. \par \par 10/19/21 No new issues....in good spirits.....sees pulm....on [id number]....received 3rd moderna vaccine on sept 1\par \par 1/19/22 Pt agrees to tele health from home...overall improving...occ du...on tacro1.5 mg bid...\par \par

## 2022-01-21 NOTE — ASSESSMENT
[FreeTextEntry1] : Ms. Gary is a 36 years old female [employer] with AML with the following comorbidities being managed:\par high complexity decision making\par \par 1) AML\par S/p HLA 10/10 allo sib (sister) PBSCT on 2/27/19\par 7/3/19 BMbx: LANA, normal female karyotype\par 11/21/19 chimerism = 99% donor (CD33 = 100% and CD3 = 83% donor)\par 12/27/19 chimerism = 98% donor\par 2/12/20 chimerism = 99% donor (CD33 = 100% and CD3 = 88.8% donor)\par 3/10/20 chimerism = 99% donor (CD33 = 100% and CD3 = 94% donor)\par 5/6/20 chimerism = 100% donor (CD33 = 100% and CD3 = 97% donor)\par 6/10/20, 7/2020  chimerism = 100% donor in CD3 and CD33 compartments\par 4/27/21 chimerism= 100% donor ( DG64=072%, and CD 3=100%)\par Current disease status: LANA based on 7/3/19 BMbx and peripheral blood counts \par \par Hold on starting maintenance Vidaza at this time given  GVHD issues \par \par 2) Heme\par No transfusions as pt is practicing [employer]\par Counts WNL's...labs reviewed from [facility], oct 2021\par Continue multivitamin and folic acid daily\par \par 3) ID\par Continue ppx:\par - Acyclovir 400 mg tid\par - Mepron 1500 mg daily ...consider bactrim at some point\par Voriconazole resumed 1/23/20 but subsequently HELD as of 2/19/20 for transaminitis\par Post transplant restrictions reviewed - in light of covid-19, CDC recs reviewed, strict adherence to restriction reinforced\par \par 4/27/21 CMV PCR not detected, continue to monitor PCR weekly \par \par 4) GVHD\par Acute GVHD: Skin 0   Liver 0   GI 0 - overall grade 0\par Chronic GVHD: mouth score 0,  lung 1, joint 1,  - mild extensive \par \par Prednisone d/c'd 12/22/19\par Continue FK taper  to 1.5  mg in am and 1 mg in pm ...\par To consider addition of Jakafi if GVHD symptoms worsen ...discussed at length\par See GVHD history below\par \par Date of onset of acute GVHD 8/1/19 \par - On 7/29/19 placed on prednisone 20 mg daily for erythematous rash of face as stated by patient.\par - 8/1/19: Erythematous/hyperpigmented rash 38% BSA- rash of face, chest, back, bilateral upper and lower extremities. Patient was placed on prednisone 20 mg daily as of 7/29/19. Instructed to increase prednisone on 8/1/19 to 40 mg daily on 8/1, 8/2, 8/3. Then decreased to prednisone 20 mg daily. Tacrolimus restarted on 8/1/19 at 0.5 mg BID. Lidex cream BID to body and hydrocortisone cream to face BID. Acute GVHD SKin 2 Liver 0 GI 0- overall grade 1\par - 8/7/19: Erythematous/hyperpigmented rash 43% BSA- rash of face, neck, chest, back, abdomen, bilateral upper and lower extremities. Increased prednisone 20mg daily to 40 mg daily. Lidex cream BID to entire body. FK at 0.5 mg BID. May have to increase FK dose if rash worsens. Acute GVHD SKin 2 Liver 0 GI 0- overall grade 1\par - 8/14/19: Erythematous rash of face. Hyperpigmented rash of chest, neck, abdomen, bilateral upper and lower extremities and back. Skin rash extent 42% BSA. Currently on prednisone 40 mg daily and lidex cream BID. Increased FK to 0.5 mg Am and 1 mg PM. \par - 9/26/19: Skin hyperpigmentation and facial hypopigmentation, but improvement in erythema from facial rash. Some oral sores using dex rinse 4 times a day. Increased FK to 1 mg BID as of 9/26/19. Continue prednisone 30 mg daily. Decrease prednisone by 5 mg every two weeks. Acute GVHD Skin 2 Liver 0 GI 0- overall grade 1. BSA 42%. \par - 12/27/19: no acute GVHD. Chronic GVHD: skin score 3 (>50% BSA), eyes score 3 (requiring scleral lenses), mouth score 2 - overall severe extensive chronic GVHD. Off prednisone as of 12/22/19. Continue FK 1.5mg bid and Decadron swish/spit qid, moisturize 2-3x/day \par - 1/23/20: no acute GVHD. Chronic GVHD: skin 3 (>50% BSA), eyes score 1, mouth score 1 - overall severe extensive chronic GVHD. Continue 1.5mg bid. Continue PRN Decadron swish/spit, instructed to avoid swallowing to prevent systemic absorption of steroid. Moisturize 2-3x/day\par - 3/10/20: no acute GVHD. Chronic GVHD eyes score 3, lung score 2, genital tract score 1 - overall severe extensive chronic GVHD. FK increased to 1mg bid and initiated FAM therapy as below for lung GVHD\par - 4/9/20: no acute GVHD. Chronic GVHD eyes score 2, lung score 2, genital tract score 1 - overall severe extensive chronic GVHD. Continue FK 1mg bid and FAM therapy\par - 5/6/20: no acute GVHD. Chronic GVHD mouth score 1, eyes score 2, lungs score 2, genitalia score 1 - overall severe extensive chronic GVHD. SOB on exertion somewhat improved. Continue FK 1mg bid and FAM therapy\par 6/10/20: no acute GVHD. Chronic GVHD mouth score 1, eyes score 2, lung score 2, genitalia score 1 - overall severe extensive chronic GVHD. Worsening mucositis, FK increased to 1.5mg bid \par \par cGVHD eyes\par No longer wearing scleral lenses at this time\par Continue PRN artificial tears and Systane ophthalmic ointment\par Continue f/u with optho\par \par cGVHD mouth\par improved\par Continue Decadron rinse prn\par Continue to monitor closely \par Resolved\par \par cGVHD lung??\par 3/10/20 PFT with FEV1 of 48%\par (Repeated outpatient  4/2021- Nor esults available for review_ \par 5/6/20 CT chest: mild diffuse mosaic attenuation pattern to the lungs. ?BOOP, air trapping, small vessel disease. To consider repeat CT imaging in 3-4mo\par PFT repeated on 4/8/21: Pulmonary function test are severe obstruction with no bronchodilatation response\par Continue FAM therapy, initiated 3/10/20 (pt already on monteleukast for asthma/allergies) \par - Flovent inhaler 440 mcg bid\par - Azithromycin 250 mg- take one tablet Monday/Wednesday/Friday\par - Monteleukast 10 mg daily \par -f/u pulm eval and pft..improved \par \par cGVHD vaginal area..\par Sensation of skin tightening and dryness of vaginal area \par May use lubricant to gently massage external labia to soften tissue\par using dilators\par Encouraged f/u with GYN after covid-19 epidemic resolves\par Resolved \par \par cGVHD joints\par Noted 7/15/20; pain causing limited ROM of shoulders, elbows, wrists, finger joints. Unable to pick things up off of the floor without pain..ROM improved\par Continue to monitor closely \par \par 5) GI\par Continue ppx:\par - Ursodiol 300 mg bid\par - Protonix 40 mg daily \par PRN Zofran and Reglan for nausea\par \par 6) Other \par Hypomagnesemia - likely 2/2 tacrolimus, continue magnesium oxide 400 mg bid \par GYN - LMP November 2019, continue Sprintec daily, instructed to wear condoms if resuming sexual activity.  f/u with GYN if abnormal menses persists \par HTN - continue amlodipine 5mg daily, BP remains WNL\par Warts - likely viral warts on hand in setting of immunosuppression, continue to monitor closely. RESOLVED.\par Health maintenance - 1yr post transplant ECHO done 3/10/20 WNL, EF = 60%\par \par 8) Plan/Dispo\par Hold off on Vidaza maintenance given GVHD issues\par Pt educated regarding plan of care, all questions/concerns addressed\par Instructed to contact our office with fever or new/worsening symptoms \par Medications sent to mail away pharmacy prn\par F/u in  person with labs 3 months\par local labwork..rx sent\par f/u pft..rx sent\par

## 2022-03-15 ENCOUNTER — RX RENEWAL (OUTPATIENT)
Age: 37
End: 2022-03-15

## 2022-04-04 ENCOUNTER — OUTPATIENT (OUTPATIENT)
Dept: OUTPATIENT SERVICES | Facility: HOSPITAL | Age: 37
LOS: 1 days | Discharge: HOME | End: 2022-04-04

## 2022-04-04 DIAGNOSIS — Z90.3 ACQUIRED ABSENCE OF STOMACH [PART OF]: Chronic | ICD-10-CM

## 2022-04-04 DIAGNOSIS — C92.00 ACUTE MYELOBLASTIC LEUKEMIA, NOT HAVING ACHIEVED REMISSION: ICD-10-CM

## 2022-04-07 ENCOUNTER — RESULT REVIEW (OUTPATIENT)
Age: 37
End: 2022-04-07

## 2022-04-07 ENCOUNTER — APPOINTMENT (OUTPATIENT)
Dept: HEMATOLOGY ONCOLOGY | Facility: CLINIC | Age: 37
End: 2022-04-07
Payer: COMMERCIAL

## 2022-04-07 VITALS
SYSTOLIC BLOOD PRESSURE: 112 MMHG | DIASTOLIC BLOOD PRESSURE: 80 MMHG | OXYGEN SATURATION: 99 % | RESPIRATION RATE: 16 BRPM | TEMPERATURE: 97 F | HEART RATE: 67 BPM | BODY MASS INDEX: 33.34 KG/M2 | WEIGHT: 182.32 LBS

## 2022-04-07 LAB
ALBUMIN SERPL ELPH-MCNC: 4.1 G/DL
ALP BLD-CCNC: 99 U/L
ALT SERPL-CCNC: 8 U/L
ANION GAP SERPL CALC-SCNC: 11 MMOL/L
AST SERPL-CCNC: 15 U/L
BASOPHILS # BLD AUTO: 0.05 K/UL — SIGNIFICANT CHANGE UP (ref 0–0.2)
BASOPHILS NFR BLD AUTO: 0.9 % — SIGNIFICANT CHANGE UP (ref 0–2)
BILIRUB SERPL-MCNC: 0.4 MG/DL
BUN SERPL-MCNC: 7 MG/DL
CALCIUM SERPL-MCNC: 9.5 MG/DL
CHLORIDE SERPL-SCNC: 107 MMOL/L
CO2 SERPL-SCNC: 21 MMOL/L
CREAT SERPL-MCNC: 0.97 MG/DL
EGFR: 79 ML/MIN/1.73M2
EOSINOPHIL # BLD AUTO: 0.15 K/UL — SIGNIFICANT CHANGE UP (ref 0–0.5)
EOSINOPHIL NFR BLD AUTO: 2.7 % — SIGNIFICANT CHANGE UP (ref 0–6)
GLUCOSE SERPL-MCNC: 81 MG/DL
HCT VFR BLD CALC: 41.3 % — SIGNIFICANT CHANGE UP (ref 34.5–45)
HGB BLD-MCNC: 13.7 G/DL — SIGNIFICANT CHANGE UP (ref 11.5–15.5)
IMM GRANULOCYTES NFR BLD AUTO: 0.2 % — SIGNIFICANT CHANGE UP (ref 0–1.5)
LDH SERPL-CCNC: 195 U/L
LYMPHOCYTES # BLD AUTO: 2 K/UL — SIGNIFICANT CHANGE UP (ref 1–3.3)
LYMPHOCYTES # BLD AUTO: 35.8 % — SIGNIFICANT CHANGE UP (ref 13–44)
MAGNESIUM SERPL-MCNC: 1.9 MG/DL
MCHC RBC-ENTMCNC: 30 PG — SIGNIFICANT CHANGE UP (ref 27–34)
MCHC RBC-ENTMCNC: 33.2 G/DL — SIGNIFICANT CHANGE UP (ref 32–36)
MCV RBC AUTO: 90.6 FL — SIGNIFICANT CHANGE UP (ref 80–100)
MONOCYTES # BLD AUTO: 0.49 K/UL — SIGNIFICANT CHANGE UP (ref 0–0.9)
MONOCYTES NFR BLD AUTO: 8.8 % — SIGNIFICANT CHANGE UP (ref 2–14)
NEUTROPHILS # BLD AUTO: 2.89 K/UL — SIGNIFICANT CHANGE UP (ref 1.8–7.4)
NEUTROPHILS NFR BLD AUTO: 51.6 % — SIGNIFICANT CHANGE UP (ref 43–77)
NRBC # BLD: 0 /100 WBCS — SIGNIFICANT CHANGE UP (ref 0–0)
PLATELET # BLD AUTO: 391 K/UL — SIGNIFICANT CHANGE UP (ref 150–400)
POTASSIUM SERPL-SCNC: 4.6 MMOL/L
PROT SERPL-MCNC: 6.7 G/DL
RBC # BLD: 4.56 M/UL — SIGNIFICANT CHANGE UP (ref 3.8–5.2)
RBC # FLD: 14 % — SIGNIFICANT CHANGE UP (ref 10.3–14.5)
SODIUM SERPL-SCNC: 139 MMOL/L
TACROLIMUS SERPL-MCNC: 2.7 NG/ML
WBC # BLD: 5.59 K/UL — SIGNIFICANT CHANGE UP (ref 3.8–10.5)
WBC # FLD AUTO: 5.59 K/UL — SIGNIFICANT CHANGE UP (ref 3.8–10.5)

## 2022-04-07 PROCEDURE — 99215 OFFICE O/P EST HI 40 MIN: CPT

## 2022-04-07 NOTE — HISTORY OF PRESENT ILLNESS
[de-identified] : Ms. 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 Of note patient is a Jew and cannot receive blood products. \par \par Patient was noted to be newly leukopenic to WBC 1.7 during preoperative evaluation in anticipation of right ankle ligament repair. Bone marrow aspirate and biopsy at [facility] on 12/2/2017 revealed AML with some suspicion for APL. She was transferred to [place] for further management. Peripheral counts from around this time are not available. \par \par Repeat marrow on 12/26/2017 showed 90% blasts with myeloid mutation arrest in a 40-50% cellular marrow. On flow blasts were negative for CD34 and HLA-DR; positive for CD38, CD58, CD33, and MPO, and partially positive for CD15, CD11b, , and CD64. CD45 was reported as dim. Karyotype showed +21 in 3 of 21 metaphases, but no t(15:17) was detected on karyotype of FISH. Molecular studies showed mutations in NPM1 (45%0, IDH1 (R132S; 39%), PTPN1 (23%), and CSF3R (5%) genes. There was lingering diagnostic uncertainty due to a largely aspicular and hypocellular specimen, and thus a marrow was repeated on 1/2/2018, confirming AML.\par \par On 1/4/2018, Ms. Gary began 7+3 with Idarubicin. Her induction course was complicated by DIC, neutropenic fever/ Klebsiella PNA, and bilateral retinal hemorrhage. She did not receive blood products and was instead supported with Nplate, Procrit, IV iron, Amicar, and Lupron. After achieving count stability, the patient was discharged; she was not deemed a candidate for further therapy given inability to receive transfusions and a day 14 marrow was not obtained. She subsequently saw Dr. [place] Obrien at [facility]. He obtained a recovery marrow on 2/13/2018 (day +40), which was 30% cellular with 45% blasts. 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 Cytogenetics and FISH were normal. \par \par She then established care with Dr. Christie for consideration of salvage therapy with IDH1 inhibition given the risks of additional chemotherapy without blood products support. Initial marrow at [facility], on 3/1/2018, showed 69% blasts. ThunderBolts panel confirmed IDH1 R132S, NPM1 N057Qod 12, and CSF3R D810G mutations. She was started on Ivosidenib on [facility] protocol  and had completed four cycles to date (6/28/2018 = C5D1). Marrow blasts increased to 85% on 4/30 but declined to 36% by 5/31. A  bone marrow aspirate and biopsy  was obtained  (6/28/2018); preliminary results show further responding disease with 10.3% blasts by flow cytometry. Subsequent marrow in oct showed 5% blasts...she was then started on vidazza plus venetoclax. She completed 2 cycles...f/u marrow with 4% blasts.....\par \par Patient underwent an AlloPSCT sister MRD (10/10) on 2/27/19, hospital course as follows:\par Ms. 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 She is a Jew and cannot receive blood products. \par \par Ms. Nickerson's donor is her sister.  She is a 10/10 match. Both donor and patient are CMV +. She had completed all pre testing needed for transplant.  She will \par start reduced intensity Fludarabine/Busulfan chemo regimen today. \par \par Upon admission, a right AC PICC line was placed in IR. Ms. Gary received IV hydration, pain management, antiemetics, anxiolysis, and antibacterial / antiviral / antifungal / PCP / GI and VOD (SOS) prophylaxis. Labs were monitored every other day, and she received electrolyte repletion as needed. She also received vitamin K, vitamin B, supplemental iron, vitamin C and procrit as directed by the institutional bloodless transplant policy. \par \par On 2/27/19, Ms. Gary received 363 mLs of fresh, apheresed, mobilized, related allogeneic HPC over 80 minutes. Cell counts as follows: \par Total MNCs ( x 10^8/kg): 6.21 \par CD 34+Cells ( x 10^6/kg): 3.37 \par CD3+ Cells ( x 10^7/kg): 12.46 \par Cell Viability (%): 100% \par \par Engraftment was noted on 3/11/19. Ms. Gary did not receive zarxio due to her having minimal residual disease on admission, and a reduced intensity preparatory regimen to avoid stimulating myeloblasts. \par \par Ms. Gary had a relatively uncomplicated transplant course. She did experience menses, which was suppressed with provera. She also had pancytopenia related to the high dose chemotherapy preparative regimen. She also experienced grade 1 oral mucositis, and grade 2 GI mucositis also related to the chemotherapy preparative regimen. Both were treated with supportive care. \par \par Currently, Ms. Gary is stable for discharge home with outpatient follow up at the [facility].  [de-identified] : \par 4/27/21\par Pt reports feeling well\par She is s/p PFTs at local facility \par She denies any exacerbations with GVHD to the skin \par REports continued dryness to the eyes and many  dental carries\par No mouth sores or dysphagia \par DU with climbing stairs.  No respiratory infection \par Stable weight/appetite \par Arthralgias to the bilateral wrists. \par Wants to get Moderna Vaccine \par \par On 8/17//21, verbal consent obtained for today's telehealth visit. Nany was in her home today during out visit. Overall patient is well and offers no acute concerns. Currently on FK 2 mg BID. States when tacrolimus was tapered recently she had pruritus which has resolved. Admits has not needed her scleral lenses recently. Applies eye drops multiple times a day. Complains of improved dyspnea on exertion and wrist  pain. Recently followed up with neurologist to rule out carpal tunnel syndrome.  Denies fever, chills, nausea, vomiting, diarrhea, rash, mouth sores or any signs of active bleeding. Remains compliant with post transplant medications. \par \par 10/19/21 No new issues....in good spirits.....sees pulm....on [id number]....received 3rd moderna vaccine on sept 1\par \par 1/19/22 Pt agrees to tele health from home...overall improving...occ du...on tacro1.5 mg bid...\par \par 4/7/22 Here for f/u..overall well..seen by pulmonary md close to home...skin, sob stable to improved...hypopigmentation noted\par \par

## 2022-04-07 NOTE — PHYSICAL EXAM
[Restricted in physically strenuous activity but ambulatory and able to carry out work of a light or sedentary nature] : Status 1- Restricted in physically strenuous activity but ambulatory and able to carry out work of a light or sedentary nature, e.g., light house work, office work [Normal] : affect appropriate [] :  [No active (erythematous_ GVHD rash)] : Skin: No active (erythematous_ GVHD rash) [< 2 mg/dl] : Liver: < 2 mg/dl [No or intermittent] : Upper GI: No or intermittent nausea, vomiting or anorexia [<500 ml/day or < 3 episodes/day] : Lower GI (stool output/day): <500 ml/day or <3 episodes/day [FEV1 60-79%] : Score 1 [Mild] : Mild [Extensive] : Extensive [No] : No [Yes] : Yes [No symptoms] : Score 0 [de-identified] : hypo / hyper pigmented  [FreeTextEntry1] : 02/27/2019

## 2022-04-07 NOTE — ASSESSMENT
[FreeTextEntry1] : Ms. Gary is a 36 years old female Spiritism with AML with the following comorbidities being managed:\par high complexity decision making\par \par 1) AML\par S/p HLA 10/10 allo sib (sister) PBSCT on 2/27/19\par 7/3/19 BMbx: LANA, normal female karyotype\par 11/21/19 chimerism = 99% donor (CD33 = 100% and CD3 = 83% donor)\par 12/27/19 chimerism = 98% donor\par 2/12/20 chimerism = 99% donor (CD33 = 100% and CD3 = 88.8% donor)\par 3/10/20 chimerism = 99% donor (CD33 = 100% and CD3 = 94% donor)\par 5/6/20 chimerism = 100% donor (CD33 = 100% and CD3 = 97% donor)\par 6/10/20, 7/2020  chimerism = 100% donor in CD3 and CD33 compartments\par 4/27/21 chimerism= 100% donor ( II51=649%, and CD 3=100%)\par Current disease status: LANA based on 7/3/19 BMbx and peripheral blood counts \par \par Hold on starting maintenance Vidaza at this time given  GVHD issues \par \par 2) Heme\par No transfusions as pt is practicing Spiritism\par Counts WNL's...labs reviewed from [facility], oct 2021, 1/2022\par Continue multivitamin and folic acid daily\par \par 3) ID\par Continue ppx:\par - Acyclovir 400 mg\par - Mepron 1500 mg daily ...consider bactrim at some point...start today 1 daily\par Voriconazole resumed 1/23/20 but subsequently HELD as of 2/19/20 for transaminitis\par Post transplant restrictions reviewed - in light of covid-19, CDC recs reviewed, strict adherence to restriction reinforced\par \par 4/27/21 CMV PCR not detected, continue to monitor PCR weekly \par \par 4) GVHD\par Acute GVHD: Skin 0   Liver 0   GI 0 - overall grade 0\par Chronic GVHD: mouth score 0,  lung 1, joint 0,  - mild extensive \par \par Prednisone d/c'd 12/22/19\par Continue FK taper  to 1 mg in am and 1 mg in pm ...\par To consider addition of Jakafi if GVHD symptoms worsen with taper ...discussed at length\par See GVHD history below\par \par Date of onset of acute GVHD 8/1/19 \par - On 7/29/19 placed on prednisone 20 mg daily for erythematous rash of face as stated by patient.\par - 8/1/19: Erythematous/hyperpigmented rash 38% BSA- rash of face, chest, back, bilateral upper and lower extremities. Patient was placed on prednisone 20 mg daily as of 7/29/19. Instructed to increase prednisone on 8/1/19 to 40 mg daily on 8/1, 8/2, 8/3. Then decreased to prednisone 20 mg daily. Tacrolimus restarted on 8/1/19 at 0.5 mg BID. Lidex cream BID to body and hydrocortisone cream to face BID. Acute GVHD SKin 2 Liver 0 GI 0- overall grade 1\par - 8/7/19: Erythematous/hyperpigmented rash 43% BSA- rash of face, neck, chest, back, abdomen, bilateral upper and lower extremities. Increased prednisone 20mg daily to 40 mg daily. Lidex cream BID to entire body. FK at 0.5 mg BID. May have to increase FK dose if rash worsens. Acute GVHD SKin 2 Liver 0 GI 0- overall grade 1\par - 8/14/19: Erythematous rash of face. Hyperpigmented rash of chest, neck, abdomen, bilateral upper and lower extremities and back. Skin rash extent 42% BSA. Currently on prednisone 40 mg daily and lidex cream BID. Increased FK to 0.5 mg Am and 1 mg PM. \par - 9/26/19: Skin hyperpigmentation and facial hypopigmentation, but improvement in erythema from facial rash. Some oral sores using dex rinse 4 times a day. Increased FK to 1 mg BID as of 9/26/19. Continue prednisone 30 mg daily. Decrease prednisone by 5 mg every two weeks. Acute GVHD Skin 2 Liver 0 GI 0- overall grade 1. BSA 42%. \par - 12/27/19: no acute GVHD. Chronic GVHD: skin score 3 (>50% BSA), eyes score 3 (requiring scleral lenses), mouth score 2 - overall severe extensive chronic GVHD. Off prednisone as of 12/22/19. Continue FK 1.5mg bid and Decadron swish/spit qid, moisturize 2-3x/day \par - 1/23/20: no acute GVHD. Chronic GVHD: skin 3 (>50% BSA), eyes score 1, mouth score 1 - overall severe extensive chronic GVHD. Continue 1.5mg bid. Continue PRN Decadron swish/spit, instructed to avoid swallowing to prevent systemic absorption of steroid. Moisturize 2-3x/day\par - 3/10/20: no acute GVHD. Chronic GVHD eyes score 3, lung score 2, genital tract score 1 - overall severe extensive chronic GVHD. FK increased to 1mg bid and initiated FAM therapy as below for lung GVHD\par - 4/9/20: no acute GVHD. Chronic GVHD eyes score 2, lung score 2, genital tract score 1 - overall severe extensive chronic GVHD. Continue FK 1mg bid and FAM therapy\par - 5/6/20: no acute GVHD. Chronic GVHD mouth score 1, eyes score 2, lungs score 2, genitalia score 1 - overall severe extensive chronic GVHD. SOB on exertion somewhat improved. Continue FK 1mg bid and FAM therapy\par 6/10/20: no acute GVHD. Chronic GVHD mouth score 1, eyes score 2, lung score 2, genitalia score 1 - overall severe extensive chronic GVHD. Worsening mucositis, FK increased to 1.5mg bid \par \par cGVHD eyes\par No longer wearing scleral lenses at this time\par Continue PRN artificial tears and Systane ophthalmic ointment\par Continue f/u with optho\par \par cGVHD mouth\par improved\par Continue Decadron rinse prn\par Continue to monitor closely \par Resolved\par \par cGVHD lung??\par 3/10/20 PFT with FEV1 of 48%\par (Repeated outpatient  4/2021- Nor esults available for review_ \par 5/6/20 CT chest: mild diffuse mosaic attenuation pattern to the lungs. ?BOOP, air trapping, small vessel disease. To consider repeat CT imaging in 3-4mo\par PFT repeated on 4/8/21: Pulmonary function test are severe obstruction with no bronchodilatation response\par Continue FAM therapy, initiated 3/10/20 (pt already on monteleukast for asthma/allergies) \par - Flovent inhaler 440 mcg bid\par - Azithromycin 250 mg- take one tablet Monday/Wednesday/Friday\par - Monteleukast 10 mg daily \par -f/u pulm eval and pft..improved \par \par cGVHD vaginal area..\par Sensation of skin tightening and dryness of vaginal area \par May use lubricant to gently massage external labia to soften tissue\par using dilators\par Encouraged f/u with GYN after covid-19 epidemic resolves\par Resolved \par \par cGVHD joints\par Noted 7/15/20; pain causing limited ROM of shoulders, elbows, wrists, finger joints. Unable to pick things up off of the floor without pain..ROM improved\par Continue to monitor closely \par \par 5) GI\par Continue ppx:\par - Ursodiol 300 mg bid\par - Protonix 40 mg daily \par PRN Zofran and Reglan for nausea\par \par 6) Other \par Hypomagnesemia - likely 2/2 tacrolimus, continue magnesium oxide 400 mg bid \par GYN - LMP November 2019, continue Sprintec daily, instructed to wear condoms if resuming sexual activity.  f/u with GYN if abnormal menses persists \par HTN - continue amlodipine 5mg daily, BP remains WNL\par Warts - likely viral warts on hand in setting of immunosuppression, continue to monitor closely. RESOLVED.\par Health maintenance - 1yr post transplant ECHO done 3/10/20 WNL, EF = 60%\par \par 8) Plan/Dispo\par Hold off on Vidaza maintenance given GVHD issues\par Pt educated regarding plan of care, all questions/concerns addressed\par Instructed to contact our office with fever or new/worsening symptoms \par Medications sent to mail away pharmacy prn\par F/u in  person with labs 6 months\par local labwork..and teb in 3 months\par s/p covid moderna X3\par started discussion of post transplant childhood boosters...schedule given to pt....will revisit in 6 months with further tacro taper\par \par

## 2022-04-13 LAB — CMV DNA SPEC QL NAA+PROBE: NOT DETECTED IU/ML

## 2022-04-14 LAB
CD3 AND CD33 ENRICHMENT: NORMAL
ENGRAFTMNET-POST: NORMAL

## 2022-05-31 ENCOUNTER — RX RENEWAL (OUTPATIENT)
Age: 37
End: 2022-05-31

## 2022-06-30 ENCOUNTER — OUTPATIENT (OUTPATIENT)
Dept: OUTPATIENT SERVICES | Facility: HOSPITAL | Age: 37
LOS: 1 days | Discharge: HOME | End: 2022-06-30

## 2022-06-30 DIAGNOSIS — Z90.3 ACQUIRED ABSENCE OF STOMACH [PART OF]: Chronic | ICD-10-CM

## 2022-06-30 DIAGNOSIS — C92.00 ACUTE MYELOBLASTIC LEUKEMIA, NOT HAVING ACHIEVED REMISSION: ICD-10-CM

## 2022-07-14 ENCOUNTER — APPOINTMENT (OUTPATIENT)
Dept: HEMATOLOGY ONCOLOGY | Facility: CLINIC | Age: 37
End: 2022-07-14

## 2022-07-14 PROCEDURE — 99202 OFFICE O/P NEW SF 15 MIN: CPT | Mod: 95

## 2022-07-14 PROCEDURE — 99212 OFFICE O/P EST SF 10 MIN: CPT | Mod: 95

## 2022-07-14 RX ORDER — FOLIC ACID 1 MG/1
1 TABLET ORAL
Qty: 90 | Refills: 2 | Status: DISCONTINUED | COMMUNITY
Start: 2019-03-18 | End: 2022-07-14

## 2022-07-14 RX ORDER — ONDANSETRON 8 MG/1
8 TABLET ORAL
Qty: 72 | Refills: 1 | Status: DISCONTINUED | COMMUNITY
Start: 2019-03-18 | End: 2022-07-14

## 2022-07-14 RX ORDER — METOCLOPRAMIDE 10 MG/1
10 TABLET ORAL EVERY 6 HOURS
Qty: 120 | Refills: 3 | Status: DISCONTINUED | COMMUNITY
Start: 2019-03-18 | End: 2022-07-14

## 2022-07-14 RX ORDER — DEXAMETHASONE 0.5 MG/5ML
0.5 SOLUTION ORAL
Qty: 1 | Refills: 3 | Status: DISCONTINUED | COMMUNITY
Start: 2019-08-07 | End: 2022-07-14

## 2022-07-14 RX ORDER — ALBUTEROL SULFATE 108 UG/1
108 (90 BASE) AEROSOL, METERED RESPIRATORY (INHALATION) EVERY 6 HOURS
Qty: 1 | Refills: 0 | Status: DISCONTINUED | COMMUNITY
Start: 2020-02-12 | End: 2022-07-14

## 2022-07-14 RX ORDER — ATOVAQUONE 750 MG/5ML
750 SUSPENSION ORAL TWICE DAILY
Qty: 1 | Refills: 5 | Status: DISCONTINUED | COMMUNITY
Start: 2019-03-18 | End: 2022-07-14

## 2022-07-14 RX ORDER — NORETHINDRONE ACETATE AND ETHINYL ESTRADIOL AND FERROUS FUMARATE 1MG-20(21)
1-20 KIT ORAL DAILY
Qty: 3 | Refills: 3 | Status: DISCONTINUED | COMMUNITY
Start: 2020-12-03 | End: 2022-07-14

## 2022-07-19 NOTE — PHYSICAL EXAM
[Restricted in physically strenuous activity but ambulatory and able to carry out work of a light or sedentary nature] : Status 1- Restricted in physically strenuous activity but ambulatory and able to carry out work of a light or sedentary nature, e.g., light house work, office work [Normal] : affect appropriate [] :  [No active (erythematous_ GVHD rash)] : Skin: No active (erythematous_ GVHD rash) [< 2 mg/dl] : Liver: < 2 mg/dl [No or intermittent] : Upper GI: No or intermittent nausea, vomiting or anorexia [<500 ml/day or < 3 episodes/day] : Lower GI (stool output/day): <500 ml/day or <3 episodes/day [FEV1 60-79%] : Score 1 [No symptoms] : Score 0 [Mild] : Mild [Extensive] : Extensive [No] : No [Yes] : Yes [de-identified] : hypo / hyper pigmented  [FreeTextEntry1] : 02/27/2019

## 2022-07-19 NOTE — HISTORY OF PRESENT ILLNESS
[de-identified] : Ms. 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 \par \par Patient was noted to be newly leukopenic to WBC 1.7 during preoperative evaluation in anticipation of right ankle ligament repair. Bone marrow aspirate and biopsy at [facility] on 12/2/2017 revealed AML with some suspicion for APL. She was transferred to [facility] for further management. Peripheral counts from around this time are not available. \par \par Repeat marrow on 12/26/2017 showed 90% blasts with myeloid mutation arrest in a 40-50% cellular marrow. On flow blasts were negative for CD34 and HLA-DR; positive for CD38, CD58, CD33, and MPO, and partially positive for CD15, CD11b, , and CD64. CD45 was reported as dim. Karyotype showed +21 in 3 of 21 metaphases, but no t(15:17) was detected on karyotype of FISH. Molecular studies showed mutations in NPM1 (45%0, IDH1 (R132S; 39%), PTPN1 (23%), and CSF3R (5%) genes. There was lingering diagnostic uncertainty due to a largely aspicular and hypocellular specimen, and thus a marrow was repeated on 1/2/2018, confirming AML.\par \par On 1/4/2018, Ms. Gary began 7+3 with Idarubicin. Her induction course was complicated by DIC, neutropenic fever/ Klebsiella PNA, and bilateral retinal hemorrhage. She did not receive blood products and was instead supported with Nplate, Procrit, IV iron, Amicar, and Lupron. After achieving count stability, the patient was discharged; she was not deemed a candidate for further therapy given inability to receive transfusions and a day 14 marrow was not obtained. She subsequently saw Dr. [place] at [facility]. He obtained a recovery marrow on 2/13/2018 (day +40), which was 30% cellular with 45% blasts. 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 Cytogenetics and FISH were normal. \par \par She then established care with Dr. Christie for consideration of salvage therapy with IDH1 inhibition given the risks of additional chemotherapy without blood products support. [place] [facility], showed 69% blasts. [employer] panel confirmed IDH1 R132S, NPM1 [id number] 12, and CSF3R D810G mutations. She was started on Ivosidenib [place] [facility] protocol  and had completed four cycles to date (6/28/2018 = C5D1). Marrow blasts increased to 85% on 4/30 but declined to 36% by 5/31. A  bone marrow aspirate and biopsy  was obtained  (6/28/2018); preliminary results show further responding disease with 10.3% blasts by flow cytometry. Subsequent marrow in oct showed 5% blasts...she was then started on vidazza plus venetoclax. She completed 2 cycles...f/u marrow with 4% blasts.....\par \par Patient underwent an AlloPSCT sister MRD (10/10) on 2/27/19, hospital course as follows:\par Ms. 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 She is a Scientology and cannot receive blood products. \par \par Ms. Nickerson's donor is her sister.  She is a 10/10 match. Both donor and patient are CMV +. She had completed all pre testing needed for transplant.  She will \par start reduced intensity Fludarabine/Busulfan chemo regimen today. \par \par Upon admission, a right AC PICC line was placed in IR. Ms. Gary received IV hydration, pain management, antiemetics, anxiolysis, and antibacterial / antiviral / antifungal / PCP / GI and VOD (SOS) prophylaxis. Labs were monitored every other day, and she received electrolyte repletion as needed. She also received vitamin K, vitamin B, supplemental iron, vitamin C and procrit as directed by the institutional bloodless transplant policy. \par \par On 2/27/19, Ms. Gary received 363 mLs of fresh, apheresed, mobilized, related allogeneic HPC over 80 minutes. Cell counts as follows: \par Total MNCs ( x 10^8/kg): 6.21 \par CD 34+Cells ( x 10^6/kg): 3.37 \par CD3+ Cells ( x 10^7/kg): 12.46 \par Cell Viability (%): 100% \par \par Engraftment was noted on 3/11/19. Ms. Gary did not receive zarxio due to her having minimal residual disease on admission, and a reduced intensity preparatory regimen to avoid stimulating myeloblasts. \par \par Ms. Gary had a relatively uncomplicated transplant course. She did experience menses, which was suppressed with provera. She also had pancytopenia related to the high dose chemotherapy preparative regimen. She also experienced grade 1 oral mucositis, and grade 2 GI mucositis also related to the chemotherapy preparative regimen. Both were treated with supportive care. \par \par Currently, Ms. Gary is stable for discharge home with outpatient follow up at the [facility].  [de-identified] : \par 4/27/21\par Pt reports feeling well\par She is s/p PFTs at local facility \par She denies any exacerbations with GVHD to the skin \par REports continued dryness to the eyes and many  dental carries\par No mouth sores or dysphagia \par DU with climbing stairs.  No respiratory infection \par Stable weight/appetite \par Arthralgias to the bilateral wrists. \par Wants to get Moderna Vaccine \par \par On 8/17//21, verbal consent obtained for today's telehealth visit. Nany was in her home today during out visit. Overall patient is well and offers no acute concerns. Currently on FK 2 mg BID. States when tacrolimus was tapered recently she had pruritus which has resolved. Admits has not needed her scleral lenses recently. Applies eye drops multiple times a day. Complains of improved dyspnea on exertion and wrist  pain. Recently followed up with neurologist to rule out carpal tunnel syndrome.  Denies fever, chills, nausea, vomiting, diarrhea, rash, mouth sores or any signs of active bleeding. Remains compliant with post transplant medications. \par \par 10/19/21 No new issues....in good spirits.....sees pulm....on [id number]....received 3rd moderna vaccine on sept 1\par \par 1/19/22 Pt agrees to tele health from home...overall improving...occ du...on tacro1.5 mg bid...\par \par 4/7/22 Here for f/u..overall well..seen by pulmonary md close to home...skin, sob stable to improved...hypopigmentation noted\par \par On 7/14/22, patient consents for today's telehealth appointment. Overall well with no acute concerns. Currently on FK 1 mg BID. States when tacrolimus dose was decreased recently complains of pruritus but, denies rash. Denies fever, chills, nausea, vomiting, diarrhea, mouth sores, dysuria or any signs of active bleeding. Denies chest pain or B/L LE edema. Remains compliant with medications as prescribed.

## 2022-07-19 NOTE — REVIEW OF SYSTEMS
[SOB on Exertion] : shortness of breath during exertion [Negative] : Heme/Lymph [Eye Pain] : no eye pain [Red Eyes] : eyes not red [Dry Eyes] : no dryness of the eyes [Vision Problems] : no vision problems [Shortness Of Breath] : no shortness of breath [Wheezing] : no wheezing [Cough] : no cough [Joint Pain] : no joint pain [Joint Stiffness] : no joint stiffness [Muscle Pain] : no muscle pain [FreeTextEntry6] : improved

## 2022-07-19 NOTE — ASSESSMENT
[FreeTextEntry1] : Ms. Gary is a 37 years old female Jain with AML with the following comorbidities being managed:\par high complexity decision making\par \par 1) AML\par S/p HLA 10/10 allo sib (sister) PBSCT on 2/27/19\par 7/3/19 BMbx: LANA, normal female karyotype\par 11/21/19 chimerism = 99% donor (CD33 = 100% and CD3 = 83% donor)\par 12/27/19 chimerism = 98% donor\par 2/12/20 chimerism = 99% donor (CD33 = 100% and CD3 = 88.8% donor)\par 3/10/20 chimerism = 99% donor (CD33 = 100% and CD3 = 94% donor)\par 5/6/20 chimerism = 100% donor (CD33 = 100% and CD3 = 97% donor)\par 6/10/20, 7/2020  chimerism = 100% donor in CD3 and CD33 compartments\par 4/27/21 chimerism= 100% donor ( PL65=158%, and CD 3=100%)\par 4/7/22 chimerism= 100% donor in all compartments \par Current disease status: LANA based on 7/3/19 BMbx and peripheral blood counts \par \par Hold on starting maintenance Vidaza at this time given  GVHD issues \par \par 2) Heme\par No transfusions as pt is practicing Jain\par Counts WNL's...labs reviewed from [facility], oct 2021, 1/2022\par Continue multivitamin and folic acid daily\par \par 3) ID\par Continue ppx:\par - Acyclovir 400 mg\par - Bactrim- take 1 tablet daily\par Voriconazole resumed 1/23/20 but subsequently HELD as of 2/19/20 for transaminitis\par Post transplant restrictions reviewed - in light of covid-19, CDC recs reviewed, strict adherence to restriction reinforced\par \par 4/7/22 CMV PCR not detected, continue to monitor PCR weekly \par \par 4) GVHD\par Acute GVHD: Skin 0   Liver 0   GI 0 - overall grade 0\par Chronic GVHD: mouth score 0,  lung 1, joint 0,  - mild extensive \par \par Prednisone d/c'd 12/22/19\par Decrease FK  to 1 mg in am and 0.5 mg in PM on 7/14/22\par To consider addition of Jakafi if GVHD symptoms worsen with taper\par See GVHD history below\par \par Date of onset of acute GVHD 8/1/19 \par - On 7/29/19 placed on prednisone 20 mg daily for erythematous rash of face as stated by patient.\par - 8/1/19: Erythematous/hyperpigmented rash 38% BSA- rash of face, chest, back, bilateral upper and lower extremities. Patient was placed on prednisone 20 mg daily as of 7/29/19. Instructed to increase prednisone on 8/1/19 to 40 mg daily on 8/1, 8/2, 8/3. Then decreased to prednisone 20 mg daily. Tacrolimus restarted on 8/1/19 at 0.5 mg BID. Lidex cream BID to body and hydrocortisone cream to face BID. Acute GVHD SKin 2 Liver 0 GI 0- overall grade 1\par - 8/7/19: Erythematous/hyperpigmented rash 43% BSA- rash of face, neck, chest, back, abdomen, bilateral upper and lower extremities. Increased prednisone 20mg daily to 40 mg daily. Lidex cream BID to entire body. FK at 0.5 mg BID. May have to increase FK dose if rash worsens. Acute GVHD SKin 2 Liver 0 GI 0- overall grade 1\par - 8/14/19: Erythematous rash of face. Hyperpigmented rash of chest, neck, abdomen, bilateral upper and lower extremities and back. Skin rash extent 42% BSA. Currently on prednisone 40 mg daily and lidex cream BID. Increased FK to 0.5 mg Am and 1 mg PM. \par - 9/26/19: Skin hyperpigmentation and facial hypopigmentation, but improvement in erythema from facial rash. Some oral sores using dex rinse 4 times a day. Increased FK to 1 mg BID as of 9/26/19. Continue prednisone 30 mg daily. Decrease prednisone by 5 mg every two weeks. Acute GVHD Skin 2 Liver 0 GI 0- overall grade 1. BSA 42%. \par - 12/27/19: no acute GVHD. Chronic GVHD: skin score 3 (>50% BSA), eyes score 3 (requiring scleral lenses), mouth score 2 - overall severe extensive chronic GVHD. Off prednisone as of 12/22/19. Continue FK 1.5mg bid and Decadron swish/spit qid, moisturize 2-3x/day \par - 1/23/20: no acute GVHD. Chronic GVHD: skin 3 (>50% BSA), eyes score 1, mouth score 1 - overall severe extensive chronic GVHD. Continue 1.5mg bid. Continue PRN Decadron swish/spit, instructed to avoid swallowing to prevent systemic absorption of steroid. Moisturize 2-3x/day\par - 3/10/20: no acute GVHD. Chronic GVHD eyes score 3, lung score 2, genital tract score 1 - overall severe extensive chronic GVHD. FK increased to 1mg bid and initiated FAM therapy as below for lung GVHD\par - 4/9/20: no acute GVHD. Chronic GVHD eyes score 2, lung score 2, genital tract score 1 - overall severe extensive chronic GVHD. Continue FK 1mg bid and FAM therapy\par - 5/6/20: no acute GVHD. Chronic GVHD mouth score 1, eyes score 2, lungs score 2, genitalia score 1 - overall severe extensive chronic GVHD. SOB on exertion somewhat improved. Continue FK 1mg bid and FAM therapy\par 6/10/20: no acute GVHD. Chronic GVHD mouth score 1, eyes score 2, lung score 2, genitalia score 1 - overall severe extensive chronic GVHD. Worsening mucositis, FK increased to 1.5mg bid \par \par cGVHD eyes\par No longer wearing scleral lenses at this time\par Continue PRN artificial tears and Systane ophthalmic ointment\par Continue f/u with optho\par \par cGVHD mouth\par improved\par Continue Decadron rinse prn\par Continue to monitor closely \par Resolved\par \par cGVHD lung??\par 3/10/20 PFT with FEV1 of 48%\par (Repeated outpatient  4/2021- Nor esults available for review_ \par 5/6/20 CT chest: mild diffuse mosaic attenuation pattern to the lungs. ?BOOP, air trapping, small vessel disease. To consider repeat CT imaging in 3-4mo\par PFT repeated on 4/8/21: Pulmonary function test are severe obstruction with no bronchodilatation response\par Continue FAM therapy, initiated 3/10/20 (pt already on monteleukast for asthma/allergies) \par - Flovent inhaler 440 mcg bid\par - Azithromycin 250 mg- take one tablet Monday/Wednesday/Friday\par - Monteleukast 10 mg daily \par -f/u pulm eval and pft..improved \par \par cGVHD vaginal area..\par Sensation of skin tightening and dryness of vaginal area \par May use lubricant to gently massage external labia to soften tissue\par using dilators\par Encouraged f/u with GYN after covid-19 epidemic resolves\par Resolved \par \par cGVHD joints\par Noted 7/15/20; pain causing limited ROM of shoulders, elbows, wrists, finger joints. Unable to pick things up off of the floor without pain..ROM improved\par Continue to monitor closely \par \par 5) GI\par Continue ppx:\par - Ursodiol 300 mg bid\par - Protonix 40 mg daily \par PRN Zofran and Reglan for nausea\par \par 6) Other \par Hypomagnesemia - likely 2/2 tacrolimus, continue magnesium oxide 400 mg bid \par GYN - LMP November 2019, continue Sprintec daily, instructed to wear condoms if resuming sexual activity.\par HTN - continue amlodipine 5mg daily, BP remains WNL\par Warts - likely viral warts on hand in setting of immunosuppression, continue to monitor closely. RESOLVED.\par Health maintenance - 1yr post transplant ECHO done 3/10/20 WNL, EF = 60%\par \par 8) Plan/Dispo\par Hold off on Vidaza maintenance given GVHD issues\par Pt educated regarding plan of care, all questions/concerns addressed\par Instructed to contact our office with fever or new/worsening symptoms \par Medications sent to mail away pharmacy prn\par F/u in  person with labs 3 months with Dr Lou\par s/p covid moderna X3\par started discussion of post transplant childhood boosters...schedule given to pt....will revisit in 6 months with further tacro taper\par \par

## 2022-08-03 LAB
CD3 AND CD33 ENRICHMENT: NORMAL
ENGRAFTMNET-POST: NORMAL

## 2022-08-29 ENCOUNTER — RX RENEWAL (OUTPATIENT)
Age: 37
End: 2022-08-29

## 2022-10-12 ENCOUNTER — OUTPATIENT (OUTPATIENT)
Dept: OUTPATIENT SERVICES | Facility: HOSPITAL | Age: 37
LOS: 1 days | Discharge: HOME | End: 2022-10-12

## 2022-10-12 DIAGNOSIS — Z90.3 ACQUIRED ABSENCE OF STOMACH [PART OF]: Chronic | ICD-10-CM

## 2022-10-12 DIAGNOSIS — C92.00 ACUTE MYELOBLASTIC LEUKEMIA, NOT HAVING ACHIEVED REMISSION: ICD-10-CM

## 2022-10-18 ENCOUNTER — RESULT REVIEW (OUTPATIENT)
Age: 37
End: 2022-10-18

## 2022-10-18 ENCOUNTER — APPOINTMENT (OUTPATIENT)
Dept: HEMATOLOGY ONCOLOGY | Facility: CLINIC | Age: 37
End: 2022-10-18

## 2022-10-18 VITALS
OXYGEN SATURATION: 96 % | SYSTOLIC BLOOD PRESSURE: 132 MMHG | DIASTOLIC BLOOD PRESSURE: 87 MMHG | TEMPERATURE: 96.9 F | WEIGHT: 181.66 LBS | BODY MASS INDEX: 33.22 KG/M2 | HEART RATE: 106 BPM | RESPIRATION RATE: 16 BRPM

## 2022-10-18 DIAGNOSIS — Z78.9 OTHER SPECIFIED HEALTH STATUS: ICD-10-CM

## 2022-10-18 LAB
ALBUMIN SERPL ELPH-MCNC: 4 G/DL
ALP BLD-CCNC: 77 U/L
ALT SERPL-CCNC: 6 U/L
ANION GAP SERPL CALC-SCNC: 11 MMOL/L
AST SERPL-CCNC: 10 U/L
BASOPHILS # BLD AUTO: 0.04 K/UL — SIGNIFICANT CHANGE UP (ref 0–0.2)
BASOPHILS NFR BLD AUTO: 0.8 % — SIGNIFICANT CHANGE UP (ref 0–2)
BILIRUB SERPL-MCNC: 0.2 MG/DL
BUN SERPL-MCNC: 6 MG/DL
CALCIUM SERPL-MCNC: 9.4 MG/DL
CHLORIDE SERPL-SCNC: 106 MMOL/L
CO2 SERPL-SCNC: 20 MMOL/L
CREAT SERPL-MCNC: 1.04 MG/DL
EGFR: 72 ML/MIN/1.73M2
EOSINOPHIL # BLD AUTO: 0.15 K/UL — SIGNIFICANT CHANGE UP (ref 0–0.5)
EOSINOPHIL NFR BLD AUTO: 2.9 % — SIGNIFICANT CHANGE UP (ref 0–6)
GLUCOSE SERPL-MCNC: 85 MG/DL
HCT VFR BLD CALC: 36.8 % — SIGNIFICANT CHANGE UP (ref 34.5–45)
HGB BLD-MCNC: 12.7 G/DL — SIGNIFICANT CHANGE UP (ref 11.5–15.5)
IMM GRANULOCYTES NFR BLD AUTO: 0.2 % — SIGNIFICANT CHANGE UP (ref 0–0.9)
LDH SERPL-CCNC: 188 U/L
LYMPHOCYTES # BLD AUTO: 1.71 K/UL — SIGNIFICANT CHANGE UP (ref 1–3.3)
LYMPHOCYTES # BLD AUTO: 32.8 % — SIGNIFICANT CHANGE UP (ref 13–44)
MAGNESIUM SERPL-MCNC: 2 MG/DL
MCHC RBC-ENTMCNC: 30.5 PG — SIGNIFICANT CHANGE UP (ref 27–34)
MCHC RBC-ENTMCNC: 34.5 G/DL — SIGNIFICANT CHANGE UP (ref 32–36)
MCV RBC AUTO: 88.2 FL — SIGNIFICANT CHANGE UP (ref 80–100)
MONOCYTES # BLD AUTO: 0.54 K/UL — SIGNIFICANT CHANGE UP (ref 0–0.9)
MONOCYTES NFR BLD AUTO: 10.3 % — SIGNIFICANT CHANGE UP (ref 2–14)
NEUTROPHILS # BLD AUTO: 2.77 K/UL — SIGNIFICANT CHANGE UP (ref 1.8–7.4)
NEUTROPHILS NFR BLD AUTO: 53 % — SIGNIFICANT CHANGE UP (ref 43–77)
NRBC # BLD: 0 /100 WBCS — SIGNIFICANT CHANGE UP (ref 0–0)
PLATELET # BLD AUTO: 359 K/UL — SIGNIFICANT CHANGE UP (ref 150–400)
POTASSIUM SERPL-SCNC: 4.5 MMOL/L
PROT SERPL-MCNC: 6.4 G/DL
RBC # BLD: 4.17 M/UL — SIGNIFICANT CHANGE UP (ref 3.8–5.2)
RBC # FLD: 13.5 % — SIGNIFICANT CHANGE UP (ref 10.3–14.5)
SODIUM SERPL-SCNC: 137 MMOL/L
TACROLIMUS SERPL-MCNC: <2 NG/ML
WBC # BLD: 5.22 K/UL — SIGNIFICANT CHANGE UP (ref 3.8–10.5)
WBC # FLD AUTO: 5.22 K/UL — SIGNIFICANT CHANGE UP (ref 3.8–10.5)

## 2022-10-18 PROCEDURE — 99215 OFFICE O/P EST HI 40 MIN: CPT

## 2022-10-19 LAB
CMV DNA SPEC QL NAA+PROBE: NOT DETECTED IU/ML
CMVPCR LOG: NOT DETECTED LOG10IU/ML

## 2022-10-24 ENCOUNTER — NON-APPOINTMENT (OUTPATIENT)
Age: 37
End: 2022-10-24

## 2022-10-24 PROBLEM — Z78.9 NON-SMOKER: Status: ACTIVE | Noted: 2018-08-13

## 2022-10-24 NOTE — ASSESSMENT
[FreeTextEntry1] : Ms. Gary is a 37 years old female [place] with AML with the following comorbidities being managed:\par high complexity decision making\par \par 1) AML\par S/p HLA 10/10 allo sib (sister) PBSCT on 2/27/19\par 7/3/19 BMbx: LANA, normal female karyotype\par 11/21/19 chimerism = 99% donor (CD33 = 100% and CD3 = 83% donor)\par 12/27/19 chimerism = 98% donor\par 2/12/20 chimerism = 99% donor (CD33 = 100% and CD3 = 88.8% donor)\par 3/10/20 chimerism = 99% donor (CD33 = 100% and CD3 = 94% donor)\par 5/6/20 chimerism = 100% donor (CD33 = 100% and CD3 = 97% donor)\par 6/10/20, 7/2020  chimerism = 100% donor in CD3 and CD33 compartments\par 4/27/21 chimerism= 100% donor ( KA02=711%, and CD 3=100%)\par 4/7/22 , 7/2022 chimerism= 100% donor in all compartments \par Current disease status: LANA based on 7/3/19 BMbx and peripheral blood counts \par \par Hold on starting maintenance Vidaza at this time given  GVHD issues \par \par 2) Heme\par No transfusions as pt is practicing [place]\par Counts WNL's...labs reviewed from [facility], oct 2021, 1/2022, 7/2022\par Continue multivitamin and folic acid daily\par \par 3) ID\par Continue ppx:\par - Acyclovir 400 mg\par - Bactrim- take 1 tablet daily\par Voriconazole resumed 1/23/20 but subsequently HELD as of 2/19/20 for transaminitis\par Post transplant restrictions reviewed - in light of covid-19, [facility] recs reviewed, strict adherence to restriction reinforced\par \par 4/7/22 CMV PCR not detected, continue to monitor PCR weekly \par \par 4) GVHD\par Acute GVHD: Skin 0   Liver 0   GI 0 - overall grade 0\par Chronic GVHD: mouth score 0,  lung 1, joint 0,  - mild extensive \par \par Prednisone d/c'd 12/22/19\par Decrease FK  to 1 mg in am and 0.5 mg in PM on 7/14/22..today decrease to 0.5 mg bid\par To consider addition of Jakafi if GVHD symptoms worsen with taper\par See GVHD history below\par \par Date of onset of acute GVHD 8/1/19 \par - On 7/29/19 placed on prednisone 20 mg daily for erythematous rash of face as stated by patient.\par - 8/1/19: Erythematous/hyperpigmented rash 38% BSA- rash of face, chest, back, bilateral upper and lower extremities. Patient was placed on prednisone 20 mg daily as of 7/29/19. Instructed to increase prednisone on 8/1/19 to 40 mg daily on 8/1, 8/2, 8/3. Then decreased to prednisone 20 mg daily. Tacrolimus restarted on 8/1/19 at 0.5 mg BID. Lidex cream BID to body and hydrocortisone cream to face BID. Acute GVHD SKin 2 Liver 0 GI 0- overall grade 1\par - 8/7/19: Erythematous/hyperpigmented rash 43% BSA- rash of face, neck, chest, back, abdomen, bilateral upper and lower extremities. Increased prednisone 20mg daily to 40 mg daily. Lidex cream BID to entire body. FK at 0.5 mg BID. May have to increase FK dose if rash worsens. Acute GVHD SKin 2 Liver 0 GI 0- overall grade 1\par - 8/14/19: Erythematous rash of face. Hyperpigmented rash of chest, neck, abdomen, bilateral upper and lower extremities and back. Skin rash extent 42% BSA. Currently on prednisone 40 mg daily and lidex cream BID. Increased FK to 0.5 mg Am and 1 mg PM. \par - 9/26/19: Skin hyperpigmentation and facial hypopigmentation, but improvement in erythema from facial rash. Some oral sores using dex rinse 4 times a day. Increased FK to 1 mg BID as of 9/26/19. Continue prednisone 30 mg daily. Decrease prednisone by 5 mg every two weeks. Acute GVHD Skin 2 Liver 0 GI 0- overall grade 1. BSA 42%. \par - 12/27/19: no acute GVHD. Chronic GVHD: skin score 3 (>50% BSA), eyes score 3 (requiring scleral lenses), mouth score 2 - overall severe extensive chronic GVHD. Off prednisone as of 12/22/19. Continue FK 1.5mg bid and Decadron swish/spit qid, moisturize 2-3x/day \par - 1/23/20: no acute GVHD. Chronic GVHD: skin 3 (>50% BSA), eyes score 1, mouth score 1 - overall severe extensive chronic GVHD. Continue 1.5mg bid. Continue PRN Decadron swish/spit, instructed to avoid swallowing to prevent systemic absorption of steroid. Moisturize 2-3x/day\par - 3/10/20: no acute GVHD. Chronic GVHD eyes score 3, lung score 2, genital tract score 1 - overall severe extensive chronic GVHD. FK increased to 1mg bid and initiated FAM therapy as below for lung GVHD\par - 4/9/20: no acute GVHD. Chronic GVHD eyes score 2, lung score 2, genital tract score 1 - overall severe extensive chronic GVHD. Continue FK 1mg bid and FAM therapy\par - 5/6/20: no acute GVHD. Chronic GVHD mouth score 1, eyes score 2, lungs score 2, genitalia score 1 - overall severe extensive chronic GVHD. SOB on exertion somewhat improved. Continue FK 1mg bid and FAM therapy\par 6/10/20: no acute GVHD. Chronic GVHD mouth score 1, eyes score 2, lung score 2, genitalia score 1 - overall severe extensive chronic GVHD. Worsening mucositis, FK increased to 1.5mg bid \par \par cGVHD eyes\par No longer wearing scleral lenses at this time\par Continue PRN artificial tears and Systane ophthalmic ointment\par Continue f/u with optho\par \par cGVHD mouth\par improved\par Continue Decadron rinse prn\par Continue to monitor closely \par Resolved\par \par cGVHD lung??\par 3/10/20 PFT with FEV1 of 48%\par (Repeated outpatient  4/2021- Nor esults available for review_ \par 5/6/20 CT chest: mild diffuse mosaic attenuation pattern to the lungs. ?BOOP, air trapping, small vessel disease. To consider repeat CT imaging in 3-4mo\par PFT repeated on 4/8/21: Pulmonary function test are severe obstruction with no bronchodilatation response\par Continue FAM therapy, initiated 3/10/20 (pt already on monteleukast for asthma/allergies) \par - Flovent inhaler 440 mcg bid\par - Azithromycin 250 mg- take one tablet Monday/Wednesday/Friday\par - Monteleukast 10 mg daily \par -f/u pulm eval and pft..improved \par \par cGVHD vaginal area..\par Sensation of skin tightening and dryness of vaginal area \par May use lubricant to gently massage external labia to soften tissue\par using dilators\par Encouraged f/u with GYN after covid-19 epidemic resolves\par Resolved \par \par cGVHD joints\par Noted 7/15/20; pain causing limited ROM of shoulders, elbows, wrists, finger joints. Unable to pick things up off of the floor without pain..ROM improved\par Continue to monitor closely \par \par 5) GI\par Continue ppx:\par - Ursodiol 300 mg bid\par - Protonix 40 mg daily \par PRN Zofran and Reglan for nausea\par \par 6) Other \par Hypomagnesemia - likely 2/2 tacrolimus, continue magnesium oxide 400 mg bid \par GYN - LMP November 2019, continue Sprintec daily, instructed to wear condoms if resuming sexual activity.\par HTN - continue amlodipine 5mg daily, BP remains WNL\par Warts - likely viral warts on hand in setting of immunosuppression, continue to monitor closely. RESOLVED.\par Health maintenance - 1yr post transplant ECHO done 3/10/20 WNL, EF = 60%\par \par 8) Plan/Dispo\par Hold off on Vidaza maintenance given GVHD issues\par Pt educated regarding plan of care, all questions/concerns addressed\par Instructed to contact our office with fever or new/worsening symptoms \par Medications sent to mail away pharmacy prn\par s/p covid moderna X3\par started discussion of post transplant childhood boosters...schedule given to pt....will revisit in 6 months with further tacro taper\par \par

## 2022-10-24 NOTE — REVIEW OF SYSTEMS
[Negative] : Heme/Lymph [Eye Pain] : no eye pain [Red Eyes] : eyes not red [Dry Eyes] : no dryness of the eyes [Vision Problems] : no vision problems [Shortness Of Breath] : no shortness of breath [Wheezing] : no wheezing [Cough] : no cough [SOB on Exertion] : no shortness of breath during exertion [Joint Pain] : no joint pain [Joint Stiffness] : no joint stiffness [Muscle Pain] : no muscle pain [FreeTextEntry6] : improved

## 2022-10-24 NOTE — HISTORY OF PRESENT ILLNESS
[de-identified] : Ms. 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 Of note patient is a Alevism and cannot receive blood products. \par \par Patient was noted to be newly leukopenic to WBC 1.7 during preoperative evaluation in anticipation of right ankle ligament repair. Bone marrow aspirate and biopsy at [facility] on 12/2/2017 revealed AML with some suspicion for APL. She was transferred to [facility] for further management. Peripheral counts from around this time are not available. \par \par Repeat marrow on 12/26/2017 showed 90% blasts with myeloid mutation arrest in a 40-50% cellular marrow. On flow blasts were negative for CD34 and HLA-DR; positive for CD38, CD58, CD33, and MPO, and partially positive for CD15, CD11b, , and CD64. CD45 was reported as dim. Karyotype showed +21 in 3 of 21 metaphases, but no t(15:17) was detected on karyotype of FISH. Molecular studies showed mutations in NPM1 (45%0, IDH1 (R132S; 39%), PTPN1 (23%), and CSF3R (5%) genes. There was lingering diagnostic uncertainty due to a largely aspicular and hypocellular specimen, and thus a marrow was repeated on 1/2/2018, confirming AML.\par \par On 1/4/2018, Ms. Gary began 7+3 with Idarubicin. Her induction course was complicated by DIC, neutropenic fever/ Klebsiella PNA, and bilateral retinal hemorrhage. She did not receive blood products and was instead supported with Nplate, Procrit, IV iron, Amicar, and Lupron. After achieving count stability, the patient was discharged; she was not deemed a candidate for further therapy given inability to receive transfusions and a day 14 marrow was not obtained. She subsequently saw Dr. [place] at [facility]. He obtained a recovery marrow on 2/13/2018 (day +40), which was 30% cellular with 45% blasts. 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 Cytogenetics and FISH were normal. \par \par She then established care with Dr. Christie for consideration of salvage therapy with IDH1 inhibition given the risks of additional chemotherapy without blood products support. Initial marrow at [facility], on 3/1/2018, showed 69% blasts. [employer] panel confirmed IDH1 R132S, NPM1 [id number], and CSF3R D810G mutations. She was started on Ivosidenib on [facility] protocol  and had completed four cycles to date (6/28/2018 = C5D1). Marrow blasts increased to 85% on 4/30 but declined to 36% by 5/31. A  bone marrow aspirate and biopsy  was obtained  (6/28/2018); preliminary results show further responding disease with 10.3% blasts by flow cytometry. Subsequent marrow in oct showed 5% blasts...she was then started on vidazza plus venetoclax. She completed 2 cycles...f/u marrow with 4% blasts.....\par \par Patient underwent an AlloPSCT sister MRD (10/10) on 2/27/19, hospital course as follows:\par Ms. 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 She is a Alevism and cannot receive blood products. \par \par Ms. Nickerson's donor is her sister.  She is a 10/10 match. Both donor and patient are CMV +. She had completed all pre testing needed for transplant.  She will \par start reduced intensity Fludarabine/Busulfan chemo regimen today. \par \par Upon admission, a right AC PICC line was placed in IR. Ms. Gary received IV hydration, pain management, antiemetics, anxiolysis, and antibacterial / antiviral / antifungal / PCP / GI and VOD (SOS) prophylaxis. Labs were monitored every other day, and she received electrolyte repletion as needed. She also received vitamin K, vitamin B, supplemental iron, vitamin C and procrit as directed by the institutional bloodless transplant policy. \par \par On 2/27/19, Ms. Gary received 363 mLs of fresh, apheresed, mobilized, related allogeneic HPC over 80 minutes. Cell counts as follows: \par Total MNCs ( x 10^8/kg): 6.21 \par CD 34+Cells ( x 10^6/kg): 3.37 \par CD3+ Cells ( x 10^7/kg): 12.46 \par Cell Viability (%): 100% \par \par Engraftment was noted on 3/11/19. Ms. Gary did not receive zarxio due to her having minimal residual disease on admission, and a reduced intensity preparatory regimen to avoid stimulating myeloblasts. \par \par Ms. Gary had a relatively uncomplicated transplant course. She did experience menses, which was suppressed with provera. She also had pancytopenia related to the high dose chemotherapy preparative regimen. She also experienced grade 1 oral mucositis, and grade 2 GI mucositis also related to the chemotherapy preparative regimen. Both were treated with supportive care. \par \par Currently, Ms. Gary is stable for discharge home with outpatient follow up at the [facility].  [de-identified] : \par 4/27/21\par Pt reports feeling well\par She is s/p PFTs at local facility \par She denies any exacerbations with GVHD to the skin \par REports continued dryness to the eyes and many  dental carries\par No mouth sores or dysphagia \par DU with climbing stairs.  No respiratory infection \par Stable weight/appetite \par Arthralgias to the bilateral wrists. \par Wants to get Moderna Vaccine \par \par On 8/17//21, verbal consent obtained for today's telehealth visit. Nany was in her home today during out visit. Overall patient is well and offers no acute concerns. Currently on FK 2 mg BID. States when tacrolimus was tapered recently she had pruritus which has resolved. Admits has not needed her scleral lenses recently. Applies eye drops multiple times a day. Complains of improved dyspnea on exertion and wrist  pain. Recently followed up with neurologist to rule out carpal tunnel syndrome.  Denies fever, chills, nausea, vomiting, diarrhea, rash, mouth sores or any signs of active bleeding. Remains compliant with post transplant medications. \par \par 10/19/21 No new issues....in good spirits.....sees pulm....on [id number]....received 3rd moderna vaccine on sept 1\par \par 1/19/22 Pt agrees to tele health from home...overall improving...occ du...on tacro1.5 mg bid...\par \par 4/7/22 Here for f/u..overall well..seen by pulmonary md close to home...skin, sob stable to improved...hypopigmentation noted\par \par On 7/14/22, patient consents for today's telehealth appointment. Overall well with no acute concerns. Currently on FK 1 mg BID. States when tacrolimus dose was decreased recently complains of pruritus but, denies rash. Denies fever, chills, nausea, vomiting, diarrhea, mouth sores, dysuria or any signs of active bleeding. Denies chest pain or B/L LE edema. Remains compliant with medications as prescribed. \par \par 10/18/22 Here for f/u..on fk taper..no worsening sxs

## 2022-10-24 NOTE — PHYSICAL EXAM
[Restricted in physically strenuous activity but ambulatory and able to carry out work of a light or sedentary nature] : Status 1- Restricted in physically strenuous activity but ambulatory and able to carry out work of a light or sedentary nature, e.g., light house work, office work [Normal] : affect appropriate [] :  [No active (erythematous_ GVHD rash)] : Skin: No active (erythematous_ GVHD rash) [< 2 mg/dl] : Liver: < 2 mg/dl [No or intermittent] : Upper GI: No or intermittent nausea, vomiting or anorexia [<500 ml/day or < 3 episodes/day] : Lower GI (stool output/day): <500 ml/day or <3 episodes/day [FEV1 60-79%] : Score 1 [No symptoms] : Score 0 [Mild] : Mild [Extensive] : Extensive [No] : No [Yes] : Yes [de-identified] : hypo / hyper pigmented  [FreeTextEntry1] : 02/27/2019 [FreeTextEntry1] : Pt is here for his Physical exam  [de-identified] : 34 year old male with no significant past medical history presenting for an annual physical exam. Patient in the past had FUO which has resolved. Patient has no other complaints and is thinking of going back to school for computer science and needs titers.

## 2022-10-25 LAB
CD3 AND CD33 ENRICHMENT: NORMAL
ENGRAFTMNET-POST: NORMAL

## 2022-10-31 ENCOUNTER — NON-APPOINTMENT (OUTPATIENT)
Age: 37
End: 2022-10-31

## 2022-11-25 ENCOUNTER — RX RENEWAL (OUTPATIENT)
Age: 37
End: 2022-11-25

## 2023-05-04 ENCOUNTER — NON-APPOINTMENT (OUTPATIENT)
Age: 38
End: 2023-05-04

## 2023-05-16 ENCOUNTER — OUTPATIENT (OUTPATIENT)
Dept: OUTPATIENT SERVICES | Facility: HOSPITAL | Age: 38
LOS: 1 days | Discharge: HOME | End: 2023-05-16

## 2023-05-16 DIAGNOSIS — C92.00 ACUTE MYELOBLASTIC LEUKEMIA, NOT HAVING ACHIEVED REMISSION: ICD-10-CM

## 2023-05-16 DIAGNOSIS — Z90.3 ACQUIRED ABSENCE OF STOMACH [PART OF]: Chronic | ICD-10-CM

## 2023-05-17 ENCOUNTER — RESULT REVIEW (OUTPATIENT)
Age: 38
End: 2023-05-17

## 2023-05-17 ENCOUNTER — APPOINTMENT (OUTPATIENT)
Dept: HEMATOLOGY ONCOLOGY | Facility: CLINIC | Age: 38
End: 2023-05-17
Payer: COMMERCIAL

## 2023-05-17 ENCOUNTER — APPOINTMENT (OUTPATIENT)
Dept: HEMATOLOGY ONCOLOGY | Facility: CLINIC | Age: 38
End: 2023-05-17

## 2023-05-17 VITALS
WEIGHT: 188.91 LBS | BODY MASS INDEX: 34.55 KG/M2 | RESPIRATION RATE: 16 BRPM | TEMPERATURE: 96.9 F | SYSTOLIC BLOOD PRESSURE: 147 MMHG | DIASTOLIC BLOOD PRESSURE: 98 MMHG | HEART RATE: 113 BPM | OXYGEN SATURATION: 99 %

## 2023-05-17 LAB
BASOPHILS # BLD AUTO: 0.06 K/UL — SIGNIFICANT CHANGE UP (ref 0–0.2)
BASOPHILS NFR BLD AUTO: 0.9 % — SIGNIFICANT CHANGE UP (ref 0–2)
EOSINOPHIL # BLD AUTO: 0.35 K/UL — SIGNIFICANT CHANGE UP (ref 0–0.5)
EOSINOPHIL NFR BLD AUTO: 5.1 % — SIGNIFICANT CHANGE UP (ref 0–6)
HCT VFR BLD CALC: 38.2 % — SIGNIFICANT CHANGE UP (ref 34.5–45)
HGB BLD-MCNC: 12.8 G/DL — SIGNIFICANT CHANGE UP (ref 11.5–15.5)
IMM GRANULOCYTES NFR BLD AUTO: 0.1 % — SIGNIFICANT CHANGE UP (ref 0–0.9)
LYMPHOCYTES # BLD AUTO: 2.03 K/UL — SIGNIFICANT CHANGE UP (ref 1–3.3)
LYMPHOCYTES # BLD AUTO: 29.4 % — SIGNIFICANT CHANGE UP (ref 13–44)
MCHC RBC-ENTMCNC: 29.8 PG — SIGNIFICANT CHANGE UP (ref 27–34)
MCHC RBC-ENTMCNC: 33.5 G/DL — SIGNIFICANT CHANGE UP (ref 32–36)
MCV RBC AUTO: 89 FL — SIGNIFICANT CHANGE UP (ref 80–100)
MONOCYTES # BLD AUTO: 0.83 K/UL — SIGNIFICANT CHANGE UP (ref 0–0.9)
MONOCYTES NFR BLD AUTO: 12 % — SIGNIFICANT CHANGE UP (ref 2–14)
NEUTROPHILS # BLD AUTO: 3.62 K/UL — SIGNIFICANT CHANGE UP (ref 1.8–7.4)
NEUTROPHILS NFR BLD AUTO: 52.5 % — SIGNIFICANT CHANGE UP (ref 43–77)
NRBC # BLD: 0 /100 WBCS — SIGNIFICANT CHANGE UP (ref 0–0)
PLATELET # BLD AUTO: 417 K/UL — HIGH (ref 150–400)
RBC # BLD: 4.29 M/UL — SIGNIFICANT CHANGE UP (ref 3.8–5.2)
RBC # FLD: 14.6 % — HIGH (ref 10.3–14.5)
TACROLIMUS SERPL-MCNC: <2 NG/ML
WBC # BLD: 6.9 K/UL — SIGNIFICANT CHANGE UP (ref 3.8–10.5)
WBC # FLD AUTO: 6.9 K/UL — SIGNIFICANT CHANGE UP (ref 3.8–10.5)

## 2023-05-17 PROCEDURE — 99215 OFFICE O/P EST HI 40 MIN: CPT

## 2023-05-17 NOTE — ASSESSMENT
[FreeTextEntry1] : Ms. Gary is a 38 years old female [employer] with AML with the following comorbidities being managed:\par high complexity decision making\par \par 1) AML\par S/p HLA 10/10 allo sib (sister) PBSCT on 2/27/19\par 7/3/19 BMbx: LANA, normal female karyotype\par 11/21/19 chimerism = 99% donor (CD33 = 100% and CD3 = 83% donor)\par 12/27/19 chimerism = 98% donor\par 2/12/20 chimerism = 99% donor (CD33 = 100% and CD3 = 88.8% donor)\par 3/10/20 chimerism = 99% donor (CD33 = 100% and CD3 = 94% donor)\par 5/6/20 chimerism = 100% donor (CD33 = 100% and CD3 = 97% donor)\par 6/10/20, 7/2020  chimerism = 100% donor in CD3 and CD33 compartments\par 4/27/21 chimerism= 100% donor ( JF84=773%, and CD 3=100%)\par 4/7/22 , 7/2022, 10/2022 chimerism= 100% donor in all compartments \par Current disease status: LANA based on 7/3/19 BMbx and peripheral blood counts \par \par Hold on starting maintenance Vidaza at this time given  GVHD issues \par \par 2) Heme\par No transfusions as pt is practicing [employer]\par Counts WNL's...labs reviewed from [facility], oct 2021, 1/2022, 7/2022, 10/2022\par Continue multivitamin and folic acid daily\par \par 3) ID\par Continue ppx:\par - Acyclovir 400 mg\par - Bactrim- take 1 tablet daily\par Voriconazole resumed 1/23/20 but subsequently HELD as of 2/19/20 for transaminitis\par Post transplant restrictions reviewed - in light of covid-19, [facility] recs reviewed, strict adherence to restriction reinforced\par \par 4/7/22 CMV PCR not detected, continue to monitor PCR weekly \par \par 4) GVHD\par Acute GVHD: Skin 0   Liver 0   GI 0 - overall grade 0\par Chronic GVHD: mouth score 0,  lung 0, joint 0,  - \par \par Prednisone d/c'd 12/22/19\par Decrease FK  to 1 mg in am and 0.5 mg in PM on 7/14/22..10/2022 decrease to 0.5 mg bid.....in aug 2023 decrease to bid on even days and qd on odd..\par To consider addition of Jakafi if GVHD symptoms worsen with taper\par See GVHD history below\par \par Date of onset of acute GVHD 8/1/19 \par - On 7/29/19 placed on prednisone 20 mg daily for erythematous rash of face as stated by patient.\par - 8/1/19: Erythematous/hyperpigmented rash 38% BSA- rash of face, chest, back, bilateral upper and lower extremities. Patient was placed on prednisone 20 mg daily as of 7/29/19. Instructed to increase prednisone on 8/1/19 to 40 mg daily on 8/1, 8/2, 8/3. Then decreased to prednisone 20 mg daily. Tacrolimus restarted on 8/1/19 at 0.5 mg BID. Lidex cream BID to body and hydrocortisone cream to face BID. Acute GVHD SKin 2 Liver 0 GI 0- overall grade 1\par - 8/7/19: Erythematous/hyperpigmented rash 43% BSA- rash of face, neck, chest, back, abdomen, bilateral upper and lower extremities. Increased prednisone 20mg daily to 40 mg daily. Lidex cream BID to entire body. FK at 0.5 mg BID. May have to increase FK dose if rash worsens. Acute GVHD SKin 2 Liver 0 GI 0- overall grade 1\par - 8/14/19: Erythematous rash of face. Hyperpigmented rash of chest, neck, abdomen, bilateral upper and lower extremities and back. Skin rash extent 42% BSA. Currently on prednisone 40 mg daily and lidex cream BID. Increased FK to 0.5 mg Am and 1 mg PM. \par - 9/26/19: Skin hyperpigmentation and facial hypopigmentation, but improvement in erythema from facial rash. Some oral sores using dex rinse 4 times a day. Increased FK to 1 mg BID as of 9/26/19. Continue prednisone 30 mg daily. Decrease prednisone by 5 mg every two weeks. Acute GVHD Skin 2 Liver 0 GI 0- overall grade 1. BSA 42%. \par - 12/27/19: no acute GVHD. Chronic GVHD: skin score 3 (>50% BSA), eyes score 3 (requiring scleral lenses), mouth score 2 - overall severe extensive chronic GVHD. Off prednisone as of 12/22/19. Continue FK 1.5mg bid and Decadron swish/spit qid, moisturize 2-3x/day \par - 1/23/20: no acute GVHD. Chronic GVHD: skin 3 (>50% BSA), eyes score 1, mouth score 1 - overall severe extensive chronic GVHD. Continue 1.5mg bid. Continue PRN Decadron swish/spit, instructed to avoid swallowing to prevent systemic absorption of steroid. Moisturize 2-3x/day\par - 3/10/20: no acute GVHD. Chronic GVHD eyes score 3, lung score 2, genital tract score 1 - overall severe extensive chronic GVHD. FK increased to 1mg bid and initiated FAM therapy as below for lung GVHD\par - 4/9/20: no acute GVHD. Chronic GVHD eyes score 2, lung score 2, genital tract score 1 - overall severe extensive chronic GVHD. Continue FK 1mg bid and FAM therapy\par - 5/6/20: no acute GVHD. Chronic GVHD mouth score 1, eyes score 2, lungs score 2, genitalia score 1 - overall severe extensive chronic GVHD. SOB on exertion somewhat improved. Continue FK 1mg bid and FAM therapy\par 6/10/20: no acute GVHD. Chronic GVHD mouth score 1, eyes score 2, lung score 2, genitalia score 1 - overall severe extensive chronic GVHD. Worsening mucositis, FK increased to 1.5mg bid \par \par cGVHD eyes\par No longer wearing scleral lenses at this time\par Continue PRN artificial tears and Systane ophthalmic ointment\par Continue f/u with optho\par \par cGVHD mouth\par improved\par Continue Decadron rinse prn\par Continue to monitor closely \par Resolved\par \par cGVHD lung??\par 3/10/20 PFT with FEV1 of 48%\par (Repeated outpatient  4/2021- Nor esults available for review_ \par 5/6/20 CT chest: mild diffuse mosaic attenuation pattern to the lungs. ?BOOP, air trapping, small vessel disease. To consider repeat CT imaging in 3-4mo\par PFT repeated on 4/8/21: Pulmonary function test are severe obstruction with no bronchodilatation response\par Continue FAM therapy, initiated 3/10/20 (pt already on monteleukast for asthma/allergies) \par - Flovent inhaler 440 mcg bid\par - Azithromycin 250 mg- take one tablet Monday/Wednesday/Friday\par - Monteleukast 10 mg daily \par -f/u pulm eval and pft..improved \par \par cGVHD vaginal area..\par Sensation of skin tightening and dryness of vaginal area \par May use lubricant to gently massage external labia to soften tissue\par using dilators\par Encouraged f/u with GYN after covid-19 epidemic resolves\par Resolved \par \par cGVHD joints\par Noted 7/15/20; pain causing limited ROM of shoulders, elbows, wrists, finger joints. Unable to pick things up off of the floor without pain..ROM improved\par Continue to monitor closely \par \par 5) GI\par Continue ppx:\par - Ursodiol 300 mg bid..stop\par - Protonix 40 mg daily \par PRN Zofran and Reglan for nausea\par \par 6) Other \par Hypomagnesemia - likely 2/2 tacrolimus, continue magnesium oxide 400 mg bid \par GYN - LMP November 2019, continue Sprintec daily, instructed to wear condoms if resuming sexual activity...asked pt to ask gyn about hpv vaccine\par HTN - continue amlodipine 5mg daily, BP remains WNL\par Warts - likely viral warts on hand in setting of immunosuppression, continue to monitor closely. RESOLVED.\par Health maintenance - 1yr post transplant ECHO done 3/10/20 WNL, EF = 60%..well care and cancer screening stressed\par \par 8) Plan/Dispo\par Hold off on Vidaza maintenance given GVHD issues\par Pt educated regarding plan of care, all questions/concerns addressed\par Instructed to contact our office with fever or new/worsening symptoms \par Medications sent to mail away pharmacy prn\par s/p covid moderna X3...last childhood  vaccines due in dec 2023\par started discussion of post transplant childhood boosters...schedule given to pt....will revisit in 6 months with further tacro taper\par \par

## 2023-05-17 NOTE — PHYSICAL EXAM
[Restricted in physically strenuous activity but ambulatory and able to carry out work of a light or sedentary nature] : Status 1- Restricted in physically strenuous activity but ambulatory and able to carry out work of a light or sedentary nature, e.g., light house work, office work [] :  [No active (erythematous_ GVHD rash)] : Skin: No active (erythematous_ GVHD rash) [< 2 mg/dl] : Liver: < 2 mg/dl [No or intermittent] : Upper GI: No or intermittent nausea, vomiting or anorexia [<500 ml/day or < 3 episodes/day] : Lower GI (stool output/day): <500 ml/day or <3 episodes/day [No] : No [Yes] : Yes [Normal] : affect appropriate [No symptoms] : Score 0 [FEV1 = 80%] : Score 0 [de-identified] : hypo / hyper pigmented  [FreeTextEntry1] : 02/27/2019

## 2023-05-17 NOTE — HISTORY OF PRESENT ILLNESS
[de-identified] : Ms. 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 Of note patient is a Christianity and cannot receive blood products. \par \par Patient was noted to be newly leukopenic to WBC 1.7 during preoperative evaluation in anticipation of right ankle ligament repair. Bone marrow aspirate and biopsy at [facility] on 12/2/2017 revealed AML with some suspicion for APL. She was transferred to [place] for further management. Peripheral counts from around this time are not available. \par \par Repeat marrow on 12/26/2017 showed 90% blasts with myeloid mutation arrest in a 40-50% cellular marrow. On flow blasts were negative for CD34 and HLA-DR; positive for CD38, CD58, CD33, and MPO, and partially positive for CD15, CD11b, , and CD64. CD45 was reported as dim. Karyotype showed +21 in 3 of 21 metaphases, but no t(15:17) was detected on karyotype of FISH. Molecular studies showed mutations in NPM1 (45%0, IDH1 (R132S; 39%), PTPN1 (23%), and CSF3R (5%) genes. There was lingering diagnostic uncertainty due to a largely aspicular and hypocellular specimen, and thus a marrow was repeated on 1/2/2018, confirming AML.\par \par On 1/4/2018, Ms. Gary began 7+3 with Idarubicin. Her induction course was complicated by DIC, neutropenic fever/ Klebsiella PNA, and bilateral retinal hemorrhage. She did not receive blood products and was instead supported with Nplate, Procrit, IV iron, Amicar, and Lupron. After achieving count stability, the patient was discharged; she was not deemed a candidate for further therapy given inability to receive transfusions and a day 14 marrow was not obtained. She subsequently saw Dr. [place] Obrien at [facility]. He obtained a recovery marrow on 2/13/2018 (day +40), which was 30% cellular with 45% blasts. 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 Cytogenetics and FISH were normal. \par \par She then established care with Dr. Christie for consideration of salvage therapy with IDH1 inhibition given the risks of additional chemotherapy without blood products support. Initial marrow at [facility], on 3/1/2018, showed 69% blasts. ThunderBolts panel confirmed IDH1 R132S, NPM1 [id number] 12, and CSF3R D810G mutations. She was started on Ivosidenib on [facility] protocol  and had completed four cycles to date (6/28/2018 = C5D1). Marrow blasts increased to 85% on 4/30 but declined to 36% by 5/31. A  bone marrow aspirate and biopsy  was obtained  (6/28/2018); preliminary results show further responding disease with 10.3% blasts by flow cytometry. Subsequent marrow in oct showed 5% blasts...she was then started on vidazza plus venetoclax. She completed 2 cycles...f/u marrow with 4% blasts.....\par \par Patient underwent an AlloPSCT sister MRD (10/10) on 2/27/19, hospital course as follows:\par Ms. 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 She is a Christianity and cannot receive blood products. \par \par Ms. Nickerson's donor is her sister.  She is a 10/10 match. Both donor and patient are CMV +. She had completed all pre testing needed for transplant.  She will \par start reduced intensity Fludarabine/Busulfan chemo regimen today. \par \par Upon admission, a right AC PICC line was placed in IR. Ms. Gary received IV hydration, pain management, antiemetics, anxiolysis, and antibacterial / antiviral / antifungal / PCP / GI and VOD (SOS) prophylaxis. Labs were monitored every other day, and she received electrolyte repletion as needed. She also received vitamin K, vitamin B, supplemental iron, vitamin C and procrit as directed by the institutional bloodless transplant policy. \par \par On 2/27/19, Ms. Gary received 363 mLs of fresh, apheresed, mobilized, related allogeneic HPC over 80 minutes. Cell counts as follows: \par Total MNCs ( x 10^8/kg): 6.21 \par CD 34+Cells ( x 10^6/kg): 3.37 \par CD3+ Cells ( x 10^7/kg): 12.46 \par Cell Viability (%): 100% \par \par Engraftment was noted on 3/11/19. Ms. Gary did not receive zarxio due to her having minimal residual disease on admission, and a reduced intensity preparatory regimen to avoid stimulating myeloblasts. \par \par Ms. Gary had a relatively uncomplicated transplant course. She did experience menses, which was suppressed with provera. She also had pancytopenia related to the high dose chemotherapy preparative regimen. She also experienced grade 1 oral mucositis, and grade 2 GI mucositis also related to the chemotherapy preparative regimen. Both were treated with supportive care. \par \par Currently, Ms. Gary is stable for discharge home with outpatient follow up at the [facility].  [de-identified] : \par 4/27/21\par Pt reports feeling well\par She is s/p PFTs at local facility \par She denies any exacerbations with GVHD to the skin \par REports continued dryness to the eyes and many  dental carries\par No mouth sores or dysphagia \par DU with climbing stairs.  No respiratory infection \par Stable weight/appetite \par Arthralgias to the bilateral wrists. \par Wants to get Moderna Vaccine \par \par On 8/17//21, verbal consent obtained for today's telehealth visit. Nany was in her home today during out visit. Overall patient is well and offers no acute concerns. Currently on FK 2 mg BID. States when tacrolimus was tapered recently she had pruritus which has resolved. Admits has not needed her scleral lenses recently. Applies eye drops multiple times a day. Complains of improved dyspnea on exertion and wrist  pain. Recently followed up with neurologist to rule out carpal tunnel syndrome.  Denies fever, chills, nausea, vomiting, diarrhea, rash, mouth sores or any signs of active bleeding. Remains compliant with post transplant medications. \par \par 10/19/21 No new issues....in good spirits.....sees pulm....on fk506....received 3rd moderna vaccine on sept 1\par \par 1/19/22 Pt agrees to tele health from home...overall improving...occ du...on tacro1.5 mg bid...\par \par 4/7/22 Here for f/u..overall well..seen by pulmonary md close to home...skin, sob stable to improved...hypopigmentation noted\par \par On 7/14/22, patient consents for today's telehealth appointment. Overall well with no acute concerns. Currently on FK 1 mg BID. States when tacrolimus dose was decreased recently complains of pruritus but, denies rash. Denies fever, chills, nausea, vomiting, diarrhea, mouth sores, dysuria or any signs of active bleeding. Denies chest pain or B/L LE edema. Remains compliant with medications as prescribed. \par \par 5/17/23  Here for f/u..on fk taper..no worsening sxs..stable hyperpigmentation of skin, vaginal dryness

## 2023-05-18 LAB
ALBUMIN SERPL ELPH-MCNC: 4 G/DL
ALP BLD-CCNC: 74 U/L
ALT SERPL-CCNC: 6 U/L
ANION GAP SERPL CALC-SCNC: 15 MMOL/L
AST SERPL-CCNC: 15 U/L
BILIRUB SERPL-MCNC: 0.2 MG/DL
BUN SERPL-MCNC: 7 MG/DL
CALCIUM SERPL-MCNC: 9.5 MG/DL
CHLORIDE SERPL-SCNC: 107 MMOL/L
CMV DNA SPEC QL NAA+PROBE: NOT DETECTED IU/ML
CMVPCR LOG: NOT DETECTED LOG10IU/ML
CO2 SERPL-SCNC: 19 MMOL/L
CREAT SERPL-MCNC: 1.11 MG/DL
EGFR: 66 ML/MIN/1.73M2
GLUCOSE SERPL-MCNC: 79 MG/DL
LDH SERPL-CCNC: 218 U/L
MAGNESIUM SERPL-MCNC: 2.1 MG/DL
POTASSIUM SERPL-SCNC: 4.9 MMOL/L
PROT SERPL-MCNC: 6.8 G/DL
SODIUM SERPL-SCNC: 140 MMOL/L

## 2023-05-24 ENCOUNTER — TRANSCRIPTION ENCOUNTER (OUTPATIENT)
Age: 38
End: 2023-05-24

## 2023-05-24 LAB
CD3 AND CD33 ENRICHMENT: NORMAL
ENGRAFTMNET-POST: NORMAL

## 2023-07-25 RX ORDER — TACROLIMUS 1 MG/1
1 CAPSULE ORAL
Qty: 30 | Refills: 1 | Status: DISCONTINUED | COMMUNITY
Start: 2019-03-18 | End: 2023-07-25

## 2023-10-04 ENCOUNTER — NON-APPOINTMENT (OUTPATIENT)
Age: 38
End: 2023-10-04

## 2023-10-04 NOTE — RESULTS/DATA
Retention Suture Text: Retention sutures were placed to support the closure and prevent dehiscence. [FreeTextEntry1] : Today's CBC reviewed with pt

## 2023-11-03 ENCOUNTER — OUTPATIENT (OUTPATIENT)
Dept: OUTPATIENT SERVICES | Facility: HOSPITAL | Age: 38
LOS: 1 days | Discharge: HOME | End: 2023-11-03

## 2023-11-03 DIAGNOSIS — Z90.3 ACQUIRED ABSENCE OF STOMACH [PART OF]: Chronic | ICD-10-CM

## 2023-11-03 DIAGNOSIS — C92.00 ACUTE MYELOBLASTIC LEUKEMIA, NOT HAVING ACHIEVED REMISSION: ICD-10-CM

## 2023-11-03 RX ORDER — FLUOCINONIDE 0.5 MG/G
0.05 CREAM TOPICAL TWICE DAILY
Qty: 2 | Refills: 3 | Status: ACTIVE | COMMUNITY
Start: 2023-11-03 | End: 1900-01-01

## 2023-11-06 ENCOUNTER — RX CHANGE (OUTPATIENT)
Age: 38
End: 2023-11-06

## 2023-11-14 ENCOUNTER — RESULT REVIEW (OUTPATIENT)
Age: 38
End: 2023-11-14

## 2023-11-14 ENCOUNTER — APPOINTMENT (OUTPATIENT)
Dept: HEMATOLOGY ONCOLOGY | Facility: CLINIC | Age: 38
End: 2023-11-14

## 2023-11-14 LAB
ALBUMIN SERPL ELPH-MCNC: 4.1 G/DL
ALP BLD-CCNC: 62 U/L
ALT SERPL-CCNC: 9 U/L
ANION GAP SERPL CALC-SCNC: 11 MMOL/L
AST SERPL-CCNC: 15 U/L
BASOPHILS # BLD AUTO: 0.05 K/UL — SIGNIFICANT CHANGE UP (ref 0–0.2)
BASOPHILS # BLD AUTO: 0.05 K/UL — SIGNIFICANT CHANGE UP (ref 0–0.2)
BASOPHILS NFR BLD AUTO: 0.8 % — SIGNIFICANT CHANGE UP (ref 0–2)
BASOPHILS NFR BLD AUTO: 0.8 % — SIGNIFICANT CHANGE UP (ref 0–2)
BILIRUB SERPL-MCNC: 0.3 MG/DL
BUN SERPL-MCNC: 6 MG/DL
CALCIUM SERPL-MCNC: 9.5 MG/DL
CHLORIDE SERPL-SCNC: 105 MMOL/L
CO2 SERPL-SCNC: 20 MMOL/L
CREAT SERPL-MCNC: 1 MG/DL
EGFR: 75 ML/MIN/1.73M2
EOSINOPHIL # BLD AUTO: 0.26 K/UL — SIGNIFICANT CHANGE UP (ref 0–0.5)
EOSINOPHIL # BLD AUTO: 0.26 K/UL — SIGNIFICANT CHANGE UP (ref 0–0.5)
EOSINOPHIL NFR BLD AUTO: 3.9 % — SIGNIFICANT CHANGE UP (ref 0–6)
EOSINOPHIL NFR BLD AUTO: 3.9 % — SIGNIFICANT CHANGE UP (ref 0–6)
GLUCOSE SERPL-MCNC: 78 MG/DL
HCT VFR BLD CALC: 37.6 % — SIGNIFICANT CHANGE UP (ref 34.5–45)
HCT VFR BLD CALC: 37.6 % — SIGNIFICANT CHANGE UP (ref 34.5–45)
HGB BLD-MCNC: 12.9 G/DL — SIGNIFICANT CHANGE UP (ref 11.5–15.5)
HGB BLD-MCNC: 12.9 G/DL — SIGNIFICANT CHANGE UP (ref 11.5–15.5)
IMM GRANULOCYTES NFR BLD AUTO: 0.2 % — SIGNIFICANT CHANGE UP (ref 0–0.9)
IMM GRANULOCYTES NFR BLD AUTO: 0.2 % — SIGNIFICANT CHANGE UP (ref 0–0.9)
LDH SERPL-CCNC: 240 U/L
LYMPHOCYTES # BLD AUTO: 1.89 K/UL — SIGNIFICANT CHANGE UP (ref 1–3.3)
LYMPHOCYTES # BLD AUTO: 1.89 K/UL — SIGNIFICANT CHANGE UP (ref 1–3.3)
LYMPHOCYTES # BLD AUTO: 28.5 % — SIGNIFICANT CHANGE UP (ref 13–44)
LYMPHOCYTES # BLD AUTO: 28.5 % — SIGNIFICANT CHANGE UP (ref 13–44)
MAGNESIUM SERPL-MCNC: 2.1 MG/DL
MCHC RBC-ENTMCNC: 30.7 PG — SIGNIFICANT CHANGE UP (ref 27–34)
MCHC RBC-ENTMCNC: 30.7 PG — SIGNIFICANT CHANGE UP (ref 27–34)
MCHC RBC-ENTMCNC: 34.3 G/DL — SIGNIFICANT CHANGE UP (ref 32–36)
MCHC RBC-ENTMCNC: 34.3 G/DL — SIGNIFICANT CHANGE UP (ref 32–36)
MCV RBC AUTO: 89.5 FL — SIGNIFICANT CHANGE UP (ref 80–100)
MCV RBC AUTO: 89.5 FL — SIGNIFICANT CHANGE UP (ref 80–100)
MONOCYTES # BLD AUTO: 0.53 K/UL — SIGNIFICANT CHANGE UP (ref 0–0.9)
MONOCYTES # BLD AUTO: 0.53 K/UL — SIGNIFICANT CHANGE UP (ref 0–0.9)
MONOCYTES NFR BLD AUTO: 8 % — SIGNIFICANT CHANGE UP (ref 2–14)
MONOCYTES NFR BLD AUTO: 8 % — SIGNIFICANT CHANGE UP (ref 2–14)
NEUTROPHILS # BLD AUTO: 3.88 K/UL — SIGNIFICANT CHANGE UP (ref 1.8–7.4)
NEUTROPHILS # BLD AUTO: 3.88 K/UL — SIGNIFICANT CHANGE UP (ref 1.8–7.4)
NEUTROPHILS NFR BLD AUTO: 58.6 % — SIGNIFICANT CHANGE UP (ref 43–77)
NEUTROPHILS NFR BLD AUTO: 58.6 % — SIGNIFICANT CHANGE UP (ref 43–77)
NRBC # BLD: 0 /100 WBCS — SIGNIFICANT CHANGE UP (ref 0–0)
NRBC # BLD: 0 /100 WBCS — SIGNIFICANT CHANGE UP (ref 0–0)
PLATELET # BLD AUTO: 432 K/UL — HIGH (ref 150–400)
PLATELET # BLD AUTO: 432 K/UL — HIGH (ref 150–400)
POTASSIUM SERPL-SCNC: 4.8 MMOL/L
PROT SERPL-MCNC: 7.1 G/DL
RBC # BLD: 4.2 M/UL — SIGNIFICANT CHANGE UP (ref 3.8–5.2)
RBC # BLD: 4.2 M/UL — SIGNIFICANT CHANGE UP (ref 3.8–5.2)
RBC # FLD: 15.6 % — HIGH (ref 10.3–14.5)
RBC # FLD: 15.6 % — HIGH (ref 10.3–14.5)
SODIUM SERPL-SCNC: 136 MMOL/L
WBC # BLD: 6.62 K/UL — SIGNIFICANT CHANGE UP (ref 3.8–10.5)
WBC # BLD: 6.62 K/UL — SIGNIFICANT CHANGE UP (ref 3.8–10.5)
WBC # FLD AUTO: 6.62 K/UL — SIGNIFICANT CHANGE UP (ref 3.8–10.5)
WBC # FLD AUTO: 6.62 K/UL — SIGNIFICANT CHANGE UP (ref 3.8–10.5)

## 2023-11-15 ENCOUNTER — TRANSCRIPTION ENCOUNTER (OUTPATIENT)
Age: 38
End: 2023-11-15

## 2023-11-15 LAB
CMV DNA SPEC QL NAA+PROBE: NOT DETECTED IU/ML
CMVPCR LOG: NOT DETECTED LOG10IU/ML
TACROLIMUS SERPL-MCNC: <2 NG/ML

## 2023-11-20 LAB
CD3 AND CD33 ENRICHMENT: NORMAL
ENGRAFTMNET-POST: NORMAL

## 2023-11-30 ENCOUNTER — APPOINTMENT (OUTPATIENT)
Dept: HEMATOLOGY ONCOLOGY | Facility: CLINIC | Age: 38
End: 2023-11-30
Payer: COMMERCIAL

## 2023-11-30 ENCOUNTER — APPOINTMENT (OUTPATIENT)
Dept: HEMATOLOGY ONCOLOGY | Facility: CLINIC | Age: 38
End: 2023-11-30

## 2023-11-30 DIAGNOSIS — D89.811 CHRONIC GRAFT-VERSUS-HOST DISEASE: ICD-10-CM

## 2023-11-30 PROCEDURE — 99443: CPT

## 2023-11-30 RX ORDER — URSODIOL 300 MG/1
300 CAPSULE ORAL TWICE DAILY
Qty: 180 | Refills: 1 | Status: DISCONTINUED | COMMUNITY
Start: 2019-03-18 | End: 2023-11-30

## 2023-12-01 ENCOUNTER — TRANSCRIPTION ENCOUNTER (OUTPATIENT)
Age: 38
End: 2023-12-01

## 2023-12-05 PROBLEM — D89.811 CHRONIC GVHD: Status: ACTIVE | Noted: 2019-12-27

## 2023-12-21 ENCOUNTER — NON-APPOINTMENT (OUTPATIENT)
Age: 38
End: 2023-12-21

## 2024-01-04 ENCOUNTER — NON-APPOINTMENT (OUTPATIENT)
Age: 39
End: 2024-01-04

## 2024-01-04 RX ORDER — SULFAMETHOXAZOLE AND TRIMETHOPRIM 800; 160 MG/1; MG/1
800-160 TABLET ORAL
Qty: 90 | Refills: 3 | Status: ACTIVE | COMMUNITY
Start: 2022-04-07 | End: 1900-01-01

## 2024-01-17 ENCOUNTER — NON-APPOINTMENT (OUTPATIENT)
Age: 39
End: 2024-01-17

## 2024-02-06 ENCOUNTER — NON-APPOINTMENT (OUTPATIENT)
Age: 39
End: 2024-02-06

## 2024-04-02 ENCOUNTER — TRANSCRIPTION ENCOUNTER (OUTPATIENT)
Age: 39
End: 2024-04-02

## 2024-04-02 RX ORDER — AZITHROMYCIN 250 MG/1
250 TABLET, FILM COATED ORAL
Qty: 45 | Refills: 3 | Status: ACTIVE | COMMUNITY
Start: 2020-03-10 | End: 1900-01-01

## 2024-04-11 ENCOUNTER — OUTPATIENT (OUTPATIENT)
Dept: OUTPATIENT SERVICES | Facility: HOSPITAL | Age: 39
LOS: 1 days | Discharge: HOME | End: 2024-04-11

## 2024-04-11 DIAGNOSIS — C92.00 ACUTE MYELOBLASTIC LEUKEMIA, NOT HAVING ACHIEVED REMISSION: ICD-10-CM

## 2024-04-11 DIAGNOSIS — Z90.3 ACQUIRED ABSENCE OF STOMACH [PART OF]: Chronic | ICD-10-CM

## 2024-04-15 ENCOUNTER — NON-APPOINTMENT (OUTPATIENT)
Age: 39
End: 2024-04-15

## 2024-04-16 ENCOUNTER — APPOINTMENT (OUTPATIENT)
Dept: HEMATOLOGY ONCOLOGY | Facility: CLINIC | Age: 39
End: 2024-04-16
Payer: COMMERCIAL

## 2024-04-16 ENCOUNTER — RESULT REVIEW (OUTPATIENT)
Age: 39
End: 2024-04-16

## 2024-04-16 VITALS
SYSTOLIC BLOOD PRESSURE: 135 MMHG | BODY MASS INDEX: 34.47 KG/M2 | DIASTOLIC BLOOD PRESSURE: 93 MMHG | TEMPERATURE: 98.1 F | RESPIRATION RATE: 16 BRPM | WEIGHT: 188.5 LBS | HEART RATE: 104 BPM | OXYGEN SATURATION: 98 %

## 2024-04-16 DIAGNOSIS — D89.813 GRAFT-VERSUS-HOST DISEASE, UNSPECIFIED: ICD-10-CM

## 2024-04-16 LAB
ALBUMIN SERPL ELPH-MCNC: 3.9 G/DL
ALP BLD-CCNC: 75 U/L
ALT SERPL-CCNC: 9 U/L
ANION GAP SERPL CALC-SCNC: 11 MMOL/L
AST SERPL-CCNC: 14 U/L
BASOPHILS # BLD AUTO: 0.06 K/UL — SIGNIFICANT CHANGE UP (ref 0–0.2)
BASOPHILS NFR BLD AUTO: 0.8 % — SIGNIFICANT CHANGE UP (ref 0–2)
BILIRUB SERPL-MCNC: 0.2 MG/DL
BUN SERPL-MCNC: 9 MG/DL
CALCIUM SERPL-MCNC: 9.3 MG/DL
CD16+CD56+ CELLS # BLD: 224 CELLS/UL
CD16+CD56+ CELLS NFR BLD: 8 %
CD19 CELLS NFR BLD: 242 CELLS/UL
CD3 CELLS # BLD: 2185 CELLS/UL
CD3 CELLS NFR BLD: 81 %
CD3+CD4+ CELLS # BLD: 1131 CELLS/UL
CD3+CD4+ CELLS NFR BLD: 41 %
CD3+CD4+ CELLS/CD3+CD8+ CLL SPEC: 1.06 RATIO
CD3+CD8+ CELLS # SPEC: 1067 CELLS/UL
CD3+CD8+ CELLS NFR BLD: 39 %
CELLS.CD3-CD19+/CELLS IN BLOOD: 9 %
CHLORIDE SERPL-SCNC: 105 MMOL/L
CO2 SERPL-SCNC: 20 MMOL/L
CREAT SERPL-MCNC: 1.07 MG/DL
EGFR: 69 ML/MIN/1.73M2
EOSINOPHIL # BLD AUTO: 0.17 K/UL — SIGNIFICANT CHANGE UP (ref 0–0.5)
EOSINOPHIL NFR BLD AUTO: 2.4 % — SIGNIFICANT CHANGE UP (ref 0–6)
GLUCOSE SERPL-MCNC: 87 MG/DL
HCT VFR BLD CALC: 36.6 % — SIGNIFICANT CHANGE UP (ref 34.5–45)
HGB BLD-MCNC: 12.3 G/DL — SIGNIFICANT CHANGE UP (ref 11.5–15.5)
IMM GRANULOCYTES NFR BLD AUTO: 0.3 % — SIGNIFICANT CHANGE UP (ref 0–0.9)
LDH SERPL-CCNC: 222 U/L
LYMPHOCYTES # BLD AUTO: 2.95 K/UL — SIGNIFICANT CHANGE UP (ref 1–3.3)
LYMPHOCYTES # BLD AUTO: 40.9 % — SIGNIFICANT CHANGE UP (ref 13–44)
MAGNESIUM SERPL-MCNC: 2.2 MG/DL
MCHC RBC-ENTMCNC: 30 PG — SIGNIFICANT CHANGE UP (ref 27–34)
MCHC RBC-ENTMCNC: 33.6 G/DL — SIGNIFICANT CHANGE UP (ref 32–36)
MCV RBC AUTO: 89.3 FL — SIGNIFICANT CHANGE UP (ref 80–100)
MONOCYTES # BLD AUTO: 0.54 K/UL — SIGNIFICANT CHANGE UP (ref 0–0.9)
MONOCYTES NFR BLD AUTO: 7.5 % — SIGNIFICANT CHANGE UP (ref 2–14)
NEUTROPHILS # BLD AUTO: 3.47 K/UL — SIGNIFICANT CHANGE UP (ref 1.8–7.4)
NEUTROPHILS NFR BLD AUTO: 48.1 % — SIGNIFICANT CHANGE UP (ref 43–77)
NRBC # BLD: 0 /100 WBCS — SIGNIFICANT CHANGE UP (ref 0–0)
PLATELET # BLD AUTO: 481 K/UL — HIGH (ref 150–400)
POTASSIUM SERPL-SCNC: 4.2 MMOL/L
PROT SERPL-MCNC: 7 G/DL
RBC # BLD: 4.1 M/UL — SIGNIFICANT CHANGE UP (ref 3.8–5.2)
RBC # FLD: 14.5 % — SIGNIFICANT CHANGE UP (ref 10.3–14.5)
SODIUM SERPL-SCNC: 136 MMOL/L
TACROLIMUS SERPL-MCNC: <2 NG/ML
WBC # BLD: 7.21 K/UL — SIGNIFICANT CHANGE UP (ref 3.8–10.5)
WBC # FLD AUTO: 7.21 K/UL — SIGNIFICANT CHANGE UP (ref 3.8–10.5)

## 2024-04-16 PROCEDURE — 99215 OFFICE O/P EST HI 40 MIN: CPT

## 2024-04-16 RX ORDER — RUXOLITINIB 10 MG/1
10 TABLET ORAL
Qty: 60 | Refills: 3 | Status: ACTIVE | COMMUNITY
Start: 2024-04-16 | End: 1900-01-01

## 2024-04-16 NOTE — ASSESSMENT
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 Gary is a 38 years old female Christian with AML with the following comorbidities being managed:  high complexity decision making  1) AML S/p HLA 10/10 allo sib (sister) PBSCT on 2/27/19 7/3/19 BMbx: LANA, normal female karyotype 11/21/19 chimerism = 99% donor (CD33 = 100% and CD3 = 83% donor) 12/27/19 chimerism = 98% donor 2/12/20 chimerism = 99% donor (CD33 = 100% and CD3 = 88.8% donor) 3/10/20 chimerism = 99% donor (CD33 = 100% and CD3 = 94% donor) 5/6/20 chimerism = 100% donor (CD33 = 100% and CD3 = 97% donor) 6/10/20, 7/2020 chimerism = 100% donor in CD3 and CD33 compartments 4/27/21 chimerism= 100% donor ( RD33=839%, and CD 3=100%) 4/7/22 , 7/2022, 10/2022,  8/2023 chimerism= 100% donor in all compartments Current disease status: LANA based on 7/3/19 BMbx and peripheral blood counts, chimerism   2) Heme No transfusions as pt is practicing Christian Counts WNL's...labs reviewed from [facility], oct 2021, 1/2022, 7/2022, 10/2022, 10/2023 Continue multivitamin and folic acid daily  3) ID Continue ppx: - Acyclovir 400 mg - Bactrim- take 1 tablet daily Voriconazole resumed 1/23/20 but subsequently HELD as of 2/19/20 for transaminitis Post transplant restrictions reviewed - in light of covid-19, CDC recs reviewed, strict adherence to restriction reinforced  4/7/22 CMV PCR not detected, continue to monitor PCR weekly  4) GVHD Acute GVHD: Skin 0 Liver 0 GI 0 - overall grade 0 Chronic GVHD: mouth score 0, lung 1, joint 0, -skin 1, eye 1..mod exyensive  Prednisone d/c'd 12/22/19 Decrease FK to 1 mg in am and 0.5 mg in PM on 7/14/22..10/2022 decrease to 0.5 mg bid.....in aug 2023 decrease to bid on even days and qd on odd.....dose increased b/c of worsening sx..hold taper To consider addition of Jakafi if GVHD symptoms worsen with taper  See GVHD history below  Date of onset of acute GVHD 8/1/19 - On 7/29/19 placed on prednisone 20 mg daily for erythematous rash of face as stated by patient. - 8/1/19: Erythematous/hyperpigmented rash 38% BSA- rash of face, chest, back, bilateral upper and lower extremities. Patient was placed on prednisone 20 mg daily as of 7/29/19. Instructed to increase prednisone on 8/1/19 to 40 mg daily on 8/1, 8/2, 8/3. Then decreased to prednisone 20 mg daily. Tacrolimus restarted on 8/1/19 at 0.5 mg BID. Lidex cream BID to body and hydrocortisone cream to face BID. Acute GVHD SKin 2 Liver 0 GI 0- overall grade 1 - 8/7/19: Erythematous/hyperpigmented rash 43% BSA- rash of face, neck, chest, back, abdomen, bilateral upper and lower extremities. Increased prednisone 20mg daily to 40 mg daily. Lidex cream BID to entire body. FK at 0.5 mg BID. May have to increase FK dose if rash worsens. Acute GVHD SKin 2 Liver 0 GI 0- overall grade 1 - 8/14/19: Erythematous rash of face. Hyperpigmented rash of chest, neck, abdomen, bilateral upper and lower extremities and back. Skin rash extent 42% BSA. Currently on prednisone 40 mg daily and lidex cream BID. Increased FK to 0.5 mg Am and 1 mg PM. - 9/26/19: Skin hyperpigmentation and facial hypopigmentation, but improvement in erythema from facial rash. Some oral sores using dex rinse 4 times a day. Increased FK to 1 mg BID as of 9/26/19. Continue prednisone 30 mg daily. Decrease prednisone by 5 mg every two weeks. Acute GVHD Skin 2 Liver 0 GI 0- overall grade 1. BSA 42%. - 12/27/19: no acute GVHD. Chronic GVHD: skin score 3 (>50% BSA), eyes score 3 (requiring scleral lenses), mouth score 2 - overall severe extensive chronic GVHD. Off prednisone as of 12/22/19. Continue FK 1.5mg bid and Decadron swish/spit qid, moisturize 2-3x/day - 1/23/20: no acute GVHD. Chronic GVHD: skin 3 (>50% BSA), eyes score 1, mouth score 1 - overall severe extensive chronic GVHD. Continue 1.5mg bid. Continue PRN Decadron swish/spit, instructed to avoid swallowing to prevent systemic absorption of steroid. Moisturize 2-3x/day - 3/10/20: no acute GVHD. Chronic GVHD eyes score 3, lung score 2, genital tract score 1 - overall severe extensive chronic GVHD. FK increased to 1mg bid and initiated FAM therapy as below for lung GVHD - 4/9/20: no acute GVHD. Chronic GVHD eyes score 2, lung score 2, genital tract score 1 - overall severe extensive chronic GVHD. Continue FK 1mg bid and FAM therapy - 5/6/20: no acute GVHD. Chronic GVHD mouth score 1, eyes score 2, lungs score 2, genitalia score 1 - overall severe extensive chronic GVHD. SOB on exertion somewhat improved. Continue FK 1mg bid and FAM therapy 6/10/20: no acute GVHD. Chronic GVHD mouth score 1, eyes score 2, lung score 2, genitalia score 1 - overall severe extensive chronic GVHD. Worsening mucositis, FK increased to 1.5mg bid Diagnosed with chronic eczema - Dupixent injections and hydroxyzine tablets.   cGVHD eyes No longer wearing scleral lenses at this time Continue PRN artificial tears and Systane ophthalmic ointment Continue f/u with optho  cGVHD mouth improved Continue Decadron rinse prn Continue to monitor closely Resolved  cGVHD lung?? 3/10/20 PFT with FEV1 of 48% (Repeated outpatient 4/2021- Nor esults available for review_ 5/6/20 CT chest: mild diffuse mosaic attenuation pattern to the lungs. ?BOOP, air trapping, small vessel disease. To consider repeat CT imaging in 3-4mo PFT repeated on 4/8/21: Pulmonary function test are severe obstruction with no bronchodilatation response Continue FAM therapy, initiated 3/10/20 (pt already on monteleukast for asthma/allergies) - Flovent inhaler 440 mcg bid - Azithromycin 250 mg- take one tablet Monday/Wednesday/Friday - Monteleukast 10 mg daily -f/u pulm eval and pft..improved  cGVHD vaginal area.. Sensation of skin tightening and dryness of vaginal area May use lubricant to gently massage external labia to soften tissue using dilators Encouraged f/u with GYN after covid-19 epidemic resolves Resolved  cGVHD joints Noted 7/15/20; pain causing limited ROM of shoulders, elbows, wrists, finger joints. Unable to pick things up off of the floor without pain..ROM improved Continue to monitor closely  4/16/24: Prescribed Jakafi 10mg bid if insurance approves...plan to taper FK in 8 weeks  5) GI Continue ppx: - Ursodiol 300 mg bid..stop - Protonix 40 mg daily PRN Zofran and Reglan for nausea  6) Other Hypomagnesemia - likely 2/2 tacrolimus, continue magnesium oxide 400 mg bid GYN - LMP November 2019, continue Sprintec daily, instructed to wear condoms if resuming sexual activity...asked pt to ask gyn about hpv vaccine HTN - continue amlodipine 5mg daily, BP remains WNL Warts - likely viral warts on hand in setting of immunosuppression, continue to monitor closely. RESOLVED. Health maintenance - 1yr post transplant ECHO done 3/10/20 WNL, EF = 60%..well care and cancer screening stressed  8) Plan/Dispo Okayed pt to eat out and see family/friends with caution Recommend patient to follow up with pulmonologist  Recommend to f/u dermatologist for skin rash Pt educated regarding plan of care, all questions/concerns addressed Instructed to contact our office with fever or new/worsening symptoms Medications sent to mail away pharmacy prn 4/16/24 prescribing Jakafi 10mg bid if insurance approves  s/p covid moderna X3...last childhood vaccines due in dec 2023 started discussion of post transplant childhood boosters...schedule given to pt Repeat blood work locally in 6 weeks then follow up with NP via [facility] in 8 weeks (begin FK taper then) Follow up with Dr. Lou in 4 months with blood work

## 2024-04-16 NOTE — HISTORY OF PRESENT ILLNESS
[de-identified] : Ms. Gary is a 33 y/o female with AML associated with normal karyotype and NPM1 ans [id number] mutation, refractory to 7+3 and currently receiving Ivosidenib. Of note patient is a Pentec[employer]al and cannot receive blood products. \par  \par  Patient was noted to be newly leukopenic to WBC 1.7 during preoperative evaluation in anticipation of right ankle ligament repair. Bone marrow aspirate and biopsy at [facility] on 12/2/2017 revealed AML with some suspicion for APL. She was transferred to [place] for further management. Peripheral counts from around this time are not available. \par  \par  Repeat marrow on 12/26/2017 showed 90% blasts with myeloid mutation arrest in a 40-50% cellular marrow. On flow blasts were negative for CD34 and HLA-DR; positive for CD38, CD58, CD33, and MPO, and partially positive for CD15, CD11b, , and CD64. CD45 was reported as dim. Karyotype showed +21 in 3 of 21 metaphases, but no t(15:17) was detected on karyotype of FISH. Molecular studies showed mutations in NPM1 (45%0, IDH1 (R132S; 39%), PTPN1 (23%), and CSF3R (5%) genes. There was lingering diagnostic uncertainty due to a largely aspicular and hypocellular specimen, and thus a marrow was repeated on 1/2/2018, confirming AML.\par  \par  On 1/4/2018, Ms. Gary began 7+3 with Idarubicin. Her induction course was complicated by DIC, neutropenic fever/ Klebsiella PNA, and bilateral retinal hemorrhage. She did not receive blood products and was instead supported with Nplate, Procrit, IV iron, Amicar, and Lupron. After achieving count stability, the patient was discharged; she was not deemed a candidate for further therapy given inability to receive transfusions and a day 14 marrow was not obtained. She subsequently saw Dr. [place] Obrien at [facility]. He obtained a recovery marrow on 2/13/2018 (day +40), which was 30% cellular with 45% blasts. 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 Cytogenetics and FISH were normal. \par  \par  She then established care with Dr. Christie for consideration of salvage therapy with IDH1 inhibition given the risks of additional chemotherapy without blood products support. Initial marrow at [facility], on 3/1/2018, showed 69% blasts. ThunderBolts panel confirmed IDH1 R132S, NPM1 D698Gck 12, and CSF3R D810G mutations. She was started on Ivosidenib on [facility] protocol  and had completed four cycles to date (6/28/2018 = C5D1). Marrow blasts increased to 85% on 4/30 but declined to 36% by 5/31. A  bone marrow aspirate and biopsy  was obtained  (6/28/2018); preliminary results show further responding disease with 10.3% blasts by flow cytometry. Subsequent marrow in oct showed 5% blasts...she was then started on vidazza plus venetoclax. She completed 2 cycles...f/u marrow with 4% blasts.....\par  \par  Patient underwent an AlloPSCT sister MRD (10/10) on 2/27/19, hospital course as follows:\par  Ms. 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 She is a Pentecostal and cannot receive blood products. \par  \par  Ms. Nickerson's donor is her sister.  She is a 10/10 match. Both donor and patient are CMV +. She had completed all pre testing needed for transplant.  She will \par  start reduced intensity Fludarabine/Busulfan chemo regimen today. \par  \par  Upon admission, a right AC PICC line was placed in IR. Ms. Gary received IV hydration, pain management, antiemetics, anxiolysis, and antibacterial / antiviral / antifungal / PCP / GI and VOD (SOS) prophylaxis. Labs were monitored every other day, and she received electrolyte repletion as needed. She also received vitamin K, vitamin B, supplemental iron, vitamin C and procrit as directed by the institutional bloodless transplant policy. \par  \par  On 2/27/19, Ms. Gary received 363 mLs of fresh, apheresed, mobilized, related allogeneic HPC over 80 minutes. Cell counts as follows: \par  Total MNCs ( x 10^8/kg): 6.21 \par  CD 34+Cells ( x 10^6/kg): 3.37 \par  CD3+ Cells ( x 10^7/kg): 12.46 \par  Cell Viability (%): 100% \par  \par  Engraftment was noted on 3/11/19. Ms. Gary did not receive zarxio due to her having minimal residual disease on admission, and a reduced intensity preparatory regimen to avoid stimulating myeloblasts. \par  \par  Ms. Gary had a relatively uncomplicated transplant course. She did experience menses, which was suppressed with provera. She also had pancytopenia related to the high dose chemotherapy preparative regimen. She also experienced grade 1 oral mucositis, and grade 2 GI mucositis also related to the chemotherapy preparative regimen. Both were treated with supportive care. \par  \par  Currently, Ms. Gary is stable for discharge home with outpatient follow up at the [facility].  [de-identified] : 4/27/21 Pt reports feeling well She is s/p PFTs at local facility  She denies any exacerbations with GVHD to the skin  REports continued dryness to the eyes and many  dental carries No mouth sores or dysphagia  DU with climbing stairs.  No respiratory infection  Stable weight/appetite  Arthralgias to the bilateral wrists.  Wants to get Moderna Vaccine   On 8/17//21, verbal consent obtained for today's telehealth visit. Nany was in her home today during out visit. Overall patient is well and offers no acute concerns. Currently on FK 2 mg BID. States when tacrolimus was tapered recently she had pruritus which has resolved. Admits has not needed her scleral lenses recently. Applies eye drops multiple times a day. Complains of improved dyspnea on exertion and wrist  pain. Recently followed up with neurologist to rule out carpal tunnel syndrome.  Denies fever, chills, nausea, vomiting, diarrhea, rash, mouth sores or any signs of active bleeding. Remains compliant with post transplant medications.   10/19/21 No new issues....in good spirits.....sees pulm....on fk506....received 3rd moderna vaccine on sept 1 1/19/22 Pt agrees to tele health from home...overall improving...occ du...on tacro1.5 mg bid...  4/7/22 Here for f/u..overall well..seen by pulmonary md close to home...skin, sob stable to improved...hypopigmentation noted  On 7/14/22, patient consents for today's telehealth appointment. Overall well with no acute concerns. Currently on FK 1 mg BID. States when tacrolimus dose was decreased recently complains of pruritus but, denies rash. Denies fever, chills, nausea, vomiting, diarrhea, mouth sores, dysuria or any signs of active bleeding. Denies chest pain or B/L LE edema. Remains compliant with medications as prescribed.   5/17/23  Here for f/u..on fk taper..no worsening sxs..stable hyperpigmentation of skin, vaginal dryness  11/30/23:  Patient is on the phone for a follow up visit. Was on fk taper...dose increased b/c of flare of skin...topicals used with improvement....Denies fever, chills, nausea, vomiting, diarrhea, mouth sores or any signs of active bleeding. Denies SOB, chest pain or B/L LE edema. Patient taking Tacrolimus, acyclovir, norvasc, azithromycin, using Lidex cream. Not on Jakafi or prednisone. Patient complains of rash and itchiness.   4/16/24:  Patient is here for a follow up visit. Denies fever, chills, nausea, vomiting, diarrhea, mouth sores or any signs of active bleeding. Denies chest pain or B/L LE edema. On FK1.5 mg bid. Pt was recently diagnosed with chronic eczema, she treats with Dupixent injections and hydroxyzine tablets. Lung gvhd is stable, occ SOB, uses inhaler.

## 2024-04-16 NOTE — ADDENDUM
[FreeTextEntry1] :  Documented by Mercedes De La Cruz acting as a scribe for Dr. Branden Lou on 4/16/24. All medical record entries made by the Scribe were at my, Dr. Branden Lou, direction and personally dictated by me on 4/16/24. I have reviewed the chart and agree that the record accurately reflects my personal performance of the history, physical exam, assessment and plan. I have also personally directed, reviewed, and agree with the discharge instructions.

## 2024-04-16 NOTE — PHYSICAL EXAM
[Restricted in physically strenuous activity but ambulatory and able to carry out work of a light or sedentary nature] : Status 1- Restricted in physically strenuous activity but ambulatory and able to carry out work of a light or sedentary nature, e.g., light house work, office work [Normal] : affect appropriate [] :  [No active (erythematous_ GVHD rash)] : Skin: No active (erythematous_ GVHD rash) [< 2 mg/dl] : Liver: < 2 mg/dl [No or intermittent] : Upper GI: No or intermittent nausea, vomiting or anorexia [<500 ml/day or < 3 episodes/day] : Lower GI (stool output/day): <500 ml/day or <3 episodes/day [1-18% BSA] : Skin % BSA: Score 1 [FEV1 = 80%] : Score 0 [No symptoms] : Score 0 [No] : No [Yes] : Yes [de-identified] : hypo / hyper pigmented  [Mild dry eye symptoms not affecting ADL(requirement of lubricant drops = 3x/day)] : Score 1 [Mild symptoms (SOB after climbing one flight of steps)] : Score 1 [Moderate] : Moderate [Extensive] : Extensive [FreeTextEntry1] : 02/27/2019

## 2024-04-17 LAB
CMV DNA SPEC QL NAA+PROBE: NOT DETECTED IU/ML
CMVPCR LOG: NOT DETECTED LOG10IU/ML

## 2024-04-18 LAB
DEPRECATED KAPPA LC FREE/LAMBDA SER: 1.82 RATIO
IGA SER QL IEP: 101 MG/DL
IGG SER QL IEP: 1325 MG/DL
IGM SER QL IEP: 175 MG/DL
KAPPA LC CSF-MCNC: 1.18 MG/DL
KAPPA LC SERPL-MCNC: 2.15 MG/DL

## 2024-04-19 ENCOUNTER — NON-APPOINTMENT (OUTPATIENT)
Age: 39
End: 2024-04-19

## 2024-04-22 ENCOUNTER — RX RENEWAL (OUTPATIENT)
Age: 39
End: 2024-04-22

## 2024-04-22 LAB
CD3 AND CD33 ENRICHMENT: NORMAL
ENGRAFTMNET-POST: NORMAL

## 2024-04-22 NOTE — ADDENDUM
Recent Visits  Date Type Provider Dept   02/12/24 Office Visit Javon Aaron, DO [facility]   01/05/24 Office Visit Jaovn Aaron, DO [facility]   08/09/23 Office Visit Javon Aaron, DO [facility]   05/09/23 Office Visit Javon Aaron, DO [facility]   03/29/23 Office Visit Javon Aaron, DO [facility]   03/01/23 Office Visit Javon Aaron, DO [facility]   11/23/22 Office Visit Javon Aaron, DO [facility]   Showing recent visits within past 540 days with a meds authorizing provider and meeting all other requirements  Future Appointments  Date Type Provider Dept   08/12/24 Appointment Javon Aaron, DO [facility]   Showing future appointments within next 150 days with a meds authorizing provider and meeting all other requirements       [FreeTextEntry1] : Documented by Nelda Carvalho acting as a scribe for Dr. Branden Lou on 10/24/2019.\par \par All medical record entries made by the Scribe were at my, Dr. Branden Lou, direction and personally dictated by me on 10/24/2019. I have reviewed the chart and agree that  the record accurately reflects my personal performance of the history, physical exam, assessment and plan. I have also personally directed, reviewed, and agree with the discharge instructions.

## 2024-05-14 ENCOUNTER — TRANSCRIPTION ENCOUNTER (OUTPATIENT)
Age: 39
End: 2024-05-14

## 2024-06-04 ENCOUNTER — NON-APPOINTMENT (OUTPATIENT)
Age: 39
End: 2024-06-04

## 2024-06-27 ENCOUNTER — APPOINTMENT (OUTPATIENT)
Dept: HEMATOLOGY ONCOLOGY | Facility: CLINIC | Age: 39
End: 2024-06-27
Payer: COMMERCIAL

## 2024-06-27 DIAGNOSIS — H04.123 DRY EYE SYNDROME OF BILATERAL LACRIMAL GLANDS: ICD-10-CM

## 2024-06-27 DIAGNOSIS — Z94.84 STEM CELLS TRANSPLANT STATUS: ICD-10-CM

## 2024-06-27 DIAGNOSIS — C92.01 ACUTE MYELOBLASTIC LEUKEMIA, IN REMISSION: ICD-10-CM

## 2024-06-27 PROCEDURE — 99212 OFFICE O/P EST SF 10 MIN: CPT

## 2024-08-20 ENCOUNTER — APPOINTMENT (OUTPATIENT)
Dept: HEMATOLOGY ONCOLOGY | Facility: CLINIC | Age: 39
End: 2024-08-20

## 2024-08-22 ENCOUNTER — RX RENEWAL (OUTPATIENT)
Age: 39
End: 2024-08-22

## 2024-09-01 ENCOUNTER — OUTPATIENT (OUTPATIENT)
Dept: OUTPATIENT SERVICES | Facility: HOSPITAL | Age: 39
LOS: 1 days | Discharge: HOME | End: 2024-09-01

## 2024-09-01 DIAGNOSIS — C92.00 ACUTE MYELOBLASTIC LEUKEMIA, NOT HAVING ACHIEVED REMISSION: ICD-10-CM

## 2024-09-01 DIAGNOSIS — Z90.3 ACQUIRED ABSENCE OF STOMACH [PART OF]: Chronic | ICD-10-CM

## 2024-09-12 ENCOUNTER — RESULT REVIEW (OUTPATIENT)
Age: 39
End: 2024-09-12

## 2024-09-12 ENCOUNTER — APPOINTMENT (OUTPATIENT)
Dept: HEMATOLOGY ONCOLOGY | Facility: CLINIC | Age: 39
End: 2024-09-12
Payer: COMMERCIAL

## 2024-09-12 ENCOUNTER — APPOINTMENT (OUTPATIENT)
Dept: HEMATOLOGY ONCOLOGY | Facility: CLINIC | Age: 39
End: 2024-09-12

## 2024-09-12 VITALS
WEIGHT: 193.34 LBS | TEMPERATURE: 98.5 F | RESPIRATION RATE: 16 BRPM | DIASTOLIC BLOOD PRESSURE: 80 MMHG | SYSTOLIC BLOOD PRESSURE: 113 MMHG | BODY MASS INDEX: 35.35 KG/M2 | HEART RATE: 95 BPM | OXYGEN SATURATION: 98 %

## 2024-09-12 DIAGNOSIS — M79.18 MYALGIA, OTHER SITE: ICD-10-CM

## 2024-09-12 DIAGNOSIS — Z94.84 STEM CELLS TRANSPLANT STATUS: ICD-10-CM

## 2024-09-12 DIAGNOSIS — Z87.898 PERSONAL HISTORY OF OTHER SPECIFIED CONDITIONS: ICD-10-CM

## 2024-09-12 DIAGNOSIS — C92.01 ACUTE MYELOBLASTIC LEUKEMIA, IN REMISSION: ICD-10-CM

## 2024-09-12 DIAGNOSIS — D89.813 GRAFT-VERSUS-HOST DISEASE, UNSPECIFIED: ICD-10-CM

## 2024-09-12 DIAGNOSIS — I10 ESSENTIAL (PRIMARY) HYPERTENSION: ICD-10-CM

## 2024-09-12 DIAGNOSIS — Z86.19 PERSONAL HISTORY OF OTHER INFECTIOUS AND PARASITIC DISEASES: ICD-10-CM

## 2024-09-12 DIAGNOSIS — S99.911A UNSPECIFIED INJURY OF RIGHT ANKLE, INITIAL ENCOUNTER: ICD-10-CM

## 2024-09-12 DIAGNOSIS — D89.811 CHRONIC GRAFT-VERSUS-HOST DISEASE: ICD-10-CM

## 2024-09-12 LAB
ALBUMIN SERPL ELPH-MCNC: 3.8 G/DL
ALP BLD-CCNC: 42 U/L
ALT SERPL-CCNC: 5 U/L
ANION GAP SERPL CALC-SCNC: 9 MMOL/L
AST SERPL-CCNC: 23 U/L
BASOPHILS # BLD AUTO: 0.01 K/UL — SIGNIFICANT CHANGE UP (ref 0–0.2)
BASOPHILS NFR BLD AUTO: 0.2 % — SIGNIFICANT CHANGE UP (ref 0–2)
BILIRUB SERPL-MCNC: 0.2 MG/DL
BUN SERPL-MCNC: 10 MG/DL
CALCIUM SERPL-MCNC: 9.2 MG/DL
CHLORIDE SERPL-SCNC: 107 MMOL/L
CO2 SERPL-SCNC: 21 MMOL/L
CREAT SERPL-MCNC: 1.17 MG/DL
EGFR: 62 ML/MIN/1.73M2
EOSINOPHIL # BLD AUTO: 0.03 K/UL — SIGNIFICANT CHANGE UP (ref 0–0.5)
EOSINOPHIL NFR BLD AUTO: 0.6 % — SIGNIFICANT CHANGE UP (ref 0–6)
GLUCOSE SERPL-MCNC: 84 MG/DL
HCT VFR BLD CALC: 32.5 % — LOW (ref 34.5–45)
HGB BLD-MCNC: 10.5 G/DL — LOW (ref 11.5–15.5)
IMM GRANULOCYTES NFR BLD AUTO: 0.2 % — SIGNIFICANT CHANGE UP (ref 0–0.9)
LDH SERPL-CCNC: 253 U/L
LYMPHOCYTES # BLD AUTO: 2.3 K/UL — SIGNIFICANT CHANGE UP (ref 1–3.3)
LYMPHOCYTES # BLD AUTO: 44.2 % — HIGH (ref 13–44)
MAGNESIUM SERPL-MCNC: 2.3 MG/DL
MCHC RBC-ENTMCNC: 31.3 PG — SIGNIFICANT CHANGE UP (ref 27–34)
MCHC RBC-ENTMCNC: 32.3 G/DL — SIGNIFICANT CHANGE UP (ref 32–36)
MCV RBC AUTO: 97 FL — SIGNIFICANT CHANGE UP (ref 80–100)
MONOCYTES # BLD AUTO: 0.47 K/UL — SIGNIFICANT CHANGE UP (ref 0–0.9)
MONOCYTES NFR BLD AUTO: 9 % — SIGNIFICANT CHANGE UP (ref 2–14)
NEUTROPHILS # BLD AUTO: 2.38 K/UL — SIGNIFICANT CHANGE UP (ref 1.8–7.4)
NEUTROPHILS NFR BLD AUTO: 45.8 % — SIGNIFICANT CHANGE UP (ref 43–77)
NRBC # BLD: 0 /100 WBCS — SIGNIFICANT CHANGE UP (ref 0–0)
PLATELET # BLD AUTO: 600 K/UL — HIGH (ref 150–400)
POTASSIUM SERPL-SCNC: 4.4 MMOL/L
PROT SERPL-MCNC: 6.8 G/DL
RBC # BLD: 3.35 M/UL — LOW (ref 3.8–5.2)
RBC # FLD: 16.8 % — HIGH (ref 10.3–14.5)
SODIUM SERPL-SCNC: 138 MMOL/L
TACROLIMUS SERPL-MCNC: <2 NG/ML
WBC # BLD: 5.2 K/UL — SIGNIFICANT CHANGE UP (ref 3.8–10.5)
WBC # FLD AUTO: 5.2 K/UL — SIGNIFICANT CHANGE UP (ref 3.8–10.5)

## 2024-09-12 PROCEDURE — 99215 OFFICE O/P EST HI 40 MIN: CPT

## 2024-09-13 LAB
CMV DNA SPEC QL NAA+PROBE: NOT DETECTED IU/ML
CMVPCR LOG: NOT DETECTED LOG10IU/ML
DEPRECATED KAPPA LC FREE/LAMBDA SER: 2.2 RATIO
IGA SER QL IEP: 94 MG/DL
IGG SER QL IEP: 961 MG/DL
IGM SER QL IEP: 123 MG/DL
KAPPA LC CSF-MCNC: 0.56 MG/DL
KAPPA LC SERPL-MCNC: 1.23 MG/DL

## 2024-09-13 NOTE — HISTORY OF PRESENT ILLNESS
[de-identified] : Ms. 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 Of note patient is a Uatsdin and cannot receive blood products.   Patient was noted to be newly leukopenic to WBC 1.7 during preoperative evaluation in anticipation of right ankle ligament repair. Bone marrow aspirate and biopsy at [facility] on 12/2/2017 revealed AML with some suspicion for APL. She was transferred to [facility] for further management. Peripheral counts from around this time are not available.   Repeat marrow on 12/26/2017 showed 90% blasts with myeloid mutation arrest in a 40-50% cellular marrow. On flow blasts were negative for CD34 and HLA-DR; positive for CD38, CD58, CD33, and MPO, and partially positive for CD15, CD11b, , and CD64. CD45 was reported as dim. Karyotype showed +21 in 3 of 21 metaphases, but no t(15:17) was detected on karyotype of FISH. Molecular studies showed mutations in NPM1 (45%0, IDH1 (R132S; 39%), PTPN1 (23%), and CSF3R (5%) genes. There was lingering diagnostic uncertainty due to a largely aspicular and hypocellular specimen, and thus a marrow was repeated on 1/2/2018, confirming AML.  On 1/4/2018, Ms. Gary began 7+3 with Idarubicin. Her induction course was complicated by DIC, neutropenic fever/ Klebsiella PNA, and bilateral retinal hemorrhage. She did not receive blood products and was instead supported with Nplate, Procrit, IV iron, Amicar, and Lupron. After achieving count stability, the patient was discharged; she was not deemed a candidate for further therapy given inability to receive transfusions and a day 14 marrow was not obtained. She subsequently saw Dr. [place] Obrien at [facility]. He obtained a recovery marrow on 2/13/2018 (day +40), which was 30% cellular with 45% blasts. 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 Cytogenetics and FISH were normal.   She then established care with Dr. Christie for consideration of salvage therapy with IDH1 inhibition given the risks of additional chemotherapy without blood products support. Initial marrow at [facility], on 3/1/2018, showed 69% blasts. ThunderBolts panel confirmed IDH1 R132S, NPM1 L282Yfb 12, and CSF3R D810G mutations. She was started on Ivosidenib on [facility] protocol  and had completed four cycles to date (6/28/2018 = C5D1). Marrow blasts increased to 85% on 4/30 but declined to 36% by 5/31. A  bone marrow aspirate and biopsy  was obtained  (6/28/2018); preliminary results show further responding disease with 10.3% blasts by flow cytometry. Subsequent marrow in oct showed 5% blasts...she was then started on vidazza plus venetoclax. She completed 2 cycles...f/u marrow with 4% blasts.....  Patient underwent an AlloPSCT sister MRD (10/10) on 2/27/19, hospital course as follows: Ms. 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 She is a Uatsdin and cannot receive blood products.   Ms. Nickerson's donor is her sister.  She is a 10/10 match. Both donor and patient are CMV +. She had completed all pre testing needed for transplant.  She will  start reduced intensity Fludarabine/Busulfan chemo regimen today.   Upon admission, a right AC PICC line was placed in IR. Ms. Gary received IV hydration, pain management, antiemetics, anxiolysis, and antibacterial / antiviral / antifungal / PCP / GI and VOD (SOS) prophylaxis. Labs were monitored every other day, and she received electrolyte repletion as needed. She also received vitamin K, vitamin B, supplemental iron, vitamin C and procrit as directed by the institutional bloodless transplant policy.   On 2/27/19, Ms. Gary received 363 mLs of fresh, apheresed, mobilized, related allogeneic HPC over 80 minutes. Cell counts as follows:  Total MNCs ( x 10^8/kg): 6.21  CD 34+Cells ( x 10^6/kg): 3.37  CD3+ Cells ( x 10^7/kg): 12.46  Cell Viability (%): 100%   Engraftment was noted on 3/11/19. Ms. Gary did not receive zarxio due to her having minimal residual disease on admission, and a reduced intensity preparatory regimen to avoid stimulating myeloblasts.   Ms. Gary had a relatively uncomplicated transplant course. She did experience menses, which was suppressed with provera. She also had pancytopenia related to the high dose chemotherapy preparative regimen. She also experienced grade 1 oral mucositis, and grade 2 GI mucositis also related to the chemotherapy preparative regimen. Both were treated with supportive care.   Currently, Ms. Gary is stable for discharge home with outpatient follow up at the [facility].  [de-identified] : 4/27/21 Pt reports feeling well She is s/p PFTs at local facility  She denies any exacerbations with GVHD to the skin  REports continued dryness to the eyes and many  dental carries No mouth sores or dysphagia  DU with climbing stairs.  No respiratory infection  Stable weight/appetite  Arthralgias to the bilateral wrists.  Wants to get Moderna Vaccine   On 8/17//21, verbal consent obtained for today's telehealth visit. Nany was in her home today during out visit. Overall patient is well and offers no acute concerns. Currently on FK 2 mg BID. States when tacrolimus was tapered recently she had pruritus which has resolved. Admits has not needed her scleral lenses recently. Applies eye drops multiple times a day. Complains of improved dyspnea on exertion and wrist  pain. Recently followed up with neurologist to rule out carpal tunnel syndrome.  Denies fever, chills, nausea, vomiting, diarrhea, rash, mouth sores or any signs of active bleeding. Remains compliant with post transplant medications.   10/19/21 No new issues....in good spirits.....sees pulm....on fk506....received 3rd moderna vaccine on sept 1 1/19/22 Pt agrees to tele health from home...overall improving...occ du...on tacro1.5 mg bid...  4/7/22 Here for f/u..overall well..seen by pulmonary md close to home...skin, sob stable to improved...hypopigmentation noted  On 7/14/22, patient consents for today's telehealth appointment. Overall well with no acute concerns. Currently on FK 1 mg BID. States when tacrolimus dose was decreased recently complains of pruritus but, denies rash. Denies fever, chills, nausea, vomiting, diarrhea, mouth sores, dysuria or any signs of active bleeding. Denies chest pain or B/L LE edema. Remains compliant with medications as prescribed.   5/17/23  Here for f/u..on fk taper..no worsening sxs..stable hyperpigmentation of skin, vaginal dryness  11/30/23:  Patient is on the phone for a follow up visit. Was on fk taper...dose increased b/c of flare of skin...topicals used with improvement....Denies fever, chills, nausea, vomiting, diarrhea, mouth sores or any signs of active bleeding. Denies SOB, chest pain or B/L LE edema. Patient taking Tacrolimus, acyclovir, norvasc, azithromycin, using Lidex cream. Not on Jakafi or prednisone. Patient complains of rash and itchiness.   4/16/24:  Patient is here for a follow up visit. Denies fever, chills, nausea, vomiting, diarrhea, mouth sores or any signs of active bleeding. Denies chest pain or B/L LE edema. On FK1.5 mg bid. Pt was recently diagnosed with chronic eczema, she treats with Dupixent injections and hydroxyzine tablets. Lung gvhd is stable, occ SOB, uses inhaler.   6/27/24: Patient consents for today's telehealth appointment. Nany overall is well and offers no acute concerns. On tacrolimus 0.5 mg daily. Jakafi 10 mg twice a day which started the last week of May 2024. States after starting Jakafi at the end of May noticed gum bruising, had nausea and diarrhea which has resolved. Continues to have dry eyes and applies eye drops prn. Has scleral lenses in. Has a procedure with obgyn on 7/24/24. Received the second hpv vaccine. Denies fever, chills, nausea, vomiting, diarrhea, rash, mouth sores or any signs of active bleeding. Denies sob or chest pain.   9/12/24:  Patient is here for a follow up visit. Denies fever, chills, nausea, vomiting, diarrhea, rash, mouth sores or any signs of active bleeding. Denies SOB, chest pain or B/L LE edema. Pt states Hyperpigmentation and ezcema are getting under control. On jakafi 10 mg bid and FK 0.5 mg on Monday & Thursday. Stop FK as of today. Pt states she is having reoccurring cavities and dentist suggested implants. Pt complains of random blisters in her mouth that will appear and disappear on their own. Pt's eyes have been improving and her breathing is stable so she does not have to use her inhaler as much.

## 2024-09-13 NOTE — HISTORY OF PRESENT ILLNESS
[de-identified] : Ms. 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 Of note patient is a Oriental orthodox and cannot receive blood products.   Patient was noted to be newly leukopenic to WBC 1.7 during preoperative evaluation in anticipation of right ankle ligament repair. Bone marrow aspirate and biopsy at [facility] on 12/2/2017 revealed AML with some suspicion for APL. She was transferred to [place] for further management. Peripheral counts from around this time are not available.   Repeat marrow on 12/26/2017 showed 90% blasts with myeloid mutation arrest in a 40-50% cellular marrow. On flow blasts were negative for CD34 and HLA-DR; positive for CD38, CD58, CD33, and MPO, and partially positive for CD15, CD11b, , and CD64. CD45 was reported as dim. Karyotype showed +21 in 3 of 21 metaphases, but no t(15:17) was detected on karyotype of FISH. Molecular studies showed mutations in NPM1 (45%0, IDH1 (R132S; 39%), PTPN1 (23%), and CSF3R (5%) genes. There was lingering diagnostic uncertainty due to a largely aspicular and hypocellular specimen, and thus a marrow was repeated on 1/2/2018, confirming AML.  On 1/4/2018, Ms. Gary began 7+3 with Idarubicin. Her induction course was complicated by DIC, neutropenic fever/ Klebsiella PNA, and bilateral retinal hemorrhage. She did not receive blood products and was instead supported with Nplate, Procrit, IV iron, Amicar, and Lupron. After achieving count stability, the patient was discharged; she was not deemed a candidate for further therapy given inability to receive transfusions and a day 14 marrow was not obtained. She subsequently saw Dr. [place] Ob[place] at [facility]. He obtained a recovery marrow on 2/13/2018 (day +40), which was 30% cellular with 45% blasts. 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 Cytogenetics and FISH were normal.   She then established care with Dr. Christie for consideration of salvage therapy with IDH1 inhibition given the risks of additional chemotherapy without blood products support. Initial marrow at [facility], on 3/1/2018, showed 69% blasts. ThunderBolts panel confirmed IDH1 R132S, NPM1 L147Xwg 12, and CSF3R D810G mutations. She was started on Ivosidenib on [facility] protocol  and had completed four cycles to date (6/28/2018 = C5D1). Marrow blasts increased to 85% on 4/30 but declined to 36% by 5/31. A  bone marrow aspirate and biopsy  was obtained  (6/28/2018); preliminary results show further responding disease with 10.3% blasts by flow cytometry. Subsequent marrow in oct showed 5% blasts...she was then started on vidazza plus venetoclax. She completed 2 cycles...f/u marrow with 4% blasts.....  Patient underwent an AlloPSCT sister MRD (10/10) on 2/27/19, hospital course as follows: Ms. 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 She is a Oriental orthodox and cannot receive blood products.   Ms. Nickerson's donor is her sister.  She is a 10/10 match. Both donor and patient are CMV +. She had completed all pre testing needed for transplant.  She will  start reduced intensity Fludarabine/Busulfan chemo regimen today.   Upon admission, a right AC PICC line was placed in IR. Ms. Gary received IV hydration, pain management, antiemetics, anxiolysis, and antibacterial / antiviral / antifungal / PCP / GI and VOD (SOS) prophylaxis. Labs were monitored every other day, and she received electrolyte repletion as needed. She also received vitamin K, vitamin B, supplemental iron, vitamin C and procrit as directed by the institutional bloodless transplant policy.   On 2/27/19, Ms. Gary received 363 mLs of fresh, apheresed, mobilized, related allogeneic HPC over 80 minutes. Cell counts as follows:  Total MNCs ( x 10^8/kg): 6.21  CD 34+Cells ( x 10^6/kg): 3.37  CD3+ Cells ( x 10^7/kg): 12.46  Cell Viability (%): 100%   Engraftment was noted on 3/11/19. Ms. Gary did not receive zarxio due to her having minimal residual disease on admission, and a reduced intensity preparatory regimen to avoid stimulating myeloblasts.   Ms. Gary had a relatively uncomplicated transplant course. She did experience menses, which was suppressed with provera. She also had pancytopenia related to the high dose chemotherapy preparative regimen. She also experienced grade 1 oral mucositis, and grade 2 GI mucositis also related to the chemotherapy preparative regimen. Both were treated with supportive care.   Currently, Ms. Gary is stable for discharge home with [facility].  [de-identified] : 4/27/21 Pt reports feeling well She is s/p PFTs at local facility  She denies any exacerbations with GVHD to the skin  REports continued dryness to the eyes and many  dental carries No mouth sores or dysphagia  DU with climbing stairs.  No respiratory infection  Stable weight/appetite  Arthralgias to the bilateral wrists.  Wants to get Moderna Vaccine   On 8/17//21, verbal consent obtained for today's telehealth visit. Nany was in her home today during out visit. Overall patient is well and offers no acute concerns. Currently on FK 2 mg BID. States when tacrolimus was tapered recently she had pruritus which has resolved. Admits has not needed her scleral lenses recently. Applies eye drops multiple times a day. Complains of improved dyspnea on exertion and wrist  pain. Recently followed up with neurologist to rule out carpal tunnel syndrome.  Denies fever, chills, nausea, vomiting, diarrhea, rash, mouth sores or any signs of active bleeding. Remains compliant with post transplant medications.   10/19/21 No new issues....in good spirits.....sees pulm....on [id number]....received 3rd moderna vaccine on sept 1 1/19/22 Pt agrees to tele health from home...overall improving...occ du...on tacro1.5 mg bid...  4/7/22 Here for f/u..overall well..seen by pulmonary md close to home...skin, sob stable to improved...hypopigmentation noted  On 7/14/22, patient consents for today's telehealth appointment. Overall well with no acute concerns. Currently on FK 1 mg BID. States when tacrolimus dose was decreased recently complains of pruritus but, denies rash. Denies fever, chills, nausea, vomiting, diarrhea, mouth sores, dysuria or any signs of active bleeding. Denies chest pain or B/L LE edema. Remains compliant with medications as prescribed.   5/17/23  Here for f/u..on fk taper..no worsening sxs..stable hyperpigmentation of skin, vaginal dryness  11/30/23:  Patient is on the phone for a follow up visit. Was on fk taper...dose increased b/c of flare of skin...topicals used with improvement....Denies fever, chills, nausea, vomiting, diarrhea, mouth sores or any signs of active bleeding. Denies SOB, chest pain or B/L LE edema. Patient taking Tacrolimus, acyclovir, norvasc, azithromycin, using Lidex cream. Not on Jakafi or prednisone. Patient complains of rash and itchiness.   4/16/24:  Patient is here for a follow up visit. Denies fever, chills, nausea, vomiting, diarrhea, mouth sores or any signs of active bleeding. Denies chest pain or B/L LE edema. On FK1.5 mg bid. Pt was recently diagnosed with chronic eczema, she treats with Dupixent injections and hydroxyzine tablets. Lung gvhd is stable, occ SOB, uses inhaler.   6/27/24: Patient consents for today's telehealth appointment. Nany overall is well and offers no acute concerns. On tacrolimus 0.5 mg daily. Jakafi 10 mg twice a day which started the last week of May 2024. States after starting Jakafi at the end of May noticed gum bruising, had nausea and diarrhea which has resolved. Continues to have dry eyes and applies eye drops prn. Has scleral lenses in. Has a procedure with obgyn on 7/24/24. Received the second hpv vaccine. Denies fever, chills, nausea, vomiting, diarrhea, rash, mouth sores or any signs of active bleeding. Denies sob or chest pain.   9/12/24:  Patient is here for a follow up visit. Denies fever, chills, nausea, vomiting, diarrhea, rash, mouth sores or any signs of active bleeding. Denies SOB, chest pain or B/L LE edema. Pt states Hyperpigmentation and ezcema are getting under control. On jakafi 10 mg bid and FK 0.5 mg on Monday & Thursday. Stop FK as of today. Pt states she is having reoccurring cavities and dentist suggested implants. Pt complains of random blisters in her mouth that will appear and disappear on their own. Pt's eyes have been improving and her breathing is stable so she does not have to use her inhaler as much.

## 2024-09-13 NOTE — ASSESSMENT
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 Gary is a 39 year old female [employer] with AML with the following comorbidities being managed:  high complexity decision making  1) AML S/p HLA 10/10 allo sib (sister) PBSCT on 2/27/19 7/3/19 BMbx: LANA, normal female karyotype 11/21/19 chimerism = 99% donor (CD33 = 100% and CD3 = 83% donor) 12/27/19 chimerism = 98% donor 2/12/20 chimerism = 99% donor (CD33 = 100% and CD3 = 88.8% donor). 3/10/20 chimerism = 99% donor (CD33 = 100% and CD3 = 94% donor) 5/6/20 chimerism = 100% donor (CD33 = 100% and CD3 = 97% donor) 6/10/20, 7/2020 chimerism = 100% donor in CD3 and CD33 compartments 4/27/21 chimerism= 100% donor ( JY88=484%, and CD 3=100%) 4/7/22 , 7/2022, 10/2022,  8/2023 chimerism= 100% donor in all compartments Current disease status: LANA based on 7/3/19 BMbx and peripheral blood counts, chimerism   2) Heme No transfusions as pt is practicing [employer] Counts WNL's...labs reviewed from today Continue multivitamin daily   3) ID Continue ppx: - Acyclovir 400 mg bid - Bactrim- take 1 tablet daily Voriconazole resumed 1/23/20 but subsequently HELD as of 2/19/20 for transaminitis  4/16/24 CMV PCR not detected, continue to monitor PCR weekly  4) GVHD Acute GVHD: Skin 0 Liver 0 GI 0 - overall grade 0 Chronic GVHD: mouth score 0, lung 1, joint 0, -skin 1, eye 1..mod extensive..improved  Prednisone d/c'd 12/22/19 4/16/24: Prescribed Jakafi 10mg bid if insurance approves...plan to taper FK in 8 weeks 6/27/24: Currently on tacrolimus 0.5 mg daily. Decrease tacrolimus to 0.5 mg daily on Monday/Wednesday/Fridays for 6 weeks then decrease tacrolimus to 0.5 mg daily on Monday/Thursdays for 6 weeks.. stopped as of 9/12/24 Continue Jakafi 10 mg bid. Started the last week of May 2024.  9/12/24: On Jakafi 10 mg bid. Stop FK as of today. Decrease amlodipine to 2.5 mg daily.. possibly stop in October after pt visits internist ..follow hgb..may need to decrease jakafi See GVHD history below  Date of onset of acute GVHD 8/1/19 - On 7/29/19 placed on prednisone 20 mg daily for erythematous rash of face as stated by patient. - 8/1/19: Erythematous/hyperpigmented rash 38% BSA- rash of face, chest, back, bilateral upper and lower extremities. Patient was placed on prednisone 20 mg daily as of 7/29/19. Instructed to increase prednisone on 8/1/19 to 40 mg daily on 8/1, 8/2, 8/3. Then decreased to prednisone 20 mg daily. Tacrolimus restarted on 8/1/19 at 0.5 mg BID. Lidex cream BID to body and hydrocortisone cream to face BID. Acute GVHD SKin 2 Liver 0 GI 0- overall grade 1 - 8/7/19: Erythematous/hyperpigmented rash 43% BSA- rash of face, neck, chest, back, abdomen, bilateral upper and lower extremities. Increased prednisone 20mg daily to 40 mg daily. Lidex cream BID to entire body. FK at 0.5 mg BID. May have to increase FK dose if rash worsens. Acute GVHD SKin 2 Liver 0 GI 0- overall grade 1 - 8/14/19: Erythematous rash of face. Hyperpigmented rash of chest, neck, abdomen, bilateral upper and lower extremities and back. Skin rash extent 42% BSA. Currently on prednisone 40 mg daily and lidex cream BID. Increased FK to 0.5 mg Am and 1 mg PM. - 9/26/19: Skin hyperpigmentation and facial hypopigmentation, but improvement in erythema from facial rash. Some oral sores using dex rinse 4 times a day. Increased FK to 1 mg BID as of 9/26/19. Continue prednisone 30 mg daily. Decrease prednisone by 5 mg every two weeks. Acute GVHD Skin 2 Liver 0 GI 0- overall grade 1. BSA 42%. - 12/27/19: no acute GVHD. Chronic GVHD: skin score 3 (>50% BSA), eyes score 3 (requiring scleral lenses), mouth score 2 - overall severe extensive chronic GVHD. Off prednisone as of 12/22/19. Continue FK 1.5mg bid and Decadron swish/spit qid, moisturize 2-3x/day - 1/23/20: no acute GVHD. Chronic GVHD: skin 3 (>50% BSA), eyes score 1, mouth score 1 - overall severe extensive chronic GVHD. Continue 1.5mg bid. Continue PRN Decadron swish/spit, instructed to avoid swallowing to prevent systemic absorption of steroid. Moisturize 2-3x/day - 3/10/20: no acute GVHD. Chronic GVHD eyes score 3, lung score 2, genital tract score 1 - overall severe extensive chronic GVHD. FK increased to 1mg bid and initiated FAM therapy as below for lung GVHD - 4/9/20: no acute GVHD. Chronic GVHD eyes score 2, lung score 2, genital tract score 1 - overall severe extensive chronic GVHD. Continue FK 1mg bid and FAM therapy - 5/6/20: no acute GVHD. Chronic GVHD mouth score 1, eyes score 2, lungs score 2, genitalia score 1 - overall severe extensive chronic GVHD. SOB on exertion somewhat improved. Continue FK 1mg bid and FAM therapy 6/10/20: no acute GVHD. Chronic GVHD mouth score 1, eyes score 2, lung score 2, genitalia score 1 - overall severe extensive chronic GVHD. Worsening mucositis, FK increased to 1.5mg bid Diagnosed with chronic eczema - Dupixent injections and hydroxyzine tablets.   cGVHD eyes Wearing scleral lenses at this time Continue PRN artificial tears and Systane ophthalmic ointment Continue f/u with optho  cGVHD mouth improved Continue Decadron rinse prn Continue to monitor closely Resolved  cGVHD lung?? 3/10/20 PFT with FEV1 of 48% (Repeated outpatient 4/2021- Nor esults available for review_ 5/6/20 CT chest: mild diffuse mosaic attenuation pattern to the lungs. ?BOOP, air trapping, small vessel disease. To consider repeat CT imaging in 3-4mo PFT repeated on 4/8/21: Pulmonary function test are severe obstruction with no bronchodilatation response Continue FAM therapy, initiated 3/10/20 (pt already on monteleukast for asthma/allergies) - Flovent inhaler 440 mcg bid - Azithromycin 250 mg- take one tablet Monday/Wednesday/Friday - Monteleukast 10 mg daily -f/u pulm eval and pft..improved  cGVHD vaginal area.. Sensation of skin tightening and dryness of vaginal area May use lubricant to gently massage external labia to soften tissue using dilators Scheduled for a procedure with obgyn on 7/24/24  cGVHD joints Noted 7/15/20; pain causing limited ROM of shoulders, elbows, wrists, finger joints. Unable to pick things up off of the floor without pain..ROM improved Continue to monitor closely  5) GI Continue ppx: - Protonix 40 mg daily PRN Zofran for nausea  6) Other Hypomagnesemia - likely 2/2 tacrolimus, decrease magnesium oxide 400 mg daily on 6/27/24. GYN - LMP November 2019, continue Sprintec daily, instructed to wear condoms if resuming sexual activity. Received 2 HPV vaccines so far.  Encouraged patient to receive Shingrix vaccine.  HTN - continue amlodipine 5mg daily, BP remains WNL Warts - likely viral warts on hand in setting of immunosuppression, continue to monitor closely. RESOLVED. Health maintenance - 1yr post transplant ECHO done 3/10/20 WNL, EF = 60%..well care and cancer screening stressed  8) Plan/Dispo Okayed pt to eat out and see family/friends with caution Recommend patient to follow up with pulmonologist  Pt educated regarding plan of care, all questions/concerns addressed Instructed to contact our office with fever or new/worsening symptoms Medications sent to mail away pharmacy  s/p covid moderna X3...last childhood vaccines due in dec 2023 started discussion of post transplant childhood boosters...schedule given to pt Follow up with Dr. Lou in 4 months with blood work

## 2024-09-13 NOTE — ADDENDUM
[FreeTextEntry1] : Documented by Jr Lund acting as a scribe for Dr. Branden Lou on 9/12/24. All medical record entries made by the Scribe were at my, Dr. Branden Lou, direction and personally dictated by me on 9/12/24. I have reviewed the chart and agree that the record accurately reflects my personal performance of the history, physical exam, assessment and plan. I have also personally directed, reviewed, and agree with the discharge instructions.

## 2024-09-13 NOTE — REVIEW OF SYSTEMS
[Negative] : Respiratory [Eye Pain] : no eye pain [Red Eyes] : eyes not red [Dry Eyes] : no dryness of the eyes [Vision Problems] : no vision problems [Shortness Of Breath] : no shortness of breath [Wheezing] : no wheezing [Cough] : no cough [SOB on Exertion] : no shortness of breath during exertion [Joint Pain] : no joint pain [Joint Stiffness] : no joint stiffness [Muscle Pain] : no muscle pain

## 2024-09-13 NOTE — PHYSICAL EXAM
[Restricted in physically strenuous activity but ambulatory and able to carry out work of a light or sedentary nature] : Status 1- Restricted in physically strenuous activity but ambulatory and able to carry out work of a light or sedentary nature, e.g., light house work, office work [] :  [No active (erythematous_ GVHD rash)] : Skin: No active (erythematous_ GVHD rash) [< 2 mg/dl] : Liver: < 2 mg/dl [No or intermittent] : Upper GI: No or intermittent nausea, vomiting or anorexia [<500 ml/day or < 3 episodes/day] : Lower GI (stool output/day): <500 ml/day or <3 episodes/day [1-18% BSA] : Skin % BSA: Score 1 [Mild dry eye symptoms not affecting ADL(requirement of lubricant drops = 3x/day)] : Score 1 [Mild symptoms (SOB after climbing one flight of steps)] : Score 1 [FEV1 = 80%] : Score 0 [No symptoms] : Score 0 [Moderate] : Moderate [Extensive] : Extensive [No] : No [Yes] : Yes [Normal] : affect appropriate [de-identified] : hypo / hyper pigmented  [FreeTextEntry1] : 02/27/2019

## 2024-09-13 NOTE — ASSESSMENT
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 Gary is a 39 year old female [employer] with AML with the following comorbidities being managed:  high complexity decision making  1) AML S/p HLA 10/10 allo sib (sister) PBSCT on 2/27/19 7/3/19 BMbx: LANA, normal female karyotype 11/21/19 chimerism = 99% donor (CD33 = 100% and CD3 = 83% donor) 12/27/19 chimerism = 98% donor 2/12/20 chimerism = 99% donor (CD33 = 100% and CD3 = 88.8% donor). 3/10/20 chimerism = 99% donor (CD33 = 100% and CD3 = 94% donor) 5/6/20 chimerism = 100% donor (CD33 = 100% and CD3 = 97% donor) 6/10/20, 7/2020 chimerism = 100% donor in CD3 and CD33 compartments 4/27/21 chimerism= 100% donor ( SV49=628%, and CD 3=100%) 4/7/22 , 7/2022, 10/2022,  8/2023 chimerism= 100% donor in all compartments Current disease status: LANA based on 7/3/19 BMbx and peripheral blood counts, chimerism   2) Heme No transfusions as pt is practicing [employer] Counts WNL's...labs reviewed from today Continue multivitamin daily   3) ID Continue ppx: - Acyclovir 400 mg bid - Bactrim- take 1 tablet daily Voriconazole resumed 1/23/20 but subsequently HELD as of 2/19/20 for transaminitis  4/16/24 CMV PCR not detected, continue to monitor PCR weekly  4) GVHD Acute GVHD: Skin 0 Liver 0 GI 0 - overall grade 0 Chronic GVHD: mouth score 0, lung 1, joint 0, -skin 1, eye 1..mod extensive..improved  Prednisone d/c'd 12/22/19 4/16/24: Prescribed Jakafi 10mg bid if insurance approves...plan to taper FK in 8 weeks 6/27/24: Currently on tacrolimus 0.5 mg daily. Decrease tacrolimus to 0.5 mg daily on Monday/Wednesday/Fridays for 6 weeks then decrease tacrolimus to 0.5 mg daily on Monday/Thursdays for 6 weeks.. stopped as of 9/12/24 Continue Jakafi 10 mg bid. Started the last week of May 2024.  9/12/24: On Jakafi 10 mg bid. Stop FK as of today. Decrease amlodipine to 2.5 mg daily.. possibly stop in October after pt visits internist ..follow hgb..may need to decrease jakafi See GVHD history below  Date of onset of acute GVHD 8/1/19 - On 7/29/19 placed on prednisone 20 mg daily for erythematous rash of face as stated by patient. - 8/1/19: Erythematous/hyperpigmented rash 38% BSA- rash of face, chest, back, bilateral upper and lower extremities. Patient was placed on prednisone 20 mg daily as of 7/29/19. Instructed to increase prednisone on 8/1/19 to 40 mg daily on 8/1, 8/2, 8/3. Then decreased to prednisone 20 mg daily. Tacrolimus restarted on 8/1/19 at 0.5 mg BID. Lidex cream BID to body and hydrocortisone cream to face BID. Acute GVHD SKin 2 Liver 0 GI 0- overall grade 1 - 8/7/19: Erythematous/hyperpigmented rash 43% BSA- rash of face, neck, chest, back, abdomen, bilateral upper and lower extremities. Increased prednisone 20mg daily to 40 mg daily. Lidex cream BID to entire body. FK at 0.5 mg BID. May have to increase FK dose if rash worsens. Acute GVHD SKin 2 Liver 0 GI 0- overall grade 1 - 8/14/19: Erythematous rash of face. Hyperpigmented rash of chest, neck, abdomen, bilateral upper and lower extremities and back. Skin rash extent 42% BSA. Currently on prednisone 40 mg daily and lidex cream BID. Increased FK to 0.5 mg Am and 1 mg PM. - 9/26/19: Skin hyperpigmentation and facial hypopigmentation, but improvement in erythema from facial rash. Some oral sores using dex rinse 4 times a day. Increased FK to 1 mg BID as of 9/26/19. Continue prednisone 30 mg daily. Decrease prednisone by 5 mg every two weeks. Acute GVHD Skin 2 Liver 0 GI 0- overall grade 1. BSA 42%. - 12/27/19: no acute GVHD. Chronic GVHD: skin score 3 (>50% BSA), eyes score 3 (requiring scleral lenses), mouth score 2 - overall severe extensive chronic GVHD. Off prednisone as of 12/22/19. Continue FK 1.5mg bid and Decadron swish/spit qid, moisturize 2-3x/day - 1/23/20: no acute GVHD. Chronic GVHD: skin 3 (>50% BSA), eyes score 1, mouth score 1 - overall severe extensive chronic GVHD. Continue 1.5mg bid. Continue PRN Decadron swish/spit, instructed to avoid swallowing to prevent systemic absorption of steroid. Moisturize 2-3x/day - 3/10/20: no acute GVHD. Chronic GVHD eyes score 3, lung score 2, genital tract score 1 - overall severe extensive chronic GVHD. FK increased to 1mg bid and initiated FAM therapy as below for lung GVHD - 4/9/20: no acute GVHD. Chronic GVHD eyes score 2, lung score 2, genital tract score 1 - overall severe extensive chronic GVHD. Continue FK 1mg bid and FAM therapy - 5/6/20: no acute GVHD. Chronic GVHD mouth score 1, eyes score 2, lungs score 2, genitalia score 1 - overall severe extensive chronic GVHD. SOB on exertion somewhat improved. Continue FK 1mg bid and FAM therapy 6/10/20: no acute GVHD. Chronic GVHD mouth score 1, eyes score 2, lung score 2, genitalia score 1 - overall severe extensive chronic GVHD. Worsening mucositis, FK increased to 1.5mg bid Diagnosed with chronic eczema - Dupixent injections and hydroxyzine tablets.   cGVHD eyes Wearing scleral lenses at this time Continue PRN artificial tears and Systane ophthalmic ointment Continue f/u with optho  cGVHD mouth improved Continue Decadron rinse prn Continue to monitor closely Resolved  cGVHD lung?? 3/10/20 PFT with FEV1 of 48% (Repeated outpatient 4/2021- Nor esults available for review_ 5/6/20 CT chest: mild diffuse mosaic attenuation pattern to the lungs. ?BOOP, air trapping, small vessel disease. To consider repeat CT imaging in 3-4mo PFT repeated on 4/8/21: Pulmonary function test are severe obstruction with no bronchodilatation response Continue FAM therapy, initiated 3/10/20 (pt already on monteleukast for asthma/allergies) - Flovent inhaler 440 mcg bid - Azithromycin 250 mg- take one tablet Monday/Wednesday/Friday - Monteleukast 10 mg daily -f/u pulm eval and pft..improved  cGVHD vaginal area.. Sensation of skin tightening and dryness of vaginal area May use lubricant to gently massage external labia to soften tissue using dilators Scheduled for a procedure with obgyn on 7/24/24  cGVHD joints Noted 7/15/20; pain causing limited ROM of shoulders, elbows, wrists, finger joints. Unable to pick things up off of the floor without pain..ROM improved Continue to monitor closely  5) GI Continue ppx: - Protonix 40 mg daily PRN Zofran for nausea  6) Other Hypomagnesemia - likely 2/2 tacrolimus, decrease magnesium oxide 400 mg daily on 6/27/24. GYN - LMP November 2019, continue Sprintec daily, instructed to wear condoms if resuming sexual activity. Received 2 HPV vaccines so far.  Encouraged patient to receive Shingrix vaccine.  HTN - continue amlodipine 5mg daily, BP remains WNL Warts - likely viral warts on hand in setting of immunosuppression, continue to monitor closely. RESOLVED. Health maintenance - 1yr post transplant ECHO done 3/10/20 WNL, EF = 60%..well care and cancer screening stressed  8) Plan/Dispo Okayed pt to eat out and see family/friends with caution Recommend patient to follow up with pulmonologist  Pt educated regarding plan of care, all questions/concerns addressed Instructed to contact our office with fever or new/worsening symptoms Medications sent to mail away pharmacy  s/p covid moderna X3...last childhood vaccines due in dec 2023 started discussion of post transplant childhood boosters...schedule given to pt Follow up with Dr. Lou in 4 months with blood work

## 2024-09-19 LAB
CD3 AND CD33 ENRICHMENT: NORMAL
ENGRAFTMNET-POST: NORMAL

## 2024-09-23 ENCOUNTER — TRANSCRIPTION ENCOUNTER (OUTPATIENT)
Age: 39
End: 2024-09-23

## 2024-10-08 ENCOUNTER — RX RENEWAL (OUTPATIENT)
Age: 39
End: 2024-10-08

## 2024-10-25 ENCOUNTER — RX RENEWAL (OUTPATIENT)
Age: 39
End: 2024-10-25

## 2024-11-18 ENCOUNTER — RX RENEWAL (OUTPATIENT)
Age: 39
End: 2024-11-18

## 2024-12-23 ENCOUNTER — RX RENEWAL (OUTPATIENT)
Age: 39
End: 2024-12-23

## 2025-01-07 ENCOUNTER — RESULT REVIEW (OUTPATIENT)
Age: 40
End: 2025-01-07

## 2025-01-07 ENCOUNTER — APPOINTMENT (OUTPATIENT)
Dept: HEMATOLOGY ONCOLOGY | Facility: CLINIC | Age: 40
End: 2025-01-07
Payer: COMMERCIAL

## 2025-01-07 VITALS
WEIGHT: 204.99 LBS | HEART RATE: 95 BPM | SYSTOLIC BLOOD PRESSURE: 131 MMHG | BODY MASS INDEX: 37.49 KG/M2 | OXYGEN SATURATION: 99 % | TEMPERATURE: 97.2 F | DIASTOLIC BLOOD PRESSURE: 90 MMHG | RESPIRATION RATE: 16 BRPM

## 2025-01-07 DIAGNOSIS — C92.01 ACUTE MYELOBLASTIC LEUKEMIA, IN REMISSION: ICD-10-CM

## 2025-01-07 DIAGNOSIS — Z94.84 STEM CELLS TRANSPLANT STATUS: ICD-10-CM

## 2025-01-07 LAB
ALBUMIN SERPL ELPH-MCNC: 3.8 G/DL
ALP BLD-CCNC: 41 U/L
ALT SERPL-CCNC: 10 U/L
ANION GAP SERPL CALC-SCNC: 12 MMOL/L
AST SERPL-CCNC: 22 U/L
BILIRUB SERPL-MCNC: 0.2 MG/DL
BUN SERPL-MCNC: 8 MG/DL
CALCIUM SERPL-MCNC: 9.3 MG/DL
CHLORIDE SERPL-SCNC: 106 MMOL/L
CO2 SERPL-SCNC: 21 MMOL/L
CREAT SERPL-MCNC: 1.18 MG/DL
EGFR: 61 ML/MIN/1.73M2
GLUCOSE SERPL-MCNC: 81 MG/DL
LDH SERPL-CCNC: 262 U/L
MAGNESIUM SERPL-MCNC: 1.9 MG/DL
POTASSIUM SERPL-SCNC: 4.7 MMOL/L
PROT SERPL-MCNC: 6.5 G/DL
SODIUM SERPL-SCNC: 138 MMOL/L

## 2025-01-07 PROCEDURE — 99215 OFFICE O/P EST HI 40 MIN: CPT

## 2025-01-08 LAB
CD16+CD56+ CELLS # BLD: 110 CELLS/UL
CD16+CD56+ CELLS NFR BLD: 6 %
CD19 CELLS NFR BLD: 185 CELLS/UL
CD3 CELLS # BLD: 1636 CELLS/UL
CD3 CELLS NFR BLD: 84 %
CD3+CD4+ CELLS # BLD: 650 CELLS/UL
CD3+CD4+ CELLS NFR BLD: 33 %
CD3+CD4+ CELLS/CD3+CD8+ CLL SPEC: 0.68 RATIO
CD3+CD8+ CELLS # SPEC: 958 CELLS/UL
CD3+CD8+ CELLS NFR BLD: 49 %
CELLS.CD3-CD19+/CELLS IN BLOOD: 10 %
CMV DNA SPEC QL NAA+PROBE: NOT DETECTED IU/ML
CMVPCR LOG: NOT DETECTED LOG10IU/ML
DEPRECATED KAPPA LC FREE/LAMBDA SER: 1.32 RATIO
IGA SER QL IEP: 77 MG/DL
IGG SER QL IEP: 911 MG/DL
IGM SER QL IEP: 102 MG/DL
KAPPA LC CSF-MCNC: 0.74 MG/DL
KAPPA LC SERPL-MCNC: 0.98 MG/DL

## 2025-01-13 ENCOUNTER — TRANSCRIPTION ENCOUNTER (OUTPATIENT)
Age: 40
End: 2025-01-13

## 2025-01-13 RX ORDER — AZITHROMYCIN 250 MG/1
250 TABLET, FILM COATED ORAL
Qty: 45 | Refills: 3 | Status: ACTIVE | COMMUNITY
Start: 2025-01-13 | End: 1900-01-01

## 2025-01-14 LAB
CD3 AND CD33 ENRICHMENT: NORMAL
ENGRAFTMNET-POST: NORMAL

## 2025-01-30 ENCOUNTER — NON-APPOINTMENT (OUTPATIENT)
Age: 40
End: 2025-01-30

## 2025-03-18 ENCOUNTER — TRANSCRIPTION ENCOUNTER (OUTPATIENT)
Age: 40
End: 2025-03-18